# Patient Record
Sex: FEMALE | Race: WHITE | NOT HISPANIC OR LATINO | Employment: UNEMPLOYED | URBAN - METROPOLITAN AREA
[De-identification: names, ages, dates, MRNs, and addresses within clinical notes are randomized per-mention and may not be internally consistent; named-entity substitution may affect disease eponyms.]

---

## 2017-11-13 ENCOUNTER — HOSPITAL ENCOUNTER (EMERGENCY)
Facility: HOSPITAL | Age: 52
Discharge: HOME | End: 2017-11-13
Attending: EMERGENCY MEDICINE | Admitting: EMERGENCY MEDICINE

## 2017-11-13 VITALS
SYSTOLIC BLOOD PRESSURE: 138 MMHG | DIASTOLIC BLOOD PRESSURE: 83 MMHG | HEIGHT: 61 IN | WEIGHT: 199 LBS | RESPIRATION RATE: 20 BRPM | TEMPERATURE: 98.2 F | OXYGEN SATURATION: 100 % | BODY MASS INDEX: 37.57 KG/M2 | HEART RATE: 79 BPM

## 2017-11-13 DIAGNOSIS — K08.89 PAIN, DENTAL: Primary | ICD-10-CM

## 2017-11-13 PROCEDURE — 99282 EMERGENCY DEPT VISIT SF MDM: CPT

## 2017-11-13 RX ORDER — PENICILLIN V POTASSIUM 500 MG/1
500 TABLET ORAL 4 TIMES DAILY
Qty: 28 TABLET | Refills: 0 | Status: SHIPPED | OUTPATIENT
Start: 2017-11-13 | End: 2017-11-20

## 2017-11-13 RX ORDER — PENICILLIN V POTASSIUM 250 MG/1
500 TABLET ORAL ONCE
Status: COMPLETED | OUTPATIENT
Start: 2017-11-13 | End: 2017-11-13

## 2017-11-13 RX ORDER — NAPROXEN 250 MG/1
500 TABLET ORAL ONCE
Status: COMPLETED | OUTPATIENT
Start: 2017-11-13 | End: 2017-11-13

## 2017-11-13 RX ORDER — OXYCODONE HYDROCHLORIDE AND ACETAMINOPHEN 5; 325 MG/1; MG/1
1 TABLET ORAL EVERY 4 HOURS PRN
Status: DISCONTINUED | OUTPATIENT
Start: 2017-11-13 | End: 2017-11-13 | Stop reason: HOSPADM

## 2017-11-13 RX ORDER — NAPROXEN 500 MG/1
500 TABLET ORAL 2 TIMES DAILY WITH MEALS
Qty: 30 TABLET | Refills: 0 | Status: SHIPPED | OUTPATIENT
Start: 2017-11-13 | End: 2019-06-27

## 2017-11-13 RX ADMIN — NAPROXEN 500 MG: 250 TABLET ORAL at 13:56

## 2017-11-13 RX ADMIN — PENICILLIN V POTASSIUM 500 MG: 250 TABLET, FILM COATED ORAL at 13:56

## 2017-11-13 RX ADMIN — OXYCODONE HYDROCHLORIDE AND ACETAMINOPHEN 1 TABLET: 5; 325 TABLET ORAL at 13:55

## 2017-11-13 NOTE — ED NOTES
D/c instructions and rx reviewed w/ pt  All questions and concerns addressed at this time         Marene Bugler, RN  11/13/17 1400

## 2017-11-13 NOTE — ED PROVIDER NOTES
Pt Name: Madelin Mcbride  MRN: [id number]  [place] 1965  Age/Sex: 46 y o  female  Date of evaluation: 11/13/2017  PCP: Jose Magaña, [address] [place] [address]    Chief Complaint   Patient presents with    Dental Pain     Pt states she started to have a dental abscess yesterday morning  Pt is now c/o pain radiating from mouth to neck  Pt states "I had some left over cipro and starting taking that"  Pt states she had a 103 fever this morning         RADHA Lorenz presents to the Emergency Department complaining of right sided dental pain  She took motrin and left over cipro at home with no relief  She called her dentist and cannot be seen until next week  Patient reports that she has had this in the past and that she required admission at some point for a severe infection  HPI      Past Medical and Surgical History    Past Medical History:   Diagnosis Date    Back pain     Fibromyalgia     Lupus        Past Surgical History:   Procedure Laterality Date    HYSTERECTOMY      NECK SURGERY         History reviewed  No pertinent family history  Social History   Substance Use Topics    Smoking status: Current Every Day Smoker     Packs/day: 0 50     Types: Cigarettes    Smokeless tobacco: Never Used    Alcohol use No              Allergies    Allergies   Allergen Reactions    Valium [Diazepam] Anaphylaxis       Home Medications    Prior to Admission medications    Medication Sig Start Date End Date Taking? Authorizing Provider   oxyCODONE-acetaminophen (PERCOCET) 5-325 mg per tablet Take 1 tablet by mouth every 6 (six) hours as needed for moderate pain Max Daily Amount: 4 tablets 10/29/16 11/13/17  Katheryn Figueroa PA-C   UNKNOWN TO PATIENT   11/13/17  Historical Provider, MD           Review of Systems    Review of Systems   Constitutional: Negative for activity change, appetite change, chills, diaphoresis, fatigue and fever  HENT: Positive for dental problem and ear pain   Negative for congestion, drooling, facial swelling, postnasal drip, rhinorrhea, sinus pressure, sneezing and sore throat  Eyes: Negative for pain and visual disturbance  Respiratory: Negative for cough, chest tightness and shortness of breath  Cardiovascular: Negative for chest pain, palpitations and leg swelling  Gastrointestinal: Negative for abdominal distention, abdominal pain, constipation, diarrhea, nausea and vomiting  Endocrine: Negative for polydipsia, polyphagia and polyuria  Genitourinary: Negative for decreased urine volume, difficulty urinating, dysuria, flank pain, frequency and hematuria  Musculoskeletal: Negative for arthralgias, gait problem, joint swelling and neck pain  Skin: Negative for pallor and rash  Allergic/Immunologic: Negative for immunocompromised state  Neurological: Negative for syncope, speech difficulty, weakness, light-headedness, numbness and headaches  All other systems reviewed and are negative  Physical Exam      ED Triage Vitals [11/13/17 1249]   Temperature Pulse Respirations Blood Pressure SpO2   98 2 °F (36 8 °C) 79 20 138/83 100 %      Temp Source Heart Rate Source Patient Position - Orthostatic VS BP Location FiO2 (%)   Oral Monitor Sitting Left arm --      Pain Score       9               Physical Exam   Constitutional: She is oriented to person, place, and time  She appears well-developed and well-nourished  No distress  HENT:   Head: Normocephalic and atraumatic  Nose: Nose normal    Mouth/Throat: Oropharynx is clear and moist and mucous membranes are normal  No oral lesions  No trismus in the jaw  No dental abscesses  No oropharyngeal exudate, posterior oropharyngeal edema, posterior oropharyngeal erythema or tonsillar abscesses  Eyes: Conjunctivae, EOM and lids are normal  Pupils are equal, round, and reactive to light  Neck: Normal range of motion and phonation normal  Neck supple  No tracheal tenderness present  No neck rigidity   No tracheal deviation, no edema, no erythema and normal range of motion present  Cardiovascular: Normal rate, regular rhythm and normal heart sounds  Exam reveals no gallop and no friction rub  No murmur heard  Pulmonary/Chest: Effort normal and breath sounds normal  No accessory muscle usage or stridor  No respiratory distress  She has no wheezes  She has no rales  Abdominal: Soft  She exhibits no distension  There is no tenderness  There is no rebound and no guarding  Neurological: She is alert and oriented to person, place, and time  No cranial nerve deficit or sensory deficit  Skin: Skin is warm and dry  No rash noted  She is not diaphoretic  No erythema  Psychiatric: She has a normal mood and affect  Her speech is normal and behavior is normal  Judgment and thought content normal    Nursing note and vitals reviewed  Assessment and Plan    Evens Valerio is a 46 y o  female who presents with dental pain  Physical examination unremarkable  Differential diagnosis (not completely inclusive) includes dental caries/ infection  Plan will be to rx abx, treat symptomatically and follow up with dental clinic as an outpatient  MDM  Number of Diagnoses or Management Options  Pain, dental:   Diagnosis management comments: No sublingual swelling  No submandibular swelling  Patient is tolerating secretions well  She is afebrile here and non-toxic          Diagnostic Results        Labs:        Radiology:    No orders to display       All radiology studies independently viewed by me and interpreted by the radiologist     Procedure    Procedures    CritCare Time      ED Course of Care and Re-Assessments      Medications   oxyCODONE-acetaminophen (PERCOCET) 5-325 mg per tablet 1 tablet (1 tablet Oral Given 11/13/17 1355)   penicillin V potassium (VEETID) tablet 500 mg (500 mg Oral Given 11/13/17 1356)   naproxen (NAPROSYN) tablet 500 mg (500 mg Oral Given 11/13/17 1356)           FINAL IMPRESSION    Final diagnoses:   Pain, dental         DISPOSITION/PLAN    Time reflects when diagnosis was documented in both MDM as applicable and the Disposition within this note     Time User Action Codes Description Comment    11/13/2017  1:51 PM Othel Minor Add [K08 89] Pain, dental       ED Disposition     ED Disposition Condition Comment    Discharge  [address]  discharge to home/self care      Condition at discharge: Good        Follow-up Information     Follow up With Specialties Details Why Contact Info Additional [facility] [facility]  Schedule an appointment as soon as possible for a visit  [facility] [facility] Adult and [address]  Schedule an appointment as soon as possible for a visit  [facility] 81 [address] [facility] Emergency Department Emergency Medicine Go to As needed, If symptoms worsen [address] [place] [address] ED, [address] [place], [place], [address]            PATIENT REFERRED TO:    [address]  [address]  [phone]  Schedule an appointment as soon as possible for a visit      [facility] Adult and [address] [facility]  [address]  [phone]  Schedule an appointment as soon as possible for a visit      [address] [facility] Emergency Department  [address] [facility] [address] [phone]  Go to  As needed, If symptoms worsen      DISCHARGE MEDICATIONS:    Discharge Medication List as of 11/13/2017  1:54 PM      START taking these medications    Details   naproxen (NAPROSYN) 500 mg tablet Take 1 tablet by mouth 2 (two) times a day with meals, Starting Mon 11/13/2017, Print      penicillin V potassium (VEETID) 500 mg tablet Take 1 tablet by mouth 4 (four) times a day for 7 days, Starting Mon 11/13/2017, Until Mon 11/20/2017, Print             No discharge procedures on file           Mena Goodpasture, DO       Mena Goodpasture, DO  11/13/17 1500

## 2017-11-13 NOTE — DISCHARGE INSTRUCTIONS
Toothache   WHAT YOU NEED TO KNOW:   A toothache is pain that is caused by irritation of the nerves in the center of your tooth  The irritation may be caused by several problems, such as a cavity, an infection, a cracked tooth, or gum disease  It is very important to follow up with your dentist so the cause of your toothache can be diagnosed and treated  This can help prevent more serious problems  DISCHARGE INSTRUCTIONS:   Medicines: You may  need any of the following:  · NSAIDs  decrease swelling and pain  This medicine can be bought with or without a doctor's order  This medicine can cause stomach bleeding or kidney problems in certain people  If you take blood thinner medicine, always ask your healthcare provider if NSAIDs are safe for you  Always read the medicine label and follow the directions on it before using this medicine  · Acetaminophen  decreases pain  It is available without a doctor's order  Ask how much to take and how often to take it  Follow directions  Acetaminophen can cause liver damage if not taken correctly  · Pain medicine  may be given as a pill or as medicine that you put directly on your tooth or gums  Do not wait until the pain is severe before you take this medicine  · Antibiotics  help fight or prevent an infection caused by bacteria  Take them as directed  · Take your medicine as directed  Contact your healthcare provider if you think your medicine is not helping or if you have side effects  Tell him of her if you are allergic to any medicine  Keep a list of the medicines, vitamins, and herbs you take  Include the amounts, and when and why you take them  Bring the list or the pill bottles to follow-up visits  Carry your medicine list with you in case of an emergency  Follow up with your dentist as directed: You may be referred to a dental surgeon  Write down your questions so you remember to ask them during your visits     Self-care:   · Rinse your mouth with warm salt water 4 times a day or as directed  · You may need to eat soft foods to help relieve pain caused by chewing  Contact your dentist if:   · You have questions or concerns about your condition or care  Return to the emergency department if:   · You have trouble breathing  · You have swelling in your face or neck  · You have a fever and chills  · You have trouble speaking or swallowing  · You have trouble opening or closing your mouth  © 2017 [facility] Information is for End User's use only and may not be sold, redistributed or otherwise used for commercial purposes  All illustrations and images included in CareNotes® are the copyrighted property of A D A M , Inc  or Valente Rangel  The above information is an  only  It is not intended as medical advice for individual conditions or treatments  Talk to your doctor, nurse or pharmacist before following any medical regimen to see if it is safe and effective for you

## 2019-06-27 ENCOUNTER — HOSPITAL ENCOUNTER (EMERGENCY)
Facility: HOSPITAL | Age: 54
Discharge: HOME | End: 2019-06-28
Attending: EMERGENCY MEDICINE

## 2019-06-27 ENCOUNTER — APPOINTMENT (EMERGENCY)
Dept: RADIOLOGY | Facility: HOSPITAL | Age: 54
End: 2019-06-27

## 2019-06-27 VITALS
TEMPERATURE: 98.7 F | DIASTOLIC BLOOD PRESSURE: 74 MMHG | HEART RATE: 79 BPM | SYSTOLIC BLOOD PRESSURE: 162 MMHG | OXYGEN SATURATION: 95 % | RESPIRATION RATE: 16 BRPM | WEIGHT: 197.97 LBS | BODY MASS INDEX: 37.41 KG/M2

## 2019-06-27 DIAGNOSIS — M54.50 LOW BACK PAIN: ICD-10-CM

## 2019-06-27 DIAGNOSIS — M25.551 ACUTE RIGHT HIP PAIN: Primary | ICD-10-CM

## 2019-06-27 LAB
ANION GAP SERPL CALCULATED.3IONS-SCNC: 9 MMOL/L (ref 4–13)
BASOPHILS # BLD AUTO: 0.04 THOUSANDS/ΜL (ref 0–0.1)
BASOPHILS NFR BLD AUTO: 1 % (ref 0–1)
BUN SERPL-MCNC: 15 MG/DL (ref 5–25)
CALCIUM SERPL-MCNC: 9 MG/DL (ref 8.3–10.1)
CHLORIDE SERPL-SCNC: 103 MMOL/L (ref 100–108)
CO2 SERPL-SCNC: 32 MMOL/L (ref 21–32)
CREAT SERPL-MCNC: 0.77 MG/DL (ref 0.6–1.3)
CRP SERPL QL: 81.3 MG/L
EOSINOPHIL # BLD AUTO: 0.15 THOUSAND/ΜL (ref 0–0.61)
EOSINOPHIL NFR BLD AUTO: 2 % (ref 0–6)
ERYTHROCYTE [DISTWIDTH] IN BLOOD BY AUTOMATED COUNT: 13.5 % (ref 11.6–15.1)
GFR SERPL CREATININE-BSD FRML MDRD: 88 ML/MIN/1.73SQ M
GLUCOSE SERPL-MCNC: 109 MG/DL (ref 65–140)
HCT VFR BLD AUTO: 35.6 % (ref 34.8–46.1)
HGB BLD-MCNC: 11.6 G/DL (ref 11.5–15.4)
IMM GRANULOCYTES # BLD AUTO: 0.03 THOUSAND/UL (ref 0–0.2)
IMM GRANULOCYTES NFR BLD AUTO: 0 % (ref 0–2)
LYMPHOCYTES # BLD AUTO: 1.48 THOUSANDS/ΜL (ref 0.6–4.47)
LYMPHOCYTES NFR BLD AUTO: 21 % (ref 14–44)
MCH RBC QN AUTO: 28.9 PG (ref 26.8–34.3)
MCHC RBC AUTO-ENTMCNC: 32.6 G/DL (ref 31.4–37.4)
MCV RBC AUTO: 89 FL (ref 82–98)
MONOCYTES # BLD AUTO: 0.75 THOUSAND/ΜL (ref 0.17–1.22)
MONOCYTES NFR BLD AUTO: 11 % (ref 4–12)
NEUTROPHILS # BLD AUTO: 4.56 THOUSANDS/ΜL (ref 1.85–7.62)
NEUTS SEG NFR BLD AUTO: 65 % (ref 43–75)
NRBC BLD AUTO-RTO: 0 /100 WBCS
PLATELET # BLD AUTO: 274 THOUSANDS/UL (ref 149–390)
PMV BLD AUTO: 9.1 FL (ref 8.9–12.7)
POTASSIUM SERPL-SCNC: 2.9 MMOL/L (ref 3.5–5.3)
RBC # BLD AUTO: 4.02 MILLION/UL (ref 3.81–5.12)
SODIUM SERPL-SCNC: 144 MMOL/L (ref 136–145)
WBC # BLD AUTO: 7.01 THOUSAND/UL (ref 4.31–10.16)

## 2019-06-27 PROCEDURE — 80048 BASIC METABOLIC PNL TOTAL CA: CPT | Performed by: EMERGENCY MEDICINE

## 2019-06-27 PROCEDURE — 36415 COLL VENOUS BLD VENIPUNCTURE: CPT | Performed by: EMERGENCY MEDICINE

## 2019-06-27 PROCEDURE — 99283 EMERGENCY DEPT VISIT LOW MDM: CPT | Performed by: EMERGENCY MEDICINE

## 2019-06-27 PROCEDURE — 73502 X-RAY EXAM HIP UNI 2-3 VIEWS: CPT

## 2019-06-27 PROCEDURE — 85025 COMPLETE CBC W/AUTO DIFF WBC: CPT | Performed by: EMERGENCY MEDICINE

## 2019-06-27 PROCEDURE — 99284 EMERGENCY DEPT VISIT MOD MDM: CPT

## 2019-06-27 PROCEDURE — 86140 C-REACTIVE PROTEIN: CPT | Performed by: EMERGENCY MEDICINE

## 2019-06-27 PROCEDURE — 85652 RBC SED RATE AUTOMATED: CPT | Performed by: EMERGENCY MEDICINE

## 2019-06-27 RX ORDER — OXYCODONE HYDROCHLORIDE AND ACETAMINOPHEN 5; 325 MG/1; MG/1
1 TABLET ORAL ONCE
Status: COMPLETED | OUTPATIENT
Start: 2019-06-27 | End: 2019-06-27

## 2019-06-27 RX ADMIN — OXYCODONE HYDROCHLORIDE AND ACETAMINOPHEN 1 TABLET: 5; 325 TABLET ORAL at 22:06

## 2019-06-28 ENCOUNTER — APPOINTMENT (EMERGENCY)
Dept: CT IMAGING | Facility: HOSPITAL | Age: 54
End: 2019-06-28

## 2019-06-28 LAB — ERYTHROCYTE [SEDIMENTATION RATE] IN BLOOD: 45 MM/HOUR (ref 0–20)

## 2019-06-28 PROCEDURE — 74177 CT ABD & PELVIS W/CONTRAST: CPT

## 2019-06-28 PROCEDURE — 96374 THER/PROPH/DIAG INJ IV PUSH: CPT

## 2019-06-28 RX ORDER — IBUPROFEN 800 MG/1
800 TABLET ORAL 3 TIMES DAILY
Qty: 21 TABLET | Refills: 0 | Status: SHIPPED | OUTPATIENT
Start: 2019-06-28

## 2019-06-28 RX ORDER — OXYCODONE HYDROCHLORIDE AND ACETAMINOPHEN 5; 325 MG/1; MG/1
1 TABLET ORAL EVERY 6 HOURS PRN
Qty: 12 TABLET | Refills: 0 | Status: SHIPPED | OUTPATIENT
Start: 2019-06-28 | End: 2019-07-01

## 2019-06-28 RX ORDER — MORPHINE SULFATE 10 MG/ML
6 INJECTION, SOLUTION INTRAMUSCULAR; INTRAVENOUS ONCE
Status: COMPLETED | OUTPATIENT
Start: 2019-06-28 | End: 2019-06-28

## 2019-06-28 RX ADMIN — IOHEXOL 100 ML: 350 INJECTION, SOLUTION INTRAVENOUS at 00:02

## 2019-06-28 RX ADMIN — MORPHINE SULFATE 6 MG: 10 INJECTION INTRAVENOUS at 00:15

## 2019-06-28 NOTE — ED PROVIDER NOTES
History  Chief Complaint   Patient presents with    Back Pain     States lower back pain that radiates to R hip, R groin and R front and inside of R leg x 4 days  Doesnt recall injury  States stood up today and fell, denies hitting head  HPI  15-year-old female with history of achalasia, chronic lower back pain, fibromyalgia, and lupus presents to the emergency department with right groin pain  Patient states that for the past few days she has had severe pain radiating from the lateral aspect of her right hip into her right groin  She has not noted any overlying skin changes  She denies having had similar pain in the past   She has been taking Motrin at home without improvement in pain and states that pain has been worse with weight-bearing  Today pain was so severe that she fell after standing up  She denies any injury resulting from the fall and otherwise denies any recent injury to right hip, back, or right leg  On review of systems, patient complains of low-grade fever with of high of 100 6 within the past 2 days  She denies any weakness, numbness, paresthesias, change in urinary/bowel function, or complaints other than stated above  She denies IVDU  None       Past Medical History:   Diagnosis Date    Achalasia     Back pain     Fibromyalgia     Lupus ([facility] [address] )        Past Surgical History:   Procedure Laterality Date    HYSTERECTOMY      NECK SURGERY         History reviewed  No pertinent family history  I have reviewed and agree with the history as documented  Social History     Tobacco Use    Smoking status: Current Every Day Smoker     Packs/day: 0 50     Types: Cigarettes    Smokeless tobacco: Never Used   Substance Use Topics    Alcohol use: No    Drug use: No        Review of Systems   Constitutional: Positive for fever  Negative for chills  Respiratory: Negative for shortness of breath  Gastrointestinal: Negative for abdominal pain, nausea and vomiting     Musculoskeletal: Positive for arthralgias, back pain and gait problem  Negative for joint swelling  Skin: Negative for rash and wound  Allergic/Immunologic: Negative for immunocompromised state  Neurological: Negative for headaches  Psychiatric/Behavioral: The patient is not nervous/anxious  All other systems reviewed and are negative  Physical Exam  Physical Exam   Constitutional: She is oriented to person, place, and time  She appears well-nourished  No distress  HENT:   Head: Normocephalic and atraumatic  Eyes: EOM are normal    Neck: Normal range of motion  Neck supple  Cardiovascular: Normal rate and regular rhythm  Pulmonary/Chest: Effort normal and breath sounds normal  No respiratory distress  Abdominal: Soft  She exhibits no distension  There is no tenderness  Hernia confirmed negative in the right inguinal area  Musculoskeletal: Normal range of motion  Right hip: She exhibits tenderness  Legs:  Neurological: She is alert and oriented to person, place, and time  Skin: Skin is warm and dry  She is not diaphoretic  Psychiatric: She has a normal mood and affect  Her behavior is normal    Nursing note and vitals reviewed        Vital Signs  ED Triage Vitals [06/27/19 2048]   Temperature Pulse Respirations Blood Pressure SpO2   98 7 °F (37 1 °C) 79 16 162/74 95 %      Temp Source Heart Rate Source Patient Position - Orthostatic VS BP Location FiO2 (%)   Oral Monitor Sitting Left arm --      Pain Score       7           Vitals:    06/27/19 2048   BP: 162/74   Pulse: 79   Patient Position - Orthostatic VS: Sitting         Visual Acuity      ED Medications  Medications   oxyCODONE-acetaminophen (PERCOCET) 5-325 mg per tablet 1 tablet (1 tablet Oral Given 6/27/19 2206)   morphine (PF) 10 mg/mL injection 6 mg (6 mg Intravenous Given 6/28/19 0015)   iohexol (OMNIPAQUE) 350 MG/ML injection (MULTI-DOSE) 100 mL (100 mL Intravenous Given 6/28/19 0002)       Diagnostic Studies  Results Reviewed Procedure Component Value Units Date/Time    Sedimentation rate, automated [[id number]]  (Abnormal) Collected:  06/27/19 2247    Lab Status:  Final result Specimen:  Blood from Arm, Left Updated:  06/28/19 0016     Sed Rate 45 mm/hour     Basic metabolic panel [[id number]]  (Abnormal) Collected:  06/27/19 2247    Lab Status:  Final result Specimen:  Blood from Arm, Left Updated:  06/27/19 2348     Sodium 144 mmol/L      Potassium 2 9 mmol/L      Chloride 103 mmol/L      CO2 32 mmol/L      ANION GAP 9 mmol/L      BUN 15 mg/dL      Creatinine 0 77 mg/dL      Glucose 109 mg/dL      Calcium 9 0 mg/dL      eGFR 88 ml/min/1 73sq m     Narrative:       [place] guidelines for Chronic Kidney Disease (CKD):     Stage 1 with normal or high GFR (GFR > 90 mL/min/1 73 square meters)    Stage 2 Mild CKD (GFR = 60-89 mL/min/1 73 square meters)    Stage 3A Moderate CKD (GFR = 45-59 mL/min/1 73 square meters)    Stage 3B Moderate CKD (GFR = 30-44 mL/min/1 73 square meters)    Stage 4 Severe CKD (GFR = 15-29 mL/min/1 73 square meters)    Stage 5 End Stage CKD (GFR <15 mL/min/1 73 square meters)  Note: GFR calculation is accurate only with a steady state creatinine    C-reactive protein [[id number]]  (Abnormal) Collected:  06/27/19 2247    Lab Status:  Final result Specimen:  Blood from Arm, Left Updated:  06/27/19 2348     CRP 81 3 mg/L     CBC and differential [[id number]] Collected:  06/27/19 2247    Lab Status:  Final result Specimen:  Blood from Arm, Left Updated:  06/27/19 2301     WBC 7 01 Thousand/uL      RBC 4 02 Million/uL      Hemoglobin 11 6 g/dL      Hematocrit 35 6 %      MCV 89 fL      MCH 28 9 pg      MCHC 32 6 g/dL      RDW 13 5 %      MPV 9 1 fL      Platelets 490 Thousands/uL      nRBC 0 /100 WBCs      Neutrophils Relative 65 %      Immat GRANS % 0 %      Lymphocytes Relative 21 %      Monocytes Relative 11 %      Eosinophils Relative 2 %      Basophils Relative 1 %      Neutrophils Absolute 4 56 Thousands/µL      Immature Grans Absolute 0 03 Thousand/uL      Lymphocytes Absolute 1 48 Thousands/µL      Monocytes Absolute 0 75 Thousand/µL      Eosinophils Absolute 0 15 Thousand/µL      Basophils Absolute 0 04 Thousands/µL                  CT abdomen pelvis with contrast   Final Result by Atilio Baeza MD (06/28 0031)         1  No evidence of bowel obstruction, colitis or diverticulitis  2   No pyelonephritis or obstructive uropathy  3   Stable degenerative change in the sacroiliac joints and lower lumbar spine  Workstation performed: [id number]         XR hip/pelv 2-3 vws right if performed   Final Result by Ricardo Roe MD (06/28 6906)      No acute osseous abnormality  Workstation performed: [id number]                    Procedures  Procedures       ED Course  ED Course as of Jul 03 0336 Fri Jun 28, 2019   0000 C-REACTIVE PROTEIN(!): 81 3   0026 Sed Rate(!): 45             MDM    Disposition  Final diagnoses:   Acute right hip pain   Low back pain     Time reflects when diagnosis was documented in both MDM as applicable and the Disposition within this note     Time User Action Codes Description Comment    6/28/2019 12:42 AM Goyo Nuñezots Add [M25 551] Acute right hip pain     6/28/2019 12:44 AM Goyo Nuñezots Add [M54 5] Low back pain       ED Disposition     ED Disposition Condition Date/Time Comment    Discharge Stable Fri Jun 28, 2019 12:42 AM Jessica Navarro discharge to home/self care              Follow-up Information     Follow up With Specialties Details Why Contact Info Additional [address] [facility] Orthopedic Surgery Schedule an appointment as soon as possible for a visit   [address] [facility] [address] [phone] [address] Specialists [place], [address], [employer], [place], [address] [facility] Emergency Department Emergency Medicine  As needed, If symptoms worsen 34 [facility] [place] [address] [phone] [place] ED, 819 [facility], [place], [place], [address]          Discharge Medication List as of 6/28/2019 12:45 AM      START taking these medications    Details   ibuprofen (MOTRIN) 800 mg tablet Take 1 tablet (800 mg total) by mouth 3 (three) times a day, Starting Fri 6/28/2019, Normal      oxyCODONE-acetaminophen (PERCOCET) 5-325 mg per tablet Take 1 tablet by mouth every 6 (six) hours as needed for severe pain for up to 3 daysMax Daily Amount: 4 tablets, Starting Fri 6/28/2019, Until Mon 7/1/2019, Print           No discharge procedures on file      ED Provider  Electronically Signed by           Josefina Altamirano MD  07/03/19 0891

## 2021-04-14 ENCOUNTER — HOSPITAL ENCOUNTER (EMERGENCY)
Facility: HOSPITAL | Age: 56
Discharge: HOME | End: 2021-04-14
Attending: EMERGENCY MEDICINE

## 2021-04-14 ENCOUNTER — APPOINTMENT (EMERGENCY)
Dept: CT IMAGING | Facility: HOSPITAL | Age: 56
End: 2021-04-14

## 2021-04-14 VITALS
DIASTOLIC BLOOD PRESSURE: 65 MMHG | HEART RATE: 89 BPM | OXYGEN SATURATION: 99 % | RESPIRATION RATE: 18 BRPM | HEIGHT: 61 IN | TEMPERATURE: 97.1 F | BODY MASS INDEX: 35.87 KG/M2 | SYSTOLIC BLOOD PRESSURE: 145 MMHG | WEIGHT: 190 LBS

## 2021-04-14 DIAGNOSIS — N39.0 URINARY TRACT INFECTION WITHOUT HEMATURIA, SITE UNSPECIFIED: Primary | ICD-10-CM

## 2021-04-14 DIAGNOSIS — R10.13 EPIGASTRIC PAIN: ICD-10-CM

## 2021-04-14 DIAGNOSIS — R10.11 RIGHT UPPER QUADRANT ABDOMINAL PAIN: ICD-10-CM

## 2021-04-14 LAB
ALBUMIN SERPL BCP-MCNC: 3.4 G/DL (ref 3.5–5)
ALP SERPL-CCNC: 123 U/L (ref 46–116)
ALT SERPL W P-5'-P-CCNC: 22 U/L (ref 12–78)
ANION GAP SERPL CALCULATED.3IONS-SCNC: 9 MMOL/L (ref 4–13)
AST SERPL W P-5'-P-CCNC: 31 U/L (ref 5–45)
BACTERIA UR QL AUTO: ABNORMAL /HPF
BASOPHILS # BLD AUTO: 0.05 THOUSANDS/ΜL (ref 0–0.1)
BASOPHILS NFR BLD AUTO: 1 % (ref 0–1)
BILIRUB SERPL-MCNC: 0.44 MG/DL (ref 0.2–1)
BILIRUB UR QL STRIP: NEGATIVE
BUN SERPL-MCNC: 13 MG/DL (ref 5–25)
CALCIUM ALBUM COR SERPL-MCNC: 9.2 MG/DL (ref 8.3–10.1)
CALCIUM SERPL-MCNC: 8.7 MG/DL (ref 8.3–10.1)
CHLORIDE SERPL-SCNC: 106 MMOL/L (ref 100–108)
CLARITY UR: CLEAR
CO2 SERPL-SCNC: 26 MMOL/L (ref 21–32)
COLOR UR: YELLOW
CREAT SERPL-MCNC: 0.64 MG/DL (ref 0.6–1.3)
EOSINOPHIL # BLD AUTO: 0.12 THOUSAND/ΜL (ref 0–0.61)
EOSINOPHIL NFR BLD AUTO: 1 % (ref 0–6)
ERYTHROCYTE [DISTWIDTH] IN BLOOD BY AUTOMATED COUNT: 14.6 % (ref 11.6–15.1)
GFR SERPL CREATININE-BSD FRML MDRD: 100 ML/MIN/1.73SQ M
GLUCOSE SERPL-MCNC: 90 MG/DL (ref 65–140)
GLUCOSE UR STRIP-MCNC: NEGATIVE MG/DL
HCT VFR BLD AUTO: 42.2 % (ref 34.8–46.1)
HGB BLD-MCNC: 13.4 G/DL (ref 11.5–15.4)
HGB UR QL STRIP.AUTO: NEGATIVE
IMM GRANULOCYTES # BLD AUTO: 0.04 THOUSAND/UL (ref 0–0.2)
IMM GRANULOCYTES NFR BLD AUTO: 0 % (ref 0–2)
KETONES UR STRIP-MCNC: NEGATIVE MG/DL
LEUKOCYTE ESTERASE UR QL STRIP: NEGATIVE
LIPASE SERPL-CCNC: 76 U/L (ref 73–393)
LYMPHOCYTES # BLD AUTO: 1.87 THOUSANDS/ΜL (ref 0.6–4.47)
LYMPHOCYTES NFR BLD AUTO: 18 % (ref 14–44)
MCH RBC QN AUTO: 28.8 PG (ref 26.8–34.3)
MCHC RBC AUTO-ENTMCNC: 31.8 G/DL (ref 31.4–37.4)
MCV RBC AUTO: 91 FL (ref 82–98)
MONOCYTES # BLD AUTO: 0.84 THOUSAND/ΜL (ref 0.17–1.22)
MONOCYTES NFR BLD AUTO: 8 % (ref 4–12)
NEUTROPHILS # BLD AUTO: 7.57 THOUSANDS/ΜL (ref 1.85–7.62)
NEUTS SEG NFR BLD AUTO: 72 % (ref 43–75)
NITRITE UR QL STRIP: POSITIVE
NON-SQ EPI CELLS URNS QL MICRO: ABNORMAL /HPF
NRBC BLD AUTO-RTO: 0 /100 WBCS
PH UR STRIP.AUTO: 6 [PH]
PLATELET # BLD AUTO: 335 THOUSANDS/UL (ref 149–390)
PMV BLD AUTO: 8.8 FL (ref 8.9–12.7)
POTASSIUM SERPL-SCNC: 4.6 MMOL/L (ref 3.5–5.3)
PROT SERPL-MCNC: 7.7 G/DL (ref 6.4–8.2)
PROT UR STRIP-MCNC: NEGATIVE MG/DL
RBC # BLD AUTO: 4.66 MILLION/UL (ref 3.81–5.12)
RBC #/AREA URNS AUTO: ABNORMAL /HPF
SODIUM SERPL-SCNC: 141 MMOL/L (ref 136–145)
SP GR UR STRIP.AUTO: 1.02 (ref 1–1.03)
UROBILINOGEN UR QL STRIP.AUTO: 2 E.U./DL
WBC # BLD AUTO: 10.49 THOUSAND/UL (ref 4.31–10.16)
WBC #/AREA URNS AUTO: ABNORMAL /HPF

## 2021-04-14 PROCEDURE — 83690 ASSAY OF LIPASE: CPT | Performed by: EMERGENCY MEDICINE

## 2021-04-14 PROCEDURE — 36415 COLL VENOUS BLD VENIPUNCTURE: CPT | Performed by: EMERGENCY MEDICINE

## 2021-04-14 PROCEDURE — 85025 COMPLETE CBC W/AUTO DIFF WBC: CPT | Performed by: EMERGENCY MEDICINE

## 2021-04-14 PROCEDURE — 99285 EMERGENCY DEPT VISIT HI MDM: CPT | Performed by: EMERGENCY MEDICINE

## 2021-04-14 PROCEDURE — 99284 EMERGENCY DEPT VISIT MOD MDM: CPT

## 2021-04-14 PROCEDURE — 96375 TX/PRO/DX INJ NEW DRUG ADDON: CPT

## 2021-04-14 PROCEDURE — 80053 COMPREHEN METABOLIC PANEL: CPT | Performed by: EMERGENCY MEDICINE

## 2021-04-14 PROCEDURE — 81001 URINALYSIS AUTO W/SCOPE: CPT | Performed by: EMERGENCY MEDICINE

## 2021-04-14 PROCEDURE — 74177 CT ABD & PELVIS W/CONTRAST: CPT

## 2021-04-14 PROCEDURE — 96374 THER/PROPH/DIAG INJ IV PUSH: CPT

## 2021-04-14 RX ORDER — ONDANSETRON 2 MG/ML
4 INJECTION INTRAMUSCULAR; INTRAVENOUS ONCE
Status: COMPLETED | OUTPATIENT
Start: 2021-04-14 | End: 2021-04-14

## 2021-04-14 RX ORDER — LIDOCAINE HYDROCHLORIDE 20 MG/ML
15 SOLUTION OROPHARYNGEAL ONCE
Status: COMPLETED | OUTPATIENT
Start: 2021-04-14 | End: 2021-04-14

## 2021-04-14 RX ORDER — MORPHINE SULFATE 4 MG/ML
4 INJECTION, SOLUTION INTRAMUSCULAR; INTRAVENOUS ONCE
Status: COMPLETED | OUTPATIENT
Start: 2021-04-14 | End: 2021-04-14

## 2021-04-14 RX ORDER — CEPHALEXIN 250 MG/1
500 CAPSULE ORAL ONCE
Status: COMPLETED | OUTPATIENT
Start: 2021-04-14 | End: 2021-04-14

## 2021-04-14 RX ORDER — MAGNESIUM HYDROXIDE/ALUMINUM HYDROXICE/SIMETHICONE 120; 1200; 1200 MG/30ML; MG/30ML; MG/30ML
30 SUSPENSION ORAL ONCE
Status: COMPLETED | OUTPATIENT
Start: 2021-04-14 | End: 2021-04-14

## 2021-04-14 RX ORDER — MORPHINE SULFATE 4 MG/ML
4 INJECTION, SOLUTION INTRAMUSCULAR; INTRAVENOUS ONCE
Status: DISCONTINUED | OUTPATIENT
Start: 2021-04-14 | End: 2021-04-14

## 2021-04-14 RX ORDER — CEPHALEXIN 500 MG/1
500 CAPSULE ORAL EVERY 12 HOURS SCHEDULED
Qty: 10 CAPSULE | Refills: 0 | Status: SHIPPED | OUTPATIENT
Start: 2021-04-15 | End: 2021-04-20

## 2021-04-14 RX ADMIN — LIDOCAINE HYDROCHLORIDE 15 ML: 20 SOLUTION ORAL; TOPICAL at 21:12

## 2021-04-14 RX ADMIN — CEPHALEXIN 500 MG: 250 CAPSULE ORAL at 21:12

## 2021-04-14 RX ADMIN — ALUMINA, MAGNESIA, AND SIMETHICONE ORAL SUSPENSION REGULAR STRENGTH 30 ML: 1200; 1200; 120 SUSPENSION ORAL at 21:12

## 2021-04-14 RX ADMIN — MORPHINE SULFATE 4 MG: 4 INJECTION INTRAVENOUS at 19:34

## 2021-04-14 RX ADMIN — IOHEXOL 100 ML: 350 INJECTION, SOLUTION INTRAVENOUS at 20:04

## 2021-04-14 RX ADMIN — ONDANSETRON 4 MG: 2 INJECTION INTRAMUSCULAR; INTRAVENOUS at 19:33

## 2021-04-14 NOTE — ED PROVIDER NOTES
Pt Name: Audrey Nieves  MRN: [id number]  [place] 1965  Age/Sex: 64 y o  female  Date of evaluation: 4/14/2021  PCP: Keyonna Salas DO    CHIEF COMPLAINT    Chief Complaint   Patient presents with    Abdominal Pain     RUQ lump with abd bloating, constipation          HPI    64 y o  female presenting with abdominal pain that started 2 days ago  Patient states she feels are right upper quadrant abdominal lump, it is tender to touch  Pain has been constant since then  She has nausea, no vomiting  Mentions constipation  No urinary symptoms  No fevers or chills  History of cholecystectomy, appendectomy, hysterectomy  Past Medical and Surgical History    Past Medical History:   Diagnosis Date    Achalasia     Back pain     Fibromyalgia     Lupus ([address] )        Past Surgical History:   Procedure Laterality Date    HYSTERECTOMY      NECK SURGERY         No family history on file  Social History     Tobacco Use    Smoking status: Current Every Day Smoker     Packs/day: 0 50     Types: Cigarettes    Smokeless tobacco: Never Used   Substance Use Topics    Alcohol use: No    Drug use: No           Allergies    Allergies   Allergen Reactions    Valium [Diazepam] Anaphylaxis       Home Medications    Prior to Admission medications    Medication Sig Start Date End Date Taking? Authorizing Provider   ibuprofen (MOTRIN) 800 mg tablet Take 1 tablet (800 mg total) by mouth 3 (three) times a day 6/28/19   Benton Libman, MD           Review of Systems    Review of Systems   Constitutional: Negative for chills and fever  HENT: Negative for rhinorrhea and sore throat  Eyes: Negative for pain and visual disturbance  Respiratory: Negative for cough and shortness of breath  Cardiovascular: Negative for chest pain and leg swelling  Gastrointestinal: Positive for abdominal pain, constipation and nausea  Negative for vomiting  Genitourinary: Negative for dysuria and hematuria  Musculoskeletal: Negative for back pain and myalgias  Skin: Negative for rash and wound  Neurological: Negative for syncope and headaches  Physical Exam      ED Triage Vitals [04/14/21 1729]   Temperature Pulse Respirations Blood Pressure SpO2   (!) 97 1 °F (36 2 °C) 89 18 145/65 99 %      Temp Source Heart Rate Source Patient Position - Orthostatic VS BP Location FiO2 (%)   Temporal Monitor Sitting Right arm --      Pain Score       9               Physical Exam  Constitutional:       General: She is not in acute distress  Appearance: She is not ill-appearing  HENT:      Head: Normocephalic and atraumatic  Nose: Nose normal    Eyes:      Extraocular Movements: Extraocular movements intact  Pupils: Pupils are equal, round, and reactive to light  Neck:      Musculoskeletal: Normal range of motion and neck supple  Cardiovascular:      Rate and Rhythm: Normal rate and regular rhythm  Pulmonary:      Effort: No respiratory distress  Breath sounds: Normal breath sounds  No wheezing  Abdominal:      General: There is no distension  Palpations: Abdomen is soft  Comments: Right upper quadrant and epigastric tenderness to palpation   Musculoskeletal: Normal range of motion  General: No swelling or deformity  Skin:     General: Skin is warm  Findings: No erythema  Neurological:      Mental Status: She is alert and oriented to person, place, and time  Mental status is at baseline                Diagnostic Results      Labs:    Results Reviewed     Procedure Component Value Units Date/Time    Urine Microscopic [[id number]]  (Abnormal) Collected: 04/14/21 2002    Lab Status: Final result Specimen: Urine, Clean Catch Updated: 04/14/21 2035     RBC, UA None Seen /hpf      WBC, UA 2-4 /hpf      Epithelial Cells None Seen /hpf      Bacteria, UA Moderate /hpf     UA w Reflex to Microscopic w Reflex to Culture [[id number]]  (Abnormal) Collected: 04/14/21 2002    Lab Status: Final result Specimen: Urine, Clean Catch Updated: 04/14/21 2011     Color, UA Yellow     Clarity, UA Clear     Specific Gravity, UA 1 025     pH, UA 6 0     Leukocytes, UA Negative     Nitrite, UA Positive     Protein, UA Negative mg/dl      Glucose, UA Negative mg/dl      Ketones, UA Negative mg/dl      Urobilinogen, UA 2 0 E U /dl      Bilirubin, UA Negative     Blood, UA Negative    Comprehensive metabolic panel [[id number]]  (Abnormal) Collected: 04/14/21 1919    Lab Status: Final result Specimen: Blood from Arm, Right Updated: 04/14/21 1941     Sodium 141 mmol/L      Potassium 4 6 mmol/L      Chloride 106 mmol/L      CO2 26 mmol/L      ANION GAP 9 mmol/L      BUN 13 mg/dL      Creatinine 0 64 mg/dL      Glucose 90 mg/dL      Calcium 8 7 mg/dL      Corrected Calcium 9 2 mg/dL      AST 31 U/L      ALT 22 U/L      Alkaline Phosphatase 123 U/L      Total Protein 7 7 g/dL      Albumin 3 4 g/dL      Total Bilirubin 0 44 mg/dL      eGFR 100 ml/min/1 73sq m     Narrative:      National [employer] guidelines for Chronic Kidney Disease (CKD):     Stage 1 with normal or high GFR (GFR > 90 mL/min/1 73 square meters)    Stage 2 Mild CKD (GFR = 60-89 mL/min/1 73 square meters)    Stage 3A Moderate CKD (GFR = 45-59 mL/min/1 73 square meters)    Stage 3B Moderate CKD (GFR = 30-44 mL/min/1 73 square meters)    Stage 4 Severe CKD (GFR = 15-29 mL/min/1 73 square meters)    Stage 5 End Stage CKD (GFR <15 mL/min/1 73 square meters)  Note: GFR calculation is accurate only with a steady state creatinine    Lipase [[id number]]  (Normal) Collected: 04/14/21 1919    Lab Status: Final result Specimen: Blood from Arm, Right Updated: 04/14/21 1941     Lipase 76 u/L     CBC and differential [[id number]]  (Abnormal) Collected: 04/14/21 1919    Lab Status: Final result Specimen: Blood from Arm, Right Updated: 04/14/21 1925     WBC 10 49 Thousand/uL      RBC 4 66 Million/uL      Hemoglobin 13 4 g/dL      Hematocrit 42 2 %      MCV 91 fL      MCH 28 8 pg      MCHC 31 8 g/dL      RDW 14 6 %      MPV 8 8 fL      Platelets 063 Thousands/uL      nRBC 0 /100 WBCs      Neutrophils Relative 72 %      Immat GRANS % 0 %      Lymphocytes Relative 18 %      Monocytes Relative 8 %      Eosinophils Relative 1 %      Basophils Relative 1 %      Neutrophils Absolute 7 57 Thousands/µL      Immature Grans Absolute 0 04 Thousand/uL      Lymphocytes Absolute 1 87 Thousands/µL      Monocytes Absolute 0 84 Thousand/µL      Eosinophils Absolute 0 12 Thousand/µL      Basophils Absolute 0 05 Thousands/µL           All labs reviewed and utilized in the medical decision making process    Radiology:    CT abdomen pelvis with contrast   Final Result      Mild circumferential thickening of the distal esophagus in keeping with a nonspecific esophagitis  No acute inflammatory changes in the abdomen or pelvis  No apparent abdominal wall mass  The study was marked in [facility] for immediate notification  Workstation performed: [id number]             All radiology studies independently viewed by me and interpreted by the radiologist     Procedure    Procedures        MDM    Patient's workup concerning for urinary tract infection as well as nonspecific esophagitis  Pain had improved upon re-evaluation, she was given GI cocktail, Zofran and morphine  Nausea resolved  Also given a dose of antibiotics for her UTI  Advised PCP follow-up  Tylenol as needed for pain  Prescribed antibiotics to her pharmacy of choice  ED return precautions discussed, patient verbalized understanding and is in agreement with this plan            Medications   morphine (PF) 4 mg/mL injection 4 mg (4 mg Intravenous Given 4/14/21 1934)   ondansetron (ZOFRAN) injection 4 mg (4 mg Intravenous Given 4/14/21 1933)   iohexol (OMNIPAQUE) 350 MG/ML injection (SINGLE-DOSE) 100 mL (100 mL Intravenous Given 4/14/21 2004)   aluminum-magnesium hydroxide-simethicone (MYLANTA) oral suspension 30 mL (30 mL Oral Given 4/14/21 2112)   Lidocaine Viscous HCl (XYLOCAINE) 2 % mucosal solution 15 mL (15 mL Swish & Swallow Given 4/14/21 2112)   cephalexin (KEFLEX) capsule 500 mg (500 mg Oral Given 4/14/21 2112)           FINAL IMPRESSION    Final diagnoses:   Urinary tract infection without hematuria, site unspecified   Epigastric pain   Right upper quadrant abdominal pain         DISPOSITION    Time reflects when diagnosis was documented in both MDM as applicable and the Disposition within this note     Time User Action Codes Description Comment    4/14/2021  9:11 PM Isaias Lux Add [N39 0] Urinary tract infection without hematuria, site unspecified     4/14/2021  9:11 PM Isaias Lux Add [R10 13] Epigastric pain     4/14/2021  9:11 PM Isaias Lux Add [R10 11] Right upper quadrant abdominal pain       ED Disposition     ED Disposition Condition Date/Time Comment    Discharge Stable Wed Apr 14, 2021  9:11 PM [address]  discharge to home/self care              Follow-up Information     Follow up With Specialties Details Why Contact Info    Noemy Tang DO General Practice Schedule an appointment as soon as possible for a visit in 1 week  [address] [place] [address]  [phone]              PATIENT REFERRED TO:    Noemy Tang DO  [address] [facility] [address]  [phone]    Schedule an appointment as soon as possible for a visit in 1 week        DISCHARGE MEDICATIONS:    Discharge Medication List as of 4/14/2021  9:14 PM      START taking these medications    Details   cephalexin (KEFLEX) 500 mg capsule Take 1 capsule (500 mg total) by mouth every 12 (twelve) hours for 5 days, Starting Thu 4/15/2021, Until Tue 4/20/2021, Normal         CONTINUE these medications which have NOT CHANGED    Details   ibuprofen (MOTRIN) 800 mg tablet Take 1 tablet (800 mg total) by mouth 3 (three) times a day, Starting Fri 6/28/2019, Normal No discharge procedures on file  Karlee Wadsworth DO        This note was partially completed using voice recognition technology, and was scanned for gross errors; however some errors may still exist  Please contact the author with any questions or requests for clarification        Karlee Wadsworth DO  04/23/21 3505

## 2021-04-15 NOTE — DISCHARGE INSTRUCTIONS
Take tylenol as needed for pain  Take antibiotics as prescribed for UTI  Follow up with your family doctor within 1 week  Return to the emergency department for any new or worsening symptoms

## 2022-09-09 ENCOUNTER — HOSPITAL ENCOUNTER (EMERGENCY)
Facility: HOSPITAL | Age: 57
Discharge: HOME | End: 2022-09-09
Attending: EMERGENCY MEDICINE

## 2022-09-09 ENCOUNTER — APPOINTMENT (OUTPATIENT)
Dept: RADIOLOGY | Facility: HOSPITAL | Age: 57
End: 2022-09-09

## 2022-09-09 VITALS
OXYGEN SATURATION: 96 % | DIASTOLIC BLOOD PRESSURE: 70 MMHG | RESPIRATION RATE: 24 BRPM | SYSTOLIC BLOOD PRESSURE: 161 MMHG | HEART RATE: 66 BPM | TEMPERATURE: 98.1 F

## 2022-09-09 DIAGNOSIS — R07.9 CHEST PAIN: Primary | ICD-10-CM

## 2022-09-09 LAB
2HR DELTA HS TROPONIN: 0 NG/L
ALBUMIN SERPL BCP-MCNC: 3.6 G/DL (ref 3.5–5)
ALP SERPL-CCNC: 111 U/L (ref 46–116)
ALT SERPL W P-5'-P-CCNC: 21 U/L (ref 12–78)
ANION GAP SERPL CALCULATED.3IONS-SCNC: 9 MMOL/L (ref 4–13)
APTT PPP: 29 SECONDS (ref 23–37)
AST SERPL W P-5'-P-CCNC: 11 U/L (ref 5–45)
BASOPHILS # BLD AUTO: 0.04 THOUSANDS/ΜL (ref 0–0.1)
BASOPHILS NFR BLD AUTO: 0 % (ref 0–1)
BILIRUB SERPL-MCNC: 0.52 MG/DL (ref 0.2–1)
BUN SERPL-MCNC: 13 MG/DL (ref 5–25)
CALCIUM SERPL-MCNC: 8.8 MG/DL (ref 8.3–10.1)
CARDIAC TROPONIN I PNL SERPL HS: 2 NG/L
CARDIAC TROPONIN I PNL SERPL HS: 2 NG/L
CHLORIDE SERPL-SCNC: 107 MMOL/L (ref 96–108)
CO2 SERPL-SCNC: 28 MMOL/L (ref 21–32)
CREAT SERPL-MCNC: 0.78 MG/DL (ref 0.6–1.3)
D DIMER PPP FEU-MCNC: 0.48 UG/ML FEU
EOSINOPHIL # BLD AUTO: 0.22 THOUSAND/ΜL (ref 0–0.61)
EOSINOPHIL NFR BLD AUTO: 3 % (ref 0–6)
ERYTHROCYTE [DISTWIDTH] IN BLOOD BY AUTOMATED COUNT: 14.5 % (ref 11.6–15.1)
GFR SERPL CREATININE-BSD FRML MDRD: 84 ML/MIN/1.73SQ M
GLUCOSE SERPL-MCNC: 139 MG/DL (ref 65–140)
HCT VFR BLD AUTO: 39 % (ref 34.8–46.1)
HGB BLD-MCNC: 12.1 G/DL (ref 11.5–15.4)
IMM GRANULOCYTES # BLD AUTO: 0.04 THOUSAND/UL (ref 0–0.2)
IMM GRANULOCYTES NFR BLD AUTO: 0 % (ref 0–2)
INR PPP: 1.04 (ref 0.84–1.19)
LYMPHOCYTES # BLD AUTO: 1.21 THOUSANDS/ΜL (ref 0.6–4.47)
LYMPHOCYTES NFR BLD AUTO: 14 % (ref 14–44)
MCH RBC QN AUTO: 28.7 PG (ref 26.8–34.3)
MCHC RBC AUTO-ENTMCNC: 31 G/DL (ref 31.4–37.4)
MCV RBC AUTO: 93 FL (ref 82–98)
MONOCYTES # BLD AUTO: 0.64 THOUSAND/ΜL (ref 0.17–1.22)
MONOCYTES NFR BLD AUTO: 7 % (ref 4–12)
NEUTROPHILS # BLD AUTO: 6.77 THOUSANDS/ΜL (ref 1.85–7.62)
NEUTS SEG NFR BLD AUTO: 76 % (ref 43–75)
NRBC BLD AUTO-RTO: 0 /100 WBCS
NT-PROBNP SERPL-MCNC: 78 PG/ML
PLATELET # BLD AUTO: 314 THOUSANDS/UL (ref 149–390)
PMV BLD AUTO: 8.9 FL (ref 8.9–12.7)
POTASSIUM SERPL-SCNC: 3.8 MMOL/L (ref 3.5–5.3)
PROT SERPL-MCNC: 7.3 G/DL (ref 6.4–8.4)
PROTHROMBIN TIME: 13.4 SECONDS (ref 11.6–14.5)
RBC # BLD AUTO: 4.21 MILLION/UL (ref 3.81–5.12)
SODIUM SERPL-SCNC: 144 MMOL/L (ref 135–147)
WBC # BLD AUTO: 8.92 THOUSAND/UL (ref 4.31–10.16)

## 2022-09-09 PROCEDURE — 80053 COMPREHEN METABOLIC PANEL: CPT | Performed by: EMERGENCY MEDICINE

## 2022-09-09 PROCEDURE — 99285 EMERGENCY DEPT VISIT HI MDM: CPT | Performed by: EMERGENCY MEDICINE

## 2022-09-09 PROCEDURE — 36415 COLL VENOUS BLD VENIPUNCTURE: CPT | Performed by: EMERGENCY MEDICINE

## 2022-09-09 PROCEDURE — 93005 ELECTROCARDIOGRAM TRACING: CPT

## 2022-09-09 PROCEDURE — 85610 PROTHROMBIN TIME: CPT | Performed by: EMERGENCY MEDICINE

## 2022-09-09 PROCEDURE — 83880 ASSAY OF NATRIURETIC PEPTIDE: CPT | Performed by: EMERGENCY MEDICINE

## 2022-09-09 PROCEDURE — 84484 ASSAY OF TROPONIN QUANT: CPT | Performed by: EMERGENCY MEDICINE

## 2022-09-09 PROCEDURE — 96374 THER/PROPH/DIAG INJ IV PUSH: CPT

## 2022-09-09 PROCEDURE — 85379 FIBRIN DEGRADATION QUANT: CPT | Performed by: EMERGENCY MEDICINE

## 2022-09-09 PROCEDURE — 96376 TX/PRO/DX INJ SAME DRUG ADON: CPT

## 2022-09-09 PROCEDURE — 85025 COMPLETE CBC W/AUTO DIFF WBC: CPT | Performed by: EMERGENCY MEDICINE

## 2022-09-09 PROCEDURE — 71046 X-RAY EXAM CHEST 2 VIEWS: CPT

## 2022-09-09 PROCEDURE — 99285 EMERGENCY DEPT VISIT HI MDM: CPT

## 2022-09-09 PROCEDURE — 85730 THROMBOPLASTIN TIME PARTIAL: CPT | Performed by: EMERGENCY MEDICINE

## 2022-09-09 RX ORDER — MORPHINE SULFATE 4 MG/ML
4 INJECTION, SOLUTION INTRAMUSCULAR; INTRAVENOUS ONCE
Status: COMPLETED | OUTPATIENT
Start: 2022-09-09 | End: 2022-09-09

## 2022-09-09 RX ADMIN — MORPHINE SULFATE 4 MG: 4 INJECTION INTRAVENOUS at 11:03

## 2022-09-09 RX ADMIN — MORPHINE SULFATE 4 MG: 4 INJECTION INTRAVENOUS at 13:02

## 2022-09-09 NOTE — ED PROVIDER NOTES
History  Chief Complaint   Patient presents with    Chest Pain     Patient states"she started having left upper chest wall pain that started last night and pain is radiating down neck, back, and arm"  Patient states"she is sob"  Patient is a 80-year-old female past medical history of SLE, fibromyalgia, achalasia presenting with chest pain  Patient notes left upper chest pain which radiates to her shoulder and down her left arm and states has been constant for the past day, unsure exertional nature but associated with shortness of breath  Had similar 2 weeks ago and was evaluated at [facility] a given muscle relaxer which she states that she has been taking intermittently with no relief  Also took 800 mg ibuprofen today with no relief  Has never had before that point  Notes increased stress and family and states that her  is currently admitted for CHF exacerbation  Denies any nausea vomiting, dizziness, rashes, leg pain or swelling, fevers, cough, vision changes, dysuria  Prior to Admission Medications   Prescriptions Last Dose Informant Patient Reported? Taking?   ibuprofen (MOTRIN) 800 mg tablet   No No   Sig: Take 1 tablet (800 mg total) by mouth 3 (three) times a day   levothyroxine (Synthroid) 200 mcg tablet   Yes No   Sig: Take 1 tablet by mouth daily   oxyCODONE-acetaminophen (PERCOCET) 5-325 mg per tablet   Yes No   Sig: For moderate to severe pain, take 1 tab prn q6 hours day 1; 1 tab prn q 12 hours days 2; 1 tab prn once day 3  Facility-Administered Medications: None       Past Medical History:   Diagnosis Date    Achalasia     Back pain     Fibromyalgia     Lupus ([facility] [address] )        Past Surgical History:   Procedure Laterality Date    HYSTERECTOMY      NECK SURGERY         History reviewed  No pertinent family history  I have reviewed and agree with the history as documented      E-Cigarette/Vaping    E-Cigarette Use Never User      E-Cigarette/Vaping Substances Social History     Tobacco Use    Smoking status: Current Every Day Smoker     Packs/day: 0 50     Types: Cigarettes    Smokeless tobacco: Never Used   Vaping Use    Vaping Use: Never used   Substance Use Topics    Alcohol use: No    Drug use: No       Review of Systems   All other systems reviewed and are negative  Physical Exam  Physical Exam  Vitals reviewed  Constitutional:       General: She is not in acute distress  Appearance: Normal appearance  She is not ill-appearing  HENT:      Mouth/Throat:      Mouth: Mucous membranes are moist    Eyes:      Conjunctiva/sclera: Conjunctivae normal    Cardiovascular:      Rate and Rhythm: Normal rate and regular rhythm  Pulses:           Radial pulses are 2+ on the right side and 2+ on the left side  Heart sounds: Normal heart sounds  Pulmonary:      Effort: Pulmonary effort is normal       Breath sounds: Normal breath sounds  Chest:      Chest wall: Tenderness present  Abdominal:      General: Abdomen is flat  Palpations: Abdomen is soft  Tenderness: There is no abdominal tenderness  Musculoskeletal:         General: No swelling  Normal range of motion  Cervical back: Neck supple  Right lower leg: No tenderness  No edema  Left lower leg: No tenderness  No edema  Skin:     General: Skin is warm and dry  Neurological:      General: No focal deficit present  Mental Status: She is alert     Psychiatric:         Mood and Affect: Mood normal          Vital Signs  ED Triage Vitals   Temperature Pulse Respirations Blood Pressure SpO2   09/09/22 1000 09/09/22 1000 09/09/22 1000 09/09/22 1000 09/09/22 1000   98 1 °F (36 7 °C) 68 20 (!) 183/79 99 %      Temp Source Heart Rate Source Patient Position - Orthostatic VS BP Location FiO2 (%)   09/09/22 1000 09/09/22 1101 09/09/22 1101 09/09/22 1101 --   Temporal Monitor Lying Left arm       Pain Score       09/09/22 1246       8           Vitals:    09/09/22 1101 09/09/22 1130 09/09/22 1200 09/09/22 1230   BP: 144/67 136/63 139/62 161/70   Pulse: 65 64 60 66   Patient Position - Orthostatic VS: Lying   Lying         Visual Acuity      ED Medications  Medications   morphine injection 4 mg (4 mg Intravenous Given 9/9/22 1103)   morphine injection 4 mg (4 mg Intravenous Given 9/9/22 1302)       Diagnostic Studies  Results Reviewed     Procedure Component Value Units Date/Time    HS Troponin I 2hr [[id number]]  (Normal) Collected: 09/09/22 1204    Lab Status: Final result Specimen: Blood from Arm, Right Updated: 09/09/22 1245     hs TnI 2hr 2 ng/L      Delta 2hr hsTnI 0 ng/L     NT-BNP PRO [[id number]]  (Normal) Collected: 09/09/22 1005    Lab Status: Final result Specimen: Blood from Arm, Right Updated: 09/09/22 1121     NT-proBNP 78 pg/mL     D-Dimer [[id number]]  (Normal) Collected: 09/09/22 1005    Lab Status: Final result Specimen: Blood from Arm, Right Updated: 09/09/22 1100     D-Dimer, Quant 0 48 ug/ml FEU     Narrative: In the evaluation for possible pulmonary embolism, in the appropriate (Well's Score of 4 or less) patient, the age adjusted d-dimer cutoff for this patient can be calculated as:    Age x 0 01 (in ug/mL) for Age-adjusted D-dimer exclusion threshold for a patient over 50 years      HS Troponin 0hr (reflex protocol) [[id number]]  (Normal) Collected: 09/09/22 1005    Lab Status: Final result Specimen: Blood from Arm, Right Updated: 09/09/22 1054     hs TnI 0hr 2 ng/L     Comprehensive metabolic panel [[id number]] Collected: 09/09/22 1005    Lab Status: Final result Specimen: Blood from Arm, Right Updated: 09/09/22 1041     Sodium 144 mmol/L      Potassium 3 8 mmol/L      Chloride 107 mmol/L      CO2 28 mmol/L      ANION GAP 9 mmol/L      BUN 13 mg/dL      Creatinine 0 78 mg/dL      Glucose 139 mg/dL      Calcium 8 8 mg/dL      AST 11 U/L      ALT 21 U/L      Alkaline Phosphatase 111 U/L      Total Protein 7 3 g/dL      Albumin 3 6 g/dL      Total Bilirubin 0 52 mg/dL      eGFR 84 ml/min/1 73sq m     Narrative:      [place] guidelines for Chronic Kidney Disease (CKD):     Stage 1 with normal or high GFR (GFR > 90 mL/min/1 73 square meters)    Stage 2 Mild CKD (GFR = 60-89 mL/min/1 73 square meters)    Stage 3A Moderate CKD (GFR = 45-59 mL/min/1 73 square meters)    Stage 3B Moderate CKD (GFR = 30-44 mL/min/1 73 square meters)    Stage 4 Severe CKD (GFR = 15-29 mL/min/1 73 square meters)    Stage 5 End Stage CKD (GFR <15 mL/min/1 73 square meters)  Note: GFR calculation is accurate only with a steady state creatinine    APTT [[id number]]  (Normal) Collected: 09/09/22 1005    Lab Status: Final result Specimen: Blood from Arm, Right Updated: 09/09/22 1040     PTT 29 seconds     Protime-INR [[id number]]  (Normal) Collected: 09/09/22 1005    Lab Status: Final result Specimen: Blood from Arm, Right Updated: 09/09/22 1040     Protime 13 4 seconds      INR 1 04    CBC and differential [[id number]]  (Abnormal) Collected: 09/09/22 1005    Lab Status: Final result Specimen: Blood from Arm, Right Updated: 09/09/22 1014     WBC 8 92 Thousand/uL      RBC 4 21 Million/uL      Hemoglobin 12 1 g/dL      Hematocrit 39 0 %      MCV 93 fL      MCH 28 7 pg      MCHC 31 0 g/dL      RDW 14 5 %      MPV 8 9 fL      Platelets 355 Thousands/uL      nRBC 0 /100 WBCs      Neutrophils Relative 76 %      Immat GRANS % 0 %      Lymphocytes Relative 14 %      Monocytes Relative 7 %      Eosinophils Relative 3 %      Basophils Relative 0 %      Neutrophils Absolute 6 77 Thousands/µL      Immature Grans Absolute 0 04 Thousand/uL      Lymphocytes Absolute 1 21 Thousands/µL      Monocytes Absolute 0 64 Thousand/µL      Eosinophils Absolute 0 22 Thousand/µL      Basophils Absolute 0 04 Thousands/µL                  XR chest pa & lateral   Final Result by Heather García MD (09/09 1033)      No acute cardiopulmonary disease                    Workstation performed: [id number] Procedures  ECG 12 Lead Documentation Only    Date/Time: 9/9/2022 10:45 AM  Performed by: Yuliana Sanchez DO  Authorized by: Yuliana Sanchez DO     ECG reviewed by me, the ED Provider: yes    Patient location:  ED  Previous ECG:     Previous ECG:  Compared to current    Similarity:  No change  Interpretation:     Interpretation: normal    Rate:     ECG rate assessment: normal    Rhythm:     Rhythm: sinus rhythm    Ectopy:     Ectopy: none    QRS:     QRS axis:  Normal    QRS intervals:  Normal  Conduction:     Conduction: normal    ST segments:     ST segments:  Normal  T waves:     T waves: normal               ED Course  ED Course as of 09/09/22 1526   Fri Sep 09, 2022   1253 Workup unremarkable, discussed return precautions outpatient follow-up and patient states she understands  HEART Risk Score    Flowsheet Row Most Recent Value   Heart Score Risk Calculator    History 1 Filed at: 09/09/2022 1257   ECG 0 Filed at: 09/09/2022 1257   Age 1 Filed at: 09/09/2022 1257   Risk Factors 1 Filed at: 09/09/2022 1257   Troponin 0 Filed at: 09/09/2022 1257   HEART Score 3 Filed at: 09/09/2022 1257                                      MDM  Number of Diagnoses or Management Options  Diagnosis management comments: Patient is a 49-year-old female past medical history of lupus, fibromyalgia, achalasia presenting with chest pain  Patient is well-appearing bedside stable vitals and in no acute distress  She was initially hypertensive but resolved without intervention  She has equal pulses, reproducible left-sided chest pain, lungs clear to auscultation no other significant physical exam findings  Initial EKG unremarkable, initial chest x-ray unremarkable, give will give pain control, obtain labs including D-dimer and reassess        Disposition  Final diagnoses:   Chest pain     Time reflects when diagnosis was documented in both MDM as applicable and the Disposition within this note Time User Action Codes Description Comment    9/9/2022 12:56 PM Alyce Jeans Add [R07 9] Chest pain       ED Disposition     ED Disposition   Discharge    Condition   Stable    Date/Time   Fri Sep 9, 2022 12:56 PM    Comment   Rajesh Navarro discharge to home/self care  Follow-up Information     Follow up With Specialties Details Why Contact Info Additional Information    Sintia Rosenthal DO General Practice In 1 week  [address] [facility] [address]       [address] [place] [place] [facility] Schedule an appointment as soon as possible for a visit   [facility] [address] [place] [address] Road Cardiology [address] [facility] [address], [address] [facility], [place], [place], [address] [facility] Pain Medicine Schedule an appointment as soon as possible for a visit   [address] [facility] [place] [address] [facility], [place], [place], [address] [place]          Discharge Medication List as of 9/9/2022  1:02 PM      CONTINUE these medications which have NOT CHANGED    Details   ibuprofen (MOTRIN) 800 mg tablet Take 1 tablet (800 mg total) by mouth 3 (three) times a day, Starting Fri 6/28/2019, Normal      levothyroxine (Synthroid) 200 mcg tablet Take 1 tablet by mouth daily, Historical Med      oxyCODONE-acetaminophen (PERCOCET) 5-325 mg per tablet For moderate to severe pain, take 1 tab prn q6 hours day 1; 1 tab prn q 12 hours days 2; 1 tab prn once day 3 , Historical Med             No discharge procedures on file      PDMP Review     None          ED Provider  Electronically Signed by           Jimmie Hutson DO  09/09/22 0948

## 2022-09-10 LAB
ATRIAL RATE: 69 BPM
P AXIS: 74 DEGREES
PR INTERVAL: 148 MS
QRS AXIS: 50 DEGREES
QRSD INTERVAL: 90 MS
QT INTERVAL: 390 MS
QTC INTERVAL: 417 MS
T WAVE AXIS: 39 DEGREES
VENTRICULAR RATE: 69 BPM

## 2022-09-10 PROCEDURE — 93010 ELECTROCARDIOGRAM REPORT: CPT | Performed by: INTERNAL MEDICINE

## 2022-09-11 ENCOUNTER — APPOINTMENT (EMERGENCY)
Dept: CT IMAGING | Facility: HOSPITAL | Age: 57
End: 2022-09-11
Payer: COMMERCIAL

## 2022-09-11 ENCOUNTER — HOSPITAL ENCOUNTER (OUTPATIENT)
Facility: HOSPITAL | Age: 57
Setting detail: OBSERVATION
Discharge: LEFT BEFORE DISCHARGE | End: 2022-09-11
Attending: EMERGENCY MEDICINE | Admitting: FAMILY MEDICINE
Payer: COMMERCIAL

## 2022-09-11 VITALS
HEIGHT: 61 IN | RESPIRATION RATE: 17 BRPM | BODY MASS INDEX: 35.38 KG/M2 | HEART RATE: 80 BPM | SYSTOLIC BLOOD PRESSURE: 117 MMHG | WEIGHT: 187.39 LBS | OXYGEN SATURATION: 91 % | TEMPERATURE: 98.3 F | DIASTOLIC BLOOD PRESSURE: 52 MMHG

## 2022-09-11 DIAGNOSIS — J44.9 COPD (CHRONIC OBSTRUCTIVE PULMONARY DISEASE) (HCC): Primary | ICD-10-CM

## 2022-09-11 DIAGNOSIS — R06.2 WHEEZING: ICD-10-CM

## 2022-09-11 DIAGNOSIS — J40 BRONCHITIS: ICD-10-CM

## 2022-09-11 PROBLEM — R07.9 CHEST PAIN, UNSPECIFIED: Status: ACTIVE | Noted: 2022-09-11

## 2022-09-11 PROBLEM — J44.1 CHRONIC OBSTRUCTIVE PULMONARY DISEASE WITH ACUTE EXACERBATION (HCC): Status: ACTIVE | Noted: 2022-09-11

## 2022-09-11 PROBLEM — R07.1 CHEST PAIN ON BREATHING: Status: ACTIVE | Noted: 2022-09-11

## 2022-09-11 PROBLEM — F17.200 SMOKER: Status: ACTIVE | Noted: 2022-09-11

## 2022-09-11 LAB
ALBUMIN SERPL BCP-MCNC: 3.7 G/DL (ref 3.5–5)
ALP SERPL-CCNC: 119 U/L (ref 46–116)
ALT SERPL W P-5'-P-CCNC: 20 U/L (ref 12–78)
ANION GAP SERPL CALCULATED.3IONS-SCNC: 7 MMOL/L (ref 4–13)
APTT PPP: 27 SECONDS (ref 23–37)
AST SERPL W P-5'-P-CCNC: 8 U/L (ref 5–45)
ATRIAL RATE: 76 BPM
BASOPHILS # BLD AUTO: 0.03 THOUSANDS/ΜL (ref 0–0.1)
BASOPHILS NFR BLD AUTO: 0 % (ref 0–1)
BILIRUB SERPL-MCNC: 0.58 MG/DL (ref 0.2–1)
BUN SERPL-MCNC: 15 MG/DL (ref 5–25)
CALCIUM SERPL-MCNC: 9.1 MG/DL (ref 8.3–10.1)
CARDIAC TROPONIN I PNL SERPL HS: 2 NG/L
CHLORIDE SERPL-SCNC: 106 MMOL/L (ref 96–108)
CO2 SERPL-SCNC: 29 MMOL/L (ref 21–32)
CREAT SERPL-MCNC: 0.65 MG/DL (ref 0.6–1.3)
EOSINOPHIL # BLD AUTO: 0.21 THOUSAND/ΜL (ref 0–0.61)
EOSINOPHIL NFR BLD AUTO: 2 % (ref 0–6)
ERYTHROCYTE [DISTWIDTH] IN BLOOD BY AUTOMATED COUNT: 14.8 % (ref 11.6–15.1)
FLUAV RNA RESP QL NAA+PROBE: NEGATIVE
FLUBV RNA RESP QL NAA+PROBE: NEGATIVE
GFR SERPL CREATININE-BSD FRML MDRD: 98 ML/MIN/1.73SQ M
GLUCOSE SERPL-MCNC: 89 MG/DL (ref 65–140)
HCT VFR BLD AUTO: 38.8 % (ref 34.8–46.1)
HGB BLD-MCNC: 12.4 G/DL (ref 11.5–15.4)
IMM GRANULOCYTES # BLD AUTO: 0.03 THOUSAND/UL (ref 0–0.2)
IMM GRANULOCYTES NFR BLD AUTO: 0 % (ref 0–2)
INR PPP: 1.04 (ref 0.84–1.19)
LACTATE SERPL-SCNC: 1.1 MMOL/L (ref 0.5–2)
LYMPHOCYTES # BLD AUTO: 1.13 THOUSANDS/ΜL (ref 0.6–4.47)
LYMPHOCYTES NFR BLD AUTO: 13 % (ref 14–44)
MCH RBC QN AUTO: 29.2 PG (ref 26.8–34.3)
MCHC RBC AUTO-ENTMCNC: 32 G/DL (ref 31.4–37.4)
MCV RBC AUTO: 92 FL (ref 82–98)
MONOCYTES # BLD AUTO: 0.67 THOUSAND/ΜL (ref 0.17–1.22)
MONOCYTES NFR BLD AUTO: 8 % (ref 4–12)
NEUTROPHILS # BLD AUTO: 6.61 THOUSANDS/ΜL (ref 1.85–7.62)
NEUTS SEG NFR BLD AUTO: 77 % (ref 43–75)
NRBC BLD AUTO-RTO: 0 /100 WBCS
NT-PROBNP SERPL-MCNC: 43 PG/ML
P AXIS: 74 DEGREES
PLATELET # BLD AUTO: 328 THOUSANDS/UL (ref 149–390)
PMV BLD AUTO: 9 FL (ref 8.9–12.7)
POTASSIUM SERPL-SCNC: 3.8 MMOL/L (ref 3.5–5.3)
PR INTERVAL: 150 MS
PROCALCITONIN SERPL-MCNC: <0.05 NG/ML
PROT SERPL-MCNC: 7.7 G/DL (ref 6.4–8.4)
PROTHROMBIN TIME: 13.4 SECONDS (ref 11.6–14.5)
QRS AXIS: 28 DEGREES
QRSD INTERVAL: 82 MS
QT INTERVAL: 370 MS
QTC INTERVAL: 416 MS
RBC # BLD AUTO: 4.24 MILLION/UL (ref 3.81–5.12)
RSV RNA RESP QL NAA+PROBE: NEGATIVE
SARS-COV-2 RNA RESP QL NAA+PROBE: NEGATIVE
SODIUM SERPL-SCNC: 142 MMOL/L (ref 135–147)
T WAVE AXIS: 48 DEGREES
TSH SERPL DL<=0.05 MIU/L-ACNC: 2.98 UIU/ML (ref 0.45–4.5)
VENTRICULAR RATE: 76 BPM
WBC # BLD AUTO: 8.68 THOUSAND/UL (ref 4.31–10.16)

## 2022-09-11 PROCEDURE — 83605 ASSAY OF LACTIC ACID: CPT | Performed by: EMERGENCY MEDICINE

## 2022-09-11 PROCEDURE — 99285 EMERGENCY DEPT VISIT HI MDM: CPT | Performed by: EMERGENCY MEDICINE

## 2022-09-11 PROCEDURE — 94760 N-INVAS EAR/PLS OXIMETRY 1: CPT

## 2022-09-11 PROCEDURE — 93005 ELECTROCARDIOGRAM TRACING: CPT

## 2022-09-11 PROCEDURE — G1004 CDSM NDSC: HCPCS

## 2022-09-11 PROCEDURE — 84443 ASSAY THYROID STIM HORMONE: CPT | Performed by: STUDENT IN AN ORGANIZED HEALTH CARE EDUCATION/TRAINING PROGRAM

## 2022-09-11 PROCEDURE — 94664 DEMO&/EVAL PT USE INHALER: CPT

## 2022-09-11 PROCEDURE — 99285 EMERGENCY DEPT VISIT HI MDM: CPT

## 2022-09-11 PROCEDURE — 83880 ASSAY OF NATRIURETIC PEPTIDE: CPT | Performed by: EMERGENCY MEDICINE

## 2022-09-11 PROCEDURE — NC001 PR NO CHARGE: Performed by: STUDENT IN AN ORGANIZED HEALTH CARE EDUCATION/TRAINING PROGRAM

## 2022-09-11 PROCEDURE — 84145 PROCALCITONIN (PCT): CPT | Performed by: EMERGENCY MEDICINE

## 2022-09-11 PROCEDURE — 84484 ASSAY OF TROPONIN QUANT: CPT | Performed by: EMERGENCY MEDICINE

## 2022-09-11 PROCEDURE — 80053 COMPREHEN METABOLIC PANEL: CPT | Performed by: EMERGENCY MEDICINE

## 2022-09-11 PROCEDURE — 87040 BLOOD CULTURE FOR BACTERIA: CPT | Performed by: EMERGENCY MEDICINE

## 2022-09-11 PROCEDURE — 71275 CT ANGIOGRAPHY CHEST: CPT

## 2022-09-11 PROCEDURE — 85025 COMPLETE CBC W/AUTO DIFF WBC: CPT | Performed by: EMERGENCY MEDICINE

## 2022-09-11 PROCEDURE — 93010 ELECTROCARDIOGRAM REPORT: CPT | Performed by: INTERNAL MEDICINE

## 2022-09-11 PROCEDURE — 36415 COLL VENOUS BLD VENIPUNCTURE: CPT | Performed by: EMERGENCY MEDICINE

## 2022-09-11 PROCEDURE — 99220 PR INITIAL OBSERVATION CARE/DAY 70 MINUTES: CPT | Performed by: STUDENT IN AN ORGANIZED HEALTH CARE EDUCATION/TRAINING PROGRAM

## 2022-09-11 PROCEDURE — 85610 PROTHROMBIN TIME: CPT | Performed by: EMERGENCY MEDICINE

## 2022-09-11 PROCEDURE — 85730 THROMBOPLASTIN TIME PARTIAL: CPT | Performed by: EMERGENCY MEDICINE

## 2022-09-11 PROCEDURE — 0241U HB NFCT DS VIR RESP RNA 4 TRGT: CPT | Performed by: EMERGENCY MEDICINE

## 2022-09-11 RX ORDER — ENOXAPARIN SODIUM 100 MG/ML
40 INJECTION SUBCUTANEOUS DAILY
Status: DISCONTINUED | OUTPATIENT
Start: 2022-09-11 | End: 2022-09-11 | Stop reason: HOSPADM

## 2022-09-11 RX ORDER — PREDNISONE 20 MG/1
40 TABLET ORAL DAILY
Status: DISCONTINUED | OUTPATIENT
Start: 2022-09-11 | End: 2022-09-11

## 2022-09-11 RX ORDER — AZITHROMYCIN 500 MG/1
500 TABLET, FILM COATED ORAL EVERY 24 HOURS
Status: DISCONTINUED | OUTPATIENT
Start: 2022-09-11 | End: 2022-09-11 | Stop reason: HOSPADM

## 2022-09-11 RX ORDER — CYCLOBENZAPRINE HCL 10 MG
10 TABLET ORAL 3 TIMES DAILY PRN
Qty: 15 TABLET | Refills: 0 | Status: ON HOLD | OUTPATIENT
Start: 2022-09-11 | End: 2022-10-07 | Stop reason: SDUPTHER

## 2022-09-11 RX ORDER — LEVALBUTEROL 1.25 MG/.5ML
1.25 SOLUTION, CONCENTRATE RESPIRATORY (INHALATION)
Status: DISCONTINUED | OUTPATIENT
Start: 2022-09-11 | End: 2022-09-11 | Stop reason: HOSPADM

## 2022-09-11 RX ORDER — AZITHROMYCIN 500 MG/1
500 TABLET, FILM COATED ORAL EVERY 24 HOURS
Qty: 2 TABLET | Refills: 0 | Status: SHIPPED | OUTPATIENT
Start: 2022-09-12 | End: 2022-09-14

## 2022-09-11 RX ORDER — OXYCODONE HYDROCHLORIDE 5 MG/1
2.5 TABLET ORAL EVERY 4 HOURS PRN
Status: DISCONTINUED | OUTPATIENT
Start: 2022-09-11 | End: 2022-09-11 | Stop reason: HOSPADM

## 2022-09-11 RX ORDER — OXYCODONE HYDROCHLORIDE AND ACETAMINOPHEN 5; 325 MG/1; MG/1
1 TABLET ORAL ONCE
Status: COMPLETED | OUTPATIENT
Start: 2022-09-11 | End: 2022-09-11

## 2022-09-11 RX ORDER — GUAIFENESIN 600 MG/1
600 TABLET, EXTENDED RELEASE ORAL 2 TIMES DAILY
Qty: 20 TABLET | Refills: 0 | Status: ON HOLD | OUTPATIENT
Start: 2022-09-11 | End: 2022-10-07 | Stop reason: SDUPTHER

## 2022-09-11 RX ORDER — MORPHINE SULFATE 4 MG/ML
4 INJECTION, SOLUTION INTRAMUSCULAR; INTRAVENOUS ONCE
Status: COMPLETED | OUTPATIENT
Start: 2022-09-11 | End: 2022-09-11

## 2022-09-11 RX ORDER — GUAIFENESIN 600 MG/1
600 TABLET, EXTENDED RELEASE ORAL 2 TIMES DAILY
Status: DISCONTINUED | OUTPATIENT
Start: 2022-09-11 | End: 2022-09-11 | Stop reason: HOSPADM

## 2022-09-11 RX ORDER — PREDNISONE 20 MG/1
40 TABLET ORAL DAILY
Status: DISCONTINUED | OUTPATIENT
Start: 2022-09-12 | End: 2022-09-11

## 2022-09-11 RX ORDER — ALBUTEROL SULFATE 90 UG/1
2 AEROSOL, METERED RESPIRATORY (INHALATION) EVERY 6 HOURS PRN
Qty: 18 G | Refills: 0 | Status: ON HOLD | OUTPATIENT
Start: 2022-09-11 | End: 2022-10-07 | Stop reason: SDUPTHER

## 2022-09-11 RX ORDER — ALBUTEROL SULFATE 2.5 MG/3ML
5 SOLUTION RESPIRATORY (INHALATION) ONCE
Status: COMPLETED | OUTPATIENT
Start: 2022-09-11 | End: 2022-09-11

## 2022-09-11 RX ORDER — METHYLPREDNISOLONE SODIUM SUCCINATE 40 MG/ML
40 INJECTION, POWDER, LYOPHILIZED, FOR SOLUTION INTRAMUSCULAR; INTRAVENOUS EVERY 8 HOURS SCHEDULED
Status: DISCONTINUED | OUTPATIENT
Start: 2022-09-12 | End: 2022-09-11 | Stop reason: HOSPADM

## 2022-09-11 RX ORDER — PREDNISONE 20 MG/1
40 TABLET ORAL DAILY
Qty: 10 TABLET | Refills: 0 | Status: ON HOLD | OUTPATIENT
Start: 2022-09-11 | End: 2022-10-07 | Stop reason: SDUPTHER

## 2022-09-11 RX ORDER — SODIUM CHLORIDE FOR INHALATION 0.9 %
3 VIAL, NEBULIZER (ML) INHALATION
Status: DISCONTINUED | OUTPATIENT
Start: 2022-09-11 | End: 2022-09-11 | Stop reason: HOSPADM

## 2022-09-11 RX ORDER — ONDANSETRON 2 MG/ML
4 INJECTION INTRAMUSCULAR; INTRAVENOUS EVERY 6 HOURS PRN
Status: DISCONTINUED | OUTPATIENT
Start: 2022-09-11 | End: 2022-09-11 | Stop reason: HOSPADM

## 2022-09-11 RX ORDER — ACETAMINOPHEN 325 MG/1
650 TABLET ORAL EVERY 6 HOURS PRN
Status: DISCONTINUED | OUTPATIENT
Start: 2022-09-11 | End: 2022-09-11 | Stop reason: HOSPADM

## 2022-09-11 RX ORDER — CYCLOBENZAPRINE HCL 10 MG
10 TABLET ORAL 3 TIMES DAILY PRN
Status: DISCONTINUED | OUTPATIENT
Start: 2022-09-11 | End: 2022-09-11 | Stop reason: HOSPADM

## 2022-09-11 RX ORDER — METHYLPREDNISOLONE SODIUM SUCCINATE 40 MG/ML
40 INJECTION, POWDER, LYOPHILIZED, FOR SOLUTION INTRAMUSCULAR; INTRAVENOUS EVERY 8 HOURS SCHEDULED
Status: DISCONTINUED | OUTPATIENT
Start: 2022-09-11 | End: 2022-09-11

## 2022-09-11 RX ORDER — ONDANSETRON 2 MG/ML
4 INJECTION INTRAMUSCULAR; INTRAVENOUS ONCE
Status: COMPLETED | OUTPATIENT
Start: 2022-09-11 | End: 2022-09-11

## 2022-09-11 RX ORDER — METHYLPREDNISOLONE SODIUM SUCCINATE 125 MG/2ML
125 INJECTION, POWDER, LYOPHILIZED, FOR SOLUTION INTRAMUSCULAR; INTRAVENOUS ONCE
Status: COMPLETED | OUTPATIENT
Start: 2022-09-11 | End: 2022-09-11

## 2022-09-11 RX ADMIN — AZITHROMYCIN 500 MG: 500 TABLET, FILM COATED ORAL at 13:00

## 2022-09-11 RX ADMIN — OXYCODONE HYDROCHLORIDE 2.5 MG: 5 TABLET ORAL at 14:41

## 2022-09-11 RX ADMIN — LEVALBUTEROL 1.25 MG: 1.25 SOLUTION, CONCENTRATE RESPIRATORY (INHALATION) at 13:16

## 2022-09-11 RX ADMIN — GUAIFENESIN 600 MG: 600 TABLET ORAL at 13:00

## 2022-09-11 RX ADMIN — ONDANSETRON 4 MG: 2 INJECTION INTRAMUSCULAR; INTRAVENOUS at 11:59

## 2022-09-11 RX ADMIN — ENOXAPARIN SODIUM 40 MG: 40 INJECTION SUBCUTANEOUS at 13:00

## 2022-09-11 RX ADMIN — OXYCODONE HYDROCHLORIDE AND ACETAMINOPHEN 1 TABLET: 5; 325 TABLET ORAL at 10:55

## 2022-09-11 RX ADMIN — METHYLPREDNISOLONE SODIUM SUCCINATE 125 MG: 125 INJECTION, POWDER, FOR SOLUTION INTRAMUSCULAR; INTRAVENOUS at 11:59

## 2022-09-11 RX ADMIN — MORPHINE SULFATE 4 MG: 4 INJECTION INTRAVENOUS at 11:59

## 2022-09-11 RX ADMIN — IOHEXOL 85 ML: 350 INJECTION, SOLUTION INTRAVENOUS at 10:48

## 2022-09-11 RX ADMIN — ALBUTEROL SULFATE 5 MG: 2.5 SOLUTION RESPIRATORY (INHALATION) at 10:01

## 2022-09-11 RX ADMIN — ISODIUM CHLORIDE 3 ML: 0.03 SOLUTION RESPIRATORY (INHALATION) at 13:16

## 2022-09-11 NOTE — ED NOTES
Pt to be admitted, irritable at times, agreeable to treatment plan at this time     David Rivera RN  09/11/22 9559

## 2022-09-11 NOTE — ASSESSMENT & PLAN NOTE
Likely has undiagnosed COPD given smoking history  Exacerbated by recent viral bronchitis  CTA negative for PE  Mild bronchial wall thickening noted  No focal consolidation  · Status post IV Solu-Medrol and nebulizer in ED, noted improvement  · Continue with supportive care  · Follow-up on procal  · Schedule nebs  · P o   Prednisone 40 mg daily, can likely taper  · Respiratory protocol  · Would benefit from outpatient PFTs and pulmonology follow-up

## 2022-09-11 NOTE — ED NOTES
Pt breathing bettter, less dyspnea noted since neb tx    Pt tearful at times requesting IV pain medications     Angus Torre RN  09/11/22 4766

## 2022-09-11 NOTE — ED PROVIDER NOTES
History  Chief Complaint   Patient presents with    Chest Pain     Seen here 2 days ago with similar symptoms, +productive cough, +cigarette smoker     HPI patient is a 80-year-old female, here 2 days ago with anterior chest pain worse with deep inspiration with cough  Patient had a negative chest x-ray at that time negative D-dimer  Negative cardiac enzymes at that time  Patient went home and reports increasing cough and congestion  He reports shortness of breath with diffuse congestion and sputum production  Patient reports when she coughs she gets a severe fairly sharp anterior chest pain again worse with each cough and resolves after she stops coughing  She denies any acute shortness of breath reports short of breath over the last 2 days  She reports history of smoking  Past medical history of back pain fibromyalgia lupus achalasia  Family history noncontributory  Social history,    Prior to Admission Medications   Prescriptions Last Dose Informant Patient Reported? Taking?   ibuprofen (MOTRIN) 800 mg tablet 9/11/2022 at Unknown time  No Yes   Sig: Take 1 tablet (800 mg total) by mouth 3 (three) times a day   levothyroxine 200 mcg tablet Not Taking at Unknown time  Yes No   Sig: Take 1 tablet by mouth daily   Patient not taking: Reported on 9/11/2022   oxyCODONE-acetaminophen (PERCOCET) 5-325 mg per tablet Not Taking at Unknown time  Yes No   Sig: For moderate to severe pain, take 1 tab prn q6 hours day 1; 1 tab prn q 12 hours days 2; 1 tab prn once day 3  Patient not taking: Reported on 9/11/2022      Facility-Administered Medications: None       Past Medical History:   Diagnosis Date    Achalasia     Back pain     Fibromyalgia     Lupus ([address] )        Past Surgical History:   Procedure Laterality Date    HYSTERECTOMY      NECK SURGERY         History reviewed  No pertinent family history  I have reviewed and agree with the history as documented      E-Cigarette/Vaping    E-Cigarette Use Never User      E-Cigarette/Vaping Substances     Social History     Tobacco Use    Smoking status: Current Every Day Smoker     Packs/day: 0 50     Types: Cigarettes    Smokeless tobacco: Never Used   Vaping Use    Vaping Use: Never used   Substance Use Topics    Alcohol use: No    Drug use: No       Review of Systems   Constitutional: Negative for diaphoresis, fatigue and fever  HENT: Positive for congestion  Negative for ear pain, nosebleeds and sore throat  Eyes: Negative for photophobia, pain, discharge and visual disturbance  Respiratory: Positive for cough  Negative for choking, chest tightness, shortness of breath and wheezing  Cardiovascular: Positive for chest pain  Negative for palpitations  Gastrointestinal: Negative for abdominal distention, abdominal pain, diarrhea and vomiting  Genitourinary: Negative for dysuria, flank pain and frequency  Musculoskeletal: Negative for back pain, gait problem and joint swelling  Skin: Negative for color change and rash  Neurological: Negative for dizziness, syncope and headaches  Psychiatric/Behavioral: Negative for behavioral problems and confusion  The patient is not nervous/anxious  All other systems reviewed and are negative  Physical Exam  Physical Exam  Vitals and nursing note reviewed  Constitutional:       Appearance: She is well-developed  HENT:      Head: Normocephalic  Right Ear: External ear normal       Left Ear: External ear normal       Nose: Nose normal    Eyes:      General: Lids are normal       Pupils: Pupils are equal, round, and reactive to light  Cardiovascular:      Rate and Rhythm: Normal rate and regular rhythm  Pulses: Normal pulses  Heart sounds: Normal heart sounds  Pulmonary:      Effort: Tachypnea present  No respiratory distress  Breath sounds: Wheezing present  Comments: , some accessory muscle use, cough and congestion  Abdominal:      General: Abdomen is flat   Bowel sounds are normal  There is no distension  Musculoskeletal:         General: No deformity  Normal range of motion  Cervical back: Normal range of motion and neck supple  Skin:     General: Skin is warm and dry  Neurological:      Mental Status: She is alert and oriented to person, place, and time       Pulse oximetry 98% on room air adequate oxygenation, there is no hypoxia    Vital Signs  ED Triage Vitals   Temperature Pulse Respirations Blood Pressure SpO2   09/11/22 0926 09/11/22 0926 09/11/22 0926 09/11/22 0926 09/11/22 0926   98 7 °F (37 1 °C) 82 20 152/70 98 %      Temp Source Heart Rate Source Patient Position - Orthostatic VS BP Location FiO2 (%)   09/11/22 0926 09/11/22 0926 09/11/22 1057 09/11/22 0926 --   Oral Monitor Sitting Right arm       Pain Score       09/11/22 0926       8           Vitals:    09/11/22 1100 09/11/22 1254 09/11/22 1314 09/11/22 1420   BP: 149/64 109/55  117/52   Pulse: 81 76 71 80   Patient Position - Orthostatic VS:  Lying           Visual Acuity      ED Medications  Medications   enoxaparin (LOVENOX) subcutaneous injection 40 mg (40 mg Subcutaneous Given 9/11/22 1300)   guaiFENesin (MUCINEX) 12 hr tablet 600 mg (600 mg Oral Given 9/11/22 1300)   levalbuterol (XOPENEX) inhalation solution 1 25 mg (1 25 mg Nebulization Given 9/11/22 1316)     And   sodium chloride 0 9 % inhalation solution 3 mL (3 mL Nebulization Given 9/11/22 1316)   azithromycin (ZITHROMAX) tablet 500 mg (500 mg Oral Given 9/11/22 1300)   oxyCODONE (ROXICODONE) IR tablet 2 5 mg (2 5 mg Oral Given 9/11/22 1441)   acetaminophen (TYLENOL) tablet 650 mg (has no administration in time range)   ondansetron (ZOFRAN) injection 4 mg (has no administration in time range)   cyclobenzaprine (FLEXERIL) tablet 10 mg (has no administration in time range)   methylPREDNISolone sodium succinate (Solu-MEDROL) injection 40 mg (has no administration in time range)   albuterol inhalation solution 5 mg (5 mg Nebulization Given 9/11/22 1001)   oxyCODONE-acetaminophen (PERCOCET) 5-325 mg per tablet 1 tablet (1 tablet Oral Given 9/11/22 1055)   iohexol (OMNIPAQUE) 350 MG/ML injection (SINGLE-DOSE) 85 mL (85 mL Intravenous Given 9/11/22 1048)   methylPREDNISolone sodium succinate (Solu-MEDROL) injection 125 mg (125 mg Intravenous Given 9/11/22 1159)   morphine injection 4 mg (4 mg Intravenous Given 9/11/22 1159)   ondansetron (ZOFRAN) injection 4 mg (4 mg Intravenous Given 9/11/22 1159)       Diagnostic Studies  Results Reviewed     Procedure Component Value Units Date/Time    TSH, 3rd generation [[id number]]  (Normal) Collected: 09/11/22 0950    Lab Status: Final result Specimen: Blood from Arm, Left Updated: 09/11/22 1316     TSH 3RD GENERATON 2 981 uIU/mL     Narrative:      Patients undergoing fluorescein dye angiography may retain small amounts of fluorescein in the body for 48-72 hours post procedure  Samples containing fluorescein can produce falsely depressed TSH values  If the patient had this procedure,a specimen should be resubmitted post fluorescein clearance  Procalcitonin [[id number]]  (Normal) Collected: 09/11/22 0950    Lab Status: Final result Specimen: Blood from Arm, Left Updated: 09/11/22 1125     Procalcitonin <0 05 ng/ml     FLU/RSV/COVID - if FLU/RSV clinically relevant [[id number]]  (Normal) Collected: 09/11/22 1001    Lab Status: Final result Specimen: Nares from Nose Updated: 09/11/22 1048     SARS-CoV-2 Negative     INFLUENZA A PCR Negative     INFLUENZA B PCR Negative     RSV PCR Negative    Narrative:      FOR PEDIATRIC PATIENTS - copy/paste COVID Guidelines URL to browser: [email or web address]    SARS-CoV-2 assay is a Nucleic Acid Amplification assay intended for the  qualitative detection of nucleic acid from SARS-CoV-2 in nasopharyngeal  swabs  Results are for the presumptive identification of SARS-CoV-2 RNA      Positive results are indicative of infection with SARS-CoV-2, the virus  causing COVID-19, but do not rule out bacterial infection or co-infection  with other viruses  Laboratories within the [place] and its  territories are required to report all positive results to the appropriate  public health authorities  Negative results do not preclude SARS-CoV-2  infection and should not be used as the sole basis for treatment or other  patient management decisions  Negative results must be combined with  clinical observations, patient history, and epidemiological information  This test has not been FDA cleared or approved  This test has been authorized by FDA under an Emergency Use Authorization  (EUA)  This test is only authorized for the duration of time the  declaration that circumstances exist justifying the authorization of the  emergency use of an in vitro diagnostic tests for detection of SARS-CoV-2  virus and/or diagnosis of COVID-19 infection under section 564(b)(1) of  the Act, 21 U  S C  910IIR-5(J)(6), unless the authorization is terminated  or revoked sooner  The test has been validated but independent review by FDA  and CLIA is pending  Test performed using [employer] Gene[employer]pert: This RT-PCR assay targets N2,  a region unique to SARS-CoV-2  A conserved region in the E-gene was chosen  for pan-Sarbecovirus detection which includes SARS-CoV-2  HS Troponin 0hr (reflex protocol) [[id number]]  (Normal) Collected: 09/11/22 0950    Lab Status: Final result Specimen: Blood from Arm, Left Updated: 09/11/22 1026     hs TnI 0hr 2 ng/L     NT-BNP PRO [[id number]]  (Normal) Collected: 09/11/22 0950    Lab Status: Final result Specimen: Blood from Arm, Left Updated: 09/11/22 1024     NT-proBNP 43 pg/mL     Lactic acid [[id number]]  (Normal) Collected: 09/11/22 0950    Lab Status: Final result Specimen: Blood from Arm, Left Updated: 09/11/22 1020     LACTIC ACID 1 1 mmol/L     Narrative:      Result may be elevated if tourniquet was used during collection  Comprehensive metabolic panel [[id number]]  (Abnormal) Collected: 09/11/22 0950    Lab Status: Final result Specimen: Blood from Arm, Left Updated: 09/11/22 1018     Sodium 142 mmol/L      Potassium 3 8 mmol/L      Chloride 106 mmol/L      CO2 29 mmol/L      ANION GAP 7 mmol/L      BUN 15 mg/dL      Creatinine 0 65 mg/dL      Glucose 89 mg/dL      Calcium 9 1 mg/dL      AST 8 U/L      ALT 20 U/L      Alkaline Phosphatase 119 U/L      Total Protein 7 7 g/dL      Albumin 3 7 g/dL      Total Bilirubin 0 58 mg/dL      eGFR 98 ml/min/1 73sq m     Narrative:      National Kidney Disease Foundation guidelines for Chronic Kidney Disease (CKD):     Stage 1 with normal or high GFR (GFR > 90 mL/min/1 73 square meters)    Stage 2 Mild CKD (GFR = 60-89 mL/min/1 73 square meters)    Stage 3A Moderate CKD (GFR = 45-59 mL/min/1 73 square meters)    Stage 3B Moderate CKD (GFR = 30-44 mL/min/1 73 square meters)    Stage 4 Severe CKD (GFR = 15-29 mL/min/1 73 square meters)    Stage 5 End Stage CKD (GFR <15 mL/min/1 73 square meters)  Note: GFR calculation is accurate only with a steady state creatinine    Protime-INR [[id number]]  (Normal) Collected: 09/11/22 0950    Lab Status: Final result Specimen: Blood from Arm, Left Updated: 09/11/22 1013     Protime 13 4 seconds      INR 1 04    APTT [[id number]]  (Normal) Collected: 09/11/22 0950    Lab Status: Final result Specimen: Blood from Arm, Left Updated: 09/11/22 1013     PTT 27 seconds     CBC and differential [[id number]]  (Abnormal) Collected: 09/11/22 0950    Lab Status: Final result Specimen: Blood from Arm, Left Updated: 09/11/22 0958     WBC 8 68 Thousand/uL      RBC 4 24 Million/uL      Hemoglobin 12 4 g/dL      Hematocrit 38 8 %      MCV 92 fL      MCH 29 2 pg      MCHC 32 0 g/dL      RDW 14 8 %      MPV 9 0 fL      Platelets 976 Thousands/uL      nRBC 0 /100 WBCs      Neutrophils Relative 77 %      Immat GRANS % 0 %      Lymphocytes Relative 13 %      Monocytes Relative 8 %      Eosinophils Relative 2 %      Basophils Relative 0 %      Neutrophils Absolute 6 61 Thousands/µL      Immature Grans Absolute 0 03 Thousand/uL      Lymphocytes Absolute 1 13 Thousands/µL      Monocytes Absolute 0 67 Thousand/µL      Eosinophils Absolute 0 21 Thousand/µL      Basophils Absolute 0 03 Thousands/µL     Blood culture #2 [[id number]] Collected: 09/11/22 0950    Lab Status: In process Specimen: Blood from Arm, Left Updated: 09/11/22 0955    Blood culture #1 [[id number]] Collected: 09/11/22 0950    Lab Status: In process Specimen: Blood from Arm, Left Updated: 09/11/22 0955                 CTA ED chest PE Study   Final Result by Santosh Nickerson MD (09/11 1119)      1  No pulmonary embolism or pneumonia  2   Mild bronchial wall thickening may represent acute or chronic bronchitis; note is made of patient's smoking history  3   Circumferential esophageal wall thickening  Correlation with symptoms is advised  Note is made of patient's history of achalasia; there is no abnormal luminal dilatation of the esophagus  Workstation performed: [id number]                    Procedures  ECG 12 Lead Documentation Only    Date/Time: 9/11/2022 9:35 AM  Performed by: Brian Busch MD  Authorized by: Brian Busch MD     Indications / Diagnosis:  Shortness of breath chest pain with coughing  ECG reviewed by me, the ED Provider: yes    Patient location:  ED  Previous ECG:     Previous ECG:  Compared to current    Comparison ECG info:  September 9, 2022    Similarity:  Changes noted  Interpretation:     Interpretation: non-specific    Rate:     ECG rate:  Seventy-six    ECG rate assessment: normal    Rhythm:     Rhythm: sinus rhythm    Comments:      Normal sinus rhythm, poor anterior forces no acute ST-T wave changes no ST elevations  ED Course        diagnostic testing showed a normal procalcitonin,  COVID testing was negative       BNP was normal no sign of heart failure  Cardiac troponin was normal no sign of cardiac ischemia despite several days of chest pain  Lactate was normal at 1 1  Electrolytes within normal limits  white count was normal 8 6 no sign of inflammation  Hemoglobin normal at 12 no sign of anemia  CT scan for pulmonary embolism  Patient was here 2 days ago with shortness of breath presents again with shortness of breath  CT to rule out pulmonary embolism was required  Concern for pulmonary embolism or pneumonia  There was some bronchial wall thickening consistent with bronchitis  SBIRT 20yo+    Flowsheet Row Most Recent Value   SBIRT (23 yo +)    In order to provide better care to our patients, we are screening all of our patients for alcohol and drug use  Would it be okay to ask you these screening questions? Yes Filed at: 09/11/2022 1215   Initial Alcohol Screen: US AUDIT-C     1  How often do you have a drink containing alcohol? 0 Filed at: 09/11/2022 1215   2  How many drinks containing alcohol do you have on a typical day you are drinking? 0 Filed at: 09/11/2022 1215   3b  FEMALE Any Age, or MALE 65+: How often do you have 4 or more drinks on one occassion? 0 Filed at: 09/11/2022 1215   Audit-C Score 0 Filed at: 09/11/2022 1215   EULALIO: How many times in the past year have you    Used an illegal drug or used a prescription medication for non-medical reasons?  Never Filed at: 09/11/2022 1215          Wells' Criteria for PE    Flowsheet Row Most Recent Value   Wells' Criteria for PE    Clinical signs and symptoms of DVT 0 Filed at: 09/11/2022 8166   PE is primary diagnosis or equally likely 3 Filed at: 09/11/2022 0929   HR >100 1 5 Filed at: 09/11/2022 0929   Immobilization at least 3 days or Surgery in the previous 4 weeks 0 Filed at: 09/11/2022 3809   Previous, objectively diagnosed PE or DVT 0 Filed at: 09/11/2022 0929   Hemoptysis 0 Filed at: 09/11/2022 2014   Malignancy with treatment within 6 months or palliative 0 Filed at: 09/11/2022 0929   Wells' Criteria Total 4 5 Filed at: 09/11/2022 2941         discussed with patient, discussed possible discharge with patient reports she is too short of breath to be discharged  Patient reports she just needs some IV morphine while she is here so she can rest and will help her chest pain  Discussed with patient she has some difficulty breathing she is wheezing morphine with suppress respirations and we be possibly fatal   Discussed with her not in her best interest   Patient reports she is too short of breath to be sent home  She is persistently wheezing  I believe she has underlying COPD will admit  MDM medical decision making  60-year-old female presents emergency department with wheezing cough congestion chest pain with breathing  CT showed no pulmonary embolism there was no pneumonia  Presentation consistent with exacerbation COPD  Patient reports she is a heavy smoker  I believe patient also has a bronchitis  Discussed treatment with IV steroids and bronchodilators  Patient reports she is too weak to be discharged  Will admit for observation    Disposition  Final diagnoses:   COPD (chronic obstructive pulmonary disease) ([facility] [address] )   Wheezing   Bronchitis     Time reflects when diagnosis was documented in both MDM as applicable and the Disposition within this note     Time User Action Codes Description Comment    9/11/2022 11:44 AM Merdis Kennel Add [J44 1] COPD with acute exacerbation ([facility] [address] )     9/11/2022 11:44 AM Merdis Kennel Remove [J44 1] COPD with acute exacerbation ([facility] [address] )     9/11/2022 11:44 AM Merdis Kennel Add [J44 9] COPD (chronic obstructive pulmonary disease) ([facility] [address] )     9/11/2022 11:44 AM Merdis Kennel Add [R06 2] Wheezing     9/11/2022 11:44 AM Merdis Kennel Add [J40] Bronchitis       ED Disposition     ED Disposition   Admit    Condition   Stable    Date/Time   Sun Sep 11, 2022 11:52 AM    Comment   Case was discussed with hospitalist service and the patient's admission status was agreed to be observation status the service of Dr Mirna Green Discharge Medication List      CONTINUE these medications which have NOT CHANGED    Details   ibuprofen (MOTRIN) 800 mg tablet Take 1 tablet (800 mg total) by mouth 3 (three) times a day  Qty: 21 tablet, Refills: 0    Associated Diagnoses: Acute right hip pain; Low back pain      levothyroxine 200 mcg tablet Take 1 tablet by mouth daily      oxyCODONE-acetaminophen (PERCOCET) 5-325 mg per tablet For moderate to severe pain, take 1 tab prn q6 hours day 1; 1 tab prn q 12 hours days 2; 1 tab prn once day 3  No discharge procedures on file      PDMP Review     None          ED Provider  Electronically Signed by           Gavin Zimmerman MD  09/11/22 5444

## 2022-09-11 NOTE — H&P
[address] [facility]  H&P - Asia Deshpande 1965, 62 y o  female MRN: [id number]  Unit/Bed#: ED 18 Encounter: [id number]  Primary Care Provider: Joss Washington DO   Date and time admitted to hospital: 9/11/2022  9:18 AM    Smoker  Assessment & Plan  Active smoker  Smoking cessation education provided  Refused nicotine patch    Chest pain, unspecified  Assessment & Plan  Patient presented with nonspecific chest pain, worse with coughing  Denies pressure-like sensation  Asymptomatic at this time  Likely MSK nature  · Troponin negative x1, continue to trend  · EKG normal sinus rhythm without acute ST/T-wave changes  · Monitor on telemetry  · Trial Flexeril    * Chronic obstructive pulmonary disease with acute exacerbation [facility])  Assessment & Plan  Likely has undiagnosed COPD given smoking history  Exacerbated by recent viral bronchitis  CTA negative for PE  Mild bronchial wall thickening noted  No focal consolidation  · Status post IV Solu-Medrol and nebulizer in ED, noted improvement  · Continue with supportive care  · Follow-up on procal  · Schedule nebs  · P o  Prednisone 40 mg daily, can likely taper  · Respiratory protocol        VTE Pharmacologic Prophylaxis: VTE Score: 4 High Risk (Score >/= 5) - Pharmacological DVT Prophylaxis Ordered: enoxaparin (Lovenox)  Sequential Compression Devices Ordered  Code Status: Level 1 - Full Code   Discussion with family: Updated  () via phone  Anticipated Length of Stay: Patient will be admitted on an observation basis with an anticipated length of stay of less than 2 midnights secondary to COPD exacerbation  Total Time for Visit, including Counseling / Coordination of Care: 45 minutes Greater than 50% of this total time spent on direct patient counseling and coordination of care      Chief Complaint:  Chest pain, cough, shortness of breath    History of Present Illness:  Asia Deshpande is a 62 y o  female with a [facility] smoker  who presents with 3 day history of cough, shortness of breath, chest pain  Came into ED yesterday for similar symptoms  Endorses chills  Was told in the past she has COPD  Not on any inhalers  Has not been smoking for the last 2 days due to cough  Chest pain is worse with coughing  Noted improvement with nebulizer treatment  Review of Systems:  Review of Systems   Constitutional: Positive for appetite change and chills  Respiratory: Positive for cough, shortness of breath and wheezing  Cardiovascular: Positive for chest pain  All other systems reviewed and are negative  Past Medical and Surgical History:   Past Medical History:   Diagnosis Date    Achalasia     Back pain     Fibromyalgia     Lupus ([facility] [address] )        Past Surgical History:   Procedure Laterality Date    HYSTERECTOMY      NECK SURGERY         Meds/Allergies:  Prior to Admission medications    Medication Sig Start Date End Date Taking? Authorizing Provider   ibuprofen (MOTRIN) 800 mg tablet Take 1 tablet (800 mg total) by mouth 3 (three) times a day 6/28/19   Erma Pascual MD   levothyroxine (Synthroid) 200 mcg tablet Take 1 tablet by mouth daily    Historical Provider, MD   oxyCODONE-acetaminophen (PERCOCET) 5-325 mg per tablet For moderate to severe pain, take 1 tab prn q6 hours day 1; 1 tab prn q 12 hours days 2; 1 tab prn once day 3  2/25/21   Historical Provider, MD     I have reviewed home medications with patient personally  Allergies:    Allergies   Allergen Reactions    Valium [Diazepam] Anaphylaxis       Social History:  Marital Status: /Civil Union   Patient Pre-hospital Living Situation: Home  Patient Pre-hospital Level of Mobility: walks  Patient Pre-hospital Diet Restrictions: n/a  Substance Use History:   Social History     Substance and Sexual Activity   Alcohol Use No     Social History     Tobacco Use   Smoking Status Current Every Day Smoker    Packs/day: 0 50    Types: Cigarettes Smokeless Tobacco Never Used     Social History     Substance and Sexual Activity   Drug Use No       Family History:  No family history on file  Physical Exam:     Vitals:   Blood Pressure: 149/64 (09/11/22 1100)  Pulse: 81 (09/11/22 1100)  Temperature: 98 7 °F (37 1 °C) (09/11/22 0926)  Temp Source: Oral (09/11/22 0926)  Respirations: 22 (09/11/22 1100)  Height: 5' 1" (154 9 cm) (09/11/22 0926)  Weight - Scale: 85 kg (187 lb 6 3 oz) (09/11/22 0926)  SpO2: 97 % (09/11/22 1100)    Physical Exam  Vitals reviewed  Constitutional:       Appearance: She is not toxic-appearing or diaphoretic  HENT:      Mouth/Throat:      Comments: Poor dentition  Eyes:      General: No scleral icterus  Cardiovascular:      Rate and Rhythm: Normal rate and regular rhythm  Heart sounds: No murmur heard  No gallop  Pulmonary:      Comments: Satting 93% on room air  Rhonchorous breath sounds bilateral bases, worse on right  Abdominal:      General: There is no distension  Palpations: Abdomen is soft  Tenderness: There is no abdominal tenderness  Musculoskeletal:         General: No swelling  Right lower leg: No edema  Left lower leg: No edema  Skin:     Coloration: Skin is pale  Neurological:      General: No focal deficit present  Mental Status: She is oriented to person, place, and time     Psychiatric:         Behavior: Behavior normal           Additional Data:     Lab Results:  Results from last 7 days   Lab Units 09/11/22  0950   WBC Thousand/uL 8 68   HEMOGLOBIN g/dL 12 4   HEMATOCRIT % 38 8   PLATELETS Thousands/uL 328   NEUTROS PCT % 77*   LYMPHS PCT % 13*   MONOS PCT % 8   EOS PCT % 2     Results from last 7 days   Lab Units 09/11/22  0950   SODIUM mmol/L 142   POTASSIUM mmol/L 3 8   CHLORIDE mmol/L 106   CO2 mmol/L 29   BUN mg/dL 15   CREATININE mg/dL 0 65   ANION GAP mmol/L 7   CALCIUM mg/dL 9 1   ALBUMIN g/dL 3 7   TOTAL BILIRUBIN mg/dL 0 58   ALK PHOS U/L 119*   ALT U/L 20 AST U/L 8   GLUCOSE RANDOM mg/dL 89     Results from last 7 days   Lab Units 09/11/22  0950   INR  1 04             Results from last 7 days   Lab Units 09/11/22  0950   LACTIC ACID mmol/L 1 1   PROCALCITONIN ng/ml <0 05       Imaging: Reviewed radiology reports from this admission including: chest CT scan  CTA ED chest PE Study   Final Result by Summer Husain MD (09/11 1119)      1  No pulmonary embolism or pneumonia  2   Mild bronchial wall thickening may represent acute or chronic bronchitis; note is made of patient's smoking history  3   Circumferential esophageal wall thickening  Correlation with symptoms is advised  Note is made of patient's history of achalasia; there is no abnormal luminal dilatation of the esophagus  Workstation performed: [id number]             EKG and Other Studies Reviewed on Admission:   · EKG: NSR  HR 75     ** Please Note: This note has been constructed using a voice recognition system   **

## 2022-09-11 NOTE — RESPIRATORY THERAPY NOTE
RT Protocol Note  Asia Deshpande 62 y o  female MRN: [id number]  Unit/Bed#: ED 18 Encounter: [id number]    Assessment    Principal Problem:    Chronic obstructive pulmonary disease with acute exacerbation (HCC)  Active Problems:    Chest pain, unspecified    Smoker      Home Pulmonary Medications:  none       Past Medical History:   Diagnosis Date    Achalasia     Back pain     Fibromyalgia     Lupus ([facility] [address] )      Social History     Socioeconomic History    Marital status: /Civil Union     Spouse name: Not on file    Number of children: Not on file    Years of education: Not on file    Highest education level: Not on file   Occupational History    Not on file   Tobacco Use    Smoking status: Current Every Day Smoker     Packs/day: 0 50     Types: Cigarettes    Smokeless tobacco: Never Used   Vaping Use    Vaping Use: Never used   Substance and Sexual Activity    Alcohol use: No    Drug use: No    Sexual activity: Not on file   Other Topics Concern    Not on file   Social History Narrative    Not on file     Social Determinants of Health     Financial Resource Strain: Not on file   Food Insecurity: Not on file   Transportation Needs: Not on file   Physical Activity: Not on file   Stress: Not on file   Social Connections: Not on file   Intimate Partner Violence: Not on file   Housing Stability: Not on file       Subjective         Objective    Physical Exam:   Assessment Type: (P) Assess only  General Appearance: (P) Alert, Awake  Respiratory Pattern: (P) Normal  Chest Assessment: (P) Chest expansion symmetrical  Bilateral Breath Sounds: (P) Diminished, Expiratory wheezes, Coarse  Cough: (P) None  O2 Device: (P) NC    Vitals:  Blood pressure 109/55, pulse 71, temperature 98 7 °F (37 1 °C), temperature source Oral, resp  rate 17, height 5' 1" (1 549 m), weight 85 kg (187 lb 6 3 oz), SpO2 94 %  Imaging and other studies: I have personally reviewed pertinent reports        O2 Device: (P) NC     Plan    Respiratory Plan: (P) Mild Distress pathway        Resp Comments: (P) Protocol completed at this time  Pt w/ no diagnosed respiratory hx, but is a long time smoker  Pt has no respiratory meds at home  Came in c/o CP w/ coughing  Has a hx of pleurodynia  Will cont w/ ordered nebs at this time

## 2022-09-11 NOTE — ASSESSMENT & PLAN NOTE
Patient presented with nonspecific chest pain, worse with coughing  Denies pressure-like sensation  Asymptomatic at this time    Likely MSK nature  · Troponin negative x1, continue to trend  · EKG normal sinus rhythm without acute ST/T-wave changes  · Monitor on telemetry  · Trial Flexeril

## 2022-09-11 NOTE — DISCHARGE SUMMARY
Medical Problems             Resolved Problems  Date Reviewed: 9/11/2022   None               Discharging Physician / Practitioner: Zan Butler DO  PCP: Aron Jo DO  Admission Date:   Admission Orders (From admission, onward)     Ordered        09/11/22 1152  Place in Observation  Once                      Discharge Date: 09/11/22    Consultations During Hospital Stay:  · n/a    Procedures Performed:   · n/a    Significant Findings / Test Results:   · n/a    Incidental Findings:   · n/a    Test Results Pending at Discharge (will require follow up):   · n/a     Outpatient Tests Requested:  · n/a    Complications:  none    Reason for Admission: SOB, cough, chest pain    Hospital Course:   Corina Turpin is a 62 y o  female patient who originally presented to the hospital on 9/11/2022   with 3 day history of cough, shortness of breath, chest pain  Came into ED yesterday for similar symptoms  Endorses chills  Was told in the past she has COPD  Not on any inhalers  Has not been smoking for the last 2 days due to cough  Chest pain is worse with coughing  Noted improvement with nebulizer treatment  Pt admitted for suspected COPD exacerbation  Unfortunately pt requested to leave AMA  Verbalized risks, paperwork signed  Sent with script of steroid and scheduled inhalers  ED precautions discussed  Please see above list of diagnoses and related plan for additional information  Condition at Discharge: fair    Discharge Day Visit / Exam:   * Please refer to separate progress note for these details *    Discussion with Family: Patient declined call to   Discharge instructions/Information to patient and family:   See after visit summary for information provided to patient and family  Provisions for Follow-Up Care:  See after visit summary for information related to follow-up care and any pertinent home health orders         Disposition:   Pt left AMA    Planned Readmission: none     Discharge Statement:  I spent 35 minutes discharging the patient  This time was spent on the day of discharge  I had direct contact with the patient on the day of discharge  Greater than 50% of the total time was spent examining patient, answering all patient questions, arranging and discussing plan of care with patient as well as directly providing post-discharge instructions  Additional time then spent on discharge activities  Discharge Medications:  See after visit summary for reconciled discharge medications provided to patient and/or family        **Please Note: This note may have been constructed using a voice recognition system**

## 2022-09-16 LAB
BACTERIA BLD CULT: NORMAL
BACTERIA BLD CULT: NORMAL

## 2022-10-04 ENCOUNTER — HOSPITAL ENCOUNTER (INPATIENT)
Facility: HOSPITAL | Age: 57
LOS: 2 days | Discharge: HOME | DRG: 133 | End: 2022-10-07
Attending: EMERGENCY MEDICINE | Admitting: STUDENT IN AN ORGANIZED HEALTH CARE EDUCATION/TRAINING PROGRAM
Payer: COMMERCIAL

## 2022-10-04 ENCOUNTER — APPOINTMENT (EMERGENCY)
Dept: CT IMAGING | Facility: HOSPITAL | Age: 57
DRG: 133 | End: 2022-10-04
Payer: COMMERCIAL

## 2022-10-04 ENCOUNTER — APPOINTMENT (OUTPATIENT)
Dept: RADIOLOGY | Facility: HOSPITAL | Age: 57
DRG: 133 | End: 2022-10-04
Payer: COMMERCIAL

## 2022-10-04 DIAGNOSIS — K21.9 GERD (GASTROESOPHAGEAL REFLUX DISEASE): ICD-10-CM

## 2022-10-04 DIAGNOSIS — F17.200 SMOKER: ICD-10-CM

## 2022-10-04 DIAGNOSIS — J44.9 COPD (CHRONIC OBSTRUCTIVE PULMONARY DISEASE) (HCC): ICD-10-CM

## 2022-10-04 DIAGNOSIS — J44.1 COPD EXACERBATION (HCC): Primary | ICD-10-CM

## 2022-10-04 PROBLEM — E87.6 HYPOKALEMIA: Status: ACTIVE | Noted: 2022-10-04

## 2022-10-04 LAB
2HR DELTA HS TROPONIN: 0 NG/L
4HR DELTA HS TROPONIN: 0 NG/L
ALBUMIN SERPL BCP-MCNC: 2.8 G/DL (ref 3.5–5)
ALP SERPL-CCNC: 93 U/L (ref 46–116)
ALT SERPL W P-5'-P-CCNC: 19 U/L (ref 12–78)
ANION GAP SERPL CALCULATED.3IONS-SCNC: 7 MMOL/L (ref 4–13)
AST SERPL W P-5'-P-CCNC: 14 U/L (ref 5–45)
ATRIAL RATE: 84 BPM
BASOPHILS # BLD AUTO: 0.03 THOUSANDS/ΜL (ref 0–0.1)
BASOPHILS NFR BLD AUTO: 0 % (ref 0–1)
BILIRUB SERPL-MCNC: 0.37 MG/DL (ref 0.2–1)
BILIRUB UR QL STRIP: NEGATIVE
BUN SERPL-MCNC: 15 MG/DL (ref 5–25)
CALCIUM ALBUM COR SERPL-MCNC: 9.4 MG/DL (ref 8.3–10.1)
CALCIUM SERPL-MCNC: 8.4 MG/DL (ref 8.3–10.1)
CARDIAC TROPONIN I PNL SERPL HS: 3 NG/L
CHLORIDE SERPL-SCNC: 106 MMOL/L (ref 96–108)
CLARITY UR: CLEAR
CO2 SERPL-SCNC: 31 MMOL/L (ref 21–32)
COLOR UR: YELLOW
CREAT SERPL-MCNC: 0.61 MG/DL (ref 0.6–1.3)
EOSINOPHIL # BLD AUTO: 0.16 THOUSAND/ΜL (ref 0–0.61)
EOSINOPHIL NFR BLD AUTO: 2 % (ref 0–6)
ERYTHROCYTE [DISTWIDTH] IN BLOOD BY AUTOMATED COUNT: 15.2 % (ref 11.6–15.1)
FLUAV RNA RESP QL NAA+PROBE: NEGATIVE
FLUBV RNA RESP QL NAA+PROBE: NEGATIVE
GFR SERPL CREATININE-BSD FRML MDRD: 100 ML/MIN/1.73SQ M
GLUCOSE SERPL-MCNC: 105 MG/DL (ref 65–140)
GLUCOSE UR STRIP-MCNC: NEGATIVE MG/DL
HCT VFR BLD AUTO: 36.6 % (ref 34.8–46.1)
HGB BLD-MCNC: 12 G/DL (ref 11.5–15.4)
HGB UR QL STRIP.AUTO: NEGATIVE
IMM GRANULOCYTES # BLD AUTO: 0.08 THOUSAND/UL (ref 0–0.2)
IMM GRANULOCYTES NFR BLD AUTO: 1 % (ref 0–2)
KETONES UR STRIP-MCNC: NEGATIVE MG/DL
LEUKOCYTE ESTERASE UR QL STRIP: NEGATIVE
LYMPHOCYTES # BLD AUTO: 1.21 THOUSANDS/ΜL (ref 0.6–4.47)
LYMPHOCYTES NFR BLD AUTO: 15 % (ref 14–44)
MCH RBC QN AUTO: 29.2 PG (ref 26.8–34.3)
MCHC RBC AUTO-ENTMCNC: 32.8 G/DL (ref 31.4–37.4)
MCV RBC AUTO: 89 FL (ref 82–98)
MONOCYTES # BLD AUTO: 0.75 THOUSAND/ΜL (ref 0.17–1.22)
MONOCYTES NFR BLD AUTO: 9 % (ref 4–12)
NEUTROPHILS # BLD AUTO: 5.85 THOUSANDS/ΜL (ref 1.85–7.62)
NEUTS SEG NFR BLD AUTO: 73 % (ref 43–75)
NITRITE UR QL STRIP: NEGATIVE
NRBC BLD AUTO-RTO: 0 /100 WBCS
NT-PROBNP SERPL-MCNC: 52 PG/ML
P AXIS: 71 DEGREES
PH UR STRIP.AUTO: 7 [PH]
PLATELET # BLD AUTO: 306 THOUSANDS/UL (ref 149–390)
PMV BLD AUTO: 8.8 FL (ref 8.9–12.7)
POTASSIUM SERPL-SCNC: 3 MMOL/L (ref 3.5–5.3)
PR INTERVAL: 148 MS
PROT SERPL-MCNC: 6.1 G/DL (ref 6.4–8.4)
PROT UR STRIP-MCNC: NEGATIVE MG/DL
QRS AXIS: 32 DEGREES
QRSD INTERVAL: 88 MS
QT INTERVAL: 386 MS
QTC INTERVAL: 456 MS
RBC # BLD AUTO: 4.11 MILLION/UL (ref 3.81–5.12)
RSV RNA RESP QL NAA+PROBE: NEGATIVE
SARS-COV-2 RNA RESP QL NAA+PROBE: NEGATIVE
SODIUM SERPL-SCNC: 144 MMOL/L (ref 135–147)
SP GR UR STRIP.AUTO: 1.01 (ref 1–1.03)
T WAVE AXIS: 20 DEGREES
UROBILINOGEN UR QL STRIP.AUTO: 2 E.U./DL
VENTRICULAR RATE: 84 BPM
WBC # BLD AUTO: 8.08 THOUSAND/UL (ref 4.31–10.16)

## 2022-10-04 PROCEDURE — 93010 ELECTROCARDIOGRAM REPORT: CPT | Performed by: INTERNAL MEDICINE

## 2022-10-04 PROCEDURE — 94664 DEMO&/EVAL PT USE INHALER: CPT

## 2022-10-04 PROCEDURE — 94640 AIRWAY INHALATION TREATMENT: CPT

## 2022-10-04 PROCEDURE — 81003 URINALYSIS AUTO W/O SCOPE: CPT | Performed by: PHYSICIAN ASSISTANT

## 2022-10-04 PROCEDURE — G1004 CDSM NDSC: HCPCS

## 2022-10-04 PROCEDURE — 36415 COLL VENOUS BLD VENIPUNCTURE: CPT | Performed by: PHYSICIAN ASSISTANT

## 2022-10-04 PROCEDURE — 74177 CT ABD & PELVIS W/CONTRAST: CPT

## 2022-10-04 PROCEDURE — 85025 COMPLETE CBC W/AUTO DIFF WBC: CPT | Performed by: PHYSICIAN ASSISTANT

## 2022-10-04 PROCEDURE — 0241U HB NFCT DS VIR RESP RNA 4 TRGT: CPT | Performed by: EMERGENCY MEDICINE

## 2022-10-04 PROCEDURE — 71275 CT ANGIOGRAPHY CHEST: CPT

## 2022-10-04 PROCEDURE — 96374 THER/PROPH/DIAG INJ IV PUSH: CPT

## 2022-10-04 PROCEDURE — 99285 EMERGENCY DEPT VISIT HI MDM: CPT | Performed by: PHYSICIAN ASSISTANT

## 2022-10-04 PROCEDURE — 94760 N-INVAS EAR/PLS OXIMETRY 1: CPT

## 2022-10-04 PROCEDURE — 96375 TX/PRO/DX INJ NEW DRUG ADDON: CPT

## 2022-10-04 PROCEDURE — 84484 ASSAY OF TROPONIN QUANT: CPT | Performed by: PHYSICIAN ASSISTANT

## 2022-10-04 PROCEDURE — 93005 ELECTROCARDIOGRAM TRACING: CPT

## 2022-10-04 PROCEDURE — 83880 ASSAY OF NATRIURETIC PEPTIDE: CPT | Performed by: PHYSICIAN ASSISTANT

## 2022-10-04 PROCEDURE — 71045 X-RAY EXAM CHEST 1 VIEW: CPT

## 2022-10-04 PROCEDURE — 99285 EMERGENCY DEPT VISIT HI MDM: CPT

## 2022-10-04 PROCEDURE — 80053 COMPREHEN METABOLIC PANEL: CPT | Performed by: PHYSICIAN ASSISTANT

## 2022-10-04 PROCEDURE — 99220 PR INITIAL OBSERVATION CARE/DAY 70 MINUTES: CPT | Performed by: STUDENT IN AN ORGANIZED HEALTH CARE EDUCATION/TRAINING PROGRAM

## 2022-10-04 RX ORDER — METHYLPREDNISOLONE SODIUM SUCCINATE 125 MG/2ML
125 INJECTION, POWDER, LYOPHILIZED, FOR SOLUTION INTRAMUSCULAR; INTRAVENOUS ONCE
Status: COMPLETED | OUTPATIENT
Start: 2022-10-04 | End: 2022-10-04

## 2022-10-04 RX ORDER — POTASSIUM CHLORIDE 20 MEQ/1
40 TABLET, EXTENDED RELEASE ORAL ONCE
Status: COMPLETED | OUTPATIENT
Start: 2022-10-04 | End: 2022-10-04

## 2022-10-04 RX ORDER — ALBUTEROL SULFATE 2.5 MG/3ML
5 SOLUTION RESPIRATORY (INHALATION) ONCE
Status: COMPLETED | OUTPATIENT
Start: 2022-10-04 | End: 2022-10-04

## 2022-10-04 RX ORDER — ONDANSETRON 2 MG/ML
4 INJECTION INTRAMUSCULAR; INTRAVENOUS ONCE
Status: COMPLETED | OUTPATIENT
Start: 2022-10-04 | End: 2022-10-04

## 2022-10-04 RX ORDER — ALBUTEROL SULFATE 90 UG/1
2 AEROSOL, METERED RESPIRATORY (INHALATION) 4 TIMES DAILY
Status: DISCONTINUED | OUTPATIENT
Start: 2022-10-04 | End: 2022-10-04

## 2022-10-04 RX ORDER — FENTANYL CITRATE 50 UG/ML
50 INJECTION, SOLUTION INTRAMUSCULAR; INTRAVENOUS ONCE
Status: COMPLETED | OUTPATIENT
Start: 2022-10-04 | End: 2022-10-04

## 2022-10-04 RX ORDER — MAGNESIUM SULFATE 1 G/100ML
1 INJECTION INTRAVENOUS ONCE
Status: COMPLETED | OUTPATIENT
Start: 2022-10-04 | End: 2022-10-04

## 2022-10-04 RX ORDER — ENOXAPARIN SODIUM 100 MG/ML
40 INJECTION SUBCUTANEOUS DAILY
Status: DISCONTINUED | OUTPATIENT
Start: 2022-10-04 | End: 2022-10-07 | Stop reason: HOSPADM

## 2022-10-04 RX ORDER — GUAIFENESIN 600 MG/1
600 TABLET, EXTENDED RELEASE ORAL 2 TIMES DAILY
Status: DISCONTINUED | OUTPATIENT
Start: 2022-10-04 | End: 2022-10-07 | Stop reason: HOSPADM

## 2022-10-04 RX ORDER — METHOCARBAMOL 500 MG/1
500 TABLET, FILM COATED ORAL EVERY 6 HOURS PRN
Status: DISCONTINUED | OUTPATIENT
Start: 2022-10-04 | End: 2022-10-06

## 2022-10-04 RX ORDER — KETOROLAC TROMETHAMINE 30 MG/ML
15 INJECTION, SOLUTION INTRAMUSCULAR; INTRAVENOUS ONCE
Status: COMPLETED | OUTPATIENT
Start: 2022-10-04 | End: 2022-10-04

## 2022-10-04 RX ORDER — ONDANSETRON 2 MG/ML
4 INJECTION INTRAMUSCULAR; INTRAVENOUS EVERY 6 HOURS PRN
Status: DISCONTINUED | OUTPATIENT
Start: 2022-10-04 | End: 2022-10-07 | Stop reason: HOSPADM

## 2022-10-04 RX ORDER — LIDOCAINE 50 MG/G
1 PATCH TOPICAL DAILY
Status: DISCONTINUED | OUTPATIENT
Start: 2022-10-04 | End: 2022-10-07 | Stop reason: HOSPADM

## 2022-10-04 RX ORDER — KETOROLAC TROMETHAMINE 30 MG/ML
15 INJECTION, SOLUTION INTRAMUSCULAR; INTRAVENOUS EVERY 6 HOURS PRN
Status: DISPENSED | OUTPATIENT
Start: 2022-10-04 | End: 2022-10-06

## 2022-10-04 RX ORDER — LEVALBUTEROL 1.25 MG/.5ML
1.25 SOLUTION, CONCENTRATE RESPIRATORY (INHALATION)
Status: DISCONTINUED | OUTPATIENT
Start: 2022-10-04 | End: 2022-10-07 | Stop reason: HOSPADM

## 2022-10-04 RX ORDER — NICOTINE 21 MG/24HR
1 PATCH, TRANSDERMAL 24 HOURS TRANSDERMAL DAILY
Status: DISCONTINUED | OUTPATIENT
Start: 2022-10-04 | End: 2022-10-07 | Stop reason: HOSPADM

## 2022-10-04 RX ORDER — IPRATROPIUM BROMIDE AND ALBUTEROL SULFATE 2.5; .5 MG/3ML; MG/3ML
3 SOLUTION RESPIRATORY (INHALATION)
Status: DISCONTINUED | OUTPATIENT
Start: 2022-10-04 | End: 2022-10-04

## 2022-10-04 RX ORDER — BENZONATATE 100 MG/1
100 CAPSULE ORAL 3 TIMES DAILY PRN
Status: DISCONTINUED | OUTPATIENT
Start: 2022-10-04 | End: 2022-10-07 | Stop reason: HOSPADM

## 2022-10-04 RX ORDER — METHYLPREDNISOLONE SODIUM SUCCINATE 40 MG/ML
40 INJECTION, POWDER, LYOPHILIZED, FOR SOLUTION INTRAMUSCULAR; INTRAVENOUS EVERY 12 HOURS SCHEDULED
Status: DISCONTINUED | OUTPATIENT
Start: 2022-10-05 | End: 2022-10-06

## 2022-10-04 RX ORDER — ACETAMINOPHEN 325 MG/1
650 TABLET ORAL EVERY 6 HOURS PRN
Status: DISCONTINUED | OUTPATIENT
Start: 2022-10-04 | End: 2022-10-05

## 2022-10-04 RX ORDER — POTASSIUM CHLORIDE 20 MEQ/1
40 TABLET, EXTENDED RELEASE ORAL 2 TIMES DAILY
Status: DISCONTINUED | OUTPATIENT
Start: 2022-10-04 | End: 2022-10-06

## 2022-10-04 RX ORDER — AZITHROMYCIN 500 MG/1
500 TABLET, FILM COATED ORAL EVERY 24 HOURS
Status: DISCONTINUED | OUTPATIENT
Start: 2022-10-04 | End: 2022-10-05

## 2022-10-04 RX ORDER — ALBUTEROL SULFATE 90 UG/1
2 AEROSOL, METERED RESPIRATORY (INHALATION) EVERY 4 HOURS PRN
Status: DISCONTINUED | OUTPATIENT
Start: 2022-10-04 | End: 2022-10-07 | Stop reason: HOSPADM

## 2022-10-04 RX ADMIN — LIDOCAINE 5% 1 PATCH: 700 PATCH TOPICAL at 13:47

## 2022-10-04 RX ADMIN — IPRATROPIUM BROMIDE 0.5 MG: 0.5 SOLUTION RESPIRATORY (INHALATION) at 07:46

## 2022-10-04 RX ADMIN — METHYLPREDNISOLONE SODIUM SUCCINATE 125 MG: 125 INJECTION, POWDER, FOR SOLUTION INTRAMUSCULAR; INTRAVENOUS at 07:46

## 2022-10-04 RX ADMIN — ONDANSETRON 4 MG: 2 INJECTION INTRAMUSCULAR; INTRAVENOUS at 20:10

## 2022-10-04 RX ADMIN — POTASSIUM CHLORIDE 40 MEQ: 1500 TABLET, EXTENDED RELEASE ORAL at 07:00

## 2022-10-04 RX ADMIN — IOHEXOL 100 ML: 350 INJECTION, SOLUTION INTRAVENOUS at 06:17

## 2022-10-04 RX ADMIN — BENZONATATE 100 MG: 100 CAPSULE ORAL at 13:46

## 2022-10-04 RX ADMIN — KETOROLAC TROMETHAMINE 15 MG: 30 INJECTION, SOLUTION INTRAMUSCULAR at 20:10

## 2022-10-04 RX ADMIN — GUAIFENESIN 600 MG: 600 TABLET ORAL at 17:58

## 2022-10-04 RX ADMIN — LEVALBUTEROL HYDROCHLORIDE 1.25 MG: 1.25 SOLUTION, CONCENTRATE RESPIRATORY (INHALATION) at 20:23

## 2022-10-04 RX ADMIN — NICOTINE 1 PATCH: 14 PATCH, EXTENDED RELEASE TRANSDERMAL at 13:46

## 2022-10-04 RX ADMIN — IPRATROPIUM BROMIDE AND ALBUTEROL SULFATE 3 ML: .5; 3 SOLUTION RESPIRATORY (INHALATION) at 13:55

## 2022-10-04 RX ADMIN — MAGNESIUM SULFATE HEPTAHYDRATE 1 G: 1 INJECTION, SOLUTION INTRAVENOUS at 13:50

## 2022-10-04 RX ADMIN — ENOXAPARIN SODIUM 40 MG: 40 INJECTION SUBCUTANEOUS at 13:46

## 2022-10-04 RX ADMIN — ONDANSETRON 4 MG: 2 INJECTION INTRAMUSCULAR; INTRAVENOUS at 05:52

## 2022-10-04 RX ADMIN — POTASSIUM CHLORIDE 40 MEQ: 1500 TABLET, EXTENDED RELEASE ORAL at 17:59

## 2022-10-04 RX ADMIN — AZITHROMYCIN 500 MG: 500 TABLET, FILM COATED ORAL at 13:47

## 2022-10-04 RX ADMIN — ALBUTEROL SULFATE 5 MG: 2.5 SOLUTION RESPIRATORY (INHALATION) at 07:46

## 2022-10-04 RX ADMIN — IPRATROPIUM BROMIDE 0.5 MG: 0.5 SOLUTION RESPIRATORY (INHALATION) at 20:23

## 2022-10-04 RX ADMIN — FENTANYL CITRATE 50 MCG: 50 INJECTION INTRAMUSCULAR; INTRAVENOUS at 07:00

## 2022-10-04 RX ADMIN — KETOROLAC TROMETHAMINE 15 MG: 30 INJECTION, SOLUTION INTRAMUSCULAR at 05:48

## 2022-10-04 NOTE — CASE MANAGEMENT
Case Management Progress Note    Patient name Asia Deshpande  Location ED 20/ED 20 MRN [id number]  : 1965 Date 10/4/2022       LOS (days): 0  Geometric Mean LOS (GMLOS) (days):   Days to GMLOS:        OBJECTIVE:        Current admission status: Observation  Preferred Pharmacy:   [facility]/pharmacy #[id number]- [place] - [place]  [address]  [place]  [address] [address], [address] [place] [address]  Phone: [phone] Fax: [phone]    Primary Care Provider: Joss Washington DO    Primary Insurance: DOROTHY ANN PENDING  Secondary Insurance:     PROGRESS NOTE:    Per chart review, patient was independent prior to admission  Her PCP is Dr Bernardo Manuel, and she has MA pending  Pulmonology has been consulted  No CM needs anticipated at this time, but CM department will continue to follow patient through discharge

## 2022-10-04 NOTE — LETTER
[facility] MED SURG UNIT  [address] [facility] [address]  [address] [place], [place] [address]  Dept: [phone]    October 7, 2022     Patient: Karen Bishop   YOB: 1965   Date of Visit: 10/4/2022       To Whom it May Concern:    Andrews Hui is under my professional care  She was seen in the hospital from 10/4/2022   to 10/07/22  She may return to work on 10/17/2022 with the following limitations no heavy lifting   If you have any questions or concerns, please don't hesitate to call           Sincerely,          Marva Olmstead MD

## 2022-10-04 NOTE — ED NOTES
Patient requested assistance with getting to the bathroom  Patient can walk short distances however becomes heavily winded when doing so  Patient assisted to transfer to wheel chair and taken to bathroom        Sun Vaughan RN  10/04/22 1766

## 2022-10-04 NOTE — LETTER
[facility] MED SURG UNIT  [address] [address]  Dept: [phone]    October 7, 2022     Patient: Kia Kincaid   YOB: 1965   Date of Visit: 10/4/2022       To Whom it May Concern:    Barbara Albarran is under my professional care  She was seen in the hospital from 10/4/2022   to 10/07/22  She may return to work on [employer] 10/9/2022 with the following limitations no heavy lifting   If you have any questions or concerns, please don't hesitate to call           Sincerely,          Amos Cuevas MD

## 2022-10-04 NOTE — ED NOTES
PCA approached this writer and informed her that the patient was requesting treatment for her back pain  PRN medication pulled for patient, Patient declined the medication and stated that she was given the PRN pain medication earlier and it did not help her before  Patient is requesting a different treatment  Provider notified  Jonathan Lorenzana RN  10/04/22 1978

## 2022-10-04 NOTE — H&P
[address] [facility]  H&P- Asia Jeramy 1965, 62 y o  female MRN: [id number]  Unit/Bed#: ED 20 Encounter: [id number]  Primary Care Provider: Joss Washington DO   Date and time admitted to hospital: 10/4/2022  4:17 AM    * Chronic obstructive pulmonary disease with acute exacerbation [facility])  Assessment & Plan  62year old female patient with past medical history of COPD, active smoker, was recently hospitalized due to COPD exacerbation however she had left AMA at that time  Again presents to emergency room with complaining of shortness of breath, cough, right-sided upper back pain due to cough  Denies any other new complaints  COVID-19 negative  NT proBNP repaired  Troponin negative x3  CTA PE study report noted negative for pulmonary embolism, bilateral lower lobe atelectasis, scarring, incidentally noted persistent diffuse esophagitis, hepatomegaly  Currently on my encounter patient appears comfortable not in distress  O2 saturation 89% room air, improved 93% on 2-3 L nasal cannula  Acutely nontoxic appearing  Started on p o  Azithromycin for COPD exacerbation  Continue IV Solu-Medrol 40 mg b i d , DuoNebs q 6 hours  Encourage incentive spirometry use  Encourage ambulation  Continue O2 supplement 3 L to keep O2 saturation more than 92%  Will start on Tessalon Perles, Tylenol, Robaxin, lidocaine patch for musculoskeletal pain  Follow-up with pulmonology consult  Hypokalemia  Assessment & Plan  Noted with hypokalemia 3 0  Supplement electrolytes as needed  Smoker  Assessment & Plan  Reports smoking half pack a day  Counseled on cessation  Order nicotine patch  VTE Pharmacologic Prophylaxis: VTE Score: 3 Moderate Risk (Score 3-4) - Pharmacological DVT Prophylaxis Ordered: enoxaparin (Lovenox)    Code Status: Level 1 - Full Code     Anticipated Length of Stay: Patient will be admitted on an observation basis with an anticipated length of stay of less than 2 midnights secondary to mild copd exacerbation  Total Time for Visit, including Counseling / Coordination of Care: 60 minutes Greater than 50% of this total time spent on direct patient counseling and coordination of care  Chief Complaint:  Cough, shortness of birth    History of Present Illness:  Richa Lemos is a 62 y o  female with a PMH of COPD, obesity, active smoker, patient was hospitalized recently in the beginning of September due to COPD exacerbation however she had left against medical advise at the time  Patient was also seen at [facility] Emergency Department towards the end of September for UTI   who presents with complaining of worsening shortness of breath, cough associated with right upper back pain due to coughing  Patient denies chest pain, fever, chills, vomiting, dysuria, diarrhea, flank pain, hematuria, melena, hematochezia, any other new complaints  Denies any known sick contact  Reports smoking half a pack a day  Denies any substance use  Review of Systems:  Review of Systems   Constitutional: Negative for chills, diaphoresis, fatigue and fever  HENT: Negative for congestion  Eyes: Negative for visual disturbance  Respiratory: Positive for cough and shortness of breath  Cardiovascular: Negative for chest pain, palpitations and leg swelling  Gastrointestinal: Negative for abdominal pain, diarrhea, nausea and vomiting  Endocrine: Negative for polyuria  Genitourinary: Negative for dysuria  Musculoskeletal: Negative for neck stiffness  Neurological: Negative for weakness  Psychiatric/Behavioral: Negative for agitation         Past Medical and Surgical History:   Past Medical History:   Diagnosis Date    Achalasia     Back pain     Fibromyalgia     Lupus ([facility] [address] )        Past Surgical History:   Procedure Laterality Date    HYSTERECTOMY      NECK SURGERY         Meds/Allergies:  Prior to Admission medications    Medication Sig Start Date End Date Taking? Authorizing Provider   albuterol (ProAir HFA) 90 mcg/act inhaler Inhale 2 puffs every 6 (six) hours as needed for wheezing 9/11/22   Sarah Andrew DO   cyclobenzaprine (FLEXERIL) 10 mg tablet Take 1 tablet (10 mg total) by mouth 3 (three) times a day as needed for muscle spasms 9/11/22   Sarah Andujar DO   guaiFENesin (MUCINEX) 600 mg 12 hr tablet Take 1 tablet (600 mg total) by mouth 2 (two) times a day 9/11/22   Sarah Andujar DO   oxyCODONE-acetaminophen (PERCOCET) 5-325 mg per tablet For moderate to severe pain, take 1 tab prn q6 hours day 1; 1 tab prn q 12 hours days 2; 1 tab prn once day 3  Patient not taking: Reported on 9/11/2022 2/25/21   Historical Provider, MD   predniSONE 20 mg tablet Take 2 tablets (40 mg total) by mouth daily 9/11/22   Denisse 89, DO     I have reviewed home medications with patient personally  Allergies: Allergies   Allergen Reactions    Valium [Diazepam] Anaphylaxis       Social History:  Marital Status: /Civil Union     Substance Use History:   Social History     Substance and Sexual Activity   Alcohol Use No     Social History     Tobacco Use   Smoking Status Current Every Day Smoker    Packs/day: 0 50    Types: Cigarettes   Smokeless Tobacco Never Used     Social History     Substance and Sexual Activity   Drug Use No       Family History:  History reviewed  No pertinent family history  Physical Exam:     Vitals:   Blood Pressure: 120/57 (10/04/22 1200)  Pulse: 75 (10/04/22 1330)  Temperature: 98 6 °F (37 °C) (10/04/22 0425)  Temp Source: Oral (10/04/22 0425)  Respirations: 20 (10/04/22 1330)  Height: 5' 1" (154 9 cm) (10/04/22 0425)  Weight - Scale: 83 9 kg (185 lb) (10/04/22 0425)  SpO2: 93 % (10/04/22 1330)    Physical Exam  Constitutional:       General: She is not in acute distress  Appearance: Normal appearance  She is obese  She is not ill-appearing  Comments: Acutely nontoxic appearing     HENT:      Head: Normocephalic and atraumatic  Eyes:      Pupils: Pupils are equal, round, and reactive to light  Cardiovascular:      Rate and Rhythm: Normal rate  Pulses: Normal pulses  Heart sounds: Normal heart sounds  Pulmonary:      Effort: Pulmonary effort is normal  No respiratory distress  Breath sounds: Normal breath sounds  No stridor  No wheezing or rhonchi  Comments: Currently not in distress, O2 saturation 92-93% on 2-3 L nasal cannula  Abdominal:      General: Bowel sounds are normal  There is no distension  Palpations: Abdomen is soft  Tenderness: There is no abdominal tenderness  Musculoskeletal:         General: Tenderness (Right upper back/right thorax area noted mildly tender on exam with palpation) present  Cervical back: Normal range of motion and neck supple  Neurological:      General: No focal deficit present  Mental Status: She is alert and oriented to person, place, and time  Mental status is at baseline  Psychiatric:         Mood and Affect: Mood normal          Behavior: Behavior normal          Additional Data:     Lab Results:  Results from last 7 days   Lab Units 10/04/22  0507   WBC Thousand/uL 8 08   HEMOGLOBIN g/dL 12 0   HEMATOCRIT % 36 6   PLATELETS Thousands/uL 306   NEUTROS PCT % 73   LYMPHS PCT % 15   MONOS PCT % 9   EOS PCT % 2     Results from last 7 days   Lab Units 10/04/22  0507   SODIUM mmol/L 144   POTASSIUM mmol/L 3 0*   CHLORIDE mmol/L 106   CO2 mmol/L 31   BUN mg/dL 15   CREATININE mg/dL 0 61   ANION GAP mmol/L 7   CALCIUM mg/dL 8 4   ALBUMIN g/dL 2 8*   TOTAL BILIRUBIN mg/dL 0 37   ALK PHOS U/L 93   ALT U/L 19   AST U/L 14   GLUCOSE RANDOM mg/dL 105                       Imaging: Reviewed radiology reports from this admission including: CTA pe study report reviewed  CT pe study w abdomen pelvis w contrast   Final Result by Shanti Charlton MD (10/04 8157)      No pulmonary embolus        Persistent diffuse esophageal thickening suggestive of esophagitis  Consider endoscopy for further evaluation  No acute pathology in the abdomen or pelvis  Hepatomegaly  Workstation performed: [id number]         XR chest 1 view portable   Final Result by Steven Foy MD (10/04 7203)      No acute cardiopulmonary disease  Workstation performed: [id number]             EKG and Other Studies Reviewed on Admission:   · EKG: Normal sinus rhythm  ** Please Note: This note has been constructed using a voice recognition system   **

## 2022-10-04 NOTE — ED NOTES
Placed patient on 2 liters nasal cannula due to oxygen saturation of 84%on room air       Javi Magaña RN  10/04/22 4006

## 2022-10-04 NOTE — ASSESSMENT & PLAN NOTE
62year old female patient with past medical history of COPD, active smoker, was recently hospitalized due to COPD exacerbation however she had left AMA at that time  Again presents to emergency room with complaining of shortness of breath, cough, right-sided upper back pain due to cough  Denies any other new complaints  COVID-19 negative  NT proBNP repaired  Troponin negative x3  CTA PE study report noted negative for pulmonary embolism, bilateral lower lobe atelectasis, scarring, incidentally noted persistent diffuse esophagitis, hepatomegaly  Currently on my encounter patient appears comfortable not in distress  O2 saturation 89% room air, improved 93% on 2-3 L nasal cannula  Acutely nontoxic appearing  Started on p o  Azithromycin for COPD exacerbation  Continue IV Solu-Medrol 40 mg b i d , DuoNebs q 6 hours  Encourage incentive spirometry use  Encourage ambulation  Continue O2 supplement 3 L to keep O2 saturation more than 92%  Will start on Tessalon Perles, Tylenol, Robaxin, lidocaine patch for musculoskeletal pain  Follow-up with pulmonology consult

## 2022-10-04 NOTE — ED PROVIDER NOTES
History  Chief Complaint   Patient presents with    Shortness of Breath     Exposed to covid at work, presents with new onset SOB and loose cough   Back Pain     Recent diagnosis of bladder/kidney infection, started on cipro on Monday, back pain accompanying infection not improving     Patient is a 66-year-old female with no significant past medical history presenting to the emergency department for evaluation of pleuritic chest pain, upper back pain, shortness of breath starting yesterday  Patient also reports productive cough that started approximately 3 days ago  She endorses bilateral lower extremity edema  She does have positive COVID exposures at work  She is not having any fevers or chills  Patient reports right-sided abdominal pain, nausea  No vomiting or diarrhea  No history of blood clots  Patient does not take blood thinners  No history of congestive heart failure  No other complaints at this time  History provided by:  Patient   used: No    Shortness of Breath  Severity:  Moderate  Onset quality:  Gradual  Duration:  24 hours  Timing:  Constant  Progression:  Worsening  Chronicity:  New  Worsened by: Activity and deep breathing  Ineffective treatments:  None tried  Associated symptoms: abdominal pain, chest pain, cough and sputum production    Associated symptoms: no claudication, no diaphoresis, no ear pain, no fever, no headaches, no hemoptysis, no neck pain, no PND, no rash, no sore throat, no syncope, no swollen glands, no vomiting and no wheezing    Back Pain  Associated symptoms: abdominal pain and chest pain    Associated symptoms: no fever and no headaches        Prior to Admission Medications   Prescriptions Last Dose Informant Patient Reported? Taking?    albuterol (ProAir HFA) 90 mcg/act inhaler   No No   Sig: Inhale 2 puffs every 6 (six) hours as needed for wheezing   cyclobenzaprine (FLEXERIL) 10 mg tablet   No No   Sig: Take 1 tablet (10 mg total) by mouth 3 (three) times a day as needed for muscle spasms   guaiFENesin (MUCINEX) 600 mg 12 hr tablet   No No   Sig: Take 1 tablet (600 mg total) by mouth 2 (two) times a day   oxyCODONE-acetaminophen (PERCOCET) 5-325 mg per tablet   Yes No   Sig: For moderate to severe pain, take 1 tab prn q6 hours day 1; 1 tab prn q 12 hours days 2; 1 tab prn once day 3  Patient not taking: Reported on 9/11/2022   predniSONE 20 mg tablet   No No   Sig: Take 2 tablets (40 mg total) by mouth daily      Facility-Administered Medications: None       Past Medical History:   Diagnosis Date    Achalasia     Back pain     Fibromyalgia     Lupus ([facility] [address] )        Past Surgical History:   Procedure Laterality Date    HYSTERECTOMY      NECK SURGERY         No family history on file  I have reviewed and agree with the history as documented  E-Cigarette/Vaping    E-Cigarette Use Never User      E-Cigarette/Vaping Substances     Social History     Tobacco Use    Smoking status: Current Every Day Smoker     Packs/day: 0 50     Types: Cigarettes    Smokeless tobacco: Never Used   Vaping Use    Vaping Use: Never used   Substance Use Topics    Alcohol use: No    Drug use: No       Review of Systems   Constitutional: Negative for chills, diaphoresis and fever  HENT: Negative for ear pain and sore throat  Respiratory: Positive for cough, sputum production and shortness of breath  Negative for hemoptysis and wheezing  Cardiovascular: Positive for chest pain and leg swelling  Negative for palpitations, claudication, syncope and PND  Gastrointestinal: Positive for abdominal pain and nausea  Negative for diarrhea and vomiting  Musculoskeletal: Positive for back pain  Negative for neck pain  Skin: Negative for rash  Neurological: Negative for headaches  All other systems reviewed and are negative  Physical Exam  Physical Exam  Vitals reviewed  Constitutional:       General: She is not in acute distress       Appearance: Normal appearance  She is ill-appearing (chronic)  She is not toxic-appearing or diaphoretic  HENT:      Head: Normocephalic and atraumatic  Right Ear: External ear normal       Left Ear: External ear normal       Nose: Nose normal  No congestion or rhinorrhea  Mouth/Throat:      Mouth: Mucous membranes are moist       Pharynx: Oropharynx is clear  No oropharyngeal exudate or posterior oropharyngeal erythema  Eyes:      General: No scleral icterus  Right eye: No discharge  Left eye: No discharge  Extraocular Movements: Extraocular movements intact  Conjunctiva/sclera: Conjunctivae normal    Cardiovascular:      Rate and Rhythm: Normal rate and regular rhythm  Pulses: Normal pulses  Heart sounds: Normal heart sounds  No murmur heard  No friction rub  No gallop  Pulmonary:      Effort: Pulmonary effort is normal  No respiratory distress  Breath sounds: No stridor  Wheezing (scattered) present  No rhonchi or rales  Abdominal:      General: Abdomen is flat  Palpations: Abdomen is soft  Tenderness: There is abdominal tenderness (right sided)  There is no guarding or rebound  Musculoskeletal:      Cervical back: Normal range of motion and neck supple  Right lower leg: Edema present  Left lower leg: Edema present  Skin:     General: Skin is warm and dry  Capillary Refill: Capillary refill takes less than 2 seconds  Neurological:      General: No focal deficit present  Mental Status: She is alert and oriented to person, place, and time     Psychiatric:         Mood and Affect: Mood normal          Behavior: Behavior normal          Vital Signs  ED Triage Vitals [10/04/22 0425]   Temperature Pulse Respirations Blood Pressure SpO2   98 6 °F (37 °C) 92 21 140/64 93 %      Temp Source Heart Rate Source Patient Position - Orthostatic VS BP Location FiO2 (%)   Oral -- Sitting Right arm --      Pain Score       9           Vitals: 10/04/22 0425 10/04/22 0600 10/04/22 0700 10/04/22 0730   BP: 140/64 116/59 116/56    Pulse: 92 79 75 75   Patient Position - Orthostatic VS: Sitting Sitting           Visual Acuity      ED Medications  Medications   ketorolac (TORADOL) injection 15 mg (15 mg Intravenous Given 10/4/22 0548)   ondansetron (ZOFRAN) injection 4 mg (4 mg Intravenous Given 10/4/22 0552)   iohexol (OMNIPAQUE) 350 MG/ML injection (SINGLE-DOSE) 100 mL (100 mL Intravenous Given 10/4/22 0617)   potassium chloride (K-DUR,KLOR-CON) CR tablet 40 mEq (40 mEq Oral Given 10/4/22 0700)   fentanyl citrate (PF) 100 MCG/2ML 50 mcg (50 mcg Intravenous Given 10/4/22 0700)   albuterol inhalation solution 5 mg (5 mg Nebulization Given 10/4/22 0746)   ipratropium (ATROVENT) 0 02 % inhalation solution 0 5 mg (0 5 mg Nebulization Given 10/4/22 0746)   methylPREDNISolone sodium succinate (Solu-MEDROL) injection 125 mg (125 mg Intravenous Given 10/4/22 0746)       Diagnostic Studies  Results Reviewed     Procedure Component Value Units Date/Time    HS Troponin I 2hr [[id number]]  (Normal) Collected: 10/04/22 0737    Lab Status: Final result Specimen: Blood from Arm, Left Updated: 10/04/22 0813     hs TnI 2hr 3 ng/L      Delta 2hr hsTnI 0 ng/L     UA w Reflex to Microscopic w Reflex to Culture [[id number]]  (Abnormal) Collected: 10/04/22 0723    Lab Status: Final result Specimen: Urine, Other Updated: 10/04/22 0749     Color, UA Yellow     Clarity, UA Clear     Specific Gravity, UA 1 010     pH, UA 7 0     Leukocytes, UA Negative     Nitrite, UA Negative     Protein, UA Negative mg/dl      Glucose, UA Negative mg/dl      Ketones, UA Negative mg/dl      Urobilinogen, UA 2 0 E U /dl      Bilirubin, UA Negative     Occult Blood, UA Negative    HS Troponin I 4hr [[id number]]     Lab Status: No result Specimen: Blood     FLU/RSV/COVID - if FLU/RSV clinically relevant [[id number]]  (Normal) Collected: 10/04/22 5546    Lab Status: Final result Specimen: Nares from Nasopharyngeal Swab Updated: 10/04/22 0607     SARS-CoV-2 Negative     INFLUENZA A PCR Negative     INFLUENZA B PCR Negative     RSV PCR Negative    Narrative:      FOR PEDIATRIC PATIENTS - copy/paste COVID Guidelines URL to browser: [email or web address]  [email or web address]    SARS-CoV-2 assay is a Nucleic Acid Amplification assay intended for the  qualitative detection of nucleic acid from SARS-CoV-2 in nasopharyngeal  swabs  Results are for the presumptive identification of SARS-CoV-2 RNA  Positive results are indicative of infection with SARS-CoV-2, the virus  causing COVID-19, but do not rule out bacterial infection or co-infection  with other viruses  Laboratories within the [place] and its  territories are required to report all positive results to the appropriate  public health authorities  Negative results do not preclude SARS-CoV-2  infection and should not be used as the sole basis for treatment or other  patient management decisions  Negative results must be combined with  clinical observations, patient history, and epidemiological information  This test has not been FDA cleared or approved  This test has been authorized by FDA under an Emergency Use Authorization  (EUA)  This test is only authorized for the duration of time the  declaration that circumstances exist justifying the authorization of the  emergency use of an in vitro diagnostic tests for detection of SARS-CoV-2  virus and/or diagnosis of COVID-19 infection under section 564(b)(1) of  the Act, 21 U  S C  039IKN-4(D)(3), unless the authorization is terminated  or revoked sooner  The test has been validated but independent review by FDA  and CLIA is pending  Test performed using [employer]: This RT-PCR assay targets N2,  a region unique to SARS-CoV-2  A conserved region in the E-gene was chosen  for pan-Sarbecovirus detection which includes SARS-CoV-2      According to CMS-2020-01-R, this platform meets the definition of high-[employer] technology      NT-BNP PRO [[id number]]  (Normal) Collected: 10/04/22 0507    Lab Status: Final result Specimen: Blood from Arm, Left Updated: 10/04/22 0538     NT-proBNP 52 pg/mL     HS Troponin 0hr (reflex protocol) [[id number]]  (Normal) Collected: 10/04/22 0507    Lab Status: Final result Specimen: Blood from Arm, Left Updated: 10/04/22 0537     hs TnI 0hr 3 ng/L     Comprehensive metabolic panel [[id number]]  (Abnormal) Collected: 10/04/22 0507    Lab Status: Final result Specimen: Blood from Arm, Left Updated: 10/04/22 0531     Sodium 144 mmol/L      Potassium 3 0 mmol/L      Chloride 106 mmol/L      CO2 31 mmol/L      ANION GAP 7 mmol/L      BUN 15 mg/dL      Creatinine 0 61 mg/dL      Glucose 105 mg/dL      Calcium 8 4 mg/dL      Corrected Calcium 9 4 mg/dL      AST 14 U/L      ALT 19 U/L      Alkaline Phosphatase 93 U/L      Total Protein 6 1 g/dL      Albumin 2 8 g/dL      Total Bilirubin 0 37 mg/dL      eGFR 100 ml/min/1 73sq m     Narrative:      National Kidney Disease Foundation guidelines for Chronic Kidney Disease (CKD):     Stage 1 with normal or high GFR (GFR > 90 mL/min/1 73 square meters)    Stage 2 Mild CKD (GFR = 60-89 mL/min/1 73 square meters)    Stage 3A Moderate CKD (GFR = 45-59 mL/min/1 73 square meters)    Stage 3B Moderate CKD (GFR = 30-44 mL/min/1 73 square meters)    Stage 4 Severe CKD (GFR = 15-29 mL/min/1 73 square meters)    Stage 5 End Stage CKD (GFR <15 mL/min/1 73 square meters)  Note: GFR calculation is accurate only with a steady state creatinine    CBC and differential [[id number]]  (Abnormal) Collected: 10/04/22 0507    Lab Status: Final result Specimen: Blood from Arm, Left Updated: 10/04/22 0515     WBC 8 08 Thousand/uL      RBC 4 11 Million/uL      Hemoglobin 12 0 g/dL      Hematocrit 36 6 %      MCV 89 fL      MCH 29 2 pg      MCHC 32 8 g/dL      RDW 15 2 %      MPV 8 8 fL      Platelets 310 Thousands/uL      nRBC 0 /100 WBCs      Neutrophils Relative 73 %      Immat GRANS % 1 %      Lymphocytes Relative 15 %      Monocytes Relative 9 %      Eosinophils Relative 2 %      Basophils Relative 0 %      Neutrophils Absolute 5 85 Thousands/µL      Immature Grans Absolute 0 08 Thousand/uL      Lymphocytes Absolute 1 21 Thousands/µL      Monocytes Absolute 0 75 Thousand/µL      Eosinophils Absolute 0 16 Thousand/µL      Basophils Absolute 0 03 Thousands/µL                  CT pe study w abdomen pelvis w contrast   Final Result by Chris Campos MD (10/04 7043)      No pulmonary embolus  Persistent diffuse esophageal thickening suggestive of esophagitis  Consider endoscopy for further evaluation  No acute pathology in the abdomen or pelvis  Hepatomegaly  Workstation performed: [id number]         XR chest 1 view portable   Final Result by Tequila Landrum MD (10/04 8803)      No acute cardiopulmonary disease  Workstation performed: [id number]                    Procedures  Procedures         ED Course                                             MDM  Number of Diagnoses or Management Options  COPD exacerbation ([facility] [address] )  Diagnosis management comments: Patient presenting for evaluation of pleuritic chest pain, shortness of breath, cough, right-sided abdominal/flank pain  Upon arrival, she appears ill  She is not any acute distress  Vital signs unremarkable  She does have some scattered wheezes on exam   She has right-sided abdominal tenderness  PE study with abdomen and pelvis did not reveal any acute pathology  Patient was given Solu-Medrol, albuterol, ipratropium  Oxygen level dropped to 88% on room air  She was admitted to Medicine for presumptive COPD exacerbation  Currently in stable condition         Amount and/or Complexity of Data Reviewed  Clinical lab tests: ordered and reviewed  Tests in the radiology section of CPT®: ordered and reviewed  Discuss the patient with other providers: yes    Patient Progress  Patient progress: stable      Disposition  Final diagnoses:   COPD exacerbation ([address] )     Time reflects when diagnosis was documented in both MDM as applicable and the Disposition within this note     Time User Action Codes Description Comment    10/4/2022  8:18 AM Jam Jo [J44 1] COPD exacerbation [facility])       ED Disposition     ED Disposition   Admit    Condition   Stable    Date/Time   Tue Oct 4, 2022  8:18 AM    Comment   Case was discussed with ZARIA and the patient's admission status was agreed to be Admission Status: observation status to the service of Dr Roderick Light   Follow-up Information    None         Patient's Medications   Discharge Prescriptions    No medications on file       No discharge procedures on file      PDMP Review     None          ED Provider  Electronically Signed by           Lynne Le PA-C  10/04/22 6733

## 2022-10-04 NOTE — RESPIRATORY THERAPY NOTE
RT Protocol Note  Aguila Glasgow 62 y o  female MRN: [id number]  Unit/Bed#: ED 20 Encounter: [id number]    Assessment    Principal Problem:    Chronic obstructive pulmonary disease with acute exacerbation (HCC)  Active Problems:    Smoker    Hypokalemia      Home Pulmonary Medications:  Albuterol mdi 2-3 times per day  Past Medical History:   Diagnosis Date    Achalasia     Back pain     Fibromyalgia     Lupus ([address] )      Social History     Socioeconomic History    Marital status: /Civil Union     Spouse name: None    Number of children: None    Years of education: None    Highest education level: None   Occupational History    None   Tobacco Use    Smoking status: Current Every Day Smoker     Packs/day: 0 50     Types: Cigarettes    Smokeless tobacco: Never Used   Vaping Use    Vaping Use: Never used   Substance and Sexual Activity    Alcohol use: No    Drug use: No    Sexual activity: Yes   Other Topics Concern    None   Social History Narrative    None     Social Determinants of Health     Financial Resource Strain: Not on file   Food Insecurity: Not on file   Transportation Needs: Not on file   Physical Activity: Not on file   Stress: Not on file   Social Connections: Not on file   Intimate Partner Violence: Not on file   Housing Stability: Not on file       Subjective         Objective    Physical Exam:   Assessment Type: Assess only  General Appearance: Awake, Drowsy  Respiratory Pattern: Dyspnea with exertion  Chest Assessment: Chest expansion symmetrical  Bilateral Breath Sounds: Diminished    Vitals:  Blood pressure 113/53, pulse 68, temperature 98 6 °F (37 °C), temperature source Oral, resp  rate 22, height 5' 1" (1 549 m), weight 83 9 kg (185 lb), SpO2 94 %  Imaging and other studies: I have personally reviewed pertinent reports  Plan    Respiratory Plan: Mild Distress pathway     Pt admitted with copd  Takes above mentioned regimen at home    Tid nebs ordered in accordance with home regimen  No furthe rmodalities at this time

## 2022-10-05 LAB
ANION GAP SERPL CALCULATED.3IONS-SCNC: 3 MMOL/L (ref 4–13)
BUN SERPL-MCNC: 19 MG/DL (ref 5–25)
CALCIUM SERPL-MCNC: 8.2 MG/DL (ref 8.3–10.1)
CHLORIDE SERPL-SCNC: 109 MMOL/L (ref 96–108)
CO2 SERPL-SCNC: 31 MMOL/L (ref 21–32)
CREAT SERPL-MCNC: 0.71 MG/DL (ref 0.6–1.3)
ERYTHROCYTE [DISTWIDTH] IN BLOOD BY AUTOMATED COUNT: 15 % (ref 11.6–15.1)
GFR SERPL CREATININE-BSD FRML MDRD: 94 ML/MIN/1.73SQ M
GLUCOSE P FAST SERPL-MCNC: 178 MG/DL (ref 65–99)
GLUCOSE SERPL-MCNC: 178 MG/DL (ref 65–140)
HCT VFR BLD AUTO: 35.4 % (ref 34.8–46.1)
HGB BLD-MCNC: 11.3 G/DL (ref 11.5–15.4)
MCH RBC QN AUTO: 29.7 PG (ref 26.8–34.3)
MCHC RBC AUTO-ENTMCNC: 31.9 G/DL (ref 31.4–37.4)
MCV RBC AUTO: 93 FL (ref 82–98)
PLATELET # BLD AUTO: 343 THOUSANDS/UL (ref 149–390)
PMV BLD AUTO: 9 FL (ref 8.9–12.7)
POTASSIUM SERPL-SCNC: 4.4 MMOL/L (ref 3.5–5.3)
RBC # BLD AUTO: 3.81 MILLION/UL (ref 3.81–5.12)
SODIUM SERPL-SCNC: 143 MMOL/L (ref 135–147)
WBC # BLD AUTO: 15.44 THOUSAND/UL (ref 4.31–10.16)

## 2022-10-05 PROCEDURE — 94640 AIRWAY INHALATION TREATMENT: CPT

## 2022-10-05 PROCEDURE — 94668 MNPJ CHEST WALL SBSQ: CPT

## 2022-10-05 PROCEDURE — 80048 BASIC METABOLIC PNL TOTAL CA: CPT | Performed by: STUDENT IN AN ORGANIZED HEALTH CARE EDUCATION/TRAINING PROGRAM

## 2022-10-05 PROCEDURE — 99223 1ST HOSP IP/OBS HIGH 75: CPT | Performed by: INTERNAL MEDICINE

## 2022-10-05 PROCEDURE — 99233 SBSQ HOSP IP/OBS HIGH 50: CPT | Performed by: STUDENT IN AN ORGANIZED HEALTH CARE EDUCATION/TRAINING PROGRAM

## 2022-10-05 PROCEDURE — 94669 MECHANICAL CHEST WALL OSCILL: CPT

## 2022-10-05 PROCEDURE — 85027 COMPLETE CBC AUTOMATED: CPT | Performed by: STUDENT IN AN ORGANIZED HEALTH CARE EDUCATION/TRAINING PROGRAM

## 2022-10-05 PROCEDURE — 94760 N-INVAS EAR/PLS OXIMETRY 1: CPT

## 2022-10-05 PROCEDURE — 94664 DEMO&/EVAL PT USE INHALER: CPT

## 2022-10-05 RX ORDER — ACETAMINOPHEN 325 MG/1
975 TABLET ORAL EVERY 8 HOURS SCHEDULED
Status: DISCONTINUED | OUTPATIENT
Start: 2022-10-05 | End: 2022-10-07 | Stop reason: HOSPADM

## 2022-10-05 RX ORDER — OXYCODONE HYDROCHLORIDE 5 MG/1
2.5 TABLET ORAL EVERY 6 HOURS PRN
Status: DISCONTINUED | OUTPATIENT
Start: 2022-10-05 | End: 2022-10-05

## 2022-10-05 RX ORDER — AZITHROMYCIN 250 MG/1
250 TABLET, FILM COATED ORAL EVERY 24 HOURS
Status: COMPLETED | OUTPATIENT
Start: 2022-10-05 | End: 2022-10-07

## 2022-10-05 RX ORDER — OXYCODONE HYDROCHLORIDE 5 MG/1
5 TABLET ORAL ONCE
Status: COMPLETED | OUTPATIENT
Start: 2022-10-05 | End: 2022-10-05

## 2022-10-05 RX ADMIN — METHYLPREDNISOLONE SODIUM SUCCINATE 40 MG: 40 INJECTION, POWDER, FOR SOLUTION INTRAMUSCULAR; INTRAVENOUS at 22:15

## 2022-10-05 RX ADMIN — ACETAMINOPHEN 975 MG: 325 TABLET, FILM COATED ORAL at 14:07

## 2022-10-05 RX ADMIN — LIDOCAINE 5% 1 PATCH: 700 PATCH TOPICAL at 08:15

## 2022-10-05 RX ADMIN — NICOTINE 1 PATCH: 14 PATCH, EXTENDED RELEASE TRANSDERMAL at 08:14

## 2022-10-05 RX ADMIN — LEVALBUTEROL HYDROCHLORIDE 1.25 MG: 1.25 SOLUTION, CONCENTRATE RESPIRATORY (INHALATION) at 19:45

## 2022-10-05 RX ADMIN — KETOROLAC TROMETHAMINE 15 MG: 30 INJECTION, SOLUTION INTRAMUSCULAR at 04:00

## 2022-10-05 RX ADMIN — LEVALBUTEROL HYDROCHLORIDE 1.25 MG: 1.25 SOLUTION, CONCENTRATE RESPIRATORY (INHALATION) at 14:19

## 2022-10-05 RX ADMIN — POTASSIUM CHLORIDE 40 MEQ: 1500 TABLET, EXTENDED RELEASE ORAL at 17:07

## 2022-10-05 RX ADMIN — METHYLPREDNISOLONE SODIUM SUCCINATE 40 MG: 40 INJECTION, POWDER, FOR SOLUTION INTRAMUSCULAR; INTRAVENOUS at 08:16

## 2022-10-05 RX ADMIN — KETOROLAC TROMETHAMINE 15 MG: 30 INJECTION, SOLUTION INTRAMUSCULAR at 17:08

## 2022-10-05 RX ADMIN — AZITHROMYCIN 250 MG: 250 TABLET, FILM COATED ORAL at 14:12

## 2022-10-05 RX ADMIN — IPRATROPIUM BROMIDE 0.5 MG: 0.5 SOLUTION RESPIRATORY (INHALATION) at 19:45

## 2022-10-05 RX ADMIN — ACETAMINOPHEN 975 MG: 325 TABLET, FILM COATED ORAL at 22:08

## 2022-10-05 RX ADMIN — ENOXAPARIN SODIUM 40 MG: 40 INJECTION SUBCUTANEOUS at 08:17

## 2022-10-05 RX ADMIN — ONDANSETRON 4 MG: 2 INJECTION INTRAMUSCULAR; INTRAVENOUS at 18:01

## 2022-10-05 RX ADMIN — KETOROLAC TROMETHAMINE 15 MG: 30 INJECTION, SOLUTION INTRAMUSCULAR at 10:07

## 2022-10-05 RX ADMIN — OXYCODONE HYDROCHLORIDE 5 MG: 5 TABLET ORAL at 18:50

## 2022-10-05 RX ADMIN — IPRATROPIUM BROMIDE 0.5 MG: 0.5 SOLUTION RESPIRATORY (INHALATION) at 08:10

## 2022-10-05 RX ADMIN — GUAIFENESIN 600 MG: 600 TABLET ORAL at 17:07

## 2022-10-05 RX ADMIN — KETOROLAC TROMETHAMINE 15 MG: 30 INJECTION, SOLUTION INTRAMUSCULAR at 23:25

## 2022-10-05 RX ADMIN — FLUTICASONE FUROATE AND VILANTEROL TRIFENATATE 1 PUFF: 100; 25 POWDER RESPIRATORY (INHALATION) at 11:59

## 2022-10-05 RX ADMIN — GUAIFENESIN 600 MG: 600 TABLET ORAL at 08:17

## 2022-10-05 RX ADMIN — IPRATROPIUM BROMIDE 0.5 MG: 0.5 SOLUTION RESPIRATORY (INHALATION) at 14:19

## 2022-10-05 RX ADMIN — LEVALBUTEROL HYDROCHLORIDE 1.25 MG: 1.25 SOLUTION, CONCENTRATE RESPIRATORY (INHALATION) at 08:10

## 2022-10-05 RX ADMIN — POTASSIUM CHLORIDE 40 MEQ: 1500 TABLET, EXTENDED RELEASE ORAL at 08:17

## 2022-10-05 RX ADMIN — OXYCODONE HYDROCHLORIDE 2.5 MG: 5 TABLET ORAL at 12:24

## 2022-10-05 NOTE — PROGRESS NOTES
[facility]  Progress Note Misty Miyamoto Karalee Habermann 1965, 62 y o  female MRN: [id number]  Unit/Bed#: -01 Encounter: [id number]  Primary Care Provider: David Cheung DO   Date and time admitted to hospital: 10/4/2022  4:17 AM    Hypokalemia  Assessment & Plan  Noted with hypokalemia 3 0  Supplement electrolytes as needed  Smoker  Assessment & Plan  Reports smoking half pack a day  Counseled on cessation  Order nicotine patch  * Chronic obstructive pulmonary disease with acute exacerbation [facility])  Assessment & Plan  62year old female patient with past medical history of COPD, active smoker, was recently hospitalized due to COPD exacerbation however she had left AMA at that time  Again presents to emergency room with complaining of shortness of breath, cough, right-sided upper back pain due to cough  Denies any other new complaints  COVID-19 negative  NT proBNP repaired  Troponin negative x3  CTA PE study report noted negative for pulmonary embolism, bilateral lower lobe atelectasis, scarring, incidentally noted persistent diffuse esophagitis, hepatomegaly  Currently on my encounter patient appears comfortable not in distress  O2 saturation 89% room air, improved 93% on 2-3 L nasal cannula  Acutely nontoxic appearing  Started on p o  Azithromycin for COPD exacerbation  Continue IV Solu-Medrol 40 mg b i d , DuoNebs q 6 hours  Encourage incentive spirometry use  Encourage ambulation  Continue O2 supplement 2 L to keep O2 saturation more than 92%  Will start on Tessalon Perles, Tylenol, Robaxin, lidocaine patch for musculoskeletal pain  Follow-up with pulmonology consult  VTE Pharmacologic Prophylaxis:   VTE Score: 3 Moderate Risk (Score 3-4) - Pharmacological DVT Prophylaxis Ordered: Enoxaparin (Lovenox)      Mechanical VTE Prophylaxis in Place: Yes    Patient Centered Rounds: I have performed bedside rounds with nursing staff today     Discussions with Specialists or Other Care Team Provider: Pulmonology    Education and Discussions with Family / Patient: Updated  () at bedside  Current Length of Stay: 0 day(s)    Current Patient Status: Observation     Discharge Plan / Estimated Discharge Date: Anticipate discharge tomorrow to home  Code Status: Level 1 - Full Code      Subjective:   Patient was seen and examined by me at bedside  Patient was pleasant and cooperative  Patient states that her SOB has slightly improved since yesterday, though is still not feeling back at her baseline  Reports her chest pain has improved and only occurs when she takes deep breaths  Denies palpitations, abdominal pain, n/v  Patient has no other acute complaints at this time  Objective:     Vitals:   Temp (24hrs), Av °F (36 7 °C), Min:97 9 °F (36 6 °C), Max:98 °F (36 7 °C)    Temp:  [97 9 °F (36 6 °C)-98 °F (36 7 °C)] 97 9 °F (36 6 °C)  HR:  [62-77] 62  Resp:  [16-22] 18  BP: (113-183)/(41-75) 131/68  SpO2:  [87 %-97 %] 93 %  Body mass index is 34 96 kg/m²  Input and Output Summary (last 24 hours): Intake/Output Summary (Last 24 hours) at 10/5/2022 1050  Last data filed at 10/5/2022 0930  Gross per 24 hour   Intake 840 ml   Output 2 ml   Net 838 ml       Physical Exam:     Physical Exam  Constitutional:       Appearance: Normal appearance  HENT:      Head: Normocephalic and atraumatic  Right Ear: External ear normal       Left Ear: External ear normal       Nose: Nose normal    Eyes:      Conjunctiva/sclera: Conjunctivae normal    Cardiovascular:      Rate and Rhythm: Normal rate and regular rhythm  Pulses: Normal pulses  Heart sounds: Normal heart sounds  Pulmonary:      Comments: Decreased breath sounds b/l  O2 Sat 90-92% on 2L   Musculoskeletal:      Right lower leg: No edema  Left lower leg: No edema  Skin:     General: Skin is warm and dry     Neurological:      General: No focal deficit present  Mental Status: She is alert and oriented to person, place, and time  Psychiatric:         Mood and Affect: Mood normal          Behavior: Behavior normal           Additional Data:     Labs:  Results from last 7 days   Lab Units 10/05/22  0447 10/04/22  0507   WBC Thousand/uL 15 44* 8 08   HEMOGLOBIN g/dL 11 3* 12 0   HEMATOCRIT % 35 4 36 6   PLATELETS Thousands/uL 343 306   NEUTROS PCT %  --  73   LYMPHS PCT %  --  15   MONOS PCT %  --  9   EOS PCT %  --  2     Results from last 7 days   Lab Units 10/05/22  0447 10/04/22  0507   SODIUM mmol/L 143 144   POTASSIUM mmol/L 4 4 3 0*   CHLORIDE mmol/L 109* 106   CO2 mmol/L 31 31   BUN mg/dL 19 15   CREATININE mg/dL 0 71 0 61   ANION GAP mmol/L 3* 7   CALCIUM mg/dL 8 2* 8 4   ALBUMIN g/dL  --  2 8*   TOTAL BILIRUBIN mg/dL  --  0 37   ALK PHOS U/L  --  93   ALT U/L  --  19   AST U/L  --  14   GLUCOSE RANDOM mg/dL 178* 105                       Imaging: No pertinent imaging reviewed      Recent Cultures (last 7 days):           Lines/Drains:  Invasive Devices  Report    Peripheral Intravenous Line  Duration           Peripheral IV 10/04/22 Left Antecubital 1 day    Peripheral IV 10/04/22 Left Hand 1 day                Telemetry:        Last 24 Hours Medication List:   Current Facility-Administered Medications   Medication Dose Route Frequency Provider Last Rate    acetaminophen  650 mg Oral Q6H PRN Daron NICOLAS MD      albuterol  2 puff Inhalation Q4H PRN Daron NICOLAS MD      azithromycin  250 mg Oral Q24H Jessica Vazquez MD      benzonatate  100 mg Oral TID PRN Jose White MD      enoxaparin  40 mg Subcutaneous Daily Daron NICOLAS MD      fluticasone-vilanterol  1 puff Inhalation Daily Lina Chaudhry PA-C      guaiFENesin  600 mg Oral BID Daron NICOLAS MD      ipratropium  0 5 mg Nebulization TID Jose White MD      ketorolac  15 mg Intravenous Q6H PRN Jessica Ellis PA-C      levalbuterol  1 25 mg Nebulization TID Hai Yeh MD      lidocaine  1 patch Topical Daily Daron NICOLAS MD      methocarbamol  500 mg Oral Q6H PRN Hai Yeh MD      methylPREDNISolone sodium succinate  40 mg Intravenous Q12H [address] Daron Barakat MD      nicotine  1 patch Transdermal Daily Daron NICOLAS MD      ondansetron  4 mg Intravenous Q6H PRN Lisa Botello PA-C      potassium chloride  40 mEq Oral BID Hai Yeh MD          Today, Patient Was Seen By: Anshu Leal    ** Please Note: This note has been constructed using a voice recognition system   **

## 2022-10-05 NOTE — PLAN OF CARE
Problem: Potential for Falls  Goal: Patient will remain free of falls  Description: INTERVENTIONS:  - Educate patient/family on patient safety including physical limitations  - Instruct patient to call for assistance with activity   - Consult OT/PT to assist with strengthening/mobility   - Keep Call bell within reach  - Keep bed low and locked with side rails adjusted as appropriate  - Keep care items and personal belongings within reach  - Initiate and maintain comfort rounds  - Make Fall Risk Sign visible to staff  - Offer Toileting every 2 Hours, in advance of need  - Initiate/Maintain bedalarm  - Obtain necessary fall risk management equipment:   - Apply yellow socks and bracelet for high fall risk patients  - Consider moving patient to room near nurses station  Outcome: Progressing     Problem: INFECTION - ADULT  Goal: Absence or prevention of progression during hospitalization  Description: INTERVENTIONS:  - Assess and monitor for signs and symptoms of infection  - Monitor lab/diagnostic results  - Monitor all insertion sites, i e  indwelling lines, tubes, and drains  - Monitor endotracheal if appropriate and nasal secretions for changes in amount and color  - [place] appropriate cooling/warming therapies per order  - Administer medications as ordered  - Instruct and encourage patient and family to use good hand hygiene technique  - Identify and instruct in appropriate isolation precautions for identified infection/condition  Outcome: Progressing  Goal: Absence of fever/infection during neutropenic period  Description: INTERVENTIONS:  - Monitor WBC    Outcome: Progressing     Problem: RESPIRATORY - ADULT  Goal: Achieves optimal ventilation and oxygenation  Description: INTERVENTIONS:  - Assess for changes in respiratory status  - Assess for changes in mentation and behavior  - Position to facilitate oxygenation and minimize respiratory effort  - Oxygen administered by appropriate delivery if ordered  - Initiate smoking cessation education as indicated  - Encourage broncho-pulmonary hygiene including cough, deep breathe, Incentive Spirometry  - Assess the need for suctioning and aspirate as needed  - Assess and instruct to report SOB or any respiratory difficulty  - Respiratory Therapy support as indicated  Outcome: Progressing     Problem: Potential for Falls  Goal: Patient will remain free of falls  Description: INTERVENTIONS:  - Educate patient/family on patient safety including physical limitations  - Instruct patient to call for assistance with activity   - Consult OT/PT to assist with strengthening/mobility   - Keep Call bell within reach  - Keep bed low and locked with side rails adjusted as appropriate  - Keep care items and personal belongings within reach  - Initiate and maintain comfort rounds  - Make Fall Risk Sign visible to staff  - Offer Toileting every 2 Hours, in advance of need  - Initiate/Maintain bedalarm  - Obtain necessary fall risk management equipment:   - Apply yellow socks and bracelet for high fall risk patients  - Consider moving patient to room near nurses station  Outcome: Progressing     Problem: INFECTION - ADULT  Goal: Absence or prevention of progression during hospitalization  Description: INTERVENTIONS:  - Assess and monitor for signs and symptoms of infection  - Monitor lab/diagnostic results  - Monitor all insertion sites, i e  indwelling lines, tubes, and drains  - Monitor endotracheal if appropriate and nasal secretions for changes in amount and color  - [place] appropriate cooling/warming therapies per order  - Administer medications as ordered  - Instruct and encourage patient and family to use good hand hygiene technique  - Identify and instruct in appropriate isolation precautions for identified infection/condition  Outcome: Progressing  Goal: Absence of fever/infection during neutropenic period  Description: INTERVENTIONS:  - Monitor WBC    Outcome: Progressing Problem: RESPIRATORY - ADULT  Goal: Achieves optimal ventilation and oxygenation  Description: INTERVENTIONS:  - Assess for changes in respiratory status  - Assess for changes in mentation and behavior  - Position to facilitate oxygenation and minimize respiratory effort  - Oxygen administered by appropriate delivery if ordered  - Initiate smoking cessation education as indicated  - Encourage broncho-pulmonary hygiene including cough, deep breathe, Incentive Spirometry  - Assess the need for suctioning and aspirate as needed  - Assess and instruct to report SOB or any respiratory difficulty  - Respiratory Therapy support as indicated  Outcome: Progressing

## 2022-10-05 NOTE — ASSESSMENT & PLAN NOTE
62year old female patient with past medical history of COPD, active smoker, was recently hospitalized due to COPD exacerbation however she had left AMA at that time  Again presents to emergency room with complaining of shortness of breath, cough, right-sided upper back pain due to cough  Denies any other new complaints  COVID-19 negative  NT proBNP repaired  Troponin negative x3  CTA PE study report noted negative for pulmonary embolism, bilateral lower lobe atelectasis, scarring, incidentally noted persistent diffuse esophagitis, hepatomegaly  Currently on my encounter patient appears comfortable not in distress  O2 saturation 89% room air, improved 93% on 2-3 L nasal cannula  Acutely nontoxic appearing  Started on p o  Azithromycin for COPD exacerbation  Continue IV Solu-Medrol 40 mg b i d , DuoNebs q 6 hours  Encourage incentive spirometry use  Encourage ambulation  Continue O2 supplement 2 L to keep O2 saturation more than 92%  Will start on Tessalon Perles, Tylenol, Robaxin, lidocaine patch for musculoskeletal pain  Follow-up with pulmonology consult

## 2022-10-05 NOTE — ASSESSMENT & PLAN NOTE
62year old female patient with past medical history of COPD, active smoker, was recently hospitalized due to COPD exacerbation however she had left AMA at that time  Again presents to emergency room with complaining of shortness of breath, cough, right-sided upper back pain due to cough  Denies any other new complaints  COVID-19 negative  NT proBNP repaired  Troponin negative x3  CTA PE study report noted negative for pulmonary embolism, bilateral lower lobe atelectasis, scarring, incidentally noted persistent diffuse esophagitis, hepatomegaly  Currently on my encounter patient appears comfortable not in distress  O2 saturation 89% room air, improved 93% on 2-3 L nasal cannula  Acutely nontoxic appearing  Started on p o  Azithromycin for COPD exacerbation  Continue prednisone 40 mg b i d , DuoNebs q 6 hours  Encourage incentive spirometry use  Encourage ambulation  Will need ambulatory oxygen and respiratory to do home oxygen evaluation  Will start on Tessalon Perles, Tylenol, Robaxin, lidocaine patch for musculoskeletal pain  Follow-up with pulmonology consult
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
## 2022-10-05 NOTE — RESPIRATORY THERAPY NOTE
RT Protocol Note  Selma Peña 62 y o  female MRN: [id number]  Unit/Bed#: -01 Encounter: [id number]    Assessment    Principal Problem:    Chronic obstructive pulmonary disease with acute exacerbation [facility])  Active Problems:    Smoker    Hypokalemia      Home Pulmonary Medications:     10/05/22 1946   Respiratory Protocol   Protocol Initiated? Yes   Protocol Selection Respiratory   Language Barrier? No   Medical & Social History Reviewed? Yes   Diagnostic Studies Reviewed? Yes   Physical Assessment Performed? Yes   Respiratory Plan Mild Distress pathway   Respiratory Assessment   Assessment Type During-treatment   General Appearance Alert; Awake   Respiratory Pattern Normal   Chest Assessment Chest expansion symmetrical   Bilateral Breath Sounds Diminished   R Breath Sounds Diminished   L Breath Sounds Diminished   Cough None   Resp Comments pt awake and alert watching tv   Tx given with no distress noted   O2 Device NC   Subjective Data 3 lpm   Cough Description   Sputum Amount None   Additional Assessments   Pulse 70   Respirations 18   SpO2 93 %          Past Medical History:   Diagnosis Date    Achalasia     Back pain     Fibromyalgia     Lupus ([facility] [address] )      Social History     Socioeconomic History    Marital status: /Civil Union     Spouse name: None    Number of children: None    Years of education: None    Highest education level: None   Occupational History    None   Tobacco Use    Smoking status: Current Every Day Smoker     Packs/day: 0 50     Types: Cigarettes    Smokeless tobacco: Never Used   Vaping Use    Vaping Use: Never used   Substance and Sexual Activity    Alcohol use: No    Drug use: No    Sexual activity: Yes   Other Topics Concern    None   Social History Narrative    None     Social Determinants of Health     Financial Resource Strain: Not on file   Food Insecurity: Not on file   Transportation Needs: Not on file   Physical Activity: Not on file   Stress: Not on file   Social Connections: Not on file   Intimate Partner Violence: Not on file   Housing Stability: Not on file       Subjective    Subjective Data: 3 lpm    Objective    Physical Exam:   Assessment Type: During-treatment  General Appearance: Alert, Awake  Respiratory Pattern: Normal  Chest Assessment: Chest expansion symmetrical  Bilateral Breath Sounds: Diminished  R Breath Sounds: Diminished  L Breath Sounds: Diminished  Cough: None  O2 Device: NC    Vitals:  Blood pressure 121/64, pulse 70, temperature 98 7 °F (37 1 °C), resp  rate 18, height 5' 1" (1 549 m), weight 83 9 kg (185 lb), SpO2 93 %  Imaging and other studies: I have personally reviewed pertinent reports  O2 Device: NC     Plan    Respiratory Plan: Mild Distress pathway        Resp Comments: pt awake and alert watching tv   Tx given with no distress noted

## 2022-10-05 NOTE — UTILIZATION REVIEW
Initial Clinical Review    OBS order 10/4 0818 converted to IP on 10/5 1333 for COPD exacerbation requiring IV solumedrol and O2   Admission: Date/Time/Statement:   Admission Orders (From admission, onward)     Ordered        10/05/22 1333  Inpatient Admission  Once            10/04/22 0818  Place in Observation  Once                       Inpatient Admission     Standing Status:   Standing     Number of Occurrences:   1     Order Specific Question:   Level of Care     Answer:   Med Surg [16]     Order Specific Question:   Estimated length of stay     Answer:   More than 2 Midnights     Order Specific Question:   Certification     Answer:   I certify that inpatient services are medically necessary for this patient for a duration of greater than two midnights  See H&P and MD Progress Notes for additional information about the patient's course of treatment  ED Arrival Information     Expected   -    Arrival   10/4/2022 04:17    Acuity   Urgent            Means of arrival   Ambulance    Escorted by   [facility]    Service   Hospitalist    Admission type   Emergency            Arrival complaint   Back Pain           Chief Complaint   Patient presents with    Shortness of Breath     Exposed to covid at work, presents with new onset SOB and loose cough   Back Pain     Recent diagnosis of bladder/kidney infection, started on cipro on Monday, back pain accompanying infection not improving       Initial Presentation: 62 y o  female to Ed from home w/ worsening SOB , cough assoc w/ R upper back pain d/t coughing   PMHX COPD   CTA neg for PE , quinton lower lobe atelectasis, scarring  Serial trop neg   O2 sat 89 % on RA , on 2-3 l w/ sat 93 %   Admitted OBS status w/ COPD exacerbation plan to cont IV solumedrol , duonebs , enc a,b , keep sat >92% , tessalon perles , tylenol , robaxin ,lidocaine patch   K 3 0 replete and monitor       PE: tenderness R upper back / R thorax area mildly tender w/ palpation      10/5 Pulm Consult   Acute hypoxemic respiratory failure secondary to likely COPD/chronic bronchitis with acute exacerbation  On 3l w/ O2 sat low to mid 90s   Keep sat > 89 %   Will need home O2 eval prior to DC   Cont solumedrol , xoponex , atrovent   Add breo   10/5 IM Note   SOB has improved since yest , but not back to baseline   CP has improved only occurs when she takes deep breath   Cont iv solumedrol and duonebs , O2 keep sat >92 %   Started on tessalon perles, tylenol , robaxin , lidocaine patch for musculoskeletal pain   pulm following   Dec BS quinton , O2 sat 90-92 % on 2l      ED Triage Vitals [10/04/22 0425]   Temperature Pulse Respirations Blood Pressure SpO2   98 6 °F (37 °C) 92 21 140/64 93 %      Temp Source Heart Rate Source Patient Position - Orthostatic VS BP Location FiO2 (%)   Oral -- Sitting Right arm --      Pain Score       9          Wt Readings from Last 1 Encounters:   10/04/22 83 9 kg (185 lb)     Additional Vital Signs:   10/05/22 15:25:09 98 7 °F (37 1 °C) 70 18 121/64 83 87 % Abnormal  -- -- -- --   10/05/22 1419 -- -- -- -- -- 88 % Abnormal  -- -- None (Room air)  --   O2 Device: 2L reapplied at 10/05/22 1419   10/05/22 12:27:19 -- 71 -- 144/72 96 88 % Abnormal  -- -- -- --   10/05/22 0810 -- -- -- -- -- 93 % 32 3 L/min Nasal cannula --   10/05/22 07:41:22 97 9 °F (36 6 °C) 62 18 131/68 89 94 % --          10/04/22 23:30:53 -- 64 -- 114/41 Abnormal  65 88 % Abnormal  -- -- -- --   10/04/22 2023 -- 67 18 -- -- 97 % 32 3 L/min Nasal cannula --   10/04/22 19:30:35 98 °F (36 7 °C) 77 16 183/75 Abnormal  111 87 % Abnormal  -- -- -- --   10/04/22 1515 -- 70 20 -- -- 95 % -- -- -- --   10/04/22 1500 -- 65 20 120/58 83 96 % 32 3 L/min Nasal cannula --   10/04/22 1445 -- 73 20 -- -- 94 % -- -- -- --   10/04/22 1430 -- 73 22 -- -- 94 % -- -- -- --   10/04/22 1420 -- -- -- -- -- 94 % 32 3 L/min Nasal cannula --   10/04/22 1345 -- 68 22 113/53 76 94 % 32 3 L/min Nasal cannula --   10/04/22 1330 -- 75 20 -- -- 93 % -- -- -- --   10/04/22 1200 -- 66 21 120/57 82 94 % -- -- -- --   10/04/22 1145 -- 72 22 -- -- 95 % -- -- -- --   10/04/22 1115 -- 73 20 -- -- 94 % 32 3 L/min Nasal cannula --   10/04/22 1100 -- 73 21 -- -- 94 % -- -- -- --   10/04/22 1045 -- 67 20 -- -- 95 % -- -- -- --   10/04/22 1030 -- 73 21 -- -- 94 % -- -- -- --   10/04/22 1015 -- 78 19 114/56 80 92 % 32 3 L/min Nasal cannula --   10/04/22 1000 -- 77 21 -- -- 95 % 32 3 L/min Nasal cannula --   10/04/22 0945 -- 79 20 -- -- 93 % 32 3 L/min Nasal cannula --   10/04/22 0900 -- 93 22 103/67 80 90 % 32 3 L/min Nasal cannula --   10/04/22 0845 -- 85 21 122/58 84 91 % -- -- -- --   10/04/22 0836 -- -- -- -- -- 90 % 28 2 L/min Nasal cannula --   10/04/22 0835 -- 90 23 Abnormal  122/58 -- 84 % Abnormal  -- -- None (Room air) --   10/04/22 0817 -- -- -- -- -- 88 % Abnormal  -- -- None (Room air)  --   O2 Device: Provider Nonnie Fix L removed oxygen from patient to see if patient tolerates  RN will reassess patient in a few minutes at 10/04/22 0817   10/04/22 0800 -- 70 21 110/53 77 98 % 28 2 L/min Nasal cannula --   10/04/22 0730 -- 75 21 -- -- 95 % -- -- -- --   10/04/22 0700 -- 75 21 116/56 81 95 % 28 2 L/min Nasal cannula --   10/04/22 0600 -- 79 22 116/59 82 95 % 28          Pertinent Labs/Diagnostic Test Results:   10/5 EKG NSR   CT pe study w abdomen pelvis w contrast   Final Result by Kapil Estes MD (10/04 6878)      No pulmonary embolus  Persistent diffuse esophageal thickening suggestive of esophagitis  Consider endoscopy for further evaluation  No acute pathology in the abdomen or pelvis  Hepatomegaly  Workstation performed: [id number]         XR chest 1 view portable   Final Result by Rasheed Ferguson MD (10/04 8460)      No acute cardiopulmonary disease                    Workstation performed: [id number]           Results from last 7 days   Lab Units 10/04/22  0507   SARS-COV-2  Negative     Results from last 7 days   Lab Units 10/05/22  0447 10/04/22  0507   WBC Thousand/uL 15 44* 8 08   HEMOGLOBIN g/dL 11 3* 12 0   HEMATOCRIT % 35 4 36 6   PLATELETS Thousands/uL 343 306   NEUTROS ABS Thousands/µL  --  5 85     Results from last 7 days   Lab Units 10/05/22  0447 10/04/22  0507   SODIUM mmol/L 143 144   POTASSIUM mmol/L 4 4 3 0*   CHLORIDE mmol/L 109* 106   CO2 mmol/L 31 31   ANION GAP mmol/L 3* 7   BUN mg/dL 19 15   CREATININE mg/dL 0 71 0 61   EGFR ml/min/1 73sq m 94 100   CALCIUM mg/dL 8 2* 8 4     Results from last 7 days   Lab Units 10/04/22  0507   AST U/L 14   ALT U/L 19   ALK PHOS U/L 93   TOTAL PROTEIN g/dL 6 1*   ALBUMIN g/dL 2 8*   TOTAL BILIRUBIN mg/dL 0 37     Results from last 7 days   Lab Units 10/05/22  0447 10/04/22  0507   GLUCOSE RANDOM mg/dL 178* 105     Results from last 7 days   Lab Units 10/04/22  0922 10/04/22  0737 10/04/22  0507   HS TNI 0HR ng/L  --   --  3   HS TNI 2HR ng/L  --  3  --    HSTNI D2 ng/L  --  0  --    HS TNI 4HR ng/L 3  --   --    HSTNI D4 ng/L 0  --   --      Results from last 7 days   Lab Units 10/04/22  0507   NT-PRO BNP pg/mL 52       Results from last 7 days   Lab Units 10/04/22  0723   CLARITY UA  Clear   COLOR UA  Yellow   SPEC GRAV UA  1 010   PH UA  7 0   GLUCOSE UA mg/dl Negative   KETONES UA mg/dl Negative   BLOOD UA  Negative   PROTEIN UA mg/dl Negative   NITRITE UA  Negative   BILIRUBIN UA  Negative   UROBILINOGEN UA E U /dl 2 0*   LEUKOCYTES UA  Negative     Results from last 7 days   Lab Units 10/04/22  0507   INFLUENZA A PCR  Negative   INFLUENZA B PCR  Negative   RSV PCR  Negative       ED Treatment:   Medication Administration from 10/04/2022 0417 to 10/04/2022 1923       Date/Time Order Dose Route Action     10/04/2022 0548 ketorolac (TORADOL) injection 15 mg 15 mg Intravenous Given     10/04/2022 0552 ondansetron (ZOFRAN) injection 4 mg 4 mg Intravenous Given     10/04/2022 0700 potassium chloride (K-DUR,KLOR-CON) CR tablet 40 mEq 40 mEq Oral Given 10/04/2022 0700 fentanyl citrate (PF) 100 MCG/2ML 50 mcg 50 mcg Intravenous Given     10/04/2022 0746 albuterol inhalation solution 5 mg 5 mg Nebulization Given     10/04/2022 0746 ipratropium (ATROVENT) 0 02 % inhalation solution 0 5 mg 0 5 mg Nebulization Given     10/04/2022 0746 methylPREDNISolone sodium succinate (Solu-MEDROL) injection 125 mg 125 mg Intravenous Given     10/04/2022 1758 guaiFENesin (MUCINEX) 12 hr tablet 600 mg 600 mg Oral Given     10/04/2022 1346 nicotine (NICODERM CQ) 14 mg/24hr TD 24 hr patch 1 patch 1 patch Transdermal Medication Applied     10/04/2022 1346 enoxaparin (LOVENOX) subcutaneous injection 40 mg 40 mg Subcutaneous Given     10/04/2022 1347 azithromycin (ZITHROMAX) tablet 500 mg 500 mg Oral Given     10/04/2022 1346 benzonatate (TESSALON PERLES) capsule 100 mg 100 mg Oral Given     10/04/2022 1347 lidocaine (LIDODERM) 5 % patch 1 patch 1 patch Topical Medication Applied     10/04/2022 1355 ipratropium-albuterol (DUO-NEB) 0 5-2 5 mg/3 mL inhalation solution 3 mL 3 mL Nebulization Given     10/04/2022 1350 magnesium sulfate IVPB (premix) SOLN 1 g 1 g Intravenous New Bag     10/04/2022 1759 potassium chloride (K-DUR,KLOR-CON) CR tablet 40 mEq 40 mEq Oral Given        Past Medical History:   Diagnosis Date    Achalasia     Back pain     Fibromyalgia     Lupus ([facility] )      Present on Admission:   Smoker   Chronic obstructive pulmonary disease with acute exacerbation ([facility] )      Admitting Diagnosis: Shortness of breath [R06 02]  Back pain [M54 9]  Smoker [F17 200]  COPD exacerbation ([facility] ) [J44 1]  Age/Sex: 62 y o  female  Admission Orders:  Scheduled Medications:  azithromycin, 500 mg, Oral, Q24H  enoxaparin, 40 mg, Subcutaneous, Daily  guaiFENesin, 600 mg, Oral, BID  ipratropium, 0 5 mg, Nebulization, TID  levalbuterol, 1 25 mg, Nebulization, TID  lidocaine, 1 patch, Topical, Daily  methylPREDNISolone sodium succinate, 40 mg, Intravenous, Q12H [facility]  nicotine, 1 patch, Transdermal, Daily  potassium chloride, 40 mEq, Oral, BID      Continuous IV Infusions:     PRN Meds:  acetaminophen, 650 mg, Oral, Q6H PRN  albuterol, 2 puff, Inhalation, Q4H PRN  benzonatate, 100 mg, Oral, TID PRN  ketorolac, 15 mg, Intravenous, Q6H PRN  10/4 x1  10/5  x1  methocarbamol, 500 mg, Oral, Q6H PRN  ondansetron, 4 mg, Intravenous, Q6H PRN  10/4 x1    Up and OOB   Reg diet   IS   Aqua K   O2     IP CONSULT TO PULMONOLOGY    Network Utilization Review Department  ATTENTION: Please call with any questions or concerns to [phone] and carefully listen to the prompts so that you are directed to the right person  All voicemails are confidential   Laura Wallace all requests for admission clinical reviews, approved or denied determinations and any other requests to dedicated fax number below belonging to the campus where the patient is receiving treatment   List of dedicated fax numbers for the Facilities:  [address] DENIALS (Administrative/Medical Necessity) [phone]   [address] (Maternity/NICU/Pediatrics) [phone]   [address]  [address] [address] [place] [address] [address] [address] [facility] [phone]

## 2022-10-05 NOTE — PLAN OF CARE
Problem: Potential for Falls  Goal: Patient will remain free of falls  Description: INTERVENTIONS:  - Educate patient/family on patient safety including physical limitations  - Instruct patient to call for assistance with activity   - Consult OT/PT to assist with strengthening/mobility   - Keep Call bell within reach  - Keep bed low and locked with side rails adjusted as appropriate  - Keep care items and personal belongings within reach  - Initiate and maintain comfort rounds  - Make Fall Risk Sign visible to staff  - Offer Toileting every 2 Hours, in advance of need  - Initiate/Maintain bed alarm  - Obtain necessary fall risk management equipment: socks  - Apply yellow socks and bracelet for high fall risk patients  - Consider moving patient to room near nurses station  Outcome: Progressing     Problem: INFECTION - ADULT  Goal: Absence or prevention of progression during hospitalization  Description: INTERVENTIONS:  - Assess and monitor for signs and symptoms of infection  - Monitor lab/diagnostic results  - Monitor all insertion sites, i e  indwelling lines, tubes, and drains  - Monitor endotracheal if appropriate and nasal secretions for changes in amount and color  - [place] appropriate cooling/warming therapies per order  - Administer medications as ordered  - Instruct and encourage patient and family to use good hand hygiene technique  - Identify and instruct in appropriate isolation precautions for identified infection/condition  Outcome: Progressing  Goal: Absence of fever/infection during neutropenic period  Description: INTERVENTIONS:  - Monitor WBC    Outcome: Progressing     Problem: RESPIRATORY - ADULT  Goal: Achieves optimal ventilation and oxygenation  Description: INTERVENTIONS:  - Assess for changes in respiratory status  - Assess for changes in mentation and behavior  - Position to facilitate oxygenation and minimize respiratory effort  - Oxygen administered by appropriate delivery if ordered  - Initiate smoking cessation education as indicated  - Encourage broncho-pulmonary hygiene including cough, deep breathe, Incentive Spirometry  - Assess the need for suctioning and aspirate as needed  - Assess and instruct to report SOB or any respiratory difficulty  - Respiratory Therapy support as indicated  Outcome: Progressing

## 2022-10-05 NOTE — RESPIRATORY THERAPY NOTE
RT Protocol Note  Kia Kincaid 62 y o  female MRN: [id number]  Unit/Bed#: -01 Encounter: [id number]    Assessment    Principal Problem:    Chronic obstructive pulmonary disease with acute exacerbation [facility])  Active Problems:    Smoker    Hypokalemia      Home Pulmonary Medications:     10/04/22 2023   Respiratory Protocol   Protocol Initiated? Yes   Protocol Selection Respiratory   Language Barrier? No   Medical & Social History Reviewed? Yes   Diagnostic Studies Reviewed? Yes   Physical Assessment Performed? Yes   Respiratory Plan Mild Distress pathway   Respiratory Assessment   Assessment Type During-treatment   General Appearance Alert; Awake;Drowsy   Respiratory Pattern Normal   Chest Assessment Chest expansion symmetrical   Bilateral Breath Sounds Diminished   Resp Comments Pt awake and alert watching tv   Tx given with no distress notred   O2 Device NC   Subjective Data 3 lpm   Cough Description   Sputum Amount None   Additional Assessments   Pulse 67   Respirations 18   SpO2 97 %          Past Medical History:   Diagnosis Date    Achalasia     Back pain     Fibromyalgia     Lupus ([facility] )      Social History     Socioeconomic History    Marital status: /Civil Union     Spouse name: None    Number of children: None    Years of education: None    Highest education level: None   Occupational History    None   Tobacco Use    Smoking status: Current Every Day Smoker     Packs/day: 0 50     Types: Cigarettes    Smokeless tobacco: Never Used   Vaping Use    Vaping Use: Never used   Substance and Sexual Activity    Alcohol use: No    Drug use: No    Sexual activity: Yes   Other Topics Concern    None   Social History Narrative    None     Social Determinants of Health     Financial Resource Strain: Not on file   Food Insecurity: Not on file   Transportation Needs: Not on file   Physical Activity: Not on file   Stress: Not on file   Social Connections: Not on file   Intimate Partner Violence: Not on file Housing Stability: Not on file       Subjective    Subjective Data: 3 lpm    Objective    Physical Exam:   Assessment Type: During-treatment  General Appearance: Alert, Awake, Drowsy  Respiratory Pattern: Normal  Chest Assessment: Chest expansion symmetrical  Bilateral Breath Sounds: Diminished  O2 Device: NC    Vitals:  Blood pressure (!) 183/75, pulse 67, temperature 98 °F (36 7 °C), resp  rate 18, height 5' 1" (1 549 m), weight 83 9 kg (185 lb), SpO2 97 %  Imaging and other studies: I have personally reviewed pertinent reports  O2 Device: NC     Plan    Respiratory Plan: Mild Distress pathway        Resp Comments: Pt awake and alert watching tv   Tx given with no distress notred

## 2022-10-06 ENCOUNTER — TELEPHONE (OUTPATIENT)
Dept: PULMONOLOGY | Facility: CLINIC | Age: 57
End: 2022-10-06

## 2022-10-06 PROBLEM — J96.91 RESPIRATORY FAILURE WITH HYPOXIA (HCC): Status: ACTIVE | Noted: 2022-10-06

## 2022-10-06 LAB
ANION GAP SERPL CALCULATED.3IONS-SCNC: 4 MMOL/L (ref 4–13)
BUN SERPL-MCNC: 22 MG/DL (ref 5–25)
CALCIUM SERPL-MCNC: 8.6 MG/DL (ref 8.3–10.1)
CHLORIDE SERPL-SCNC: 108 MMOL/L (ref 96–108)
CO2 SERPL-SCNC: 27 MMOL/L (ref 21–32)
CREAT SERPL-MCNC: 0.72 MG/DL (ref 0.6–1.3)
GFR SERPL CREATININE-BSD FRML MDRD: 93 ML/MIN/1.73SQ M
GLUCOSE SERPL-MCNC: 209 MG/DL (ref 65–140)
POTASSIUM SERPL-SCNC: 5.3 MMOL/L (ref 3.5–5.3)
SODIUM SERPL-SCNC: 139 MMOL/L (ref 135–147)

## 2022-10-06 PROCEDURE — 94760 N-INVAS EAR/PLS OXIMETRY 1: CPT

## 2022-10-06 PROCEDURE — 94640 AIRWAY INHALATION TREATMENT: CPT

## 2022-10-06 PROCEDURE — 94664 DEMO&/EVAL PT USE INHALER: CPT

## 2022-10-06 PROCEDURE — 99232 SBSQ HOSP IP/OBS MODERATE 35: CPT | Performed by: STUDENT IN AN ORGANIZED HEALTH CARE EDUCATION/TRAINING PROGRAM

## 2022-10-06 PROCEDURE — 99232 SBSQ HOSP IP/OBS MODERATE 35: CPT | Performed by: INTERNAL MEDICINE

## 2022-10-06 PROCEDURE — 80048 BASIC METABOLIC PNL TOTAL CA: CPT | Performed by: INTERNAL MEDICINE

## 2022-10-06 RX ORDER — METHOCARBAMOL 500 MG/1
500 TABLET, FILM COATED ORAL EVERY 6 HOURS SCHEDULED
Status: DISCONTINUED | OUTPATIENT
Start: 2022-10-06 | End: 2022-10-07 | Stop reason: HOSPADM

## 2022-10-06 RX ORDER — OXYCODONE HYDROCHLORIDE AND ACETAMINOPHEN 5; 325 MG/1; MG/1
1 TABLET ORAL EVERY 6 HOURS PRN
Status: DISCONTINUED | OUTPATIENT
Start: 2022-10-06 | End: 2022-10-07 | Stop reason: HOSPADM

## 2022-10-06 RX ORDER — PREDNISONE 20 MG/1
40 TABLET ORAL DAILY
Status: DISCONTINUED | OUTPATIENT
Start: 2022-10-07 | End: 2022-10-07 | Stop reason: HOSPADM

## 2022-10-06 RX ORDER — PREDNISONE 20 MG/1
40 TABLET ORAL 2 TIMES DAILY WITH MEALS
Status: DISCONTINUED | OUTPATIENT
Start: 2022-10-06 | End: 2022-10-06

## 2022-10-06 RX ADMIN — FLUTICASONE FUROATE AND VILANTEROL TRIFENATATE 1 PUFF: 100; 25 POWDER RESPIRATORY (INHALATION) at 10:01

## 2022-10-06 RX ADMIN — LEVALBUTEROL HYDROCHLORIDE 1.25 MG: 1.25 SOLUTION, CONCENTRATE RESPIRATORY (INHALATION) at 13:06

## 2022-10-06 RX ADMIN — NICOTINE 1 PATCH: 14 PATCH, EXTENDED RELEASE TRANSDERMAL at 10:01

## 2022-10-06 RX ADMIN — IPRATROPIUM BROMIDE 0.5 MG: 0.5 SOLUTION RESPIRATORY (INHALATION) at 13:06

## 2022-10-06 RX ADMIN — ACETAMINOPHEN 975 MG: 325 TABLET, FILM COATED ORAL at 22:18

## 2022-10-06 RX ADMIN — KETOROLAC TROMETHAMINE 15 MG: 30 INJECTION, SOLUTION INTRAMUSCULAR at 06:25

## 2022-10-06 RX ADMIN — OXYCODONE HYDROCHLORIDE AND ACETAMINOPHEN 1 TABLET: 5; 325 TABLET ORAL at 22:18

## 2022-10-06 RX ADMIN — OXYCODONE HYDROCHLORIDE AND ACETAMINOPHEN 1 TABLET: 5; 325 TABLET ORAL at 15:54

## 2022-10-06 RX ADMIN — KETOROLAC TROMETHAMINE 15 MG: 30 INJECTION, SOLUTION INTRAMUSCULAR at 18:34

## 2022-10-06 RX ADMIN — KETOROLAC TROMETHAMINE 15 MG: 30 INJECTION, SOLUTION INTRAMUSCULAR at 12:17

## 2022-10-06 RX ADMIN — POTASSIUM CHLORIDE 40 MEQ: 1500 TABLET, EXTENDED RELEASE ORAL at 10:00

## 2022-10-06 RX ADMIN — LEVALBUTEROL HYDROCHLORIDE 1.25 MG: 1.25 SOLUTION, CONCENTRATE RESPIRATORY (INHALATION) at 07:09

## 2022-10-06 RX ADMIN — METHOCARBAMOL 500 MG: 500 TABLET ORAL at 20:29

## 2022-10-06 RX ADMIN — GUAIFENESIN 600 MG: 600 TABLET ORAL at 18:34

## 2022-10-06 RX ADMIN — IPRATROPIUM BROMIDE 0.5 MG: 0.5 SOLUTION RESPIRATORY (INHALATION) at 07:09

## 2022-10-06 RX ADMIN — ONDANSETRON 4 MG: 2 INJECTION INTRAMUSCULAR; INTRAVENOUS at 17:02

## 2022-10-06 RX ADMIN — PREDNISONE 40 MG: 20 TABLET ORAL at 07:46

## 2022-10-06 RX ADMIN — ACETAMINOPHEN 975 MG: 325 TABLET, FILM COATED ORAL at 06:19

## 2022-10-06 RX ADMIN — GUAIFENESIN 600 MG: 600 TABLET ORAL at 10:00

## 2022-10-06 RX ADMIN — LIDOCAINE 5% 1 PATCH: 700 PATCH TOPICAL at 10:00

## 2022-10-06 RX ADMIN — AZITHROMYCIN 250 MG: 250 TABLET, FILM COATED ORAL at 14:51

## 2022-10-06 RX ADMIN — AZITHROMYCIN 250 MG: 250 TABLET, FILM COATED ORAL at 12:17

## 2022-10-06 NOTE — TELEPHONE ENCOUNTER
Left message for pt to call office to give insurance information or go over self pay fees if no insurance assigned

## 2022-10-06 NOTE — NURSING NOTE
Pt educated on need to put her oxygen on when oxygen saturation drops below 88  Gave instructions on deep breathing when on Room Air and on how to use incentive spirometry

## 2022-10-06 NOTE — CASE MANAGEMENT
Case Management Progress Note    Patient name Derik Profit  Location /-13 MRN [id number]  : 1965 Date 10/6/2022       LOS (days): 1  Geometric Mean LOS (GMLOS) (days):   Days to GMLOS:        OBJECTIVE:        Current admission status: Inpatient  Preferred Pharmacy:   [id number] [address]  [place]  [address] [facility], [address] [place] [address]  Phone: [phone] Fax: [phone]    Primary Care Provider: Elsa Grullon DO    Primary Insurance: DOROTHY ANN PENDING  Secondary Insurance:     PROGRESS NOTE:  Patient reviewed w/ [facility] during Care Coordination rounds this morning  They anticipate she'll be cleared for dc in 24-48 hrs  She remains on steroids and abx for COPD exacerbation as well as inhalers and nebulizers  They're trying to wean her off of O2, if possible, as she is not on it chronically  Patient does not have active insurance coverage  She is MA pending and DANK has placed PATHS contact information on S  CM Dept will continue to follow for any needs at discharge

## 2022-10-06 NOTE — PROGRESS NOTES
Progress Note - Pulmonary   Claudean Punch Matlock 62 y o  female MRN: [id number]  Unit/Bed#: -01 Encounter: [id number]    Assessment:  1  Acute hypoxemic respiratory failure  2  COPD/chronic bronchitis with acute exacerbation  3  Cigarette nicotine dependence    Plan:  Acute hypoxemic respiratory failure secondary to likely COPD/chronic bronchitis with acute exacerbation  She is currently on 2 L nasal cannula with sats in the low to mid 90s, titrate to maintain O2 sats greater than 89%  Will need home O2 eval prior to discharge  She is not sure that she will use the oxygen, she works as a CNA - I spoke to her about the importance of being compliant with the oxygen and the risks of chronic hypoxia  Will be switched to prednisone today, can decrease by 10 mg every 3 days, continue Xopenex and Atrovent t i d  Continue Breo, would continue this upon discharge as well  Will need outpatient follow-up for PFTs, [place] allergy panel, COPD management  Smoking cessation, continue nicotine replacement  Plan for discharge home later today versus tomorrow, she can follow-up with us in the office in 1-2 weeks  Will sign off, please call with questions  Subjective:   Patient resting in bed  She is feeling better with her breathing and cough this morning  Main complaint it back pain  Objective:     Vitals: Blood pressure 129/64, pulse 68, temperature 97 8 °F (36 6 °C), resp  rate 20, height 5' 1" (1 549 m), weight 83 9 kg (185 lb), SpO2 (!) 83 %  ,Body mass index is 34 96 kg/m²        Intake/Output Summary (Last 24 hours) at 10/6/2022 0944  Last data filed at 10/5/2022 1300  Gross per 24 hour   Intake 300 ml   Output --   Net 300 ml       Invasive Devices  Report    Peripheral Intravenous Line  Duration           Peripheral IV 10/04/22 Left Hand 2 days                Physical Exam: /64   Pulse 68   Temp 97 8 °F (36 6 °C)   Resp 20   Ht 5' 1" (1 549 m)   Wt 83 9 kg (185 lb)   SpO2 (!) 83%   BMI 34 96 kg/m² General appearance: alert and oriented, in no acute distress  Head: Normocephalic, without obvious abnormality, atraumatic  Eyes: negative findings: conjunctivae and sclerae normal  Lungs: clear to auscultation bilaterally  Heart: regular rate and rhythm  Abdomen: normal findings: soft, non-tender  Extremities: No edema  Skin: Warm and dry  Neurologic: Mental status: Alert, oriented, thought content appropriate     Labs: I have personally reviewed pertinent lab results  , CBC: No results found for: WBC, HGB, HCT, MCV, PLT, ADJUSTEDWBC, MCH, MCHC, RDW, MPV, NRBC, CMP: No results found for: SODIUM, K, CL, CO2, ANIONGAP, BUN, CREATININE, GLUCOSE, CALCIUM, AST, ALT, ALKPHOS, PROT, BILITOT, EGFR  Imaging and other studies: I have personally reviewed pertinent reports     and I have personally reviewed pertinent films in PACS

## 2022-10-06 NOTE — NURSING NOTE
1850Dr  jaye made aware pt with c/o back pain  Medicated with toradol iv at 5pm  One time dose of Oxycodone 5mg ordered by md and given  Pt monitored

## 2022-10-06 NOTE — PLAN OF CARE
Problem: GASTROINTESTINAL - ADULT  Goal: Minimal or absence of nausea and/or vomiting  Description: INTERVENTIONS:  - Administer IV fluids if ordered to ensure adequate hydration  - Maintain NPO status until nausea and vomiting are resolved  - Nasogastric tube if ordered  - Administer ordered antiemetic medications as needed  - Provide nonpharmacologic comfort measures as appropriate  - Advance diet as tolerated, if ordered  - Consider nutrition services referral to assist patient with adequate nutrition and appropriate food choices  Outcome: Progressing     Problem: GASTROINTESTINAL - ADULT  Goal: Minimal or absence of nausea and/or vomiting  Description: INTERVENTIONS:  - Administer IV fluids if ordered to ensure adequate hydration  - Maintain NPO status until nausea and vomiting are resolved  - Nasogastric tube if ordered  - Administer ordered antiemetic medications as needed  - Provide nonpharmacologic comfort measures as appropriate  - Advance diet as tolerated, if ordered  - Consider nutrition services referral to assist patient with adequate nutrition and appropriate food choices  Outcome: Progressing     Problem: GASTROINTESTINAL - ADULT  Goal: Maintains or returns to baseline bowel function  Description: INTERVENTIONS:  - Assess bowel function  - Encourage oral fluids to ensure adequate hydration  - Administer IV fluids if ordered to ensure adequate hydration  - Administer ordered medications as needed  - Encourage mobilization and activity  - Consider nutritional services referral to assist patient with adequate nutrition and appropriate food choices  Outcome: Progressing     Problem: GASTROINTESTINAL - ADULT  Goal: Maintains adequate nutritional intake  Description: INTERVENTIONS:  - Monitor percentage of each meal consumed  - Identify factors contributing to decreased intake, treat as appropriate  - Assist with meals as needed  - Monitor I&O, weight, and lab values if indicated  - Obtain nutrition services referral as needed  Outcome: Progressing

## 2022-10-06 NOTE — ASSESSMENT & PLAN NOTE
Patient has hypoxia requiring 3 liters nasal canula  She was placed on 3 liters NC, now tapered down to 1 liters

## 2022-10-06 NOTE — PLAN OF CARE
Problem: Potential for Falls  Goal: Patient will remain free of falls  Description: INTERVENTIONS:  - Educate patient/family on patient safety including physical limitations  - Instruct patient to call for assistance with activity   - Consult OT/PT to assist with strengthening/mobility   - Keep Call bell within reach  - Keep bed low and locked with side rails adjusted as appropriate  - Keep care items and personal belongings within reach  - Initiate and maintain comfort rounds  - Make Fall Risk Sign visible to staff  - Offer Toileting every  Hours, in advance of need  - Initiate/Maintain alarm  - Obtain necessary fall risk management equipment  - Apply yellow socks and bracelet for high fall risk patients  - Consider moving patient to room near nurses station  Outcome: Progressing     Problem: INFECTION - ADULT  Goal: Absence or prevention of progression during hospitalization  Description: INTERVENTIONS:  - Assess and monitor for signs and symptoms of infection  - Monitor lab/diagnostic results  - Monitor all insertion sites, i e  indwelling lines, tubes, and drains  - Monitor endotracheal if appropriate and nasal secretions for changes in amount and color  - [place] appropriate cooling/warming therapies per order  - Administer medications as ordered  - Instruct and encourage patient and family to use good hand hygiene technique  - Identify and instruct in appropriate isolation precautions for identified infection/condition  Outcome: Progressing  Goal: Absence of fever/infection during neutropenic period  Description: INTERVENTIONS:  - Monitor WBC    Outcome: Progressing     Problem: RESPIRATORY - ADULT  Goal: Achieves optimal ventilation and oxygenation  Description: INTERVENTIONS:  - Assess for changes in respiratory status  - Assess for changes in mentation and behavior  - Position to facilitate oxygenation and minimize respiratory effort  - Oxygen administered by appropriate delivery if ordered  - Initiate smoking cessation education as indicated  - Encourage broncho-pulmonary hygiene including cough, deep breathe, Incentive Spirometry  - Assess the need for suctioning and aspirate as needed  - Assess and instruct to report SOB or any respiratory difficulty  - Respiratory Therapy support as indicated  Outcome: Progressing     Problem: GASTROINTESTINAL - ADULT  Goal: Minimal or absence of nausea and/or vomiting  Description: INTERVENTIONS:  - Administer IV fluids if ordered to ensure adequate hydration  - Maintain NPO status until nausea and vomiting are resolved  - Nasogastric tube if ordered  - Administer ordered antiemetic medications as needed  - Provide nonpharmacologic comfort measures as appropriate  - Advance diet as tolerated, if ordered  - Consider nutrition services referral to assist patient with adequate nutrition and appropriate food choices  Outcome: Progressing  Goal: Maintains or returns to baseline bowel function  Description: INTERVENTIONS:  - Assess bowel function  - Encourage oral fluids to ensure adequate hydration  - Administer IV fluids if ordered to ensure adequate hydration  - Administer ordered medications as needed  - Encourage mobilization and activity  - Consider nutritional services referral to assist patient with adequate nutrition and appropriate food choices  Outcome: Progressing  Goal: Maintains adequate nutritional intake  Description: INTERVENTIONS:  - Monitor percentage of each meal consumed  - Identify factors contributing to decreased intake, treat as appropriate  - Assist with meals as needed  - Monitor I&O, weight, and lab values if indicated  - Obtain nutrition services referral as needed  Outcome: Progressing  Goal: Oral mucous membranes remain intact  Description: INTERVENTIONS  - Assess oral mucosa and hygiene practices  - Implement preventative oral hygiene regimen  - Implement oral medicated treatments as ordered  - Initiate Nutrition services referral as needed  Outcome: Progressing

## 2022-10-06 NOTE — PROGRESS NOTES
[facility]  Progress Note Allenmaurice Oliva 1965, 62 y o  female MRN: [id number]  Unit/Bed#: -01 Encounter: [id number]  Primary Care Provider: Aracely Schneider DO   Date and time admitted to hospital: 10/4/2022  4:17 AM    Respiratory failure with hypoxia [facility])  Assessment & Plan  Patient has hypoxia requiring 3 liters nasal canula  She was placed on 3 liters NC, now tapered down to 1 liters      Hypokalemia  Assessment & Plan  Noted with hypokalemia 3 0  Supplement electrolytes as needed  Smoker  Assessment & Plan  Reports smoking half pack a day  Counseled on cessation  Order nicotine patch  * Chronic obstructive pulmonary disease with acute exacerbation [facility])  Assessment & Plan  62year old female patient with past medical history of COPD, active smoker, was recently hospitalized due to COPD exacerbation however she had left AMA at that time  Again presents to emergency room with complaining of shortness of breath, cough, right-sided upper back pain due to cough  Denies any other new complaints  COVID-19 negative  NT proBNP repaired  Troponin negative x3  CTA PE study report noted negative for pulmonary embolism, bilateral lower lobe atelectasis, scarring, incidentally noted persistent diffuse esophagitis, hepatomegaly  Currently on my encounter patient appears comfortable not in distress  O2 saturation 89% room air, improved 93% on 2-3 L nasal cannula  Acutely nontoxic appearing  Started on p o  Azithromycin for COPD exacerbation  Continue prednisone 40 mg b i d , DuoNebs q 6 hours  Encourage incentive spirometry use  Encourage ambulation  Will need ambulatory oxygen and respiratory to do home oxygen evaluation  Will start on Tessalon Perles, Tylenol, Robaxin, lidocaine patch for musculoskeletal pain  Follow-up with pulmonology consult             VTE Pharmacologic Prophylaxis: VTE Score: 3 High Risk (Score >/= 5) - Pharmacological DVT Prophylaxis Ordered: enoxaparin (Lovenox)  Sequential Compression Devices Ordered  Patient Centered Rounds: I performed bedside rounds with nursing staff today  Discussions with Specialists or Other Care Team Provider: pulmonary, RN and CM     Education and Discussions with Family / Patient: Patient declined call to   Time Spent for Care: 45 minutes  More than 50% of total time spent on counseling and coordination of care as described above  Current Length of Stay: 1 day(s)  Current Patient Status: Inpatient   Certification Statement: The patient will continue to require additional inpatient hospital stay due to hypoxia  Discharge Plan: Anticipate discharge in 24-48 hrs to home  Code Status: Level 1 - Full Code    Subjective:   Patient has improved with symptoms  She is still having hypoxia  She has no chest pain, n/v, cough, diarrhea, fever  Objective:     Vitals:   Temp (24hrs), Av 3 °F (36 8 °C), Min:97 8 °F (36 6 °C), Max:98 7 °F (37 1 °C)    Temp:  [97 8 °F (36 6 °C)-98 7 °F (37 1 °C)] 97 8 °F (36 6 °C)  HR:  [60-70] 68  Resp:  [18-20] 20  BP: (105-129)/(54-64) 129/64  SpO2:  [83 %-94 %] 90 %  Body mass index is 34 96 kg/m²  Input and Output Summary (last 24 hours): Intake/Output Summary (Last 24 hours) at 10/6/2022 1322  Last data filed at 10/6/2022 1231  Gross per 24 hour   Intake 220 ml   Output --   Net 220 ml       Physical Exam:   Physical Exam  Vitals and nursing note reviewed  Constitutional:       General: She is not in acute distress  Appearance: She is well-developed  HENT:      Head: Normocephalic and atraumatic  Right Ear: There is no impacted cerumen  Left Ear: There is no impacted cerumen  Nose: No congestion or rhinorrhea  Eyes:      General:         Right eye: No discharge  Left eye: No discharge  Conjunctiva/sclera: Conjunctivae normal    Cardiovascular:      Rate and Rhythm: Normal rate and regular rhythm  Heart sounds: No murmur heard  Pulmonary:      Effort: Pulmonary effort is normal  No respiratory distress  Breath sounds: Normal breath sounds  Abdominal:      General: There is no distension  Palpations: Abdomen is soft  There is no mass  Tenderness: There is no abdominal tenderness  Hernia: No hernia is present  Musculoskeletal:         General: No swelling, tenderness, deformity or signs of injury  Cervical back: Neck supple  Skin:     General: Skin is warm and dry  Coloration: Skin is not jaundiced or pale  Findings: No bruising or erythema  Neurological:      Mental Status: She is alert and oriented to person, place, and time  Cranial Nerves: No cranial nerve deficit  Sensory: No sensory deficit  Motor: No weakness        Coordination: Coordination normal    Psychiatric:         Mood and Affect: Mood normal          Behavior: Behavior normal            Additional Data:     Labs:  Results from last 7 days   Lab Units 10/05/22  0447 10/04/22  0507   WBC Thousand/uL 15 44* 8 08   HEMOGLOBIN g/dL 11 3* 12 0   HEMATOCRIT % 35 4 36 6   PLATELETS Thousands/uL 343 306   NEUTROS PCT %  --  73   LYMPHS PCT %  --  15   MONOS PCT %  --  9   EOS PCT %  --  2     Results from last 7 days   Lab Units 10/05/22  0447 10/04/22  0507   SODIUM mmol/L 143 144   POTASSIUM mmol/L 4 4 3 0*   CHLORIDE mmol/L 109* 106   CO2 mmol/L 31 31   BUN mg/dL 19 15   CREATININE mg/dL 0 71 0 61   ANION GAP mmol/L 3* 7   CALCIUM mg/dL 8 2* 8 4   ALBUMIN g/dL  --  2 8*   TOTAL BILIRUBIN mg/dL  --  0 37   ALK PHOS U/L  --  93   ALT U/L  --  19   AST U/L  --  14   GLUCOSE RANDOM mg/dL 178* 105                       Lines/Drains:  Invasive Devices  Report    Peripheral Intravenous Line  Duration           Peripheral IV 10/04/22 Left Hand 2 days                      Imaging: Reviewed radiology reports from this admission including: chest xray    Recent Cultures (last 7 days):         Last 24 Hours Medication List:   Current Facility-Administered Medications   Medication Dose Route Frequency Provider Last Rate    acetaminophen  975 mg Oral Q8H [address] Layla Farr MD      albuterol  2 puff Inhalation Q4H PRN Felisha Alston MD      azithromycin  250 mg Oral Q24H Layla Farr MD      benzonatate  100 mg Oral TID PRN Felisha Alston MD      enoxaparin  40 mg Subcutaneous Daily Felisha Alston MD      fluticasone-vilanterol  1 puff Inhalation Daily [facility]      guaiFENesin  600 mg Oral BID Daron NICOLAS MD      ipratropium  0 5 mg Nebulization TID Felisha Alston MD      ketorolac  15 mg Intravenous Q6H PRN Chap[place]ito Botello PA-C      levalbuterol  1 25 mg Nebulization TID Felisha Alston MD      lidocaine  1 patch Topical Daily Felisha Alston MD      methocarbamol  500 mg Oral Q6H PRN Felisha Alston MD      nicotine  1 patch Transdermal Daily Daron NICOLAS MD      ondansetron  4 mg Intravenous Q6H PRN [place] Botello PA-C      potassium chloride  40 mEq Oral BID Felisha Alston MD      predniSONE  40 mg Oral BID With Meals Layla Farr MD          Today, Patient Was Seen By: Layla Farr    **Please Note: This note may have been constructed using a voice recognition system  **

## 2022-10-07 VITALS
SYSTOLIC BLOOD PRESSURE: 118 MMHG | BODY MASS INDEX: 34.93 KG/M2 | HEIGHT: 61 IN | WEIGHT: 185 LBS | DIASTOLIC BLOOD PRESSURE: 51 MMHG | HEART RATE: 75 BPM | OXYGEN SATURATION: 87 % | RESPIRATION RATE: 18 BRPM | TEMPERATURE: 98.7 F

## 2022-10-07 LAB
BASOPHILS # BLD MANUAL: 0 THOUSAND/UL (ref 0–0.1)
BASOPHILS NFR MAR MANUAL: 0 % (ref 0–1)
DME PARACHUTE DELIVERY DATE REQUESTED: NORMAL
DME PARACHUTE DELIVERY NOTE: NORMAL
DME PARACHUTE ITEM DESCRIPTION: NORMAL
DME PARACHUTE ORDER STATUS: NORMAL
DME PARACHUTE SUPPLIER NAME: NORMAL
DME PARACHUTE SUPPLIER PHONE: NORMAL
EOSINOPHIL # BLD MANUAL: 0 THOUSAND/UL (ref 0–0.4)
EOSINOPHIL NFR BLD MANUAL: 0 % (ref 0–6)
ERYTHROCYTE [DISTWIDTH] IN BLOOD BY AUTOMATED COUNT: 15.3 % (ref 11.6–15.1)
GLUCOSE SERPL-MCNC: 182 MG/DL (ref 65–140)
HCT VFR BLD AUTO: 34.6 % (ref 34.8–46.1)
HGB BLD-MCNC: 10.8 G/DL (ref 11.5–15.4)
LYMPHOCYTES # BLD AUTO: 1.42 THOUSAND/UL (ref 0.6–4.47)
LYMPHOCYTES # BLD AUTO: 8 % (ref 14–44)
MCH RBC QN AUTO: 29 PG (ref 26.8–34.3)
MCHC RBC AUTO-ENTMCNC: 31.2 G/DL (ref 31.4–37.4)
MCV RBC AUTO: 93 FL (ref 82–98)
METAMYELOCYTES NFR BLD MANUAL: 2 % (ref 0–1)
MONOCYTES # BLD AUTO: 0.35 THOUSAND/UL (ref 0–1.22)
MONOCYTES NFR BLD: 2 % (ref 4–12)
MYELOCYTES NFR BLD MANUAL: 2 % (ref 0–1)
NEUTROPHILS # BLD MANUAL: 15.26 THOUSAND/UL (ref 1.85–7.62)
NEUTS SEG NFR BLD AUTO: 86 % (ref 43–75)
PLATELET # BLD AUTO: 337 THOUSANDS/UL (ref 149–390)
PLATELET BLD QL SMEAR: ADEQUATE
PMV BLD AUTO: 8.9 FL (ref 8.9–12.7)
PROCALCITONIN SERPL-MCNC: <0.05 NG/ML
RBC # BLD AUTO: 3.73 MILLION/UL (ref 3.81–5.12)
RBC MORPH BLD: NORMAL
WBC # BLD AUTO: 17.74 THOUSAND/UL (ref 4.31–10.16)

## 2022-10-07 PROCEDURE — 85007 BL SMEAR W/DIFF WBC COUNT: CPT | Performed by: INTERNAL MEDICINE

## 2022-10-07 PROCEDURE — 94640 AIRWAY INHALATION TREATMENT: CPT

## 2022-10-07 PROCEDURE — 84145 PROCALCITONIN (PCT): CPT | Performed by: STUDENT IN AN ORGANIZED HEALTH CARE EDUCATION/TRAINING PROGRAM

## 2022-10-07 PROCEDURE — 85027 COMPLETE CBC AUTOMATED: CPT | Performed by: INTERNAL MEDICINE

## 2022-10-07 PROCEDURE — 94760 N-INVAS EAR/PLS OXIMETRY 1: CPT

## 2022-10-07 PROCEDURE — 99239 HOSP IP/OBS DSCHRG MGMT >30: CPT | Performed by: STUDENT IN AN ORGANIZED HEALTH CARE EDUCATION/TRAINING PROGRAM

## 2022-10-07 PROCEDURE — 82948 REAGENT STRIP/BLOOD GLUCOSE: CPT

## 2022-10-07 RX ORDER — BENZONATATE 100 MG/1
100 CAPSULE ORAL 3 TIMES DAILY PRN
Qty: 20 CAPSULE | Refills: 0 | Status: SHIPPED | OUTPATIENT
Start: 2022-10-07 | End: 2022-10-07 | Stop reason: SDUPTHER

## 2022-10-07 RX ORDER — OXYCODONE HYDROCHLORIDE AND ACETAMINOPHEN 5; 325 MG/1; MG/1
1 TABLET ORAL EVERY 6 HOURS PRN
Qty: 10 TABLET | Refills: 0 | Status: SHIPPED | OUTPATIENT
Start: 2022-10-07 | End: 2022-10-07 | Stop reason: SDUPTHER

## 2022-10-07 RX ORDER — AZITHROMYCIN 250 MG/1
250 TABLET, FILM COATED ORAL EVERY 24 HOURS
Qty: 2 TABLET | Refills: 0 | Status: SHIPPED | OUTPATIENT
Start: 2022-10-07 | End: 2022-10-07 | Stop reason: SDUPTHER

## 2022-10-07 RX ORDER — IPRATROPIUM BROMIDE AND ALBUTEROL SULFATE 2.5; .5 MG/3ML; MG/3ML
3 SOLUTION RESPIRATORY (INHALATION) 4 TIMES DAILY
Qty: 120 ML | Refills: 0 | Status: SHIPPED | OUTPATIENT
Start: 2022-10-07 | End: 2022-10-07 | Stop reason: SDUPTHER

## 2022-10-07 RX ORDER — NICOTINE 21 MG/24HR
1 PATCH, TRANSDERMAL 24 HOURS TRANSDERMAL DAILY
Qty: 28 PATCH | Refills: 0 | Status: SHIPPED | OUTPATIENT
Start: 2022-10-08 | End: 2022-10-07 | Stop reason: SDUPTHER

## 2022-10-07 RX ORDER — ALBUTEROL SULFATE 90 UG/1
2 AEROSOL, METERED RESPIRATORY (INHALATION) EVERY 6 HOURS PRN
Qty: 18 G | Refills: 0 | Status: SHIPPED | OUTPATIENT
Start: 2022-10-07 | End: 2022-10-07 | Stop reason: SDUPTHER

## 2022-10-07 RX ORDER — AZITHROMYCIN 250 MG/1
250 TABLET, FILM COATED ORAL EVERY 24 HOURS
Qty: 2 TABLET | Refills: 0 | Status: SHIPPED | OUTPATIENT
Start: 2022-10-07 | End: 2022-10-09

## 2022-10-07 RX ORDER — CYCLOBENZAPRINE HCL 10 MG
10 TABLET ORAL 3 TIMES DAILY PRN
Qty: 15 TABLET | Refills: 0 | Status: SHIPPED | OUTPATIENT
Start: 2022-10-07

## 2022-10-07 RX ORDER — GUAIFENESIN 600 MG/1
600 TABLET, EXTENDED RELEASE ORAL 2 TIMES DAILY
Qty: 20 TABLET | Refills: 0 | Status: SHIPPED | OUTPATIENT
Start: 2022-10-07

## 2022-10-07 RX ORDER — PREDNISONE 10 MG/1
TABLET ORAL
Qty: 26 TABLET | Refills: 0 | Status: SHIPPED | OUTPATIENT
Start: 2022-10-07 | End: 2022-10-07 | Stop reason: SDUPTHER

## 2022-10-07 RX ORDER — BENZONATATE 100 MG/1
100 CAPSULE ORAL 3 TIMES DAILY PRN
Qty: 20 CAPSULE | Refills: 0 | Status: SHIPPED | OUTPATIENT
Start: 2022-10-07

## 2022-10-07 RX ORDER — IPRATROPIUM BROMIDE AND ALBUTEROL SULFATE 2.5; .5 MG/3ML; MG/3ML
3 SOLUTION RESPIRATORY (INHALATION) 4 TIMES DAILY
Qty: 120 ML | Refills: 0 | Status: SHIPPED | OUTPATIENT
Start: 2022-10-07

## 2022-10-07 RX ORDER — PREDNISONE 10 MG/1
TABLET ORAL
Qty: 26 TABLET | Refills: 0 | Status: SHIPPED | OUTPATIENT
Start: 2022-10-07 | End: 2022-10-18

## 2022-10-07 RX ORDER — ALBUTEROL SULFATE 90 UG/1
2 AEROSOL, METERED RESPIRATORY (INHALATION) EVERY 6 HOURS PRN
Qty: 18 G | Refills: 0 | Status: SHIPPED | OUTPATIENT
Start: 2022-10-07 | End: 2022-10-07

## 2022-10-07 RX ORDER — ALBUTEROL SULFATE 90 UG/1
2 AEROSOL, METERED RESPIRATORY (INHALATION) EVERY 4 HOURS PRN
Qty: 18 G | Refills: 0 | Status: SHIPPED | OUTPATIENT
Start: 2022-10-07 | End: 2022-10-08 | Stop reason: SDUPTHER

## 2022-10-07 RX ORDER — OXYCODONE HYDROCHLORIDE AND ACETAMINOPHEN 5; 325 MG/1; MG/1
1 TABLET ORAL EVERY 6 HOURS PRN
Qty: 10 TABLET | Refills: 0 | Status: SHIPPED | OUTPATIENT
Start: 2022-10-07 | End: 2022-10-17

## 2022-10-07 RX ORDER — NICOTINE 21 MG/24HR
1 PATCH, TRANSDERMAL 24 HOURS TRANSDERMAL DAILY
Qty: 28 PATCH | Refills: 0 | Status: SHIPPED | OUTPATIENT
Start: 2022-10-08

## 2022-10-07 RX ADMIN — OXYCODONE HYDROCHLORIDE AND ACETAMINOPHEN 1 TABLET: 5; 325 TABLET ORAL at 11:54

## 2022-10-07 RX ADMIN — METHOCARBAMOL 500 MG: 500 TABLET ORAL at 05:41

## 2022-10-07 RX ADMIN — OXYCODONE HYDROCHLORIDE AND ACETAMINOPHEN 1 TABLET: 5; 325 TABLET ORAL at 05:40

## 2022-10-07 RX ADMIN — FLUTICASONE FUROATE AND VILANTEROL TRIFENATATE 1 PUFF: 100; 25 POWDER RESPIRATORY (INHALATION) at 08:54

## 2022-10-07 RX ADMIN — GUAIFENESIN 600 MG: 600 TABLET ORAL at 08:53

## 2022-10-07 RX ADMIN — PREDNISONE 40 MG: 20 TABLET ORAL at 08:53

## 2022-10-07 RX ADMIN — ACETAMINOPHEN 975 MG: 325 TABLET, FILM COATED ORAL at 05:40

## 2022-10-07 RX ADMIN — METHOCARBAMOL 500 MG: 500 TABLET ORAL at 13:12

## 2022-10-07 RX ADMIN — LEVALBUTEROL HYDROCHLORIDE 1.25 MG: 1.25 SOLUTION, CONCENTRATE RESPIRATORY (INHALATION) at 13:16

## 2022-10-07 RX ADMIN — IPRATROPIUM BROMIDE 0.5 MG: 0.5 SOLUTION RESPIRATORY (INHALATION) at 13:16

## 2022-10-07 RX ADMIN — LEVALBUTEROL HYDROCHLORIDE 1.25 MG: 1.25 SOLUTION, CONCENTRATE RESPIRATORY (INHALATION) at 08:12

## 2022-10-07 RX ADMIN — ONDANSETRON 4 MG: 2 INJECTION INTRAMUSCULAR; INTRAVENOUS at 13:12

## 2022-10-07 RX ADMIN — LIDOCAINE 5% 1 PATCH: 700 PATCH TOPICAL at 08:53

## 2022-10-07 RX ADMIN — AZITHROMYCIN 250 MG: 250 TABLET, FILM COATED ORAL at 13:12

## 2022-10-07 RX ADMIN — NICOTINE 1 PATCH: 14 PATCH, EXTENDED RELEASE TRANSDERMAL at 08:54

## 2022-10-07 RX ADMIN — METHOCARBAMOL 500 MG: 500 TABLET ORAL at 01:14

## 2022-10-07 RX ADMIN — IPRATROPIUM BROMIDE 0.5 MG: 0.5 SOLUTION RESPIRATORY (INHALATION) at 08:12

## 2022-10-07 NOTE — DISCHARGE SUMMARY
[facility]  Discharge- Selma Peña 1965, 62 y o  female MRN: [id number]  Unit/Bed#: -01 Encounter: [id number]  Primary Care Provider: Leighton Cheung DO   Date and time admitted to hospital: 10/4/2022  4:17 AM    * Chronic obstructive pulmonary disease with acute exacerbation [facility])  Assessment & Plan  62year old female patient with past medical history of presumed COPD(no PFTs documented), active smoker, was recently hospitalized due to COPD exacerbation however she had left AMA at that time  Again presents to emergency room with complaining of shortness of breath, cough, right-sided upper back pain due to cough  Required oxygen at the time of admission with tightness of chest   Denies any other new complaints  COVID-19 negative  NT proBNP    Troponin negative x3  CTA PE study report noted negative for pulmonary embolism, bilateral lower lobe atelectasis, scarring, incidentally noted persistent diffuse esophagitis, hepatomegaly  Improved O2 saturations   Acutely nontoxic appearing  Started on p o  Azithromycin for COPD exacerbation  Continue prednisone 40 mg daily for 2 more days, then 30 mg daily for 3 more days, then 20 mg daily for 3 more days, then 10 mg daily for three days and stop  Encourage incentive spirometry use  Encourage ambulation  Did not qualify for home oxygen   Will start on Tessalon Perles, Tylenol,wants oxycodone for now   Follow-up with pulmonary outpatient for PFTs      Respiratory failure with hypoxia [facility])  Assessment & Plan  Patient has hypoxia requiring 3 liters nasal canula  She was placed on 3 liters NC, now tapered down to 1 liters  Improved, did not qualify for oxygen for home       Hypokalemia  Assessment & Plan  Noted with hypokalemia 3 0  Supplement electrolytes as needed  Smoker  Assessment & Plan  Reports smoking half pack a day  Counseled on cessation  Order nicotine patch          Medical Problems Resolved Problems  Date Reviewed: 10/7/2022   None               Discharging Physician / Practitioner: Nando Abrams  PCP: Thad Padilla,   Admission Date:   Admission Orders (From admission, onward)     Ordered        10/05/22 1333  Inpatient Admission  Once            10/04/22 0818  Place in Observation  Once                      Discharge Date: 10/07/22    Consultations During Hospital Stay:  · Pulmonary, PT/OT, Respiratory therapy    Procedures Performed:   COVID-19 negative  NT proBNP    Troponin negative x3  CTA PE study report noted negative for pulmonary embolism, bilateral lower lobe atelectasis, scarring, incidentally noted persistent diffuse esophagitis, hepatomegaly  Significant Findings / Test Results:   ·  as above     Incidental Findings:   · None      Test Results Pending at Discharge (will require follow up): · None      Outpatient Tests Requested:  · PFTs by pulmonary     Complications:  Patient has no insurance and needs outpatient medications set up before discharge  Reason for Admission: SOB    Hospital Course:   Sean Sow is a 62 y o  female patient who originally presented to the hospital on 10/4/2022 due to sob, she has history of COPD, no PFTs in system suggest she has COPD  She is an active smoker  She was started on steroids, methylprednisolone 125 mg once, then 40 mg bid started, then changed to 40 mg po by pulmonary  Nebulization every 4 hours with duo nebs, breo-ellipta was added  She was hypoxic respiratory failure as per her body habitus she tends have hypoxia when she is sitting forward, however, compensates when she is sitting back  She will need diuretics  She has improved and did not require oxygen while ambulating, she did not qualify for home O2  She is stable for discharge home  She has issues with insurance, needs medications and nebulization machine provided for her   She will need pulmonary follow up for PFT tests at some point when she is feeling better and has insurance assistance  Please see above list of diagnoses and related plan for additional information  Condition at Discharge: stable    Discharge Day Visit / Exam:   * Please refer to separate progress note for these details *    Discussion with Family: Patient declined call to   Discharge instructions/Information to patient and family:   See after visit summary for information provided to patient and family  Provisions for Follow-Up Care:  See after visit summary for information related to follow-up care and any pertinent home health orders  Disposition:   Home    Planned Readmission: no      Discharge Statement:  I spent 45 minutes discharging the patient  This time was spent on the day of discharge  I had direct contact with the patient on the day of discharge  Greater than 50% of the total time was spent examining patient, answering all patient questions, arranging and discussing plan of care with patient as well as directly providing post-discharge instructions  Additional time then spent on discharge activities  Discharge Medications:  See after visit summary for reconciled discharge medications provided to patient and/or family        **Please Note: This note may have been constructed using a voice recognition system**

## 2022-10-07 NOTE — CASE MANAGEMENT
Case Management Discharge Planning Note    Patient name [facility]  Location /-71 MRN [id number]  : 1965 Date 10/7/2022       Current Admission Date: 10/4/2022  Current Admission Diagnosis:Chronic obstructive pulmonary disease with acute exacerbation [facility])   Patient Active Problem List    Diagnosis Date Noted    Respiratory failure with hypoxia ([facility] ) 10/06/2022    Hypokalemia 10/04/2022    Chest pain, unspecified 2022    Chronic obstructive pulmonary disease with acute exacerbation ([facility] ) 2022    Smoker 2022      LOS (days): 2  Geometric Mean LOS (GMLOS) (days):   Days to GMLOS:     OBJECTIVE:  Risk of Unplanned Readmission Score: 14 61         Current admission status: Inpatient   Preferred Pharmacy:   [id number] [address]  [address]  [address] [address], [address] [facility]  Phone: [phone] Fax: [address]  [address]  [address] [place] [address] [address] [address]  Phone: [phone] Fax: [phone]    Primary Care Provider: Jessica Michel DO    Primary Insurance: DOROTHY ANN PENDING  Secondary Insurance:     DISCHARGE DETAILS:    DME Referral Provided  Referral made for DME?: Yes  DME referral completed for the following items[de-identified] Nebulizer  DME Supplier Name[de-identified] [employer]    Other Referral/Resources/Interventions Provided:  Interventions: DME  Referral Comments: CM sent a referral to [facility] requesting a nebulizer  SLIM agreeable to sign  CM then called [facility] again at [phone] and spoke to [address] confirmed that they still have not received any recent scripts for patient  Rober asked CM to call back in about 10 minutes

## 2022-10-07 NOTE — ASSESSMENT & PLAN NOTE
62year old female patient with past medical history of presumed COPD(no PFTs documented), active smoker, was recently hospitalized due to COPD exacerbation however she had left AMA at that time  Again presents to emergency room with complaining of shortness of breath, cough, right-sided upper back pain due to cough  Required oxygen at the time of admission with tightness of chest   Denies any other new complaints  COVID-19 negative  NT proBNP    Troponin negative x3  CTA PE study report noted negative for pulmonary embolism, bilateral lower lobe atelectasis, scarring, incidentally noted persistent diffuse esophagitis, hepatomegaly  Improved O2 saturations   Acutely nontoxic appearing  Started on p o  Azithromycin for COPD exacerbation  Continue prednisone 40 mg daily for 2 more days, then 30 mg daily for 3 more days, then 20 mg daily for 3 more days, then 10 mg daily for three days and stop  Encourage incentive spirometry use  Encourage ambulation    Did not qualify for home oxygen   Will start on Tessalon Perles, Tylenol,wants oxycodone for now   Follow-up with pulmonary outpatient for PFTs

## 2022-10-07 NOTE — PLAN OF CARE
Problem: Potential for Falls  Goal: Patient will remain free of falls  Description: INTERVENTIONS:  - Educate patient/family on patient safety including physical limitations  - Instruct patient to call for assistance with activity   - Consult OT/PT to assist with strengthening/mobility   - Keep Call bell within reach  - Keep bed low and locked with side rails adjusted as appropriate  - Keep care items and personal belongings within reach  - Initiate and maintain comfort rounds  - Make Fall Risk Sign visible to staff  - Offer Toileting every  Hours, in advance of need  - Initiate/Maintain alarm  - Obtain necessary fall risk management equipment  - Apply yellow socks and bracelet for high fall risk patients  - Consider moving patient to room near nurses station  10/7/2022 1522 by Cassy Díaz RN  Outcome: Adequate for Discharge  10/7/2022 1049 by Cassy Díaz RN  Outcome: Progressing     Problem: INFECTION - ADULT  Goal: Absence or prevention of progression during hospitalization  Description: INTERVENTIONS:  - Assess and monitor for signs and symptoms of infection  - Monitor lab/diagnostic results  - Monitor all insertion sites, i e  indwelling lines, tubes, and drains  - Monitor endotracheal if appropriate and nasal secretions for changes in amount and color  - [place] appropriate cooling/warming therapies per order  - Administer medications as ordered  - Instruct and encourage patient and family to use good hand hygiene technique  - Identify and instruct in appropriate isolation precautions for identified infection/condition  10/7/2022 1522 by Cassy Díaz RN  Outcome: Adequate for Discharge  10/7/2022 1049 by Cassy Díaz RN  Outcome: Progressing  Goal: Absence of fever/infection during neutropenic period  Description: INTERVENTIONS:  - Monitor WBC    10/7/2022 1522 by Cassy Díaz RN  Outcome: Adequate for Discharge  10/7/2022 1049 by Cassy Díaz RN  Outcome: Progressing Problem: RESPIRATORY - ADULT  Goal: Achieves optimal ventilation and oxygenation  Description: INTERVENTIONS:  - Assess for changes in respiratory status  - Assess for changes in mentation and behavior  - Position to facilitate oxygenation and minimize respiratory effort  - Oxygen administered by appropriate delivery if ordered  - Initiate smoking cessation education as indicated  - Encourage broncho-pulmonary hygiene including cough, deep breathe, Incentive Spirometry  - Assess the need for suctioning and aspirate as needed  - Assess and instruct to report SOB or any respiratory difficulty  - Respiratory Therapy support as indicated  10/7/2022 1522 by Tio Christianson RN  Outcome: Adequate for Discharge  10/7/2022 1049 by Tio Christianson RN  Outcome: Progressing     Problem: GASTROINTESTINAL - ADULT  Goal: Minimal or absence of nausea and/or vomiting  Description: INTERVENTIONS:  - Administer IV fluids if ordered to ensure adequate hydration  - Maintain NPO status until nausea and vomiting are resolved  - Nasogastric tube if ordered  - Administer ordered antiemetic medications as needed  - Provide nonpharmacologic comfort measures as appropriate  - Advance diet as tolerated, if ordered  - Consider nutrition services referral to assist patient with adequate nutrition and appropriate food choices  10/7/2022 1522 by Tio Christianson RN  Outcome: Adequate for Discharge  10/7/2022 1049 by Tio Christianson RN  Outcome: Progressing  Goal: Maintains or returns to baseline bowel function  Description: INTERVENTIONS:  - Assess bowel function  - Encourage oral fluids to ensure adequate hydration  - Administer IV fluids if ordered to ensure adequate hydration  - Administer ordered medications as needed  - Encourage mobilization and activity  - Consider nutritional services referral to assist patient with adequate nutrition and appropriate food choices  10/7/2022 1522 by Tio Christianson RN  Outcome: Adequate for Discharge  10/7/2022 1049 by Martha Dinh RN  Outcome: Progressing  Goal: Maintains adequate nutritional intake  Description: INTERVENTIONS:  - Monitor percentage of each meal consumed  - Identify factors contributing to decreased intake, treat as appropriate  - Assist with meals as needed  - Monitor I&O, weight, and lab values if indicated  - Obtain nutrition services referral as needed  10/7/2022 1522 by Martha Dinh RN  Outcome: Adequate for Discharge  10/7/2022 1049 by Martha Dinh RN  Outcome: Progressing  Goal: Oral mucous membranes remain intact  Description: INTERVENTIONS  - Assess oral mucosa and hygiene practices  - Implement preventative oral hygiene regimen  - Implement oral medicated treatments as ordered  - Initiate Nutrition services referral as needed  10/7/2022 1522 by Martha Dinh RN  Outcome: Adequate for Discharge  10/7/2022 1049 by Martha Dinh RN  Outcome: Progressing

## 2022-10-07 NOTE — PLAN OF CARE
Problem: Potential for Falls  Goal: Patient will remain free of falls  Description: INTERVENTIONS:  - Educate patient/family on patient safety including physical limitations  - Instruct patient to call for assistance with activity   - Consult OT/PT to assist with strengthening/mobility   - Keep Call bell within reach  - Keep bed low and locked with side rails adjusted as appropriate  - Keep care items and personal belongings within reach  - Initiate and maintain comfort rounds  - Make Fall Risk Sign visible to staff  - Offer Toileting every  Hours, in advance of need  - Initiate/Maintain alarm  - Obtain necessary fall risk management equipment:  - Apply yellow socks and bracelet for high fall risk patients  - Consider moving patient to room near nurses station  Outcome: Progressing     Problem: INFECTION - ADULT  Goal: Absence or prevention of progression during hospitalization  Description: INTERVENTIONS:  - Assess and monitor for signs and symptoms of infection  - Monitor lab/diagnostic results  - Monitor all insertion sites, i e  indwelling lines, tubes, and drains  - Monitor endotracheal if appropriate and nasal secretions for changes in amount and color  - Clio appropriate cooling/warming therapies per order  - Administer medications as ordered  - Instruct and encourage patient and family to use good hand hygiene technique  - Identify and instruct in appropriate isolation precautions for identified infection/condition  Outcome: Progressing  Goal: Absence of fever/infection during neutropenic period  Description: INTERVENTIONS:  - Monitor WBC    Outcome: Progressing     Problem: RESPIRATORY - ADULT  Goal: Achieves optimal ventilation and oxygenation  Description: INTERVENTIONS:  - Assess for changes in respiratory status  - Assess for changes in mentation and behavior  - Position to facilitate oxygenation and minimize respiratory effort  - Oxygen administered by appropriate delivery if ordered  - Initiate smoking cessation education as indicated  - Encourage broncho-pulmonary hygiene including cough, deep breathe, Incentive Spirometry  - Assess the need for suctioning and aspirate as needed  - Assess and instruct to report SOB or any respiratory difficulty  - Respiratory Therapy support as indicated  Outcome: Progressing     Problem: GASTROINTESTINAL - ADULT  Goal: Minimal or absence of nausea and/or vomiting  Description: INTERVENTIONS:  - Administer IV fluids if ordered to ensure adequate hydration  - Maintain NPO status until nausea and vomiting are resolved  - Nasogastric tube if ordered  - Administer ordered antiemetic medications as needed  - Provide nonpharmacologic comfort measures as appropriate  - Advance diet as tolerated, if ordered  - Consider nutrition services referral to assist patient with adequate nutrition and appropriate food choices  Outcome: Progressing  Goal: Maintains or returns to baseline bowel function  Description: INTERVENTIONS:  - Assess bowel function  - Encourage oral fluids to ensure adequate hydration  - Administer IV fluids if ordered to ensure adequate hydration  - Administer ordered medications as needed  - Encourage mobilization and activity  - Consider nutritional services referral to assist patient with adequate nutrition and appropriate food choices  Outcome: Progressing  Goal: Maintains adequate nutritional intake  Description: INTERVENTIONS:  - Monitor percentage of each meal consumed  - Identify factors contributing to decreased intake, treat as appropriate  - Assist with meals as needed  - Monitor I&O, weight, and lab values if indicated  - Obtain nutrition services referral as needed  Outcome: Progressing  Goal: Oral mucous membranes remain intact  Description: INTERVENTIONS  - Assess oral mucosa and hygiene practices  - Implement preventative oral hygiene regimen  - Implement oral medicated treatments as ordered  - Initiate Nutrition services referral as needed  Outcome: Progressing

## 2022-10-07 NOTE — CASE MANAGEMENT
Case Management Discharge Planning Note    Patient name Mayuri Katelin  Location /-33 MRN [id number]  : 1965 Date 10/7/2022       Current Admission Date: 10/4/2022  Current Admission Diagnosis:Chronic obstructive pulmonary disease with acute exacerbation [facility])   Patient Active Problem List    Diagnosis Date Noted    Respiratory failure with hypoxia ([facility] [address] ) 10/06/2022    Hypokalemia 10/04/2022    Chest pain, unspecified 2022    Chronic obstructive pulmonary disease with acute exacerbation ([facility] ) 2022    Smoker 2022      LOS (days): 2  Geometric Mean LOS (GMLOS) (days):   Days to GMLOS:     OBJECTIVE:  Risk of Unplanned Readmission Score: 14 61         Current admission status: Inpatient   Preferred Pharmacy:   [id number] [address]  [address]  [address] [address], [address] [facility]  Phone: [phone] Fax: [phone]    [address] [place] [address] [address]  [address]  [address] [address]  Phone: [phone] Fax: [phone]    [facility] [id number] [facility], [facility] [address] [address]  Phone: [phone] Fax: [phone]    Primary Care Provider: Ashkan Gaston DO    Primary Insurance: DOROTHY ANN PENDING  Secondary Insurance:     DISCHARGE DETAILS:    Other Referral/Resources/Interventions Provided:  Interventions: Prescription Price Check, DME  Referral Comments: CM called [facility] in [place] at [phone] and spoke to RASHEED IQBAL reported that she has only run the cost of 7 prescriptions so far, and it comes out to $86 77  However, she has 4 more to run and it should take about 30 more minutes  RASHEED asked CM to call back  CM then reviewed [place] and found that the nebulizer has been approved, but there is a $100 copay   CM to review with patient if she is agreeable with cost

## 2022-10-07 NOTE — CASE MANAGEMENT
Case Management Discharge Planning Note    Patient name Imelda Cerda  Location /-44 MRN [id number]  : 1965 Date 10/7/2022       Current Admission Date: 10/4/2022  Current Admission Diagnosis:Chronic obstructive pulmonary disease with acute exacerbation [facility])   Patient Active Problem List    Diagnosis Date Noted    Respiratory failure with hypoxia ([facility] [address] ) 10/06/2022    Hypokalemia 10/04/2022    Chest pain, unspecified 2022    [facility] disease with [facility] exacerbation ([facility] ) 2022    Smoker 2022      LOS (days): 2  Geometric Mean LOS (GMLOS) (days):   Days to GMLOS:     OBJECTIVE:  Risk of Unplanned Readmission Score: 14 61         Current admission status: Inpatient   Preferred Pharmacy:   [id number] [address] - [address]  [address]  [address] [facility], [address]  Phone: [phone] Fax: [phone] [facility], [address] [facility] [address]  [address]  [address]  [address] [place] [address] Drive [address]  Phone: [phone] Fax: [phone]    Primary Care Provider: Ashley Alegria DO    Primary Insurance: DOROTHY ANN PENDING  Secondary Insurance:     DISCHARGE DETAILS:    Other Referral/Resources/Interventions Provided:  Interventions: Prescription Price Check  Referral Comments: DANK called [facility] at [phone] and spoke to Dewayne Tatum reported that she has a few scripts, but there is nothing over $20  However, these scripts are old and nothing was sent over today  DANK sent a TT to [facility] asking Dr Lucio Burt to send scripts again

## 2022-10-07 NOTE — ASSESSMENT & PLAN NOTE
Patient has hypoxia requiring 3 liters nasal canula  She was placed on 3 liters NC, now tapered down to 1 liters  Improved, did not qualify for oxygen for home

## 2022-10-08 RX ORDER — ALBUTEROL SULFATE 90 UG/1
2 AEROSOL, METERED RESPIRATORY (INHALATION) EVERY 4 HOURS PRN
Qty: 18 G | Refills: 0 | Status: SHIPPED | OUTPATIENT
Start: 2022-10-08

## 2022-10-08 RX ORDER — PANTOPRAZOLE SODIUM 40 MG/1
40 TABLET, DELAYED RELEASE ORAL DAILY
Qty: 30 TABLET | Refills: 0 | Status: SHIPPED | OUTPATIENT
Start: 2022-10-08

## 2022-11-10 LAB
DME PARACHUTE DELIVERY DATE ACTUAL: NORMAL
DME PARACHUTE DELIVERY DATE REQUESTED: NORMAL
DME PARACHUTE DELIVERY NOTE: NORMAL
DME PARACHUTE ITEM DESCRIPTION: NORMAL
DME PARACHUTE ORDER STATUS: NORMAL
DME PARACHUTE SUPPLIER NAME: NORMAL
DME PARACHUTE SUPPLIER PHONE: NORMAL

## 2023-01-01 ENCOUNTER — APPOINTMENT (EMERGENCY)
Dept: RADIOLOGY | Facility: HOSPITAL | Age: 58
End: 2023-01-01

## 2023-01-01 ENCOUNTER — APPOINTMENT (EMERGENCY)
Dept: CT IMAGING | Facility: HOSPITAL | Age: 58
End: 2023-01-01

## 2023-01-01 ENCOUNTER — HOSPITAL ENCOUNTER (EMERGENCY)
Facility: HOSPITAL | Age: 58
Discharge: HOME | End: 2023-01-02
Attending: EMERGENCY MEDICINE

## 2023-01-01 DIAGNOSIS — M54.9 BACK PAIN: Primary | ICD-10-CM

## 2023-01-01 DIAGNOSIS — S32.010A CLOSED COMPRESSION FRACTURE OF BODY OF L1 VERTEBRA (HCC): ICD-10-CM

## 2023-01-01 LAB
ALBUMIN SERPL BCP-MCNC: 3.4 G/DL (ref 3.5–5)
ALP SERPL-CCNC: 109 U/L (ref 46–116)
ALT SERPL W P-5'-P-CCNC: 20 U/L (ref 12–78)
ANION GAP SERPL CALCULATED.3IONS-SCNC: 6 MMOL/L (ref 4–13)
AST SERPL W P-5'-P-CCNC: 16 U/L (ref 5–45)
BASOPHILS # BLD AUTO: 0.04 THOUSANDS/ÂΜL (ref 0–0.1)
BASOPHILS NFR BLD AUTO: 0 % (ref 0–1)
BILIRUB SERPL-MCNC: 0.62 MG/DL (ref 0.2–1)
BUN SERPL-MCNC: 16 MG/DL (ref 5–25)
CALCIUM ALBUM COR SERPL-MCNC: 9.2 MG/DL (ref 8.3–10.1)
CALCIUM SERPL-MCNC: 8.7 MG/DL (ref 8.3–10.1)
CHLORIDE SERPL-SCNC: 100 MMOL/L (ref 96–108)
CO2 SERPL-SCNC: 32 MMOL/L (ref 21–32)
CREAT SERPL-MCNC: 0.73 MG/DL (ref 0.6–1.3)
EOSINOPHIL # BLD AUTO: 0.21 THOUSAND/ÂΜL (ref 0–0.61)
EOSINOPHIL NFR BLD AUTO: 2 % (ref 0–6)
ERYTHROCYTE [DISTWIDTH] IN BLOOD BY AUTOMATED COUNT: 15.6 % (ref 11.6–15.1)
GFR SERPL CREATININE-BSD FRML MDRD: 91 ML/MIN/1.73SQ M
GLUCOSE SERPL-MCNC: 108 MG/DL (ref 65–140)
HCT VFR BLD AUTO: 39 % (ref 34.8–46.1)
HGB BLD-MCNC: 12.4 G/DL (ref 11.5–15.4)
IMM GRANULOCYTES # BLD AUTO: 0.04 THOUSAND/UL (ref 0–0.2)
IMM GRANULOCYTES NFR BLD AUTO: 0 % (ref 0–2)
LYMPHOCYTES # BLD AUTO: 1.38 THOUSANDS/ÂΜL (ref 0.6–4.47)
LYMPHOCYTES NFR BLD AUTO: 10 % (ref 14–44)
MCH RBC QN AUTO: 29 PG (ref 26.8–34.3)
MCHC RBC AUTO-ENTMCNC: 31.8 G/DL (ref 31.4–37.4)
MCV RBC AUTO: 91 FL (ref 82–98)
MONOCYTES # BLD AUTO: 1.09 THOUSAND/ÂΜL (ref 0.17–1.22)
MONOCYTES NFR BLD AUTO: 8 % (ref 4–12)
NEUTROPHILS # BLD AUTO: 10.92 THOUSANDS/ÂΜL (ref 1.85–7.62)
NEUTS SEG NFR BLD AUTO: 80 % (ref 43–75)
NRBC BLD AUTO-RTO: 0 /100 WBCS
PLATELET # BLD AUTO: 269 THOUSANDS/UL (ref 149–390)
PMV BLD AUTO: 9.2 FL (ref 8.9–12.7)
POTASSIUM SERPL-SCNC: 4.4 MMOL/L (ref 3.5–5.3)
PROT SERPL-MCNC: 7.3 G/DL (ref 6.4–8.4)
RBC # BLD AUTO: 4.28 MILLION/UL (ref 3.81–5.12)
SODIUM SERPL-SCNC: 138 MMOL/L (ref 135–147)
WBC # BLD AUTO: 13.68 THOUSAND/UL (ref 4.31–10.16)

## 2023-01-01 RX ORDER — MORPHINE SULFATE 4 MG/ML
4 INJECTION, SOLUTION INTRAMUSCULAR; INTRAVENOUS ONCE
Status: COMPLETED | OUTPATIENT
Start: 2023-01-01 | End: 2023-01-01

## 2023-01-01 RX ORDER — KETOROLAC TROMETHAMINE 30 MG/ML
15 INJECTION, SOLUTION INTRAMUSCULAR; INTRAVENOUS ONCE
Status: COMPLETED | OUTPATIENT
Start: 2023-01-01 | End: 2023-01-01

## 2023-01-01 RX ADMIN — SODIUM CHLORIDE 1000 ML: 0.9 INJECTION, SOLUTION INTRAVENOUS at 22:58

## 2023-01-01 RX ADMIN — MORPHINE SULFATE 4 MG: 4 INJECTION INTRAVENOUS at 22:59

## 2023-01-01 RX ADMIN — KETOROLAC TROMETHAMINE 15 MG: 30 INJECTION, SOLUTION INTRAMUSCULAR; INTRAVENOUS at 22:58

## 2023-01-01 RX ADMIN — IOHEXOL 100 ML: 350 INJECTION, SOLUTION INTRAVENOUS at 23:44

## 2023-01-01 NOTE — Clinical Note
Lowell Hamlin was seen and treated in our emergency department on 1/1/2023  No work until cleared by Family Doctor/Orthopedics        Diagnosis:     Mary Santizo    She may return on this date: If you have any questions or concerns, please don't hesitate to call        Ciera Skaggs MD    ______________________________           _______________          _______________  Hospital Representative                              Date                                Time

## 2023-01-02 VITALS
TEMPERATURE: 99.1 F | RESPIRATION RATE: 16 BRPM | HEART RATE: 100 BPM | DIASTOLIC BLOOD PRESSURE: 100 MMHG | OXYGEN SATURATION: 93 % | SYSTOLIC BLOOD PRESSURE: 144 MMHG

## 2023-01-02 PROBLEM — A41.9 SEPSIS (HCC): Status: ACTIVE | Noted: 2023-01-02

## 2023-01-02 PROBLEM — S32.010A COMPRESSION FRACTURE OF L1 VERTEBRA (HCC): Status: ACTIVE | Noted: 2023-01-02
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
## 2023-01-02 PROBLEM — J44.9 COPD (CHRONIC OBSTRUCTIVE PULMONARY DISEASE) (HCC): Status: ACTIVE | Noted: 2023-01-02

## 2023-01-02 RX ORDER — MORPHINE SULFATE 15 MG/1
7.5 TABLET ORAL ONCE
Status: DISCONTINUED | OUTPATIENT
Start: 2023-01-02 | End: 2023-01-02

## 2023-01-02 RX ORDER — IBUPROFEN 800 MG/1
800 TABLET ORAL 3 TIMES DAILY
Qty: 21 TABLET | Refills: 0 | Status: SHIPPED | OUTPATIENT
Start: 2023-01-02 | End: 2023-01-09 | Stop reason: SDUPTHER

## 2023-01-02 RX ORDER — METHOCARBAMOL 500 MG/1
500 TABLET, FILM COATED ORAL 2 TIMES DAILY
Qty: 20 TABLET | Refills: 0 | OUTPATIENT
Start: 2023-01-02 | End: 2023-01-09

## 2023-01-02 RX ORDER — METHOCARBAMOL 500 MG/1
500 TABLET, FILM COATED ORAL 2 TIMES DAILY
Qty: 20 TABLET | Refills: 0 | Status: SHIPPED | OUTPATIENT
Start: 2023-01-02 | End: 2023-01-09 | Stop reason: SDUPTHER

## 2023-01-02 RX ORDER — MORPHINE SULFATE 15 MG/1
7.5 TABLET ORAL ONCE
Status: COMPLETED | OUTPATIENT
Start: 2023-01-02 | End: 2023-01-02

## 2023-01-02 RX ORDER — MORPHINE SULFATE 15 MG/1
7.5 TABLET ORAL EVERY 4 HOURS PRN
Qty: 5 TABLET | Refills: 0 | OUTPATIENT
Start: 2023-01-02 | End: 2023-01-09

## 2023-01-02 RX ADMIN — MORPHINE SULFATE 7.5 MG: 15 TABLET ORAL at 01:50

## 2023-01-02 NOTE — ASSESSMENT & PLAN NOTE
· HR 98, RR21, WBC 13 68  · CT showing scattered airspace opacities in the right lung possible representing infection  · Not hypoxic on RA; denies SOB  · Trend WBC; f/u w/ pending infectious labs

## 2023-01-02 NOTE — ASSESSMENT & PLAN NOTE
· Does not appear to be in acute exacerbation  · Continue home inhalers  · Monitor respiratory status

## 2023-01-02 NOTE — ED NOTES
Patient transported to [address] [place] [address], [address] [place] [address]  01/01/23 1045

## 2023-01-02 NOTE — ASSESSMENT & PLAN NOTE
· Had fall on Friday  · Not currently on outpatient anticoagulation  · Had resulting L1 compression fracture as above   · Fall precautions; assist with ambulation

## 2023-01-02 NOTE — ED NOTES
XR came to take pt for imaging, pt would like to wait until pain medications work a little longer before going over for imaging   XR tech states she will be back in a few minutes       Lynn Raymond RN  01/01/23 1845

## 2023-01-02 NOTE — ED NOTES
RN went in to give pt oral pain medication per provider to  see if pt able to ambulate at this time  Patient was sleeping at the time, RN turned on lights and spoke to pt  Pt is arousable, but could not give RN pain score due to falling back asleep during conversation  Provider made aware of pt sleepiness at this time, and RN did not give pain medication   Provider states will come to reevaluate pt to see if pt will be able to ambulate     Lynn Raymond RN  01/02/23 2204

## 2023-01-02 NOTE — ED NOTES
Patient ambulatory to bathroom at this time, pt asked for pain medication; RN told patient provider needed to evaluate patient first due to patient being sleepy when RN went into room to administer second pain medication dose  Pt refused to RN to hold and assist pt to bathroom stating she was steady enough to walk on her own   Provider made aware     Millie Salazar RN  01/02/23 9461

## 2023-01-04 ENCOUNTER — TELEPHONE (OUTPATIENT)
Dept: PHYSICAL THERAPY | Facility: OTHER | Age: 58
End: 2023-01-04

## 2023-01-04 NOTE — TELEPHONE ENCOUNTER
Nurse reached out to discuss recent referral entered for  Comprehensive Spine program   Patients voice MB is full  Nurse unable to reach or LM  Referral deferred for f/u attempt per protocol

## 2023-01-07 ENCOUNTER — HOSPITAL ENCOUNTER (OUTPATIENT)
Facility: HOSPITAL | Age: 58
Setting detail: OBSERVATION
Discharge: HOME | End: 2023-01-09
Attending: EMERGENCY MEDICINE | Admitting: INTERNAL MEDICINE

## 2023-01-07 ENCOUNTER — APPOINTMENT (EMERGENCY)
Dept: RADIOLOGY | Facility: HOSPITAL | Age: 58
End: 2023-01-07

## 2023-01-07 DIAGNOSIS — S32.010A CLOSED COMPRESSION FRACTURE OF BODY OF L1 VERTEBRA (HCC): ICD-10-CM

## 2023-01-07 DIAGNOSIS — S32.010A COMPRESSION FRACTURE OF L1 VERTEBRA (HCC): Primary | ICD-10-CM

## 2023-01-07 DIAGNOSIS — S22.000A COMPRESSION FRACTURE OF BODY OF THORACIC VERTEBRA (HCC): ICD-10-CM

## 2023-01-07 DIAGNOSIS — M54.9 BACK PAIN: ICD-10-CM

## 2023-01-07 PROBLEM — M32.13 SYSTEMIC LUPUS ERYTHEMATOSUS WITH LUNG INVOLVEMENT (HCC): Status: ACTIVE | Noted: 2022-12-10

## 2023-01-07 LAB
ALBUMIN SERPL BCP-MCNC: 3.2 G/DL (ref 3.5–5)
ALP SERPL-CCNC: 114 U/L (ref 46–116)
ALT SERPL W P-5'-P-CCNC: 18 U/L (ref 12–78)
ANION GAP SERPL CALCULATED.3IONS-SCNC: 8 MMOL/L (ref 4–13)
AST SERPL W P-5'-P-CCNC: 13 U/L (ref 5–45)
BASOPHILS # BLD AUTO: 0.03 THOUSANDS/ÂΜL (ref 0–0.1)
BASOPHILS NFR BLD AUTO: 0 % (ref 0–1)
BILIRUB SERPL-MCNC: 0.31 MG/DL (ref 0.2–1)
BUN SERPL-MCNC: 14 MG/DL (ref 5–25)
CALCIUM ALBUM COR SERPL-MCNC: 9.4 MG/DL (ref 8.3–10.1)
CALCIUM SERPL-MCNC: 8.8 MG/DL (ref 8.3–10.1)
CHLORIDE SERPL-SCNC: 105 MMOL/L (ref 96–108)
CO2 SERPL-SCNC: 30 MMOL/L (ref 21–32)
CREAT SERPL-MCNC: 0.86 MG/DL (ref 0.6–1.3)
EOSINOPHIL # BLD AUTO: 0.31 THOUSAND/ÂΜL (ref 0–0.61)
EOSINOPHIL NFR BLD AUTO: 4 % (ref 0–6)
ERYTHROCYTE [DISTWIDTH] IN BLOOD BY AUTOMATED COUNT: 14.7 % (ref 11.6–15.1)
GFR SERPL CREATININE-BSD FRML MDRD: 75 ML/MIN/1.73SQ M
GLUCOSE SERPL-MCNC: 144 MG/DL (ref 65–140)
HCT VFR BLD AUTO: 37.1 % (ref 34.8–46.1)
HGB BLD-MCNC: 11.7 G/DL (ref 11.5–15.4)
IMM GRANULOCYTES # BLD AUTO: 0.09 THOUSAND/UL (ref 0–0.2)
IMM GRANULOCYTES NFR BLD AUTO: 1 % (ref 0–2)
LYMPHOCYTES # BLD AUTO: 1.23 THOUSANDS/ÂΜL (ref 0.6–4.47)
LYMPHOCYTES NFR BLD AUTO: 15 % (ref 14–44)
MCH RBC QN AUTO: 28.8 PG (ref 26.8–34.3)
MCHC RBC AUTO-ENTMCNC: 31.5 G/DL (ref 31.4–37.4)
MCV RBC AUTO: 91 FL (ref 82–98)
MONOCYTES # BLD AUTO: 0.65 THOUSAND/ÂΜL (ref 0.17–1.22)
MONOCYTES NFR BLD AUTO: 8 % (ref 4–12)
NEUTROPHILS # BLD AUTO: 5.96 THOUSANDS/ÂΜL (ref 1.85–7.62)
NEUTS SEG NFR BLD AUTO: 72 % (ref 43–75)
NRBC BLD AUTO-RTO: 0 /100 WBCS
PLATELET # BLD AUTO: 259 THOUSANDS/UL (ref 149–390)
PMV BLD AUTO: 8.9 FL (ref 8.9–12.7)
POTASSIUM SERPL-SCNC: 3.3 MMOL/L (ref 3.5–5.3)
PROT SERPL-MCNC: 7.1 G/DL (ref 6.4–8.4)
RBC # BLD AUTO: 4.06 MILLION/UL (ref 3.81–5.12)
SODIUM SERPL-SCNC: 143 MMOL/L (ref 135–147)
WBC # BLD AUTO: 8.27 THOUSAND/UL (ref 4.31–10.16)

## 2023-01-07 RX ORDER — POTASSIUM CHLORIDE 20 MEQ/1
20 TABLET, EXTENDED RELEASE ORAL ONCE
Status: COMPLETED | OUTPATIENT
Start: 2023-01-08 | End: 2023-01-08

## 2023-01-07 RX ORDER — ACETAMINOPHEN 325 MG/1
975 TABLET ORAL EVERY 6 HOURS PRN
Status: DISCONTINUED | OUTPATIENT
Start: 2023-01-07 | End: 2023-01-09 | Stop reason: HOSPADM

## 2023-01-07 RX ORDER — DIPHENHYDRAMINE HYDROCHLORIDE 50 MG/ML
50 INJECTION INTRAMUSCULAR; INTRAVENOUS ONCE
Status: COMPLETED | OUTPATIENT
Start: 2023-01-07 | End: 2023-01-07

## 2023-01-07 RX ORDER — KETOROLAC TROMETHAMINE 30 MG/ML
15 INJECTION, SOLUTION INTRAMUSCULAR; INTRAVENOUS EVERY 6 HOURS PRN
Status: DISCONTINUED | OUTPATIENT
Start: 2023-01-07 | End: 2023-01-08

## 2023-01-07 RX ORDER — FENTANYL CITRATE 50 UG/ML
100 INJECTION, SOLUTION INTRAMUSCULAR; INTRAVENOUS ONCE
Status: COMPLETED | OUTPATIENT
Start: 2023-01-07 | End: 2023-01-07

## 2023-01-07 RX ORDER — LIDOCAINE 50 MG/G
1 PATCH TOPICAL DAILY PRN
Status: DISCONTINUED | OUTPATIENT
Start: 2023-01-07 | End: 2023-01-09 | Stop reason: HOSPADM

## 2023-01-07 RX ORDER — ONDANSETRON 2 MG/ML
4 INJECTION INTRAMUSCULAR; INTRAVENOUS ONCE
Status: COMPLETED | OUTPATIENT
Start: 2023-01-07 | End: 2023-01-07

## 2023-01-07 RX ORDER — METHOCARBAMOL 500 MG/1
500 TABLET, FILM COATED ORAL EVERY 8 HOURS PRN
Status: DISCONTINUED | OUTPATIENT
Start: 2023-01-07 | End: 2023-01-09 | Stop reason: HOSPADM

## 2023-01-07 RX ADMIN — ONDANSETRON 4 MG: 2 INJECTION INTRAMUSCULAR; INTRAVENOUS at 21:35

## 2023-01-07 RX ADMIN — FENTANYL CITRATE 100 MCG: 50 INJECTION, SOLUTION INTRAMUSCULAR; INTRAVENOUS at 21:34

## 2023-01-07 RX ADMIN — DIPHENHYDRAMINE HYDROCHLORIDE 50 MG: 50 INJECTION, SOLUTION INTRAMUSCULAR; INTRAVENOUS at 21:35

## 2023-01-08 PROBLEM — R26.2 AMBULATORY DYSFUNCTION: Status: ACTIVE | Noted: 2023-01-08

## 2023-01-08 LAB
ATRIAL RATE: 85 BPM
FLUAV RNA RESP QL NAA+PROBE: NEGATIVE
FLUBV RNA RESP QL NAA+PROBE: NEGATIVE
P AXIS: 80 DEGREES
PR INTERVAL: 148 MS
QRS AXIS: 27 DEGREES
QRSD INTERVAL: 88 MS
QT INTERVAL: 364 MS
QTC INTERVAL: 433 MS
RSV RNA RESP QL NAA+PROBE: NEGATIVE
SARS-COV-2 RNA RESP QL NAA+PROBE: NEGATIVE
T WAVE AXIS: 39 DEGREES
VENTRICULAR RATE: 85 BPM

## 2023-01-08 RX ORDER — HEPARIN SODIUM 5000 [USP'U]/ML
5000 INJECTION, SOLUTION INTRAVENOUS; SUBCUTANEOUS EVERY 8 HOURS SCHEDULED
Status: DISCONTINUED | OUTPATIENT
Start: 2023-01-08 | End: 2023-01-09 | Stop reason: HOSPADM

## 2023-01-08 RX ORDER — ONDANSETRON 2 MG/ML
4 INJECTION INTRAMUSCULAR; INTRAVENOUS EVERY 6 HOURS PRN
Status: DISCONTINUED | OUTPATIENT
Start: 2023-01-08 | End: 2023-01-09 | Stop reason: HOSPADM

## 2023-01-08 RX ORDER — KETOROLAC TROMETHAMINE 30 MG/ML
15 INJECTION, SOLUTION INTRAMUSCULAR; INTRAVENOUS EVERY 6 HOURS PRN
Status: DISCONTINUED | OUTPATIENT
Start: 2023-01-08 | End: 2023-01-09 | Stop reason: HOSPADM

## 2023-01-08 RX ORDER — NICOTINE 21 MG/24HR
1 PATCH, TRANSDERMAL 24 HOURS TRANSDERMAL DAILY
Status: DISCONTINUED | OUTPATIENT
Start: 2023-01-08 | End: 2023-01-09 | Stop reason: HOSPADM

## 2023-01-08 RX ORDER — ALBUTEROL SULFATE 90 UG/1
2 AEROSOL, METERED RESPIRATORY (INHALATION) EVERY 4 HOURS PRN
Status: DISCONTINUED | OUTPATIENT
Start: 2023-01-08 | End: 2023-01-09 | Stop reason: HOSPADM

## 2023-01-08 RX ORDER — BENZONATATE 100 MG/1
100 CAPSULE ORAL 3 TIMES DAILY PRN
Status: DISCONTINUED | OUTPATIENT
Start: 2023-01-08 | End: 2023-01-09 | Stop reason: HOSPADM

## 2023-01-08 RX ORDER — OXYCODONE HYDROCHLORIDE 5 MG/1
5 TABLET ORAL EVERY 4 HOURS PRN
Status: DISCONTINUED | OUTPATIENT
Start: 2023-01-08 | End: 2023-01-09 | Stop reason: HOSPADM

## 2023-01-08 RX ORDER — PANTOPRAZOLE SODIUM 40 MG/1
40 TABLET, DELAYED RELEASE ORAL DAILY
Status: DISCONTINUED | OUTPATIENT
Start: 2023-01-08 | End: 2023-01-09 | Stop reason: HOSPADM

## 2023-01-08 RX ORDER — MAGNESIUM HYDROXIDE/ALUMINUM HYDROXICE/SIMETHICONE 120; 1200; 1200 MG/30ML; MG/30ML; MG/30ML
30 SUSPENSION ORAL EVERY 6 HOURS PRN
Status: DISCONTINUED | OUTPATIENT
Start: 2023-01-08 | End: 2023-01-09 | Stop reason: HOSPADM

## 2023-01-08 RX ORDER — GUAIFENESIN 600 MG/1
600 TABLET, EXTENDED RELEASE ORAL 2 TIMES DAILY
Status: DISCONTINUED | OUTPATIENT
Start: 2023-01-08 | End: 2023-01-09 | Stop reason: HOSPADM

## 2023-01-08 RX ORDER — ENOXAPARIN SODIUM 100 MG/ML
40 INJECTION SUBCUTANEOUS DAILY
Status: DISCONTINUED | OUTPATIENT
Start: 2023-01-08 | End: 2023-01-08

## 2023-01-08 RX ORDER — FLUTICASONE FUROATE AND VILANTEROL 100; 25 UG/1; UG/1
1 POWDER RESPIRATORY (INHALATION) DAILY
Status: DISCONTINUED | OUTPATIENT
Start: 2023-01-08 | End: 2023-01-09 | Stop reason: HOSPADM

## 2023-01-08 RX ORDER — OXYCODONE HYDROCHLORIDE 5 MG/1
5 TABLET ORAL EVERY 4 HOURS PRN
Status: DISCONTINUED | OUTPATIENT
Start: 2023-01-08 | End: 2023-01-08

## 2023-01-08 RX ORDER — POTASSIUM CHLORIDE 20 MEQ/1
40 TABLET, EXTENDED RELEASE ORAL ONCE
Status: COMPLETED | OUTPATIENT
Start: 2023-01-08 | End: 2023-01-08

## 2023-01-08 RX ORDER — IPRATROPIUM BROMIDE AND ALBUTEROL SULFATE 2.5; .5 MG/3ML; MG/3ML
3 SOLUTION RESPIRATORY (INHALATION) EVERY 6 HOURS SCHEDULED
Status: DISCONTINUED | OUTPATIENT
Start: 2023-01-08 | End: 2023-01-09

## 2023-01-08 RX ADMIN — GUAIFENESIN 600 MG: 600 TABLET ORAL at 17:24

## 2023-01-08 RX ADMIN — ENOXAPARIN SODIUM 40 MG: 40 INJECTION SUBCUTANEOUS at 08:19

## 2023-01-08 RX ADMIN — POTASSIUM CHLORIDE 20 MEQ: 1500 TABLET, EXTENDED RELEASE ORAL at 00:13

## 2023-01-08 RX ADMIN — IPRATROPIUM BROMIDE AND ALBUTEROL SULFATE 3 ML: 2.5; .5 SOLUTION RESPIRATORY (INHALATION) at 05:04

## 2023-01-08 RX ADMIN — PANTOPRAZOLE SODIUM 40 MG: 40 TABLET, DELAYED RELEASE ORAL at 08:19

## 2023-01-08 RX ADMIN — ACETAMINOPHEN 650 MG: 325 TABLET, FILM COATED ORAL at 00:12

## 2023-01-08 RX ADMIN — METHOCARBAMOL 500 MG: 500 TABLET ORAL at 00:13

## 2023-01-08 RX ADMIN — OXYCODONE HYDROCHLORIDE 5 MG: 5 TABLET ORAL at 13:29

## 2023-01-08 RX ADMIN — POTASSIUM CHLORIDE 40 MEQ: 1500 TABLET, EXTENDED RELEASE ORAL at 09:26

## 2023-01-08 RX ADMIN — BENZONATATE 100 MG: 100 CAPSULE ORAL at 05:34

## 2023-01-08 RX ADMIN — MORPHINE SULFATE 2 MG: 2 INJECTION, SOLUTION INTRAMUSCULAR; INTRAVENOUS at 12:00

## 2023-01-08 RX ADMIN — LIDOCAINE 1 PATCH: 50 PATCH TOPICAL at 13:22

## 2023-01-08 RX ADMIN — METHOCARBAMOL 500 MG: 500 TABLET ORAL at 17:24

## 2023-01-08 RX ADMIN — METHOCARBAMOL 500 MG: 500 TABLET ORAL at 08:20

## 2023-01-08 RX ADMIN — ONDANSETRON 4 MG: 2 INJECTION INTRAMUSCULAR; INTRAVENOUS at 23:44

## 2023-01-08 RX ADMIN — KETOROLAC TROMETHAMINE 15 MG: 30 INJECTION, SOLUTION INTRAMUSCULAR at 00:13

## 2023-01-08 RX ADMIN — BENZONATATE 100 MG: 100 CAPSULE ORAL at 08:19

## 2023-01-08 RX ADMIN — OXYCODONE HYDROCHLORIDE 5 MG: 5 TABLET ORAL at 04:59

## 2023-01-08 RX ADMIN — OXYCODONE HYDROCHLORIDE 5 MG: 5 TABLET ORAL at 09:31

## 2023-01-08 RX ADMIN — MORPHINE SULFATE 2 MG: 2 INJECTION, SOLUTION INTRAMUSCULAR; INTRAVENOUS at 23:14

## 2023-01-08 RX ADMIN — GUAIFENESIN 600 MG: 600 TABLET ORAL at 08:20

## 2023-01-08 RX ADMIN — KETOROLAC TROMETHAMINE 15 MG: 30 INJECTION, SOLUTION INTRAMUSCULAR; INTRAVENOUS at 15:49

## 2023-01-08 RX ADMIN — KETOROLAC TROMETHAMINE 15 MG: 30 INJECTION, SOLUTION INTRAMUSCULAR at 08:33

## 2023-01-08 RX ADMIN — MORPHINE SULFATE 2 MG: 2 INJECTION, SOLUTION INTRAMUSCULAR; INTRAVENOUS at 18:41

## 2023-01-08 RX ADMIN — OXYCODONE HYDROCHLORIDE 5 MG: 5 TABLET ORAL at 21:33

## 2023-01-08 RX ADMIN — FLUTICASONE FUROATE AND VILANTEROL TRIFENATATE 1 PUFF: 100; 25 POWDER RESPIRATORY (INHALATION) at 09:26

## 2023-01-08 RX ADMIN — OXYCODONE HYDROCHLORIDE 5 MG: 5 TABLET ORAL at 17:24

## 2023-01-08 RX ADMIN — NICOTINE 1 PATCH: 14 PATCH, EXTENDED RELEASE TRANSDERMAL at 08:20

## 2023-01-08 RX ADMIN — LIDOCAINE 1 PATCH: 50 PATCH TOPICAL at 00:12

## 2023-01-08 NOTE — PHYSICAL THERAPY NOTE
Physical Therapy Evaluation     Patient's Name: Corina Turpin    Admitting Diagnosis  SOB (shortness of breath) [R06 02]  Pain [R52]    Problem List  Patient Active Problem List   Diagnosis    Chest pain, unspecified    Chronic obstructive pulmonary disease with acute exacerbation (HCC)    Smoker    Hypokalemia    Respiratory failure with hypoxia (HCC)    Compression fracture of L1 vertebra (HCC)    COPD (chronic obstructive pulmonary disease) ([facility] )    Sepsis ([facility] )    Fall    Systemic lupus erythematosus with lung involvement ([facility] )    Ambulatory dysfunction       Past Medical History  Past Medical History:   Diagnosis Date    Achalasia     Back pain     Fibromyalgia     Lupus ([facility] [address] )        Past Surgical History  Past Surgical History:   Procedure Laterality Date    HYSTERECTOMY      NECK SURGERY          01/08/23 1119   PT Last Visit   PT Visit Date 01/08/23   Note Type   Note type Evaluation; Cancelled Session   Cancel Reasons Medical status   Additional Comments Chart review performed  At this time, PT evaluation cancelled secondary to lumbar xray reporting "Progression of a compression fracture involving L1 with approximately 40% loss of vertebral body height, progressed from the recent CT of 1/1/2023 "  Await further clarification for bracing needs, Dr Loreto Isaac notified  PT will follow and provide PT interventions as appropriate           Giacomo Hodge, PT

## 2023-01-08 NOTE — ED NOTES
Pt states she is incontinent of small amount of urine from coughing  Refusing to be changed until she receives pain medications        Varinder Rodriguez RN  01/07/23 6076

## 2023-01-08 NOTE — CONSULTS
Orthopedics   Jersey Navarro 62 y o  female MRN: [id number]  Unit/Bed#: ED 16-01      Chief Complaint:   Back Pain    HPI:   62 y  o female ambulator complaining of Lumbar back pain  Patient has a history for COPD  She states that she started to have back pain about 1 week ago  She states that she was helping a coworker left somebody when she felt a pop in her back and had sudden pain that went from her lower back and radiated down both of her legs  She was seen in the emergency room on 1/1/2023 and was diagnosed with an L1 compression fracture  She reports taking pain medication while at home which does not seem to help  She states that she has tried taking some Motrin [employer]i-Gels with some relief of her symptoms  She states that she has been able to walk with some discomfort while doing so  She denies any chest pain, shortness of breath, nausea, vomiting, diarrhea, lightheadedness or dizziness  Denies any urinary or bowel incontinence  She states that she occasionally gets numbness and tingling down her legs which she states prior to the incident she had a history of sciatica  Review Of Systems:   · Skin: Normal  · Neuro: See HPI  · Musculoskeletal: See HPI  · 14 point review of systems negative except as stated above     Past Medical History:   Past Medical History:   Diagnosis Date   • Achalasia    • Back pain    • Fibromyalgia    • Lupus ([facility] [address] )        Past Surgical History:   Past Surgical History:   Procedure Laterality Date   • HYSTERECTOMY     • NECK SURGERY         Family History:  Family history reviewed and non-contributory  History reviewed  No pertinent family history      Social History:  Social History     Socioeconomic History   • Marital status: /Civil Union     Spouse name: None   • Number of children: None   • Years of education: None   • Highest education level: None   Occupational History   • None   Tobacco Use   • Smoking status: Every Day     Packs/day: 0 50     Types: Cigarettes   • Smokeless tobacco: Never   Vaping Use   • Vaping Use: Never used   Substance and Sexual Activity   • Alcohol use: Never   • Drug use: Never   • Sexual activity: Yes   Other Topics Concern   • None   Social History Narrative   • None     Social Determinants of Health     Financial Resource Strain: Not on file   Food Insecurity: Not on file   Transportation Needs: Not on file   Physical Activity: Not on file   Stress: Not on file   Social Connections: Not on file   Intimate Partner Violence: Not on file   Housing Stability: Not on file       Allergies:    Allergies   Allergen Reactions   • Valium [Diazepam] Anaphylaxis           Labs:  0   Lab Value Date/Time    HCT 37 1 01/07/2023 2128    HCT 39 0 01/01/2023 2246    HCT 34 6 (L) 10/07/2022 0515    HGB 11 7 01/07/2023 2128    HGB 12 4 01/01/2023 2246    HGB 10 8 (L) 10/07/2022 0515    INR 1 04 09/11/2022 0950    WBC 8 27 01/07/2023 2128    WBC 13 68 (H) 01/01/2023 2246    WBC 17 74 (H) 10/07/2022 0515    ESR 45 (H) 06/27/2019 2247    CRP 81 3 (H) 06/27/2019 2247       Meds:    Current Facility-Administered Medications:   •  acetaminophen (TYLENOL) tablet 975 mg, 975 mg, Oral, Q6H PRN, Megan Madison PA-C, 650 mg at 01/08/23 0012  •  albuterol (PROVENTIL HFA,VENTOLIN HFA) inhaler 2 puff, 2 puff, Inhalation, Q4H PRN, Megan Madison PA-C  •  aluminum-magnesium hydroxide-simethicone (MYLANTA) oral suspension 30 mL, 30 mL, Oral, Q6H PRN, Megan Madison PA-C  •  benzonatate (TESSALON PERLES) capsule 100 mg, 100 mg, Oral, TID PRN, Megan Madison PA-C, 100 mg at 01/08/23 3623  •  Fluticasone Furoate-Vilanterol 100-25 mcg/actuation 1 puff, 1 puff, Inhalation, Daily, Gay Plata PA-C, 1 puff at 01/08/23 0926  •  guaiFENesin (MUCINEX) 12 hr tablet 600 mg, 600 mg, Oral, BID, Gay Plata PA-C, 600 mg at 01/08/23 0820  •  heparin (porcine) subcutaneous injection 5,000 Units, 5,000 Units, Subcutaneous, Q8H [facility], Lisbeth Shi MD  • ipratropium-albuterol (DUO-NEB) 0 5-2 5 mg/3 mL inhalation solution 3 mL, 3 mL, Nebulization, Q6H [facility], Gay Plata PA-C, 3 mL at 01/08/23 0504  •  ketorolac (TORADOL) injection 15 mg, 15 mg, Intravenous, Q6H PRN, Rut Dorsey MD  •  lidocaine (LIDODERM) 5 % patch 1 patch, 1 patch, Topical, Daily PRN, Che Lyndon, PA-C, 1 patch at 01/08/23 1322  •  methocarbamol (ROBAXIN) tablet 500 mg, 500 mg, Oral, Q8H PRN, Che Haney, PA-C, 500 mg at 01/08/23 0820  •  morphine injection 2 mg, 2 mg, Intravenous, Q4H PRN, Rut Dorsey MD, 2 mg at 01/08/23 1200  •  nicotine (NICODERM CQ) 14 mg/24hr TD 24 hr patch 1 patch, 1 patch, Transdermal, Daily, Che Haney, PA-C, 1 patch at 01/08/23 0820  •  ondansetron (ZOFRAN) injection 4 mg, 4 mg, Intravenous, Q6H PRN, Che Haney, PA-C  •  oxyCODONE (ROXICODONE) IR tablet 5 mg, 5 mg, Oral, Q4H PRN, Rut Dorsey MD, 5 mg at 01/08/23 1329  •  pantoprazole (PROTONIX) EC tablet 40 mg, 40 mg, Oral, Daily, Che Lyndon, PA-C, 40 mg at 01/08/23 8403    Current Outpatient Medications:   •  albuterol (PROVENTIL HFA,VENTOLIN HFA) 90 mcg/act inhaler, Inhale 2 puffs every 4 (four) hours as needed for wheezing, Disp: 18 g, Rfl: 0  •  benzonatate (TESSALON PERLES) 100 mg capsule, Take 1 capsule (100 mg total) by mouth 3 (three) times a day as needed for cough, Disp: 20 capsule, Rfl: 0  •  cyclobenzaprine (FLEXERIL) 10 mg tablet, Take 1 tablet (10 mg total) by mouth 3 (three) times a day as needed for muscle spasms, Disp: 15 tablet, Rfl: 0  •  fluticasone-vilanterol (BREO ELLIPTA) 100-25 mcg/inh inhaler, Inhale 1 puff daily Rinse mouth after use   Do not start before October 8, 2022 , Disp: 60 blister, Rfl: 0  •  guaiFENesin (MUCINEX) 600 mg 12 hr tablet, Take 1 tablet (600 mg total) by mouth 2 (two) times a day, Disp: 20 tablet, Rfl: 0  •  ibuprofen (MOTRIN) 800 mg tablet, Take 1 tablet (800 mg total) by mouth 3 (three) times a day, Disp: 21 tablet, Rfl: 0  • ipratropium-albuterol (DUO-NEB) 0 5-2 5 mg/3 mL nebulizer solution, Take 3 mL by nebulization 4 (four) times a day, Disp: 120 mL, Rfl: 0  •  methocarbamol (ROBAXIN) 500 mg tablet, Take 1 tablet (500 mg total) by mouth 2 (two) times a day, Disp: 20 tablet, Rfl: 0  •  methocarbamol (ROBAXIN) 500 mg tablet, Take 1 tablet (500 mg total) by mouth 2 (two) times a day, Disp: 20 tablet, Rfl: 0  •  morphine (MSIR) 15 mg tablet, Take 0 5 tablets (7 5 mg total) by mouth every 4 (four) hours as needed for severe pain for up to 10 doses Max Daily Amount: 45 mg, Disp: 5 tablet, Rfl: 0  •  nicotine (NICODERM CQ) 14 mg/24hr TD 24 hr patch, Place 1 patch on the skin daily Do not start before October 8, 2022 , Disp: 28 patch, Rfl: 0  •  pantoprazole (PROTONIX) 40 mg tablet, Take 1 tablet (40 mg total) by mouth daily, Disp: 30 tablet, Rfl: 0  •  predniSONE 10 mg tablet, Take 4 tablets (40 mg total) by mouth daily for 2 days, THEN 3 tablets (30 mg total) daily for 3 days, THEN 2 tablets (20 mg total) daily for 3 days, THEN 1 tablet (10 mg total) daily for 3 days  , Disp: 26 tablet, Rfl: 0    Blood Culture:   Lab Results   Component Value Date    BLOODCX No Growth After 5 Days  09/11/2022    BLOODCX No Growth After 5 Days  09/11/2022       Wound Culture:   No results found for: WOUNDCULT    Ins and Outs:  No intake/output data recorded            Physical Exam:   /52   Pulse 59   Temp 98 3 °F (36 8 °C)   Resp 16   Ht 5' 1" (1 549 m)   Wt 83 9 kg (185 lb)   SpO2 93%   BMI 34 96 kg/m²   Gen: No acute distress, resting comfortably in bed  HEENT: Eyes clear, moist mucus membranes, hearing intact  Respiratory: No audible wheezing or stridor  Cardiovascular: Well Perfused peripherally, 2+ distal pulse  Abdomen: nondistended, no peritoneal signs  Musculoskeletal: BACK  · Skin intact, no open lesions  · Tenderness to palpation over Lumbar spine  · 5/5 strength with hip flexion/extension/abduction, knee flexion/extension, ankle dorsi/plantar flexion, EHL/FHL bilateral lower extremities  · Sensation intact L2-S1 bilateral lower extremities  · negative straight leg raise  · 2+ deep tendon reflexes noted at patella tendon, achilles tendon bilateral lower extremities    Radiology:   I personally reviewed the films  X-rays  Lumbar spine shows compression fracture L1 progression of compression since CT scan 1/1/2023    CT scan of the chest abdomen pelvis from 1/1/2023 demonstrated L1 compression fracture    _*_*_*_*_*_*_*_*_*_*_*_*_*_*_*_*_*_*_*_*_*_*_*_*_*_*_*_*_*_*_*_*_*_*_*_*_*_*_*_*_*    Assessment:  62 y  o female complaining of  Lumbar back pain with L1 compression fracture    Plan:   · Weight-bear as tolerated bilateral lower extremity  · Order for TLSO brace was placed  · PT/OT for ambulation training  · Analgesics as per primary team  · DVT prophylaxis as per primary team  · Body mass index is 34 96 kg/m²  moderately obese  Recommend behavior modifications, nutrition and physical activity  · Dispo: Merline Roller for discharge from ortho perspective   · CT scan and x-ray were reviewed with the patient  It was discussed has been slight progression of her compression of the anterior column of the bar spine at L1  It was discussed that order a TLSO brace to be worn  She can use analgesics as needed for pain control  She can follow-up with Dr Camila Akhtar as an outpatient for reevaluation  No further orthopedic intervention is needed at this time  If there are any questions, please reach out  Ricarda Bennett PA-C

## 2023-01-08 NOTE — ASSESSMENT & PLAN NOTE
· Diagnosed 1/1 with L1 compression fracture after lifting coworker at work to help them and hearing a "pop in my back "  · Reports no relief of pain despite receiving p o  morphine and Robaxin from ED on 1/1, returning and per ED recommendation for admission due to failed pain control  · PT/OT eval  · Plan for outpatient follow-up with orthopedic/comprehensive spine team  · As needed aqua K pad, lidocaine patch, pain medications

## 2023-01-08 NOTE — ED NOTES
Pt given PRN medications for pain  Became aggravated when initial pain medications didn't include strong pain meds  Demanded to see the admitting Doctor  Pt had been made aware that stronger medications would be added by provider after she had just evaluated her  Charge nurse made aware of patient behavior due to pt screaming out        Juanita Posada RN  01/08/23 0020

## 2023-01-08 NOTE — ED NOTES
Pt again on call bell requesting pain medications, provider made aware  Pt made aware that admitting provider will be here shortly        Fabiano Lund RN  01/07/23 9191

## 2023-01-08 NOTE — ASSESSMENT & PLAN NOTE
· Secondary to L1 compression fracture  · Able to ambulate, but difficult due to pain  · PT/OT eval  · See plan under compression fracture of L1 vertebrae

## 2023-01-08 NOTE — H&P
[address] [facility]  [facility]&P- [facility]  1965, 62 y o  female MRN: [id number]  Unit/Bed#: ED 16 Encounter: [id number]  Primary Care Provider: Gurvinder Bentley DO   Date and time admitted to hospital: 1/7/2023  8:41 PM    * Compression fracture of L1 vertebra (HCC)  Assessment & Plan  · Diagnosed 1/1 with L1 compression fracture after lifting coworker at work to help them and hearing a "pop in my back "  · Reports no relief of pain despite receiving p o  morphine and Robaxin from ED on 1/1, returning and per ED recommendation for admission due to failed pain control  · PT/OT eval  · Plan for outpatient follow-up with orthopedic/comprehensive spine team  · As needed aqua K pad, lidocaine patch, pain medications    Ambulatory dysfunction  Assessment & Plan  · Secondary to L1 compression fracture  · Able to ambulate, but difficult due to pain  · PT/OT eval  · See plan under compression fracture of L1 vertebrae    COPD (chronic obstructive pulmonary disease) ([facility] )  Assessment & Plan  · Currently does not appear to be in exacerbation  · Continue home DuoNeb treatments 4 times daily, Breo inhaler daily      VTE Pharmacologic Prophylaxis: VTE Score: 3 Moderate Risk (Score 3-4) - Pharmacological DVT Prophylaxis Ordered: enoxaparin (Lovenox)  Code Status: Level 1 - Full Code per pt  Discussion with family: pt  Anticipated Length of Stay: Patient will be admitted on an observation basis with an anticipated length of stay of less than 2 midnights secondary to see above  Total Time for Visit, including Counseling / Coordination of Care: 60 minutes Greater than 50% of this total time spent on direct patient counseling and coordination of care  Chief Complaint:    Chief Complaint   Patient presents with   • Pain     Pt reports she's was told to come in if her back pain hasn't improved  Also cough and some chest pain  Was seen here Sunday          History of Present Illness:  [facility]  is a 62 y o  female with a PMH of COPD who presents with complaint of sudden onset constant severe back pain x1 week  Patient reports she had to help lift a coworker at work and at the time heard a pop in her back and felt pain from her lower back radiating down bilateral legs  Of note patient was in the ED 1/1 and diagnosed with L1 compression fracture  She reports pain medication at home was not helping and she continues to have constant severe aching in her lower back with pain a little bit worse on the left side versus the right side  Reports she has been able to ambulate, but due to pain it can be difficult at times  Denies abdominal pain, chest pain, worsening shortness of breath from baseline  Does admit to increase in productive cough, but no worse respiratory distress  Review of Systems:  Review of Systems   Constitutional: Negative for activity change  Respiratory: Positive for cough  Negative for shortness of breath  Cardiovascular: Negative for chest pain  Gastrointestinal: Negative for abdominal pain  Musculoskeletal: Positive for back pain  Neurological: Negative for numbness  Past Medical and Surgical History:   Past Medical History:   Diagnosis Date   • Achalasia    • Back pain    • Fibromyalgia    • Lupus ([facility] [address] )        Past Surgical History:   Procedure Laterality Date   • HYSTERECTOMY     • NECK SURGERY         Meds/Allergies:  Prior to Admission medications    Medication Sig Start Date End Date Taking?  Authorizing Provider   albuterol (PROVENTIL HFA,VENTOLIN HFA) 90 mcg/act inhaler Inhale 2 puffs every 4 (four) hours as needed for wheezing 10/8/22   Irene Fabian MD   benzonatate (TESSALON PERLES) 100 mg capsule Take 1 capsule (100 mg total) by mouth 3 (three) times a day as needed for cough 10/7/22   Irene Fabian MD   cyclobenzaprine (FLEXERIL) 10 mg tablet Take 1 tablet (10 mg total) by mouth 3 (three) times a day as needed for muscle spasms 10/7/22   Ehsan Newberry Tyler Preciado MD   fluticasone-vilanterol (BREO ELLIPTA) 100-25 mcg/inh inhaler Inhale 1 puff daily Rinse mouth after use  Do not start before October 8, 2022  10/8/22   Kandi Friedman MD   guaiFENesin (MUCINEX) 600 mg 12 hr tablet Take 1 tablet (600 mg total) by mouth 2 (two) times a day 10/7/22   Kandi Friedman MD   ibuprofen (MOTRIN) 800 mg tablet Take 1 tablet (800 mg total) by mouth 3 (three) times a day 1/2/23   Mathieu Gagnon MD   ipratropium-albuterol (DUO-NEB) 0 5-2 5 mg/3 mL nebulizer solution Take 3 mL by nebulization 4 (four) times a day 10/7/22   Kandi Friedman MD   methocarbamol (ROBAXIN) 500 mg tablet Take 1 tablet (500 mg total) by mouth 2 (two) times a day 1/2/23   Mathieu Gagnon MD   methocarbamol (ROBAXIN) 500 mg tablet Take 1 tablet (500 mg total) by mouth 2 (two) times a day 1/2/23   Mathieu Gagnon MD   morphine (MSIR) 15 mg tablet Take 0 5 tablets (7 5 mg total) by mouth every 4 (four) hours as needed for severe pain for up to 10 doses Max Daily Amount: 45 mg 1/2/23   Mathieu Gangon MD   nicotine (NICODERM CQ) 14 mg/24hr TD 24 hr patch Place 1 patch on the skin daily Do not start before October 8, 2022  10/8/22   Kandi Friedman MD   pantoprazole (PROTONIX) 40 mg tablet Take 1 tablet (40 mg total) by mouth daily 10/8/22   Mary Kate Howard MD   predniSONE 10 mg tablet Take 4 tablets (40 mg total) by mouth daily for 2 days, THEN 3 tablets (30 mg total) daily for 3 days, THEN 2 tablets (20 mg total) daily for 3 days, THEN 1 tablet (10 mg total) daily for 3 days  10/7/22 10/18/22  Kandi Friedman MD     I have reviewed her medications per review of chart  Allergies:    Allergies   Allergen Reactions   • Valium [Diazepam] Anaphylaxis       Social History:  Marital Status: /Civil Union     Patient Pre-hospital Living Situation: Home  Patient Pre-hospital Level of Mobility: walks  Patient Pre-hospital Diet Restrictions: n/a  Substance Use History:   Social History     Substance and Sexual Activity   Alcohol Use No     Social History     Tobacco Use   Smoking Status Every Day   • Packs/day: 0 50   • Types: Cigarettes   Smokeless Tobacco Never     Social History     Substance and Sexual Activity   Drug Use No       Family History:  History reviewed  No pertinent family history  Physical Exam:     Vitals:   Blood Pressure: 137/61 (01/08/23 0000)  Pulse: 67 (01/08/23 0000)  Temperature: 98 3 °F (36 8 °C) (01/07/23 2041)  Respirations: 16 (01/08/23 0000)  SpO2: 99 % (01/08/23 0000)    Physical Exam  Vitals and nursing note reviewed  Constitutional:       General: She is not in acute distress  Appearance: She is not diaphoretic  HENT:      Head: Normocephalic  Cardiovascular:      Rate and Rhythm: Normal rate and regular rhythm  Heart sounds: Normal heart sounds  Pulmonary:      Effort: Pulmonary effort is normal  No respiratory distress  Breath sounds: Normal breath sounds  Abdominal:      General: Abdomen is flat  Bowel sounds are normal       Palpations: Abdomen is soft  Tenderness: There is no abdominal tenderness  Musculoskeletal:      Right lower leg: No edema  Left lower leg: No edema  Comments: Tenderness to palpation over mid thoracic spine and lumbar spine  No tenderness to palpation over paravertebral muscles or cervical spine  Tenderness palpation over posterior left hip, mostly over muscle  No tenderness to palpation over lower extremities  Full range of flexion and extension of left hip and knee   Skin:     General: Skin is warm and dry  Neurological:      General: No focal deficit present  Mental Status: She is alert and oriented to person, place, and time     Psychiatric:         Mood and Affect: Mood normal          Behavior: Behavior normal           Additional Data:     Lab Results:  Results from last 7 days   Lab Units 01/07/23  2128   WBC Thousand/uL 8 27   HEMOGLOBIN g/dL 11 7   HEMATOCRIT % 37 1   PLATELETS Thousands/uL 259 NEUTROS PCT % 72   LYMPHS PCT % 15   MONOS PCT % 8   EOS PCT % 4     Results from last 7 days   Lab Units 01/07/23  2128   SODIUM mmol/L 143   POTASSIUM mmol/L 3 3*   CHLORIDE mmol/L 105   CO2 mmol/L 30   BUN mg/dL 14   CREATININE mg/dL 0 86   ANION GAP mmol/L 8   CALCIUM mg/dL 8 8   ALBUMIN g/dL 3 2*   TOTAL BILIRUBIN mg/dL 0 31   ALK PHOS U/L 114   ALT U/L 18   AST U/L 13   GLUCOSE RANDOM mg/dL 144*                       Lines/Drains:  Invasive Devices     Peripheral Intravenous Line  Duration           Peripheral IV 01/07/23 Right;Ventral (anterior) Forearm <1 day                    Imaging: Personally reviewed the following imaging: abdominal/pelvic CT and xray(s)  XR spine lumbar 2 or 3 views injury    (Results Pending)       EKG and Other Studies Reviewed on Admission:   · EKG: Normal sinus rhythm  ** Please Note: This note has been constructed using a voice recognition system   **

## 2023-01-08 NOTE — UTILIZATION REVIEW
Initial Clinical Review    Admission: Date/Time/Statement:   Admission Orders (From admission, onward)     Ordered        01/07/23 2305  Place in Observation  Once                      Orders Placed This Encounter   Procedures   • Place in Observation     Standing Status:   Standing     Number of Occurrences:   1     Order Specific Question:   Level of Care     Answer:   Med Surg [16]     ED Arrival Information     Expected   -    Arrival   1/7/2023 20:34    Acuity   Urgent            Means of arrival   Walk-In    Escorted by   Spouse    Service   Hospitalist    Admission type   Emergency            Arrival complaint   sob & body pain           Chief Complaint   Patient presents with   • Pain     Pt reports she's was told to come in if her back pain hasn't improved  Also cough and some chest pain  Was seen here Sunday  Initial Presentation: 62 y o  female   To ER from home  H/o recent fall,  Sciatica, copd  ER visit  1/1 for back pain  (CT  Showed compression deformity at L1)   DC  home on ibuprofen, robaxin, MSIR prn  Returns to ER Today 1/7  For intractable Back pain  Lumbar Xray shows worsening compression  Admit  OBS  Status,  MS Level of care for  Pain management,  PT OT evaluations,  Ortho consult       Date:   1/08      Day 2: Pt states she is not getting pain relief - reviewed pain meds with pt & instructed pt that we will try to offer meds every 4 hours rather than pt having to ring for them    1/08  ORTHO:   slight progression of her compression of the anterior column of the bar spine at L1 - ordered  TLSO brace:  Cont prn analgesics  WBAT b/l LE  Body mass index is 34 96 kg/m²  moderately obese  Recommend behavior modifications, nutrition and physical activity          ED Triage Vitals   Temperature Pulse Respirations Blood Pressure SpO2   01/07/23 2041 01/07/23 2041 01/07/23 2041 01/07/23 2041 01/07/23 2041   98 3 °F (36 8 °C) 65 18 144/67 93 %      Temp src Heart Rate Source Patient Position - Orthostatic VS BP Location FiO2 (%)   -- 01/07/23 2100 01/07/23 2041 01/07/23 2041 --    Monitor Sitting Left arm       Pain Score       01/07/23 2134       10 - Worst Possible Pain          Wt Readings from Last 1 Encounters:   01/08/23 83 9 kg (185 lb)     Additional Vital Signs:    01/08/23 0729 -- -- -- --   01/08/23 0600 -- 59 -- 106/52   01/08/23 0545 -- 61 16 123/59   01/08/23 0500 -- 83 -- 126/70   01/08/23 0430 -- 61 -- 122/66   01/08/23 0330 -- 60 -- 127/60   01/08/23 0300 -- 65 16 116/61   01/08/23 0230 -- 63 18 126/67   01/08/23 0200 -- 67 -- 129/64   01/08/23 0130 -- 68 -- 124/69   01/08/23 0100 -- 70 17 123/66   01/08/23 0000 -- 67 16 137/61   01/07/23 2330 -- 62 -- 126/60   01/07/23 2300 -- 69 16 108/57   01/07/23 2230 -- 71 18 115/57   01/07/23 2215 -- 76 -- 125/58   01/07/23 2100 -- 78 17 139/65       Pertinent Labs/Diagnostic Test Results:     1/1  CTAP:     compression deformity of the L1 vertebral body        XR spine lumbar 2 or 3 views injury   Final Result by Mario Mills MD (01/08 0900)   Progression of a compression fracture involving L1 with approximately 40%    loss of vertebral body height, progressed from the recent CT of 1/1/2023 1/7  EKG :  NSR     Results from last 7 days   Lab Units 01/07/23 2327   SARS-COV-2  Negative     Results from last 7 days   Lab Units 01/07/23 2128 01/01/23  2246   WBC Thousand/uL 8 27 13 68*   HEMOGLOBIN g/dL 11 7 12 4   HEMATOCRIT % 37 1 39 0   PLATELETS Thousands/uL 259 269   NEUTROS ABS Thousands/µL 5 96 10 92*         Results from last 7 days   Lab Units 01/07/23 2128 01/01/23  2246   SODIUM mmol/L 143 138   POTASSIUM mmol/L 3 3* 4 4   CHLORIDE mmol/L 105 100   CO2 mmol/L 30 32   ANION GAP mmol/L 8 6   BUN mg/dL 14 16   CREATININE mg/dL 0 86 0 73   EGFR ml/min/1 73sq m 75 91   CALCIUM mg/dL 8 8 8 7     Results from last 7 days   Lab Units 01/07/23 2128 01/01/23  2246   AST U/L 13 16   ALT U/L 18 20   ALK PHOS U/L 114 109 TOTAL PROTEIN g/dL 7 1 7 3   ALBUMIN g/dL 3 2* 3 4*   TOTAL BILIRUBIN mg/dL 0 31 0 62         Results from last 7 days   Lab Units 01/07/23 2128 01/01/23  2246   GLUCOSE RANDOM mg/dL 144* 108       Results from last 7 days   Lab Units 01/07/23  2327   INFLUENZA A PCR  Negative   INFLUENZA B PCR  Negative   RSV PCR  Negative       ED Treatment:   Medication Administration from 01/07/2023 2034 to 01/08/2023 1732    Date/Time Order Dose Route Action   01/07/2023 2134 EST fentanyl citrate (PF) 100 MCG/2ML 100 mcg 100 mcg Intravenous Given   01/07/2023 2135 EST diphenhydrAMINE (BENADRYL) injection 50 mg 50 mg Intravenous Given   01/07/2023 2135 EST ondansetron (ZOFRAN) injection 4 mg 4 mg Intravenous Given   01/08/2023 1322 EST lidocaine (LIDODERM) 5 % patch 1 patch 1 patch Topical Medication Applied   01/08/2023 0012 EST lidocaine (LIDODERM) 5 % patch 1 patch 1 patch Topical Medication Applied   01/08/2023 0833 EST ketorolac (TORADOL) injection 15 mg 15 mg Intravenous Given   01/08/2023 0013 EST ketorolac (TORADOL) injection 15 mg 15 mg Intravenous Given   01/08/2023 1724 EST methocarbamol (ROBAXIN) tablet 500 mg 500 mg Oral Given   01/08/2023 0820 EST methocarbamol (ROBAXIN) tablet 500 mg 500 mg Oral Given   01/08/2023 0013 EST methocarbamol (ROBAXIN) tablet 500 mg 500 mg Oral Given   01/08/2023 0013 EST potassium chloride (K-DUR,KLOR-CON) CR tablet 20 mEq 20 mEq Oral Given   01/08/2023 0819 EST benzonatate (TESSALON PERLES) capsule 100 mg 100 mg Oral Given   01/08/2023 0534 EST benzonatate (TESSALON PERLES) capsule 100 mg 100 mg Oral Given   01/08/2023 0926 EST Fluticasone Furoate-Vilanterol 100-25 mcg/actuation 1 puff 1 puff Inhalation Given   01/08/2023 1724 EST guaiFENesin (MUCINEX) 12 hr tablet 600 mg 600 mg Oral Given   01/08/2023 0820 EST guaiFENesin (MUCINEX) 12 hr tablet 600 mg 600 mg Oral Given   01/08/2023 0504 EST ipratropium-albuterol (DUO-NEB) 0 5-2 5 mg/3 mL inhalation solution 3 mL 3 mL Nebulization Given   01/08/2023 0820 EST nicotine (NICODERM CQ) 14 mg/24hr TD 24 hr patch 1 patch 1 patch Transdermal Medication Applied   01/08/2023 0819 EST pantoprazole (PROTONIX) EC tablet 40 mg 40 mg Oral Given   01/08/2023 0819 EST enoxaparin (LOVENOX) subcutaneous injection 40 mg 40 mg Subcutaneous Given   01/08/2023 0931 EST oxyCODONE (ROXICODONE) IR tablet 5 mg 5 mg Oral Given   01/08/2023 0459 EST oxyCODONE (ROXICODONE) IR tablet 5 mg 5 mg Oral Given   01/08/2023 0926 EST potassium chloride (K-DUR,KLOR-CON) CR tablet 40 mEq 40 mEq Oral Given   01/08/2023 1200 EST morphine injection 2 mg 2 mg Intravenous Given   01/08/2023 1724 EST oxyCODONE (ROXICODONE) IR tablet 5 mg 5 mg Oral Given   01/08/2023 1329 EST oxyCODONE (ROXICODONE) IR tablet 5 mg 5 mg Oral Given   01/08/2023 1549 EST ketorolac (TORADOL) injection 15 mg 15 mg Intravenous Given        Past Medical History:   Diagnosis Date   • Achalasia    • Back pain    • Fibromyalgia    • Lupus ([facility] [address] )      Present on Admission:  • Compression fracture of L1 vertebra ([facility])  • Ambulatory dysfunction  • COPD (chronic obstructive pulmonary disease) ([facility])      Admitting Diagnosis: SOB (shortness of breath) [R06 02]  Pain [R52]  Age/Sex: 62 y o  female  Admission Orders:   See above note  Routine vs    Up w/assist - OOB as tolerated  Scheduled Medications:  Fluticasone Furoate-Vilanterol, 1 puff, Inhalation, Daily  guaiFENesin, 600 mg, Oral, BID  heparin (porcine), 5,000 Units, Subcutaneous, Q8H [address]  ipratropium-albuterol, 3 mL, Nebulization, Q6H TANVIR  nicotine, 1 patch, Transdermal, Daily  pantoprazole, 40 mg, Oral, Daily      Continuous IV Infusions:     PRN Meds:  acetaminophen, 975 mg, Oral, Q6H PRN  albuterol, 2 puff, Inhalation, Q4H PRN  aluminum-magnesium hydroxide-simethicone, 30 mL, Oral, Q6H PRN  benzonatate, 100 mg, Oral, TID PRN  ketorolac, 15 mg, Intravenous, Q6H PRN  lidocaine, 1 patch, Topical, Daily PRN  methocarbamol, 500 mg, Oral, Q8H PRN      1/08 @ 1725  morphine injection, 2 mg, Intravenous, Q4H PRN  ondansetron, 4 mg, Intravenous, Q6H PRN  oxyCODONE, 5 mg, Oral, Q4H PRN     1/08 @  [address] Utilization Review Department  ATTENTION: Please call with any questions or concerns to [phone] and carefully listen to the prompts so that you are directed to the right person  All voicemails are confidential   Elisa Grand all requests for admission clinical reviews, approved or denied determinations and any other requests to dedicated fax number below belonging to the campus where the patient is receiving treatment   List of dedicated fax numbers for the Facilities:  [address] DENIALS (Administrative/Medical Necessity) [phone]   [address] (Maternity/NICU/Pediatrics) [phone]   [address] [phone]   [facility] [phone]   [address] [address] Karan Jade [facility] 28 [phone]   [facility] [place] [facility] 134 5 [facility] [phone]

## 2023-01-08 NOTE — ED PROVIDER NOTES
History  Chief Complaint   Patient presents with   • Pain     Pt reports she's was told to come in if her back pain hasn't improved  Also cough and some chest pain  Was seen here Sunday  66-year-old female with recent fall, seen here in the emergency department and found to have a compression fracture returns to the emergency department due to intractable pain  The patient with an x-ray done to check for worsening compression and if it is present she will be admitted for pain control and PT OT evaluation          Past Medical History:  No date: Achalasia  No date: Back pain  No date: Fibromyalgia  No date: Lupus [facility])      Results Reviewed    Procedure Component Value Units Date/Time   CBC and differential ([id number]) Collected: 01/07/23 2128   Lab Status: Final result Specimen: Blood from Arm, Right Updated:   01/07/23 2134    WBC 8 27 Thousand/uL     RBC 4 06 Million/uL     Hemoglobin 11 7 g/dL     Hematocrit 37 1 %     MCV 91 fL     MCH 28 8 pg     MCHC 31 5 g/dL     RDW 14 7 %     MPV 8 9 fL     Platelets 881 Thousands/uL     nRBC 0 /100 WBCs     Neutrophils Relative 72 %     Immat GRANS % 1 %     Lymphocytes Relative 15 %     Monocytes Relative 8 %     Eosinophils Relative 4 %     Basophils Relative 0 %     Neutrophils Absolute 5 96 Thousands/µL     Immature Grans Absolute 0 09 Thousand/uL     Lymphocytes Absolute 1 23 Thousands/µL     Monocytes Absolute 0 65 Thousand/µL     Eosinophils Absolute 0 31 Thousand/µL     Basophils Absolute 0 03 Thousands/µL    Comprehensive metabolic panel ([id number]) Collected: 01/07/23 2128   Lab Status:  In process Specimen: Blood from Arm, Right Updated: 01/07/23 2131         Active Ambulatory Problems   Chest pain, unspecified        Date Noted: 09/11/2022     Chronic obstructive pulmonary disease with acute exacerbation [facility])        Date Noted: 09/11/2022     Smoker        Date Noted: 09/11/2022     Hypokalemia        Date Noted: 10/04/2022     Respiratory failure with hypoxia ([facility] )        Date Noted: 10/06/2022     Compression fracture of L1 vertebra ([place] [address] )        Date Noted: 01/02/2023     COPD (chronic obstructive pulmonary disease) ([place] [address] )        Date Noted: 01/02/2023     Sepsis [facility])        Date Noted: 01/02/2023     Fall        Date Noted: 01/02/2023    Resolved Ambulatory Problems  No Resolved Ambulatory Problems  Past Medical History:  No date: Achalasia  No date: Back pain  No date: Fibromyalgia  No date: Lupus ([place] [address] )      Administrations This Visit    diphenhydrAMINE (BENADRYL) injection 50 mg    Admin Date  01/07/2023 Action  Given Dose  50 mg Route  Intravenous Administered By  Jessica Ruelas RN       fentanyl citrate (PF) 100 MCG/2ML 100 mcg    Admin Date  01/07/2023 Action  Given Dose  100 mcg Route  Intravenous Administered By  Jessica Ruelas RN       ondansetron [facility]) injection 4 mg    Admin Date  01/07/2023 Action  Given Dose  4 mg Route  Intravenous Administered By  Jessica Ruelas RN              -- Valium (Diazepam) -- Anaphylaxis        Results          62 y o   female  Patient presents with:  Pain: Pt reports she's was told to come in if her back pain hasn't improved  Also cough and some chest pain  Was seen here Sunday       Past Medical History:  No date: Achalasia  No date: Back pain  No date: Fibromyalgia  No date: Lupus [facility]) Active Ambulatory Problems    Chest pain, unspecified         Date Noted: 09/11/2022      Chronic obstructive pulmonary disease with acute exacerbation ([place] [address] )         Date Noted: 09/11/2022      Smoker         Date Noted: 09/11/2022      Hypokalemia         Date Noted: 10/04/2022      Respiratory failure with hypoxia ([place] [address] )         Date Noted: 10/06/2022      Compression fracture of L1 vertebra ([facility])         Date Noted: 01/02/2023      COPD (chronic obstructive pulmonary disease) ([facility] )         Date Noted: 01/02/2023      Sepsis [facility])         Date Noted: 01/02/2023      Fall         Date Noted: 01/02/2023    Resolved Ambulatory Problems  No Resolved Ambulatory Problems  Past Medical History:  No date: Achalasia  No date: Back pain  No date: Fibromyalgia  No date: Lupus ([facility] )           History provided by:  Patient   used: No    Back Pain  Location:  Lumbar spine  Quality:  Aching  Radiates to:  Does not radiate  Pain severity:  Moderate  Pain is:  Same all the time  Timing:  Constant  Progression:  Worsening  Chronicity:  New  Context: not falling, not lifting heavy objects, not MCA, not occupational injury and not recent injury        Prior to Admission Medications   Prescriptions Last Dose Informant Patient Reported? Taking? albuterol (PROVENTIL HFA,VENTOLIN HFA) 90 mcg/act inhaler   No No   Sig: Inhale 2 puffs every 4 (four) hours as needed for wheezing   benzonatate (TESSALON PERLES) 100 mg capsule   No No   Sig: Take 1 capsule (100 mg total) by mouth 3 (three) times a day as needed for cough   cyclobenzaprine (FLEXERIL) 10 mg tablet   No No   Sig: Take 1 tablet (10 mg total) by mouth 3 (three) times a day as needed for muscle spasms   fluticasone-vilanterol (BREO ELLIPTA) 100-25 mcg/inh inhaler   No No   Sig: Inhale 1 puff daily Rinse mouth after use  Do not start before October 8, 2022     guaiFENesin (MUCINEX) 600 mg 12 hr tablet   No No   Sig: Take 1 tablet (600 mg total) by mouth 2 (two) times a day   ibuprofen (MOTRIN) 800 mg tablet   No No   Sig: Take 1 tablet (800 mg total) by mouth 3 (three) times a day   ipratropium-albuterol (DUO-NEB) 0 5-2 5 mg/3 mL nebulizer solution   No No   Sig: Take 3 mL by nebulization 4 (four) times a day   methocarbamol (ROBAXIN) 500 mg tablet   No No   Sig: Take 1 tablet (500 mg total) by mouth 2 (two) times a day   methocarbamol (ROBAXIN) 500 mg tablet   No No   Sig: Take 1 tablet (500 mg total) by mouth 2 (two) times a day   morphine (MSIR) 15 mg tablet   No No   Sig: Take 0 5 tablets (7 5 mg total) by mouth every 4 (four) hours as needed for severe pain for up to 10 doses Max Daily Amount: 45 mg   nicotine (NICODERM CQ) 14 mg/24hr TD 24 hr patch   No No   Sig: Place 1 patch on the skin daily Do not start before October 8, 2022  pantoprazole (PROTONIX) 40 mg tablet   No No   Sig: Take 1 tablet (40 mg total) by mouth daily   predniSONE 10 mg tablet   No No   Sig: Take 4 tablets (40 mg total) by mouth daily for 2 days, THEN 3 tablets (30 mg total) daily for 3 days, THEN 2 tablets (20 mg total) daily for 3 days, THEN 1 tablet (10 mg total) daily for 3 days  Facility-Administered Medications: None       Past Medical History:   Diagnosis Date   • Achalasia    • Back pain    • Fibromyalgia    • Lupus ([facility] )        Past Surgical History:   Procedure Laterality Date   • HYSTERECTOMY     • NECK SURGERY         History reviewed  No pertinent family history  I have reviewed and agree with the history as documented  E-Cigarette/Vaping   • E-Cigarette Use Never User      E-Cigarette/Vaping Substances     Social History     Tobacco Use   • Smoking status: Every Day     Packs/day: 0 50     Types: Cigarettes   • Smokeless tobacco: Never   Vaping Use   • Vaping Use: Never used   Substance Use Topics   • Alcohol use: Never   • Drug use: Never       Review of Systems   Musculoskeletal: Positive for back pain  All other systems reviewed and are negative        Physical Exam  Physical Exam    Vital Signs  ED Triage Vitals   Temperature Pulse Respirations Blood Pressure SpO2   01/07/23 2041 01/07/23 2041 01/07/23 2041 01/07/23 2041 01/07/23 2041   98 3 °F (36 8 °C) 65 18 144/67 93 %      Temp src Heart Rate Source Patient Position - Orthostatic VS BP Location FiO2 (%)   -- 01/07/23 2100 01/07/23 2041 01/07/23 2041 --    Monitor Sitting Left arm       Pain Score       01/07/23 2134       10 - Worst Possible Pain           Vitals:    01/08/23 0430 01/08/23 0500 01/08/23 0545 01/08/23 0600   BP: 122/66 126/70 123/59 106/52   Pulse: 61 83 61 59   Patient Position - Orthostatic VS: Sitting Sitting          Visual Acuity      ED Medications  Medications   lidocaine (LIDODERM) 5 % patch 1 patch (1 patch Topical Medication Applied 1/8/23 1322)   acetaminophen (TYLENOL) tablet 975 mg (650 mg Oral Given 1/8/23 0012)   methocarbamol (ROBAXIN) tablet 500 mg (500 mg Oral Given 1/8/23 0820)   benzonatate (TESSALON PERLES) capsule 100 mg (100 mg Oral Given 1/8/23 0819)   albuterol (PROVENTIL HFA,VENTOLIN HFA) inhaler 2 puff (has no administration in time range)   Fluticasone Furoate-Vilanterol 100-25 mcg/actuation 1 puff (1 puff Inhalation Given 1/8/23 0926)   guaiFENesin (MUCINEX) 12 hr tablet 600 mg (600 mg Oral Given 1/8/23 0820)   ipratropium-albuterol (DUO-NEB) 0 5-2 5 mg/3 mL inhalation solution 3 mL (3 mL Nebulization Not Given 1/8/23 1646)   nicotine (NICODERM CQ) 14 mg/24hr TD 24 hr patch 1 patch (1 patch Transdermal Medication Applied 1/8/23 0820)   pantoprazole (PROTONIX) EC tablet 40 mg (40 mg Oral Given 1/8/23 0819)   aluminum-magnesium hydroxide-simethicone (MYLANTA) oral suspension 30 mL (has no administration in time range)   ondansetron (ZOFRAN) injection 4 mg (has no administration in time range)   morphine injection 2 mg (2 mg Intravenous Given 1/8/23 1200)   oxyCODONE (ROXICODONE) IR tablet 5 mg (5 mg Oral Given 1/8/23 1329)   ketorolac (TORADOL) injection 15 mg (15 mg Intravenous Given 1/8/23 1549)   heparin (porcine) subcutaneous injection 5,000 Units (5,000 Units Subcutaneous Not Given 1/8/23 1320)   fentanyl citrate (PF) 100 MCG/2ML 100 mcg (100 mcg Intravenous Given 1/7/23 2134)   diphenhydrAMINE (BENADRYL) injection 50 mg (50 mg Intravenous Given 1/7/23 2135)   ondansetron (ZOFRAN) injection 4 mg (4 mg Intravenous Given 1/7/23 2135)   potassium chloride (K-DUR,KLOR-CON) CR tablet 20 mEq (20 mEq Oral Given 1/8/23 0013)   potassium chloride (K-DUR,KLOR-CON) CR tablet 40 mEq (40 mEq Oral Given 1/8/23 0926)       Diagnostic Studies  Results Reviewed     Procedure Component Value Units Date/Time    COVID/FLU/RSV [[id number]]  (Normal) Collected: 01/07/23 4034    Lab Status: Final result Specimen: Nares from Nose Updated: 01/08/23 0010     SARS-CoV-2 Negative     INFLUENZA A PCR Negative     INFLUENZA B PCR Negative     RSV PCR Negative    Narrative:      FOR PEDIATRIC PATIENTS - copy/paste COVID Guidelines URL to browser: [email or web address]  [email or web address]    SARS-CoV-2 assay is a Nucleic Acid Amplification assay intended for the  qualitative detection of nucleic acid from SARS-CoV-2 in nasopharyngeal  swabs  Results are for the presumptive identification of SARS-CoV-2 RNA  Positive results are indicative of infection with SARS-CoV-2, the virus  causing COVID-19, but do not rule out bacterial infection or co-infection  with other viruses  Laboratories within the [place] and its  territories are required to report all positive results to the appropriate  public health authorities  Negative results do not preclude SARS-CoV-2  infection and should not be used as the sole basis for treatment or other  patient management decisions  Negative results must be combined with  clinical observations, patient history, and epidemiological information  This test has not been FDA cleared or approved  This test has been authorized by FDA under an Emergency Use Authorization  (EUA)  This test is only authorized for the duration of time the  declaration that circumstances exist justifying the authorization of the  emergency use of an in vitro diagnostic tests for detection of SARS-CoV-2  virus and/or diagnosis of COVID-19 infection under section 564(b)(1) of  the Act, 21 U  S C  735VOZ-7(J)(4), unless the authorization is terminated  or revoked sooner  The test has been validated but independent review by FDA  and CLIA is pending  Test performed using [employer] GeneXpert: This RT-PCR assay targets N2,  a region unique to SARS-CoV-2   A conserved region in the E-gene was chosen  for pan-Sarbecovirus detection which includes SARS-CoV-2  According to CMS-2020-01-R, this platform meets the definition of high-throughput technology      Comprehensive metabolic panel [[id number]]  (Abnormal) Collected: 01/07/23 2128    Lab Status: Final result Specimen: Blood from Arm, Right Updated: 01/07/23 2154     Sodium 143 mmol/L      Potassium 3 3 mmol/L      Chloride 105 mmol/L      CO2 30 mmol/L      ANION GAP 8 mmol/L      BUN 14 mg/dL      Creatinine 0 86 mg/dL      Glucose 144 mg/dL      Calcium 8 8 mg/dL      Corrected Calcium 9 4 mg/dL      AST 13 U/L      ALT 18 U/L      Alkaline Phosphatase 114 U/L      Total Protein 7 1 g/dL      Albumin 3 2 g/dL      Total Bilirubin 0 31 mg/dL      eGFR 75 ml/min/1 73sq m     Narrative:      National Kidney Disease Foundation guidelines for Chronic Kidney Disease (CKD):   •  Stage 1 with normal or high GFR (GFR > 90 mL/min/1 73 square meters)  •  Stage 2 Mild CKD (GFR = 60-89 mL/min/1 73 square meters)  •  Stage 3A Moderate CKD (GFR = 45-59 mL/min/1 73 square meters)  •  Stage 3B Moderate CKD (GFR = 30-44 mL/min/1 73 square meters)  •  Stage 4 Severe CKD (GFR = 15-29 mL/min/1 73 square meters)  •  Stage 5 End Stage CKD (GFR <15 mL/min/1 73 square meters)  Note: GFR calculation is accurate only with a steady state creatinine    CBC and differential [[id number]] Collected: 01/07/23 2128    Lab Status: Final result Specimen: Blood from Arm, Right Updated: 01/07/23 2134     WBC 8 27 Thousand/uL      RBC 4 06 Million/uL      Hemoglobin 11 7 g/dL      Hematocrit 37 1 %      MCV 91 fL      MCH 28 8 pg      MCHC 31 5 g/dL      RDW 14 7 %      MPV 8 9 fL      Platelets 681 Thousands/uL      nRBC 0 /100 WBCs      Neutrophils Relative 72 %      Immat GRANS % 1 %      Lymphocytes Relative 15 %      Monocytes Relative 8 %      Eosinophils Relative 4 %      Basophils Relative 0 %      Neutrophils Absolute 5 96 Thousands/µL      Immature Grans Absolute 0 09 Thousand/uL      Lymphocytes Absolute 1 23 Thousands/µL      Monocytes Absolute 0 65 Thousand/µL      Eosinophils Absolute 0 31 Thousand/µL      Basophils Absolute 0 03 Thousands/µL                  XR spine lumbar 2 or 3 views injury   Final Result by Stacy Douglas MD (01/08 0900)   Addendum (preliminary) 1 of 1 by Stacy Douglas MD (01/08 0900)   ADDENDUM:      The study was marked in EPIC for immediate notification (changed from    significant)  Final      Progression of a compression fracture involving L1 with approximately 40%    loss of vertebral body height, progressed from the recent CT of 1/1/2023  The study was marked in EPIC for significant notification  Workstation performed: [id number]                    Procedures  Procedures         ED Course                                             Medical Decision Making  Amount and/or Complexity of Data Reviewed  Labs: ordered  Radiology: ordered  Risk  Prescription drug management  Decision regarding hospitalization  Disposition  Final diagnoses:   Compression fracture of L1 vertebra ([facility] )     Time reflects when diagnosis was documented in both MDM as applicable and the Disposition within this note     Time User Action Codes Description Comment    1/8/2023 11:33 AM Mario Ordaz Add [S32 010A] Compression fracture of L1 vertebra [facility])       ED Disposition     ED Disposition   Admit    Condition   Stable    Date/Time   Sat Jan 7, 2023 10:47 PM    Comment   Case was discussed with Mitchell Client and the patient's admission status was agreed to be Admission Status: inpatient status to the service of Dr Isrrael Plaza   Follow-up Information    None         Patient's Medications   Discharge Prescriptions    No medications on file       No discharge procedures on file      PDMP Review       Value Time User    PDMP Reviewed  Yes 10/6/2022  3:44 PM Claudia Epstein MD          ED Provider  Electronically Signed by           Marcelino Aguilar DO  01/08/23 165

## 2023-01-08 NOTE — ASSESSMENT & PLAN NOTE
· Currently does not appear to be in exacerbation  · Continue home DuoNeb treatments 4 times daily, Breo inhaler daily

## 2023-01-08 NOTE — ED NOTES
Pt requesting to speak with nursing supervisor, charge nurse went in but pt was on phone  Lengthly conversation with pt about her requests - pt upset because she's not sure of her plan of care - explained to pt that she is here for pain management only, pt states "well I can home for that"  Extensive review of pt's meds & plan for OT/PT & brace explained to pt  Pt states she is not getting pain relief - reviewed pain meds with pt & instructed pt that we will try to offer meds every 4 hours rather than pt having to ring for them  Pt states she has been ringing her callbell for very long time & many people have walked by her room without answering her  Pt appears to be satisfied with conversation         Mare Roe RN  01/08/23 8440

## 2023-01-08 NOTE — ED NOTES
Pt returned from imaging, states pain medications did not work, provider made aware        Jessica Ruelas, RN  01/07/23 0319

## 2023-01-09 VITALS
HEART RATE: 70 BPM | DIASTOLIC BLOOD PRESSURE: 66 MMHG | OXYGEN SATURATION: 94 % | BODY MASS INDEX: 34.93 KG/M2 | SYSTOLIC BLOOD PRESSURE: 156 MMHG | HEIGHT: 61 IN | WEIGHT: 185 LBS | TEMPERATURE: 98.2 F | RESPIRATION RATE: 18 BRPM

## 2023-01-09 LAB
DME PARACHUTE DELIVERY DATE REQUESTED: NORMAL
DME PARACHUTE ITEM DESCRIPTION: NORMAL
DME PARACHUTE ORDER STATUS: NORMAL
DME PARACHUTE SUPPLIER NAME: NORMAL
DME PARACHUTE SUPPLIER PHONE: NORMAL

## 2023-01-09 RX ORDER — IBUPROFEN 800 MG/1
800 TABLET ORAL EVERY 8 HOURS PRN
Qty: 60 TABLET | Refills: 0 | Status: SHIPPED | OUTPATIENT
Start: 2023-01-09

## 2023-01-09 RX ORDER — OXYCODONE HYDROCHLORIDE 5 MG/1
5 TABLET ORAL EVERY 4 HOURS PRN
Qty: 25 TABLET | Refills: 0 | Status: ON HOLD | OUTPATIENT
Start: 2023-01-09 | End: 2023-01-17 | Stop reason: SDUPTHER

## 2023-01-09 RX ORDER — METHOCARBAMOL 500 MG/1
500 TABLET, FILM COATED ORAL 2 TIMES DAILY
Qty: 40 TABLET | Refills: 0 | Status: ON HOLD | OUTPATIENT
Start: 2023-01-09 | End: 2023-01-17 | Stop reason: SDUPTHER

## 2023-01-09 RX ORDER — LIDOCAINE 50 MG/G
1 PATCH TOPICAL DAILY PRN
Qty: 30 PATCH | Refills: 0 | Status: SHIPPED | OUTPATIENT
Start: 2023-01-09

## 2023-01-09 RX ORDER — LEVALBUTEROL 1.25 MG/.5ML
1.25 SOLUTION, CONCENTRATE RESPIRATORY (INHALATION)
Status: DISCONTINUED | OUTPATIENT
Start: 2023-01-09 | End: 2023-01-09 | Stop reason: HOSPADM

## 2023-01-09 RX ADMIN — OXYCODONE HYDROCHLORIDE 5 MG: 5 TABLET ORAL at 12:09

## 2023-01-09 RX ADMIN — HEPARIN SODIUM 5000 UNITS: 5000 INJECTION INTRAVENOUS; SUBCUTANEOUS at 06:18

## 2023-01-09 RX ADMIN — OXYCODONE HYDROCHLORIDE 5 MG: 5 TABLET ORAL at 07:48

## 2023-01-09 RX ADMIN — LEVALBUTEROL HYDROCHLORIDE 1.25 MG: 1.25 SOLUTION, CONCENTRATE RESPIRATORY (INHALATION) at 08:22

## 2023-01-09 RX ADMIN — MORPHINE SULFATE 2 MG: 2 INJECTION, SOLUTION INTRAMUSCULAR; INTRAVENOUS at 03:37

## 2023-01-09 RX ADMIN — NICOTINE 1 PATCH: 14 PATCH, EXTENDED RELEASE TRANSDERMAL at 09:39

## 2023-01-09 RX ADMIN — IPRATROPIUM BROMIDE 0.5 MG: 0.5 SOLUTION RESPIRATORY (INHALATION) at 08:22

## 2023-01-09 RX ADMIN — PANTOPRAZOLE SODIUM 40 MG: 40 TABLET, DELAYED RELEASE ORAL at 09:39

## 2023-01-09 RX ADMIN — GUAIFENESIN 600 MG: 600 TABLET ORAL at 09:39

## 2023-01-09 RX ADMIN — FLUTICASONE FUROATE AND VILANTEROL TRIFENATATE 1 PUFF: 100; 25 POWDER RESPIRATORY (INHALATION) at 09:38

## 2023-01-09 RX ADMIN — LIDOCAINE 1 PATCH: 50 PATCH TOPICAL at 10:00

## 2023-01-09 RX ADMIN — OXYCODONE HYDROCHLORIDE 5 MG: 5 TABLET ORAL at 02:18

## 2023-01-09 RX ADMIN — MORPHINE SULFATE 2 MG: 2 INJECTION, SOLUTION INTRAMUSCULAR; INTRAVENOUS at 10:47

## 2023-01-09 RX ADMIN — METHOCARBAMOL 500 MG: 500 TABLET ORAL at 04:03

## 2023-01-09 RX ADMIN — KETOROLAC TROMETHAMINE 15 MG: 30 INJECTION, SOLUTION INTRAMUSCULAR; INTRAVENOUS at 04:37

## 2023-01-09 NOTE — DISCHARGE INSTR - AVS FIRST PAGE
Dear Jonathon Calderon,     It was our pleasure to care for you here at [facility]  At this time we provide for you here, the most important instructions / recommendations at discharge:     Notable Medication Adjustments -   Tylenol 650 mg every 6 hours  Ibuprofen 800 mg every 8 hours  Robaxin 500 mg twice a day  Oxycodone 5 mg every 4 hours  Lidoderm patch (can be bough over the counter) daily onto the lumbar spine  Testing Required after Discharge -   None  Important follow up information -   Dr Camila Akhtar is the orthopedic surgeon with whom you can follow up for check up in the op setting in regard to the spinal fracture  Call the primary care doctor to make an appt regarding evaluation and treatment for potential osteoporosis  Physical therapy in the op setting referral was placed to strengthen the muscles around the spine to support the area of the spinal fracture  Other Instructions -   Please wear the TSLO brace with ambulation and as needed when sitting to support the spinal fracture  Please review this entire after visit summary as additional general instructions including medication list, appointments, activity, diet, any pertinent wound care, and other additional recommendations from your care team that may be provided for you        Sincerely,     Sendy Villanueva PA-C

## 2023-01-09 NOTE — ED PROVIDER NOTES
History  Chief Complaint   Patient presents with   • Back Pain     Pt arrives via EMS c/o of "severe lower back pain to my hips and groin, I can't walk" pt states she lifted a pt at work several days ago and felt a pop that has progressively worsened since  Pt states she took a percocet at 11 this morning and barely felt relief  59-year-old female presenting to the emergency department for evaluation of back pain  Patient arrives via EMS complaining of low back pain that radiates to her hips and groin bilaterally, aching in nature, radiating down both legs, started after she felt a pop after working and lifting a heavy patient  She took 1 Percocet and did not feel much relief  She denies any numbness or tingling  She denies any bowel or bladder dysfunction/incontinence  She denies any fevers or chills, she denies any saddle anesthesia          Prior to Admission Medications   Prescriptions Last Dose Informant Patient Reported? Taking? albuterol (PROVENTIL HFA,VENTOLIN HFA) 90 mcg/act inhaler   No No   Sig: Inhale 2 puffs every 4 (four) hours as needed for wheezing   benzonatate (TESSALON PERLES) 100 mg capsule   No No   Sig: Take 1 capsule (100 mg total) by mouth 3 (three) times a day as needed for cough   fluticasone-vilanterol (BREO ELLIPTA) 100-25 mcg/inh inhaler   No No   Sig: Inhale 1 puff daily Rinse mouth after use  Do not start before October 8, 2022  guaiFENesin (MUCINEX) 600 mg 12 hr tablet   No No   Sig: Take 1 tablet (600 mg total) by mouth 2 (two) times a day   ipratropium-albuterol (DUO-NEB) 0 5-2 5 mg/3 mL nebulizer solution   No No   Sig: Take 3 mL by nebulization 4 (four) times a day   nicotine (NICODERM CQ) 14 mg/24hr TD 24 hr patch   No No   Sig: Place 1 patch on the skin daily Do not start before October 8, 2022     pantoprazole (PROTONIX) 40 mg tablet   No No   Sig: Take 1 tablet (40 mg total) by mouth daily      Facility-Administered Medications: None       Past Medical History: Diagnosis Date   • Achalasia    • Back pain    • Fibromyalgia    • Lupus ([facility] [address] )        Past Surgical History:   Procedure Laterality Date   • HYSTERECTOMY     • NECK SURGERY         History reviewed  No pertinent family history  I have reviewed and agree with the history as documented  E-Cigarette/Vaping   • E-Cigarette Use Never User      E-Cigarette/Vaping Substances     Social History     Tobacco Use   • Smoking status: Every Day     Packs/day: 0 50     Types: Cigarettes   • Smokeless tobacco: Never   Vaping Use   • Vaping Use: Never used   Substance Use Topics   • Alcohol use: Never   • Drug use: Never       Review of Systems   Constitutional: Negative for appetite change, chills, fatigue and fever  HENT: Negative for sneezing and sore throat  Eyes: Negative for visual disturbance  Respiratory: Negative for cough, choking, chest tightness, shortness of breath and wheezing  Cardiovascular: Negative for chest pain and palpitations  Gastrointestinal: Negative for abdominal pain, constipation, diarrhea, nausea and vomiting  Genitourinary: Negative for difficulty urinating and dysuria  Musculoskeletal: Positive for arthralgias, back pain and myalgias  Neurological: Negative for dizziness, weakness, light-headedness, numbness and headaches  All other systems reviewed and are negative  Physical Exam  Physical Exam  Vitals and nursing note reviewed  Constitutional:       General: She is not in acute distress  Appearance: She is well-developed  She is not diaphoretic  HENT:      Head: Normocephalic and atraumatic  Eyes:      Pupils: Pupils are equal, round, and reactive to light  Neck:      Vascular: No JVD  Trachea: No tracheal deviation  Cardiovascular:      Rate and Rhythm: Normal rate and regular rhythm  Heart sounds: Normal heart sounds  No murmur heard  No friction rub  No gallop  Pulmonary:      Effort: Pulmonary effort is normal  No respiratory distress  Breath sounds: Normal breath sounds  No wheezing or rales  Abdominal:      General: Bowel sounds are normal  There is no distension  Palpations: Abdomen is soft  Tenderness: There is no abdominal tenderness  There is no guarding or rebound  Musculoskeletal:      Comments: There is some tenderness to the mid lumbar spine  There is 5-5 strength in the bilateral lower extremities including 5 out of 5 EHL strength, there is 2+ dorsalis pedis pulse bilaterally  Skin:     General: Skin is warm and dry  Coloration: Skin is not pale  Neurological:      Mental Status: She is alert and oriented to person, place, and time  Cranial Nerves: No cranial nerve deficit  Motor: No abnormal muscle tone     Psychiatric:         Behavior: Behavior normal          Vital Signs  ED Triage Vitals   Temperature Pulse Respirations Blood Pressure SpO2   01/01/23 2002 01/01/23 2002 01/01/23 2002 01/01/23 2002 01/01/23 2002   99 1 °F (37 3 °C) 98 21 145/84 92 %      Temp Source Heart Rate Source Patient Position - Orthostatic VS BP Location FiO2 (%)   01/01/23 2002 01/01/23 2002 01/01/23 2002 01/01/23 2002 --   Oral Monitor Sitting Left arm       Pain Score       01/01/23 2258       10 - Worst Possible Pain           Vitals:    01/01/23 2002 01/01/23 2303 01/02/23 0135   BP: 145/84 148/67 144/100   Pulse: 98 94 100   Patient Position - Orthostatic VS: Sitting Sitting Sitting         Visual Acuity      ED Medications  Medications   ketorolac (TORADOL) injection 15 mg (15 mg Intravenous Given 1/1/23 2258)   morphine injection 4 mg (4 mg Intravenous Given 1/1/23 2259)   sodium chloride 0 9 % bolus 1,000 mL (0 mL Intravenous Stopped 1/2/23 0058)   iohexol (OMNIPAQUE) 350 MG/ML injection (SINGLE-DOSE) 100 mL (100 mL Intravenous Given 1/1/23 2344)   morphine (MSIR) IR tablet 7 5 mg (7 5 mg Oral Given 1/2/23 0150)       Diagnostic Studies  Results Reviewed     Procedure Component Value Units Date/Time Comprehensive metabolic panel [[id number]]  (Abnormal) Collected: 01/01/23 2246    Lab Status: Final result Specimen: Blood from Arm, Left Updated: 01/01/23 2319     Sodium 138 mmol/L      Potassium 4 4 mmol/L      Chloride 100 mmol/L      CO2 32 mmol/L      ANION GAP 6 mmol/L      BUN 16 mg/dL      Creatinine 0 73 mg/dL      Glucose 108 mg/dL      Calcium 8 7 mg/dL      Corrected Calcium 9 2 mg/dL      AST 16 U/L      ALT 20 U/L      Alkaline Phosphatase 109 U/L      Total Protein 7 3 g/dL      Albumin 3 4 g/dL      Total Bilirubin 0 62 mg/dL      eGFR 91 ml/min/1 73sq m     Narrative:      National Kidney Disease Foundation guidelines for Chronic Kidney Disease (CKD):   •  Stage 1 with normal or high GFR (GFR > 90 mL/min/1 73 square meters)  •  Stage 2 Mild CKD (GFR = 60-89 mL/min/1 73 square meters)  •  Stage 3A Moderate CKD (GFR = 45-59 mL/min/1 73 square meters)  •  Stage 3B Moderate CKD (GFR = 30-44 mL/min/1 73 square meters)  •  Stage 4 Severe CKD (GFR = 15-29 mL/min/1 73 square meters)  •  Stage 5 End Stage CKD (GFR <15 mL/min/1 73 square meters)  Note: GFR calculation is accurate only with a steady state creatinine    CBC and differential [[id number]]  (Abnormal) Collected: 01/01/23 2246    Lab Status: Final result Specimen: Blood from Arm, Left Updated: 01/01/23 2251     WBC 13 68 Thousand/uL      RBC 4 28 Million/uL      Hemoglobin 12 4 g/dL      Hematocrit 39 0 %      MCV 91 fL      MCH 29 0 pg      MCHC 31 8 g/dL      RDW 15 6 %      MPV 9 2 fL      Platelets 991 Thousands/uL      nRBC 0 /100 WBCs      Neutrophils Relative 80 %      Immat GRANS % 0 %      Lymphocytes Relative 10 %      Monocytes Relative 8 %      Eosinophils Relative 2 %      Basophils Relative 0 %      Neutrophils Absolute 10 92 Thousands/µL      Immature Grans Absolute 0 04 Thousand/uL      Lymphocytes Absolute 1 38 Thousands/µL      Monocytes Absolute 1 09 Thousand/µL      Eosinophils Absolute 0 21 Thousand/µL      Basophils Absolute 0 04 Thousands/µL                  CT abdomen pelvis with contrast   Final Result by Addie Peña DO (01/02 3145)      Compression deformity of the L1 vertebral body  Scattered airspace opacities in the right lung which may represent infection  Recommend short-term follow-up chest x-ray in 3 months to evaluate for resolution  The study was marked in [facility] for immediate notification  Workstation performed: [id number]                    Procedures  Procedures         ED Course                               SBIRT 22yo+    Flowsheet Row Most Recent Value   SBIRT (23 yo +)    In order to provide better care to our patients, we are screening all of our patients for alcohol and drug use  Would it be okay to ask you these screening questions? Unable to answer at this time Filed at: 01/01/2023 9763                    Medical Decision Making  62 female with acute low back pain, will check CT, give pain meds, reassess  CT demonstrates compression fracture of L1 vertebra, will give pain meds, recommend light duty at work, will give orthospine follow-up  Back pain: complicated acute illness or injury  Closed compression fracture of body of L1 vertebra ([facility] [address] ): complicated acute illness or injury  Amount and/or Complexity of Data Reviewed  Labs: ordered  Radiology: ordered  Risk  Prescription drug management            Disposition  Final diagnoses:   Back pain   Closed compression fracture of body of L1 vertebra ([address] )     Time reflects when diagnosis was documented in both MDM as applicable and the Disposition within this note     Time User Action Codes Description Comment    1/2/2023  1:32 AM Wiliam Brandt [M54 9] Back pain     1/2/2023  1:32 AM Wiliam Brandt [S32 010A] Closed compression fracture of body of L1 vertebra [facility])       ED Disposition     ED Disposition   Discharge    Condition   Stable    Date/Time   Mon Jan 2, 2023  1:32 AM    Comment   Eliazar Navarro discharge to home/self care                Follow-up Information    None         Discharge Medication List as of 1/2/2023  1:34 AM      START taking these medications    Details   ibuprofen (MOTRIN) 800 mg tablet Take 1 tablet (800 mg total) by mouth 3 (three) times a day, Starting Mon 1/2/2023, Normal      !! methocarbamol (ROBAXIN) 500 mg tablet Take 1 tablet (500 mg total) by mouth 2 (two) times a day, Starting Mon 1/2/2023, Normal      !! methocarbamol (ROBAXIN) 500 mg tablet Take 1 tablet (500 mg total) by mouth 2 (two) times a day, Starting Mon 1/2/2023, Normal      morphine (MSIR) 15 mg tablet Take 0 5 tablets (7 5 mg total) by mouth every 4 (four) hours as needed for severe pain for up to 10 doses Max Daily Amount: 45 mg, Starting Mon 1/2/2023, Normal       !! - Potential duplicate medications found  Please discuss with provider  CONTINUE these medications which have NOT CHANGED    Details   albuterol (PROVENTIL HFA,VENTOLIN HFA) 90 mcg/act inhaler Inhale 2 puffs every 4 (four) hours as needed for wheezing, Starting Sat 10/8/2022, Normal      benzonatate (TESSALON PERLES) 100 mg capsule Take 1 capsule (100 mg total) by mouth 3 (three) times a day as needed for cough, Starting Fri 10/7/2022, Normal      fluticasone-vilanterol (BREO ELLIPTA) 100-25 mcg/inh inhaler Inhale 1 puff daily Rinse mouth after use   Do not start before October 8, 2022 , Starting Sat 10/8/2022, Normal      guaiFENesin (MUCINEX) 600 mg 12 hr tablet Take 1 tablet (600 mg total) by mouth 2 (two) times a day, Starting Fri 10/7/2022, Normal      ipratropium-albuterol (DUO-NEB) 0 5-2 5 mg/3 mL nebulizer solution Take 3 mL by nebulization 4 (four) times a day, Starting Fri 10/7/2022, Normal      nicotine (NICODERM CQ) 14 mg/24hr TD 24 hr patch Place 1 patch on the skin daily Do not start before October 8, 2022 , Starting Sat 10/8/2022, Normal      pantoprazole (PROTONIX) 40 mg tablet Take 1 tablet (40 mg total) by mouth daily, Starting Sat 10/8/2022, Normal cyclobenzaprine (FLEXERIL) 10 mg tablet Take 1 tablet (10 mg total) by mouth 3 (three) times a day as needed for muscle spasms, Starting Fri 10/7/2022, Normal      predniSONE 10 mg tablet Multiple Dosages:Starting Fri 10/7/2022, Until Sat 10/8/2022 at 2359, THEN Starting Sun 10/9/2022, Until Tue 10/11/2022 at 2359, THEN Starting Wed 10/12/2022, Until Fri 10/14/2022 at 2359, THEN Starting Sat 10/15/2022, Until Mon 10/17/2022 at 2359Ta ke 4 tablets (40 mg total) by mouth daily for 2 days, THEN 3 tablets (30 mg total) daily for 3 days, THEN 2 tablets (20 mg total) daily for 3 days, THEN 1 tablet (10 mg total) daily for 3 days  , Normal                 PDMP Review       Value Time User    PDMP Reviewed  Yes 10/6/2022  3:44 PM Anastasia Bowens MD          ED Provider  Electronically Signed by           Mertha Cushing, MD  01/09/23 7409

## 2023-01-09 NOTE — OCCUPATIONAL THERAPY NOTE
Occupational Therapy Evaluation        Patient Name: Seth Mcleod  GAMSQ'C Date: 1/9/2023 01/09/23 0914   OT Last Visit   OT Visit Date 01/09/23   Note Type   Note type Evaluation   Pain Assessment   Pain Assessment Tool 0-10   Pain Score 9   Pain Location/Orientation Orientation: Lower; Location: Back; Location: Leg   Hospital Pain Intervention(s) Repositioned; Ambulation/increased activity; Emotional support; Rest   Multiple Pain Sites Yes   Pain 2   Pain Score 2 6   Pain Location/Orientation 2 Location: Head   Restrictions/Precautions   Weight Bearing Precautions Per Order Yes  (per ortho)   RLE Weight Bearing Per Order WBAT   LLE Weight Bearing Per Order WBAT   Braces or Orthoses TLSO   Other Precautions Multiple lines;Telemetry;O2;Fall Risk;Pain;Spinal precautions  (back safety precautions; log roll technique  2L O2 NC)   Home Living   Type of Home Mobile home   Home Layout One level;Stairs to enter with rails; Performs ADLs on one level  (4-5 LISA)   Bathroom Shower/Tub Tub/shower unit   Bathroom Toilet Standard   Bathroom Equipment Grab bars in shower   [address] Other (Comment)  (no AD in use at baseline)   Additional Comments pt reports home O2 since hospitalized with the Flu in Dec 2022   Prior Function   Level of [place] Independent with ADLs; Independent with functional mobility; Independent with IADLS   Lives With Spouse; Family   Receives Help From Family;Friend(s)   IADLs Independent with driving;Family/Friend/Other provides meals; Independent with medication management   Falls in the last 6 months 1 to 4   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 3  Moderate Assistance   [address] 5  [address] 3  Moderate [address]  5  Supervision/Setup   Functional Assistance 5  Supervision/Setup   Bed Mobility   Sit to Supine 4  Minimal assistance   Additional items Assist x 1;HOB elevated; Bedrails; Increased time required   Additional Comments education on log roll technique   Transfers   Sit to Stand 4  Minimal assistance   Additional items Assist x 1; Armrests; Increased time required;Verbal cues   Stand to Sit 4  Minimal assistance   Additional items Assist x 1; Armrests; Increased time required;Verbal cues   Functional Mobility   Functional Mobility 4  Minimal assistance   Additional items   (will benfit from the use of a RW)   Balance   Static Sitting Fair +   Dynamic Sitting Fair   Static Standing Fair -   Dynamic Standing Poor +   Activity Tolerance   Activity Tolerance Patient limited by pain; Patient limited by fatigue   Nurse Made Aware RASHARD Siddiqui made aware of therapy outcome   RUE Assessment   RUE Assessment WFL   LUE Assessment   LUE Assessment WFL   Hand Function   Gross Motor Coordination Functional   Fine Motor Coordination Functional   Sensation   Light Touch No apparent deficits  (BUEs)   Psychosocial   Psychosocial (WDL) X   Patient Behaviors/Mood Tearful   Ability to Express Feelings Able to express   Ability to Express Needs Able to express   Ability to Express Thoughts Able to express   Ability to Understand Others Understands   Cognition   Overall Cognitive Status [facility]   Arousal/Participation Alert; Responsive   Attention Within functional limits   Orientation Level Oriented X4   Memory Within functional limits   Following Commands Follows all commands and directions without difficulty   Assessment   Limitation Decreased ADL status; Decreased endurance;Decreased self-care trans;Decreased high-level ADLs   Prognosis Good   Assessment Patient is a 62 y o  female seen for OT evaluation s/p admit to [address] [facility] on 1/7/2023 w/Compression fracture of L1 vertebra ([facility] [address] )  Commorbidities affecting patient's functional performance at time of assessment include: compression fx L1, COPD, ambulatory dysfunction, chest pain, COPD, smoker, hypokalemia, respiratory failure with hypoxia, sepsis, fall systemic lupus erythematosus with lung involvement  Orders placed for OT evaluation and treatment  Performed at least two patient identifiers during session including name and wristband  Prior to admission, Patient reported she is independent ADLs and IADLs, lives with her  and family in a mobile home with 4-5 LISA  patient ambulates with no AD, drives and works at [employer]  Personal factors affecting patient at time of initial evaluation include: steps to enter, difficulty performing ADLs and difficulty performing IADLs  Upon evaluation, patient requires supervision and set up assist for UB ADLs, moderate assist for LB ADLs  Occupational performance is affected by the following deficits: degenerative arthritic joint changes, dynamic sit/ stand balance deficit with poor standing tolerance time for self care and functional mobility, decreased activity tolerance, increased pain and orthopedic restrictions  Patient to benefit from continued Occupational Therapy treatment while in the hospital to address deficits as defined above and maximize level of functional independence with ADLs and functional mobility  Occupational Performance areas to address include: dressing, toilet hygiene, transfer to all surfaces, functional mobility, emergency response and IADLs: safety procedures   From OT standpoint, recommendation at time of d/c would be Home with family support, [address] and Home OT  Goals   Patient Goals to derease pain   Plan   Treatment Interventions ADL retraining; Endurance training;Patient/family training;Equipment evaluation/education; Compensatory technique education;Continued evaluation   Goal Expiration Date 01/16/23   OT Frequency 3-5x/wk   Recommendation   OT Discharge Recommendation Home with home health rehabilitation   AM-PAC Daily Activity Inpatient   Lower Body Dressing 2   Bathing 2   Toileting 3   Upper Body Dressing 3   Grooming 4   Eating 4   Daily Activity Raw Score 18   Daily Activity Standardized Score (Calc for Raw Score >=11) 38 66   AM-PAC Applied Cognition Inpatient   Following a Speech/Presentation 4   Understanding Ordinary Conversation 4   Taking Medications 4   Remembering Where Things Are Placed or Put Away 4   Remembering List of 4-5 Errands 4   Taking Care of Complicated Tasks 4   Applied Cognition Raw Score 24   Applied Cognition Standardized Score 62 21   Barthel Index   Feeding 10   Bathing 0   Grooming Score 0   Dressing Score 5   Bladder Score 10   Bowels Score 10   Toilet Use Score 5   Transfers (Bed/Chair) Score 5   Mobility (Level Surface) Score 0   Stairs Score 0   Barthel Index Score 45     Occupational therapy Goals: In 7-10 days    Patient will verbalize/ demonstrate back safety precautions during functional activity  with No verbal cues    Patient will demonstrate correct of  use of long handle equipment to complete LB ADLs @ Mod I  level  Patient will increase standing tolerance time to 5 minutes with  Unilateral UE support to complete sink level ADLs @  Mod I  level    Patient will restore  independence in bed mobility using Log Rolling technique to transfer OOB at Mod I  level  Patient will verbalize 3  safety awareness/ body mechanics principles to  prevent falls in the home setting  Patient will complete UB/LB ADLs @ Mod I level      Patient will complete light meal prep @ Mod I level  Patient will complete transfers to all surfaces @ Mod I level with AD as indicated

## 2023-01-09 NOTE — ED NOTES
RN went to bring pt PRN pain medication that was requested, upon entering room patient was asleep  RN had to shake pt to wake up, pt then became upset and crying out that she was in pain  After medicating pt returned to her position and sat on phone, no outward signs of distress or discomfort        Ad Alvarez RN  01/09/23 4952

## 2023-01-09 NOTE — ASSESSMENT & PLAN NOTE
· Diagnosed 1/1 with L1 compression fracture after lifting coworker at work to help them and hearing a "pop in my back "  · Reports no relief of pain despite receiving p o  morphine and Robaxin from ED on 1/1  · TLSO bracing  · Plan for outpatient follow-up with orthopedic/comprehensive spine team  · As needed aqua K pad, lidocaine patch, pain medications

## 2023-01-09 NOTE — PLAN OF CARE
Problem: OCCUPATIONAL THERAPY ADULT  Goal: Performs self-care activities at highest level of function for planned discharge setting  See evaluation for individualized goals  Description: Treatment Interventions: ADL retraining, Endurance training, Patient/family training, Equipment evaluation/education, Compensatory technique education, Continued evaluation          See flowsheet documentation for full assessment, interventions and recommendations  Note: Limitation: Decreased ADL status, Decreased endurance, Decreased self-care trans, Decreased high-level ADLs  Prognosis: Good  Assessment: Patient is a 62 y o  female seen for OT evaluation s/p admit to [address] [facility] on 1/7/2023 w/Compression fracture of L1 vertebra ([facility] [address] )  Commorbidities affecting patient's functional performance at time of assessment include: compression fx L1, COPD, ambulatory dysfunction, chest pain, COPD, smoker, hypokalemia, respiratory failure with hypoxia, sepsis, fall systemic lupus erythematosus with lung involvement  Orders placed for OT evaluation and treatment  Performed at least two patient identifiers during session including name and wristband  Prior to admission, Patient reported she is independent ADLs and IADLs, lives with her  and family in a mobile home with 4-5 LISA  patient ambulates with no AD, drives and works at [employer]  Personal factors affecting patient at time of initial evaluation include: steps to enter, difficulty performing ADLs and difficulty performing IADLs  Upon evaluation, patient requires supervision and set up assist for UB ADLs, moderate assist for LB ADLs  Occupational performance is affected by the following deficits: degenerative arthritic joint changes, dynamic sit/ stand balance deficit with poor standing tolerance time for self care and functional mobility, decreased activity tolerance, increased pain and orthopedic restrictions    Patient to benefit from continued Occupational Therapy treatment while in the hospital to address deficits as defined above and maximize level of functional independence with ADLs and functional mobility  Occupational Performance areas to address include: dressing, toilet hygiene, transfer to all surfaces, functional mobility, emergency response and IADLs: safety procedures  From OT standpoint, recommendation at time of d/c would be Home with family support, [address] and Home OT       OT Discharge Recommendation: Home with home health rehabilitation

## 2023-01-09 NOTE — CASE MANAGEMENT
Case Management Assessment & Discharge Planning Note    Patient name Nader Govern  [facility] LINDSAY Pool Room/LINDSAY MRN [id number]  : 1965 Date 2023       Current Admission Date: 2023  Current Admission Diagnosis:Compression fracture of L1 vertebra [facility])   Patient Active Problem List    Diagnosis Date Noted   • Ambulatory dysfunction 2023   • Compression fracture of L1 vertebra ([facility] [address] ) 2023   • COPD (chronic obstructive pulmonary disease) ([facility] [address] ) 2023   • Sepsis ([facility] [address] ) 2023   • Fall 2023   • Systemic lupus erythematosus with lung involvement ([facility] ) 12/10/2022   • Respiratory failure with hypoxia ([facility] ) 10/06/2022   • Hypokalemia 10/04/2022   • Chest pain, unspecified 2022   • Chronic obstructive pulmonary disease with acute exacerbation ([facility] ) 2022   • Smoker 2022      LOS (days): 0  Geometric Mean LOS (GMLOS) (days):   Days to GMLOS:     OBJECTIVE:              Current admission status: Observation       Preferred Pharmacy:   [facility]/pharm[facility] #[id number]- [address]HY ALMANZA - [address]  [address]  [address] [address], [address] [facility]  Phone: [phone] Fax: [phone]    [address] [place] [address] [address] [place]  [address]  [address] [place] [address] [address] [address]  Phone: [phone] Fax: [phone]    [facility] [address], [address] [address]  Phone: [phone] Fax: [phone]    Primary Care Provider: Larisa Wren DO    Primary Insurance: WORKERS COMPENSATION  Secondary Insurance:     ASSESSMENT:  Lacey 26 Proxies    There are no active Health Care Proxies on file         Advance Directives  Does patient have a [address] [place] [address] Avenue?: No  Was patient offered paperwork?: Yes  Does patient currently have a Health Care decision maker?: Yes, please see Health Care Proxy section  Does patient have Advance Directives?: No  Was patient offered paperwork?: Yes (declined)  Primary Contact:  Matheus Roberts         Readmission Root Cause  30 Day Readmission: No    Patient Information  Admitted from[de-identified] Home  Mental Status: Alert  During Assessment patient was accompanied by: Not accompanied during assessment  Assessment information provided by[de-identified] Patient  Primary Caregiver: Self  Support Systems: Spouse/significant other, Children, Family members  [place] of Residence: [place] [address] do you live in?: [address] entry access options   Select all that apply : Stairs  Number of steps to enter home : 3  Do the steps have railings?: Yes  Type of Current Residence: [facility]  In the last 12 months, was there a time when you were not able to pay the mortgage or rent on time?: No  In the last 12 months, how many places have you lived?: 1  In the last 12 months, was there a time when you did not have a steady place to sleep or slept in a shelter (including now)?: No  Homeless/housing insecurity resource given?: No  Living Arrangements: Lives w/ Spouse/significant other, Lives w/ Extended Family (lives with  Matheus Roberts, 4 children, and her brother)  Is patient a ?: No    Activities of Daily Living Prior to Admission  Functional Status: Independent  Completes ADLs independently?: Yes  Ambulates independently?: Yes  Does patient use assisted devices?: No  Does patient currently own DME?: No  Does patient have a history of Outpatient Therapy (PT/OT)?: No  Does the patient have a history of Short-Term Rehab?: No  Does patient have a history of [facility]?: Yes  [facility] have [facility]?: No         Patient Information Continued  Income Source: Unemployed  Does patient have prescription coverage?: Yes  Within the past 12 months, you worried that your food would run out before you got the money to buy more : Never true  Within the past 12 months, the food you bought just didn't last and you didn't have money to get more : Never true  Food insecurity resource given?: No  Does patient receive dialysis treatments?: No  Does patient have a history of substance abuse?: No  Does patient have a history of Mental Health Diagnosis?: No    PHQ 2/9 Screening   Reviewed PHQ 2/9 Depression Screening Score?: No    Means of Transportation  Means of Transport to Appts[de-identified] Family transport  In the past 12 months, has lack of transportation kept you from medical appointments or from getting medications?: No  In the past 12 months, has lack of transportation kept you from meetings, work, or from getting things needed for daily living?: No  Was application for public transport provided?: No        DISCHARGE DETAILS:    Discharge planning discussed with[de-identified] patient  Freedom of Choice: Yes  Comments - Freedom of Choice: PT recommended a walker-order placed w/[employer] and brought to pt, but she is refusing this DME  She does not feel that it is needed  Refusing VNA-states she has adequate family support with her , 4 children and her brother  CM contacted family/caregiver?: No- see comments (pt states he is here waiting to take her home  She will update him herself)  Were Treatment Team discharge recommendations reviewed with patient/caregiver?: Yes  Did patient/caregiver verbalize understanding of patient care needs?: Yes  Were patient/caregiver advised of the risks associated with not following Treatment Team discharge recommendations?: Yes    Contacts  Patient Contacts: Minerva Moreno  Relationship to Patient[de-identified] [address]         Is the patient interested in [facility] at discharge?: No    DME Referral Provided  Referral made for DME?: Yes  DME referral completed for the following items[de-identified] Obie Castleman  DME Supplier Name[de-identified] [employer] (but pt refused DME)    Other Referral/Resources/Interventions Provided:  Referral Comments: Pt refusing VNA services and walker    Feels she has adequate family support    Would you like to participate in our [address] service program?  : No - Declined

## 2023-01-09 NOTE — ED NOTES
This RN attempted to give pt another breathing treatment at this time and pt refused again  Pt continues to complain about severe pain in her back and not being able to get comfortable  Pt still agitated and verbally aggressive towards this RN  RN offered to reach out to treatment team again and pt denied        Willian Laboy RN  01/09/23 Tessa Singh 4 Jacklyn Cox  01/09/23 6090

## 2023-01-09 NOTE — PLAN OF CARE
Problem: PHYSICAL THERAPY ADULT  Goal: Performs mobility at highest level of function for planned discharge setting  See evaluation for individualized goals  Description: Treatment/Interventions: LE strengthening/ROM, Functional transfer training, Therapeutic exercise, Endurance training, Patient/family training, Equipment eval/education, Bed mobility, Gait training, Elevations, Spoke to nursing, OT  Equipment Recommended: Ness Domínguez (LEXIE)       See flowsheet documentation for full assessment, interventions and recommendations  1/9/2023 1035 by Claudette Lu, PT  Note: Prognosis: Good  Problem List: Decreased strength, Decreased endurance, Impaired balance, Decreased mobility, Orthopedic restrictions, Pain, Obesity  Assessment: Pt is 62 y o  female seen for PT evaluation on 1/9/2023 s/p admit to [facility] on 1/7/2023 w/ Compression fracture of L1 vertebra ([facility] [address] )  PT was consulted to assess pt's functional mobility and d/c needs  Order placed for PT eval and tx  PTA, pt resides with spouse and children in mobile home with 4-5 LISA, ambulates without AD, works part time as PCA  At time of eval, pt requiring min A for bed mobility, transfers, short gait trial with use of RW  Upon evaluation, pt presenting with impaired functional mobility d/t decreased strength, decreased ROM, decreased endurance, impaired balance, decreased mobility, pain and activity intolerance  Pertinent PMHx and current co-morbidities affecting pt's physical performance at time of assessment include: compression fx L1, COPD, ambulatory dysfunction, chest pain, COPD, smoker, hypokalemia, respiratory failure with hypoxia, sepsis, fall systemic lupus erythematosus with lung involvement  Personal factors affecting pt at time of eval include: ambulating w/ assistive device, stairs to enter home, inability to navigate community distances and positive fall history   The following objective measures performed on IE also reveal limitations: Barthel Index: 45/100, Modified [place]: 4 (moderate/severe disability) and AM-PAC 6-Clicks: 90/27  Pt's clinical presentation is currently unstable/unpredictable seen in pt's presentation of abnormal lab value(s), need for input for task focus and mobility technique, back pain impacting overall mobility status and ongoing medical assessment  Overall, pt's rehab potential and prognosis to return to PLOF is good as impacted by objective findings, warranting pt to receive further skilled PT interventions to address identified impairments, activity limitation(s), and participation restriction(s)  Pt to benefit from continued PT tx to address deficits as defined above and maximize level of functional independent mobility  From PT/mobility standpoint, recommendation at time of d/c would be post acute rehabilitation services pending progress in order to facilitate return to PLOF  Barriers to Discharge: Inaccessible home environment, Decreased caregiver support     PT Discharge Recommendation: Post acute rehabilitation services    See flowsheet documentation for full assessment  1/9/2023 1034 by Noni Roberts PT  Note: Prognosis: Good  Problem List: Decreased strength, Decreased endurance, Impaired balance, Decreased mobility, Orthopedic restrictions, Pain, Obesity  Assessment: Pt is 62 y o  female seen for PT evaluation on 1/9/2023 s/p admit to [place] on 1/7/2023 w/ Compression fracture of L1 vertebra ([address] )  PT was consulted to assess pt's functional mobility and d/c needs  Order placed for PT eval and tx  PTA, pt resides with spouse and children in mobile home with 4-5 LISA, ambulates without AD, works part time as PCA  At time of eval, pt requiring min A for bed mobility, transfers, short gait trial with use of RW  Upon evaluation, pt presenting with impaired functional mobility d/t decreased strength, decreased ROM, decreased endurance, impaired balance, decreased mobility, pain and activity intolerance  Pertinent PMHx and current co-morbidities affecting pt's physical performance at time of assessment include: compression fx L1, COPD, ambulatory dysfunction, chest pain, COPD, smoker, hypokalemia, respiratory failure with hypoxia, sepsis, fall systemic lupus erythematosus with lung involvement  Personal factors affecting pt at time of eval include: ambulating w/ assistive device, stairs to enter home, inability to navigate community distances and positive fall history  The following objective measures performed on IE also reveal limitations: Barthel Index: 45/100, Modified [place]: 4 (moderate/severe disability) and AM-PAC 6-Clicks: 08/79  Pt's clinical presentation is currently unstable/unpredictable seen in pt's presentation of abnormal lab value(s), need for input for task focus and mobility technique, back pain impacting overall mobility status and ongoing medical assessment  Overall, pt's rehab potential and prognosis to return to PLOF is good as impacted by objective findings, warranting pt to receive further skilled PT interventions to address identified impairments, activity limitation(s), and participation restriction(s)  Pt to benefit from continued PT tx to address deficits as defined above and maximize level of functional independent mobility  From PT/mobility standpoint, recommendation at time of d/c would be post acute rehabilitation services pending progress in order to facilitate return to PLOF  Barriers to Discharge: Inaccessible home environment, Decreased caregiver support     PT Discharge Recommendation: Post acute rehabilitation services    See flowsheet documentation for full assessment

## 2023-01-09 NOTE — DISCHARGE SUMMARY
[address] [facility]  Discharge- Kia Kincaid 1965, 62 y o  female MRN: [id number]  Unit/Bed#: ED 16 Encounter: [id number]  Primary Care Provider: Jeff Sethi DO   Date and time admitted to hospital: 1/7/2023  8:41 PM    * Compression fracture of L1 vertebra (HCC)  Assessment & Plan  · Diagnosed 1/1 with L1 compression fracture after lifting coworker at work to help them and hearing a "pop in my back "  · Reports no relief of pain despite receiving p o  morphine and Robaxin from ED on 1/1  · TLSO bracing  · Plan for outpatient follow-up with orthopedic/comprehensive spine team  · As needed aqua K pad, lidocaine patch, pain medications    Ambulatory dysfunction  Assessment & Plan  · Secondary to L1 compression fracture  · Able to ambulate, but difficult due to pain  · PT/OT eval  · See plan under compression fracture of L1 vertebrae    COPD (chronic obstructive pulmonary disease) ([facility] [address] )  Assessment & Plan  · Currently does not appear to be in exacerbation  · Continue home DuoNeb treatments 4 times daily, Breo inhaler daily    Medical Problems     Resolved Problems  Date Reviewed: 1/8/2023   None       Discharging Physician / Practitioner: Michelle Zamora PA-C  PCP: Jeff Sethi DO  Admission Date:   Admission Orders (From admission, onward)     Ordered        01/07/23 2305  Place in Observation  Once                      Discharge Date: 01/09/23    Consultations During Hospital Stay:  · Ortho surg, pt    Procedures Performed:   · None    Significant Findings / Test Results:   XR spine lumbar 2 or 3 views injury   Final Result by Sherley De Souza MD (01/08 0900)   Addendum (preliminary) 1 of 1 by Sherley De Souza MD (01/08 0900)   ADDENDUM:      The study was marked in EPIC for immediate notification (changed from    significant)        Final      Progression of a compression fracture involving L1 with approximately 40%    loss of vertebral body height, progressed from the recent CT of 1/1/2023  The study was marked in EPIC for significant notification  Workstation performed: [id number]           Results Reviewed     Procedure Component Value Units Date/Time    COVID/FLU/RSV [[id number]]  (Normal) Collected: 01/07/23 9910    Lab Status: Final result Specimen: Nares from Nose Updated: 01/08/23 0010     SARS-CoV-2 Negative     INFLUENZA A PCR Negative     INFLUENZA B PCR Negative     RSV PCR Negative    Narrative:      FOR PEDIATRIC PATIENTS - copy/paste COVID Guidelines URL to browser: [email or web address]  [employer]    SARS-CoV-2 assay is a Nucleic Acid Amplification assay intended for the  qualitative detection of nucleic acid from SARS-CoV-2 in nasopharyngeal  swabs  Results are for the presumptive identification of SARS-CoV-2 RNA  Positive results are indicative of infection with SARS-CoV-2, the virus  causing COVID-19, but do not rule out bacterial infection or co-infection  with other viruses  Laboratories within the [place] and its  territories are required to report all positive results to the appropriate  public health authorities  Negative results do not preclude SARS-CoV-2  infection and should not be used as the sole basis for treatment or other  patient management decisions  Negative results must be combined with  clinical observations, patient history, and epidemiological information  This test has not been FDA cleared or approved  This test has been authorized by FDA under an Emergency Use Authorization  (EUA)  This test is only authorized for the duration of time the  declaration that circumstances exist justifying the authorization of the  emergency use of an in vitro diagnostic tests for detection of SARS-CoV-2  virus and/or diagnosis of COVID-19 infection under section 564(b)(1) of  the Act, 21 U  S C  813GGP-4(G)(6), unless the authorization is terminated  or revoked sooner   The test has been validated but independent review by FDA  and CLIA is pending  Test performed using [employer] Gene[employer]pert: This RT-PCR assay targets N2,  a region unique to SARS-CoV-2  A conserved region in the E-gene was chosen  for pan-Sarbecovirus detection which includes SARS-CoV-2  According to CMS-2020-01-R, this platform meets the definition of high-throughput technology      Comprehensive metabolic panel [[id number]]  (Abnormal) Collected: 01/07/23 2128    Lab Status: Final result Specimen: Blood from Arm, Right Updated: 01/07/23 2154     Sodium 143 mmol/L      Potassium 3 3 mmol/L      Chloride 105 mmol/L      CO2 30 mmol/L      ANION GAP 8 mmol/L      BUN 14 mg/dL      Creatinine 0 86 mg/dL      Glucose 144 mg/dL      Calcium 8 8 mg/dL      Corrected Calcium 9 4 mg/dL      AST 13 U/L      ALT 18 U/L      Alkaline Phosphatase 114 U/L      Total Protein 7 1 g/dL      Albumin 3 2 g/dL      Total Bilirubin 0 31 mg/dL      eGFR 75 ml/min/1 73sq m     Narrative:      [place] guidelines for Chronic Kidney Disease (CKD):   •  Stage 1 with normal or high GFR (GFR > 90 mL/min/1 73 square meters)  •  Stage 2 Mild CKD (GFR = 60-89 mL/min/1 73 square meters)  •  Stage 3A Moderate CKD (GFR = 45-59 mL/min/1 73 square meters)  •  Stage 3B Moderate CKD (GFR = 30-44 mL/min/1 73 square meters)  •  Stage 4 Severe CKD (GFR = 15-29 mL/min/1 73 square meters)  •  Stage 5 End Stage CKD (GFR <15 mL/min/1 73 square meters)  Note: GFR calculation is accurate only with a steady state creatinine    CBC and differential [[id number]] Collected: 01/07/23 2128    Lab Status: Final result Specimen: Blood from Arm, Right Updated: 01/07/23 2134     WBC 8 27 Thousand/uL      RBC 4 06 Million/uL      Hemoglobin 11 7 g/dL      Hematocrit 37 1 %      MCV 91 fL      MCH 28 8 pg      MCHC 31 5 g/dL      RDW 14 7 %      MPV 8 9 fL      Platelets 839 Thousands/uL      nRBC 0 /100 WBCs      Neutrophils Relative 72 %      Immat GRANS % 1 %      Lymphocytes Relative 15 %      Monocytes Relative 8 %      Eosinophils Relative 4 %      Basophils Relative 0 %      Neutrophils Absolute 5 96 Thousands/µL      Immature Grans Absolute 0 09 Thousand/uL      Lymphocytes Absolute 1 23 Thousands/µL      Monocytes Absolute 0 65 Thousand/µL      Eosinophils Absolute 0 31 Thousand/µL      Basophils Absolute 0 03 Thousands/µL             Incidental Findings:   · None     Test Results Pending at Discharge (will require follow up): · None     Outpatient Tests Requested:  · None    Complications:  None    Reason for Admission: back pain    Hospital Course:   Jung Rosenbaum is a 62 y o  female patient with pmh obesity, copd, smoker, fibromyalgia, lupus, achalasia, chronic back pain who originally presented to the hospital on 1/7/2023 due to back pain  Had prior eval in the ED 1/1/23 because of feeling a "pop in the back" with acute on chronic back pain with strain at work  She had acute compression fracture diagnosed on imaging in the ED and was dc to home with referral to comprehensive spine program and 5 pills of morphine sulfate 15 mg  She returned to the ED for the same back pain 1/7/2023 and was admitted for pain management  Repeat imaging showed the same compression fx  Ortho surg rec TSLO brace and analgesia  Patient was instructed by PT on the bracing and provided with Rx for Ibuprofen 800 mg q8h prn (disp 60 tablets), robaxin 500 mg bid (disp 40 tablets), Lidoderm patch daily (disp 30 patch) and oxycodone 5mg IR q4h prn (disp 25 tablets)  She can follow up with ortho as needed for this problem  She was again referred to PT and comprehensive spine PT on dc  She needs to wear the brace to support the area and then work with PT to strengthen the paraspinal muscles and support the area in the long term, improve stability and overall improve her pain  I explained this to her  She is amenable to dc to home today   I called the PCP office to alert him that she was dc from the hospital today and needs fu op  Please see above list of diagnoses and related plan for additional information  Condition at Discharge: stable    Discharge Day Visit / Exam:   Subjective:  Complaining of back pain in the left side of the lumbar spine which is stabbing and radiates under the back of the ribs on both sides of her back  Also causing numbness into the right thigh which is new with this injury  States this is all acute not any component of her chronic back pain  Denies urinary incontinence or fecal incontinence  I educated her about the prognosis and the way these compression fractures can come up (?osteoporosis) and they way they become healed/pain decreases over time with bracing and PT  Vitals: Blood Pressure: 115/71 (01/09/23 1047)  Pulse: 72 (01/09/23 1047)  Temperature: 98 2 °F (36 8 °C) (01/09/23 0007)  Temp Source: Oral (01/09/23 0007)  Respirations: 18 (01/09/23 0822)  Height: 5' 1" (154 9 cm) (01/08/23 0724)  Weight - Scale: 83 9 kg (185 lb) (01/08/23 0724)  SpO2: 96 % (01/09/23 1047)  Exam:   Physical Exam  Vitals and nursing note reviewed  HENT:      Head: Normocephalic and atraumatic  Nose: Nose normal       Mouth/Throat:      Mouth: Mucous membranes are moist    Eyes:      Conjunctiva/sclera: Conjunctivae normal    Cardiovascular:      Rate and Rhythm: Normal rate and regular rhythm  Pulses: Normal pulses  Heart sounds: Normal heart sounds  Pulmonary:      Effort: Pulmonary effort is normal       Breath sounds: Normal breath sounds  Abdominal:      General: Bowel sounds are normal       Palpations: Abdomen is soft  Musculoskeletal:         General: No deformity  Cervical back: Normal range of motion  No rigidity  Comments: Lb le rom limited from pain    Lumbar spinal process with tenderness as well as para spinal tenderness   Skin:     Findings: No bruising or rash        Comments: No edema in the back or the legs   Neurological:      General: No focal deficit present  Mental Status: She is alert and oriented to person, place, and time  Psychiatric:         Mood and Affect: Mood normal          Behavior: Behavior normal           Discussion with Family: Patient declined call to   Discharge instructions/Information to patient and family:   See after visit summary for information provided to patient and family  Provisions for Follow-Up Care:  See after visit summary for information related to follow-up care and any pertinent home health orders  Disposition:   Home    Planned Readmission: none     Discharge Statement:  I spent 45 minutes discharging the patient  This time was spent on the day of discharge  I had direct contact with the patient on the day of discharge  Greater than 50% of the total time was spent examining patient, answering all patient questions, arranging and discussing plan of care with patient as well as directly providing post-discharge instructions  Additional time then spent on discharge activities  Discharge Medications:  See after visit summary for reconciled discharge medications provided to patient and/or family        **Please Note: This note may have been constructed using a voice recognition system**

## 2023-01-09 NOTE — ED NOTES
RN offered pt breathing treatment as scheduled pt refused and stated "I am in too much pain and do not need it at this time"  Pt also became agitated and stated that she wanted to see a Dr because her urine was dark and would like to know why and would like different pain medication  This RN offered to reach out to the dr on call to address pain management and pt responded " It is not worth it   I do not want to get yelled at, don't bother "      Gil Cunha, RASHARD  01/09/23 [address] [facility], [address] [place] [address]  01/09/23 8098

## 2023-01-09 NOTE — RESPIRATORY THERAPY NOTE
RT Protocol Note  Hernan Lawson 62 y o  female MRN: [id number]  Unit/Bed#: ED 16 Encounter: [id number]    Assessment    Principal Problem:    Compression fracture of L1 vertebra (HCC)  Active Problems:    COPD (chronic obstructive pulmonary disease) (HCC)    Ambulatory dysfunction      Home Pulmonary Medications:  Nebs/inhalers    Home Devices/Therapy: Home O2    Past Medical History:   Diagnosis Date    Achalasia     Back pain     Fibromyalgia     Lupus ([address] )      Social History     Socioeconomic History    Marital status: /Civil Union     Spouse name: None    Number of children: None    Years of education: None    Highest education level: None   Occupational History    None   Tobacco Use    Smoking status: Every Day     Packs/day: 0 50     Types: Cigarettes    Smokeless tobacco: Never   Vaping Use    Vaping Use: Never used   Substance and Sexual Activity    Alcohol use: Never    Drug use: Never    Sexual activity: Yes   Other Topics Concern    None   Social History Narrative    None     Social Determinants of Health     Financial Resource Strain: Not on file   Food Insecurity: Not on file   Transportation Needs: Not on file   Physical Activity: Not on file   Stress: Not on file   Social Connections: Not on file   Intimate Partner Violence: Not on file   Housing Stability: Not on file       Subjective         Objective    Physical Exam:   Assessment Type: Pre-treatment  General Appearance: Alert, Awake  Respiratory Pattern: Normal  Chest Assessment: Chest expansion symmetrical  Bilateral Breath Sounds: Diminished  Cough: None  O2 Device: NC    Vitals:  Blood pressure 105/52, pulse 62, temperature 98 2 °F (36 8 °C), temperature source Oral, resp  rate 18, height 5' 1" (1 549 m), weight 83 9 kg (185 lb), SpO2 97 %  Imaging and other studies: I have personally reviewed pertinent reports        O2 Device: NC     Plan    Respiratory Plan: No distress/Pulmonary history, Home Bronchodilator Patient pathway        Resp Comments: Pt resting comfortably in bed in no apparent distress  Offers no complaints at this time  Pt states she takes nebs at home and has a PMH of COPD  Will cont w/ sherin nebs  Tx given

## 2023-01-09 NOTE — PHYSICAL THERAPY NOTE
Physical Therapy Evaluation   Time in: 850  Time out: 907  Total evaluation time: 17 minutes    Patient's Name: Ross Chávez    Admitting Diagnosis  SOB (shortness of breath) [R06 02]  Pain [R52]    Problem List  Patient Active Problem List   Diagnosis   • Chest pain, unspecified   • Chronic obstructive pulmonary disease with acute exacerbation (HCC)   • Smoker   • Hypokalemia   • Respiratory failure with hypoxia (HCC)   • Compression fracture of L1 vertebra ([facility])   • COPD (chronic obstructive pulmonary disease) ([facility] )   • Sepsis ([facility] )   • Fall   • Systemic lupus erythematosus with lung involvement ([facility] )   • Ambulatory dysfunction       Past Medical History  Past Medical History:   Diagnosis Date   • Achalasia    • Back pain    • Fibromyalgia    • Lupus ([facility] )        Past Surgical History  Past Surgical History:   Procedure Laterality Date   • HYSTERECTOMY     • NECK SURGERY         PT performed at least 2 patient identifiers during session: Name and wristband  01/09/23 0852   PT Last Visit   PT Visit Date 01/09/23   Note Type   Note type Evaluation   Pain Assessment   Pain Assessment Tool 0-10   Pain Score 9   Pain Location/Orientation Orientation: Lower; Location: Back; Location: Leg   Hospital Pain Intervention(s) Repositioned; Ambulation/increased activity; Emotional support; Rest   Multiple Pain Sites Yes   Pain 2   Pain Score 2 6   Pain Location/Orientation 2 Location: Head   Patient's Stated Pain Goal 2   (RN made aware of session outcome/recs)   Restrictions/Precautions   Weight Bearing Precautions Per Order Yes  (per ortho)   RLE Weight Bearing Per Order WBAT   LLE Weight Bearing Per Order WBAT   Braces or Orthoses TLSO   Other Precautions Multiple lines;Telemetry;O2;Fall Risk;Pain;Spinal precautions  (back safety precautions; log roll technique  2L O2 NC)   Home Living   Type of Home Mobile home   Home Layout One level;Stairs to enter with rails; Performs ADLs on one level  (4-5 LISA)   Bathroom Shower/Tub Tub/shower unit   Bathroom Toilet Standard   Bathroom Equipment Grab bars in shower   [address] Other (Comment)  (no AD at baseline)   Additional Comments pt reports home O2 since hospitalized with the Flu in Dec 2022, notes she is supposed to wear continuously, but is using more prn   Prior Function   Level of [place] Independent with ADLs; Independent with functional mobility; Independent with IADLS   Lives With Spouse; Family   Receives Help From Family;Friend(s)   IADLs Independent with driving;Family/Friend/Other provides meals; Independent with medication management   Falls in the last 6 months 1 to 4   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[employer])   General   Family/Caregiver Present No   Cognition   Overall Cognitive Status WFL   Arousal/Participation Alert   Orientation Level Oriented X4   Memory Within functional limits   Following Commands Follows all commands and directions without difficulty   Comments pt agreeable to PT evaluation   RLE Assessment   RLE Assessment   (grossly 4-/5 observed with functional mobility)   LLE Assessment   LLE Assessment   (grossly 4-/5 observed with functional mobility)   Coordination   Movements are Fluid and Coordinated 1   Sensation WFL   Bed Mobility   Supine to Sit   (pt received EOB  upon arrival)   Sit to Supine 4  Minimal assistance   Additional items Assist x 1;HOB elevated; Bedrails; Increased time required  (pt placed self into quadraped position and returned self BTB)   Additional Comments education on log roll technique   Transfers   Sit to Stand 4  Minimal assistance   Additional items Assist x 1; Armrests; Increased time required;Verbal cues   Stand to Sit 4  Minimal assistance   Additional items Assist x 1; Armrests; Increased time required;Verbal cues    (SPO2 observed to be reading 85% on RA sp mobility, redonned 2L O2 NC, RN made aware)   Ambulation/Elevation   Gait pattern Decreased foot clearance; Antalgic; Wide BRAXTON; Short stride; Step to   Gait Assistance 4  Minimal assist   Additional items Assist x 1;Verbal cues; Tactile cues   Assistive Device Rolling walker   Distance 5' (limited d/t pain)   Stair Management Assistance Not tested   Balance   Static Sitting Fair +   Dynamic Sitting Fair   Static Standing Fair -   Dynamic Standing Poor +   Ambulatory Poor +   Endurance Deficit   Endurance Deficit Yes   Activity Tolerance   Activity Tolerance Patient limited by pain; Patient limited by fatigue   Medical Staff Made Aware care coordination with [facility]   Nurse Made Aware RN ESDRAS   Assessment   Prognosis Good   Problem List Decreased strength;Decreased endurance; Impaired balance;Decreased mobility;Orthopedic restrictions;Pain;Obesity   Assessment Pt is 62 y o  female seen for PT evaluation on 1/9/2023 s/p admit to [facility] on 1/7/2023 w/ Compression fracture of L1 vertebra ([facility] )  PT was consulted to assess pt's functional mobility and d/c needs  Order placed for PT eval and tx  PTA, pt resides with spouse and children in mobile home with 4-5 LISA, ambulates without AD, works part time as PCA  At time of eval, pt requiring min A for bed mobility, transfers, short gait trial with use of RW  Upon evaluation, pt presenting with impaired functional mobility d/t decreased strength, decreased ROM, decreased endurance, impaired balance, decreased mobility, pain and activity intolerance  Pertinent PMHx and current co-morbidities affecting pt's physical performance at time of assessment include: compression fx L1, COPD, ambulatory dysfunction, chest pain, COPD, smoker, hypokalemia, respiratory failure with hypoxia, sepsis, fall systemic lupus erythematosus with lung involvement  Personal factors affecting pt at time of eval include: ambulating w/ assistive device, stairs to enter home, inability to navigate community distances and positive fall history   The following objective measures performed on IE also reveal limitations: Barthel Index: 45/100, Modified [place]: 4 (moderate/severe disability) and AM-PAC 6-Clicks: 95/40  Pt's clinical presentation is currently unstable/unpredictable seen in pt's presentation of abnormal lab value(s), need for input for task focus and mobility technique, back pain impacting overall mobility status and ongoing medical assessment  Overall, pt's rehab potential and prognosis to return to PLOF is good as impacted by objective findings, warranting pt to receive further skilled PT interventions to address identified impairments, activity limitation(s), and participation restriction(s)  Pt to benefit from continued PT tx to address deficits as defined above and maximize level of functional independent mobility  From PT/mobility standpoint, recommendation at time of d/c would be post acute rehabilitation services pending progress in order to facilitate return to PLOF     Barriers to Discharge Inaccessible home environment;Decreased caregiver support   Goals   Patient Goals to decrease the pain   STG Expiration Date 01/19/23   Short Term Goal #1 In 7-10 days: Increase bilateral LE strength 1/2 grade to facilitate independent mobility, Perform all bed mobility tasks modified independent to decrease caregiver burden, Perform all transfers modified independent to improve independence, Ambulate > 150 ft  with least restrictive assistive device modified independent w/o LOB and w/ normalized gait pattern 100% of the time, Navigate 5 stairs modified independent with unilateral handrail to facilitate return to previous living environment, Increase all balance 1/2 grade to decrease risk for falls, Improve Barthel Index score to 60 or greater to facilitate independence, PT provider will perform functional balance assessment to determine fall risk and pt to don/doff TLSO independently and verbalize back safety precautions   PT Treatment Day 0   Plan   Treatment/Interventions LE strengthening/ROM; Functional transfer training; Therapeutic exercise; Endurance training;Patient/family training;Equipment eval/education; Bed mobility;Gait training;Elevations; Spoke to nursing;OT   PT Frequency 3-5x/wk   Recommendation   PT Discharge Recommendation Post acute rehabilitation services   Equipment Recommended Walker  (RW)   AM-PAC Basic Mobility Inpatient   Turning in Flat Bed Without Bedrails 3   Lying on Back to Sitting on Edge of Flat Bed Without Bedrails 3   Moving Bed to Chair 3   Standing Up From Chair Using Arms 3   Walk in Room 2   Climb 3-5 Stairs With Railing 2   Basic Mobility Inpatient Raw Score 16   Basic Mobility Standardized Score 38 32   Highest Level Of Mobility   JH-HLM Goal 5: Stand one or more mins   JH-HLM Achieved 5: Stand (1 or more minutes)   Modified [place] Scale   Modified [place] Scale 4   Barthel Index   Feeding 10   Bathing 0   Grooming Score 0   Dressing Score 5   Bladder Score 10   Bowels Score 10   Toilet Use Score 5   Transfers (Bed/Chair) Score 5   Mobility (Level Surface) Score 0   Stairs Score 0   Barthel Index Score 45       Efren Hooks, PT, DPT                        Orthotic Note            Date: 1/9/2023      Patient Name: Dionna Mcintyre       Reason for Consult:  Order placed for TLSO brace per orthopedics  "Weight-bear as tolerated bilateral lower extremity"  Recommendations:  Pt sized w/ [employer] with extension panels prior to PT mobility assessment, see PT evaluation for assessment findings  Pt educated on brace components, wearing schedule when OOB per MD recommendation, maintenance of brace, donning/doffing, skin inspection  Educated that brace may need to be repositioned throughout the day   Pt verbalized understanding, requiring assistance to don/doff brace w/ verbal instruction 100% of the time from therapist  Education to [facility] staff on same, including don/doffing, wearing schedule, skin inspection of NSG q shift as well as assessing circulation for appropriate fit of brace  Handout provided and reviewed with pt re back safety precautions (no bending, lifting, twisting), log roll technique, proper body mechanics, brace components/ don/doff instructions        Pratik Jackson, PT, DPT

## 2023-01-09 NOTE — ASSESSMENT & PLAN NOTE
Patient arrives from home via [facility] following a fall.  Patient reports tripping over his rug and landing face first.  Patient is on Xarelto, per EMS, blood pressure 183/98, paced rhythm, 98% on RA and 224 mg/dL for blood sugar.  Trauma alert called, Dr. Conner at bedside.  Patient is alert and oriented x4.  C-collar placed per Dr. Conner orders due to C-spine tenderness.  Patient arrives with nasal clip due to nose bleed, bleeding appears controlled, but will continue to assess.    · Secondary to L1 compression fracture  · Able to ambulate, but difficult due to pain  · PT/OT eval  · See plan under compression fracture of L1 vertebrae

## 2023-01-14 ENCOUNTER — APPOINTMENT (EMERGENCY)
Dept: CT IMAGING | Facility: HOSPITAL | Age: 58
End: 2023-01-14

## 2023-01-14 ENCOUNTER — APPOINTMENT (EMERGENCY)
Dept: RADIOLOGY | Facility: HOSPITAL | Age: 58
End: 2023-01-14

## 2023-01-14 ENCOUNTER — HOSPITAL ENCOUNTER (INPATIENT)
Facility: HOSPITAL | Age: 58
LOS: 1 days | Discharge: HOME | End: 2023-01-17
Attending: EMERGENCY MEDICINE | Admitting: INTERNAL MEDICINE

## 2023-01-14 DIAGNOSIS — J44.1 COPD EXACERBATION (HCC): ICD-10-CM

## 2023-01-14 DIAGNOSIS — S32.010A COMPRESSION FRACTURE OF L1 VERTEBRA (HCC): ICD-10-CM

## 2023-01-14 DIAGNOSIS — R06.02 SOB (SHORTNESS OF BREATH): ICD-10-CM

## 2023-01-14 DIAGNOSIS — R07.9 CHEST PAIN, UNSPECIFIED TYPE: ICD-10-CM

## 2023-01-14 DIAGNOSIS — J18.9 PNEUMONIA: Primary | ICD-10-CM

## 2023-01-14 DIAGNOSIS — S32.010A CLOSED COMPRESSION FRACTURE OF BODY OF L1 VERTEBRA (HCC): ICD-10-CM

## 2023-01-14 DIAGNOSIS — S32.010D COMPRESSION FRACTURE OF L1 VERTEBRA WITH ROUTINE HEALING, SUBSEQUENT ENCOUNTER: ICD-10-CM

## 2023-01-14 DIAGNOSIS — M54.9 BACK PAIN: ICD-10-CM

## 2023-01-14 LAB
ALBUMIN SERPL BCP-MCNC: 3.1 G/DL (ref 3.5–5)
ALP SERPL-CCNC: 141 U/L (ref 46–116)
ALT SERPL W P-5'-P-CCNC: 16 U/L (ref 12–78)
ANION GAP SERPL CALCULATED.3IONS-SCNC: 9 MMOL/L (ref 4–13)
APTT PPP: 30 SECONDS (ref 23–37)
AST SERPL W P-5'-P-CCNC: 11 U/L (ref 5–45)
BASOPHILS # BLD AUTO: 0.03 THOUSANDS/ÂΜL (ref 0–0.1)
BASOPHILS NFR BLD AUTO: 0 % (ref 0–1)
BILIRUB SERPL-MCNC: 0.29 MG/DL (ref 0.2–1)
BUN SERPL-MCNC: 11 MG/DL (ref 5–25)
CALCIUM ALBUM COR SERPL-MCNC: 9.4 MG/DL (ref 8.3–10.1)
CALCIUM SERPL-MCNC: 8.7 MG/DL (ref 8.3–10.1)
CARDIAC TROPONIN I PNL SERPL HS: 3 NG/L
CHLORIDE SERPL-SCNC: 103 MMOL/L (ref 96–108)
CO2 SERPL-SCNC: 30 MMOL/L (ref 21–32)
CREAT SERPL-MCNC: 0.82 MG/DL (ref 0.6–1.3)
EOSINOPHIL # BLD AUTO: 0.14 THOUSAND/ÂΜL (ref 0–0.61)
EOSINOPHIL NFR BLD AUTO: 2 % (ref 0–6)
ERYTHROCYTE [DISTWIDTH] IN BLOOD BY AUTOMATED COUNT: 14.7 % (ref 11.6–15.1)
GFR SERPL CREATININE-BSD FRML MDRD: 79 ML/MIN/1.73SQ M
GLUCOSE SERPL-MCNC: 114 MG/DL (ref 65–140)
HCT VFR BLD AUTO: 37 % (ref 34.8–46.1)
HGB BLD-MCNC: 11.9 G/DL (ref 11.5–15.4)
IMM GRANULOCYTES # BLD AUTO: 0.06 THOUSAND/UL (ref 0–0.2)
IMM GRANULOCYTES NFR BLD AUTO: 1 % (ref 0–2)
INR PPP: 1.08 (ref 0.84–1.19)
LYMPHOCYTES # BLD AUTO: 1.15 THOUSANDS/ÂΜL (ref 0.6–4.47)
LYMPHOCYTES NFR BLD AUTO: 13 % (ref 14–44)
MCH RBC QN AUTO: 29.2 PG (ref 26.8–34.3)
MCHC RBC AUTO-ENTMCNC: 32.2 G/DL (ref 31.4–37.4)
MCV RBC AUTO: 91 FL (ref 82–98)
MONOCYTES # BLD AUTO: 0.66 THOUSAND/ÂΜL (ref 0.17–1.22)
MONOCYTES NFR BLD AUTO: 8 % (ref 4–12)
NEUTROPHILS # BLD AUTO: 6.79 THOUSANDS/ÂΜL (ref 1.85–7.62)
NEUTS SEG NFR BLD AUTO: 76 % (ref 43–75)
NRBC BLD AUTO-RTO: 0 /100 WBCS
PLATELET # BLD AUTO: 300 THOUSANDS/UL (ref 149–390)
PMV BLD AUTO: 9.2 FL (ref 8.9–12.7)
POTASSIUM SERPL-SCNC: 3.2 MMOL/L (ref 3.5–5.3)
PROT SERPL-MCNC: 7.2 G/DL (ref 6.4–8.4)
PROTHROMBIN TIME: 13.8 SECONDS (ref 11.6–14.5)
RBC # BLD AUTO: 4.08 MILLION/UL (ref 3.81–5.12)
SODIUM SERPL-SCNC: 142 MMOL/L (ref 135–147)
WBC # BLD AUTO: 8.83 THOUSAND/UL (ref 4.31–10.16)

## 2023-01-14 RX ORDER — MORPHINE SULFATE 4 MG/ML
4 INJECTION, SOLUTION INTRAMUSCULAR; INTRAVENOUS ONCE
Status: COMPLETED | OUTPATIENT
Start: 2023-01-14 | End: 2023-01-14

## 2023-01-14 RX ORDER — POTASSIUM CHLORIDE 20 MEQ/1
40 TABLET, EXTENDED RELEASE ORAL ONCE
Status: COMPLETED | OUTPATIENT
Start: 2023-01-14 | End: 2023-01-14

## 2023-01-14 RX ADMIN — IOHEXOL 85 ML: 350 INJECTION, SOLUTION INTRAVENOUS at 22:57

## 2023-01-14 RX ADMIN — MORPHINE SULFATE 4 MG: 4 INJECTION INTRAVENOUS at 23:47

## 2023-01-14 RX ADMIN — POTASSIUM CHLORIDE 40 MEQ: 1500 TABLET, EXTENDED RELEASE ORAL at 23:14

## 2023-01-14 RX ADMIN — MORPHINE SULFATE 4 MG: 4 INJECTION INTRAVENOUS at 21:56

## 2023-01-15 ENCOUNTER — APPOINTMENT (OUTPATIENT)
Dept: NON INVASIVE DIAGNOSTICS | Facility: HOSPITAL | Age: 58
End: 2023-01-15

## 2023-01-15 PROBLEM — R07.9 CHEST PAIN: Status: ACTIVE | Noted: 2023-01-15

## 2023-01-15 PROBLEM — J96.00 ACUTE RESPIRATORY FAILURE (HCC): Status: ACTIVE | Noted: 2023-01-15

## 2023-01-15 PROBLEM — E87.70 VOLUME OVERLOAD: Status: ACTIVE | Noted: 2023-01-15

## 2023-01-15 LAB
2HR DELTA HS TROPONIN: 0 NG/L
4HR DELTA HS TROPONIN: 0 NG/L
AORTIC ROOT: 2.4 CM
AORTIC VALVE MEAN VELOCITY: 12 M/S
APICAL FOUR CHAMBER EJECTION FRACTION: 60 %
ATRIAL RATE: 250 BPM
ATRIAL RATE: 258 BPM
ATRIAL RATE: 74 BPM
ATRIAL RATE: 78 BPM
ATRIAL RATE: 84 BPM
ATRIAL RATE: 89 BPM
AV LVOT MEAN GRADIENT: 5 MMHG
AV LVOT PEAK GRADIENT: 8 MMHG
AV MEAN GRADIENT: 7 MMHG
AV PEAK GRADIENT: 14 MMHG
AV VELOCITY RATIO: 0.77
CARDIAC TROPONIN I PNL SERPL HS: 3 NG/L
CARDIAC TROPONIN I PNL SERPL HS: 3 NG/L
DOP CALC AO PEAK VEL: 1.89 M/S
DOP CALC AO VTI: 36.46 CM
DOP CALC LVOT PEAK VEL VTI: 30.19 CM
DOP CALC LVOT PEAK VEL: 1.45 M/S
E WAVE DECELERATION TIME: 261 MS
ERYTHROCYTE [DISTWIDTH] IN BLOOD BY AUTOMATED COUNT: 14.8 % (ref 11.6–15.1)
FLUAV RNA RESP QL NAA+PROBE: NEGATIVE
FLUBV RNA RESP QL NAA+PROBE: NEGATIVE
FRACTIONAL SHORTENING: 35 (ref 28–44)
HCT VFR BLD AUTO: 34.6 % (ref 34.8–46.1)
HGB BLD-MCNC: 11.1 G/DL (ref 11.5–15.4)
INTERVENTRICULAR SEPTUM IN DIASTOLE (PARASTERNAL SHORT AXIS VIEW): 0.7 CM
INTERVENTRICULAR SEPTUM: 0.7 CM (ref 0.6–1.1)
L PNEUMO1 AG UR QL IA.RAPID: NEGATIVE
LAAS-AP2: 21.8 CM2
LAAS-AP4: 19.5 CM2
LACTATE SERPL-SCNC: 1.1 MMOL/L (ref 0.5–2)
LEFT ATRIUM SIZE: 3.6 CM
LEFT INTERNAL DIMENSION IN SYSTOLE: 3 CM (ref 2.1–4)
LEFT VENTRICULAR INTERNAL DIMENSION IN DIASTOLE: 4.6 CM (ref 3.5–6)
LEFT VENTRICULAR POSTERIOR WALL IN END DIASTOLE: 1 CM
LEFT VENTRICULAR STROKE VOLUME: 64 ML
LVSV (TEICH): 64 ML
MCH RBC QN AUTO: 29.1 PG (ref 26.8–34.3)
MCHC RBC AUTO-ENTMCNC: 32.1 G/DL (ref 31.4–37.4)
MCV RBC AUTO: 91 FL (ref 82–98)
MV E'TISSUE VEL-SEP: 11 CM/S
MV PEAK A VEL: 0.89 M/S
MV PEAK E VEL: 102 CM/S
MV STENOSIS PRESSURE HALF TIME: 76 MS
MV VALVE AREA P 1/2 METHOD: 2.89
NT-PROBNP SERPL-MCNC: 58 PG/ML
P AXIS: 100 DEGREES
P AXIS: 69 DEGREES
P AXIS: 71 DEGREES
P AXIS: 73 DEGREES
P AXIS: 88 DEGREES
P AXIS: 98 DEGREES
PLATELET # BLD AUTO: 297 THOUSANDS/UL (ref 149–390)
PMV BLD AUTO: 8.9 FL (ref 8.9–12.7)
PR INTERVAL: 142 MS
PR INTERVAL: 158 MS
PR INTERVAL: 160 MS
PR INTERVAL: 168 MS
PROCALCITONIN SERPL-MCNC: <0.05 NG/ML
QRS AXIS: 10 DEGREES
QRS AXIS: 12 DEGREES
QRS AXIS: 12 DEGREES
QRS AXIS: 132 DEGREES
QRS AXIS: 135 DEGREES
QRS AXIS: 137 DEGREES
QRSD INTERVAL: 84 MS
QRSD INTERVAL: 86 MS
QRSD INTERVAL: 88 MS
QT INTERVAL: 348 MS
QT INTERVAL: 350 MS
QT INTERVAL: 366 MS
QT INTERVAL: 366 MS
QT INTERVAL: 384 MS
QT INTERVAL: 392 MS
QTC INTERVAL: 406 MS
QTC INTERVAL: 413 MS
QTC INTERVAL: 417 MS
QTC INTERVAL: 420 MS
QTC INTERVAL: 423 MS
QTC INTERVAL: 429 MS
RBC # BLD AUTO: 3.81 MILLION/UL (ref 3.81–5.12)
RIGHT ATRIUM AREA SYSTOLE A4C: 14.8 CM2
RIGHT VENTRICLE ID DIMENSION: 4.2 CM
RSV RNA RESP QL NAA+PROBE: NEGATIVE
S PNEUM AG UR QL: NEGATIVE
SARS-COV-2 RNA RESP QL NAA+PROBE: NEGATIVE
SL CV LEFT ATRIUM LENGTH A2C: 6.3 CM
SL CV PED ECHO LEFT VENTRICLE DIASTOLIC VOLUME (MOD BIPLANE) 2D: 97 ML
SL CV PED ECHO LEFT VENTRICLE SYSTOLIC VOLUME (MOD BIPLANE) 2D: 34 ML
T WAVE AXIS: 114 DEGREES
T WAVE AXIS: 127 DEGREES
T WAVE AXIS: 131 DEGREES
T WAVE AXIS: 55 DEGREES
T WAVE AXIS: 59 DEGREES
T WAVE AXIS: 66 DEGREES
TRICUSPID ANNULAR PLANE SYSTOLIC EXCURSION: 2.1 CM
VENTRICULAR RATE: 72 BPM
VENTRICULAR RATE: 72 BPM
VENTRICULAR RATE: 74 BPM
VENTRICULAR RATE: 78 BPM
VENTRICULAR RATE: 84 BPM
VENTRICULAR RATE: 89 BPM
WBC # BLD AUTO: 7.83 THOUSAND/UL (ref 4.31–10.16)

## 2023-01-15 RX ORDER — PREDNISONE 20 MG/1
40 TABLET ORAL DAILY
Status: DISCONTINUED | OUTPATIENT
Start: 2023-01-15 | End: 2023-01-16

## 2023-01-15 RX ORDER — METHOCARBAMOL 500 MG/1
500 TABLET, FILM COATED ORAL 2 TIMES DAILY
Status: DISCONTINUED | OUTPATIENT
Start: 2023-01-15 | End: 2023-01-17 | Stop reason: HOSPADM

## 2023-01-15 RX ORDER — ACETAMINOPHEN 325 MG/1
650 TABLET ORAL EVERY 6 HOURS PRN
Status: DISCONTINUED | OUTPATIENT
Start: 2023-01-15 | End: 2023-01-17 | Stop reason: HOSPADM

## 2023-01-15 RX ORDER — PANTOPRAZOLE SODIUM 40 MG/1
40 TABLET, DELAYED RELEASE ORAL DAILY
Status: DISCONTINUED | OUTPATIENT
Start: 2023-01-15 | End: 2023-01-17 | Stop reason: HOSPADM

## 2023-01-15 RX ORDER — OXYCODONE HYDROCHLORIDE 5 MG/1
5 TABLET ORAL EVERY 4 HOURS PRN
Status: DISCONTINUED | OUTPATIENT
Start: 2023-01-15 | End: 2023-01-16

## 2023-01-15 RX ORDER — NICOTINE 21 MG/24HR
1 PATCH, TRANSDERMAL 24 HOURS TRANSDERMAL DAILY
Status: DISCONTINUED | OUTPATIENT
Start: 2023-01-15 | End: 2023-01-17 | Stop reason: HOSPADM

## 2023-01-15 RX ORDER — FUROSEMIDE 10 MG/ML
40 INJECTION INTRAMUSCULAR; INTRAVENOUS ONCE
Status: COMPLETED | OUTPATIENT
Start: 2023-01-15 | End: 2023-01-15

## 2023-01-15 RX ORDER — IPRATROPIUM BROMIDE AND ALBUTEROL SULFATE 2.5; .5 MG/3ML; MG/3ML
3 SOLUTION RESPIRATORY (INHALATION) 4 TIMES DAILY
Status: DISCONTINUED | OUTPATIENT
Start: 2023-01-15 | End: 2023-01-15

## 2023-01-15 RX ORDER — LEVALBUTEROL 1.25 MG/.5ML
1.25 SOLUTION, CONCENTRATE RESPIRATORY (INHALATION)
Status: DISCONTINUED | OUTPATIENT
Start: 2023-01-15 | End: 2023-01-17 | Stop reason: HOSPADM

## 2023-01-15 RX ORDER — LIDOCAINE 50 MG/G
1 PATCH TOPICAL DAILY PRN
Status: DISCONTINUED | OUTPATIENT
Start: 2023-01-15 | End: 2023-01-17 | Stop reason: HOSPADM

## 2023-01-15 RX ORDER — ENOXAPARIN SODIUM 100 MG/ML
40 INJECTION SUBCUTANEOUS DAILY
Status: DISCONTINUED | OUTPATIENT
Start: 2023-01-15 | End: 2023-01-17 | Stop reason: HOSPADM

## 2023-01-15 RX ORDER — FLUTICASONE FUROATE AND VILANTEROL 100; 25 UG/1; UG/1
1 POWDER RESPIRATORY (INHALATION) DAILY
Status: DISCONTINUED | OUTPATIENT
Start: 2023-01-15 | End: 2023-01-17 | Stop reason: HOSPADM

## 2023-01-15 RX ORDER — ALBUTEROL SULFATE 90 UG/1
2 AEROSOL, METERED RESPIRATORY (INHALATION) EVERY 4 HOURS PRN
Status: DISCONTINUED | OUTPATIENT
Start: 2023-01-15 | End: 2023-01-17 | Stop reason: HOSPADM

## 2023-01-15 RX ORDER — GUAIFENESIN 600 MG/1
600 TABLET, EXTENDED RELEASE ORAL 2 TIMES DAILY
Status: DISCONTINUED | OUTPATIENT
Start: 2023-01-15 | End: 2023-01-16

## 2023-01-15 RX ADMIN — IPRATROPIUM BROMIDE 0.5 MG: 0.5 SOLUTION RESPIRATORY (INHALATION) at 19:44

## 2023-01-15 RX ADMIN — IPRATROPIUM BROMIDE 0.5 MG: 0.5 SOLUTION RESPIRATORY (INHALATION) at 08:06

## 2023-01-15 RX ADMIN — METHOCARBAMOL 500 MG: 500 TABLET ORAL at 09:16

## 2023-01-15 RX ADMIN — IPRATROPIUM BROMIDE 0.5 MG: 0.5 SOLUTION RESPIRATORY (INHALATION) at 13:49

## 2023-01-15 RX ADMIN — OXYCODONE HYDROCHLORIDE 5 MG: 5 TABLET ORAL at 20:24

## 2023-01-15 RX ADMIN — ENOXAPARIN SODIUM 40 MG: 40 INJECTION SUBCUTANEOUS at 09:16

## 2023-01-15 RX ADMIN — Medication 1000 MG: at 01:45

## 2023-01-15 RX ADMIN — LIDOCAINE 5% 1 PATCH: 700 PATCH TOPICAL at 03:03

## 2023-01-15 RX ADMIN — FLUTICASONE FUROATE AND VILANTEROL TRIFENATATE 1 PUFF: 100; 25 POWDER RESPIRATORY (INHALATION) at 09:17

## 2023-01-15 RX ADMIN — PANTOPRAZOLE SODIUM 40 MG: 40 TABLET, DELAYED RELEASE ORAL at 09:16

## 2023-01-15 RX ADMIN — LEVALBUTEROL HYDROCHLORIDE 1.25 MG: 1.25 SOLUTION, CONCENTRATE RESPIRATORY (INHALATION) at 19:44

## 2023-01-15 RX ADMIN — LEVALBUTEROL HYDROCHLORIDE 1.25 MG: 1.25 SOLUTION, CONCENTRATE RESPIRATORY (INHALATION) at 08:06

## 2023-01-15 RX ADMIN — FUROSEMIDE 40 MG: 10 INJECTION, SOLUTION INTRAMUSCULAR; INTRAVENOUS at 03:03

## 2023-01-15 RX ADMIN — METHOCARBAMOL 500 MG: 500 TABLET ORAL at 17:06

## 2023-01-15 RX ADMIN — GUAIFENESIN 600 MG: 600 TABLET ORAL at 09:16

## 2023-01-15 RX ADMIN — OXYCODONE HYDROCHLORIDE 5 MG: 5 TABLET ORAL at 03:26

## 2023-01-15 RX ADMIN — LEVALBUTEROL HYDROCHLORIDE 1.25 MG: 1.25 SOLUTION, CONCENTRATE RESPIRATORY (INHALATION) at 13:49

## 2023-01-15 RX ADMIN — ACETAMINOPHEN 650 MG: 325 TABLET, FILM COATED ORAL at 03:02

## 2023-01-15 RX ADMIN — NICOTINE 1 PATCH: 21 PATCH, EXTENDED RELEASE TRANSDERMAL at 09:17

## 2023-01-15 RX ADMIN — GUAIFENESIN 600 MG: 600 TABLET ORAL at 17:06

## 2023-01-15 RX ADMIN — PREDNISONE 40 MG: 20 TABLET ORAL at 13:50

## 2023-01-15 RX ADMIN — OXYCODONE HYDROCHLORIDE 5 MG: 5 TABLET ORAL at 09:19

## 2023-01-15 NOTE — ASSESSMENT & PLAN NOTE
· Patient presenting to the ED for midsternal chest pain and associated SOB  · ECG: NSR without acute ischemic changes  · Troponin level: 0hr 3, 2hr 3 (Delta 0)  · Likely related to underlying pneumonia vs volume overload  · Serial ECG and troponin levels  · Monitor on telemetry

## 2023-01-15 NOTE — ED PROVIDER NOTES
Pt Name: Kylie Pickens  MRN: [id number]  [place] 1965  Age/Sex: 62 y o  female  Date of evaluation: 1/14/2023  PCP: Brian Headley DO    CHIEF COMPLAINT    Chief Complaint   Patient presents with   • Chest Pain     Cp started at 1300 today  No prior hx  States it's in the middle of her chest and does not radiate  Described as a heaviness  Pt wears 02 at home  Was given 324 ASA and 1 nitro with no relief          HPI and MDM    62 y o  female presenting with cp that started at 1300 earlier today  Has been consistent since then but severity has varied, it is not as bad as it was  Mentions she's also had palpitations and fluttering sensations, but when she checks her heart rate it has been normal and has not changed  Mentions associated sob  She states she used her breathing treatments, has history of COPD, but they did not help  Upon external documentation reviewed, patient was recently discharged from the hospital on 1/9/2023, she was admitted for persistent from L1 compression fracture  Upon review  ,  Note from 12/9/2022 shows that patient was hospitalized at an outside hospital, was diagnosed with influenza A and acute respiratory failure with hypoxia, since then patient states she has been on oxygen, 2 L  Per my independent rotation of EKG, normal sinus rhythm heart of 84, narrow QRS, normal axis, intervals reassuring, no ST elevation, no significantly depression  Chest x-ray per my independent interpretation is concerning for linear atelectasis versus pneumonia in the right lower lobe  CT scan results as below  Patient does mention productive cough, states she had a fever couple days ago, it is possible she may have pneumonia  Started on IV antibiotics  She states that she is supposed to use her 2 L of oxygen as needed but has required it consistently  I discussed with internal medicine for hospitalization            Medications   albuterol (PROVENTIL HFA,VENTOLIN HFA) inhaler 2 puff (has no administration in time range)   Fluticasone Furoate-Vilanterol 100-25 mcg/actuation 1 puff (has no administration in time range)   guaiFENesin (MUCINEX) 12 hr tablet 600 mg (has no administration in time range)   lidocaine (LIDODERM) 5 % patch 1 patch (1 patch Topical Medication Applied 1/15/23 0303)   methocarbamol (ROBAXIN) tablet 500 mg (has no administration in time range)   oxyCODONE (ROXICODONE) IR tablet 5 mg (5 mg Oral Given 1/15/23 0326)   pantoprazole (PROTONIX) EC tablet 40 mg (has no administration in time range)   acetaminophen (TYLENOL) tablet 650 mg (650 mg Oral Given 1/15/23 0302)   nicotine (NICODERM CQ) 21 mg/24 hr TD 24 hr patch 1 patch (has no administration in time range)   enoxaparin (LOVENOX) subcutaneous injection 40 mg (has no administration in time range)   ceftriaxone (ROCEPHIN) 1 g/50 mL in dextrose IVPB (has no administration in time range)   ipratropium (ATROVENT) 0 02 % inhalation solution 0 5 mg (has no administration in time range)   levalbuterol (XOPENEX) inhalation solution 1 25 mg (has no administration in time range)   morphine injection 4 mg (4 mg Intravenous Given 1/14/23 2156)   potassium chloride (K-DUR,KLOR-CON) CR tablet 40 mEq (40 mEq Oral Given 1/14/23 2314)   iohexol (OMNIPAQUE) 350 MG/ML injection (SINGLE-DOSE) 85 mL (85 mL Intravenous Given 1/14/23 2257)   morphine injection 4 mg (4 mg Intravenous Given 1/14/23 2347)   ceftriaxone (ROCEPHIN) 1 g/50 mL in dextrose IVPB (0 mg Intravenous Stopped 1/15/23 0218)   furosemide (LASIX) injection 40 mg (40 mg Intravenous Given 1/15/23 0303)         Past Medical and Surgical History    Past Medical History:   Diagnosis Date   • Achalasia    • Back pain    • Fibromyalgia    • Lupus ([address] )        Past Surgical History:   Procedure Laterality Date   • HYSTERECTOMY     • NECK SURGERY         History reviewed  No pertinent family history      Social History     Tobacco Use   • Smoking status: Every Day     Packs/day: 1 00 Types: Cigarettes   • Smokeless tobacco: Never   Vaping Use   • Vaping Use: Never used   Substance Use Topics   • Alcohol use: Never   • Drug use: Never           Allergies    Allergies   Allergen Reactions   • Valium [Diazepam] Anaphylaxis       Home Medications    Prior to Admission medications    Medication Sig Start Date End Date Taking? Authorizing Provider   albuterol (PROVENTIL HFA,VENTOLIN HFA) 90 mcg/act inhaler Inhale 2 puffs every 4 (four) hours as needed for wheezing 10/8/22   Ana Blank MD   benzonatate (TESSALON PERLES) 100 mg capsule Take 1 capsule (100 mg total) by mouth 3 (three) times a day as needed for cough 10/7/22   Ana Blank MD   fluticasone-vilanterol (BREO ELLIPTA) 100-25 mcg/inh inhaler Inhale 1 puff daily Rinse mouth after use   Do not start before October 8, 2022  10/8/22   Ana Blank MD   guaiFENesin (MUCINEX) 600 mg 12 hr tablet Take 1 tablet (600 mg total) by mouth 2 (two) times a day 10/7/22   Ana Blank MD   ibuprofen (MOTRIN) 800 mg tablet Take 1 tablet (800 mg total) by mouth every 8 (eight) hours as needed for moderate pain 1/9/23   Beverly Garcia PA-C   ipratropium-albuterol (DUO-NEB) 0 5-2 5 mg/3 mL nebulizer solution Take 3 mL by nebulization 4 (four) times a day 10/7/22   Ana Blank MD   lidocaine (LIDODERM) 5 % Apply 1 patch topically daily as needed (back pain) Remove & Discard patch within 12 hours or as directed by MD 1/9/23   Evelio Dominguez PA-C   methocarbamol (ROBAXIN) 500 mg tablet Take 1 tablet (500 mg total) by mouth 2 (two) times a day 1/9/23   Evelio Dominguez PA-C   nicotine (NICODERM CQ) 14 mg/24hr TD 24 hr patch Place 1 patch on the skin daily Do not start before October 8, 2022  10/8/22   Ana Blank MD   oxyCODONE (ROXICODONE) 5 immediate release tablet Take 1 tablet (5 mg total) by mouth every 4 (four) hours as needed for moderate pain Max Daily Amount: 30 mg 1/9/23   Beverly Garcia PA-C   pantoprazole (PROTONIX) 40 mg tablet Take 1 tablet (40 mg total) by mouth daily 10/8/22   Nichole Agarwal MD           Physical Exam      ED Triage Vitals   Temperature Pulse Respirations Blood Pressure SpO2   01/14/23 2127 01/14/23 2126 01/14/23 2126 01/14/23 2126 01/14/23 2126   98 8 °F (37 1 °C) 79 20 141/67 97 %      Temp Source Heart Rate Source Patient Position - Orthostatic VS BP Location FiO2 (%)   01/14/23 2127 01/14/23 2126 01/14/23 2126 01/14/23 2126 --   Oral Monitor Sitting Left arm       Pain Score       01/14/23 2156       7               Physical Exam  Constitutional:       General: She is not in acute distress  HENT:      Head: Normocephalic and atraumatic  Nose: Nose normal       Mouth/Throat:      Mouth: Mucous membranes are moist    Eyes:      Extraocular Movements: Extraocular movements intact  Pupils: Pupils are equal, round, and reactive to light  Cardiovascular:      Rate and Rhythm: Normal rate and regular rhythm  Pulmonary:      Effort: No respiratory distress  Breath sounds: Normal breath sounds  No wheezing  Abdominal:      General: There is no distension  Palpations: Abdomen is soft  Tenderness: There is no abdominal tenderness  Musculoskeletal:         General: No swelling or deformity  Cervical back: Normal range of motion and neck supple  Skin:     General: Skin is warm  Findings: No erythema  Neurological:      Mental Status: She is alert and oriented to person, place, and time  Mental status is at baseline                Diagnostic Results      Labs:    Results Reviewed     Procedure Component Value Units Date/Time    NT-BNP PRO [[id number]]  (Normal) Collected: 01/15/23 0149    Lab Status: Final result Specimen: Blood from Arm, Right Updated: 01/15/23 0428     NT-proBNP 58 pg/mL     Procalcitonin [[id number]]  (Normal) Collected: 01/15/23 0149    Lab Status: Final result Specimen: Blood from Arm, Right Updated: 01/15/23 0253     Procalcitonin <0 05 ng/ml     HS Troponin I 4hr [[id number]]  (Normal) Collected: 01/15/23 0159    Lab Status: Final result Specimen: Blood Updated: 01/15/23 0250     hs TnI 4hr 3 ng/L      Delta 4hr hsTnI 0 ng/L     Lactic acid [[id number]]  (Normal) Collected: 01/15/23 0149    Lab Status: Final result Specimen: Blood from Arm, Right Updated: 01/15/23 0244     LACTIC ACID 1 1 mmol/L     Narrative:      Result may be elevated if tourniquet was used during collection  COVID/FLU/RSV [[id number]]  (Normal) Collected: 01/15/23 0149    Lab Status: Final result Specimen: Nares from Nose Updated: 01/15/23 0240     SARS-CoV-2 Negative     INFLUENZA A PCR Negative     INFLUENZA B PCR Negative     RSV PCR Negative    Narrative:      FOR PEDIATRIC PATIENTS - copy/paste COVID Guidelines URL to browser: [email or web address] App Review Source/  [employer]    SARS-CoV-2 assay is a Nucleic Acid Amplification assay intended for the  qualitative detection of nucleic acid from SARS-CoV-2 in nasopharyngeal  swabs  Results are for the presumptive identification of SARS-CoV-2 RNA  Positive results are indicative of infection with SARS-CoV-2, the virus  causing COVID-19, but do not rule out bacterial infection or co-infection  with other viruses  Laboratories within the [place] and its  territories are required to report all positive results to the appropriate  public health authorities  Negative results do not preclude SARS-CoV-2  infection and should not be used as the sole basis for treatment or other  patient management decisions  Negative results must be combined with  clinical observations, patient history, and epidemiological information  This test has not been FDA cleared or approved  This test has been authorized by FDA under an Emergency Use Authorization  (EUA)   This test is only authorized for the duration of time the  declaration that circumstances exist justifying the authorization of the  emergency use of an in vitro diagnostic tests for detection of SARS-CoV-2  virus and/or diagnosis of COVID-19 infection under section 564(b)(1) of  the Act, 21 U  S C  916HJA-2(N)(0), unless the authorization is terminated  or revoked sooner  The test has been validated but independent review by FDA  and CLIA is pending  Test performed using [email or web address]: This RT-PCR assay targets N2,  a region unique to SARS-CoV-2  A conserved region in the E-gene was chosen  for pan-Sarbecovirus detection which includes SARS-CoV-2  According to CMS-2020-01-R, this platform meets the definition of high-throughput technology  Blood culture #2 [[id number]] Collected: 01/15/23 0149    Lab Status: In process Specimen: Blood from Arm, Right Updated: 01/15/23 0159    Blood culture #1 [[id number]] Collected: 01/15/23 0149    Lab Status:  In process Specimen: Blood from Arm, Left Updated: 01/15/23 0159    Sputum culture and Gram stain [[id number]]     Lab Status: No result Specimen: Sputum     Strep Pneumoniae, Urine [[id number]]     Lab Status: No result Specimen: Urine     Legionella antigen, Urine [[id number]]     Lab Status: No result Specimen: Urine     HS Troponin I 2hr [[id number]]  (Normal) Collected: 01/15/23 0029    Lab Status: Final result Specimen: Blood from Arm, Right Updated: 01/15/23 0116     hs TnI 2hr 3 ng/L      Delta 2hr hsTnI 0 ng/L     HS Troponin 0hr (reflex protocol) [[id number]]  (Normal) Collected: 01/14/23 2154    Lab Status: Final result Specimen: Blood from Arm, Left Updated: 01/14/23 2235     hs TnI 0hr 3 ng/L     Comprehensive metabolic panel [[id number]]  (Abnormal) Collected: 01/14/23 2154    Lab Status: Final result Specimen: Blood from Arm, Left Updated: 01/14/23 2229     Sodium 142 mmol/L      Potassium 3 2 mmol/L      Chloride 103 mmol/L      CO2 30 mmol/L      ANION GAP 9 mmol/L      BUN 11 mg/dL      Creatinine 0 82 mg/dL      Glucose 114 mg/dL      Calcium 8 7 mg/dL      Corrected Calcium 9 4 mg/dL      AST 11 U/L      ALT 16 U/L Alkaline Phosphatase 141 U/L      Total Protein 7 2 g/dL      Albumin 3 1 g/dL      Total Bilirubin 0 29 mg/dL      eGFR 79 ml/min/1 73sq m     Narrative:      [place] guidelines for Chronic Kidney Disease (CKD):   •  Stage 1 with normal or high GFR (GFR > 90 mL/min/1 73 square meters)  •  Stage 2 Mild CKD (GFR = 60-89 mL/min/1 73 square meters)  •  Stage 3A Moderate CKD (GFR = 45-59 mL/min/1 73 square meters)  •  Stage 3B Moderate CKD (GFR = 30-44 mL/min/1 73 square meters)  •  Stage 4 Severe CKD (GFR = 15-29 mL/min/1 73 square meters)  •  Stage 5 End Stage CKD (GFR <15 mL/min/1 73 square meters)  Note: GFR calculation is accurate only with a steady state creatinine    Protime-INR [[id number]]  (Normal) Collected: 01/14/23 2154    Lab Status: Final result Specimen: Blood from Arm, Left Updated: 01/14/23 2224     Protime 13 8 seconds      INR 1 08    APTT [[id number]]  (Normal) Collected: 01/14/23 2154    Lab Status: Final result Specimen: Blood from Arm, Left Updated: 01/14/23 2224     PTT 30 seconds     CBC and differential [[id number]]  (Abnormal) Collected: 01/14/23 2154    Lab Status: Final result Specimen: Blood from Arm, Left Updated: 01/14/23 2204     WBC 8 83 Thousand/uL      RBC 4 08 Million/uL      Hemoglobin 11 9 g/dL      Hematocrit 37 0 %      MCV 91 fL      MCH 29 2 pg      MCHC 32 2 g/dL      RDW 14 7 %      MPV 9 2 fL      Platelets 122 Thousands/uL      nRBC 0 /100 WBCs      Neutrophils Relative 76 %      Immat GRANS % 1 %      Lymphocytes Relative 13 %      Monocytes Relative 8 %      Eosinophils Relative 2 %      Basophils Relative 0 %      Neutrophils Absolute 6 79 Thousands/µL      Immature Grans Absolute 0 06 Thousand/uL      Lymphocytes Absolute 1 15 Thousands/µL      Monocytes Absolute 0 66 Thousand/µL      Eosinophils Absolute 0 14 Thousand/µL      Basophils Absolute 0 03 Thousands/µL           All labs reviewed and utilized in the medical decision making process    Radiology:    CTA ED chest PE study   Final Result      Bibasilar areas of subsegmental atelectasis  Superimposed infection must be excluded clinically  Irregularity of the superior margin of the L1 vertebral body  Most recent CT abdomen pelvis from October 4, 2022 does not demonstrate this abnormality  On this examination, this anatomy is incompletely visualized  This finding may represent a fracture  Recommend dedicated imaging of this anatomy  Workstation performed: [id number]         XR chest 1 view portable    (Results Pending)       All radiology studies independently viewed by me and interpreted by the radiologist     Procedure    Procedures        FINAL IMPRESSION    Final diagnoses:   Pneumonia   Chest pain, unspecified type   SOB (shortness of breath)         DISPOSITION    Time reflects when diagnosis was documented in both MDM as applicable and the Disposition within this note     Time User Action Codes Description Comment    1/15/2023  1:24 AM Lenoria Dwaine Add [J18 9] Pneumonia     1/15/2023  1:25 AM Lenoria Dwaine Add [R07 9] Chest pain, unspecified type     1/15/2023  1:25 AM Lenoria Dwaine Add [R06 02] SOB (shortness of breath)       ED Disposition     ED Disposition   Admit    Condition   Stable    Date/Time   Sun Aryan 15, 2023  1:24 AM    Comment   Case was discussed with Devante Deras and the patient's admission status was agreed to be Admission Status: observation status to the service of Dr Faby Grant  Follow-up Information    None           PATIENT REFERRED TO:    No follow-up provider specified      DISCHARGE MEDICATIONS:    Current Discharge Medication List      CONTINUE these medications which have NOT CHANGED    Details   albuterol (PROVENTIL HFA,VENTOLIN HFA) 90 mcg/act inhaler Inhale 2 puffs every 4 (four) hours as needed for wheezing  Qty: 18 g, Refills: 0    Comments: Substitution to a formulary equivalent within the same pharmaceutical class is authorized    Associated Diagnoses: COPD exacerbation (HCC)      benzonatate (TESSALON PERLES) 100 mg capsule Take 1 capsule (100 mg total) by mouth 3 (three) times a day as needed for cough  Qty: 20 capsule, Refills: 0    Associated Diagnoses: COPD exacerbation (HCC)      guaiFENesin (MUCINEX) 600 mg 12 hr tablet Take 1 tablet (600 mg total) by mouth 2 (two) times a day  Qty: 20 tablet, Refills: 0    Associated Diagnoses: COPD (chronic obstructive pulmonary disease) (HCC)      ibuprofen (MOTRIN) 800 mg tablet Take 1 tablet (800 mg total) by mouth every 8 (eight) hours as needed for moderate pain  Qty: 60 tablet, Refills: 0    Associated Diagnoses: Back pain; Closed compression fracture of body of L1 vertebra (HCC)      ipratropium-albuterol (DUO-NEB) 0 5-2 5 mg/3 mL nebulizer solution Take 3 mL by nebulization 4 (four) times a day  Qty: 120 mL, Refills: 0    Associated Diagnoses: COPD exacerbation ([facility])      lidocaine (LIDODERM) 5 % Apply 1 patch topically daily as needed (back pain) Remove & Discard patch within 12 hours or as directed by MD  Qty: 30 patch, Refills: 0    Associated Diagnoses: Compression fracture of L1 vertebra (HCC)      methocarbamol (ROBAXIN) 500 mg tablet Take 1 tablet (500 mg total) by mouth 2 (two) times a day  Qty: 40 tablet, Refills: 0    Associated Diagnoses: Back pain; Closed compression fracture of body of L1 vertebra (HCC)      oxyCODONE (ROXICODONE) 5 immediate release tablet Take 1 tablet (5 mg total) by mouth every 4 (four) hours as needed for moderate pain Max Daily Amount: 30 mg  Qty: 25 tablet, Refills: 0    Associated Diagnoses: Compression fracture of L1 vertebra (HCC)      pantoprazole (PROTONIX) 40 mg tablet Take 1 tablet (40 mg total) by mouth daily  Qty: 30 tablet, Refills: 0    Associated Diagnoses: GERD (gastroesophageal reflux disease)      tiotropium (SPIRIVA) 18 mcg inhalation capsule Place 18 mcg into inhaler and inhale daily      fluticasone-vilanterol (BREO ELLIPTA) 100-25 mcg/inh inhaler Inhale 1 puff daily Rinse mouth after use  Do not start before October 8, 2022  Qty: 60 blister, Refills: 0    Associated Diagnoses: COPD exacerbation ([facility] [address] )      nicotine (NICODERM CQ) 14 mg/24hr TD 24 hr patch Place 1 patch on the skin daily Do not start before October 8, 2022  Qty: 28 patch, Refills: 0    Associated Diagnoses: COPD exacerbation ([facility] [address] )             No discharge procedures on file  Jude Bloom DO        This note was partially completed using voice recognition technology, and was scanned for gross errors; however some errors may still exist  Please contact the author with any questions or requests for clarification        Jude Bloom DO  01/15/23 [id number]

## 2023-01-15 NOTE — H&P
[address] [facility] 1965, 62 y o  female MRN: [id number]  Unit/Bed#: ED 11 Encounter: [id number]  Primary Care Provider: Kaern Singer,    Date and time admitted to hospital: 1/14/2023  9:23 PM    * Acute respiratory failure [facility])  Assessment & Plan  Patient presenting to the ED for acute mid-sternal chest pain and associated shortness of breath that started yesterday afternoon  She had a recent admission for L1 compression fracture  Denies any fever/chills, abdominal pain, nausea or vomiting  · Patient currently requiring 2L NC  Reports that at home she uses 2L NC as needed, but rarely uses it  Right now she is dependent on nasal cannula oxygen  · CT chest: Bibasilar areas of subsegmental atelectasis  Superimposed infection must be excluded clinically  · Etiology: underlying pneumonia vs volume overload vs other  · Does not meet sepsis criteria on admission  · Will start on IV ceftriaxone  · Follow up lactic acid, procalcitonin, BC x2  · Check COVID/FLU/RSV  · Monitor respiratory status    Volume overload  Assessment & Plan  · Patient presenting with bilateral lower extremity pitting edema  Patient reports that this is new over the last 2-3 days    · Denies history of CHF, however patient does not follow with a PCP  · Will check BNP  · Obtain Echo  · Give dose of IV lasix 40mg now  · Cardiac diet with 1500ml fluid restriction  · Daily weights, I&O  · Monitor respiratory status, currently on 2L NC    Chest pain  Assessment & Plan  · Patient presenting to the ED for midsternal chest pain and associated SOB  · ECG: NSR without acute ischemic changes  · Troponin level: 0hr 3, 2hr 3 (Delta 0)  · Likely related to underlying pneumonia vs volume overload  · Serial ECG and troponin levels  · Monitor on telemetry    Hypokalemia  Assessment & Plan  · Potassium 3 2 on admission  · Given 40mEq oral K   · Recheck and replete as necessary     Compression fracture of L1 vertebra [facility])  Assessment & Plan  · Recently hospitalized for L1 compression fracture  · Continue with TLSO brace  · Oxycodone 5mg Q4H prn  · Outpatient follow up with Ortho    COPD (chronic obstructive pulmonary disease) ([facility])  Assessment & Plan  · No acute exacerbation  · Continue pre-hospital inhalers      VTE Prophylaxis: Enoxaparin (Lovenox)  / sequential compression device   Code Status: Level 1 code  Discussion with family: Update in the AM    Anticipated Length of Stay:  Patient will be admitted on an Observation basis with an anticipated length of stay of  Less than 2 midnights  Justification for Hospital Stay: Acute respiratory failure    Total Time for Visit, including Counseling / Coordination of Care: 90 minutes  Greater than 50% of this total time spent on direct patient counseling and coordination of care  Chief Complaint:   Chest pain, SOB    History of Present Illness:    Nathaniel Patel is a 62 y o  female who has a past medical history significant for COPD, obesity, tobacco abuse, L1 compression fracture  Patient presenting to the ED for acute mid-sternal chest pain and associated shortness of breath that started yesterday afternoon  She had a recent admission for L1 compression fracture  Denies any fever/chills, abdominal pain, nausea or vomiting  Patient currently requiring admission for acute respiratory failure and chest pain  All patient questions answered to the best of my ability  Review of Systems:    Review of Systems   Constitutional: Negative for chills and fever  HENT: Negative for ear pain and sore throat  Eyes: Negative for pain and visual disturbance  Respiratory: Positive for shortness of breath  Negative for cough  Cardiovascular: Positive for chest pain and leg swelling  Negative for palpitations  Gastrointestinal: Negative for abdominal pain and vomiting  Genitourinary: Negative for dysuria and hematuria     Musculoskeletal: Negative for arthralgias and back pain  Skin: Negative for color change and rash  Neurological: Negative for seizures and syncope  All other systems reviewed and are negative  Past Medical and Surgical History:     Past Medical History:   Diagnosis Date   • Achalasia    • Back pain    • Fibromyalgia    • Lupus ([facility] [address] )        Past Surgical History:   Procedure Laterality Date   • HYSTERECTOMY     • NECK SURGERY         Meds/Allergies:    Prior to Admission medications    Medication Sig Start Date End Date Taking? Authorizing Provider   albuterol (PROVENTIL HFA,VENTOLIN HFA) 90 mcg/act inhaler Inhale 2 puffs every 4 (four) hours as needed for wheezing 10/8/22   Ria Moreno MD   benzonatate (TESSALON PERLES) 100 mg capsule Take 1 capsule (100 mg total) by mouth 3 (three) times a day as needed for cough 10/7/22   Ria Moreno MD   fluticasone-vilanterol (BREO ELLIPTA) 100-25 mcg/inh inhaler Inhale 1 puff daily Rinse mouth after use   Do not start before October 8, 2022  10/8/22   Ria Moreno MD   guaiFENesin (MUCINEX) 600 mg 12 hr tablet Take 1 tablet (600 mg total) by mouth 2 (two) times a day 10/7/22   Ria Moreno MD   ibuprofen (MOTRIN) 800 mg tablet Take 1 tablet (800 mg total) by mouth every 8 (eight) hours as needed for moderate pain 1/9/23   Beverly Garcia PA-C   ipratropium-albuterol (DUO-NEB) 0 5-2 5 mg/3 mL nebulizer solution Take 3 mL by nebulization 4 (four) times a day 10/7/22   Ria Moreno MD   lidocaine (LIDODERM) 5 % Apply 1 patch topically daily as needed (back pain) Remove & Discard patch within 12 hours or as directed by MD 1/9/23   Ana Laura Mercado PA-C   methocarbamol (ROBAXIN) 500 mg tablet Take 1 tablet (500 mg total) by mouth 2 (two) times a day 1/9/23   Ana Laura Mercado PA-C   nicotine (NICODERM CQ) 14 mg/24hr TD 24 hr patch Place 1 patch on the skin daily Do not start before October 8, 2022  10/8/22   Ria Moreno MD   oxyCODONE (ROXICODONE) 5 immediate release tablet Take 1 tablet (5 mg total) by mouth every 4 (four) hours as needed for moderate pain Max Daily Amount: 30 mg 1/9/23   Beverly Garcia PA-C   pantoprazole (PROTONIX) 40 mg tablet Take 1 tablet (40 mg total) by mouth daily 10/8/22   Jared Haque MD     I have reviewed home medications using allscripts  Allergies: Allergies   Allergen Reactions   • Valium [Diazepam] Anaphylaxis       Social History:     Marital Status: /Civil Union   Occupation: NA  Patient Pre-hospital Living Situation: Home  Patient Pre-hospital Level of Mobility: Walks  Patient Pre-hospital Diet Restrictions: None  Substance Use History:   Social History     Substance and Sexual Activity   Alcohol Use Never     Social History     Tobacco Use   Smoking Status Every Day   • Packs/day: 0 50   • Types: Cigarettes   Smokeless Tobacco Never     Social History     Substance and Sexual Activity   Drug Use Never       Family History:    History reviewed  No pertinent family history  Physical Exam:     Vitals:   Blood Pressure: 137/64 (01/14/23 2330)  Pulse: 72 (01/14/23 2330)  Temperature: 98 8 °F (37 1 °C) (01/14/23 2127)  Temp Source: Oral (01/14/23 2127)  Respirations: 21 (01/14/23 2330)  SpO2: 97 % (01/14/23 2330)    Physical Exam  Vitals and nursing note reviewed  Constitutional:       General: She is not in acute distress  Appearance: She is well-developed  HENT:      Head: Normocephalic and atraumatic  Eyes:      Conjunctiva/sclera: Conjunctivae normal    Cardiovascular:      Rate and Rhythm: Normal rate and regular rhythm  Heart sounds: No murmur heard  Pulmonary:      Effort: Respiratory distress present  Breath sounds: Rales present  Comments: 2L NC  Abdominal:      Palpations: Abdomen is soft  Tenderness: There is no abdominal tenderness  Musculoskeletal:      Cervical back: Neck supple  Right lower leg: Edema present  Left lower leg: Edema present  Skin:     General: Skin is warm and dry        Capillary Refill: Capillary refill takes less than 2 seconds  Neurological:      Mental Status: She is alert and oriented to person, place, and time  Psychiatric:         Mood and Affect: Mood normal        Additional Data:     Lab Results: I have personally reviewed pertinent reports  Results from last 7 days   Lab Units 01/14/23  2154   WBC Thousand/uL 8 83   HEMOGLOBIN g/dL 11 9   HEMATOCRIT % 37 0   PLATELETS Thousands/uL 300   NEUTROS PCT % 76*   LYMPHS PCT % 13*   MONOS PCT % 8   EOS PCT % 2     Results from last 7 days   Lab Units 01/14/23  2154   SODIUM mmol/L 142   POTASSIUM mmol/L 3 2*   CHLORIDE mmol/L 103   CO2 mmol/L 30   BUN mg/dL 11   CREATININE mg/dL 0 82   ANION GAP mmol/L 9   CALCIUM mg/dL 8 7   ALBUMIN g/dL 3 1*   TOTAL BILIRUBIN mg/dL 0 29   ALK PHOS U/L 141*   ALT U/L 16   AST U/L 11   GLUCOSE RANDOM mg/dL 114     Results from last 7 days   Lab Units 01/14/23  2154   INR  1 08                   Imaging: I have personally reviewed pertinent reports  CTA ED chest PE study   Final Result by Martha Huber MD (01/15 0039)      Bibasilar areas of subsegmental atelectasis  Superimposed infection must be excluded clinically  Irregularity of the superior margin of the L1 vertebral body  Most recent CT abdomen pelvis from October 4, 2022 does not demonstrate this abnormality  On this examination, this anatomy is incompletely visualized  This finding may represent a fracture  Recommend dedicated imaging of this anatomy  Workstation performed: [id number]         XR chest 1 view portable    (Results Pending)       EKG, Pathology, and Other Studies Reviewed on Admission:   · EKG: Reviewed    Allscripts / Epic Records Reviewed: Yes     ** Please Note: This note has been constructed using a voice recognition system   **

## 2023-01-15 NOTE — PLAN OF CARE
Problem: Potential for Falls  Goal: Patient will remain free of falls  Description: INTERVENTIONS:  - Educate patient/family on patient safety including physical limitations  - Instruct patient to call for assistance with activity   - Consult OT/PT to assist with strengthening/mobility   - Keep Call bell within reach  - Keep bed low and locked with side rails adjusted as appropriate  - Keep care items and personal belongings within reach  - Initiate and maintain comfort rounds  - Make Fall Risk Sign visible to staff  - Offer Toileting every 2 Hours, in advance of need  - Initiate/Maintain Bed alarm  - Obtain necessary fall risk management equipment: Bed alarm  - Apply yellow socks and bracelet for high fall risk patients  - Consider moving patient to room near nurses station  Outcome: Progressing

## 2023-01-15 NOTE — ASSESSMENT & PLAN NOTE
Patient presenting to the ED for acute mid-sternal chest pain and associated shortness of breath that started yesterday afternoon  She had a recent admission for L1 compression fracture  Denies any fever/chills, abdominal pain, nausea or vomiting  · Patient currently requiring 2L NC  Reports that at home she uses 2L NC as needed, but rarely uses it  Right now she is dependent on nasal cannula oxygen  · CT chest: Bibasilar areas of subsegmental atelectasis  Superimposed infection must be excluded clinically     · Etiology: underlying pneumonia vs volume overload vs other  · Does not meet sepsis criteria on admission  · Will start on IV ceftriaxone  · Follow up lactic acid, procalcitonin, BC x2  · Check COVID/FLU/RSV  · Monitor respiratory status

## 2023-01-15 NOTE — ASSESSMENT & PLAN NOTE
· Patient presenting with bilateral lower extremity pitting edema  Patient reports that this is new over the last 2-3 days    · Denies history of CHF, however patient does not follow with a PCP  · Will check BNP  · Obtain Echo  · Give dose of IV lasix 40mg now  · Cardiac diet with 1500ml fluid restriction  · Daily weights, I&O  · Monitor respiratory status, currently on 2L NC

## 2023-01-15 NOTE — ASSESSMENT & PLAN NOTE
· Recently hospitalized for L1 compression fracture  · Continue with TLSO brace  · Oxycodone 5mg Q4H prn  · Outpatient follow up with Ortho

## 2023-01-15 NOTE — RESPIRATORY THERAPY NOTE
RT Protocol Note  Karen Delacruz 62 y o  female MRN: [id number]  Unit/Bed#: -01 Encounter: [id number]    Assessment    Principal Problem:    Acute respiratory failure ([email or web address] )  Active Problems:    Hypokalemia    Compression fracture of L1 vertebra (HCC)    COPD (chronic obstructive pulmonary disease) ([facility])    Chest pain    Volume overload      Home Pulmonary Medications:  Nebs/inhalers    Home Devices/Therapy: Home O2    Past Medical History:   Diagnosis Date    Achalasia     Back pain     Fibromyalgia     Lupus ([email or web address] )      Social History     Socioeconomic History    Marital status: /Civil Union     Spouse name: None    Number of children: None    Years of education: None    Highest education level: None   Occupational History    None   Tobacco Use    Smoking status: Every Day     Packs/day: 1 00     Types: Cigarettes    Smokeless tobacco: Never   Vaping Use    Vaping Use: Never used   Substance and Sexual Activity    Alcohol use: Never    Drug use: Never    Sexual activity: Yes   Other Topics Concern    None   Social History Narrative    None     Social Determinants of Health     Financial Resource Strain: Not on file   Food Insecurity: No Food Insecurity    Worried About Running Out of Food in the Last Year: Never true    Ran Out of Food in the Last Year: Never true   Transportation Needs: No Transportation Needs    Lack of Transportation (Medical): No    Lack of Transportation (Non-Medical):  No   Physical Activity: Not on file   Stress: Not on file   Social Connections: Not on file   Intimate Partner Violence: Not on file   Housing Stability: Low Risk     Unable to Pay for Housing in the Last Year: No    Number of Places Lived in the Last Year: 1    Unstable Housing in the Last Year: No       Subjective         Objective    Physical Exam:   Assessment Type: Pre-treatment  General Appearance: Alert, Awake  Respiratory Pattern: Normal  Chest Assessment: Chest expansion symmetrical  Bilateral Breath Sounds: Clear  Cough: None  O2 Device: NC    Vitals:  Blood pressure (!) 87/50, pulse (P) 58, temperature 97 6 °F (36 4 °C), resp  rate (P) 16, height 5' 1" (1 549 m), weight 93 9 kg (207 lb 0 2 oz), SpO2 99 %  Imaging and other studies: I have personally reviewed pertinent reports  O2 Device: NC     Plan    Respiratory Plan: Home Bronchodilator Patient pathway        Resp Comments: Pt resting in no apparent distress  Offers no complaints  Pt has a hx of COPD; admitted w/ chest pain and SOB  Uses inhalers and nebs at home  Will cont w/ home regimen  Tx given

## 2023-01-16 PROBLEM — J44.1 COPD EXACERBATION (HCC): Status: ACTIVE | Noted: 2023-01-02

## 2023-01-16 LAB
ALBUMIN SERPL BCP-MCNC: 3 G/DL (ref 3.5–5)
ALP SERPL-CCNC: 131 U/L (ref 46–116)
ALT SERPL W P-5'-P-CCNC: 15 U/L (ref 12–78)
ANION GAP SERPL CALCULATED.3IONS-SCNC: 9 MMOL/L (ref 4–13)
AST SERPL W P-5'-P-CCNC: 17 U/L (ref 5–45)
BASOPHILS # BLD AUTO: 0.02 THOUSANDS/ÂΜL (ref 0–0.1)
BASOPHILS NFR BLD AUTO: 0 % (ref 0–1)
BILIRUB SERPL-MCNC: 0.21 MG/DL (ref 0.2–1)
BUN SERPL-MCNC: 15 MG/DL (ref 5–25)
CALCIUM ALBUM COR SERPL-MCNC: 9.8 MG/DL (ref 8.3–10.1)
CALCIUM SERPL-MCNC: 9 MG/DL (ref 8.3–10.1)
CHLORIDE SERPL-SCNC: 106 MMOL/L (ref 96–108)
CO2 SERPL-SCNC: 29 MMOL/L (ref 21–32)
CREAT SERPL-MCNC: 0.72 MG/DL (ref 0.6–1.3)
EOSINOPHIL # BLD AUTO: 0.01 THOUSAND/ÂΜL (ref 0–0.61)
EOSINOPHIL NFR BLD AUTO: 0 % (ref 0–6)
ERYTHROCYTE [DISTWIDTH] IN BLOOD BY AUTOMATED COUNT: 14.3 % (ref 11.6–15.1)
GFR SERPL CREATININE-BSD FRML MDRD: 93 ML/MIN/1.73SQ M
GLUCOSE SERPL-MCNC: 155 MG/DL (ref 65–140)
HCT VFR BLD AUTO: 35.5 % (ref 34.8–46.1)
HGB BLD-MCNC: 11.5 G/DL (ref 11.5–15.4)
IMM GRANULOCYTES # BLD AUTO: 0.04 THOUSAND/UL (ref 0–0.2)
IMM GRANULOCYTES NFR BLD AUTO: 0 % (ref 0–2)
LYMPHOCYTES # BLD AUTO: 0.73 THOUSANDS/ÂΜL (ref 0.6–4.47)
LYMPHOCYTES NFR BLD AUTO: 7 % (ref 14–44)
MCH RBC QN AUTO: 29 PG (ref 26.8–34.3)
MCHC RBC AUTO-ENTMCNC: 32.4 G/DL (ref 31.4–37.4)
MCV RBC AUTO: 90 FL (ref 82–98)
MONOCYTES # BLD AUTO: 0.55 THOUSAND/ÂΜL (ref 0.17–1.22)
MONOCYTES NFR BLD AUTO: 6 % (ref 4–12)
NEUTROPHILS # BLD AUTO: 8.6 THOUSANDS/ÂΜL (ref 1.85–7.62)
NEUTS SEG NFR BLD AUTO: 87 % (ref 43–75)
NRBC BLD AUTO-RTO: 0 /100 WBCS
PLATELET # BLD AUTO: 327 THOUSANDS/UL (ref 149–390)
PMV BLD AUTO: 9.2 FL (ref 8.9–12.7)
POTASSIUM SERPL-SCNC: 4 MMOL/L (ref 3.5–5.3)
PROT SERPL-MCNC: 7.2 G/DL (ref 6.4–8.4)
RBC # BLD AUTO: 3.96 MILLION/UL (ref 3.81–5.12)
SODIUM SERPL-SCNC: 144 MMOL/L (ref 135–147)
WBC # BLD AUTO: 9.95 THOUSAND/UL (ref 4.31–10.16)

## 2023-01-16 RX ORDER — OXYCODONE HYDROCHLORIDE 5 MG/1
5 TABLET ORAL EVERY 4 HOURS PRN
Status: DISCONTINUED | OUTPATIENT
Start: 2023-01-16 | End: 2023-01-17 | Stop reason: HOSPADM

## 2023-01-16 RX ORDER — IBUPROFEN 400 MG/1
800 TABLET ORAL EVERY 6 HOURS PRN
Status: DISCONTINUED | OUTPATIENT
Start: 2023-01-16 | End: 2023-01-17 | Stop reason: HOSPADM

## 2023-01-16 RX ORDER — GUAIFENESIN 600 MG/1
1200 TABLET, EXTENDED RELEASE ORAL 2 TIMES DAILY
Status: DISCONTINUED | OUTPATIENT
Start: 2023-01-16 | End: 2023-01-17 | Stop reason: HOSPADM

## 2023-01-16 RX ORDER — METHYLPREDNISOLONE SODIUM SUCCINATE 40 MG/ML
40 INJECTION, POWDER, LYOPHILIZED, FOR SOLUTION INTRAMUSCULAR; INTRAVENOUS EVERY 12 HOURS SCHEDULED
Status: DISCONTINUED | OUTPATIENT
Start: 2023-01-16 | End: 2023-01-17 | Stop reason: HOSPADM

## 2023-01-16 RX ADMIN — OXYCODONE HYDROCHLORIDE 5 MG: 5 TABLET ORAL at 08:17

## 2023-01-16 RX ADMIN — IBUPROFEN 800 MG: 400 TABLET, FILM COATED ORAL at 12:55

## 2023-01-16 RX ADMIN — IPRATROPIUM BROMIDE 0.5 MG: 0.5 SOLUTION RESPIRATORY (INHALATION) at 19:23

## 2023-01-16 RX ADMIN — MORPHINE SULFATE 2 MG: 2 INJECTION, SOLUTION INTRAMUSCULAR; INTRAVENOUS at 15:09

## 2023-01-16 RX ADMIN — LEVALBUTEROL HYDROCHLORIDE 1.25 MG: 1.25 SOLUTION, CONCENTRATE RESPIRATORY (INHALATION) at 14:01

## 2023-01-16 RX ADMIN — METHYLPREDNISOLONE SODIUM SUCCINATE 40 MG: 40 INJECTION, POWDER, FOR SOLUTION INTRAMUSCULAR; INTRAVENOUS at 12:54

## 2023-01-16 RX ADMIN — IPRATROPIUM BROMIDE 0.5 MG: 0.5 SOLUTION RESPIRATORY (INHALATION) at 14:01

## 2023-01-16 RX ADMIN — LEVALBUTEROL HYDROCHLORIDE 1.25 MG: 1.25 SOLUTION, CONCENTRATE RESPIRATORY (INHALATION) at 07:41

## 2023-01-16 RX ADMIN — FLUTICASONE FUROATE AND VILANTEROL TRIFENATATE 1 PUFF: 100; 25 POWDER RESPIRATORY (INHALATION) at 08:19

## 2023-01-16 RX ADMIN — ENOXAPARIN SODIUM 40 MG: 40 INJECTION SUBCUTANEOUS at 08:19

## 2023-01-16 RX ADMIN — GUAIFENESIN 600 MG: 600 TABLET ORAL at 08:19

## 2023-01-16 RX ADMIN — IPRATROPIUM BROMIDE 0.5 MG: 0.5 SOLUTION RESPIRATORY (INHALATION) at 07:41

## 2023-01-16 RX ADMIN — NICOTINE 1 PATCH: 21 PATCH, EXTENDED RELEASE TRANSDERMAL at 08:19

## 2023-01-16 RX ADMIN — METHYLPREDNISOLONE SODIUM SUCCINATE 40 MG: 40 INJECTION, POWDER, FOR SOLUTION INTRAMUSCULAR; INTRAVENOUS at 21:21

## 2023-01-16 RX ADMIN — METHOCARBAMOL 500 MG: 500 TABLET ORAL at 18:04

## 2023-01-16 RX ADMIN — GUAIFENESIN 1200 MG: 600 TABLET ORAL at 18:04

## 2023-01-16 RX ADMIN — ACETAMINOPHEN 650 MG: 325 TABLET, FILM COATED ORAL at 18:04

## 2023-01-16 RX ADMIN — LEVALBUTEROL HYDROCHLORIDE 1.25 MG: 1.25 SOLUTION, CONCENTRATE RESPIRATORY (INHALATION) at 19:23

## 2023-01-16 RX ADMIN — OXYCODONE HYDROCHLORIDE 5 MG: 5 TABLET ORAL at 19:52

## 2023-01-16 RX ADMIN — PANTOPRAZOLE SODIUM 40 MG: 40 TABLET, DELAYED RELEASE ORAL at 08:19

## 2023-01-16 RX ADMIN — PREDNISONE 40 MG: 20 TABLET ORAL at 08:19

## 2023-01-16 RX ADMIN — METHOCARBAMOL 500 MG: 500 TABLET ORAL at 08:19

## 2023-01-16 NOTE — ASSESSMENT & PLAN NOTE
· Continue pre-hospital inhalers  · On IV solumedrol and wean as able, likely transition to po tomorrow

## 2023-01-16 NOTE — ASSESSMENT & PLAN NOTE
· Patient presenting with bilateral lower extremity pitting edema  Patient reports that this is new over the last 2-3 days    · Denies history of CHF, however patient does not follow with a PCP  · Echo reviewed   · No further diuretics indicated   · Daily weights, I&O  · Monitor respiratory status

## 2023-01-16 NOTE — ASSESSMENT & PLAN NOTE
· Patient presenting to the ED for midsternal chest pain and associated SOB  · ECG: NSR without acute ischemic changes  · Troponin level: 0hr 3, 2hr 3 (Delta 0)  · Likely related to underlying pneumonia vs volume overload  · Serial ECG and troponin levels  · No events on tele

## 2023-01-16 NOTE — PLAN OF CARE
Problem: Potential for Falls  Goal: Patient will remain free of falls  Description: INTERVENTIONS:  - Educate patient/family on patient safety including physical limitations  - Instruct patient to call for assistance with activity   - Consult OT/PT to assist with strengthening/mobility   - Keep Call bell within reach  - Keep bed low and locked with side rails adjusted as appropriate  - Keep care items and personal belongings within reach  - Initiate and maintain comfort rounds  - Make Fall Risk Sign visible to staff  - Offer Toileting every 2 Hours, in advance of need  - Initiate/Maintain bed alarm  - Obtain necessary fall risk management equipment:   Problem: PAIN - ADULT  Goal: Verbalizes/displays adequate comfort level or baseline comfort level  Description: Interventions:  - Encourage patient to monitor pain and request assistance  - Assess pain using appropriate pain scale  - Administer analgesics based on type and severity of pain and evaluate response  - Implement non-pharmacological measures as appropriate and evaluate response  - Consider cultural and social influences on pain and pain management  - Notify physician/advanced practitioner if interventions unsuccessful or patient reports new pain  Outcome: Progressing     - Apply yellow socks and bracelet for high fall risk patients  - Consider moving patient to room near nurses station  Outcome: Progressing

## 2023-01-16 NOTE — ASSESSMENT & PLAN NOTE
Patient presenting to the ED for acute mid-sternal chest pain and associated shortness of breath that started yesterday afternoon  She had a recent admission for L1 compression fracture  Denies any fever/chills, abdominal pain, nausea or vomiting  · Patient currently requiring 2L NC  Reports that at home she uses 2L NC as needed, but rarely uses it  Right now she is dependent on nasal cannula oxygen  · CT chest: Bibasilar areas of subsegmental atelectasis  Superimposed infection must be excluded clinically     · Possibly secondary to copd exacerbation   · Does not meet sepsis criteria on admission  · Ceftriaxone discontinued as procal is negative and no pneumonia on CT chest   ·  COVID/FLU/RSV negative   · Monitor respiratory status  · Switch to IV steroid and taper as able

## 2023-01-16 NOTE — PLAN OF CARE
Problem: Potential for Falls  Goal: Patient will remain free of falls  Description: INTERVENTIONS:  - Educate patient/family on patient safety including physical limitations  - Instruct patient to call for assistance with activity   - Consult OT/PT to assist with strengthening/mobility   - Keep Call bell within reach  - Keep bed low and locked with side rails adjusted as appropriate  - Keep care items and personal belongings within reach  - Initiate and maintain comfort rounds  - Make Fall Risk Sign visible to staff  - Offer Toileting every 2 Hours, in advance of need  - Initiate/Maintain Bed alarm  - Obtain necessary fall risk management equipment: Bed Alarm  - Apply yellow socks and bracelet for high fall risk patients  - Consider moving patient to room near nurses station  Outcome: Progressing     Problem: MOBILITY - ADULT  Goal: Maintain or return to baseline ADL function  Description: INTERVENTIONS:  -  Assess patient's ability to carry out ADLs; assess patient's baseline for ADL function and identify physical deficits which impact ability to perform ADLs (bathing, care of mouth/teeth, toileting, grooming, dressing, etc )  - Assess/evaluate cause of self-care deficits   - Assess range of motion  - Assess patient's mobility; develop plan if impaired  - Assess patient's need for assistive devices and provide as appropriate  - Encourage maximum independence but intervene and supervise when necessary  - Involve family in performance of ADLs  - Assess for home care needs following discharge   - Consider OT consult to assist with ADL evaluation and planning for discharge  - Provide patient education as appropriate  Outcome: Progressing  Goal: Maintains/Returns to pre admission functional level  Description: INTERVENTIONS:  - Perform BMAT or MOVE assessment daily    - Set and communicate daily mobility goal to care team and patient/family/caregiver     - Collaborate with rehabilitation services on mobility goals if consulted  - Perform Range of Motion 4 times a day  - Reposition patient every 2 hours    - Dangle patient 4 times a day  - Stand patient 4 times a day  - Ambulate patient 4 times a day  - Out of bed to chair 3 times a day   - Out of bed for meals 3 times a day  - Out of bed for toileting  - Record patient progress and toleration of activity level   Outcome: Progressing     Problem: PAIN - ADULT  Goal: Verbalizes/displays adequate comfort level or baseline comfort level  Description: Interventions:  - Encourage patient to monitor pain and request assistance  - Assess pain using appropriate pain scale  - Administer analgesics based on type and severity of pain and evaluate response  - Implement non-pharmacological measures as appropriate and evaluate response  - Consider cultural and social influences on pain and pain management  - Notify physician/advanced practitioner if interventions unsuccessful or patient reports new pain  Outcome: Progressing     Problem: DISCHARGE PLANNING  Goal: Discharge to home or other facility with appropriate resources  Description: INTERVENTIONS:  - Identify barriers to discharge w/patient and caregiver  - Arrange for needed discharge resources and transportation as appropriate  - Identify discharge learning needs (meds, wound care, etc )  - Arrange for interpretive services to assist at discharge as needed  - Refer to Case Management Department for coordinating discharge planning if the patient needs post-hospital services based on physician/advanced practitioner order or complex needs related to functional status, cognitive ability, or social support system  Outcome: Progressing     Problem: RESPIRATORY - ADULT  Goal: Achieves optimal ventilation and oxygenation  Description: INTERVENTIONS:  - Assess for changes in respiratory status  - Assess for changes in mentation and behavior  - Position to facilitate oxygenation and minimize respiratory effort  - Oxygen administered by appropriate delivery if ordered  - Initiate smoking cessation education as indicated  - Encourage broncho-pulmonary hygiene including cough, deep breathe, Incentive Spirometry  - Assess the need for suctioning and aspirate as needed  - Assess and instruct to report SOB or any respiratory difficulty  - Respiratory Therapy support as indicated  Outcome: Progressing

## 2023-01-16 NOTE — NURSING NOTE
Pt requested family take home medications sent to pharmacy: oxycodone, Ibuprofen, Robaxin all given to pts    lead out of building to ensure medication were safely routed home

## 2023-01-16 NOTE — PROGRESS NOTES
[address] [facility]  Progress Note Lesley Godfrey Free 1965, 62 y o  female MRN: [id number]  Unit/Bed#: -01 Encounter: [id number]  Primary Care Provider: Gabriela Black DO   Date and time admitted to hospital: 1/14/2023  9:23 PM    * Acute respiratory failure [facility])  Assessment & Plan  Patient presenting to the ED for acute mid-sternal chest pain and associated shortness of breath that started yesterday afternoon  She had a recent admission for L1 compression fracture  Denies any fever/chills, abdominal pain, nausea or vomiting  · Patient currently requiring 2L NC  Reports that at home she uses 2L NC as needed, but rarely uses it  Right now she is dependent on nasal cannula oxygen  · CT chest: Bibasilar areas of subsegmental atelectasis  Superimposed infection must be excluded clinically  · Possibly secondary to copd exacerbation   · Does not meet sepsis criteria on admission  · Ceftriaxone discontinued as procal is negative and no pneumonia on CT chest   ·  COVID/FLU/RSV negative   · Monitor respiratory status  · Switch to IV steroid and taper as able     Volume overload  Assessment & Plan  · Patient presenting with bilateral lower extremity pitting edema  Patient reports that this is new over the last 2-3 days    · Denies history of CHF, however patient does not follow with a PCP  · Echo reviewed   · No further diuretics indicated   · Daily weights, I&O  · Monitor respiratory status    Chest pain  Assessment & Plan  · Patient presenting to the ED for midsternal chest pain and associated SOB  · ECG: NSR without acute ischemic changes  · Troponin level: 0hr 3, 2hr 3 (Delta 0)  · Likely related to underlying pneumonia vs volume overload  · Serial ECG and troponin levels  · No events on tele    COPD exacerbation (HCC)  Assessment & Plan  · Continue pre-hospital inhalers  · On IV solumedrol and wean as able, likely transition to po tomorrow     Compression fracture of L1 vertebra [facility])  Assessment & Plan  · Recently hospitalized for L1 compression fracture  · Continue with TLSO brace  · Oxycodone 5mg Q4H prn  · Outpatient follow up with Ortho    Hypokalemia  Assessment & Plan  · Repleted and now within normal range   · Recheck and replete as necessary         VTE Pharmacologic Prophylaxis:   Pharmacologic: Enoxaparin (Lovenox)  Mechanical VTE Prophylaxis in Place: Yes    Review of Systems:    Review of Systems   Constitutional: Negative for chills and fever  HENT: Negative for ear pain and sore throat  Eyes: Negative for pain and visual disturbance  Respiratory: Positive for shortness of breath  Negative for cough  Cardiovascular: Negative for chest pain and palpitations  Gastrointestinal: Negative for abdominal pain and vomiting  Genitourinary: Negative for dysuria and hematuria  Musculoskeletal: Negative for arthralgias and back pain  Skin: Negative for color change and rash  Neurological: Negative for seizures and syncope  All other systems reviewed and are negative  Past Medical and Surgical History:     Past Medical History:   Diagnosis Date   • Achalasia    • Back pain    • Fibromyalgia    • Lupus ([facility] [address] )        Past Surgical History:   Procedure Laterality Date   • HYSTERECTOMY     • NECK SURGERY         Patient Centered Rounds: I have performed bedside rounds with nursing staff today  Discussions with Specialists or Other Care Team Provider: Nursing, CM    Education and Discussions with Family / Patient: Patient     Time Spent for Care: 45 minutes  More than 50% of total time spent on counseling and coordination of care as described above      Current Length of Stay: 0 day(s)    Current Patient Status: Inpatient   Certification Statement: The patient will continue to require additional inpatient hospital stay due to IV steroid     Discharge Plan: next 24-48 hours     Code Status: Level 1 - Full Code      Subjective:   Still complaints of SOB, wheezing and cough     Objective:     Vitals:   Temp (24hrs), Av 1 °F (36 7 °C), Min:97 7 °F (36 5 °C), Max:98 5 °F (36 9 °C)    Temp:  [97 7 °F (36 5 °C)-98 5 °F (36 9 °C)] 97 7 °F (36 5 °C)  HR:  [64-94] 67  Resp:  [16] 16  BP: (116-136)/(57-71) 136/71  SpO2:  [84 %-99 %] 93 %  Body mass index is 39 36 kg/m²  Input and Output Summary (last 24 hours): Intake/Output Summary (Last 24 hours) at 2023 1127  Last data filed at 2023 1001  Gross per 24 hour   Intake 240 ml   Output 600 ml   Net -360 ml       Physical Exam:     Physical Exam  Vitals and nursing note reviewed  Constitutional:       General: She is not in acute distress  Appearance: She is well-developed  She is not ill-appearing or diaphoretic  Interventions: Nasal cannula in place  HENT:      Head: Normocephalic and atraumatic  Mouth/Throat:      Mouth: Mucous membranes are moist       Pharynx: Oropharynx is clear  Eyes:      General: No scleral icterus  Conjunctiva/sclera: Conjunctivae normal    Cardiovascular:      Rate and Rhythm: Normal rate and regular rhythm  Heart sounds: No murmur heard  Pulmonary:      Effort: Pulmonary effort is normal  No respiratory distress  Breath sounds: Wheezing present  Abdominal:      General: Bowel sounds are normal       Palpations: Abdomen is soft  Tenderness: There is no abdominal tenderness  Musculoskeletal:         General: No swelling  Cervical back: Normal range of motion and neck supple  Right lower leg: No edema  Left lower leg: No edema  Skin:     General: Skin is warm and dry  Capillary Refill: Capillary refill takes less than 2 seconds  Neurological:      General: No focal deficit present  Mental Status: She is alert and oriented to person, place, and time     Psychiatric:         Mood and Affect: Mood normal          Behavior: Behavior normal            Additional Data:     Labs:    Results from last 7 days   Lab Units 01/16/23  0455   WBC Thousand/uL 9 95   HEMOGLOBIN g/dL 11 5   HEMATOCRIT % 35 5   PLATELETS Thousands/uL 327   NEUTROS PCT % 87*   LYMPHS PCT % 7*   MONOS PCT % 6   EOS PCT % 0     Results from last 7 days   Lab Units 01/16/23  0455   SODIUM mmol/L 144   POTASSIUM mmol/L 4 0   CHLORIDE mmol/L 106   CO2 mmol/L 29   BUN mg/dL 15   CREATININE mg/dL 0 72   ANION GAP mmol/L 9   CALCIUM mg/dL 9 0   ALBUMIN g/dL 3 0*   TOTAL BILIRUBIN mg/dL 0 21   ALK PHOS U/L 131*   ALT U/L 15   AST U/L 17   GLUCOSE RANDOM mg/dL 155*     Results from last 7 days   Lab Units 01/14/23  2154   INR  1 08             Results from last 7 days   Lab Units 01/15/23  0149   LACTIC ACID mmol/L 1 1   PROCALCITONIN ng/ml <0 05           * I Have Reviewed All Lab Data Listed Above  * Additional Pertinent Lab Tests Reviewed: [facility]    Imaging:    Imaging Reports Reviewed Today Include: CTA chest   Imaging Personally Reviewed by Myself Includes:  none    Recent Cultures (last 7 days):     Results from last 7 days   Lab Units 01/15/23  0802 01/15/23  0149   BLOOD CULTURE   --  Received in Microbiology Lab  Culture in Progress  Received in Microbiology Lab  Culture in Progress     LEGIONELLA URINARY ANTIGEN  Negative  --        Last 24 Hours Medication List:   Current Facility-Administered Medications   Medication Dose Route Frequency Provider Last Rate   • acetaminophen  650 mg Oral Q6H PRN Emma Thapa PA-C     • albuterol  2 puff Inhalation Q4H PRN Emma Thapa PA-C     • enoxaparin  40 mg Subcutaneous Daily Emma Thapa PA-C     • Fluticasone Furoate-Vilanterol  1 puff Inhalation Daily Emma Thapa PA-C     • guaiFENesin  1,200 mg Oral BID Felton Silvestre MD     • ibuprofen  800 mg Oral Q6H PRN Felton Silvestre MD     • ipratropium  0 5 mg Nebulization TID Krishna Lara MD     • levalbuterol  1 25 mg Nebulization TID Krishna Lara MD     • lidocaine  1 patch Topical Daily PRN Dustin Motta PA-C     • methocarbamol  500 mg Oral BID Lon Melissa PA-C     • methylPREDNISolone sodium succinate  40 mg Intravenous Q12H [address] Yariel Lai MD     • nicotine  1 patch Transdermal Daily Emma Thapa PA-C     • oxyCODONE  5 mg Oral Q4H PRN Yariel Lai MD     • pantoprazole  40 mg Oral Daily Lon Melissa PA-C          Today, Patient Was Seen By: Andrzej Griffin MD    ** Please Note: Dictation voice to text software may have been used in the creation of this document   **

## 2023-01-16 NOTE — UTILIZATION REVIEW
Initial Clinical Review      OBS order 1/15 0125 converted to IP on 1/16 1120 for continued treatment of COPD requiring IV steroids     Admission: Date/Time/Statement:   Admission Orders (From admission, onward)     Ordered        01/16/23 1120  Inpatient Admission  Once            01/15/23 0125  Place in Observation  Once                       Inpatient Admission     Standing Status:   Standing     Number of Occurrences:   1     Order Specific Question:   Level of Care     Answer:   Med Surg [16]     Order Specific Question:   Estimated length of stay     Answer:   More than 2 Midnights     Order Specific Question:   Certification     Answer:   I certify that inpatient services are medically necessary for this patient for a duration of greater than two midnights  See H&P and MD Progress Notes for additional information about the patient's course of treatment  ED Arrival Information     Expected   -    Arrival   1/14/2023 21:22    Acuity   Urgent            Means of arrival   Ambulance    Escorted by   [address] [place] [address] Ambulance    Service   Hospitalist    Admission type   Emergency            Arrival complaint   chest pain           Chief Complaint   Patient presents with   • Chest Pain     Cp started at 1300 today  No prior hx  States it's in the middle of her chest and does not radiate  Described as a heaviness  Pt wears 02 at home  Was given 324 ASA and 1 nitro with no relief        Initial Presentation: 62 y o  female acute mid-sternal chest pain and associated shortness of breath that started yesterday afternoon  She had a recent admission for L1 compression fracture  PMHX COPD, L1 compression fracture   CT chest: Bibasilar areas of subsegmental atelectasis  Superimposed infection must be excluded clinically  Etiology: underlying pneumonia vs volume overload vs other  Admitted OBS status w/ COPD exacerbation  plan for IV abx , check BNP , IV lasix , weights , I&O , echo , fld restriction    Cont nebs, inhalers, solumedrol   Hold off on furtehr IV diuretics and abx   F/u BC   Maintaining O2 sat on  2l NC   PE: rales, BLE edema     1/16 IM Note   Pt cont w/ SOB , wheezing and cough   Cont IV steroids  Wean as able and transition to po   Cont supportive care   O2 sat 93 % on 2l NC   Pt on 2l NC prn at home   States she rarely uses it            ED Triage Vitals   Temperature Pulse Respirations Blood Pressure SpO2   01/14/23 2127 01/14/23 2126 01/14/23 2126 01/14/23 2126 01/14/23 2126   98 8 °F (37 1 °C) 79 20 141/67 97 %      Temp Source Heart Rate Source Patient Position - Orthostatic VS BP Location FiO2 (%)   01/14/23 2127 01/14/23 2126 01/14/23 2126 01/14/23 2126 --   Oral Monitor Sitting Left arm       Pain Score       01/14/23 2156       7          Wt Readings from Last 1 Encounters:   01/16/23 94 5 kg (208 lb 5 4 oz)     Additional Vital Signs:   01/16/23 0741 -- -- -- -- -- 93 % 28 2 L/min Nasal cannula --   01/16/23 07:23:04 97 7 °F (36 5 °C) 67 -- 136/71 93 93 % -- -- -- --   01/16/23 03:31:29 97 7 °F (36 5 °C) 72 16 118/57 77 90 % -- -- -- --   01/15/23 21:58:33 98 3 °F (36 8 °C) 94 -- 135/65 88 91 % -- -- -- --   01/15/23 1944 -- -- -- -- -- 91 % -- -- -- --   01/15/23 18:52:29 98 3 °F (36 8 °C) 89 -- 130/62 85 89 % Abnormal  -- -- -- --   01/15/23 18:50:37 98 3 °F (36 8 °C) 82 -- 130/62 85 91 % -- -- -- --   01/15/23 1623 -- 66 -- -- -- 90 % -- -- None (Room air) --   01/15/23 15:31:38 98 5 °F (36 9 °C) 81 -- 118/71 87 84 % Abnormal  -- -- -- --   01/15/23 1409 -- 70 -- 116/57 -- 92 % -- -- None (Room air) --   01/15/23 1352 -- 64 -- -- -- 99 % -- -- None (Room air) --   01/15/23 1349 -- -- -- -- -- 89 % Abnormal  -- -- None (Room air) --   01/15/23 13:28:39 -- 69 -- 116/57 77 94 % -- -- None (Room air) Sitting   01/15/23 1100 97 6 °F (36 4 °C) 61 -- 114/56 75 96 % -- -- -- --   01/15/23 10:58:14 97 6 °F (36 4 °C) 64 16 114/56 75 95 % -- -- -- --   01/15/23 0919 -- -- -- -- -- -- 28 2 L/min  Nasal cannula --   Nasal Cannula O2 Flow Rate (L/min): PRN at 01/15/23 0919   01/15/23 0806 -- 58 16 -- -- 99 % 28 2 L/min Nasal cannula --   01/15/23 07:24:31 97 6 °F (36 4 °C) -- 16 87/50 Abnormal  62 Abnormal  -- -- -- -- --   01/15/23 02:20:53 98 3 °F (36 8 °C) -- 16 114/59 77 -- -- -- -- --   01/15/23 0200 -- 81 25 Abnormal  114/59 83 98 % -- -- -- --   01/14/23 2330 -- 72 21 137/64 -- 97 % 28 2 L/min  Nasal cannula Sitting   Nasal Cannula O2 Flow Rate (L/min): per pt, she is on 2L at home at 01/14/23 2330   01/14/23 2127 98 8 °F (37 1 °C) -- -- -- -- --           Pertinent Labs/Diagnostic Test Results:   1/15 Echo •  Left Ventricle: Left ventricular cavity size is normal  Wall thickness is normal  Systolic function is normal (65%)  Wall motion is normal  Diastolic function is normal   •  Right Ventricle: Right ventricular cavity size is normal  Systolic function is normal   •  Mitral Valve: There is trace regurgitation  •  Tricuspid Valve: There is trace regurgitation  The right ventricular systolic pressure is normal      1/15 EKG Normal sinus rhythm  Left posterior fascicular block  Nonspecific ST abnormality  CTA ED chest PE study   Final Result by Finesse Ruiz MD (01/15 0039)      Bibasilar areas of subsegmental atelectasis  Superimposed infection must be excluded clinically  Irregularity of the superior margin of the L1 vertebral body  Most recent CT abdomen pelvis from October 4, 2022 does not demonstrate this abnormality  On this examination, this anatomy is incompletely visualized  This finding may represent a fracture  Recommend dedicated imaging of this anatomy  Workstation performed: [id number]         XR chest 1 view portable   Final Result by Chase Magaña MD (01/15 1312)      Bibasilar atelectatic changes, right greater than left  Lungs otherwise clear                    Workstation performed: [id number]           Results from last 7 days   Lab Units 01/15/23  0149   SARS-COV-2 Negative     Results from last 7 days   Lab Units 01/16/23  0455 01/15/23  0318 01/14/23  2154   WBC Thousand/uL 9 95 7 83 8 83   HEMOGLOBIN g/dL 11 5 11 1* 11 9   HEMATOCRIT % 35 5 34 6* 37 0   PLATELETS Thousands/uL 327 297 300   NEUTROS ABS Thousands/µL 8 60*  --  6 79     Results from last 7 days   Lab Units 01/16/23  0455 01/14/23  2154   SODIUM mmol/L 144 142   POTASSIUM mmol/L 4 0 3 2*   CHLORIDE mmol/L 106 103   CO2 mmol/L 29 30   ANION GAP mmol/L 9 9   BUN mg/dL 15 11   CREATININE mg/dL 0 72 0 82   EGFR ml/min/1 73sq m 93 79   CALCIUM mg/dL 9 0 8 7     Results from last 7 days   Lab Units 01/16/23  0455 01/14/23  2154   AST U/L 17 11   ALT U/L 15 16   ALK PHOS U/L 131* 141*   TOTAL PROTEIN g/dL 7 2 7 2   ALBUMIN g/dL 3 0* 3 1*   TOTAL BILIRUBIN mg/dL 0 21 0 29     Results from last 7 days   Lab Units 01/16/23  0455 01/14/23  2154   GLUCOSE RANDOM mg/dL 155* 114     Results from last 7 days   Lab Units 01/15/23  0159 01/15/23  0029 01/14/23  2154   HS TNI 0HR ng/L  --   --  3   HS TNI 2HR ng/L  --  3  --    HSTNI D2 ng/L  --  0  --    HS TNI 4HR ng/L 3  --   --    HSTNI D4 ng/L 0  --   --      Results from last 7 days   Lab Units 01/14/23  2154   PROTIME seconds 13 8   INR  1 08   PTT seconds 30     Results from last 7 days   Lab Units 01/15/23  0149   PROCALCITONIN ng/ml <0 05     Results from last 7 days   Lab Units 01/15/23  0149   LACTIC ACID mmol/L 1 1     Results from last 7 days   Lab Units 01/15/23  0149   NT-PRO BNP pg/mL 58     Results from last 7 days   Lab Units 01/15/23  0802 01/15/23  0149   STREP PNEUMONIAE ANTIGEN, URINE  Negative  --    LEGIONELLA URINARY ANTIGEN  Negative  --    INFLUENZA A PCR   --  Negative   INFLUENZA B PCR   --  Negative   RSV PCR   --  Negative       Results from last 7 days   Lab Units 01/15/23  0149   BLOOD CULTURE  Received in Microbiology Lab  Culture in Progress  Received in Microbiology Lab  Culture in Progress         ED Treatment:   Medication Administration from 01/14/2023 2122 to 01/15/2023 0209       Date/Time Order Dose Route Action     01/14/2023 2156 EST morphine injection 4 mg 4 mg Intravenous Given     01/14/2023 2314 EST potassium chloride (K-DUR,KLOR-CON) CR tablet 40 mEq 40 mEq Oral Given     01/14/2023 2347 EST morphine injection 4 mg 4 mg Intravenous Given     01/15/2023 0145 EST ceftriaxone (ROCEPHIN) 1 g/50 mL in dextrose IVPB 1,000 mg Intravenous New Bag        Past Medical History:   Diagnosis Date   • Achalasia    • Back pain    • Fibromyalgia    • Lupus ([facility] [address] )      Present on Admission:  • COPD exacerbation ([facility] [address] )  • Compression fracture of L1 vertebra (HCC)  • Hypokalemia      Admitting Diagnosis: Chest pain [R07 9]  Pneumonia [J18 9]  SOB (shortness of breath) [R06 02]  Chest pain, unspecified type [R07 9]  Age/Sex: 62 y o  female  Admission Orders:  Scheduled Medications:  enoxaparin, 40 mg, Subcutaneous, Daily  Fluticasone Furoate-Vilanterol, 1 puff, Inhalation, Daily  guaiFENesin, 1,200 mg, Oral, BID  ipratropium, 0 5 mg, Nebulization, TID  levalbuterol, 1 25 mg, Nebulization, TID  methocarbamol, 500 mg, Oral, BID  methylPREDNISolone sodium succinate, 40 mg, Intravenous, Q12H [facility]  nicotine, 1 patch, Transdermal, Daily  pantoprazole, 40 mg, Oral, Daily      Continuous IV Infusions:     PRN Meds:  acetaminophen, 650 mg, Oral, Q6H PRN  albuterol, 2 puff, Inhalation, Q4H PRN  ibuprofen, 800 mg, Oral, Q6H PRN  lidocaine, 1 patch, Topical, Daily PRN  morphine injection, 2 mg, Intravenous, Daily PRN  oxyCODONE, 5 mg, Oral, Q4H PRN      Weights  I&O   Up as jennifer   Fld restriction 1500 ml   Tele   None    Network Utilization Review Department  ATTENTION: Please call with any questions or concerns to [phone] and carefully listen to the prompts so that you are directed to the right person   All voicemails are confidential   Anastasia Darby all requests for admission clinical reviews, approved or denied determinations and any other requests to dedicated fax number below belonging to the campus where the patient is receiving treatment   List of dedicated fax numbers for the Facilities:  [address] [place] [address] DENIALS (Administrative/Medical Necessity) [phone]   [address]  (Maternity/NICU/Pediatrics) [phone] [address] [phone]   [address] [phone]   [address] [address] [address] [place] [address] [phone]   [address] [facility] [address] [facility] [phone]

## 2023-01-16 NOTE — CASE MANAGEMENT
Case Management Assessment & Discharge Planning Note    Patient name Eloise Langley  Location /-95 MRN [id number]  : 1965 Date 2023       Current Admission Date: 2023  Current Admission Diagnosis:Acute respiratory failure [facility])   Patient Active Problem List    Diagnosis Date Noted   • Chest pain 01/15/2023   • Acute respiratory failure ([address] ) 01/15/2023   • Volume overload 01/15/2023   • Ambulatory dysfunction 2023   • Compression fracture of L1 vertebra ([address] ) 2023   • COPD exacerbation ([address] ) 2023   • Sepsis ([address] ) 2023   • Fall 2023   • Systemic lupus erythematosus with lung involvement ([address] ) 12/10/2022   • Respiratory failure with hypoxia ([address] ) 10/06/2022   • Hypokalemia 10/04/2022   • Chest pain, unspecified 2022   • Chronic obstructive pulmonary disease with acute exacerbation ([address] ) 2022   • Smoker 2022      LOS (days): 0  Geometric Mean LOS (GMLOS) (days):   Days to GMLOS:     OBJECTIVE:    Risk of Unplanned Readmission Score: 14 44         Current admission status: Inpatient       Preferred Pharmacy:   [address] [place], [place] [address]e - [address] [place] [address] 6  [address] [place] [address] Drive [address]  Phone: [phone] Fax: [phone]    Primary Care Provider: Jersey Goins DO    Primary Insurance:   Secondary Insurance:     ASSESSMENT:  [address] Proxies    There are no active Health Care Proxies on file         Advance Directives  Does patient have a [address]?: No  Was patient offered paperwork?: Yes (pt declined)  Does patient currently have a Health Care decision maker?: Yes, please see Health Care Proxy section  Does patient have Advance Directives?: No  Was patient offered paperwork?: Yes (declined)  Primary Contact:  Matt Marie         Readmission Root Cause  30 Day Readmission: No    Patient Information  Admitted from[de-identified] Home  Mental Status: Alert  During Assessment patient was accompanied by: Not accompanied during assessment  Assessment information provided by[de-identified] Patient  Primary Caregiver: Self  Support Systems: Spouse/significant other  [place] of Residence: [place] [address] do you live in?: [place] [place] [address] entry access options   Select all that apply : Stairs  Number of steps to enter home : 2  Do the steps have railings?: Yes  Type of Current Residence: [place]  In the last 12 months, was there a time when you were not able to pay the mortgage or rent on time?: No  In the last 12 months, how many places have you lived?: 1  In the last 12 months, was there a time when you did not have a steady place to sleep or slept in a shelter (including now)?: No  Homeless/housing insecurity resource given?: No  Living Arrangements: Lives w/ Spouse/significant other, Lives w/ Extended Family (Lives with  Octavio Perdomo and 4 grandchildren)  Is patient a ?: No    Activities of Daily Living Prior to Admission  Functional Status: Independent  Completes ADLs independently?: Yes  Ambulates independently?: Yes  Does patient use assisted devices?: Yes  Assisted Devices (DME) used: Portable Oxygen tanks, Portable Oxygen concentrator  DME Company Name (respiratory supplies): [employer]  O2 Rate(s): 2lpm, prn  Does patient currently own DME?: No  Does patient have a history of Outpatient Therapy (PT/OT)?: No  Does the patient have a history of Short-Term Rehab?: No  Does patient have a history of HHC?: No  Does patient currently have [facility]?: No         Patient Information Continued  Income Source: Employed  Does patient have prescription coverage?: No (pt has no insurance-financial informed)  Within the past 12 months, you worried that your food would run out before you got the money to buy more : Never true  Within the past 12 months, the food you bought just didn't last and you didn't have money to get more : Never true  Food insecurity resource given?: No  Does patient receive dialysis treatments?: No  Does patient have a history of substance abuse?: No  Does patient have a history of Mental Health Diagnosis?: No    PHQ 2/9 Screening   Reviewed PHQ 2/9 Depression Screening Score?: No    Means of Transportation  Means of Transport to Appts[de-identified] Drives Self  In the past 12 months, has lack of transportation kept you from medical appointments or from getting medications?: No  In the past 12 months, has lack of transportation kept you from meetings, work, or from getting things needed for daily living?: No  Was application for public transport provided?: No        DISCHARGE DETAILS:    Discharge planning discussed with[de-identified] patient  Freedom of Choice: Yes  Comments - Freedom of Choice: CM discussed dc needs and pt is very concerned about this hospital bill  Financial informed of self pay status  Pt refusing VNA services  CM contacted family/caregiver?: No- see comments (pt will update her  herself)  Were Treatment Team discharge recommendations reviewed with patient/caregiver?: Yes  Did patient/caregiver verbalize understanding of patient care needs?: Yes  Were patient/caregiver advised of the risks associated with not following Treatment Team discharge recommendations?: Yes    Contacts  Patient Contacts: Justen Garvin  Relationship to Patient[de-identified] [address]         Is the patient interested in [facility] at discharge?: No    DME Referral Provided  Referral made for DME?: No    Other Referral/Resources/Interventions Provided:  Financial Resources Provided: Financial Counselor  Referral Comments: CM discussed dc needs and pt is very concerned about this hospital bill  Financial informed of self pay status    Pt refusing VNA services    Would you like to participate in our [facility] service program?  : No - Declined       Discharge Destination Plan[de-identified] Home  Transport at Discharge : Family

## 2023-01-17 VITALS
TEMPERATURE: 97.5 F | WEIGHT: 201.28 LBS | HEIGHT: 61 IN | BODY MASS INDEX: 38 KG/M2 | DIASTOLIC BLOOD PRESSURE: 68 MMHG | SYSTOLIC BLOOD PRESSURE: 148 MMHG | RESPIRATION RATE: 22 BRPM | OXYGEN SATURATION: 92 % | HEART RATE: 62 BPM

## 2023-01-17 LAB
ANION GAP SERPL CALCULATED.3IONS-SCNC: 9 MMOL/L (ref 4–13)
BUN SERPL-MCNC: 15 MG/DL (ref 5–25)
CALCIUM SERPL-MCNC: 8.9 MG/DL (ref 8.3–10.1)
CHLORIDE SERPL-SCNC: 107 MMOL/L (ref 96–108)
CO2 SERPL-SCNC: 28 MMOL/L (ref 21–32)
CREAT SERPL-MCNC: 0.69 MG/DL (ref 0.6–1.3)
ERYTHROCYTE [DISTWIDTH] IN BLOOD BY AUTOMATED COUNT: 14.3 % (ref 11.6–15.1)
GFR SERPL CREATININE-BSD FRML MDRD: 96 ML/MIN/1.73SQ M
GLUCOSE SERPL-MCNC: 215 MG/DL (ref 65–140)
HCT VFR BLD AUTO: 34.6 % (ref 34.8–46.1)
HGB BLD-MCNC: 11.2 G/DL (ref 11.5–15.4)
MCH RBC QN AUTO: 28.9 PG (ref 26.8–34.3)
MCHC RBC AUTO-ENTMCNC: 32.4 G/DL (ref 31.4–37.4)
MCV RBC AUTO: 89 FL (ref 82–98)
PLATELET # BLD AUTO: 361 THOUSANDS/UL (ref 149–390)
PMV BLD AUTO: 9.1 FL (ref 8.9–12.7)
POTASSIUM SERPL-SCNC: 4.4 MMOL/L (ref 3.5–5.3)
RBC # BLD AUTO: 3.87 MILLION/UL (ref 3.81–5.12)
SODIUM SERPL-SCNC: 144 MMOL/L (ref 135–147)
WBC # BLD AUTO: 14.99 THOUSAND/UL (ref 4.31–10.16)

## 2023-01-17 RX ORDER — PREDNISONE 10 MG/1
TABLET ORAL
Qty: 30 TABLET | Refills: 0 | Status: SHIPPED | OUTPATIENT
Start: 2023-01-17 | End: 2023-01-29

## 2023-01-17 RX ORDER — OXYCODONE HYDROCHLORIDE 5 MG/1
5 TABLET ORAL EVERY 4 HOURS PRN
Qty: 10 TABLET | Refills: 0 | Status: SHIPPED | OUTPATIENT
Start: 2023-01-17

## 2023-01-17 RX ORDER — METHOCARBAMOL 500 MG/1
500 TABLET, FILM COATED ORAL 2 TIMES DAILY
Qty: 40 TABLET | Refills: 0 | Status: SHIPPED | OUTPATIENT
Start: 2023-01-17

## 2023-01-17 RX ADMIN — FLUTICASONE FUROATE AND VILANTEROL TRIFENATATE 1 PUFF: 100; 25 POWDER RESPIRATORY (INHALATION) at 09:08

## 2023-01-17 RX ADMIN — GUAIFENESIN 1200 MG: 600 TABLET ORAL at 09:07

## 2023-01-17 RX ADMIN — OXYCODONE HYDROCHLORIDE 5 MG: 5 TABLET ORAL at 13:01

## 2023-01-17 RX ADMIN — NICOTINE 1 PATCH: 21 PATCH, EXTENDED RELEASE TRANSDERMAL at 09:08

## 2023-01-17 RX ADMIN — METHYLPREDNISOLONE SODIUM SUCCINATE 40 MG: 40 INJECTION, POWDER, FOR SOLUTION INTRAMUSCULAR; INTRAVENOUS at 09:06

## 2023-01-17 RX ADMIN — OXYCODONE HYDROCHLORIDE 5 MG: 5 TABLET ORAL at 02:01

## 2023-01-17 RX ADMIN — METHOCARBAMOL 500 MG: 500 TABLET ORAL at 09:07

## 2023-01-17 RX ADMIN — IPRATROPIUM BROMIDE 0.5 MG: 0.5 SOLUTION RESPIRATORY (INHALATION) at 07:27

## 2023-01-17 RX ADMIN — LEVALBUTEROL HYDROCHLORIDE 1.25 MG: 1.25 SOLUTION, CONCENTRATE RESPIRATORY (INHALATION) at 07:27

## 2023-01-17 RX ADMIN — MORPHINE SULFATE 2 MG: 2 INJECTION, SOLUTION INTRAMUSCULAR; INTRAVENOUS at 09:15

## 2023-01-17 RX ADMIN — PANTOPRAZOLE SODIUM 40 MG: 40 TABLET, DELAYED RELEASE ORAL at 09:07

## 2023-01-17 NOTE — DISCHARGE SUMMARY
[address] [facility]  Discharge- María Kilpatrick 1965, 62 y o  female MRN: [id number]  Unit/Bed#: -01 Encounter: [id number]  Primary Care Provider: Jaiden Garnett DO   Date and time admitted to hospital: 1/14/2023  9:23 PM    * Acute respiratory failure [facility])  Assessment & Plan  Patient presenting to the ED for acute mid-sternal chest pain and associated shortness of breath that started yesterday afternoon  She had a recent admission for L1 compression fracture  Denies any fever/chills, abdominal pain, nausea or vomiting  · Patient currently requiring 2L NC  Reports that at home she uses 2L NC as needed, but rarely uses it  Right now she is dependent on nasal cannula oxygen  · CT chest: Bibasilar areas of subsegmental atelectasis  Superimposed infection must be excluded clinically  · Possibly secondary to copd exacerbation   · Does not meet sepsis criteria on admission  · Ceftriaxone discontinued as procal is negative and no pneumonia on CT chest   ·  COVID/FLU/RSV negative   · Monitor respiratory status  · Transitioned to p o  steroid taper    Volume overload  Assessment & Plan  · Patient presenting with bilateral lower extremity pitting edema  Patient reports that this is new over the last 2-3 days    · Denies history of CHF, however patient does not follow with a PCP  · Echo reviewed   · No further diuretics indicated   · Daily weights, I&O  · Monitor respiratory status    Chest pain  Assessment & Plan  · Patient presenting to the ED for midsternal chest pain and associated SOB  · ECG: NSR without acute ischemic changes  · Troponin level: 0hr 3, 2hr 3 (Delta 0)  · Likely related to underlying pneumonia vs volume overload  · Serial ECG and troponin levels  · No events on tele    COPD exacerbation (HCC)  Assessment & Plan  · Continue pre-hospital inhalers  · Transition to prednisone taper    Compression fracture of L1 vertebra (HCC)  Assessment & Plan  · Recently hospitalized for L1 compression fracture  · Continue with TLSO brace  · Oxycodone 5mg Q4H prn  · Outpatient follow up with Ortho    Hypokalemia  Assessment & Plan  · Repleted and now within normal range   · Recheck and replete as necessary           Discharging Physician / Practitioner: Grant Lira MD  PCP: Sergio Aguayo DO  Admission Date:   Admission Orders (From admission, onward)     Ordered        01/16/23 1120  Inpatient Admission  Once            01/15/23 0125  Place in Observation  Once                      Discharge Date: 01/17/23    Medical Problems     Resolved Problems  Date Reviewed: 1/17/2023   None         Consultations During Hospital Stay:  · none  Significant Findings / Test Results:   XR chest 1 view portable    Result Date: 1/15/2023  Impression: Bibasilar atelectatic changes, right greater than left  Lungs otherwise clear  Workstation performed: [id number]     CTA ED chest PE study    Result Date: 1/15/2023  Impression: Bibasilar areas of subsegmental atelectasis  Superimposed infection must be excluded clinically  Irregularity of the superior margin of the L1 vertebral body  Most recent CT abdomen pelvis from October 4, 2022 does not demonstrate this abnormality  On this examination, this anatomy is incompletely visualized  This finding may represent a fracture  Recommend dedicated imaging of this anatomy  Workstation performed: [id number]       Complications:  none    Reason for Admission: SOB    Hospital Course:     Vidhya Simon is a 62 y o  female patient who originally presented to the hospital on 1/14/2023 due to COPD exacerbation and required oxygen initially, treated with nebulizer therapy and IV steroid with improvement of symptoms significantly  Patient remained hemodynamically stable and satting well on room air at rest and on ambulation  Patient will be discharged with prednisone taper  She medically stable for discharge          Please see above list of diagnoses and related plan for additional information  Condition at Discharge: stable     Discharge Day Visit / Exam:     Subjective: No Acute events  Vitals: Blood Pressure: 148/68 (01/17/23 0742)  Pulse: 62 (01/17/23 0742)  Temperature: 97 5 °F (36 4 °C) (01/17/23 0742)  Temp Source: Oral (01/14/23 2127)  Respirations: 22 (01/16/23 1437)  Height: 5' 1" (154 9 cm) (01/15/23 1409)  Weight - Scale: 91 3 kg (201 lb 4 5 oz) (01/17/23 0600)  SpO2: 92 % (01/17/23 0921)  Exam:   Physical Exam  Vitals and nursing note reviewed  Constitutional:       General: She is not in acute distress  Appearance: She is well-developed  She is not ill-appearing or diaphoretic  HENT:      Head: Normocephalic and atraumatic  Mouth/Throat:      Mouth: Mucous membranes are moist       Pharynx: Oropharynx is clear  Eyes:      General: No scleral icterus  Conjunctiva/sclera: Conjunctivae normal    Cardiovascular:      Rate and Rhythm: Normal rate and regular rhythm  Heart sounds: No murmur heard  Pulmonary:      Effort: Pulmonary effort is normal  No respiratory distress  Breath sounds: Normal breath sounds  No wheezing or rales  Abdominal:      Palpations: Abdomen is soft  Tenderness: There is no abdominal tenderness  Musculoskeletal:         General: No swelling  Cervical back: Neck supple  Right lower leg: No edema  Left lower leg: No edema  Skin:     General: Skin is warm and dry  Capillary Refill: Capillary refill takes less than 2 seconds  Neurological:      General: No focal deficit present  Mental Status: She is alert and oriented to person, place, and time  Psychiatric:         Mood and Affect: Mood normal          Behavior: Behavior normal            Discharge instructions/Information to patient and family:   See after visit summary for information provided to patient and family        Provisions for Follow-Up Care:  See after visit summary for information related to follow-up care and any pertinent home health orders  Disposition:     Home        Planned Readmission: no     Discharge Statement:  I spent 50 minutes discharging the patient  This time was spent on the day of discharge  I had direct contact with the patient on the day of discharge  Greater than 50% of the total time was spent examining patient, answering all patient questions, arranging and discussing plan of care with patient as well as directly providing post-discharge instructions  Additional time then spent on discharge activities  Discharge Medications:  See after visit summary for reconciled discharge medications provided to patient and family        ** Please Note: This note has been constructed using a voice recognition system **

## 2023-01-17 NOTE — PLAN OF CARE
Problem: Potential for Falls  Goal: Patient will remain free of falls  Description: INTERVENTIONS:  - Educate patient/family on patient safety including physical limitations  - Instruct patient to call for assistance with activity   - Consult OT/PT to assist with strengthening/mobility   - Keep Call bell within reach  - Keep bed low and locked with side rails adjusted as appropriate  - Keep care items and personal belongings within reach  - Initiate and maintain comfort rounds  - Make Fall Risk Sign visible to staff  - Offer Toileting every 2 Hours, in advance of need  - Initiate/Maintain 2alarm  - Obtain necessary fall risk management equipment: 2  - Apply yellow socks and bracelet for high fall risk patients  - Consider moving patient to room near nurses station  1/17/2023 1420 by Dm Lamb RN  Outcome: Progressing  1/17/2023 1412 by Dm Lamb RN  Outcome: Progressing     Problem: Potential for Falls  Goal: Patient will remain free of falls  Description: INTERVENTIONS:  - Educate patient/family on patient safety including physical limitations  - Instruct patient to call for assistance with activity   - Consult OT/PT to assist with strengthening/mobility   - Keep Call bell within reach  - Keep bed low and locked with side rails adjusted as appropriate  - Keep care items and personal belongings within reach  - Initiate and maintain comfort rounds  - Make Fall Risk Sign visible to staff  - Offer Toileting every 2 Hours, in advance of need  - Initiate/Maintain 2alarm  - Obtain necessary fall risk management equipment: 2  - Apply yellow socks and bracelet for high fall risk patients  - Consider moving patient to room near nurses station  1/17/2023 1420 by Dm Lamb RN  Outcome: Progressing  1/17/2023 1412 by Dm Lamb RN  Outcome: Progressing

## 2023-01-17 NOTE — OCCUPATIONAL THERAPY NOTE
Occupational Therapy Evaluation      [facility]    1/17/2023    Patient Active Problem List   Diagnosis    Chest pain, unspecified    Chronic obstructive pulmonary disease with acute exacerbation (HCC)    Smoker    Hypokalemia    Respiratory failure with hypoxia (HCC)    Compression fracture of L1 vertebra (HCC)    COPD exacerbation (HCC)    Sepsis ([facility] )    Fall    Systemic lupus erythematosus with lung involvement ([facility] )    Ambulatory dysfunction    Chest pain    Acute respiratory failure (HCC)    Volume overload       Past Medical History:   Diagnosis Date    Achalasia     Back pain     Fibromyalgia     Lupus ([facility] )        Past Surgical History:   Procedure Laterality Date    HYSTERECTOMY      NECK SURGERY          01/17/23 0939   OT Last Visit   OT Visit Date 01/17/23   Note Type   Note type Evaluation   Additional Comments Pt agreeable to OT eval  Upon arrival pt supine in bed with HOB elevated  Pain Assessment   Pain Assessment Tool 0-10   Pain Score 8   Pain Location/Orientation Location: Back   Pain Onset/Description Onset: Ongoing;Frequency: Constant/Continuous; Descriptor: Aching;Descriptor: Discomfort   Hospital Pain Intervention(s) Ambulation/increased activity;Repositioned;Medication (See MAR)   Restrictions/Precautions   Weight Bearing Precautions Per Order Yes   RLE Weight Bearing Per Order WBAT  (per ortho on 1/9/23)   LLE Weight Bearing Per Order WBAT  (per ortho on 1/9/23)   Braces or Orthoses TLSO  (Pt reports that she has not been wearing brace d/t discomfort from the back plate and reports that she asked  to bring TLSO brace into hospital ~3 days ago but he keeps forgetting  Provided extensive education on importance of TLSO brace )   Other Precautions Bed Alarm; Chair Alarm;Telemetry; Fall Risk;Pain;Spinal precautions   Home Living   Type of [address] [place] [address] One level;Performs ADLs on one level; Able to live on main level with bedroom/bathroom;Stairs to enter with rails  (4-5 LISA)   Bathroom Shower/Tub Tub/shower unit   Bathroom Toilet Standard   Bathroom Equipment Grab bars in shower   [address]   (no AD used at baseline)   Additional Comments Pt reports 2 L O2 via NC PRN   Prior Function   Level of [place] Independent with ADLs; Independent with functional mobility; Independent with IADLS   Lives With Spouse; Family   Receives Help From Family   IADLs Independent with driving; Independent with medication management; Family/Friend/Other provides meals   Falls in the last 6 months 1 to 4   Vocational Part time employment  (pt has not been working since recent hospitalization on 1/9/23)   Lifestyle   Autonomy Patient reporting being independent with ADLs, ambulatory with no AD, and lives with family in a one level house   Reciprocal Relationships Supportive family   Service to Others Works part-time as CNA at [phone]  Modified independent   [address] [place] [address]  4  [address] 5  [facility] 66  3  [facility] 73  5  Supervision/Setup   Bed Mobility   Rolling R 5  Supervision  (log-roll technique utilized for spinal precautions)   Additional items Assist x 1;HOB elevated; Bedrails; Increased time required   Supine to Sit 5  Supervision   Additional items Assist x 1;HOB elevated; Bedrails; Increased time required;Verbal cues;LE management   Sit to Supine   (DNT: pt seated OOB in recliner at end of session)   Additional Comments Pt denied lightheaded/dizziness with transitional movements   Transfers   Sit to Stand 5  Supervision   Additional items Impulsive;Verbal cues  (Upon sitting at EOB, pt impulsively stood despite not having TLSO)   Stand to Sit 5  Supervision   Additional items Assist x 1; Armrests; Verbal cues; Impulsive Functional Mobility   Functional Mobility   (CGA)   Additional Comments Pt insisted on ambulating despite max education on back safety and importance of TLSO brace  Additional items   (pt declined RW or SPC)   Balance   Static Sitting Fair +   Dynamic Sitting Fair   Static Standing Fair   Dynamic Standing Fair   Activity Tolerance   Activity Tolerance Patient limited by fatigue;Patient limited by pain   Medical Staff Made Aware Pt seen as a co-eval with PT Cee due to the patient's co-morbidities, clinically unstable presentation, and present impairments which are a regression from the patient's baseline  RUE Assessment   RUE Assessment WFL  (grossly 4-/5 MMT based on functional assessment)   LUE Assessment   LUE Assessment WFL  (grossly 4-/5 MMT based on functional assessment)   Hand Function   Gross Motor Coordination Functional   Fine Motor Coordination Functional   Cognition   Overall Cognitive Status WFL   Arousal/Participation Alert; Responsive   Attention Attends with cues to redirect   Orientation Level Oriented X4   Memory Decreased recall of precautions  (pt did not recall spinal precautions from 1/9/23  Re-educated and recommended avoiding bending, lifting and twisting)   Following Commands Follows one step commands with increased time or repetition   Assessment   Limitation Decreased ADL status; Decreased UE strength;Decreased Safe judgement during ADL;Decreased endurance;Decreased self-care trans;Decreased high-level ADLs   Prognosis Good   Assessment Patient is a 62 y o  female seen for OT evaluation s/p admit to [id number] [facility]  on 1/14/2023 w/Acute respiratory failure ([address] )  Commorbidities affecting patient's functional performance at time of assessment include: hypokalemia, compression fx of L1 vertebra, COPD, chest pain, and volume overload  Orders placed for OT evaluation and treatment and up as tolerated   Performed at least two patient identifiers during session including name and wristband  Prior to admission, Patient reporting being independent with ADLs, ambulatory with no AD, and lives with family in a one level house  Personal factors affecting patient at time of initial evaluation include: steps to enter, difficulty performing ADLs and difficulty performing IADLs  Upon evaluation, patient requires supervision assist for UB ADLs, minimal  and moderate assist for LB ADLs, transfers and functional ambulation in room and bathroom with supervision and contact guard assist, with the use of no AD  Patient is oriented x 4  Occupational performance is affected by the following deficits: impulsive, decreased safety awareness, decreased muscle strength, dynamic sit/ stand balance deficit with poor standing tolerance time for self care and functional mobility, decreased activity tolerance, increased pain and delayed righting and equilibrium reactions  Patient to benefit from continued Occupational Therapy treatment while in the hospital to address deficits as defined above and maximize level of functional independence with ADLs and functional mobility  Occupational Performance areas to address include: grooming , bathing/ shower, dressing, toilet hygiene, transfer to all surfaces, functional ambulation, medication routine/ management, IADLS: Household maintenance, IADLs: safety procedures and IADLs: meal prep/ clean up  From OT standpoint, recommendation at time of d/c would be Home with outpatient rehabilitation  Goals   Patient Goals to get back to work   Plan   Treatment Interventions ADL retraining;Functional transfer training;UE strengthening/ROM; Endurance training;Patient/family training;Equipment evaluation/education; Compensatory technique education; Activityengagement   Goal Expiration Date 01/27/23   OT Treatment Day 0   OT Frequency 2-3x/wk   Recommendation   OT Discharge Recommendation Home with outpatient rehabilitation   Additional Comments  The patient's raw score on the AM-PAC Daily Activity inpatient short form is 19, standardized score is 40 22, greater than 39 4  Patients at this level are likely to benefit from discharge to home  Please refer to the recommendation of the Occupational Therapist for safe discharge planning  AM-PAC Daily Activity Inpatient   Lower Body Dressing 2   Bathing 3   Toileting 3   Upper Body Dressing 3   Grooming 4   Eating 4   Daily Activity Raw Score 19   Daily Activity Standardized Score (Calc for Raw Score >=11) 40 22   AM-PAC Applied Cognition Inpatient   Following a Speech/Presentation 4   Understanding Ordinary Conversation 4   Taking Medications 3   Remembering Where Things Are Placed or Put Away 3   Remembering List of 4-5 Errands 3   Taking Care of Complicated Tasks 3   Applied Cognition Raw Score 20   Applied Cognition Standardized Score 41 76   Modified [place] Scale   Modified [place] Scale 3   End of Consult   Patient Position at End of Consult Bedside chair; All needs within reach   Nurse Communication Nurse aware of consult  (RASHARD Simpson present during session)     GOALS:    *ADL transfers with (I) for inc'd independence with ADLs/purposeful tasks    *UB ADL with (I) for inc'd independence with self cares    *LB ADL with (S) using AE prn for inc'd independence with self cares    *Toileting with (S) for clothing management and hygiene for return to PLOF with personal care    *Increase stand tolerance x 5  m for inc'd tolerance with standing purposeful tasks    *Participate in 10m UE therex to increase overall stamina/activity tolerance for purposeful tasks    *Bed mobility- (I) for inc'd independence to manage own comfort and initiate EOB & OOB purposeful tasks    *Patient will verbalize 3 safety awareness/ principles to prevent falls in the home setting  *Patient will verbalize and demonstrate use of energy conservation/deep breathing techniques and work simplification skills during functional activities with no verbal cues  *Patient will increase OOB/sitting tolerance to 2-4 hours per day to increase participation in self-care and leisure tasks with no s/s of exertion  *Patient will engage in ongoing cognitive assessment to assist with safe discharge planning/recommendations  *Pt will verbalize and demonstrate understanding of spinal movement precautions 100% in tx sessions for increased safety and functional mobility        *Pt will demonstrate use of long handled AE during 100% of tx sessions for increased ADL safety and independence following D/C     RICHARD Hugo, OTR/L

## 2023-01-17 NOTE — PHYSICAL THERAPY NOTE
PT Evaluation (20min)  (9:20-9:40)    Past Medical History:   Diagnosis Date    Achalasia     Back pain     Fibromyalgia     Lupus ([facility] )          01/17/23 0940   PT Last Visit   PT Visit Date 01/17/23   Note Type   Note type Evaluation   Pain Assessment   Pain Assessment Tool 0-10   Pain Score 8   Pain Location/Orientation Location: Back   Hospital Pain Intervention(s) Ambulation/increased activity;Repositioned   Restrictions/Precautions   Weight Bearing Precautions Per Order Yes  (per PT eval 1/9/23 from recent admission)   RLE Weight Bearing Per Order WBAT   LLE Weight Bearing Per Order WBAT   Braces or Orthoses TLSO  (not present for PT eval; pt reports causes increased discomfort + has not been compliant c using)   Other Precautions Bed Alarm; Chair Alarm;Telemetry; Fall Risk;Pain;Spinal precautions  (home O2 (2L prn); back safety precautions for jt protection)   Home Living   Type of [address] [place] [address] One level;Stairs to enter with rails  (4-5 LISA)   Bathroom Shower/Tub Tub/shower unit   Bathroom Toilet Standard   Bathroom Equipment Grab bars in shower   Bathroom Accessibility Accessible   Prior Function   Level of [place] Independent with ADLs; Independent with functional mobility; Independent with IADLS   Lives With Spouse; Family   Receives Help From Family   IADLs Independent with driving; Independent with medication management; Family/Friend/Other provides meals   Falls in the last 6 months 1 to 4   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[employer])   General   Additional Pertinent History pt presents to [facility] c acute respiratory failure  PT consulted for mobility + d/c planning  Family/Caregiver Present No   Cognition   Orientation Level Oriented X4   Subjective   Subjective "I need to get up and walk and get out of this bed"     RUE Assessment   RUE Assessment WFL   LUE Assessment   LUE Assessment WFL   RLE Assessment   RLE Assessment WFL  (4/5)   LLE Assessment   LLE Assessment WFL  (4/5)   Coordination   Sensation WFL   Bed Mobility   Rolling R 5  Supervision   Additional items HOB elevated; Bedrails; Increased time required;Verbal cues  (cues for log roll technique)   Supine to Sit 5  Supervision   Additional items HOB elevated; Bedrails; Increased time required;Verbal cues   Transfers   Sit to Stand 5  Supervision   Additional items Verbal cues; Increased time required   Stand to Sit 5  Supervision   Additional items Armrests; Verbal cues; Increased time required   Ambulation/Elevation   Gait pattern Antalgic;Narrow BRAXTON; Decreased foot clearance; Inconsistent annie   Gait Assistance 5  Supervision   Additional items Verbal cues   Assistive Device None; Other (Comment)  (pt occassionally uses wall, furniture to steady; refused trials c RW or SPC)   Distance 90'x2 c standing rest + 15' c seated rest for toileting   Stair Management Assistance Not tested   Balance   Static Sitting Fair +   Dynamic Sitting Fair   Static Standing Fair   Dynamic Standing Fair   Ambulatory Fair -   Endurance Deficit   Endurance Deficit Yes   Endurance Deficit Description SpO2 92-92% on RA throughout session despite SOB   Activity Tolerance   Activity Tolerance Patient limited by fatigue;Patient limited by pain   Medical Staff Made Aware OT-Homa   Nurse Made Aware RN [place] aware of outcomes of PT session  Assessment   Prognosis Good   Problem List Decreased strength;Decreased endurance; Impaired balance;Decreased mobility;Pain;Orthopedic restrictions   Assessment pt is a 58y/o f who presents to [facility] c acute respiratory failure  PMH significant for achalasia, back pain s/p L1 compression fx, fibromyalgia, + Lupus  at baseline, pt (I) c functional mobility tasks s AD  resides c spouse + family in ranch home c 4-5 LISA  pt has TLSO, however not present for PT eval + pt reports non-compliance c use 2* discomfort   currently presents c deficits in strength, balance, gait quality, pain, + activity tolerance noted in PT exam above  Barthel Index 70/100, Modified [place] 3  pt educated on back safety precautions along c log roll technique c bed mobility  ambulated 90'x2 c standing rest + 15' c seated rest 2* fatigue  SpO2 92-93% on RA throughout session despite SOB  tolerated sitting in chair at end of session c chair alarm activated  would benefit from skilled PT to maximize functional mobility + return home safely  AM-PAC raw score 18 indicating pt safe for d/c home c OPPT follow up, however please refer to PT d/c recommendation for safe d/c planning  pt would also benefit from [facility]  PT eval of high complexity 2* unstable med status c pt requiring ongoing medical management 2* acute respiratory failure  pt c multiple co-morbidities + mobility deficits noted above  resides in ranch home c 4-5 LISA  Barriers to Discharge Inaccessible home environment   Goals   Patient Goals "to get back to work"  [facility] Expiration Date 01/27/23   Short Term Goal #1 1  increase strength 1/2 grade to improve overall functional mobility, 2  perform bed mobility mod (I) to decrease caregiver burden, 3  perform transfers mod (I) to safely perform ADLs, 4  ambulate >150' mod (I)/(I) to safely return to work, 5  negotiate 5 stairs mod (I) to safely enter home   Plan   Treatment/Interventions Functional transfer training;LE strengthening/ROM; Elevations; Therapeutic exercise; Endurance training;Patient/family training;Equipment eval/education; Bed mobility;Gait training;Spoke to nursing;OT   PT Frequency 3-5x/wk   Recommendation   PT Discharge Recommendation Home with outpatient rehabilitation   AM-PAC Basic Mobility Inpatient   Turning in Flat Bed Without Bedrails 3   Lying on Back to Sitting on Edge of Flat Bed Without Bedrails 3   Moving Bed to Chair 3   Standing Up From Chair Using Arms 3   Walk in Room 3   Climb 3-5 Stairs With Railing 3   Basic Mobility Inpatient Raw Score 18   Basic Mobility Standardized Score 41 05   Highest Level Of Mobility   [facility] Goal 6: Walk 10 steps or more   -[facility] Achieved 7: Walk 25 feet or more   Modified [place]oris Scale   Modified [place] Scale 3   Barthel Index   Feeding 10   Bathing 5   Grooming Score 5   Dressing Score 5   Bladder Score 10   Bowels Score 10   Toilet Use Score 5   Transfers (Bed/Chair) Score 10   Mobility (Level Surface) Score 10   Stairs Score 0   Barthel Index Score 70   End of Consult   Patient Position at End of Consult Bedside chair;Bed/Chair alarm activated; All needs within reach     Elaine Díaz DPT

## 2023-01-17 NOTE — PLAN OF CARE
Problem: OCCUPATIONAL THERAPY ADULT  Goal: Performs self-care activities at highest level of function for planned discharge setting  See evaluation for individualized goals  Description: Treatment Interventions: ADL retraining, Functional transfer training, UE strengthening/ROM, Endurance training, Patient/family training, Equipment evaluation/education, Compensatory technique education, Activityengagement          See flowsheet documentation for full assessment, interventions and recommendations  Note: Limitation: Decreased ADL status, Decreased UE strength, Decreased Safe judgement during ADL, Decreased endurance, Decreased self-care trans, Decreased high-level ADLs  Prognosis: Good  Assessment: Patient is a 62 y o  female seen for OT evaluation s/p admit to [id number] [place] [address]  on 1/14/2023 w/Acute respiratory failure ([facility] )  Commorbidities affecting patient's functional performance at time of assessment include: hypokalemia, compression fx of L1 vertebra, COPD, chest pain, and volume overload  Orders placed for OT evaluation and treatment and up as tolerated  Performed at least two patient identifiers during session including name and wristband  Prior to admission, Patient reporting being independent with ADLs, ambulatory with no AD, and lives with family in a one level house  Personal factors affecting patient at time of initial evaluation include: steps to enter, difficulty performing ADLs and difficulty performing IADLs  Upon evaluation, patient requires supervision assist for UB ADLs, minimal  and moderate assist for LB ADLs, transfers and functional ambulation in room and bathroom with supervision and contact guard assist, with the use of no AD  Patient is oriented x 4    Occupational performance is affected by the following deficits: decreased muscle strength, dynamic sit/ stand balance deficit with poor standing tolerance time for self care and functional mobility, decreased activity tolerance, increased pain and delayed righting and equilibrium reactions  Patient to benefit from continued Occupational Therapy treatment while in the hospital to address deficits as defined above and maximize level of functional independence with ADLs and functional mobility  Occupational Performance areas to address include: grooming , bathing/ shower, dressing, toilet hygiene, transfer to all surfaces, functional ambulation, medication routine/ management, IADLS: Household maintenance, IADLs: safety procedures and IADLs: meal prep/ clean up  From OT standpoint, recommendation at time of d/c would be Home with outpatient rehabilitation       OT Discharge Recommendation: Home with outpatient rehabilitation     Colleen Hopper OTD

## 2023-01-17 NOTE — PLAN OF CARE
Problem: Potential for Falls  Goal: Patient will remain free of falls  Description: INTERVENTIONS:  - Educate patient/family on patient safety including physical limitations  - Instruct patient to call for assistance with activity   - Consult OT/PT to assist with strengthening/mobility   - Keep Call bell within reach  - Keep bed low and locked with side rails adjusted as appropriate  - Keep care items and personal belongings within reach  - Initiate and maintain comfort rounds  - Make Fall Risk Sign visible to staff  - Offer Toileting every   Problem: Potential for Falls  Goal: Patient will remain free of falls  Description: INTERVENTIONS:  - Educate patient/family on patient safety including physical limitations  - Instruct patient to call for assistance with activity   - Consult OT/PT to assist with strengthening/mobility   - Keep Call bell within reach  - Keep bed low and locked with side rails adjusted as appropriate  - Keep care items and personal belongings within reach  - Initiate and maintain comfort rounds  - Make Fall Risk Sign visible to staff  - Offer Toileting every 2 Hours, in advance of need  - Initiate/Maintain 2alarm  - Obtain necessary fall risk management equipment: 2  - Apply yellow socks and bracelet for high fall risk patients  - Consider moving patient to room near nurses station  Outcome: Progressing    Hours, in advance of need  - Initiate/Maintain 2alarm  - Obtain necessary fall risk management equipment: 2  - Apply yellow socks and bracelet for high fall risk patients  - Consider moving patient to room near nurses station  Outcome: Progressing

## 2023-01-17 NOTE — PLAN OF CARE
Problem: PHYSICAL THERAPY ADULT  Goal: Performs mobility at highest level of function for planned discharge setting  See evaluation for individualized goals  Description: Treatment/Interventions: Functional transfer training, LE strengthening/ROM, Elevations, Therapeutic exercise, Endurance training, Patient/family training, Equipment eval/education, Bed mobility, Gait training, Spoke to nursing, OT          See flowsheet documentation for full assessment, interventions and recommendations  Note: Prognosis: Good  Problem List: Decreased strength, Decreased endurance, Impaired balance, Decreased mobility, Pain, Orthopedic restrictions  Assessment: pt is a 58y/o f who presents to [facility] c acute respiratory failure  PMH significant for achalasia, back pain s/p L1 compression fx, fibromyalgia, + Lupus  at baseline, pt (I) c functional mobility tasks s AD  resides c spouse + family in ranch home c 4-5 LISA  pt has TLSO, however not present for PT eval + pt reports non-compliance c use 2* discomfort  currently presents c deficits in strength, balance, gait quality, pain, + activity tolerance noted in PT exam above  Barthel Index 70/100, Modified [place] 3  pt educated on back safety precautions along c log roll technique c bed mobility  ambulated 90'x2 c standing rest + 15' c seated rest 2* fatigue  SpO2 92-93% on RA throughout session despite SOB  tolerated sitting in chair at end of session c chair alarm activated  would benefit from skilled PT to maximize functional mobility + return home safely  AM-PAC raw score 18 indicating pt safe for d/c home c OPPT follow up, however please refer to PT d/c recommendation for safe d/c planning  pt would also benefit from [facility]  PT eval of high complexity 2* unstable med status c pt requiring ongoing medical management 2* acute respiratory failure  pt c multiple co-morbidities + mobility deficits noted above  resides in ranch home c 4-5 LISA    Barriers to Discharge: Inaccessible home environment     PT Discharge Recommendation: Home with outpatient rehabilitation    See flowsheet documentation for full assessment

## 2023-01-17 NOTE — PLAN OF CARE
Problem: Potential for Falls  Goal: Patient will remain free of falls  Description: INTERVENTIONS:  - Educate patient/family on patient safety including physical limitations  - Instruct patient to call for assistance with activity   - Consult OT/PT to assist with strengthening/mobility   - Keep Call bell within reach  - Keep bed low and locked with side rails adjusted as appropriate  - Keep care items and personal belongings within reach  - Initiate and maintain comfort rounds  - Make Fall Risk Sign visible to staff  - Apply yellow socks and bracelet for high fall risk patients  - Consider moving patient to room near nurses station  Outcome: Progressing     Problem: MOBILITY - ADULT  Goal: Maintain or return to baseline ADL function  Description: INTERVENTIONS:  -  Assess patient's ability to carry out ADLs; assess patient's baseline for ADL function and identify physical deficits which impact ability to perform ADLs (bathing, care of mouth/teeth, toileting, grooming, dressing, etc )  - Assess/evaluate cause of self-care deficits   - Assess range of motion  - Assess patient's mobility; develop plan if impaired  - Assess patient's need for assistive devices and provide as appropriate  - Encourage maximum independence but intervene and supervise when necessary  - Involve family in performance of ADLs  - Assess for home care needs following discharge   - Consider OT consult to assist with ADL evaluation and planning for discharge  - Provide patient education as appropriate  Outcome: Progressing  Goal: Maintains/Returns to pre admission functional level  Description: INTERVENTIONS:  - Perform BMAT or MOVE assessment daily    - Set and communicate daily mobility goal to care team and patient/family/caregiver     - Collaborate with rehabilitation services on mobility goals if consulted  - Ambulate patient 3 times a day  - Out of bed to chair 3 times a day   - Out of bed for meals 3 times a day  - Out of bed for toileting  - Record patient progress and toleration of activity level   Outcome: Progressing     Problem: PAIN - ADULT  Goal: Verbalizes/displays adequate comfort level or baseline comfort level  Description: Interventions:  - Encourage patient to monitor pain and request assistance  - Assess pain using appropriate pain scale  - Administer analgesics based on type and severity of pain and evaluate response  - Implement non-pharmacological measures as appropriate and evaluate response  - Consider cultural and social influences on pain and pain management  - Notify physician/advanced practitioner if interventions unsuccessful or patient reports new pain  Outcome: Progressing     Problem: DISCHARGE PLANNING  Goal: Discharge to home or other facility with appropriate resources  Description: INTERVENTIONS:  - Identify barriers to discharge w/patient and caregiver  - Arrange for needed discharge resources and transportation as appropriate  - Identify discharge learning needs (meds, wound care, etc )  - Arrange for interpretive services to assist at discharge as needed  - Refer to Case Management Department for coordinating discharge planning if the patient needs post-hospital services based on physician/advanced practitioner order or complex needs related to functional status, cognitive ability, or social support system  Outcome: Progressing     Problem: Knowledge Deficit  Goal: Patient/family/caregiver demonstrates understanding of disease process, treatment plan, medications, and discharge instructions  Description: Complete learning assessment and assess knowledge base    Interventions:  - Provide teaching at level of understanding  - Provide teaching via preferred learning methods  Outcome: Progressing     Problem: RESPIRATORY - ADULT  Goal: Achieves optimal ventilation and oxygenation  Description: INTERVENTIONS:  - Assess for changes in respiratory status  - Assess for changes in mentation and behavior  - Position to facilitate oxygenation and minimize respiratory effort  - Oxygen administered by appropriate delivery if ordered  - Initiate smoking cessation education as indicated  - Encourage broncho-pulmonary hygiene including cough, deep breathe, Incentive Spirometry  - Assess the need for suctioning and aspirate as needed  - Assess and instruct to report SOB or any respiratory difficulty  - Respiratory Therapy support as indicated  Outcome: Progressing

## 2023-01-17 NOTE — PROGRESS NOTES
Patient ambulated in hallway with [facility] PT  O2 accessed by this nurse  O2 sat  92-93% On RA  No distress noted

## 2023-01-17 NOTE — ASSESSMENT & PLAN NOTE
Patient presenting to the ED for acute mid-sternal chest pain and associated shortness of breath that started yesterday afternoon  She had a recent admission for L1 compression fracture  Denies any fever/chills, abdominal pain, nausea or vomiting  · Patient currently requiring 2L NC  Reports that at home she uses 2L NC as needed, but rarely uses it  Right now she is dependent on nasal cannula oxygen  · CT chest: Bibasilar areas of subsegmental atelectasis  Superimposed infection must be excluded clinically     · Possibly secondary to copd exacerbation   · Does not meet sepsis criteria on admission  · Ceftriaxone discontinued as procal is negative and no pneumonia on CT chest   ·  COVID/FLU/RSV negative   · Monitor respiratory status  · Transitioned to p o  steroid taper

## 2023-01-20 LAB
BACTERIA BLD CULT: NORMAL
BACTERIA BLD CULT: NORMAL

## 2023-01-21 ENCOUNTER — APPOINTMENT (EMERGENCY)
Dept: RADIOLOGY | Facility: HOSPITAL | Age: 58
End: 2023-01-21

## 2023-01-21 ENCOUNTER — HOSPITAL ENCOUNTER (EMERGENCY)
Facility: HOSPITAL | Age: 58
Discharge: HOME | End: 2023-01-22
Attending: EMERGENCY MEDICINE

## 2023-01-21 VITALS
TEMPERATURE: 98.1 F | HEART RATE: 75 BPM | OXYGEN SATURATION: 97 % | RESPIRATION RATE: 18 BRPM | DIASTOLIC BLOOD PRESSURE: 68 MMHG | SYSTOLIC BLOOD PRESSURE: 142 MMHG

## 2023-01-21 DIAGNOSIS — R42 LIGHTHEADEDNESS: ICD-10-CM

## 2023-01-21 DIAGNOSIS — M54.9 BACK PAIN: ICD-10-CM

## 2023-01-21 DIAGNOSIS — R06.02 SHORTNESS OF BREATH: ICD-10-CM

## 2023-01-21 DIAGNOSIS — R00.2 PALPITATIONS: Primary | ICD-10-CM

## 2023-01-21 LAB
2HR DELTA HS TROPONIN: 0 NG/L
ALBUMIN SERPL BCP-MCNC: 3.4 G/DL (ref 3.5–5)
ALP SERPL-CCNC: 124 U/L (ref 46–116)
ALT SERPL W P-5'-P-CCNC: 10 U/L (ref 12–78)
ANION GAP SERPL CALCULATED.3IONS-SCNC: 8 MMOL/L (ref 4–13)
APTT PPP: 27 SECONDS (ref 23–37)
AST SERPL W P-5'-P-CCNC: 9 U/L (ref 5–45)
BASOPHILS # BLD AUTO: 0.02 THOUSANDS/ÂΜL (ref 0–0.1)
BASOPHILS NFR BLD AUTO: 0 % (ref 0–1)
BILIRUB SERPL-MCNC: 0.3 MG/DL (ref 0.2–1)
BUN SERPL-MCNC: 17 MG/DL (ref 5–25)
CALCIUM ALBUM COR SERPL-MCNC: 8.9 MG/DL (ref 8.3–10.1)
CALCIUM SERPL-MCNC: 8.4 MG/DL (ref 8.3–10.1)
CARDIAC TROPONIN I PNL SERPL HS: 4 NG/L
CARDIAC TROPONIN I PNL SERPL HS: 4 NG/L
CHLORIDE SERPL-SCNC: 105 MMOL/L (ref 96–108)
CO2 SERPL-SCNC: 27 MMOL/L (ref 21–32)
CREAT SERPL-MCNC: 0.81 MG/DL (ref 0.6–1.3)
EOSINOPHIL # BLD AUTO: 0.03 THOUSAND/ÂΜL (ref 0–0.61)
EOSINOPHIL NFR BLD AUTO: 0 % (ref 0–6)
ERYTHROCYTE [DISTWIDTH] IN BLOOD BY AUTOMATED COUNT: 14.8 % (ref 11.6–15.1)
FLUAV RNA RESP QL NAA+PROBE: NEGATIVE
FLUBV RNA RESP QL NAA+PROBE: NEGATIVE
GFR SERPL CREATININE-BSD FRML MDRD: 80 ML/MIN/1.73SQ M
GLUCOSE SERPL-MCNC: 119 MG/DL (ref 65–140)
HCT VFR BLD AUTO: 36.1 % (ref 34.8–46.1)
HGB BLD-MCNC: 11.9 G/DL (ref 11.5–15.4)
IMM GRANULOCYTES # BLD AUTO: 0.05 THOUSAND/UL (ref 0–0.2)
IMM GRANULOCYTES NFR BLD AUTO: 1 % (ref 0–2)
INR PPP: 1 (ref 0.84–1.19)
LYMPHOCYTES # BLD AUTO: 1.26 THOUSANDS/ÂΜL (ref 0.6–4.47)
LYMPHOCYTES NFR BLD AUTO: 12 % (ref 14–44)
MCH RBC QN AUTO: 29.3 PG (ref 26.8–34.3)
MCHC RBC AUTO-ENTMCNC: 33 G/DL (ref 31.4–37.4)
MCV RBC AUTO: 89 FL (ref 82–98)
MONOCYTES # BLD AUTO: 0.71 THOUSAND/ÂΜL (ref 0.17–1.22)
MONOCYTES NFR BLD AUTO: 7 % (ref 4–12)
NEUTROPHILS # BLD AUTO: 8.37 THOUSANDS/ÂΜL (ref 1.85–7.62)
NEUTS SEG NFR BLD AUTO: 80 % (ref 43–75)
NRBC BLD AUTO-RTO: 0 /100 WBCS
NT-PROBNP SERPL-MCNC: 59 PG/ML
PLATELET # BLD AUTO: 407 THOUSANDS/UL (ref 149–390)
PMV BLD AUTO: 8.6 FL (ref 8.9–12.7)
POTASSIUM SERPL-SCNC: 3.6 MMOL/L (ref 3.5–5.3)
PROT SERPL-MCNC: 6.9 G/DL (ref 6.4–8.4)
PROTHROMBIN TIME: 13 SECONDS (ref 11.6–14.5)
RBC # BLD AUTO: 4.06 MILLION/UL (ref 3.81–5.12)
RSV RNA RESP QL NAA+PROBE: NEGATIVE
SARS-COV-2 RNA RESP QL NAA+PROBE: NEGATIVE
SODIUM SERPL-SCNC: 140 MMOL/L (ref 135–147)
WBC # BLD AUTO: 10.44 THOUSAND/UL (ref 4.31–10.16)

## 2023-01-21 RX ORDER — OXYCODONE HYDROCHLORIDE 5 MG/1
5 TABLET ORAL ONCE
Status: COMPLETED | OUTPATIENT
Start: 2023-01-21 | End: 2023-01-21

## 2023-01-21 RX ADMIN — OXYCODONE HYDROCHLORIDE 5 MG: 5 TABLET ORAL at 20:37

## 2023-01-22 LAB
ATRIAL RATE: 66 BPM
ATRIAL RATE: 67 BPM
P AXIS: 69 DEGREES
P AXIS: 74 DEGREES
PR INTERVAL: 138 MS
PR INTERVAL: 140 MS
QRS AXIS: 29 DEGREES
QRS AXIS: 6 DEGREES
QRSD INTERVAL: 90 MS
QRSD INTERVAL: 94 MS
QT INTERVAL: 380 MS
QT INTERVAL: 388 MS
QTC INTERVAL: 398 MS
QTC INTERVAL: 409 MS
T WAVE AXIS: 25 DEGREES
T WAVE AXIS: 44 DEGREES
VENTRICULAR RATE: 66 BPM
VENTRICULAR RATE: 67 BPM

## 2023-01-22 RX ORDER — OXYCODONE HYDROCHLORIDE 5 MG/1
5 TABLET ORAL ONCE
Status: COMPLETED | OUTPATIENT
Start: 2023-01-22 | End: 2023-01-22

## 2023-01-22 RX ORDER — OXYCODONE HYDROCHLORIDE 5 MG/1
5 TABLET ORAL EVERY 6 HOURS PRN
Qty: 8 TABLET | Refills: 0 | Status: SHIPPED | OUTPATIENT
Start: 2023-01-22 | End: 2023-01-24

## 2023-01-22 RX ADMIN — OXYCODONE HYDROCHLORIDE 5 MG: 5 TABLET ORAL at 00:38

## 2023-01-22 NOTE — DISCHARGE INSTRUCTIONS
Follow-up with orthopedic spine specialist as scheduled  Follow-up with primary care provider/cardiology for palpitations  Return if you have any new or worsening symptoms  Please return to the ED if you begin to experience any new or worsening symptoms, chest pain, shortness of breath, lightheadedness, dizziness, changes to vision, passing out, numbness, tingling, or weakness in the extremities, difficulty walking/swallowing/breathing, fevers or chills

## 2023-01-22 NOTE — ED PROVIDER NOTES
History  Chief Complaint   Patient presents with   • Dizziness     Pt c/o of headache and dizziness that began yesterday and "my heart keeps racing"  Pt states she was recently discharged after COPD exacerbation and given steroids     Santo Mcmillan is a 62 y o  female with a pertinent past medical history for lupus, polymyalgia, COPD presenting today with lightheadedness, palpitations  Patient was recently admitted here for shortness of breath and was diagnosed with COPD exacerbation, I reviewed notes from previous visit  She was discharged on steroids  Patient is finishing her taper currently  Reports that beginning yesterday she began to experience lightheadedness with palpitations  She also recently has history of L1 compression fracture which she has scheduled follow-up with orthopedics  Reports that pain has continued at this time  Denies any numbness/ting/weakness in the extremities  Patient has remained afebrile at home  Patient denies any chest pain  MDM:  On arrival the patient is awake alert and oriented x4, saturating 97% on room air, other vital signs reviewed  Reviewed patient's previous notes  History obtained from patient and from family member at bedside  Will obtain cardiac work-up secondary to palpitations and lightheadedness  Symptoms are likely secondary to steroid use  Patient's primary care provider had told the patient this as well, however they recommended presentation for cardiac work-up  Patient reports that she has had continued shortness of breath and has oxygen available at home, denies any hypoxic changes at home she has a pulse oximeter  Measures pulse oximetry frequently  Differential diagnosis includes but is not limited to COPD exacerbation, pneumonia, arrhythmia  Prior to Admission Medications   Prescriptions Last Dose Informant Patient Reported? Taking?    albuterol (PROVENTIL HFA,VENTOLIN HFA) 90 mcg/act inhaler   No No   Sig: Inhale 2 puffs every 4 (four) hours as needed for wheezing   benzonatate (TESSALON PERLES) 100 mg capsule   No No   Sig: Take 1 capsule (100 mg total) by mouth 3 (three) times a day as needed for cough   fluticasone-vilanterol (BREO ELLIPTA) 100-25 mcg/inh inhaler   No No   Sig: Inhale 1 puff daily Rinse mouth after use  Do not start before October 8, 2022  Patient not taking: Reported on 1/15/2023   guaiFENesin (MUCINEX) 600 mg 12 hr tablet   No No   Sig: Take 1 tablet (600 mg total) by mouth 2 (two) times a day   ibuprofen (MOTRIN) 800 mg tablet   No No   Sig: Take 1 tablet (800 mg total) by mouth every 8 (eight) hours as needed for moderate pain   ipratropium-albuterol (DUO-NEB) 0 5-2 5 mg/3 mL nebulizer solution   No No   Sig: Take 3 mL by nebulization 4 (four) times a day   lidocaine (LIDODERM) 5 %   No No   Sig: Apply 1 patch topically daily as needed (back pain) Remove & Discard patch within 12 hours or as directed by MD   methocarbamol (ROBAXIN) 500 mg tablet   No No   Sig: Take 1 tablet (500 mg total) by mouth 2 (two) times a day   nicotine (NICODERM CQ) 14 mg/24hr TD 24 hr patch   No No   Sig: Place 1 patch on the skin daily Do not start before October 8, 2022  oxyCODONE (ROXICODONE) 5 immediate release tablet   No No   Sig: Take 1 tablet (5 mg total) by mouth every 4 (four) hours as needed for moderate pain Max Daily Amount: 30 mg   pantoprazole (PROTONIX) 40 mg tablet   No No   Sig: Take 1 tablet (40 mg total) by mouth daily   predniSONE 10 mg tablet   No No   Sig: Take 4 tablets (40 mg total) by mouth daily for 3 days, THEN 3 tablets (30 mg total) daily for 3 days, THEN 2 tablets (20 mg total) daily for 3 days, THEN 1 tablet (10 mg total) daily for 3 days     tiotropium (SPIRIVA) 18 mcg inhalation capsule   Yes No   Sig: Place 18 mcg into inhaler and inhale daily      Facility-Administered Medications: None       Past Medical History:   Diagnosis Date   • Achalasia    • Back pain    • Fibromyalgia    • Lupus ([facility] [address] ) Past Surgical History:   Procedure Laterality Date   • HYSTERECTOMY     • NECK SURGERY         No family history on file  I have reviewed and agree with the history as documented  E-Cigarette/Vaping   • E-Cigarette Use Never User      E-Cigarette/Vaping Substances     Social History     Tobacco Use   • Smoking status: Every Day     Packs/day: 1 00     Types: Cigarettes   • Smokeless tobacco: Never   Vaping Use   • Vaping Use: Never used   Substance Use Topics   • Alcohol use: Never   • Drug use: Never       Review of Systems   Cardiovascular: Positive for palpitations  Neurological: Positive for light-headedness  All other systems reviewed and are negative  Physical Exam  Physical Exam  Vitals and nursing note reviewed  Constitutional:       General: She is not in acute distress  Appearance: She is well-developed  HENT:      Head: Normocephalic and atraumatic  Eyes:      Conjunctiva/sclera: Conjunctivae normal    Cardiovascular:      Rate and Rhythm: Normal rate and regular rhythm  Heart sounds: No murmur heard  Pulmonary:      Effort: Pulmonary effort is normal  No respiratory distress  Breath sounds: Normal breath sounds  Abdominal:      Palpations: Abdomen is soft  Tenderness: There is no abdominal tenderness  Musculoskeletal:         General: No swelling  Cervical back: Neck supple  Skin:     General: Skin is warm and dry  Capillary Refill: Capillary refill takes less than 2 seconds  Neurological:      Mental Status: She is alert     Psychiatric:         Mood and Affect: Mood normal          Vital Signs  ED Triage Vitals   Temperature Pulse Respirations Blood Pressure SpO2   01/21/23 1945 01/21/23 1945 01/21/23 1945 01/21/23 1945 01/21/23 1945   98 1 °F (36 7 °C) 75 18 142/68 97 %      Temp Source Heart Rate Source Patient Position - Orthostatic VS BP Location FiO2 (%)   01/21/23 1945 01/21/23 1945 01/21/23 1945 01/21/23 1945 --   Oral Monitor Sitting Left arm       Pain Score       01/21/23 2037       8           Vitals:    01/21/23 1945   BP: 142/68   Pulse: 75   Patient Position - Orthostatic VS: Sitting         Visual Acuity      ED Medications  Medications   oxyCODONE (ROXICODONE) IR tablet 5 mg (5 mg Oral Given 1/21/23 2037)   oxyCODONE (ROXICODONE) IR tablet 5 mg (5 mg Oral Given 1/22/23 0038)       Diagnostic Studies  Results Reviewed     Procedure Component Value Units Date/Time    HS Troponin I 2hr [[id number]]  (Normal) Collected: 01/21/23 2301    Lab Status: Final result Specimen: Blood from Arm, Left Updated: 01/21/23 2352     hs TnI 2hr 4 ng/L      Delta 2hr hsTnI 0 ng/L     FLU/RSV/COVID - if FLU/RSV clinically relevant [[id number]]  (Normal) Collected: 01/21/23 2046    Lab Status: Final result Specimen: Nasopharyngeal Swab Updated: 01/21/23 2133     SARS-CoV-2 Negative     INFLUENZA A PCR Negative     INFLUENZA B PCR Negative     RSV PCR Negative    Narrative:      FOR PEDIATRIC PATIENTS - copy/paste COVID Guidelines URL to browser: [email or web address]    SARS-CoV-2 assay is a Nucleic Acid Amplification assay intended for the  qualitative detection of nucleic acid from SARS-CoV-2 in nasopharyngeal  swabs  Results are for the presumptive identification of SARS-CoV-2 RNA  Positive results are indicative of infection with SARS-CoV-2, the virus  causing COVID-19, but do not rule out bacterial infection or co-infection  with other viruses  Laboratories within the [place] and its  territories are required to report all positive results to the appropriate  public health authorities  Negative results do not preclude SARS-CoV-2  infection and should not be used as the sole basis for treatment or other  patient management decisions  Negative results must be combined with  clinical observations, patient history, and epidemiological information    This test has not been FDA cleared or approved  This test has been authorized by FDA under an Emergency Use Authorization  (EUA)  This test is only authorized for the duration of time the  declaration that circumstances exist justifying the authorization of the  emergency use of an in vitro diagnostic tests for detection of SARS-CoV-2  virus and/or diagnosis of COVID-19 infection under section 564(b)(1) of  the Act, 21 U  S C  653MZC-1(N)(4), unless the authorization is terminated  or revoked sooner  The test has been validated but independent review by FDA  and CLIA is pending  Test performed using [employer] GeneXpert: This RT-PCR assay targets N2,  a region unique to SARS-CoV-2  A conserved region in the E-gene was chosen  for pan-Sarbecovirus detection which includes SARS-CoV-2  According to CMS-2020-01-R, this platform meets the definition of high-throughput technology      HS Troponin 0hr (reflex protocol) [[id number]]  (Normal) Collected: 01/21/23 2046    Lab Status: Final result Specimen: Blood from Arm, Left Updated: 01/21/23 2117     hs TnI 0hr 4 ng/L     NT-BNP PRO-BE,SH,[place] campuses only [[id number]]  (Normal) Collected: 01/21/23 2046    Lab Status: Final result Specimen: Blood from Arm, Left Updated: 01/21/23 2116     NT-proBNP 59 pg/mL     Comprehensive metabolic panel [[id number]]  (Abnormal) Collected: 01/21/23 2046    Lab Status: Final result Specimen: Blood from Arm, Left Updated: 01/21/23 2109     Sodium 140 mmol/L      Potassium 3 6 mmol/L      Chloride 105 mmol/L      CO2 27 mmol/L      ANION GAP 8 mmol/L      BUN 17 mg/dL      Creatinine 0 81 mg/dL      Glucose 119 mg/dL      Calcium 8 4 mg/dL      Corrected Calcium 8 9 mg/dL      AST 9 U/L      ALT 10 U/L      Alkaline Phosphatase 124 U/L      Total Protein 6 9 g/dL      Albumin 3 4 g/dL      Total Bilirubin 0 30 mg/dL      eGFR 80 ml/min/1 73sq m     Narrative:      [place] guidelines for Chronic Kidney Disease (CKD):   •  Stage 1 with normal or high GFR (GFR > 90 mL/min/1 73 square meters)  •  Stage 2 Mild CKD (GFR = 60-89 mL/min/1 73 square meters)  •  Stage 3A Moderate CKD (GFR = 45-59 mL/min/1 73 square meters)  •  Stage 3B Moderate CKD (GFR = 30-44 mL/min/1 73 square meters)  •  Stage 4 Severe CKD (GFR = 15-29 mL/min/1 73 square meters)  •  Stage 5 End Stage CKD (GFR <15 mL/min/1 73 square meters)  Note: GFR calculation is accurate only with a steady state creatinine    Protime-INR [[id number]]  (Normal) Collected: 01/21/23 2046    Lab Status: Final result Specimen: Blood from Arm, Left Updated: 01/21/23 2107     Protime 13 0 seconds      INR 1 00    APTT [[id number]]  (Normal) Collected: 01/21/23 2046    Lab Status: Final result Specimen: Blood from Arm, Left Updated: 01/21/23 2107     PTT 27 seconds     CBC and differential [[id number]]  (Abnormal) Collected: 01/21/23 2046    Lab Status: Final result Specimen: Blood from Arm, Left Updated: 01/21/23 2053     WBC 10 44 Thousand/uL      RBC 4 06 Million/uL      Hemoglobin 11 9 g/dL      Hematocrit 36 1 %      MCV 89 fL      MCH 29 3 pg      MCHC 33 0 g/dL      RDW 14 8 %      MPV 8 6 fL      Platelets 532 Thousands/uL      nRBC 0 /100 WBCs      Neutrophils Relative 80 %      Immat GRANS % 1 %      Lymphocytes Relative 12 %      Monocytes Relative 7 %      Eosinophils Relative 0 %      Basophils Relative 0 %      Neutrophils Absolute 8 37 Thousands/µL      Immature Grans Absolute 0 05 Thousand/uL      Lymphocytes Absolute 1 26 Thousands/µL      Monocytes Absolute 0 71 Thousand/µL      Eosinophils Absolute 0 03 Thousand/µL      Basophils Absolute 0 02 Thousands/µL                  XR chest 1 view portable   Final Result by Kerry Harley MD (01/21 2300)      Bibasilar atelectasis otherwise no acute cardiopulmonary disease                    Workstation performed: [id number]                    Procedures  Procedures         ED Course  ED Course as of 01/23/23 0010   Sat Jan 21, 2023 2052 EKG interpreted by me, normal sinus rhythm at a rate of 89 bpm, no ST or T wave elevation or depression, no STEMI  QT lengthened from previous EKG  2202 Call to radiology for official read on CXR  Randi Harris Jan 22, 2023 0024 Patient reevaluated - feeling significantly improved at this time  Ran out of pain medication which she was taking daily secondary to compression fracture requesting pain med              HEART Risk Score    Flowsheet Row Most Recent Value   Heart Score Risk Calculator    History 1 Filed at: 01/22/2023 0036   ECG 1 Filed at: 01/22/2023 0036   Age 1 Filed at: 01/22/2023 0036   Risk Factors 2 Filed at: 01/22/2023 0036   Troponin 0 Filed at: 01/22/2023 0036   HEART Score 5 Filed at: 01/22/2023 0036                        SBIRT 22yo+    Flowsheet Row Most Recent Value   SBIRT (25 yo +)    In order to provide better care to our patients, we are screening all of our patients for alcohol and drug use  Would it be okay to ask you these screening questions? Yes Filed at: 01/21/2023 2048   Initial Alcohol Screen: US AUDIT-C     1  How often do you have a drink containing alcohol? 1 Filed at: 01/21/2023 2048   2  How many drinks containing alcohol do you have on a typical day you are drinking? 1 Filed at: 01/21/2023 2048   Audit-C Score 2 Filed at: 01/21/2023 2048   EULALIO: How many times in the past year have you    Used an illegal drug or used a prescription medication for non-medical reasons? Never Filed at: 01/21/2023 2048                    Medical Decision Making  Back pain: acute illness or injury  Lightheadedness: acute illness or injury  Palpitations: acute illness or injury  Shortness of breath: acute illness or injury  Amount and/or Complexity of Data Reviewed  Labs: ordered  Radiology: ordered  Risk  Prescription drug management            Disposition  Final diagnoses:   Palpitations   Lightheadedness   Shortness of breath   Back pain     Time reflects when diagnosis was documented in both MDM as applicable and the Disposition within this note     Time User Action Codes Description Comment    1/22/2023 12:33 AM Marylee Price Add [R00 2] Palpitations     1/22/2023 12:33 AM Marylee Price Add [R42] Lightheadedness     1/22/2023 12:33 AM Marylee Price Add [R06 02] Shortness of breath     1/22/2023 12:34 AM Marylee Price Add [M54 9] Back pain       ED Disposition     ED Disposition   Discharge    Condition   Stable    Date/Time   Sun Jan 22, 2023 12:33 AM    [address] discharge to home/self care  Follow-up Information     Follow up With Specialties Details Why Contact Info Additional [address] [facility] Emergency Department Emergency Medicine Go to  If symptoms worsen 34 [facility] Emergency Department, [address] [place], [place], [address] [place] [address] General Practice   [facility]  [address]  [address] [place] [address]  [phone]             Discharge Medication List as of 1/22/2023 12:37 AM      START taking these medications    Details   !! oxyCODONE (Roxicodone) 5 immediate release tablet Take 1 tablet (5 mg total) by mouth every 6 (six) hours as needed for moderate pain for up to 2 days Max Daily Amount: 20 mg, Starting Sun 1/22/2023, Until Tue 1/24/2023 at 2359, Normal       !! - Potential duplicate medications found  Please discuss with provider  CONTINUE these medications which have NOT CHANGED    Details   albuterol (PROVENTIL HFA,VENTOLIN HFA) 90 mcg/act inhaler Inhale 2 puffs every 4 (four) hours as needed for wheezing, Starting Sat 10/8/2022, Normal      benzonatate (TESSALON PERLES) 100 mg capsule Take 1 capsule (100 mg total) by mouth 3 (three) times a day as needed for cough, Starting Fri 10/7/2022, Normal      fluticasone-vilanterol (BREO ELLIPTA) 100-25 mcg/inh inhaler Inhale 1 puff daily Rinse mouth after use   Do not start before October 8, 2022 , Starting Sat 10/8/2022, Normal      guaiFENesin (MUCINEX) 600 mg 12 hr tablet Take 1 tablet (600 mg total) by mouth 2 (two) times a day, Starting Fri 10/7/2022, Normal      ibuprofen (MOTRIN) 800 mg tablet Take 1 tablet (800 mg total) by mouth every 8 (eight) hours as needed for moderate pain, Starting Mon 1/9/2023, Normal      ipratropium-albuterol (DUO-NEB) 0 5-2 5 mg/3 mL nebulizer solution Take 3 mL by nebulization 4 (four) times a day, Starting Fri 10/7/2022, Normal      lidocaine (LIDODERM) 5 % Apply 1 patch topically daily as needed (back pain) Remove & Discard patch within 12 hours or as directed by MD, Starting Mon 1/9/2023, Normal      methocarbamol (ROBAXIN) 500 mg tablet Take 1 tablet (500 mg total) by mouth 2 (two) times a day, Starting Tue 1/17/2023, Normal      nicotine (NICODERM CQ) 14 mg/24hr TD 24 hr patch Place 1 patch on the skin daily Do not start before October 8, 2022 , Starting Sat 10/8/2022, Normal      !! oxyCODONE (ROXICODONE) 5 immediate release tablet Take 1 tablet (5 mg total) by mouth every 4 (four) hours as needed for moderate pain Max Daily Amount: 30 mg, Starting Tue 1/17/2023, Normal      pantoprazole (PROTONIX) 40 mg tablet Take 1 tablet (40 mg total) by mouth daily, Starting Sat 10/8/2022, Normal      predniSONE 10 mg tablet Multiple Dosages:Starting Tue 1/17/2023, Until Thu 1/19/2023 at 2359, THEN Starting Fri 1/20/2023, Until Sun 1/22/2023 at 2359, THEN Starting Mon 1/23/2023, Until Wed 1/25/2023 at 2359, THEN Starting Thu 1/26/2023, Until Sat 1/28/2023 at 2359Take 4  tablets (40 mg total) by mouth daily for 3 days, THEN 3 tablets (30 mg total) daily for 3 days, THEN 2 tablets (20 mg total) daily for 3 days, THEN 1 tablet (10 mg total) daily for 3 days  , Normal      tiotropium (SPIRIVA) 18 mcg inhalation capsule Place 18 mcg into inhaler and inhale daily, Historical Med       !! - Potential duplicate medications found   Please discuss with provider                PDMP Review       Value Time User    PDMP Reviewed  Yes 1/17/2023  1:10 PM Emory Cerda MD          ED Provider  Electronically Signed by           Ravinder Stearns PA-C  01/23/23 0011

## 2023-01-27 ENCOUNTER — HOSPITAL ENCOUNTER (EMERGENCY)
Facility: HOSPITAL | Age: 58
Discharge: HOME | End: 2023-01-27
Attending: EMERGENCY MEDICINE

## 2023-01-27 ENCOUNTER — APPOINTMENT (EMERGENCY)
Dept: RADIOLOGY | Facility: HOSPITAL | Age: 58
End: 2023-01-27

## 2023-01-27 ENCOUNTER — TELEPHONE (OUTPATIENT)
Dept: OBGYN CLINIC | Facility: HOSPITAL | Age: 58
End: 2023-01-27

## 2023-01-27 VITALS
OXYGEN SATURATION: 95 % | RESPIRATION RATE: 21 BRPM | DIASTOLIC BLOOD PRESSURE: 64 MMHG | SYSTOLIC BLOOD PRESSURE: 143 MMHG | HEIGHT: 61 IN | TEMPERATURE: 97.7 F | HEART RATE: 83 BPM | WEIGHT: 201 LBS | BODY MASS INDEX: 37.95 KG/M2

## 2023-01-27 DIAGNOSIS — S32.000A COMPRESSION FRACTURE OF LUMBAR VERTEBRA, UNSPECIFIED LUMBAR VERTEBRAL LEVEL, INITIAL ENCOUNTER (HCC): Primary | ICD-10-CM

## 2023-01-27 RX ORDER — LIDOCAINE 50 MG/G
1 PATCH TOPICAL ONCE
Status: DISCONTINUED | OUTPATIENT
Start: 2023-01-27 | End: 2023-01-27 | Stop reason: HOSPADM

## 2023-01-27 RX ORDER — OXYCODONE HYDROCHLORIDE 5 MG/1
5 TABLET ORAL EVERY 6 HOURS PRN
Qty: 10 TABLET | Refills: 0 | Status: SHIPPED | OUTPATIENT
Start: 2023-01-27 | End: 2023-02-06

## 2023-01-27 RX ORDER — OXYCODONE HYDROCHLORIDE 5 MG/1
5 TABLET ORAL EVERY 6 HOURS PRN
Qty: 10 TABLET | Refills: 0 | Status: SHIPPED | OUTPATIENT
Start: 2023-01-27 | End: 2023-01-27 | Stop reason: SDUPTHER

## 2023-01-27 RX ORDER — METHOCARBAMOL 500 MG/1
500 TABLET, FILM COATED ORAL ONCE
Status: COMPLETED | OUTPATIENT
Start: 2023-01-27 | End: 2023-01-27

## 2023-01-27 RX ORDER — KETOROLAC TROMETHAMINE 30 MG/ML
15 INJECTION, SOLUTION INTRAMUSCULAR; INTRAVENOUS ONCE
Status: COMPLETED | OUTPATIENT
Start: 2023-01-27 | End: 2023-01-27

## 2023-01-27 RX ORDER — OXYCODONE HYDROCHLORIDE 5 MG/1
5 TABLET ORAL ONCE
Status: COMPLETED | OUTPATIENT
Start: 2023-01-27 | End: 2023-01-27

## 2023-01-27 RX ADMIN — METHOCARBAMOL 500 MG: 500 TABLET ORAL at 17:40

## 2023-01-27 RX ADMIN — OXYCODONE HYDROCHLORIDE 5 MG: 5 TABLET ORAL at 18:46

## 2023-01-27 RX ADMIN — KETOROLAC TROMETHAMINE 15 MG: 30 INJECTION, SOLUTION INTRAMUSCULAR at 17:40

## 2023-01-27 NOTE — ED NOTES
Pt states that med given for back pain is not working and asked the RN to notify the provider   Provider was notified     Sean York RN  01/27/23 5554

## 2023-01-27 NOTE — TELEPHONE ENCOUNTER
Caller: Nanci    Doctor: Clark Hammans    Reason for call: Patietnt was checking on a sooner appt  , she might have to go to the ED    Call back#: [phone]

## 2023-01-28 NOTE — ED PROVIDER NOTES
History  Chief Complaint   Patient presents with   • Back Pain     Pt reports she has a hx of a compression fracture and she had fall yesterday landing on her butt and now has back pain  HPI  Patient is a 59-year-old female past medical history of chronic back pain, fibromyalgia, lupus, compression fracture of spine presenting today after a fall with back pain  Reports that her pain is in the same location as her previous compression fracture  Reports has been taking Tylenol, ibuprofen with only minimal relief  Denies any saddle anesthesia dysuria hematuria urinary retention or urgency  Denies any numbness, weakness  Has otherwise been in her usual state of health  Prior to Admission Medications   Prescriptions Last Dose Informant Patient Reported? Taking? albuterol (PROVENTIL HFA,VENTOLIN HFA) 90 mcg/act inhaler   No No   Sig: Inhale 2 puffs every 4 (four) hours as needed for wheezing   benzonatate (TESSALON PERLES) 100 mg capsule   No No   Sig: Take 1 capsule (100 mg total) by mouth 3 (three) times a day as needed for cough   fluticasone-vilanterol (BREO ELLIPTA) 100-25 mcg/inh inhaler   No No   Sig: Inhale 1 puff daily Rinse mouth after use  Do not start before October 8, 2022     Patient not taking: Reported on 1/15/2023   guaiFENesin (MUCINEX) 600 mg 12 hr tablet   No No   Sig: Take 1 tablet (600 mg total) by mouth 2 (two) times a day   ibuprofen (MOTRIN) 800 mg tablet   No No   Sig: Take 1 tablet (800 mg total) by mouth every 8 (eight) hours as needed for moderate pain   ipratropium-albuterol (DUO-NEB) 0 5-2 5 mg/3 mL nebulizer solution   No No   Sig: Take 3 mL by nebulization 4 (four) times a day   lidocaine (LIDODERM) 5 %   No No   Sig: Apply 1 patch topically daily as needed (back pain) Remove & Discard patch within 12 hours or as directed by MD   methocarbamol (ROBAXIN) 500 mg tablet   No No   Sig: Take 1 tablet (500 mg total) by mouth 2 (two) times a day   nicotine (NICODERM CQ) 14 mg/24hr TD 24 hr patch   No No   Sig: Place 1 patch on the skin daily Do not start before October 8, 2022  oxyCODONE (ROXICODONE) 5 immediate release tablet   No No   Sig: Take 1 tablet (5 mg total) by mouth every 4 (four) hours as needed for moderate pain Max Daily Amount: 30 mg   pantoprazole (PROTONIX) 40 mg tablet   No No   Sig: Take 1 tablet (40 mg total) by mouth daily   predniSONE 10 mg tablet   No No   Sig: Take 4 tablets (40 mg total) by mouth daily for 3 days, THEN 3 tablets (30 mg total) daily for 3 days, THEN 2 tablets (20 mg total) daily for 3 days, THEN 1 tablet (10 mg total) daily for 3 days  tiotropium (SPIRIVA) 18 mcg inhalation capsule   Yes No   Sig: Place 18 mcg into inhaler and inhale daily      Facility-Administered Medications: None       Past Medical History:   Diagnosis Date   • Achalasia    • Back pain    • Fibromyalgia    • Lupus ([facility] [address] )        Past Surgical History:   Procedure Laterality Date   • HYSTERECTOMY     • NECK SURGERY         History reviewed  No pertinent family history  I have reviewed and agree with the history as documented  E-Cigarette/Vaping   • E-Cigarette Use Never User      E-Cigarette/Vaping Substances     Social History     Tobacco Use   • Smoking status: Every Day     Packs/day: 1 00     Types: Cigarettes   • Smokeless tobacco: Never   Vaping Use   • Vaping Use: Never used   Substance Use Topics   • Alcohol use: Never   • Drug use: Never       Review of Systems   Constitutional: Negative for chills and fever  HENT: Negative for congestion and sore throat  Eyes: Negative for redness and visual disturbance  Respiratory: Negative for cough and shortness of breath  Cardiovascular: Negative for chest pain and palpitations  Gastrointestinal: Negative for constipation, diarrhea, nausea and vomiting  Genitourinary: Negative for dysuria, hematuria, vaginal bleeding and vaginal discharge  Musculoskeletal: Positive for back pain  Negative for myalgias     Skin: Negative for rash and wound  Allergic/Immunologic: Negative for immunocompromised state  Neurological: Negative for seizures and syncope  Psychiatric/Behavioral: Negative for confusion, self-injury and suicidal ideas  Physical Exam  Physical Exam  Vitals and nursing note reviewed  Constitutional:       General: She is not in acute distress  Appearance: Normal appearance  She is well-developed  She is not ill-appearing  HENT:      Head: Normocephalic and atraumatic  No raccoon eyes  Right Ear: External ear normal       Left Ear: External ear normal       Nose: Nose normal  No congestion  Mouth/Throat:      Lips: Pink  Mouth: Mucous membranes are moist    Eyes:      General: Lids are normal  No scleral icterus  Conjunctiva/sclera: Conjunctivae normal    Cardiovascular:      Rate and Rhythm: Normal rate and regular rhythm  Heart sounds: No murmur heard  No friction rub  Pulmonary:      Effort: Pulmonary effort is normal  No respiratory distress  Breath sounds: No wheezing or rales  Abdominal:      General: Abdomen is flat  Tenderness: There is no abdominal tenderness  There is no guarding or rebound  Musculoskeletal:         General: No swelling or signs of injury  Cervical back: Normal range of motion  No rigidity or tenderness  Lumbar back: Tenderness present  No bony tenderness  Negative right straight leg raise test and negative left straight leg raise test    Skin:     General: Skin is warm and dry  Coloration: Skin is not jaundiced  Findings: No rash  Neurological:      Mental Status: She is alert and oriented to person, place, and time  Mental status is at baseline  Psychiatric:         Behavior: Behavior normal  Behavior is cooperative           Vital Signs  ED Triage Vitals   Temperature Pulse Respirations Blood Pressure SpO2   01/27/23 1447 01/27/23 1447 01/27/23 1447 01/27/23 1447 01/27/23 1447   97 7 °F (36 5 °C) 83 21 143/64 95 %      Temp Source Heart Rate Source Patient Position - Orthostatic VS BP Location FiO2 (%)   01/27/23 1447 01/27/23 1447 01/27/23 1447 01/27/23 1447 --   Temporal Monitor Sitting Left arm       Pain Score       01/27/23 1740       8           Vitals:    01/27/23 1447   BP: 143/64   Pulse: 83   Patient Position - Orthostatic VS: Sitting         Visual Acuity      ED Medications  Medications   ketorolac (TORADOL) injection 15 mg (15 mg Intramuscular Given 1/27/23 1740)   methocarbamol (ROBAXIN) tablet 500 mg (500 mg Oral Given 1/27/23 1740)   oxyCODONE (ROXICODONE) IR tablet 5 mg (5 mg Oral Given 1/27/23 1846)       Diagnostic Studies  Results Reviewed     None                 XR spine lumbar 2 or 3 views injury    (Results Pending)              Procedures  Procedures         ED Course  ED Course as of 01/27/23 2341   Fri Jan 27, 2023   1837 XR does show concerning features for a compression fracture however compared to her prior no significant change  Imaging review done by myself and preliminary results were discussed with the patient and family members  [place] was had with patient and family members that this read is preliminary and therefore discrepancies between this read and the final read by the radiologist are possible   Patient and family members were informed that any discrepancies found by would be relayed by phone call  Will treat with oxycodone for breakthrough pain only  Will discharge in stable condition  Return precautions discussed  Referred to spine center  MDM  Patient on arrival is ambulatory to room is in no acute distress, vital signs stable, afebrile  On exam lungs clear auscultation, heart without murmurs rubs or gallops abdomen soft nontender  Denies history of severe or worsening lower extremity weakness/numbness  Denies history of saddle anesthesia/perineal anesthesia  Denies bowel or bladder incontinence/retention    History does not suggest diagnosis of cauda equina syndrome  Patient denies history of IVDA, denies history of fevers, no recent surgeries or any procedures to suggest a transient bacteremia leading to a diagnosis of subdural abscess  Denies history of blood thinner use with recent history of lumbar puncture or any violation of the epidural space to suggest history of epidural hematoma  Therefore these above diagnoses (cauda equina syndrome, epidural abscess, epidural hematoma) were not pursued with advanced diagnostic imaging  Will obtain x-ray to further assess, and trauma history  Disposition  Final diagnoses:   Compression fracture of lumbar vertebra, unspecified lumbar vertebral level, initial encounter [facility])     Time reflects when diagnosis was documented in both MDM as applicable and the Disposition within this note     Time User Action Codes Description Comment    1/27/2023  6:38 PM Lizeth Lazo Add [S32 000A] Compression fracture of lumbar vertebra, unspecified lumbar vertebral level, initial encounter [facility])       ED Disposition     ED Disposition   Discharge    Condition   Stable    Date/Time   Fri Jan 27, 2023  6:38 PM    Comment   Louanne Prader Matlock discharge to home/self care  Follow-up Information     Follow up With Specialties Details Why [address], [address], DO General Practice   [address]  [address]  [address] [facility]  [phone]            Discharge Medication List as of 1/27/2023  6:45 PM      START taking these medications    Details   !! oxyCODONE (Roxicodone) 5 immediate release tablet Take 1 tablet (5 mg total) by mouth every 6 (six) hours as needed for severe pain for up to 10 days Max Daily Amount: 20 mg, Starting Fri 1/27/2023, Until Mon 2/6/2023 at 2359, Normal       !! - Potential duplicate medications found  Please discuss with provider        CONTINUE these medications which have NOT CHANGED    Details   albuterol (PROVENTIL HFA,VENTOLIN HFA) 90 mcg/act inhaler Inhale 2 puffs every 4 (four) hours as needed for wheezing, Starting Sat 10/8/2022, Normal      benzonatate (TESSALON PERLES) 100 mg capsule Take 1 capsule (100 mg total) by mouth 3 (three) times a day as needed for cough, Starting Fri 10/7/2022, Normal      fluticasone-vilanterol (BREO ELLIPTA) 100-25 mcg/inh inhaler Inhale 1 puff daily Rinse mouth after use   Do not start before October 8, 2022 , Starting Sat 10/8/2022, Normal      guaiFENesin (MUCINEX) 600 mg 12 hr tablet Take 1 tablet (600 mg total) by mouth 2 (two) times a day, Starting Fri 10/7/2022, Normal      ibuprofen (MOTRIN) 800 mg tablet Take 1 tablet (800 mg total) by mouth every 8 (eight) hours as needed for moderate pain, Starting Mon 1/9/2023, Normal      ipratropium-albuterol (DUO-NEB) 0 5-2 5 mg/3 mL nebulizer solution Take 3 mL by nebulization 4 (four) times a day, Starting Fri 10/7/2022, Normal      lidocaine (LIDODERM) 5 % Apply 1 patch topically daily as needed (back pain) Remove & Discard patch within 12 hours or as directed by MD, Starting Mon 1/9/2023, Normal      methocarbamol (ROBAXIN) 500 mg tablet Take 1 tablet (500 mg total) by mouth 2 (two) times a day, Starting Tue 1/17/2023, Normal      nicotine (NICODERM CQ) 14 mg/24hr TD 24 hr patch Place 1 patch on the skin daily Do not start before October 8, 2022 , Starting Sat 10/8/2022, Normal      !! oxyCODONE (ROXICODONE) 5 immediate release tablet Take 1 tablet (5 mg total) by mouth every 4 (four) hours as needed for moderate pain Max Daily Amount: 30 mg, Starting Tue 1/17/2023, Normal      pantoprazole (PROTONIX) 40 mg tablet Take 1 tablet (40 mg total) by mouth daily, Starting Sat 10/8/2022, Normal      predniSONE 10 mg tablet Multiple Dosages:Starting Tue 1/17/2023, Until Thu 1/19/2023 at 2359, THEN Starting Fri 1/20/2023, Until Sun 1/22/2023 at 2359, THEN Starting Mon 1/23/2023, Until Wed 1/25/2023 at 2359, THEN Starting Thu 1/26/2023, Until Sat 1/28/2023 at 2359Take 4  tablets (40 mg total) by mouth daily for 3 days, THEN 3 tablets (30 mg total) daily for 3 days, THEN 2 tablets (20 mg total) daily for 3 days, THEN 1 tablet (10 mg total) daily for 3 days  , Normal      tiotropium (SPIRIVA) 18 mcg inhalation capsule Place 18 mcg into inhaler and inhale daily, Historical Med       !! - Potential duplicate medications found  Please discuss with provider  No discharge procedures on file      PDMP Review       Value Time User    PDMP Reviewed  Yes 1/17/2023  1:10 PM Neville Yu MD          ED Provider  Electronically Signed by           Dilcia Fairbanks MD  01/27/23 1367

## 2023-01-30 ENCOUNTER — TELEPHONE (OUTPATIENT)
Dept: PHYSICAL THERAPY | Facility: OTHER | Age: 58
End: 2023-01-30

## 2023-01-30 NOTE — TELEPHONE ENCOUNTER
Call placed to the patient per [facility] referral     Voice message left for patient to call back  Phone number and hours of business provided  This is the 1st attempt to reach the patient  Will defer per protocol  Compression Fracture and has apointment with Ortho surgery 2/13/23  Possible workers comp case

## 2023-02-05 ENCOUNTER — APPOINTMENT (EMERGENCY)
Dept: CT IMAGING | Facility: HOSPITAL | Age: 58
End: 2023-02-05

## 2023-02-05 ENCOUNTER — APPOINTMENT (EMERGENCY)
Dept: RADIOLOGY | Facility: HOSPITAL | Age: 58
End: 2023-02-05

## 2023-02-05 ENCOUNTER — HOSPITAL ENCOUNTER (EMERGENCY)
Facility: HOSPITAL | Age: 58
Discharge: HOME | End: 2023-02-05
Attending: EMERGENCY MEDICINE | Admitting: EMERGENCY MEDICINE

## 2023-02-05 VITALS
SYSTOLIC BLOOD PRESSURE: 138 MMHG | RESPIRATION RATE: 24 BRPM | OXYGEN SATURATION: 99 % | DIASTOLIC BLOOD PRESSURE: 65 MMHG | HEIGHT: 61 IN | WEIGHT: 201 LBS | BODY MASS INDEX: 37.95 KG/M2 | TEMPERATURE: 97.4 F | HEART RATE: 69 BPM

## 2023-02-05 DIAGNOSIS — R42 LIGHTHEADEDNESS: Primary | ICD-10-CM

## 2023-02-05 DIAGNOSIS — R06.00 DYSPNEA: ICD-10-CM

## 2023-02-05 LAB
ALBUMIN SERPL BCP-MCNC: 3.2 G/DL (ref 3.5–5)
ALP SERPL-CCNC: 102 U/L (ref 46–116)
ALT SERPL W P-5'-P-CCNC: 25 U/L (ref 12–78)
ANION GAP SERPL CALCULATED.3IONS-SCNC: 9 MMOL/L (ref 4–13)
AST SERPL W P-5'-P-CCNC: 13 U/L (ref 5–45)
BASOPHILS # BLD AUTO: 0.02 THOUSANDS/ÂΜL (ref 0–0.1)
BASOPHILS NFR BLD AUTO: 0 % (ref 0–1)
BILIRUB SERPL-MCNC: 0.38 MG/DL (ref 0.2–1)
BUN SERPL-MCNC: 10 MG/DL (ref 5–25)
CALCIUM ALBUM COR SERPL-MCNC: 9 MG/DL (ref 8.3–10.1)
CALCIUM SERPL-MCNC: 8.4 MG/DL (ref 8.3–10.1)
CARDIAC TROPONIN I PNL SERPL HS: 3 NG/L
CHLORIDE SERPL-SCNC: 105 MMOL/L (ref 96–108)
CO2 SERPL-SCNC: 29 MMOL/L (ref 21–32)
CREAT SERPL-MCNC: 0.9 MG/DL (ref 0.6–1.3)
EOSINOPHIL # BLD AUTO: 0.12 THOUSAND/ÂΜL (ref 0–0.61)
EOSINOPHIL NFR BLD AUTO: 2 % (ref 0–6)
ERYTHROCYTE [DISTWIDTH] IN BLOOD BY AUTOMATED COUNT: 15.4 % (ref 11.6–15.1)
GFR SERPL CREATININE-BSD FRML MDRD: 71 ML/MIN/1.73SQ M
GLUCOSE SERPL-MCNC: 132 MG/DL (ref 65–140)
HCT VFR BLD AUTO: 35.2 % (ref 34.8–46.1)
HGB BLD-MCNC: 11.2 G/DL (ref 11.5–15.4)
IMM GRANULOCYTES # BLD AUTO: 0.02 THOUSAND/UL (ref 0–0.2)
IMM GRANULOCYTES NFR BLD AUTO: 0 % (ref 0–2)
LACTATE SERPL-SCNC: 1.2 MMOL/L (ref 0.5–2)
LIPASE SERPL-CCNC: 33 U/L (ref 73–393)
LYMPHOCYTES # BLD AUTO: 1.05 THOUSANDS/ÂΜL (ref 0.6–4.47)
LYMPHOCYTES NFR BLD AUTO: 17 % (ref 14–44)
MAGNESIUM SERPL-MCNC: 1.9 MG/DL (ref 1.6–2.6)
MCH RBC QN AUTO: 28.6 PG (ref 26.8–34.3)
MCHC RBC AUTO-ENTMCNC: 31.8 G/DL (ref 31.4–37.4)
MCV RBC AUTO: 90 FL (ref 82–98)
MONOCYTES # BLD AUTO: 0.47 THOUSAND/ÂΜL (ref 0.17–1.22)
MONOCYTES NFR BLD AUTO: 8 % (ref 4–12)
NEUTROPHILS # BLD AUTO: 4.43 THOUSANDS/ÂΜL (ref 1.85–7.62)
NEUTS SEG NFR BLD AUTO: 73 % (ref 43–75)
NRBC BLD AUTO-RTO: 0 /100 WBCS
NT-PROBNP SERPL-MCNC: 130 PG/ML
PLATELET # BLD AUTO: 190 THOUSANDS/UL (ref 149–390)
PMV BLD AUTO: 9.2 FL (ref 8.9–12.7)
POTASSIUM SERPL-SCNC: 3.2 MMOL/L (ref 3.5–5.3)
PROT SERPL-MCNC: 6.3 G/DL (ref 6.4–8.4)
RBC # BLD AUTO: 3.92 MILLION/UL (ref 3.81–5.12)
SODIUM SERPL-SCNC: 143 MMOL/L (ref 135–147)
TSH SERPL DL<=0.05 MIU/L-ACNC: 4.82 UIU/ML (ref 0.45–4.5)
WBC # BLD AUTO: 6.11 THOUSAND/UL (ref 4.31–10.16)

## 2023-02-05 RX ORDER — SODIUM CHLORIDE 9 MG/ML
125 INJECTION, SOLUTION INTRAVENOUS CONTINUOUS
Status: DISCONTINUED | OUTPATIENT
Start: 2023-02-05 | End: 2023-02-05 | Stop reason: HOSPADM

## 2023-02-05 RX ORDER — OXYCODONE HYDROCHLORIDE AND ACETAMINOPHEN 5; 325 MG/1; MG/1
1 TABLET ORAL ONCE
Status: COMPLETED | OUTPATIENT
Start: 2023-02-05 | End: 2023-02-05

## 2023-02-05 RX ORDER — POTASSIUM CHLORIDE 20 MEQ/1
40 TABLET, EXTENDED RELEASE ORAL ONCE
Status: COMPLETED | OUTPATIENT
Start: 2023-02-05 | End: 2023-02-05

## 2023-02-05 RX ADMIN — SODIUM CHLORIDE 125 ML/HR: 0.9 INJECTION, SOLUTION INTRAVENOUS at 16:58

## 2023-02-05 RX ADMIN — IOHEXOL 100 ML: 350 INJECTION, SOLUTION INTRAVENOUS at 17:32

## 2023-02-05 RX ADMIN — OXYCODONE HYDROCHLORIDE AND ACETAMINOPHEN 1 TABLET: 5; 325 TABLET ORAL at 18:34

## 2023-02-05 RX ADMIN — POTASSIUM CHLORIDE 40 MEQ: 1500 TABLET, EXTENDED RELEASE ORAL at 18:54

## 2023-02-06 LAB
ATRIAL RATE: 78 BPM
P AXIS: 69 DEGREES
PR INTERVAL: 142 MS
QRS AXIS: 43 DEGREES
QRSD INTERVAL: 84 MS
QT INTERVAL: 362 MS
QTC INTERVAL: 412 MS
T WAVE AXIS: 54 DEGREES
VENTRICULAR RATE: 78 BPM

## 2023-02-07 NOTE — ED PROVIDER NOTES
History  Chief Complaint   Patient presents with   • Dizziness     Pt c/o lightheadedness and slight sob that started Friday after having a cardiac catheretization      80-year-old female is coming in with complaint of headache and lightheadedness for the past 2 days  She reports that she was admitted at the hospital at [facility] where they were evaluating her for chest pain which she actually still states she has some  Patient reports she had a cath that had no intervention for reports they just discharged her because they are now not-for-profit  She says that she just feels lightheaded but does not describe it as a dizziness or room spinning or vertiginous  Worse with some position changes  She does have a mild diffuse headache  She has no other associated neurologic symptoms  She states she told her cardiologist about the lightheadedness and they told her to come make sure it was not a stroke, but she actually has no vision change no speech change no facial droop no arm numbness leg numbness or weakness in her lightheadedness is not vertigo or vertiginous nests she says its a "lightheadedness at times with moving too fast   Patient has some mild shortness of breath although she admits she always has some shortness of breath because she has COPD and patient still has some substernal chest discomfort which she had before but that is not worsening or new  She has no associated fever or chills  No nausea vomiting or diarrhea  No back pain or abdominal pain        History provided by:  Patient  Dizziness  Quality:  Lightheadedness  Severity:  Mild  Onset quality:  Gradual  Duration:  1 day  Timing:  Constant  Progression:  Unchanged  Context: not with medication    Relieved by:  Nothing  Worsened by:  Nothing  Associated symptoms: chest pain (some substernal chest pain, she has had this issue, she was admitted to [facility] for this and kept in hospital, had stress test, echo and cath that was negative with no intervention) and headaches    Associated symptoms: no diarrhea, no hearing loss, no palpitations, no shortness of breath, no syncope, no tinnitus and no weakness        Prior to Admission Medications   Prescriptions Last Dose Informant Patient Reported? Taking? albuterol (PROVENTIL HFA,VENTOLIN HFA) 90 mcg/act inhaler   No No   Sig: Inhale 2 puffs every 4 (four) hours as needed for wheezing   benzonatate (TESSALON PERLES) 100 mg capsule   No No   Sig: Take 1 capsule (100 mg total) by mouth 3 (three) times a day as needed for cough   fluticasone-vilanterol (BREO ELLIPTA) 100-25 mcg/inh inhaler   No No   Sig: Inhale 1 puff daily Rinse mouth after use  Do not start before October 8, 2022  Patient not taking: Reported on 1/15/2023   guaiFENesin (MUCINEX) 600 mg 12 hr tablet   No No   Sig: Take 1 tablet (600 mg total) by mouth 2 (two) times a day   ibuprofen (MOTRIN) 800 mg tablet   No No   Sig: Take 1 tablet (800 mg total) by mouth every 8 (eight) hours as needed for moderate pain   ipratropium-albuterol (DUO-NEB) 0 5-2 5 mg/3 mL nebulizer solution   No No   Sig: Take 3 mL by nebulization 4 (four) times a day   lidocaine (LIDODERM) 5 %   No No   Sig: Apply 1 patch topically daily as needed (back pain) Remove & Discard patch within 12 hours or as directed by MD   methocarbamol (ROBAXIN) 500 mg tablet   No No   Sig: Take 1 tablet (500 mg total) by mouth 2 (two) times a day   nicotine (NICODERM CQ) 14 mg/24hr TD 24 hr patch   No No   Sig: Place 1 patch on the skin daily Do not start before October 8, 2022     oxyCODONE (ROXICODONE) 5 immediate release tablet   No No   Sig: Take 1 tablet (5 mg total) by mouth every 4 (four) hours as needed for moderate pain Max Daily Amount: 30 mg   oxyCODONE (Roxicodone) 5 immediate release tablet   No No   Sig: Take 1 tablet (5 mg total) by mouth every 6 (six) hours as needed for severe pain for up to 10 days Max Daily Amount: 20 mg   pantoprazole (PROTONIX) 40 mg tablet   No No   Sig: Take 1 tablet (40 mg total) by mouth daily   tiotropium (SPIRIVA) 18 mcg inhalation capsule   Yes No   Sig: Place 18 mcg into inhaler and inhale daily      Facility-Administered Medications: None       Past Medical History:   Diagnosis Date   • Achalasia    • Back pain    • Fibromyalgia    • Lupus ([facility] [address] )        Past Surgical History:   Procedure Laterality Date   • HYSTERECTOMY     • NECK SURGERY         History reviewed  No pertinent family history  I have reviewed and agree with the history as documented  E-Cigarette/Vaping   • E-Cigarette Use Never User      E-Cigarette/Vaping Substances     Social History     Tobacco Use   • Smoking status: Every Day     Packs/day: 1 00     Types: Cigarettes   • Smokeless tobacco: Never   Vaping Use   • Vaping Use: Never used   Substance Use Topics   • Alcohol use: Never   • Drug use: Never       Review of Systems   HENT: Negative for hearing loss and tinnitus  Respiratory: Negative for shortness of breath  Cardiovascular: Positive for chest pain (some substernal chest pain, she has had this issue, she was admitted to [facility] for this and kept in hospital, had stress test, echo and cath that was negative with no intervention)  Negative for palpitations and syncope  Gastrointestinal: Negative for diarrhea  Neurological: Positive for dizziness and headaches  Negative for weakness  All other systems reviewed and are negative  Physical Exam  Physical Exam  Vitals and nursing note reviewed  Constitutional:       General: She is not in acute distress  Appearance: She is well-developed  She is not diaphoretic  HENT:      Head: Normocephalic and atraumatic  Nose: Nose normal       Mouth/Throat:      Mouth: Mucous membranes are moist    Eyes:      Extraocular Movements: Extraocular movements intact  Conjunctiva/sclera: Conjunctivae normal       Pupils: Pupils are equal, round, and reactive to light     Cardiovascular:      Rate and Rhythm: Normal rate and regular rhythm  Heart sounds: Normal heart sounds  Pulmonary:      Effort: Pulmonary effort is normal  No respiratory distress  Breath sounds: Normal breath sounds  Abdominal:      General: There is no distension  Palpations: Abdomen is soft  Tenderness: There is no abdominal tenderness  Musculoskeletal:         General: Normal range of motion  Cervical back: Normal range of motion and neck supple  Comments: DMITRIY had cath site with bandage C/D/I distal motor and sensation intact, and cap refill intact   Skin:     General: Skin is warm and dry  Neurological:      General: No focal deficit present  Mental Status: She is alert and oriented to person, place, and time  Mental status is at baseline  Cranial Nerves: No cranial nerve deficit  Sensory: No sensory deficit  Motor: No weakness        Coordination: Coordination normal       Gait: Gait normal          Vital Signs  ED Triage Vitals   Temperature Pulse Respirations Blood Pressure SpO2   02/05/23 1623 02/05/23 1623 02/05/23 1623 02/05/23 1623 02/05/23 1623   (!) 97 4 °F (36 3 °C) 73 19 (!) 186/84 96 %      Temp Source Heart Rate Source Patient Position - Orthostatic VS BP Location FiO2 (%)   02/05/23 1623 02/05/23 1623 02/05/23 1623 02/05/23 1623 --   Temporal Monitor Sitting Right arm       Pain Score       02/05/23 1704       6           Vitals:    02/05/23 1623 02/05/23 1800   BP: (!) 186/84 138/65   Pulse: 73 69   Patient Position - Orthostatic VS: Sitting          Visual Acuity  Visual Acuity    Flowsheet Row Most Recent Value   L Pupil Size (mm) 3   R Pupil Size (mm) 3          ED Medications  Medications   iohexol (OMNIPAQUE) 350 MG/ML injection (SINGLE-DOSE) 100 mL (100 mL Intravenous Given 2/5/23 1732)   oxyCODONE-acetaminophen (PERCOCET) 5-325 mg per tablet 1 tablet (1 tablet Oral Given 2/5/23 1834)   potassium chloride (K-DUR,KLOR-CON) CR tablet 40 mEq (40 mEq Oral Given 2/5/23 1854) Diagnostic Studies  Results Reviewed     Procedure Component Value Units Date/Time    TSH [[id number]]  (Abnormal) Collected: 02/05/23 1653    Lab Status: Final result Specimen: Blood from Arm, Left Updated: 02/05/23 1730     TSH 3RD GENERATON 4 820 uIU/mL     Narrative:      Patients undergoing fluorescein dye angiography may retain small amounts of fluorescein in the body for 48-72 hours post procedure  Samples containing fluorescein can produce falsely depressed TSH values  If the patient had this procedure,a specimen should be resubmitted post fluorescein clearance  Magnesium [[id number]]  (Normal) Collected: 02/05/23 1653    Lab Status: Final result Specimen: Blood from Arm, Left Updated: 02/05/23 1730     Magnesium 1 9 mg/dL     Lipase [[id number]]  (Abnormal) Collected: 02/05/23 1653    Lab Status: Final result Specimen: Blood from Arm, Left Updated: 02/05/23 1730     Lipase 33 u/L     NT-BNP PRO-[facility],[place] campuses only [[id number]]  (Abnormal) Collected: 02/05/23 1653    Lab Status: Final result Specimen: Blood from Arm, Left Updated: 02/05/23 1730     NT-proBNP 130 pg/mL     HS Troponin 0hr (reflex protocol) [[id number]]  (Normal) Collected: 02/05/23 1653    Lab Status: Final result Specimen: Blood from Arm, Left Updated: 02/05/23 1723     hs TnI 0hr 3 ng/L     Lactic acid [[id number]]  (Normal) Collected: 02/05/23 1653    Lab Status: Final result Specimen: Blood from Arm, Left Updated: 02/05/23 1723     LACTIC ACID 1 2 mmol/L     Narrative:      Result may be elevated if tourniquet was used during collection      Comprehensive metabolic panel [[id number]]  (Abnormal) Collected: 02/05/23 1653    Lab Status: Final result Specimen: Blood from Arm, Left Updated: 02/05/23 1720     Sodium 143 mmol/L      Potassium 3 2 mmol/L      Chloride 105 mmol/L      CO2 29 mmol/L      ANION GAP 9 mmol/L      BUN 10 mg/dL      Creatinine 0 90 mg/dL      Glucose 132 mg/dL      Calcium 8 4 mg/dL      Corrected Calcium 9 0 mg/dL      AST 13 U/L      ALT 25 U/L      Alkaline Phosphatase 102 U/L      Total Protein 6 3 g/dL      Albumin 3 2 g/dL      Total Bilirubin 0 38 mg/dL      eGFR 71 ml/min/1 73sq m     Narrative:      [place] guidelines for Chronic Kidney Disease (CKD):   •  Stage 1 with normal or high GFR (GFR > 90 mL/min/1 73 square meters)  •  Stage 2 Mild CKD (GFR = 60-89 mL/min/1 73 square meters)  •  Stage 3A Moderate CKD (GFR = 45-59 mL/min/1 73 square meters)  •  Stage 3B Moderate CKD (GFR = 30-44 mL/min/1 73 square meters)  •  Stage 4 Severe CKD (GFR = 15-29 mL/min/1 73 square meters)  •  Stage 5 End Stage CKD (GFR <15 mL/min/1 73 square meters)  Note: GFR calculation is accurate only with a steady state creatinine    CBC and differential [[id number]]  (Abnormal) Collected: 02/05/23 1653    Lab Status: Final result Specimen: Blood from Arm, Left Updated: 02/05/23 1659     WBC 6 11 Thousand/uL      RBC 3 92 Million/uL      Hemoglobin 11 2 g/dL      Hematocrit 35 2 %      MCV 90 fL      MCH 28 6 pg      MCHC 31 8 g/dL      RDW 15 4 %      MPV 9 2 fL      Platelets 918 Thousands/uL      nRBC 0 /100 WBCs      Neutrophils Relative 73 %      Immat GRANS % 0 %      Lymphocytes Relative 17 %      Monocytes Relative 8 %      Eosinophils Relative 2 %      Basophils Relative 0 %      Neutrophils Absolute 4 43 Thousands/µL      Immature Grans Absolute 0 02 Thousand/uL      Lymphocytes Absolute 1 05 Thousands/µL      Monocytes Absolute 0 47 Thousand/µL      Eosinophils Absolute 0 12 Thousand/µL      Basophils Absolute 0 02 Thousands/µL                  CTA head and neck with and without contrast   Final Result by Bigg Rodríguez DO (02/05 1810)      Mild chronic microangiopathic changes  No acute intracranial abnormality  No significant carotid or vertebral artery stenosis  No focal intracranial stenosis or aneurysm                    Workstation performed: [id number]         XR chest 1 view portable   ED Interpretation by Shashank Beth MD (02/05 1752)   NAD      Final Result by Tatiana Gómez MD (02/06 7050)      Suspected resolving infiltrate/atelectasis at right lung base, improved                  Workstation performed: [id number]                    Procedures  ECG 12 Lead Documentation Only    Date/Time: 2/5/2023 3:25 PM  Performed by: Shashank Beth MD  Authorized by: Shashank Beth MD     Rate:     ECG rate:  78    ECG rate assessment: normal    Rhythm:     Rhythm: sinus rhythm    ST segments:     ST segments:  Normal  T waves:     T waves: normal               ED Course                               SBIRT 20yo+    Flowsheet Row Most Recent Value   SBIRT (23 yo +)    In order to provide better care to our patients, we are screening all of our patients for alcohol and drug use  Would it be okay to ask you these screening questions? No Filed at: 02/05/2023 1712                    Medical Decision Making  60-year-old female coming in with multiple complaints having some headache and lightheadedness and still with some chest pain and shortness of breath after patient had recent hospital admission  Patient had a recent catheterization with reported negative clean cath without any intervention  Will check EKG and troponins to evaluate and make sure no sequela from cath  Given recent cath intervention and complaint of lightheadedness will CTA her head  Although patient's complaint of headache and lightheadedness she actually has a normal neurologic exam and her NIH is 0  She has no focal neurologic complaints and has no deficits  But will CTA her head given recent cath and likely use of blood thinners to more serious neurologic pathology  Will get chest x-ray as well rule out pneumonia or pneumothorax  Patient sat is 99%  She has no reported fever  She states her breathing is at her baseline    We will observe and have her follow-up with cardiology if examinations are negative  Dyspnea: complicated acute illness or injury  Lightheadedness: complicated acute illness or injury  Amount and/or Complexity of Data Reviewed  Labs: ordered  Radiology: ordered and independent interpretation performed  Risk  Prescription drug management  Disposition  Final diagnoses:   Lightheadedness   Dyspnea     Time reflects when diagnosis was documented in both MDM as applicable and the Disposition within this note     Time User Action Codes Description Comment    2/5/2023  6:51 PM Renna Lanes Add [R42] [address]     2/5/2023  6:52 PM Renna Lanes Add [R06 00] Dyspnea       ED Disposition     ED Disposition   Discharge    Condition   Stable    Date/Time   Sun Feb 5, 2023 1851    [place] 61 discharge to home/self care  Follow-up Information     Follow up With Specialties Details Why Contact Info    Mir Abbey, DO General Practice Go to  If symptoms worsen [address] [address] [phone]            Discharge Medication List as of 2/5/2023  6:52 PM      CONTINUE these medications which have NOT CHANGED    Details   albuterol (PROVENTIL HFA,VENTOLIN HFA) 90 mcg/act inhaler Inhale 2 puffs every 4 (four) hours as needed for wheezing, Starting Sat 10/8/2022, Normal      benzonatate (TESSALON PERLES) 100 mg capsule Take 1 capsule (100 mg total) by mouth 3 (three) times a day as needed for cough, Starting Fri 10/7/2022, Normal      fluticasone-vilanterol (BREO ELLIPTA) 100-25 mcg/inh inhaler Inhale 1 puff daily Rinse mouth after use   Do not start before October 8, 2022 , Starting Sat 10/8/2022, Normal      guaiFENesin (MUCINEX) 600 mg 12 hr tablet Take 1 tablet (600 mg total) by mouth 2 (two) times a day, Starting Fri 10/7/2022, Normal      ibuprofen (MOTRIN) 800 mg tablet Take 1 tablet (800 mg total) by mouth every 8 (eight) hours as needed for moderate pain, Starting Mon 1/9/2023, Normal ipratropium-albuterol (DUO-NEB) 0 5-2 5 mg/3 mL nebulizer solution Take 3 mL by nebulization 4 (four) times a day, Starting Fri 10/7/2022, Normal      lidocaine (LIDODERM) 5 % Apply 1 patch topically daily as needed (back pain) Remove & Discard patch within 12 hours or as directed by MD, Starting Mon 1/9/2023, Normal      methocarbamol (ROBAXIN) 500 mg tablet Take 1 tablet (500 mg total) by mouth 2 (two) times a day, Starting Tue 1/17/2023, Normal      nicotine (NICODERM CQ) 14 mg/24hr TD 24 hr patch Place 1 patch on the skin daily Do not start before October 8, 2022 , Starting Sat 10/8/2022, Normal      !! oxyCODONE (ROXICODONE) 5 immediate release tablet Take 1 tablet (5 mg total) by mouth every 4 (four) hours as needed for moderate pain Max Daily Amount: 30 mg, Starting Tue 1/17/2023, Normal      !! oxyCODONE (Roxicodone) 5 immediate release tablet Take 1 tablet (5 mg total) by mouth every 6 (six) hours as needed for severe pain for up to 10 days Max Daily Amount: 20 mg, Starting Fri 1/27/2023, Until Mon 2/6/2023 at 2359, Normal      pantoprazole (PROTONIX) 40 mg tablet Take 1 tablet (40 mg total) by mouth daily, Starting Sat 10/8/2022, Normal      tiotropium (SPIRIVA) 18 mcg inhalation capsule Place 18 mcg into inhaler and inhale daily, Historical Med       !! - Potential duplicate medications found  Please discuss with provider  No discharge procedures on file      PDMP Review       Value Time User    PDMP Reviewed  Yes 1/17/2023  1:10 PM Yariel Lai MD          ED Provider  Electronically Signed by           Ana Avery MD  02/07/23 6761

## 2023-02-13 ENCOUNTER — OFFICE VISIT (OUTPATIENT)
Dept: OBGYN CLINIC | Facility: CLINIC | Age: 58
End: 2023-02-13

## 2023-02-13 ENCOUNTER — APPOINTMENT (OUTPATIENT)
Dept: RADIOLOGY | Facility: CLINIC | Age: 58
End: 2023-02-13

## 2023-02-13 VITALS
WEIGHT: 203 LBS | BODY MASS INDEX: 38.33 KG/M2 | HEART RATE: 97 BPM | SYSTOLIC BLOOD PRESSURE: 125 MMHG | HEIGHT: 61 IN | DIASTOLIC BLOOD PRESSURE: 77 MMHG

## 2023-02-13 DIAGNOSIS — M54.16 LUMBAR RADICULOPATHY: ICD-10-CM

## 2023-02-13 DIAGNOSIS — M54.50 ACUTE LOW BACK PAIN, UNSPECIFIED BACK PAIN LATERALITY, UNSPECIFIED WHETHER SCIATICA PRESENT: ICD-10-CM

## 2023-02-13 DIAGNOSIS — S32.010A COMPRESSION FRACTURE OF L1 VERTEBRA, INITIAL ENCOUNTER (HCC): Primary | ICD-10-CM

## 2023-02-13 NOTE — PROGRESS NOTES
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 she felt a pop in her lower back  She had immediate onset of pain with this  After this persisted she was evaluated at the ED on 1/1/2023 where x-rays were taken which she reports revealed a compression fracture of L1  She was then discharged however over the following week her pain became very severe and she was again evaluated at the ED where she states she was admitted for 3 days  Since being discharged she has had continued pain in her lower back  Denies any numbness and tingling  She unfortunately did slip on ice due to difficulty ambulating because of the pain on 1/26/2023 and again proceeded to the ED for evaluation and x-rays, she states the fracture had showed worsening at that point        ALLERGIES:   Allergies   Allergen Reactions   • Valium [Diazepam] Anaphylaxis       MEDICATIONS:    Current Outpatient Medications:   •  albuterol (PROVENTIL HFA,VENTOLIN HFA) 90 mcg/act inhaler, Inhale 2 puffs every 4 (four) hours as needed for wheezing, Disp: 18 g, Rfl: 0  •  benzonatate (TESSALON PERLES) 100 mg capsule, Take 1 capsule (100 mg total) by mouth 3 (three) times a day as needed for cough, Disp: 20 capsule, Rfl: 0  •  guaiFENesin (MUCINEX) 600 mg 12 hr tablet, Take 1 tablet (600 mg total) by mouth 2 (two) times a day, Disp: 20 tablet, Rfl: 0  •  ibuprofen (MOTRIN) 800 mg tablet, Take 1 tablet (800 mg total) by mouth every 8 (eight) hours as needed for moderate pain, Disp: 60 tablet, Rfl: 0  •  ipratropium-albuterol (DUO-NEB) 0 5-2 5 mg/3 mL nebulizer solution, Take 3 mL by nebulization 4 (four) times a day, Disp: 120 mL, Rfl: 0  •  lidocaine (LIDODERM) 5 %, Apply 1 patch topically daily as needed (back pain) Remove & Discard patch within 12 hours or as directed by MD, Disp: 30 patch, Rfl: 0  •  methocarbamol (ROBAXIN) 500 mg tablet, Take 1 tablet (500 mg total) by mouth 2 (two) times a day, Disp: 40 tablet, Rfl: 0  •  nicotine (NICODERM CQ) 14 mg/24hr TD 24 hr patch, Place 1 patch on the skin daily Do not start before October 8, 2022 , Disp: 28 patch, Rfl: 0  •  oxyCODONE (ROXICODONE) 5 immediate release tablet, Take 1 tablet (5 mg total) by mouth every 4 (four) hours as needed for moderate pain Max Daily Amount: 30 mg, Disp: 10 tablet, Rfl: 0  •  pantoprazole (PROTONIX) 40 mg tablet, Take 1 tablet (40 mg total) by mouth daily, Disp: 30 tablet, Rfl: 0  •  tiotropium (SPIRIVA) 18 mcg inhalation capsule, Place 18 mcg into inhaler and inhale daily, Disp: , Rfl:   •  fluticasone-vilanterol (BREO ELLIPTA) 100-25 mcg/inh inhaler, Inhale 1 puff daily Rinse mouth after use  Do not start before October 8, 2022  (Patient not taking: Reported on 1/15/2023), Disp: 60 blister, Rfl: 0     PAST MEDICAL HISTORY:   Past Medical History:   Diagnosis Date   • Achalasia    • Back pain    • Fibromyalgia    • Lupus ([facility] [address] )        PAST SURGICAL HISTORY:  Past Surgical History:   Procedure Laterality Date   • HYSTERECTOMY     • NECK SURGERY         SOCIAL HISTORY:  Social History     Tobacco Use   Smoking Status Every Day   • Packs/day: 1 00   • Types: Cigarettes   Smokeless Tobacco Never        ROS:  Review of Systems   Constitutional: Negative for appetite change and unexpected weight change  HENT: Negative for congestion and trouble swallowing  Eyes: Negative for visual disturbance  Respiratory: Negative for cough and shortness of breath  Cardiovascular: Negative for chest pain and palpitations  Gastrointestinal: Negative for nausea and vomiting  Endocrine: Negative for cold intolerance and heat intolerance  Musculoskeletal: Positive for back pain  Negative for gait problem  Skin: Negative for rash  Neurological: Negative for numbness  PHYSICAL EXAM:  62 y o  female sitting uncomfortably on exam chair in no acute distress  Tenderness to palpation over L1  Range of motion limited secondary to pain  Able to heel and toe walk  RADIOGRAPHIC STUDIES:  1   X-rays, L-spine, 1/7/2023 -L1 compression fracture with mild wedge deformity  The fracture involves the superior endplate of L1 vertebral body in association with mild kyphosis  2   X-rays, L-spine, 1/27/2023 -mild progression of deformity associate with L1 compression fracture  No evidence of coronal imbalance  Kyphosis and loss of sagittal balance and multilevel lumbar degenerative disc disease  3  L-spine, 2/13/2023 -progression of deformity at L1 with increased kyphosis, mild  Multilevel degenerative changes  ASSESSMENT:  1  Lumbar radiculopathy  -     XR spine lumbar minimum 4 views non injury; Future; Expected date: 02/13/2023        PLAN:  62 y o  female with L1 compression fracture, further collapse on repeat x-rays  On review of the serial x-rays, there is evidence of progression of deformity consistent with a healing compression fracture  She continues to be symptomatic and is now 6 weeks out from her injury  Natural history of compression fracture was discussed with the patient  I do think that there is a need for MRI study  If there is still edema at the fracture site, she will be a great surgical candidate  The surgery was to be in the form of a kyphoplasty which is performed by IR  At this time would like to obtain an MRI of the lumbar spine to evaluate for healing of this fracture  We did discuss the possible need for possible kyphoplasty  Also discussed other treatment modalities  She is going to need rehabilitation given her body habitus, her age, her medical comorbidities and the fact that she now has a compression fracture with deformity at the thoracolumbar junction    Regardless of fracture status, I would like to start her in [facility] at this time  She most likely is suffering from osteoporosis  Would also like to obtain a DEXA scan to evaluate her bone density  She is to reach out to her PCP regarding any prescription medications for pain  I will plan to see her in 2 weeks to discuss the results of her MRI and treatment plan moving forward           Scribe Attestation    I,:  Minerva Ring am acting as a scribe while in the presence of the attending physician :       I,:  Marek Nguyen MD personally performed the services described in this documentation    as scribed in my presence :

## 2023-02-14 ENCOUNTER — TELEPHONE (OUTPATIENT)
Dept: OBGYN CLINIC | Facility: HOSPITAL | Age: 58
End: 2023-02-14

## 2023-02-14 NOTE — TELEPHONE ENCOUNTER
Spoke with patient via telephone  Advised that work note is in her chart for her to remain out of work until next followup appointment

## 2023-02-14 NOTE — TELEPHONE ENCOUNTER
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

## 2023-02-15 ENCOUNTER — APPOINTMENT (EMERGENCY)
Dept: RADIOLOGY | Facility: HOSPITAL | Age: 58
End: 2023-02-15

## 2023-02-15 ENCOUNTER — HOSPITAL ENCOUNTER (EMERGENCY)
Facility: HOSPITAL | Age: 58
Discharge: HOME | End: 2023-02-15
Attending: EMERGENCY MEDICINE

## 2023-02-15 VITALS
TEMPERATURE: 99.4 F | SYSTOLIC BLOOD PRESSURE: 120 MMHG | RESPIRATION RATE: 20 BRPM | DIASTOLIC BLOOD PRESSURE: 57 MMHG | OXYGEN SATURATION: 92 % | HEART RATE: 82 BPM

## 2023-02-15 DIAGNOSIS — R07.9 CHEST PAIN: Primary | ICD-10-CM

## 2023-02-15 LAB
2HR DELTA HS TROPONIN: 0 NG/L
ALBUMIN SERPL BCP-MCNC: 4.2 G/DL (ref 3.5–5)
ALP SERPL-CCNC: 95 U/L (ref 34–104)
ALT SERPL W P-5'-P-CCNC: 12 U/L (ref 7–52)
ANION GAP SERPL CALCULATED.3IONS-SCNC: 11 MMOL/L (ref 4–13)
AST SERPL W P-5'-P-CCNC: 10 U/L (ref 13–39)
BASOPHILS # BLD AUTO: 0.02 THOUSANDS/ÂΜL (ref 0–0.1)
BASOPHILS NFR BLD AUTO: 0 % (ref 0–1)
BILIRUB SERPL-MCNC: 0.51 MG/DL (ref 0.2–1)
BUN SERPL-MCNC: 11 MG/DL (ref 5–25)
CALCIUM SERPL-MCNC: 9.5 MG/DL (ref 8.4–10.2)
CARDIAC TROPONIN I PNL SERPL HS: 2 NG/L
CARDIAC TROPONIN I PNL SERPL HS: 2 NG/L
CHLORIDE SERPL-SCNC: 104 MMOL/L (ref 96–108)
CO2 SERPL-SCNC: 24 MMOL/L (ref 21–32)
CREAT SERPL-MCNC: 0.72 MG/DL (ref 0.6–1.3)
EOSINOPHIL # BLD AUTO: 0.16 THOUSAND/ÂΜL (ref 0–0.61)
EOSINOPHIL NFR BLD AUTO: 2 % (ref 0–6)
ERYTHROCYTE [DISTWIDTH] IN BLOOD BY AUTOMATED COUNT: 15 % (ref 11.6–15.1)
GFR SERPL CREATININE-BSD FRML MDRD: 92 ML/MIN/1.73SQ M
GLUCOSE SERPL-MCNC: 112 MG/DL (ref 65–140)
HCT VFR BLD AUTO: 35.6 % (ref 34.8–46.1)
HGB BLD-MCNC: 11.8 G/DL (ref 11.5–15.4)
IMM GRANULOCYTES # BLD AUTO: 0.02 THOUSAND/UL (ref 0–0.2)
IMM GRANULOCYTES NFR BLD AUTO: 0 % (ref 0–2)
INR PPP: 1.14 (ref 0.84–1.19)
LYMPHOCYTES # BLD AUTO: 0.9 THOUSANDS/ÂΜL (ref 0.6–4.47)
LYMPHOCYTES NFR BLD AUTO: 11 % (ref 14–44)
MCH RBC QN AUTO: 29.3 PG (ref 26.8–34.3)
MCHC RBC AUTO-ENTMCNC: 33.1 G/DL (ref 31.4–37.4)
MCV RBC AUTO: 88 FL (ref 82–98)
MONOCYTES # BLD AUTO: 0.52 THOUSAND/ÂΜL (ref 0.17–1.22)
MONOCYTES NFR BLD AUTO: 6 % (ref 4–12)
NEUTROPHILS # BLD AUTO: 6.51 THOUSANDS/ÂΜL (ref 1.85–7.62)
NEUTS SEG NFR BLD AUTO: 81 % (ref 43–75)
NRBC BLD AUTO-RTO: 0 /100 WBCS
PLATELET # BLD AUTO: 389 THOUSANDS/UL (ref 149–390)
PMV BLD AUTO: 8.8 FL (ref 8.9–12.7)
POTASSIUM SERPL-SCNC: 3.5 MMOL/L (ref 3.5–5.3)
PROT SERPL-MCNC: 7.4 G/DL (ref 6.4–8.4)
PROTHROMBIN TIME: 14.4 SECONDS (ref 11.6–14.5)
RBC # BLD AUTO: 4.03 MILLION/UL (ref 3.81–5.12)
SODIUM SERPL-SCNC: 139 MMOL/L (ref 135–147)
WBC # BLD AUTO: 8.13 THOUSAND/UL (ref 4.31–10.16)

## 2023-02-15 RX ORDER — KETOROLAC TROMETHAMINE 30 MG/ML
30 INJECTION, SOLUTION INTRAMUSCULAR; INTRAVENOUS ONCE
Status: COMPLETED | OUTPATIENT
Start: 2023-02-15 | End: 2023-02-15

## 2023-02-15 RX ADMIN — KETOROLAC TROMETHAMINE 30 MG: 30 INJECTION, SOLUTION INTRAMUSCULAR at 19:07

## 2023-02-16 LAB
ATRIAL RATE: 77 BPM
P AXIS: 73 DEGREES
PR INTERVAL: 148 MS
QRS AXIS: 14 DEGREES
QRSD INTERVAL: 86 MS
QT INTERVAL: 374 MS
QTC INTERVAL: 423 MS
T WAVE AXIS: 38 DEGREES
VENTRICULAR RATE: 77 BPM

## 2023-02-19 NOTE — ED PROVIDER NOTES
History  Chief Complaint   Patient presents with   • Chest Pain     Pt reports chest pain and SOB on and off for a while  Pt reports recent cardiac cath      60-year-old female patient presents to the emergency department for evaluation of chest pain  Patient still has the outline of EKG stickers from her last 2 evaluations for similar symptoms and it happened over the last several days  Today's chest pain is no different than her previous bouts when she was told to come to the emergency department anytime she has chest pains and the patient is following these directions  Currently patient sitting in bed, no apparent distress, speaking full sentences, not pausing to breathe  Patient was able to ambulate to the treatment area without difficulty  She is not pale, not diaphoretic, she is not nauseated, not vomiting  Plan will be for evaluation with a differential diagnosis includes was not limited to acute coronary syndrome, pneumonia, pneumothorax  History provided by:  Patient   used: No    Chest Pain  Pain location:  L chest  Pain quality: aching    Pain severity:  Mild  Onset quality:  Gradual  Timing:  Constant  Relieved by:  Nothing  Worsened by:  Nothing tried  Ineffective treatments:  None tried      Prior to Admission Medications   Prescriptions Last Dose Informant Patient Reported? Taking? albuterol (PROVENTIL HFA,VENTOLIN HFA) 90 mcg/act inhaler   No No   Sig: Inhale 2 puffs every 4 (four) hours as needed for wheezing   benzonatate (TESSALON PERLES) 100 mg capsule   No No   Sig: Take 1 capsule (100 mg total) by mouth 3 (three) times a day as needed for cough   fluticasone-vilanterol (BREO ELLIPTA) 100-25 mcg/inh inhaler   No No   Sig: Inhale 1 puff daily Rinse mouth after use  Do not start before October 8, 2022     Patient not taking: Reported on 1/15/2023   guaiFENesin (MUCINEX) 600 mg 12 hr tablet   No No   Sig: Take 1 tablet (600 mg total) by mouth 2 (two) times a day ibuprofen (MOTRIN) 800 mg tablet   No No   Sig: Take 1 tablet (800 mg total) by mouth every 8 (eight) hours as needed for moderate pain   ipratropium-albuterol (DUO-NEB) 0 5-2 5 mg/3 mL nebulizer solution   No No   Sig: Take 3 mL by nebulization 4 (four) times a day   lidocaine (LIDODERM) 5 %   No No   Sig: Apply 1 patch topically daily as needed (back pain) Remove & Discard patch within 12 hours or as directed by MD   methocarbamol (ROBAXIN) 500 mg tablet   No No   Sig: Take 1 tablet (500 mg total) by mouth 2 (two) times a day   nicotine (NICODERM CQ) 14 mg/24hr TD 24 hr patch   No No   Sig: Place 1 patch on the skin daily Do not start before October 8, 2022  oxyCODONE (ROXICODONE) 5 immediate release tablet   No No   Sig: Take 1 tablet (5 mg total) by mouth every 4 (four) hours as needed for moderate pain Max Daily Amount: 30 mg   pantoprazole (PROTONIX) 40 mg tablet   No No   Sig: Take 1 tablet (40 mg total) by mouth daily   tiotropium (SPIRIVA) 18 mcg inhalation capsule   Yes No   Sig: Place 18 mcg into inhaler and inhale daily      Facility-Administered Medications: None       Past Medical History:   Diagnosis Date   • Achalasia    • Back pain    • Cancer ([facility] )     Ovarian   • Fibromyalgia    • Lupus ([facility] )        Past Surgical History:   Procedure Laterality Date   • HYSTERECTOMY     • NECK SURGERY         History reviewed  No pertinent family history  I have reviewed and agree with the history as documented  E-Cigarette/Vaping   • E-Cigarette Use Never User      E-Cigarette/Vaping Substances     Social History     Tobacco Use   • Smoking status: Every Day     Packs/day: 1 00     Types: Cigarettes   • Smokeless tobacco: Never   Vaping Use   • Vaping Use: Never used   Substance Use Topics   • Alcohol use: Never   • Drug use: Never       Review of Systems   Cardiovascular: Positive for chest pain  All other systems reviewed and are negative        Physical Exam  Physical Exam  Vitals and nursing note reviewed  Constitutional:       Appearance: She is well-developed  HENT:      Head: Normocephalic and atraumatic  Right Ear: External ear normal       Left Ear: External ear normal    Eyes:      Conjunctiva/sclera: Conjunctivae normal    Neck:      Thyroid: No thyromegaly  Vascular: No JVD  Trachea: No tracheal deviation  Cardiovascular:      Rate and Rhythm: Normal rate  Pulmonary:      Effort: Pulmonary effort is normal       Breath sounds: Normal breath sounds  No stridor  Abdominal:      General: There is no distension  Palpations: Abdomen is soft  There is no mass  Tenderness: There is no abdominal tenderness  There is no guarding  Hernia: No hernia is present  Musculoskeletal:         General: No tenderness or deformity  Normal range of motion  Lymphadenopathy:      Cervical: No cervical adenopathy  Skin:     General: Skin is warm  Coloration: Skin is not pale  Findings: No erythema or rash  Neurological:      Mental Status: She is alert and oriented to person, place, and time     Psychiatric:         Behavior: Behavior normal          Vital Signs  ED Triage Vitals   Temperature Pulse Respirations Blood Pressure SpO2   02/15/23 1704 02/15/23 1702 02/15/23 1702 02/15/23 1702 02/15/23 1702   99 4 °F (37 4 °C) 82 22 164/76 97 %      Temp Source Heart Rate Source Patient Position - Orthostatic VS BP Location FiO2 (%)   02/15/23 1704 02/15/23 1702 02/15/23 1702 02/15/23 1702 --   Tympanic Monitor Sitting Right arm       Pain Score       --                  Vitals:    02/15/23 1815 02/15/23 1900 02/15/23 2000 02/15/23 2100   BP: 118/56 117/56 118/57 120/57   Pulse: 79 81 79 82   Patient Position - Orthostatic VS:             Visual Acuity      ED Medications  Medications   ketorolac (TORADOL) injection 30 mg (30 mg Intravenous Given 2/15/23 1907)       Diagnostic Studies  Results Reviewed     Procedure Component Value Units Date/Time    HS Troponin I 2hr [[id number]]  (Normal) Collected: 02/15/23 1858    Lab Status: Final result Specimen: Blood from Arm, Left Updated: 02/15/23 1933     hs TnI 2hr 2 ng/L      Delta 2hr hsTnI 0 ng/L     HS Troponin 0hr (reflex protocol) [[id number]]  (Normal) Collected: 02/15/23 1719    Lab Status: Final result Specimen: Blood from Arm, Left Updated: 02/15/23 1817     hs TnI 0hr 2 ng/L     Comprehensive metabolic panel [[id number]]  (Abnormal) Collected: 02/15/23 1719    Lab Status: Final result Specimen: Blood from Arm, Left Updated: 02/15/23 1800     Sodium 139 mmol/L      Potassium 3 5 mmol/L      Chloride 104 mmol/L      CO2 24 mmol/L      ANION GAP 11 mmol/L      BUN 11 mg/dL      Creatinine 0 72 mg/dL      Glucose 112 mg/dL      Calcium 9 5 mg/dL      AST 10 U/L      ALT 12 U/L      Alkaline Phosphatase 95 U/L      Total Protein 7 4 g/dL      Albumin 4 2 g/dL      Total Bilirubin 0 51 mg/dL      eGFR 92 ml/min/1 73sq m     Narrative:      [facility] guidelines for Chronic Kidney Disease (CKD):   •  Stage 1 with normal or high GFR (GFR > 90 mL/min/1 73 square meters)  •  Stage 2 Mild CKD (GFR = 60-89 mL/min/1 73 square meters)  •  Stage 3A Moderate CKD (GFR = 45-59 mL/min/1 73 square meters)  •  Stage 3B Moderate CKD (GFR = 30-44 mL/min/1 73 square meters)  •  Stage 4 Severe CKD (GFR = 15-29 mL/min/1 73 square meters)  •  Stage 5 End Stage CKD (GFR <15 mL/min/1 73 square meters)  Note: GFR calculation is accurate only with a steady state creatinine    Protime-INR [[id number]]  (Normal) Collected: 02/15/23 1719    Lab Status: Final result Specimen: Blood from Arm, Left Updated: 02/15/23 1755     Protime 14 4 seconds      INR 1 14    CBC and differential [[id number]]  (Abnormal) Collected: 02/15/23 1719    Lab Status: Final result Specimen: Blood from Arm, Left Updated: 02/15/23 1728     WBC 8 13 Thousand/uL      RBC 4 03 Million/uL      Hemoglobin 11 8 g/dL      Hematocrit 35 6 %      MCV 88 fL      MCH 29 3 pg MCHC 33 1 g/dL      RDW 15 0 %      MPV 8 8 fL      Platelets 659 Thousands/uL      nRBC 0 /100 WBCs      Neutrophils Relative 81 %      Immat GRANS % 0 %      Lymphocytes Relative 11 %      Monocytes Relative 6 %      Eosinophils Relative 2 %      Basophils Relative 0 %      Neutrophils Absolute 6 51 Thousands/µL      Immature Grans Absolute 0 02 Thousand/uL      Lymphocytes Absolute 0 90 Thousands/µL      Monocytes Absolute 0 52 Thousand/µL      Eosinophils Absolute 0 16 Thousand/µL      Basophils Absolute 0 02 Thousands/µL                  XR chest 2 views   Final Result by Anshu Lynn MD (02/16 1211)      Right base and middle lobe atelectasis and or focal infiltrate      The study was marked in EPIC for immediate notification  Workstation performed: [id number]                    Procedures  Procedures         ED Course             HEART Risk Score    Flowsheet Row Most Recent Value   Heart Score Risk Calculator    History 0 Filed at: 02/15/2023 2138   ECG 0 Filed at: 02/15/2023 2138   Age 1 Filed at: 02/15/2023 2138   Risk Factors 0 Filed at: 02/15/2023 2138   Troponin 0 Filed at: 02/15/2023 2138   HEART Score 1 Filed at: 02/15/2023 2138                        SBIRT 20yo+    Flowsheet Row Most Recent Value   SBIRT (23 yo +)    In order to provide better care to our patients, we are screening all of our patients for alcohol and drug use  Would it be okay to ask you these screening questions? Yes Filed at: 02/15/2023 1717   Initial Alcohol Screen: US AUDIT-C     1  How often do you have a drink containing alcohol? 0 Filed at: 02/15/2023 1717   2  How many drinks containing alcohol do you have on a typical day you are drinking? 0 Filed at: 02/15/2023 1717   3a  Male UNDER 65: How often do you have five or more drinks on one occasion? 0 Filed at: 02/15/2023 1717   3b  FEMALE Any Age, or MALE 65+: How often do you have 4 or more drinks on one occassion?  0 Filed at: 02/15/2023 1717   Audit-C Score 0 Filed at: 02/15/2023 1717   EULALIO: How many times in the past year have you    Used an illegal drug or used a prescription medication for non-medical reasons? Never Filed at: 02/15/2023 1717                    Medical Decision Making  The patient was evaluated for life-threatening causes of her chest pain  None were found  The patient can follow-up as an outpatient with her primary care physician to discuss further testing    Chest pain: self-limited or minor problem  Amount and/or Complexity of Data Reviewed  Labs: ordered  Radiology: ordered  Risk  Prescription drug management  Disposition  Final diagnoses:   Chest pain     Time reflects when diagnosis was documented in both MDM as applicable and the Disposition within this note     Time User Action Codes Description Comment    2/15/2023  9:40 PM Gene  Add [R07 9] Chest pain       ED Disposition     ED Disposition   Discharge    Condition   Stable    Date/Time   Wed Feb 15, 2023  9:40 PM    Comment   Dory Navarro discharge to home/self care  Follow-up Information     Follow up With Specialties Details Why [address], [address], DO General Practice   [address]  [address]  [address] [address] [phone]            Discharge Medication List as of 2/15/2023  9:41 PM      CONTINUE these medications which have NOT CHANGED    Details   albuterol (PROVENTIL HFA,VENTOLIN HFA) 90 mcg/act inhaler Inhale 2 puffs every 4 (four) hours as needed for wheezing, Starting Sat 10/8/2022, Normal      benzonatate (TESSALON PERLES) 100 mg capsule Take 1 capsule (100 mg total) by mouth 3 (three) times a day as needed for cough, Starting Fri 10/7/2022, Normal      fluticasone-vilanterol (BREO ELLIPTA) 100-25 mcg/inh inhaler Inhale 1 puff daily Rinse mouth after use   Do not start before October 8, 2022 , Starting Sat 10/8/2022, Normal      guaiFENesin (MUCINEX) 600 mg 12 hr tablet Take 1 tablet (600 mg total) by mouth 2 (two) times a day, Starting Fri 10/7/2022, Normal      ibuprofen (MOTRIN) 800 mg tablet Take 1 tablet (800 mg total) by mouth every 8 (eight) hours as needed for moderate pain, Starting Mon 1/9/2023, Normal      ipratropium-albuterol (DUO-NEB) 0 5-2 5 mg/3 mL nebulizer solution Take 3 mL by nebulization 4 (four) times a day, Starting Fri 10/7/2022, Normal      lidocaine (LIDODERM) 5 % Apply 1 patch topically daily as needed (back pain) Remove & Discard patch within 12 hours or as directed by MD, Starting Mon 1/9/2023, Normal      methocarbamol (ROBAXIN) 500 mg tablet Take 1 tablet (500 mg total) by mouth 2 (two) times a day, Starting Tue 1/17/2023, Normal      nicotine (NICODERM CQ) 14 mg/24hr TD 24 hr patch Place 1 patch on the skin daily Do not start before October 8, 2022 , Starting Sat 10/8/2022, Normal      oxyCODONE (ROXICODONE) 5 immediate release tablet Take 1 tablet (5 mg total) by mouth every 4 (four) hours as needed for moderate pain Max Daily Amount: 30 mg, Starting Tue 1/17/2023, Normal      pantoprazole (PROTONIX) 40 mg tablet Take 1 tablet (40 mg total) by mouth daily, Starting Sat 10/8/2022, Normal      tiotropium (SPIRIVA) 18 mcg inhalation capsule Place 18 mcg into inhaler and inhale daily, Historical Med             No discharge procedures on file      PDMP Review       Value Time User    PDMP Reviewed  Yes 1/17/2023  1:10 PM Marilyn Meyers MD          ED Provider  Electronically Signed by           Sary Gavin DO  02/19/23 0555

## 2023-02-20 ENCOUNTER — HOSPITAL ENCOUNTER (OUTPATIENT)
Dept: BONE DENSITY | Facility: CLINIC | Age: 58
Discharge: HOME | End: 2023-02-20

## 2023-02-20 ENCOUNTER — HOSPITAL ENCOUNTER (EMERGENCY)
Facility: HOSPITAL | Age: 58
Discharge: HOME | End: 2023-02-20
Attending: EMERGENCY MEDICINE

## 2023-02-20 ENCOUNTER — APPOINTMENT (EMERGENCY)
Dept: RADIOLOGY | Facility: HOSPITAL | Age: 58
End: 2023-02-20

## 2023-02-20 VITALS
OXYGEN SATURATION: 92 % | RESPIRATION RATE: 20 BRPM | HEART RATE: 82 BPM | WEIGHT: 203.04 LBS | DIASTOLIC BLOOD PRESSURE: 57 MMHG | BODY MASS INDEX: 45.52 KG/M2 | TEMPERATURE: 98.7 F | SYSTOLIC BLOOD PRESSURE: 129 MMHG

## 2023-02-20 VITALS — BODY MASS INDEX: 45.51 KG/M2 | HEIGHT: 56 IN

## 2023-02-20 DIAGNOSIS — S32.010A COMPRESSION FRACTURE OF L1 VERTEBRA, INITIAL ENCOUNTER (HCC): ICD-10-CM

## 2023-02-20 DIAGNOSIS — J44.1 COPD WITH ACUTE EXACERBATION (HCC): Primary | ICD-10-CM

## 2023-02-20 DIAGNOSIS — M54.50 ACUTE LOW BACK PAIN, UNSPECIFIED BACK PAIN LATERALITY, UNSPECIFIED WHETHER SCIATICA PRESENT: ICD-10-CM

## 2023-02-20 DIAGNOSIS — R05.9 COUGH: ICD-10-CM

## 2023-02-20 DIAGNOSIS — R07.9 CHEST PAIN: ICD-10-CM

## 2023-02-20 LAB
ALBUMIN SERPL BCP-MCNC: 3.3 G/DL (ref 3.5–5)
ALP SERPL-CCNC: 108 U/L (ref 46–116)
ALT SERPL W P-5'-P-CCNC: 15 U/L (ref 12–78)
ANION GAP SERPL CALCULATED.3IONS-SCNC: 9 MMOL/L (ref 4–13)
AST SERPL W P-5'-P-CCNC: 12 U/L (ref 5–45)
BASE EX.OXY STD BLDV CALC-SCNC: 70.6 % (ref 60–80)
BASE EXCESS BLDV CALC-SCNC: 2.1 MMOL/L
BASOPHILS # BLD AUTO: 0.03 THOUSANDS/ÂΜL (ref 0–0.1)
BASOPHILS NFR BLD AUTO: 0 % (ref 0–1)
BILIRUB DIRECT SERPL-MCNC: 0.17 MG/DL (ref 0–0.2)
BILIRUB SERPL-MCNC: 0.45 MG/DL (ref 0.2–1)
BUN SERPL-MCNC: 10 MG/DL (ref 5–25)
CALCIUM SERPL-MCNC: 9.2 MG/DL (ref 8.3–10.1)
CARDIAC TROPONIN I PNL SERPL HS: 3 NG/L
CHLORIDE SERPL-SCNC: 105 MMOL/L (ref 96–108)
CO2 SERPL-SCNC: 29 MMOL/L (ref 21–32)
CREAT SERPL-MCNC: 0.75 MG/DL (ref 0.6–1.3)
EOSINOPHIL # BLD AUTO: 0.12 THOUSAND/ÂΜL (ref 0–0.61)
EOSINOPHIL NFR BLD AUTO: 2 % (ref 0–6)
ERYTHROCYTE [DISTWIDTH] IN BLOOD BY AUTOMATED COUNT: 14.8 % (ref 11.6–15.1)
GFR SERPL CREATININE-BSD FRML MDRD: 88 ML/MIN/1.73SQ M
GLUCOSE SERPL-MCNC: 98 MG/DL (ref 65–140)
HCO3 BLDV-SCNC: 27.4 MMOL/L (ref 24–30)
HCT VFR BLD AUTO: 33.5 % (ref 34.8–46.1)
HGB BLD-MCNC: 10.7 G/DL (ref 11.5–15.4)
IMM GRANULOCYTES # BLD AUTO: 0.02 THOUSAND/UL (ref 0–0.2)
IMM GRANULOCYTES NFR BLD AUTO: 0 % (ref 0–2)
LYMPHOCYTES # BLD AUTO: 1.03 THOUSANDS/ÂΜL (ref 0.6–4.47)
LYMPHOCYTES NFR BLD AUTO: 15 % (ref 14–44)
MCH RBC QN AUTO: 28.2 PG (ref 26.8–34.3)
MCHC RBC AUTO-ENTMCNC: 31.9 G/DL (ref 31.4–37.4)
MCV RBC AUTO: 88 FL (ref 82–98)
MONOCYTES # BLD AUTO: 0.62 THOUSAND/ÂΜL (ref 0.17–1.22)
MONOCYTES NFR BLD AUTO: 9 % (ref 4–12)
NEUTROPHILS # BLD AUTO: 5.2 THOUSANDS/ÂΜL (ref 1.85–7.62)
NEUTS SEG NFR BLD AUTO: 74 % (ref 43–75)
NRBC BLD AUTO-RTO: 0 /100 WBCS
NT-PROBNP SERPL-MCNC: 59 PG/ML
O2 CT BLDV-SCNC: 12.2 ML/DL
PCO2 BLDV: 45.2 MM HG (ref 42–50)
PH BLDV: 7.4 [PH] (ref 7.3–7.4)
PLATELET # BLD AUTO: 377 THOUSANDS/UL (ref 149–390)
PMV BLD AUTO: 8.8 FL (ref 8.9–12.7)
PO2 BLDV: 38.5 MM HG (ref 35–45)
POTASSIUM SERPL-SCNC: 3.2 MMOL/L (ref 3.5–5.3)
PROT SERPL-MCNC: 7 G/DL (ref 6.4–8.4)
RBC # BLD AUTO: 3.8 MILLION/UL (ref 3.81–5.12)
SODIUM SERPL-SCNC: 143 MMOL/L (ref 135–147)
WBC # BLD AUTO: 7.02 THOUSAND/UL (ref 4.31–10.16)

## 2023-02-20 RX ORDER — OXYCODONE HYDROCHLORIDE AND ACETAMINOPHEN 5; 325 MG/1; MG/1
1 TABLET ORAL ONCE
Status: COMPLETED | OUTPATIENT
Start: 2023-02-20 | End: 2023-02-20

## 2023-02-20 RX ORDER — PREDNISONE 20 MG/1
40 TABLET ORAL DAILY
Qty: 10 TABLET | Refills: 0 | Status: SHIPPED | OUTPATIENT
Start: 2023-02-20

## 2023-02-20 RX ORDER — AMOXICILLIN 500 MG/1
1000 CAPSULE ORAL 3 TIMES DAILY
Qty: 42 CAPSULE | Refills: 0 | Status: SHIPPED | OUTPATIENT
Start: 2023-02-20 | End: 2023-02-27

## 2023-02-20 RX ORDER — IPRATROPIUM BROMIDE AND ALBUTEROL SULFATE 2.5; .5 MG/3ML; MG/3ML
3 SOLUTION RESPIRATORY (INHALATION) ONCE
Status: COMPLETED | OUTPATIENT
Start: 2023-02-20 | End: 2023-02-20

## 2023-02-20 RX ORDER — METHYLPREDNISOLONE SODIUM SUCCINATE 125 MG/2ML
60 INJECTION, POWDER, LYOPHILIZED, FOR SOLUTION INTRAMUSCULAR; INTRAVENOUS ONCE
Status: COMPLETED | OUTPATIENT
Start: 2023-02-20 | End: 2023-02-20

## 2023-02-20 RX ADMIN — IPRATROPIUM BROMIDE AND ALBUTEROL SULFATE 3 ML: 2.5; .5 SOLUTION RESPIRATORY (INHALATION) at 14:06

## 2023-02-20 RX ADMIN — IPRATROPIUM BROMIDE AND ALBUTEROL SULFATE 3 ML: 2.5; .5 SOLUTION RESPIRATORY (INHALATION) at 13:17

## 2023-02-20 RX ADMIN — METHYLPREDNISOLONE SODIUM SUCCINATE 60 MG: 125 INJECTION, POWDER, FOR SOLUTION INTRAMUSCULAR; INTRAVENOUS at 13:17

## 2023-02-20 RX ADMIN — OXYCODONE HYDROCHLORIDE AND ACETAMINOPHEN 1 TABLET: 5; 325 TABLET ORAL at 14:31

## 2023-02-20 NOTE — ED PROVIDER NOTES
History  Chief Complaint   Patient presents with   • Shortness of Breath     Since this AM, referred to ED by PCP  Expiratory wheezes noted in triage  Pt concerned for pna  Productive cough, yellow in color  61 yo female with chronic COPD who presents to ED c/o SOB with wheezing and productive cough  Unassociated chronic sternal nonradiating nonpleuritic CP which she has had every day for over a year and she attributes to stress  Prior to Admission Medications   Prescriptions Last Dose Informant Patient Reported? Taking? albuterol (PROVENTIL HFA,VENTOLIN HFA) 90 mcg/act inhaler   No No   Sig: Inhale 2 puffs every 4 (four) hours as needed for wheezing   benzonatate (TESSALON PERLES) 100 mg capsule   No No   Sig: Take 1 capsule (100 mg total) by mouth 3 (three) times a day as needed for cough   fluticasone-vilanterol (BREO ELLIPTA) 100-25 mcg/inh inhaler   No No   Sig: Inhale 1 puff daily Rinse mouth after use  Do not start before October 8, 2022  Patient not taking: Reported on 1/15/2023   guaiFENesin (MUCINEX) 600 mg 12 hr tablet   No No   Sig: Take 1 tablet (600 mg total) by mouth 2 (two) times a day   ibuprofen (MOTRIN) 800 mg tablet   No No   Sig: Take 1 tablet (800 mg total) by mouth every 8 (eight) hours as needed for moderate pain   ipratropium-albuterol (DUO-NEB) 0 5-2 5 mg/3 mL nebulizer solution   No No   Sig: Take 3 mL by nebulization 4 (four) times a day   lidocaine (LIDODERM) 5 %   No No   Sig: Apply 1 patch topically daily as needed (back pain) Remove & Discard patch within 12 hours or as directed by MD   methocarbamol (ROBAXIN) 500 mg tablet   No No   Sig: Take 1 tablet (500 mg total) by mouth 2 (two) times a day   nicotine (NICODERM CQ) 14 mg/24hr TD 24 hr patch   No No   Sig: Place 1 patch on the skin daily Do not start before October 8, 2022     oxyCODONE (ROXICODONE) 5 immediate release tablet   No No   Sig: Take 1 tablet (5 mg total) by mouth every 4 (four) hours as needed for moderate pain Max Daily Amount: 30 mg   pantoprazole (PROTONIX) 40 mg tablet   No No   Sig: Take 1 tablet (40 mg total) by mouth daily   tiotropium (SPIRIVA) 18 mcg inhalation capsule   Yes No   Sig: Place 18 mcg into inhaler and inhale daily      Facility-Administered Medications: None       Past Medical History:   Diagnosis Date   • Achalasia    • Back pain    • Cancer (HCC)     Ovarian   • Fibromyalgia    • Lupus (HCC)        Past Surgical History:   Procedure Laterality Date   • HYSTERECTOMY     • NECK SURGERY         No family history on file  I have reviewed and agree with the history as documented  E-Cigarette/Vaping   • E-Cigarette Use Never User      E-Cigarette/Vaping Substances     Social History     Tobacco Use   • Smoking status: Every Day     Packs/day: 1 00     Types: Cigarettes   • Smokeless tobacco: Never   Vaping Use   • Vaping Use: Never used   Substance Use Topics   • Alcohol use: Never   • Drug use: Never       Review of Systems   Respiratory: Positive for cough, shortness of breath and wheezing  Physical Exam  Physical Exam  Vitals and nursing note reviewed  Constitutional:       General: She is not in acute distress  Appearance: Normal appearance  She is well-developed  She is not ill-appearing, toxic-appearing or diaphoretic  HENT:      Head: Normocephalic and atraumatic  Eyes:      Conjunctiva/sclera: Conjunctivae normal       Pupils: Pupils are equal, round, and reactive to light  Neck:      Vascular: No JVD  Cardiovascular:      Rate and Rhythm: Normal rate and regular rhythm  Pulses: Normal pulses  Heart sounds: Normal heart sounds  Pulmonary:      Effort: Pulmonary effort is normal  No respiratory distress  Breath sounds: No stridor  Wheezing present  Comments: Speaking full sentences without difficulty or limitation  No accessory muscle use  Abdominal:      General: There is no distension  Palpations: Abdomen is soft  Tenderness: There is no abdominal tenderness  There is no guarding or rebound  Musculoskeletal:         General: No tenderness or deformity  Normal range of motion  Cervical back: Normal range of motion and neck supple  Skin:     General: Skin is warm and dry  Capillary Refill: Capillary refill takes less than 2 seconds  Coloration: Skin is not pale  Findings: No erythema or rash  Neurological:      General: No focal deficit present  Mental Status: She is alert and oriented to person, place, and time  Cranial Nerves: No cranial nerve deficit  Sensory: No sensory deficit  Motor: No weakness or abnormal muscle tone        Coordination: Coordination normal          Vital Signs  ED Triage Vitals   Temperature Pulse Respirations Blood Pressure SpO2   02/20/23 1251 02/20/23 1251 02/20/23 1251 02/20/23 1251 02/20/23 1251   98 7 °F (37 1 °C) 81 18 133/60 95 %      Temp Source Heart Rate Source Patient Position - Orthostatic VS BP Location FiO2 (%)   02/20/23 1251 02/20/23 1251 -- -- --   Oral Monitor         Pain Score       02/20/23 1431       8           Vitals:    02/20/23 1251 02/20/23 1315 02/20/23 1400   BP: 133/60 125/60 129/57   Pulse: 81 78 82         Visual Acuity      ED Medications  Medications   ipratropium-albuterol (DUO-NEB) 0 5-2 5 mg/3 mL inhalation solution 3 mL (3 mL Nebulization Given 2/20/23 1317)   methylPREDNISolone sodium succinate (Solu-MEDROL) injection 60 mg (60 mg Intravenous Given 2/20/23 1317)   ipratropium-albuterol (DUO-NEB) 0 5-2 5 mg/3 mL inhalation solution 3 mL (3 mL Nebulization Given 2/20/23 1406)   oxyCODONE-acetaminophen (PERCOCET) 5-325 mg per tablet 1 tablet (1 tablet Oral Given 2/20/23 1431)       Diagnostic Studies  Results Reviewed     Procedure Component Value Units Date/Time    HS Troponin I 2hr [[id number]]     Lab Status: No result Specimen: Blood     HS Troponin 0hr (reflex protocol) [[id number]]  (Normal) Collected: 02/20/23 1311 Lab Status: Final result Specimen: Blood from Arm, Right Updated: 02/20/23 1344     hs TnI 0hr 3 ng/L     Hepatic function panel [[id number]]  (Abnormal) Collected: 02/20/23 1311    Lab Status: Final result Specimen: Blood from Arm, Right Updated: 02/20/23 1342     Total Bilirubin 0 45 mg/dL      Bilirubin, Direct 0 17 mg/dL      Alkaline Phosphatase 108 U/L      AST 12 U/L      ALT 15 U/L      Total Protein 7 0 g/dL      Albumin 3 3 g/dL     NT-BNP PRO-BE,[facility] only           [[id number]]  (Normal) Collected: 02/20/23 1311    Lab Status: Final result Specimen: Blood from Arm, Right Updated: 02/20/23 1342     NT-proBNP 59 pg/mL     Basic metabolic panel [[id number]]  (Abnormal) Collected: 02/20/23 1311    Lab Status: Final result Specimen: Blood from Arm, Right Updated: 02/20/23 1334     Sodium 143 mmol/L      Potassium 3 2 mmol/L      Chloride 105 mmol/L      CO2 29 mmol/L      ANION GAP 9 mmol/L      BUN 10 mg/dL      Creatinine 0 75 mg/dL      Glucose 98 mg/dL      Calcium 9 2 mg/dL      eGFR 88 ml/min/1 73sq m     Narrative:      [place] guidelines for Chronic Kidney Disease (CKD):   •  Stage 1 with normal or high GFR (GFR > 90 mL/min/1 73 square meters)  •  Stage 2 Mild CKD (GFR = 60-89 mL/min/1 73 square meters)  •  Stage 3A Moderate CKD (GFR = 45-59 mL/min/1 73 square meters)  •  Stage 3B Moderate CKD (GFR = 30-44 mL/min/1 73 square meters)  •  Stage 4 Severe CKD (GFR = 15-29 mL/min/1 73 square meters)  •  Stage 5 End Stage CKD (GFR <15 mL/min/1 73 square meters)  Note: GFR calculation is accurate only with a steady state creatinine    Blood gas, venous [[id number]] Collected: 02/20/23 1311    Lab Status: Final result Specimen: Blood from Arm, Right Updated: 02/20/23 1323     pH, [place] 7 400     pCO2, Dwight 45 2 mm Hg      pO2, Dwight 38 5 mm Hg      HCO3, Dw[place] 27 4 mmol/L      Base Excess, Dwight 2 1 mmol/L      O2 Content, Dwight 12 2 ml/dL      O2 HGB, VENOUS 70 6 %     CBC and differential [[id number]]  (Abnormal) Collected: 02/20/23 1311    Lab Status: Final result Specimen: Blood from Arm, Right Updated: 02/20/23 1318     WBC 7 02 Thousand/uL      RBC 3 80 Million/uL      Hemoglobin 10 7 g/dL      Hematocrit 33 5 %      MCV 88 fL      MCH 28 2 pg      MCHC 31 9 g/dL      RDW 14 8 %      MPV 8 8 fL      Platelets 922 Thousands/uL      nRBC 0 /100 WBCs      Neutrophils Relative 74 %      Immat GRANS % 0 %      Lymphocytes Relative 15 %      Monocytes Relative 9 %      Eosinophils Relative 2 %      Basophils Relative 0 %      Neutrophils Absolute 5 20 Thousands/µL      Immature Grans Absolute 0 02 Thousand/uL      Lymphocytes Absolute 1 03 Thousands/µL      Monocytes Absolute 0 62 Thousand/µL      Eosinophils Absolute 0 12 Thousand/µL      Basophils Absolute 0 03 Thousands/µL                  XR chest 1 view portable    (Results Pending)   interpreted by me  Normal xray  No ptx or effusion  Procedures  ECG 12 Lead Documentation Only    Date/Time: 2/20/2023 2:50 PM  Performed by: Phuong Rayo MD  Authorized by: Phuong Rayo MD     Indications / Diagnosis:  Cp  ECG reviewed by me, the ED Provider: yes    Patient location:  ED  Previous ECG:     Previous ECG:  Unavailable  Interpretation:     Interpretation: normal    Rate:     ECG rate:  80    ECG rate assessment: normal    Rhythm:     Rhythm: sinus rhythm    Comments:      Normal ekg  ED Course  ED Course as of 02/20/23 1452   Mon Feb 20, 2023   1403 Reexamined  Normal wob  Appears well  Pt appears appropriate for d/c home at this time, she requests one more breathing treatment prior to d/c  Will order                HEART Risk Score    Flowsheet Row Most Recent Value   Heart Score Risk Calculator    History 0 Filed at: 02/20/2023 1452   ECG 0 Filed at: 02/20/2023 1452   Age 1 Filed at: 02/20/2023 1452   Risk Factors 1 Filed at: 02/20/2023 1452   Troponin 0 Filed at: 02/20/2023 1452   HEART Score 2 Filed at: 02/20/2023 1452                        SBIRT 20yo+    Flowsheet Row Most Recent Value   SBIRT (25 yo +)    In order to provide better care to our patients, we are screening all of our patients for alcohol and drug use  Would it be okay to ask you these screening questions? Yes Filed at: 02/20/2023 1311   Initial Alcohol Screen: US AUDIT-C     1  How often do you have a drink containing alcohol? 0 Filed at: 02/20/2023 1311   2  How many drinks containing alcohol do you have on a typical day you are drinking? 0 Filed at: 02/20/2023 1311   3a  Male UNDER 65: How often do you have five or more drinks on one occasion? 0 Filed at: 02/20/2023 1311   3b  FEMALE Any Age, or MALE 65+: How often do you have 4 or more drinks on one occassion? 0 Filed at: 02/20/2023 1311   Audit-C Score 0 Filed at: 02/20/2023 1311   EULALIO: How many times in the past year have you    Used an illegal drug or used a prescription medication for non-medical reasons? Never Filed at: 02/20/2023 1311                    Medical Decision Making  Chest pain: chronic illness or injury  COPD with acute exacerbation [facility]): chronic illness or injury with exacerbation, progression, or side effects of treatment  Cough: complicated acute illness or injury  Amount and/or Complexity of Data Reviewed  Labs: ordered  Radiology: ordered  Risk  Prescription drug management            Disposition  Final diagnoses:   COPD with acute exacerbation ([facility] [address] )   Cough   Chest pain     Time reflects when diagnosis was documented in both MDM as applicable and the Disposition within this note     Time User Action Codes Description Comment    2/20/2023  1:58 PM Rena Kocher Add [J44 1] COPD with acute exacerbation ([facility] [address] )     2/20/2023  1:58 PM Rena Kocher Add [R05 9] Cough     2/20/2023  2:52 PM Rena Kocher Add [R07 9] Chest pain       ED Disposition     ED Disposition   Discharge    Condition   Stable    Date/Time   Mon Feb 20, 2023  1:57 PM    Comment   [facility] discharge to home/self care  Follow-up Information     Follow up With Specialties Details Why Contact Info Additional Information    [address] [facility] Emergency [facility] Emergency Medicine  If symptoms worsen [facility] [address] [place] [address] Emergency Department, [address] [place] [address], [place], [place], [address]          Discharge Medication List as of 2/20/2023  2:00 PM      START taking these medications    Details   amoxicillin (AMOXIL) 500 mg capsule Take 2 capsules (1,000 mg total) by mouth 3 (three) times a day for 7 days, Starting Mon 2/20/2023, Until Mon 2/27/2023, Print      predniSONE 20 mg tablet Take 2 tablets (40 mg total) by mouth daily, Starting Mon 2/20/2023, Print         CONTINUE these medications which have NOT CHANGED    Details   albuterol (PROVENTIL HFA,VENTOLIN HFA) 90 mcg/act inhaler Inhale 2 puffs every 4 (four) hours as needed for wheezing, Starting Sat 10/8/2022, Normal      benzonatate (TESSALON PERLES) 100 mg capsule Take 1 capsule (100 mg total) by mouth 3 (three) times a day as needed for cough, Starting Fri 10/7/2022, Normal      fluticasone-vilanterol (BREO ELLIPTA) 100-25 mcg/inh inhaler Inhale 1 puff daily Rinse mouth after use   Do not start before October 8, 2022 , Starting Sat 10/8/2022, Normal      guaiFENesin (MUCINEX) 600 mg 12 hr tablet Take 1 tablet (600 mg total) by mouth 2 (two) times a day, Starting Fri 10/7/2022, Normal      ibuprofen (MOTRIN) 800 mg tablet Take 1 tablet (800 mg total) by mouth every 8 (eight) hours as needed for moderate pain, Starting Mon 1/9/2023, Normal      ipratropium-albuterol (DUO-NEB) 0 5-2 5 mg/3 mL nebulizer solution Take 3 mL by nebulization 4 (four) times a day, Starting Fri 10/7/2022, Normal      lidocaine (LIDODERM) 5 % Apply 1 patch topically daily as needed (back pain) Remove & Discard patch within 12 hours or as directed by MD, Starting Major Bleak 1/9/2023, Normal      methocarbamol (ROBAXIN) 500 mg tablet Take 1 tablet (500 mg total) by mouth 2 (two) times a day, Starting Tue 1/17/2023, Normal      nicotine (NICODERM CQ) 14 mg/24hr TD 24 hr patch Place 1 patch on the skin daily Do not start before October 8, 2022 , Starting Sat 10/8/2022, Normal      oxyCODONE (ROXICODONE) 5 immediate release tablet Take 1 tablet (5 mg total) by mouth every 4 (four) hours as needed for moderate pain Max Daily Amount: 30 mg, Starting Tue 1/17/2023, Normal      pantoprazole (PROTONIX) 40 mg tablet Take 1 tablet (40 mg total) by mouth daily, Starting Sat 10/8/2022, Normal      tiotropium (SPIRIVA) 18 mcg inhalation capsule Place 18 mcg into inhaler and inhale daily, Historical Med             No discharge procedures on file      PDMP Review       Value Time User    PDMP Reviewed  Yes 1/17/2023  1:10 PM Terry Danielle MD          ED Provider  Electronically Signed by           Phuong Rayo MD  02/20/23 1535

## 2023-02-21 LAB
ATRIAL RATE: 80 BPM
P AXIS: 70 DEGREES
PR INTERVAL: 152 MS
QRS AXIS: 27 DEGREES
QRSD INTERVAL: 90 MS
QT INTERVAL: 364 MS
QTC INTERVAL: 419 MS
T WAVE AXIS: 37 DEGREES
VENTRICULAR RATE: 80 BPM

## 2023-03-01 ENCOUNTER — HOSPITAL ENCOUNTER (EMERGENCY)
Facility: HOSPITAL | Age: 58
Discharge: HOME | End: 2023-03-02
Attending: EMERGENCY MEDICINE

## 2023-03-01 ENCOUNTER — APPOINTMENT (EMERGENCY)
Dept: CT IMAGING | Facility: HOSPITAL | Age: 58
End: 2023-03-01

## 2023-03-01 DIAGNOSIS — M54.9 BACK PAIN: ICD-10-CM

## 2023-03-01 DIAGNOSIS — K21.9 GERD (GASTROESOPHAGEAL REFLUX DISEASE): ICD-10-CM

## 2023-03-01 DIAGNOSIS — M54.50 ACUTE LOW BACK PAIN: Primary | ICD-10-CM

## 2023-03-01 DIAGNOSIS — S32.010A COMPRESSION FRACTURE OF L1 VERTEBRA, INITIAL ENCOUNTER (HCC): ICD-10-CM

## 2023-03-01 DIAGNOSIS — S32.010A CLOSED COMPRESSION FRACTURE OF BODY OF L1 VERTEBRA (HCC): ICD-10-CM

## 2023-03-01 RX ORDER — OXYCODONE HYDROCHLORIDE AND ACETAMINOPHEN 5; 325 MG/1; MG/1
1 TABLET ORAL ONCE
Status: COMPLETED | OUTPATIENT
Start: 2023-03-01 | End: 2023-03-01

## 2023-03-01 RX ORDER — METHOCARBAMOL 500 MG/1
500 TABLET, FILM COATED ORAL ONCE
Status: DISCONTINUED | OUTPATIENT
Start: 2023-03-01 | End: 2023-03-02 | Stop reason: HOSPADM

## 2023-03-01 RX ORDER — KETOROLAC TROMETHAMINE 30 MG/ML
30 INJECTION, SOLUTION INTRAMUSCULAR; INTRAVENOUS ONCE
Status: COMPLETED | OUTPATIENT
Start: 2023-03-01 | End: 2023-03-01

## 2023-03-01 RX ADMIN — OXYCODONE HYDROCHLORIDE AND ACETAMINOPHEN 1 TABLET: 5; 325 TABLET ORAL at 23:38

## 2023-03-01 RX ADMIN — KETOROLAC TROMETHAMINE 30 MG: 30 INJECTION, SOLUTION INTRAMUSCULAR; INTRAVENOUS at 22:21

## 2023-03-02 VITALS
OXYGEN SATURATION: 97 % | HEART RATE: 88 BPM | RESPIRATION RATE: 16 BRPM | TEMPERATURE: 98.2 F | SYSTOLIC BLOOD PRESSURE: 121 MMHG | DIASTOLIC BLOOD PRESSURE: 64 MMHG

## 2023-03-02 RX ORDER — OXYCODONE HYDROCHLORIDE AND ACETAMINOPHEN 5; 325 MG/1; MG/1
1 TABLET ORAL ONCE
Status: COMPLETED | OUTPATIENT
Start: 2023-03-02 | End: 2023-03-02

## 2023-03-02 RX ORDER — OXYCODONE HYDROCHLORIDE AND ACETAMINOPHEN 5; 325 MG/1; MG/1
1 TABLET ORAL EVERY 4 HOURS PRN
Qty: 20 TABLET | Refills: 0 | Status: SHIPPED | OUTPATIENT
Start: 2023-03-02 | End: 2023-03-07

## 2023-03-02 RX ORDER — IBUPROFEN 800 MG/1
800 TABLET ORAL EVERY 8 HOURS PRN
Qty: 60 TABLET | Refills: 0 | Status: SHIPPED | OUTPATIENT
Start: 2023-03-02

## 2023-03-02 RX ORDER — METHOCARBAMOL 500 MG/1
500 TABLET, FILM COATED ORAL 2 TIMES DAILY
Qty: 20 TABLET | Refills: 0 | Status: SHIPPED | OUTPATIENT
Start: 2023-03-02

## 2023-03-02 RX ADMIN — OXYCODONE HYDROCHLORIDE AND ACETAMINOPHEN 1 TABLET: 5; 325 TABLET ORAL at 01:32

## 2023-03-02 NOTE — ED NOTES
Patient transported to [address] [place] [address], [address] [place] [address]  03/01/23 3358
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
## 2023-03-03 PROBLEM — A41.9 SEPSIS (HCC): Status: RESOLVED | Noted: 2023-01-02 | Resolved: 2023-03-03

## 2023-03-07 ENCOUNTER — HOSPITAL ENCOUNTER (EMERGENCY)
Facility: HOSPITAL | Age: 58
Discharge: HOME | End: 2023-03-07
Attending: EMERGENCY MEDICINE

## 2023-03-07 ENCOUNTER — APPOINTMENT (EMERGENCY)
Dept: RADIOLOGY | Facility: HOSPITAL | Age: 58
End: 2023-03-07

## 2023-03-07 ENCOUNTER — APPOINTMENT (EMERGENCY)
Dept: CT IMAGING | Facility: HOSPITAL | Age: 58
End: 2023-03-07

## 2023-03-07 VITALS
TEMPERATURE: 98.2 F | OXYGEN SATURATION: 95 % | SYSTOLIC BLOOD PRESSURE: 125 MMHG | RESPIRATION RATE: 23 BRPM | DIASTOLIC BLOOD PRESSURE: 60 MMHG | HEART RATE: 92 BPM

## 2023-03-07 DIAGNOSIS — R07.9 CHEST PAIN: Primary | ICD-10-CM

## 2023-03-07 LAB
2HR DELTA HS TROPONIN: 0 NG/L
ALBUMIN SERPL BCP-MCNC: 4.1 G/DL (ref 3.5–5)
ALP SERPL-CCNC: 88 U/L (ref 34–104)
ALT SERPL W P-5'-P-CCNC: 11 U/L (ref 7–52)
ANION GAP SERPL CALCULATED.3IONS-SCNC: 7 MMOL/L (ref 4–13)
AST SERPL W P-5'-P-CCNC: 13 U/L (ref 13–39)
ATRIAL RATE: 82 BPM
BASE EX.OXY STD BLDV CALC-SCNC: 72.7 % (ref 60–80)
BASE EXCESS BLDV CALC-SCNC: 1.5 MMOL/L
BASOPHILS # BLD AUTO: 0.03 THOUSANDS/ÂΜL (ref 0–0.1)
BASOPHILS NFR BLD AUTO: 0 % (ref 0–1)
BILIRUB SERPL-MCNC: 0.54 MG/DL (ref 0.2–1)
BUN SERPL-MCNC: 9 MG/DL (ref 5–25)
CALCIUM SERPL-MCNC: 9 MG/DL (ref 8.4–10.2)
CARDIAC TROPONIN I PNL SERPL HS: 3 NG/L
CARDIAC TROPONIN I PNL SERPL HS: 3 NG/L
CHLORIDE SERPL-SCNC: 106 MMOL/L (ref 96–108)
CO2 SERPL-SCNC: 27 MMOL/L (ref 21–32)
CREAT SERPL-MCNC: 0.63 MG/DL (ref 0.6–1.3)
EOSINOPHIL # BLD AUTO: 0.08 THOUSAND/ÂΜL (ref 0–0.61)
EOSINOPHIL NFR BLD AUTO: 1 % (ref 0–6)
ERYTHROCYTE [DISTWIDTH] IN BLOOD BY AUTOMATED COUNT: 15.1 % (ref 11.6–15.1)
GFR SERPL CREATININE-BSD FRML MDRD: 99 ML/MIN/1.73SQ M
GLUCOSE SERPL-MCNC: 141 MG/DL (ref 65–140)
HCO3 BLDV-SCNC: 27 MMOL/L (ref 24–30)
HCT VFR BLD AUTO: 36.8 % (ref 34.8–46.1)
HGB BLD-MCNC: 11.8 G/DL (ref 11.5–15.4)
IMM GRANULOCYTES # BLD AUTO: 0.05 THOUSAND/UL (ref 0–0.2)
IMM GRANULOCYTES NFR BLD AUTO: 1 % (ref 0–2)
INR PPP: 1 (ref 0.84–1.19)
LYMPHOCYTES # BLD AUTO: 0.97 THOUSANDS/ÂΜL (ref 0.6–4.47)
LYMPHOCYTES NFR BLD AUTO: 12 % (ref 14–44)
MCH RBC QN AUTO: 28.3 PG (ref 26.8–34.3)
MCHC RBC AUTO-ENTMCNC: 32.1 G/DL (ref 31.4–37.4)
MCV RBC AUTO: 88 FL (ref 82–98)
MONOCYTES # BLD AUTO: 0.48 THOUSAND/ÂΜL (ref 0.17–1.22)
MONOCYTES NFR BLD AUTO: 6 % (ref 4–12)
NEUTROPHILS # BLD AUTO: 6.46 THOUSANDS/ÂΜL (ref 1.85–7.62)
NEUTS SEG NFR BLD AUTO: 80 % (ref 43–75)
NRBC BLD AUTO-RTO: 0 /100 WBCS
O2 CT BLDV-SCNC: 13.4 ML/DL
P AXIS: 74 DEGREES
PCO2 BLDV: 46.3 MM HG (ref 42–50)
PH BLDV: 7.38 [PH] (ref 7.3–7.4)
PLATELET # BLD AUTO: 289 THOUSANDS/UL (ref 149–390)
PMV BLD AUTO: 9.1 FL (ref 8.9–12.7)
PO2 BLDV: 41.9 MM HG (ref 35–45)
POTASSIUM SERPL-SCNC: 3.8 MMOL/L (ref 3.5–5.3)
PR INTERVAL: 152 MS
PROT SERPL-MCNC: 7 G/DL (ref 6.4–8.4)
PROTHROMBIN TIME: 13 SECONDS (ref 11.6–14.5)
QRS AXIS: 47 DEGREES
QRSD INTERVAL: 82 MS
QT INTERVAL: 376 MS
QTC INTERVAL: 439 MS
RBC # BLD AUTO: 4.17 MILLION/UL (ref 3.81–5.12)
SODIUM SERPL-SCNC: 140 MMOL/L (ref 135–147)
T WAVE AXIS: 12 DEGREES
VENTRICULAR RATE: 82 BPM
WBC # BLD AUTO: 8.07 THOUSAND/UL (ref 4.31–10.16)

## 2023-03-07 RX ORDER — IPRATROPIUM BROMIDE AND ALBUTEROL SULFATE 2.5; .5 MG/3ML; MG/3ML
3 SOLUTION RESPIRATORY (INHALATION) ONCE
Status: COMPLETED | OUTPATIENT
Start: 2023-03-07 | End: 2023-03-07

## 2023-03-07 RX ORDER — ALBUTEROL SULFATE 2.5 MG/3ML
5 SOLUTION RESPIRATORY (INHALATION) ONCE
Status: COMPLETED | OUTPATIENT
Start: 2023-03-07 | End: 2023-03-07

## 2023-03-07 RX ORDER — KETOROLAC TROMETHAMINE 30 MG/ML
30 INJECTION, SOLUTION INTRAMUSCULAR; INTRAVENOUS ONCE
Status: COMPLETED | OUTPATIENT
Start: 2023-03-07 | End: 2023-03-07

## 2023-03-07 RX ADMIN — ALBUTEROL SULFATE 5 MG: 2.5 SOLUTION RESPIRATORY (INHALATION) at 16:48

## 2023-03-07 RX ADMIN — IPRATROPIUM BROMIDE AND ALBUTEROL SULFATE 3 ML: .5; 3 SOLUTION RESPIRATORY (INHALATION) at 16:48

## 2023-03-07 RX ADMIN — KETOROLAC TROMETHAMINE 30 MG: 30 INJECTION, SOLUTION INTRAMUSCULAR at 17:58

## 2023-03-07 NOTE — ED PROVIDER NOTES
History  Chief Complaint   Patient presents with   • Shortness of Breath   • Chest Pain     Pt states she got a bloody nose today and afterwards developed a headache, chest pain and SOB      62year old female patient comes into the ED with complaint of chest pain  She states having a nose bleed that lasted for "3 hours"  She was able to control it and then she rebled for "one more hour"  She came in stating he did not take her daily medications because she was afraid it would cause her to bleed more  She is a smoker  She has COPD  She is wheezing audibly  She will be given a breathing treatment  She will be evaluated for possible ACS vs  COPD exacerbation as the most likely of the possible differrential diagnoses  History provided by:  Patient   used: No    Shortness of Breath  Severity:  Mild  Onset quality:  Gradual  Timing:  Constant  Progression:  Worsening  Chronicity:  New  Context: not activity, not emotional upset, not pollens and not weather changes    Relieved by:  Nothing  Worsened by:  Nothing  Ineffective treatments:  None tried  Associated symptoms: chest pain    Risk factors: no recent alcohol use, no family hx of DVT, no oral contraceptive use and no recent surgery    Chest Pain  Associated symptoms: shortness of breath        Prior to Admission Medications   Prescriptions Last Dose Informant Patient Reported? Taking? albuterol (PROVENTIL HFA,VENTOLIN HFA) 90 mcg/act inhaler   No No   Sig: Inhale 2 puffs every 4 (four) hours as needed for wheezing   benzonatate (TESSALON PERLES) 100 mg capsule   No No   Sig: Take 1 capsule (100 mg total) by mouth 3 (three) times a day as needed for cough   fluticasone-vilanterol (BREO ELLIPTA) 100-25 mcg/inh inhaler   No No   Sig: Inhale 1 puff daily Rinse mouth after use  Do not start before October 8, 2022     Patient not taking: Reported on 1/15/2023   guaiFENesin (MUCINEX) 600 mg 12 hr tablet   No No   Sig: Take 1 tablet (600 mg total) by mouth 2 (two) times a day   ibuprofen (MOTRIN) 800 mg tablet   No No   Sig: Take 1 tablet (800 mg total) by mouth every 8 (eight) hours as needed for moderate pain   ipratropium-albuterol (DUO-NEB) 0 5-2 5 mg/3 mL nebulizer solution   No No   Sig: Take 3 mL by nebulization 4 (four) times a day   lidocaine (LIDODERM) 5 %   No No   Sig: Apply 1 patch topically daily as needed (back pain) Remove & Discard patch within 12 hours or as directed by MD   methocarbamol (ROBAXIN) 500 mg tablet   No No   Sig: Take 1 tablet (500 mg total) by mouth 2 (two) times a day   methocarbamol (ROBAXIN) 500 mg tablet   No No   Sig: Take 1 tablet (500 mg total) by mouth 2 (two) times a day   nicotine (NICODERM CQ) 14 mg/24hr TD 24 hr patch   No No   Sig: Place 1 patch on the skin daily Do not start before October 8, 2022  oxyCODONE (ROXICODONE) 5 immediate release tablet   No No   Sig: Take 1 tablet (5 mg total) by mouth every 4 (four) hours as needed for moderate pain Max Daily Amount: 30 mg   oxyCODONE-acetaminophen (PERCOCET) 5-325 mg per tablet   No No   Sig: Take 1 tablet by mouth every 4 (four) hours as needed for severe pain for up to 5 days Max Daily Amount: 6 tablets   pantoprazole (PROTONIX) 40 mg tablet   No No   Sig: Take 1 tablet (40 mg total) by mouth daily   predniSONE 20 mg tablet   No No   Sig: Take 2 tablets (40 mg total) by mouth daily   tiotropium (SPIRIVA) 18 mcg inhalation capsule   Yes No   Sig: Place 18 mcg into inhaler and inhale daily      Facility-Administered Medications: None       Past Medical History:   Diagnosis Date   • Achalasia    • Back pain    • [facility] ([facility] )     Ovarian   • Fibromyalgia    • Lupus ([facility] )        Past Surgical History:   Procedure Laterality Date   • HYSTERECTOMY     • NECK SURGERY         History reviewed  No pertinent family history  I have reviewed and agree with the history as documented      E-Cigarette/Vaping   • E-Cigarette Use Never User E-Cigarette/Vaping Substances     Social History     Tobacco Use   • Smoking status: Every Day     Packs/day: 1 00     Types: Cigarettes   • Smokeless tobacco: Never   Vaping Use   • Vaping Use: Never used   Substance Use Topics   • Alcohol use: Never   • Drug use: Never       Review of Systems   Respiratory: Positive for shortness of breath  Cardiovascular: Positive for chest pain  All other systems reviewed and are negative  Physical Exam  Physical Exam  Vitals and nursing note reviewed  Constitutional:       Appearance: She is well-developed  HENT:      Head: Normocephalic and atraumatic  Right Ear: External ear normal       Left Ear: External ear normal    Eyes:      Conjunctiva/sclera: Conjunctivae normal    Neck:      Thyroid: No thyromegaly  Vascular: No JVD  Trachea: No tracheal deviation  Cardiovascular:      Rate and Rhythm: Normal rate  Pulmonary:      Effort: Pulmonary effort is normal       Breath sounds: Normal breath sounds  No stridor  Abdominal:      General: There is no distension  Palpations: Abdomen is soft  There is no mass  Tenderness: There is no abdominal tenderness  There is no guarding  Hernia: No hernia is present  Musculoskeletal:         General: No tenderness or deformity  Normal range of motion  Lymphadenopathy:      Cervical: No cervical adenopathy  Skin:     General: Skin is warm  Coloration: Skin is not pale  Findings: No erythema or rash  Neurological:      Mental Status: She is alert and oriented to person, place, and time     Psychiatric:         Behavior: Behavior normal          Vital Signs  ED Triage Vitals [03/07/23 1628]   Temperature Pulse Respirations Blood Pressure SpO2   98 6 °F (37 °C) 77 22 (!) 136/107 98 %      Temp Source Heart Rate Source Patient Position - Orthostatic VS BP Location FiO2 (%)   Oral Monitor Sitting Left arm --      Pain Score       --           Vitals:    03/07/23 1628 03/07/23 1630   BP: (!) 136/107 (!) 136/107   Pulse: 77 80   Patient Position - Orthostatic VS: Sitting          Visual Acuity      ED Medications  Medications   albuterol inhalation solution 5 mg (has no administration in time range)   ipratropium-albuterol (DUO-NEB) 0 5-2 5 mg/3 mL inhalation solution 3 mL (has no administration in time range)       Diagnostic Studies  Results Reviewed     Procedure Component Value Units Date/Time    Blood gas, venous [[id number]]     Lab Status: No result Specimen: Blood     HS Troponin 0hr (reflex protocol) [[id number]]     Lab Status: No result Specimen: Blood     CBC and differential [[id number]]     Lab Status: No result Specimen: Blood     Comprehensive metabolic panel [[id number]]     Lab Status: No result Specimen: Blood     Protime-INR [[id number]]     Lab Status: No result Specimen: Blood                  XR chest 2 views    (Results Pending)   CT head without contrast    (Results Pending)              Procedures  Procedures         ED Course  ED Course as of 03/08/23 1118 [facility] 07, 2023 1857 The patient is refusing the CT scan of the head  Will monitor                                             MDM    Disposition  Final diagnoses:   None     ED Disposition     None      Follow-up Information    None         Patient's Medications   Discharge Prescriptions    No medications on file       No discharge procedures on file      PDMP Review       Value Time User    PDMP Reviewed  Yes 1/17/2023  1:10 PM Tiffany Ponce MD          ED Provider  Electronically Signed by           Isidra Pierce DO  03/08/23 9610

## 2023-03-15 ENCOUNTER — HOSPITAL ENCOUNTER (OUTPATIENT)
Dept: MRI IMAGING | Facility: HOSPITAL | Age: 58
Discharge: HOME | End: 2023-03-15
Attending: ORTHOPAEDIC SURGERY

## 2023-03-15 DIAGNOSIS — M54.50 ACUTE LOW BACK PAIN, UNSPECIFIED BACK PAIN LATERALITY, UNSPECIFIED WHETHER SCIATICA PRESENT: ICD-10-CM

## 2023-03-15 DIAGNOSIS — S32.010A COMPRESSION FRACTURE OF L1 VERTEBRA, INITIAL ENCOUNTER (HCC): ICD-10-CM

## 2023-03-20 ENCOUNTER — OFFICE VISIT (OUTPATIENT)
Dept: OBGYN CLINIC | Facility: CLINIC | Age: 58
End: 2023-03-20

## 2023-03-20 VITALS
WEIGHT: 202 LBS | HEIGHT: 56 IN | DIASTOLIC BLOOD PRESSURE: 91 MMHG | SYSTOLIC BLOOD PRESSURE: 151 MMHG | BODY MASS INDEX: 45.44 KG/M2 | HEART RATE: 99 BPM

## 2023-03-20 DIAGNOSIS — S32.010D COMPRESSION FRACTURE OF L1 VERTEBRA WITH ROUTINE HEALING, SUBSEQUENT ENCOUNTER: ICD-10-CM

## 2023-03-20 DIAGNOSIS — M54.50 ACUTE LOW BACK PAIN, UNSPECIFIED BACK PAIN LATERALITY, UNSPECIFIED WHETHER SCIATICA PRESENT: Primary | ICD-10-CM

## 2023-03-20 NOTE — PROGRESS NOTES
[address] Gerry Banks MD  5 [facility] [address]  [phone]    HISTORY OF PRESENT ILLNESS:    Richa Lemos is a 62 y o  female who presents for follow up of L1 compression fracture in which displayed increased collapse at last visit, 2/13/2023  She did have work related injury and is debate with work over the coverage  She feels worse overall  Today she complains of thoracolumbar pain that can extend upward toward neck with spasms  She denies radiating lumbar pain  She rates her pain at 8/10 and 10/10 at its worse  General activity aggravates while rest alleviates  She has used IBU 800mg with limited benefit  She has use Robaxin with limited benefit  She denies recent aqua or physical therapy and resources are a concern  She last had lumbar injections 10 years ago  She denies past lumbar surgery  She has history of C4-C5 fusion          ALLERGIES:   Allergies   Allergen Reactions   • Valium [Diazepam] Anaphylaxis       MEDICATIONS:    Current Outpatient Medications:   •  albuterol (PROVENTIL HFA,VENTOLIN HFA) 90 mcg/act inhaler, Inhale 2 puffs every 4 (four) hours as needed for wheezing, Disp: 18 g, Rfl: 0  •  guaiFENesin (MUCINEX) 600 mg 12 hr tablet, Take 1 tablet (600 mg total) by mouth 2 (two) times a day, Disp: 20 tablet, Rfl: 0  •  ibuprofen (MOTRIN) 800 mg tablet, Take 1 tablet (800 mg total) by mouth every 8 (eight) hours as needed for moderate pain, Disp: 60 tablet, Rfl: 0  •  ipratropium-albuterol (DUO-NEB) 0 5-2 5 mg/3 mL nebulizer solution, Take 3 mL by nebulization 4 (four) times a day, Disp: 120 mL, Rfl: 0  •  lidocaine (LIDODERM) 5 %, Apply 1 patch topically daily as needed (back pain) Remove & Discard patch within 12 hours or as directed by MD, Disp: 30 patch, Rfl: 0  •  methocarbamol (ROBAXIN) 500 mg tablet, Take 1 tablet (500 mg total) by mouth 2 (two) times a day, Disp: 40 tablet, Rfl: 0  •  nicotine (NICODERM CQ) 14 mg/24hr TD 24 hr patch, Place 1 patch on the skin daily Do not start before October 8, 2022 , Disp: 28 patch, Rfl: 0  •  pantoprazole (PROTONIX) 40 mg tablet, Take 1 tablet (40 mg total) by mouth daily, Disp: 30 tablet, Rfl: 0  •  predniSONE 20 mg tablet, Take 2 tablets (40 mg total) by mouth daily, Disp: 10 tablet, Rfl: 0  •  tiotropium (SPIRIVA) 18 mcg inhalation capsule, Place 18 mcg into inhaler and inhale daily, Disp: , Rfl:   •  benzonatate (TESSALON PERLES) 100 mg capsule, Take 1 capsule (100 mg total) by mouth 3 (three) times a day as needed for cough, Disp: 20 capsule, Rfl: 0  •  fluticasone-vilanterol (BREO ELLIPTA) 100-25 mcg/inh inhaler, Inhale 1 puff daily Rinse mouth after use  Do not start before October 8, 2022  (Patient not taking: Reported on 1/15/2023), Disp: 60 blister, Rfl: 0  •  methocarbamol (ROBAXIN) 500 mg tablet, Take 1 tablet (500 mg total) by mouth 2 (two) times a day, Disp: 20 tablet, Rfl: 0  •  oxyCODONE (ROXICODONE) 5 immediate release tablet, Take 1 tablet (5 mg total) by mouth every 4 (four) hours as needed for moderate pain Max Daily Amount: 30 mg, Disp: 10 tablet, Rfl: 0     PAST MEDICAL HISTORY:   Past Medical History:   Diagnosis Date   • Achalasia    • Back pain    • Cancer ([facility] )     Ovarian   • Fibromyalgia    • Lupus ([facility] )        PAST SURGICAL HISTORY:  Past Surgical History:   Procedure Laterality Date   • HYSTERECTOMY     • NECK SURGERY         SOCIAL HISTORY:  Social History     Tobacco Use   Smoking Status Every Day   • Packs/day: 1 00   • Types: Cigarettes   Smokeless Tobacco Never        ROS:  Review of Systems   Constitutional: Negative for appetite change and unexpected weight change  HENT: Negative for congestion and trouble swallowing  Eyes: Negative for visual disturbance  Respiratory: Negative for cough and shortness of breath  Cardiovascular: Negative for chest pain and palpitations  Gastrointestinal: Negative for nausea and vomiting     Endocrine: Negative for cold intolerance and heat intolerance  Musculoskeletal: Positive for back pain  Negative for gait problem  Skin: Negative for rash  Neurological: Negative for numbness  PHYSICAL EXAM:  62 y o  female sitting uncomfortably on exam chair in no acute distress  Tenderness to palpation over L1  Range of motion limited secondary to pain  Able to heel and toe walk  RADIOGRAPHIC STUDIES:  1   X-rays, L-spine, 1/7/2023 -L1 compression fracture with mild wedge deformity  The fracture involves the superior endplate of L1 vertebral body in association with mild kyphosis  2   X-rays, L-spine, 1/27/2023 -mild progression of deformity associate with L1 compression fracture  No evidence of coronal imbalance  Kyphosis and loss of sagittal balance and multilevel lumbar degenerative disc disease  3  L-spine, 2/13/2023 -progression of deformity at L1 with increased kyphosis, mild  Multilevel degenerative changes  4  DXA scan of 2/20/2023:  Evidence of osteopenia  5   Lumbar MRI of 3/15/2023:  Healed L1 compression deformity  No significant stenosis or nerve compression  ASSESSMENT:  1  Acute low back pain, unspecified back pain laterality, unspecified whether sciatica present  -     Ambulatory referral to Pain Management; Future    2  Compression fracture of L1 vertebra with routine healing, subsequent encounter        PLAN:  62 y o  female with L1 compression fracture, further collapse on repeat x-rays with DXA scan and lumbar MRI reviewed today with patient  She continues to have significant thoracolumbar pain extending upward toward neck with no radiating symptoms  Lumbar MRI demonstrates healed L1 compression deformity with no significant stenosis or nerve compression  DXA scan demonstrates osteopenia  The patient should follow up with PCP in regard to osteopenia  We did discuss the referral to pain management  She stated that she is familiar with Dr Luis Acevedo  She is referred to back to [facility] for pain management  Surgery is not recommended at this time  The patient can follow up as needed and is welcome to return at any point with any new or old issue           Scribe Attestation    I,:  Kimberly Ordaz am acting as a scribe while in the presence of the attending physician :       I,:  Nanci Mora MD personally performed the services described in this documentation    as scribed in my presence :

## 2023-03-21 ENCOUNTER — TELEPHONE (OUTPATIENT)
Dept: OBGYN CLINIC | Facility: CLINIC | Age: 58
End: 2023-03-21

## 2023-03-21 NOTE — TELEPHONE ENCOUNTER
Caller: Patient     Doctor: Zena Degroot     Reason for call: Patient is wanting to know if she is to return to work at this time or wait till she is seen by the specialist before that call is made? She is in need of a note for her employer        Call back#: [phone]

## 2023-03-22 NOTE — TELEPHONE ENCOUNTER
Caller: Nanci    Doctor: Dougie Bashir    Reason for call: The patient is calling back to ask for a note for Workers Comp  She understands that she will follow up with the other doctor but she does need a note for her employer regarding her appointment with Dr Dougie Bashir  Thank you        Call back#: [phone]

## 2023-03-30 NOTE — TELEPHONE ENCOUNTER
Caller: Self    Doctor: Speedy Ann    Reason for call: Patient states  office did not receive letter, can it be refaxed Fax#: [phone]   will also pickup a copy at the [place] office   Patient wanted to speak with someone from the office, transferred to [facility]    Call back#: [phone]

## 2023-03-30 NOTE — TELEPHONE ENCOUNTER
Caller: Patient    Doctor: Cali England    Reason for call: Requesting for the work note and the referral to be faxed over  Patient has expressed that this needed today      Fax#: [phone]    Call back#: [phone]

## 2023-04-09 ENCOUNTER — APPOINTMENT (EMERGENCY)
Dept: CT IMAGING | Facility: HOSPITAL | Age: 58
End: 2023-04-09

## 2023-04-09 ENCOUNTER — APPOINTMENT (EMERGENCY)
Dept: RADIOLOGY | Facility: HOSPITAL | Age: 58
End: 2023-04-09

## 2023-04-09 ENCOUNTER — HOSPITAL ENCOUNTER (EMERGENCY)
Facility: HOSPITAL | Age: 58
Discharge: HOME | End: 2023-04-09
Attending: EMERGENCY MEDICINE | Admitting: EMERGENCY MEDICINE

## 2023-04-09 VITALS
DIASTOLIC BLOOD PRESSURE: 55 MMHG | TEMPERATURE: 98.6 F | RESPIRATION RATE: 21 BRPM | SYSTOLIC BLOOD PRESSURE: 111 MMHG | HEART RATE: 85 BPM | OXYGEN SATURATION: 92 %

## 2023-04-09 DIAGNOSIS — R10.9 ABDOMINAL PAIN: Primary | ICD-10-CM

## 2023-04-09 LAB
ALBUMIN SERPL BCP-MCNC: 3.5 G/DL (ref 3.5–5)
ALP SERPL-CCNC: 80 U/L (ref 34–104)
ALT SERPL W P-5'-P-CCNC: 10 U/L (ref 7–52)
ANION GAP SERPL CALCULATED.3IONS-SCNC: 8 MMOL/L (ref 4–13)
AST SERPL W P-5'-P-CCNC: 20 U/L (ref 13–39)
BASOPHILS # BLD AUTO: 0.04 THOUSANDS/ΜL (ref 0–0.1)
BASOPHILS NFR BLD AUTO: 1 % (ref 0–1)
BILIRUB SERPL-MCNC: 0.41 MG/DL (ref 0.2–1)
BUN SERPL-MCNC: 15 MG/DL (ref 5–25)
CALCIUM SERPL-MCNC: 8.6 MG/DL (ref 8.4–10.2)
CARDIAC TROPONIN I PNL SERPL HS: 4 NG/L
CHLORIDE SERPL-SCNC: 107 MMOL/L (ref 96–108)
CO2 SERPL-SCNC: 25 MMOL/L (ref 21–32)
CREAT SERPL-MCNC: 0.98 MG/DL (ref 0.6–1.3)
EOSINOPHIL # BLD AUTO: 0.09 THOUSAND/ΜL (ref 0–0.61)
EOSINOPHIL NFR BLD AUTO: 1 % (ref 0–6)
ERYTHROCYTE [DISTWIDTH] IN BLOOD BY AUTOMATED COUNT: 14.5 % (ref 11.6–15.1)
GFR SERPL CREATININE-BSD FRML MDRD: 63 ML/MIN/1.73SQ M
GLUCOSE SERPL-MCNC: 122 MG/DL (ref 65–140)
HCT VFR BLD AUTO: 32.7 % (ref 34.8–46.1)
HGB BLD-MCNC: 10.5 G/DL (ref 11.5–15.4)
IMM GRANULOCYTES # BLD AUTO: 0.02 THOUSAND/UL (ref 0–0.2)
IMM GRANULOCYTES NFR BLD AUTO: 0 % (ref 0–2)
LIPASE SERPL-CCNC: 21 U/L (ref 11–82)
LYMPHOCYTES # BLD AUTO: 1.23 THOUSANDS/ΜL (ref 0.6–4.47)
LYMPHOCYTES NFR BLD AUTO: 14 % (ref 14–44)
MCH RBC QN AUTO: 28.5 PG (ref 26.8–34.3)
MCHC RBC AUTO-ENTMCNC: 32.1 G/DL (ref 31.4–37.4)
MCV RBC AUTO: 89 FL (ref 82–98)
MONOCYTES # BLD AUTO: 0.9 THOUSAND/ΜL (ref 0.17–1.22)
MONOCYTES NFR BLD AUTO: 10 % (ref 4–12)
NEUTROPHILS # BLD AUTO: 6.34 THOUSANDS/ΜL (ref 1.85–7.62)
NEUTS SEG NFR BLD AUTO: 74 % (ref 43–75)
NRBC BLD AUTO-RTO: 0 /100 WBCS
PLATELET # BLD AUTO: 261 THOUSANDS/UL (ref 149–390)
PMV BLD AUTO: 9 FL (ref 8.9–12.7)
POTASSIUM SERPL-SCNC: 3.9 MMOL/L (ref 3.5–5.3)
PROT SERPL-MCNC: 6.3 G/DL (ref 6.4–8.4)
RBC # BLD AUTO: 3.69 MILLION/UL (ref 3.81–5.12)
SODIUM SERPL-SCNC: 140 MMOL/L (ref 135–147)
WBC # BLD AUTO: 8.62 THOUSAND/UL (ref 4.31–10.16)

## 2023-04-09 RX ORDER — ONDANSETRON 2 MG/ML
4 INJECTION INTRAMUSCULAR; INTRAVENOUS ONCE
Status: COMPLETED | OUTPATIENT
Start: 2023-04-09 | End: 2023-04-09

## 2023-04-09 RX ORDER — MORPHINE SULFATE 4 MG/ML
4 INJECTION, SOLUTION INTRAMUSCULAR; INTRAVENOUS ONCE
Status: COMPLETED | OUTPATIENT
Start: 2023-04-09 | End: 2023-04-09

## 2023-04-09 RX ORDER — FAMOTIDINE 20 MG/1
20 TABLET, FILM COATED ORAL 2 TIMES DAILY
Qty: 30 TABLET | Refills: 0 | Status: SHIPPED | OUTPATIENT
Start: 2023-04-09

## 2023-04-09 RX ADMIN — ONDANSETRON 4 MG: 2 INJECTION INTRAMUSCULAR; INTRAVENOUS at 19:36

## 2023-04-09 RX ADMIN — MORPHINE SULFATE 4 MG: 4 INJECTION INTRAVENOUS at 21:17

## 2023-04-09 RX ADMIN — SODIUM CHLORIDE 1000 ML: 0.9 INJECTION, SOLUTION INTRAVENOUS at 19:38

## 2023-04-09 RX ADMIN — MORPHINE SULFATE 4 MG: 4 INJECTION INTRAVENOUS at 19:36

## 2023-04-09 RX ADMIN — IOHEXOL 100 ML: 350 INJECTION, SOLUTION INTRAVENOUS at 20:20

## 2023-04-10 LAB
ATRIAL RATE: 87 BPM
P AXIS: 73 DEGREES
PR INTERVAL: 152 MS
QRS AXIS: 18 DEGREES
QRSD INTERVAL: 86 MS
QT INTERVAL: 360 MS
QTC INTERVAL: 433 MS
T WAVE AXIS: -15 DEGREES
VENTRICULAR RATE: 87 BPM

## 2023-04-10 NOTE — ED NOTES
Patient transported to [address] [place] [address] Drive, [facility]  04/09/23 2012

## 2023-04-25 NOTE — ED PROVIDER NOTES
History  Chief Complaint   Patient presents with   • Abdominal Pain     Pt arrived via hospital wheelchair with c/o abdominal pain that started 4 days ago  77-year-old female presented to the emergency department for evaluation abdominal pain  Patient has chronic back and abdominal pain, comes presenting to the emergency department with exacerbation of her abdominal pain, cramps 4 days of aching generalized abdominal pain worse in the epigastrium  Nonradiating  No fevers or chills  Nausea without vomiting  No changes in bowel or bladder habits  No chest pain palpitations or shortness of breath  Prior to Admission Medications   Prescriptions Last Dose Informant Patient Reported? Taking? albuterol (PROVENTIL HFA,VENTOLIN HFA) 90 mcg/act inhaler   No No   Sig: Inhale 2 puffs every 4 (four) hours as needed for wheezing   benzonatate (TESSALON PERLES) 100 mg capsule   No No   Sig: Take 1 capsule (100 mg total) by mouth 3 (three) times a day as needed for cough   fluticasone-vilanterol (BREO ELLIPTA) 100-25 mcg/inh inhaler   No No   Sig: Inhale 1 puff daily Rinse mouth after use  Do not start before October 8, 2022     Patient not taking: Reported on 1/15/2023   guaiFENesin (MUCINEX) 600 mg 12 hr tablet   No No   Sig: Take 1 tablet (600 mg total) by mouth 2 (two) times a day   ibuprofen (MOTRIN) 800 mg tablet   No No   Sig: Take 1 tablet (800 mg total) by mouth every 8 (eight) hours as needed for moderate pain   ipratropium-albuterol (DUO-NEB) 0 5-2 5 mg/3 mL nebulizer solution   No No   Sig: Take 3 mL by nebulization 4 (four) times a day   lidocaine (LIDODERM) 5 %   No No   Sig: Apply 1 patch topically daily as needed (back pain) Remove & Discard patch within 12 hours or as directed by MD   methocarbamol (ROBAXIN) 500 mg tablet   No No   Sig: Take 1 tablet (500 mg total) by mouth 2 (two) times a day   methocarbamol (ROBAXIN) 500 mg tablet   No No   Sig: Take 1 tablet (500 mg total) by mouth 2 (two) times a day   nicotine (NICODERM CQ) 14 mg/24hr TD 24 hr patch   No No   Sig: Place 1 patch on the skin daily Do not start before October 8, 2022  oxyCODONE (ROXICODONE) 5 immediate release tablet   No No   Sig: Take 1 tablet (5 mg total) by mouth every 4 (four) hours as needed for moderate pain Max Daily Amount: 30 mg   pantoprazole (PROTONIX) 40 mg tablet   No No   Sig: Take 1 tablet (40 mg total) by mouth daily   predniSONE 20 mg tablet   No No   Sig: Take 2 tablets (40 mg total) by mouth daily   tiotropium (SPIRIVA) 18 mcg inhalation capsule   Yes No   Sig: Place 18 mcg into inhaler and inhale daily      Facility-Administered Medications: None       Past Medical History:   Diagnosis Date   • Achalasia    • Back pain    • Cancer ([facility] )     Ovarian   • Fibromyalgia    • Lupus ([facility] )        Past Surgical History:   Procedure Laterality Date   • HYSTERECTOMY     • NECK SURGERY         History reviewed  No pertinent family history  I have reviewed and agree with the history as documented  E-Cigarette/Vaping   • E-Cigarette Use Never User      E-Cigarette/Vaping Substances     Social History     Tobacco Use   • Smoking status: Every Day     Packs/day: 1 00     Types: Cigarettes   • Smokeless tobacco: Never   Vaping Use   • Vaping Use: Never used   Substance Use Topics   • Alcohol use: Never   • Drug use: Never       Review of Systems   Constitutional: Negative for appetite change, chills, fatigue and fever  HENT: Negative for sneezing and sore throat  Eyes: Negative for visual disturbance  Respiratory: Negative for cough, choking, chest tightness, shortness of breath and wheezing  Cardiovascular: Negative for chest pain and palpitations  Gastrointestinal: Positive for abdominal distention and nausea  Negative for abdominal pain, constipation, diarrhea and vomiting  Genitourinary: Negative for difficulty urinating and dysuria     Neurological: Negative for dizziness, weakness, light-headedness, numbness and headaches  All other systems reviewed and are negative  Physical Exam  Physical Exam  Vitals and nursing note reviewed  Constitutional:       General: She is not in acute distress  Appearance: She is well-developed  She is not diaphoretic  HENT:      Head: Normocephalic and atraumatic  Eyes:      Pupils: Pupils are equal, round, and reactive to light  Neck:      Vascular: No JVD  Trachea: No tracheal deviation  Cardiovascular:      Rate and Rhythm: Normal rate and regular rhythm  Heart sounds: Normal heart sounds  No murmur heard  No friction rub  No gallop  Pulmonary:      Effort: Pulmonary effort is normal  No respiratory distress  Breath sounds: Normal breath sounds  No wheezing or rales  Abdominal:      General: Bowel sounds are normal  There is no distension  Palpations: Abdomen is soft  Tenderness: There is abdominal tenderness in the epigastric area  There is no guarding or rebound  Skin:     General: Skin is warm and dry  Coloration: Skin is not pale  Neurological:      Mental Status: She is alert and oriented to person, place, and time  Cranial Nerves: No cranial nerve deficit  Motor: No abnormal muscle tone     Psychiatric:         Behavior: Behavior normal          Vital Signs  ED Triage Vitals   Temperature Pulse Respirations Blood Pressure SpO2   04/09/23 1858 04/09/23 1858 04/09/23 1858 04/09/23 1858 04/09/23 1858   98 6 °F (37 °C) 94 19 143/63 95 %      Temp Source Heart Rate Source Patient Position - Orthostatic VS BP Location FiO2 (%)   04/09/23 1858 04/09/23 1858 04/09/23 1858 04/09/23 1858 --   Oral Monitor Sitting Left arm       Pain Score       04/09/23 1936       10 - Worst Possible Pain           Vitals:    04/09/23 1858 04/09/23 2000   BP: 143/63 111/55   Pulse: 94 85   Patient Position - Orthostatic VS: Sitting Sitting         Visual Acuity      ED Medications  Medications   sodium chloride 0 9 % bolus 1,000 mL (0 mL Intravenous Stopped 4/9/23 2117)   ondansetron (ZOFRAN) injection 4 mg (4 mg Intravenous Given 4/9/23 1936)   morphine injection 4 mg (4 mg Intravenous Given 4/9/23 1936)   iohexol (OMNIPAQUE) 350 MG/ML injection (MULTI-DOSE) 100 mL (100 mL Intravenous Given 4/9/23 2020)   morphine injection 4 mg (4 mg Intravenous Given 4/9/23 2117)       Diagnostic Studies  Results Reviewed     Procedure Component Value Units Date/Time    CBC and differential [[id number]]  (Abnormal) Collected: 04/09/23 1954    Lab Status: Final result Specimen: Blood from Arm, Right Updated: 04/09/23 2009     WBC 8 62 Thousand/uL      RBC 3 69 Million/uL      Hemoglobin 10 5 g/dL      Hematocrit 32 7 %      MCV 89 fL      MCH 28 5 pg      MCHC 32 1 g/dL      RDW 14 5 %      MPV 9 0 fL      Platelets 450 Thousands/uL      nRBC 0 /100 WBCs      Neutrophils Relative 74 %      Immat GRANS % 0 %      Lymphocytes Relative 14 %      Monocytes Relative 10 %      Eosinophils Relative 1 %      Basophils Relative 1 %      Neutrophils Absolute 6 34 Thousands/µL      Immature Grans Absolute 0 02 Thousand/uL      Lymphocytes Absolute 1 23 Thousands/µL      Monocytes Absolute 0 90 Thousand/µL      Eosinophils Absolute 0 09 Thousand/µL      Basophils Absolute 0 04 Thousands/µL     HS Troponin 0hr (reflex protocol) [[id number]]  (Normal) Collected: 04/09/23 1927    Lab Status: Final result Specimen: Blood from Arm, Right Updated: 04/09/23 1954     hs TnI 0hr 4 ng/L     Comprehensive metabolic panel [[id number]]  (Abnormal) Collected: 04/09/23 1927    Lab Status: Final result Specimen: Blood from Arm, Right Updated: 04/09/23 1953     Sodium 140 mmol/L      Potassium 3 9 mmol/L      Chloride 107 mmol/L      CO2 25 mmol/L      ANION GAP 8 mmol/L      BUN 15 mg/dL      Creatinine 0 98 mg/dL      Glucose 122 mg/dL      Calcium 8 6 mg/dL      AST 20 U/L      ALT 10 U/L      Alkaline Phosphatase 80 U/L      Total Protein 6 3 g/dL      Albumin 3 5 g/dL      Total Bilirubin 0 41 mg/dL      eGFR 63 ml/min/1 73sq m     Narrative:      [place] guidelines for Chronic Kidney Disease (CKD):   •  Stage 1 with normal or high GFR (GFR > 90 mL/min/1 73 square meters)  •  Stage 2 Mild CKD (GFR = 60-89 mL/min/1 73 square meters)  •  Stage 3A Moderate CKD (GFR = 45-59 mL/min/1 73 square meters)  •  Stage 3B Moderate CKD (GFR = 30-44 mL/min/1 73 square meters)  •  Stage 4 Severe CKD (GFR = 15-29 mL/min/1 73 square meters)  •  Stage 5 End Stage CKD (GFR <15 mL/min/1 73 square meters)  Note: GFR calculation is accurate only with a steady state creatinine    Lipase [[id number]]  (Normal) Collected: 04/09/23 1927    Lab Status: Final result Specimen: Blood from Arm, Right Updated: 04/09/23 1953     Lipase 21 u/L                  CT chest without contrast   Final Result by Nanci Malagon MD (04/09 2151)      No acute abnormality  Basilar subsegmental atelectatic changes similar to the prior study  Correlate clinically to exclude superimposed infection  Mild nonspecific distal esophageal wall thickening similar to the prior  Correlate clinically and consider GI follow-up  Workstation performed: [id number]         CT abdomen pelvis with contrast   Final Result by Nanci Malagon MD (04/09 2102)      No acute abdominopelvic abnormality identified  Workstation performed: [id number]         XR chest 1 view portable   Final Result by Peggy Toure MD (15/91 5024)      No acute cardiopulmonary disease  Findings are stable            Workstation performed: [id number]                    Procedures  Procedures         ED Course                                             Medical Decision Making  15-year-old female with abdominal pain, will check labs, CT, give fluids, pain meds, antiemetics reassess  Amount and/or Complexity of Data Reviewed  Labs: ordered  Radiology: ordered        Risk  Prescription drug management  Disposition  Final diagnoses:   Abdominal pain     Time reflects when diagnosis was documented in both MDM as applicable and the Disposition within this note     Time User Action Codes Description Comment    4/9/2023  9:10 PM Ezio Villa Add [R10 9] Abdominal pain       ED Disposition     ED Disposition   Discharge    Condition   Stable    Date/Time   Sun Apr 9, 2023  9:10 PM    Orlando Geiger discharge to home/self care  Follow-up Information     Follow up With Specialties Details Why [address], [address], DO General Practice   [address]  [address]  [address] [address] [phone]            Discharge Medication List as of 4/9/2023  9:10 PM      START taking these medications    Details   famotidine (PEPCID) 20 mg tablet Take 1 tablet (20 mg total) by mouth 2 (two) times a day, Starting Sun 4/9/2023, Normal         CONTINUE these medications which have NOT CHANGED    Details   albuterol (PROVENTIL HFA,VENTOLIN HFA) 90 mcg/act inhaler Inhale 2 puffs every 4 (four) hours as needed for wheezing, Starting Sat 10/8/2022, Normal      benzonatate (TESSALON PERLES) 100 mg capsule Take 1 capsule (100 mg total) by mouth 3 (three) times a day as needed for cough, Starting Fri 10/7/2022, Normal      fluticasone-vilanterol (BREO ELLIPTA) 100-25 mcg/inh inhaler Inhale 1 puff daily Rinse mouth after use   Do not start before October 8, 2022 , Starting Sat 10/8/2022, Normal      guaiFENesin (MUCINEX) 600 mg 12 hr tablet Take 1 tablet (600 mg total) by mouth 2 (two) times a day, Starting Fri 10/7/2022, Normal      ibuprofen (MOTRIN) 800 mg tablet Take 1 tablet (800 mg total) by mouth every 8 (eight) hours as needed for moderate pain, Starting Thu 3/2/2023, Normal      ipratropium-albuterol (DUO-NEB) 0 5-2 5 mg/3 mL nebulizer solution Take 3 mL by nebulization 4 (four) times a day, Starting Fri 10/7/2022, Normal      lidocaine (LIDODERM) 5 % Apply 1 patch topically daily as needed (back pain) Remove & Discard patch within 12 hours or as directed by MD, Starting Mon 1/9/2023, Normal      !! methocarbamol (ROBAXIN) 500 mg tablet Take 1 tablet (500 mg total) by mouth 2 (two) times a day, Starting Tue 1/17/2023, Normal      !! methocarbamol (ROBAXIN) 500 mg tablet Take 1 tablet (500 mg total) by mouth 2 (two) times a day, Starting Thu 3/2/2023, Normal      nicotine (NICODERM CQ) 14 mg/24hr TD 24 hr patch Place 1 patch on the skin daily Do not start before October 8, 2022 , Starting Sat 10/8/2022, Normal      oxyCODONE (ROXICODONE) 5 immediate release tablet Take 1 tablet (5 mg total) by mouth every 4 (four) hours as needed for moderate pain Max Daily Amount: 30 mg, Starting Tue 1/17/2023, Normal      pantoprazole (PROTONIX) 40 mg tablet Take 1 tablet (40 mg total) by mouth daily, Starting Sat 10/8/2022, Normal      predniSONE 20 mg tablet Take 2 tablets (40 mg total) by mouth daily, Starting Mon 2/20/2023, Print      tiotropium (SPIRIVA) 18 mcg inhalation capsule Place 18 mcg into inhaler and inhale daily, Historical Med       !! - Potential duplicate medications found  Please discuss with provider  No discharge procedures on file      PDMP Review       Value Time User    PDMP Reviewed  Yes 1/17/2023  1:10 PM Braeden Parisi MD          ED Provider  Electronically Signed by           Marty Wu MD  04/24/23 7516

## 2023-08-06 ENCOUNTER — HOSPITAL ENCOUNTER (EMERGENCY)
Facility: HOSPITAL | Age: 58
Discharge: HOME | End: 2023-08-06
Attending: EMERGENCY MEDICINE | Admitting: EMERGENCY MEDICINE
Payer: OTHER MISCELLANEOUS

## 2023-08-06 ENCOUNTER — APPOINTMENT (EMERGENCY)
Dept: RADIOLOGY | Facility: HOSPITAL | Age: 58
End: 2023-08-06
Payer: OTHER MISCELLANEOUS

## 2023-08-06 VITALS
TEMPERATURE: 98.5 F | DIASTOLIC BLOOD PRESSURE: 93 MMHG | OXYGEN SATURATION: 96 % | RESPIRATION RATE: 18 BRPM | SYSTOLIC BLOOD PRESSURE: 137 MMHG | HEART RATE: 96 BPM

## 2023-08-06 DIAGNOSIS — M25.551 PAIN OF RIGHT HIP: ICD-10-CM
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
## 2023-08-06 PROCEDURE — 99284 EMERGENCY DEPT VISIT MOD MDM: CPT | Performed by: EMERGENCY MEDICINE

## 2023-08-06 PROCEDURE — 73502 X-RAY EXAM HIP UNI 2-3 VIEWS: CPT

## 2023-08-06 PROCEDURE — 99284 EMERGENCY DEPT VISIT MOD MDM: CPT

## 2023-08-06 PROCEDURE — 96372 THER/PROPH/DIAG INJ SC/IM: CPT

## 2023-08-06 RX ORDER — MORPHINE SULFATE 10 MG/ML
6 INJECTION, SOLUTION INTRAMUSCULAR; INTRAVENOUS ONCE
Status: COMPLETED | OUTPATIENT
Start: 2023-08-06 | End: 2023-08-06

## 2023-08-06 RX ORDER — OXYCODONE HYDROCHLORIDE 5 MG/1
5 TABLET ORAL EVERY 6 HOURS PRN
Qty: 12 TABLET | Refills: 0 | Status: SHIPPED | OUTPATIENT
Start: 2023-08-06

## 2023-08-06 RX ADMIN — MORPHINE SULFATE 6 MG: 10 INJECTION INTRAVENOUS at 19:46

## 2023-08-07 NOTE — ED PROVIDER NOTES
History  Chief Complaint   Patient presents with   • Fall     Patient reports old spinal fracture from this past December, had a "nerve cauterization" this past Monday with pain management, then fell at [facility] this past Wednesday and is reporting right hip, right low back and right leg pain. Timmy any loss of sensation. (-) thinners or headstrike     63 yo female with 4 days R hip pain s/p fall. Unalleviated by po meds. No leg weakness or numbness. Worse with weight bearing and movement. Occurs in context of chronic pain, follows with pain management. No fever or chills. No urinary sxs. No abd pain. No other injuries or concerns. Prior to Admission Medications   Prescriptions Last Dose Informant Patient Reported? Taking? albuterol (PROVENTIL HFA,VENTOLIN HFA) 90 mcg/act inhaler  Self No No   Sig: Inhale 2 puffs every 4 (four) hours as needed for wheezing   benzonatate (TESSALON PERLES) 100 mg capsule  Self No No   Sig: Take 1 capsule (100 mg total) by mouth 3 (three) times a day as needed for cough   famotidine (PEPCID) 20 mg tablet   No No   Sig: Take 1 tablet (20 mg total) by mouth 2 (two) times a day   fluticasone-vilanterol (BREO ELLIPTA) 100-25 mcg/inh inhaler  Self No No   Sig: Inhale 1 puff daily Rinse mouth after use. Do not start before October 8, 2022.    Patient not taking: Reported on 1/15/2023   guaiFENesin (MUCINEX) 600 mg 12 hr tablet  Self No No   Sig: Take 1 tablet (600 mg total) by mouth 2 (two) times a day   ibuprofen (MOTRIN) 800 mg tablet  Self No No   Sig: Take 1 tablet (800 mg total) by mouth every 8 (eight) hours as needed for moderate pain   ipratropium-albuterol (DUO-NEB) 0.5-2.5 mg/3 mL nebulizer solution  Self No No   Sig: Take 3 mL by nebulization 4 (four) times a day   lidocaine (LIDODERM) 5 %  Self No No   Sig: Apply 1 patch topically daily as needed (back pain) Remove & Discard patch within 12 hours or as directed by MD   methocarbamol (ROBAXIN) 500 mg tablet  Self No No Sig: Take 1 tablet (500 mg total) by mouth 2 (two) times a day   methocarbamol (ROBAXIN) 500 mg tablet  Self No No   Sig: Take 1 tablet (500 mg total) by mouth 2 (two) times a day   nicotine (NICODERM CQ) 14 mg/24hr TD 24 hr patch  Self No No   Sig: Place 1 patch on the skin daily Do not start before October 8, 2022. oxyCODONE (ROXICODONE) 5 immediate release tablet  Self No No   Sig: Take 1 tablet (5 mg total) by mouth every 4 (four) hours as needed for moderate pain Max Daily Amount: 30 mg   pantoprazole (PROTONIX) 40 mg tablet  Self No No   Sig: Take 1 tablet (40 mg total) by mouth daily   predniSONE 20 mg tablet  Self No No   Sig: Take 2 tablets (40 mg total) by mouth daily   tiotropium (SPIRIVA) 18 mcg inhalation capsule  Self Yes No   Sig: Place 18 mcg into inhaler and inhale daily      Facility-Administered Medications: None       Past Medical History:   Diagnosis Date   • Achalasia    • Back pain    • Cancer ([address])     Ovarian   • Fibromyalgia    • Lupus ([address])        Past Surgical History:   Procedure Laterality Date   • HYSTERECTOMY     • NECK SURGERY         History reviewed. No pertinent family history. I have reviewed and agree with the history as documented. E-Cigarette/Vaping   • E-Cigarette Use Never User      E-Cigarette/Vaping Substances     Social History     Tobacco Use   • Smoking status: Every Day     Packs/day: 1.00     Types: Cigarettes   • Smokeless tobacco: Never   Vaping Use   • Vaping Use: Never used   Substance Use Topics   • Alcohol use: Never   • Drug use: Never       Review of Systems   Musculoskeletal: Positive for arthralgias. Physical Exam  Physical Exam  Vitals and nursing note reviewed. Constitutional:       General: She is not in acute distress. Appearance: Normal appearance. She is well-developed. She is not ill-appearing, toxic-appearing or diaphoretic. HENT:      Head: Normocephalic and atraumatic.    Eyes:      Conjunctiva/sclera: Conjunctivae normal. Pupils: Pupils are equal, round, and reactive to light. Neck:      Vascular: No JVD. Cardiovascular:      Rate and Rhythm: Normal rate and regular rhythm. Heart sounds: Normal heart sounds. Pulmonary:      Effort: Pulmonary effort is normal.      Breath sounds: Normal breath sounds. No stridor. Abdominal:      General: There is no distension. Palpations: Abdomen is soft. Tenderness: There is no abdominal tenderness. There is no guarding or rebound. Musculoskeletal:         General: No swelling, tenderness, deformity or signs of injury. Normal range of motion. Cervical back: Normal range of motion and neck supple. Right lower leg: No edema. Left lower leg: No edema. Skin:     General: Skin is warm and dry. Capillary Refill: Capillary refill takes less than 2 seconds. Coloration: Skin is not pale. Findings: No erythema or rash. Neurological:      General: No focal deficit present. Mental Status: She is alert and oriented to person, place, and time. Cranial Nerves: No cranial nerve deficit. Sensory: No sensory deficit. Motor: No weakness or abnormal muscle tone.       Coordination: Coordination normal.         Vital Signs  ED Triage Vitals   Temperature Pulse Respirations Blood Pressure SpO2   08/06/23 1930 08/06/23 1930 08/06/23 1930 08/06/23 1930 08/06/23 1930   98.5 °F (36.9 °C) 97 19 137/93 97 %      Temp Source Heart Rate Source Patient Position - Orthostatic VS BP Location FiO2 (%)   08/06/23 1930 08/06/23 1930 -- -- --   Temporal Monitor         Pain Score       08/06/23 1946       9           Vitals:    08/06/23 1930 08/06/23 1945   BP: 137/93 137/93   Pulse: 97 96         Visual Acuity  Visual Acuity    Flowsheet Row Most Recent Value   L Pupil Size (mm) 3   R Pupil Size (mm) 3          ED Medications  Medications   morphine injection 6 mg (6 mg Intramuscular Given 8/6/23 1946)       Diagnostic Studies  Results Reviewed     None XR hip/pelv 2-3 vws right if performed    (Results Pending)   interpreted by me. No acute findings. Procedures  Procedures         ED Course  ED Course as of 08/06/23 2122   [place] Puls Aug 06, 2023   2056 I have reasonably determined that electronically prescribing a controlled substance would be impractical for the patient to obtain the controlled substance prescribed by electronic prescription or would cause an untimely delay resulting in an adverse impact on the patient's medical condition. Medical Decision Making  Fall, initial encounter: acute illness or injury  Pain of right hip: complicated acute illness or injury     Details: ddx includes fracture, sprain, strain, disloction. Amount and/or Complexity of Data Reviewed  Radiology: ordered and independent interpretation performed. Risk  Prescription drug management. Disposition  Final diagnoses:   Fall, initial encounter   Pain of right hip     Time reflects when diagnosis was documented in both MDM as applicable and the Disposition within this note     Time User Action Codes Description Comment    8/6/2023  8:55 PM Livia Thornton Add [L27. YFQF] Fall, initial encounter     8/6/2023  8:55 PM Livia Thornton Add [M25.551] Pain of right hip       ED Disposition     ED Disposition   Discharge    Condition   Stable    Date/Time   Sun Aug 6, 2023  8:55 PM    Comment   Sung Navarro discharge to home/self care.                Follow-up Information     Follow up With Specialties Details Why Contact Info Additional Information    [address] [place] [address] Emergency Department Emergency Medicine  If symptoms worsen [address] [facility] Emergency Department, [place], [place], [address]          Discharge Medication List as of 8/6/2023  8:56 PM      START taking these medications    Details !! oxyCODONE (Roxicodone) 5 immediate release tablet Take 1 tablet (5 mg total) by mouth every 6 (six) hours as needed for moderate pain for up to 12 doses Max Daily Amount: 20 mg, Starting Sun 8/6/2023, Print       !! - Potential duplicate medications found. Please discuss with provider. CONTINUE these medications which have NOT CHANGED    Details   albuterol (PROVENTIL HFA,VENTOLIN HFA) 90 mcg/act inhaler Inhale 2 puffs every 4 (four) hours as needed for wheezing, Starting Sat 10/8/2022, Normal      benzonatate (TESSALON PERLES) 100 mg capsule Take 1 capsule (100 mg total) by mouth 3 (three) times a day as needed for cough, Starting Fri 10/7/2022, Normal      famotidine (PEPCID) 20 mg tablet Take 1 tablet (20 mg total) by mouth 2 (two) times a day, Starting Sun 4/9/2023, Normal      fluticasone-vilanterol (BREO ELLIPTA) 100-25 mcg/inh inhaler Inhale 1 puff daily Rinse mouth after use.  Do not start before October 8, 2022., Starting Sat 10/8/2022, Normal      guaiFENesin (MUCINEX) 600 mg 12 hr tablet Take 1 tablet (600 mg total) by mouth 2 (two) times a day, Starting Fri 10/7/2022, Normal      ibuprofen (MOTRIN) 800 mg tablet Take 1 tablet (800 mg total) by mouth every 8 (eight) hours as needed for moderate pain, Starting u 3/2/2023, Normal      ipratropium-albuterol (DUO-NEB) 0.5-2.5 mg/3 mL nebulizer solution Take 3 mL by nebulization 4 (four) times a day, Starting Fri 10/7/2022, Normal      lidocaine (LIDODERM) 5 % Apply 1 patch topically daily as needed (back pain) Remove & Discard patch within 12 hours or as directed by MD, Starting Mon 1/9/2023, Normal      !! methocarbamol (ROBAXIN) 500 mg tablet Take 1 tablet (500 mg total) by mouth 2 (two) times a day, Starting Tue 1/17/2023, Normal      !! methocarbamol (ROBAXIN) 500 mg tablet Take 1 tablet (500 mg total) by mouth 2 (two) times a day, Starting Thu 3/2/2023, Normal      nicotine (NICODERM CQ) 14 mg/24hr TD 24 hr patch Place 1 patch on the skin daily Do not start before October 8, 2022., Starting Sat 10/8/2022, Normal      !! oxyCODONE (ROXICODONE) 5 immediate release tablet Take 1 tablet (5 mg total) by mouth every 4 (four) hours as needed for moderate pain Max Daily Amount: 30 mg, Starting Tue 1/17/2023, Normal      pantoprazole (PROTONIX) 40 mg tablet Take 1 tablet (40 mg total) by mouth daily, Starting Sat 10/8/2022, Normal      predniSONE 20 mg tablet Take 2 tablets (40 mg total) by mouth daily, Starting Mon 2/20/2023, Print      tiotropium (SPIRIVA) 18 mcg inhalation capsule Place 18 mcg into inhaler and inhale daily, Historical Med       !! - Potential duplicate medications found. Please discuss with provider. No discharge procedures on file.     PDMP Review       Value Time User    PDMP Reviewed  Yes 1/17/2023  1:10 PM Tevin Hope MD          ED Provider  Electronically Signed by           Abhi Rios MD  08/06/23 8108

## 2023-11-25 ENCOUNTER — APPOINTMENT (EMERGENCY)
Dept: RADIOLOGY | Facility: HOSPITAL | Age: 58
DRG: 140 | End: 2023-11-25
Payer: COMMERCIAL

## 2023-11-25 ENCOUNTER — APPOINTMENT (EMERGENCY)
Dept: CT IMAGING | Facility: HOSPITAL | Age: 58
DRG: 140 | End: 2023-11-25
Payer: COMMERCIAL

## 2023-11-25 ENCOUNTER — HOSPITAL ENCOUNTER (INPATIENT)
Facility: HOSPITAL | Age: 58
LOS: 4 days | Discharge: HOME | DRG: 140 | End: 2023-11-29
Attending: EMERGENCY MEDICINE | Admitting: FAMILY MEDICINE
Payer: COMMERCIAL

## 2023-11-25 DIAGNOSIS — S32.010D COMPRESSION FRACTURE OF L1 VERTEBRA WITH ROUTINE HEALING, SUBSEQUENT ENCOUNTER: ICD-10-CM

## 2023-11-25 DIAGNOSIS — R07.9 CHEST PAIN, UNSPECIFIED: ICD-10-CM

## 2023-11-25 DIAGNOSIS — R06.00 DYSPNEA: Primary | ICD-10-CM

## 2023-11-25 DIAGNOSIS — R11.0 NAUSEA: ICD-10-CM

## 2023-11-25 DIAGNOSIS — M81.0 OSTEOPOROSIS: ICD-10-CM

## 2023-11-25 PROBLEM — K21.9 GASTROESOPHAGEAL REFLUX DISEASE WITHOUT ESOPHAGITIS: Status: ACTIVE | Noted: 2023-11-25

## 2023-11-25 LAB
2HR DELTA HS TROPONIN: 0 NG/L
4HR DELTA HS TROPONIN: 0 NG/L
ALBUMIN SERPL BCP-MCNC: 3.9 G/DL (ref 3.5–5)
ALP SERPL-CCNC: 92 U/L (ref 34–104)
ALT SERPL W P-5'-P-CCNC: 6 U/L (ref 7–52)
ANION GAP SERPL CALCULATED.3IONS-SCNC: 7 MMOL/L
AST SERPL W P-5'-P-CCNC: 10 U/L (ref 13–39)
BASOPHILS # BLD AUTO: 0.04 THOUSANDS/ÂΜL (ref 0–0.1)
BASOPHILS NFR BLD AUTO: 1 % (ref 0–1)
BILIRUB SERPL-MCNC: 0.32 MG/DL (ref 0.2–1)
BNP SERPL-MCNC: 28 PG/ML (ref 0–100)
BUN SERPL-MCNC: 16 MG/DL (ref 5–25)
CALCIUM SERPL-MCNC: 8.8 MG/DL (ref 8.4–10.2)
CARDIAC TROPONIN I PNL SERPL HS: 3 NG/L
CHLORIDE SERPL-SCNC: 106 MMOL/L (ref 96–108)
CO2 SERPL-SCNC: 28 MMOL/L (ref 21–32)
CREAT SERPL-MCNC: 0.68 MG/DL (ref 0.6–1.3)
EOSINOPHIL # BLD AUTO: 0.13 THOUSAND/ÂΜL (ref 0–0.61)
EOSINOPHIL NFR BLD AUTO: 2 % (ref 0–6)
ERYTHROCYTE [DISTWIDTH] IN BLOOD BY AUTOMATED COUNT: 15.7 % (ref 11.6–15.1)
FLUAV RNA RESP QL NAA+PROBE: NEGATIVE
FLUBV RNA RESP QL NAA+PROBE: NEGATIVE
GFR SERPL CREATININE-BSD FRML MDRD: 96 ML/MIN/1.73SQ M
GLUCOSE SERPL-MCNC: 92 MG/DL (ref 65–140)
HCT VFR BLD AUTO: 41.3 % (ref 34.8–46.1)
HGB BLD-MCNC: 13.1 G/DL (ref 11.5–15.4)
IMM GRANULOCYTES # BLD AUTO: 0.03 THOUSAND/UL (ref 0–0.2)
IMM GRANULOCYTES NFR BLD AUTO: 0 % (ref 0–2)
LACTATE SERPL-SCNC: 1 MMOL/L (ref 0.5–2)
LIPASE SERPL-CCNC: 15 U/L (ref 11–82)
LYMPHOCYTES # BLD AUTO: 1.84 THOUSANDS/ÂΜL (ref 0.6–4.47)
LYMPHOCYTES NFR BLD AUTO: 21 % (ref 14–44)
MCH RBC QN AUTO: 29.3 PG (ref 26.8–34.3)
MCHC RBC AUTO-ENTMCNC: 31.7 G/DL (ref 31.4–37.4)
MCV RBC AUTO: 92 FL (ref 82–98)
MONOCYTES # BLD AUTO: 0.87 THOUSAND/ÂΜL (ref 0.17–1.22)
MONOCYTES NFR BLD AUTO: 10 % (ref 4–12)
NEUTROPHILS # BLD AUTO: 5.95 THOUSANDS/ÂΜL (ref 1.85–7.62)
NEUTS SEG NFR BLD AUTO: 66 % (ref 43–75)
NRBC BLD AUTO-RTO: 0 /100 WBCS
PLATELET # BLD AUTO: 291 THOUSANDS/UL (ref 149–390)
PMV BLD AUTO: 9.8 FL (ref 8.9–12.7)
POTASSIUM SERPL-SCNC: 4 MMOL/L (ref 3.5–5.3)
PROT SERPL-MCNC: 6.6 G/DL (ref 6.4–8.4)
RBC # BLD AUTO: 4.47 MILLION/UL (ref 3.81–5.12)
RSV RNA RESP QL NAA+PROBE: NEGATIVE
SARS-COV-2 RNA RESP QL NAA+PROBE: NEGATIVE
SODIUM SERPL-SCNC: 141 MMOL/L (ref 135–147)
WBC # BLD AUTO: 8.86 THOUSAND/UL (ref 4.31–10.16)

## 2023-11-25 PROCEDURE — 71045 X-RAY EXAM CHEST 1 VIEW: CPT

## 2023-11-25 PROCEDURE — 83690 ASSAY OF LIPASE: CPT | Performed by: EMERGENCY MEDICINE

## 2023-11-25 PROCEDURE — 83605 ASSAY OF LACTIC ACID: CPT | Performed by: EMERGENCY MEDICINE

## 2023-11-25 PROCEDURE — 94664 DEMO&/EVAL PT USE INHALER: CPT

## 2023-11-25 PROCEDURE — 71275 CT ANGIOGRAPHY CHEST: CPT

## 2023-11-25 PROCEDURE — 99285 EMERGENCY DEPT VISIT HI MDM: CPT | Performed by: EMERGENCY MEDICINE

## 2023-11-25 PROCEDURE — 73552 X-RAY EXAM OF FEMUR 2/>: CPT

## 2023-11-25 PROCEDURE — 93005 ELECTROCARDIOGRAM TRACING: CPT

## 2023-11-25 PROCEDURE — G1004 CDSM NDSC: HCPCS

## 2023-11-25 PROCEDURE — 99285 EMERGENCY DEPT VISIT HI MDM: CPT

## 2023-11-25 PROCEDURE — 96374 THER/PROPH/DIAG INJ IV PUSH: CPT

## 2023-11-25 PROCEDURE — 84484 ASSAY OF TROPONIN QUANT: CPT | Performed by: EMERGENCY MEDICINE

## 2023-11-25 PROCEDURE — 94760 N-INVAS EAR/PLS OXIMETRY 1: CPT

## 2023-11-25 PROCEDURE — 94644 CONT INHLJ TX 1ST HOUR: CPT

## 2023-11-25 PROCEDURE — 73590 X-RAY EXAM OF LOWER LEG: CPT

## 2023-11-25 PROCEDURE — 74177 CT ABD & PELVIS W/CONTRAST: CPT

## 2023-11-25 PROCEDURE — 36415 COLL VENOUS BLD VENIPUNCTURE: CPT | Performed by: EMERGENCY MEDICINE

## 2023-11-25 PROCEDURE — 99223 1ST HOSP IP/OBS HIGH 75: CPT | Performed by: FAMILY MEDICINE

## 2023-11-25 PROCEDURE — 96376 TX/PRO/DX INJ SAME DRUG ADON: CPT

## 2023-11-25 PROCEDURE — 87040 BLOOD CULTURE FOR BACTERIA: CPT | Performed by: EMERGENCY MEDICINE

## 2023-11-25 PROCEDURE — 83880 ASSAY OF NATRIURETIC PEPTIDE: CPT | Performed by: EMERGENCY MEDICINE

## 2023-11-25 PROCEDURE — 85025 COMPLETE CBC W/AUTO DIFF WBC: CPT | Performed by: EMERGENCY MEDICINE

## 2023-11-25 PROCEDURE — 0241U HB NFCT DS VIR RESP RNA 4 TRGT: CPT | Performed by: EMERGENCY MEDICINE

## 2023-11-25 PROCEDURE — 80053 COMPREHEN METABOLIC PANEL: CPT | Performed by: EMERGENCY MEDICINE

## 2023-11-25 RX ORDER — METHYLPREDNISOLONE SODIUM SUCCINATE 40 MG/ML
40 INJECTION, POWDER, LYOPHILIZED, FOR SOLUTION INTRAMUSCULAR; INTRAVENOUS EVERY 8 HOURS
Status: DISCONTINUED | OUTPATIENT
Start: 2023-11-25 | End: 2023-11-27

## 2023-11-25 RX ORDER — DEXAMETHASONE SODIUM PHOSPHATE 10 MG/ML
10 INJECTION, SOLUTION INTRAMUSCULAR; INTRAVENOUS ONCE
Status: COMPLETED | OUTPATIENT
Start: 2023-11-25 | End: 2023-11-25

## 2023-11-25 RX ORDER — CEFTRIAXONE 1 G/50ML
1000 INJECTION, SOLUTION INTRAVENOUS EVERY 24 HOURS
Status: DISCONTINUED | OUTPATIENT
Start: 2023-11-26 | End: 2023-11-26

## 2023-11-25 RX ORDER — IPRATROPIUM BROMIDE AND ALBUTEROL SULFATE 2.5; .5 MG/3ML; MG/3ML
3 SOLUTION RESPIRATORY (INHALATION)
Status: DISCONTINUED | OUTPATIENT
Start: 2023-11-25 | End: 2023-11-25

## 2023-11-25 RX ORDER — NICOTINE 21 MG/24HR
1 PATCH, TRANSDERMAL 24 HOURS TRANSDERMAL DAILY
Status: DISCONTINUED | OUTPATIENT
Start: 2023-11-26 | End: 2023-11-29 | Stop reason: HOSPADM

## 2023-11-25 RX ORDER — SODIUM CHLORIDE FOR INHALATION 0.9 %
12 VIAL, NEBULIZER (ML) INHALATION ONCE
Status: COMPLETED | OUTPATIENT
Start: 2023-11-25 | End: 2023-11-25

## 2023-11-25 RX ORDER — ENOXAPARIN SODIUM 100 MG/ML
40 INJECTION SUBCUTANEOUS DAILY
Status: DISCONTINUED | OUTPATIENT
Start: 2023-11-26 | End: 2023-11-29 | Stop reason: HOSPADM

## 2023-11-25 RX ORDER — CEFTRIAXONE 1 G/50ML
1000 INJECTION, SOLUTION INTRAVENOUS ONCE
Status: COMPLETED | OUTPATIENT
Start: 2023-11-25 | End: 2023-11-25

## 2023-11-25 RX ORDER — METHOCARBAMOL 500 MG/1
500 TABLET, FILM COATED ORAL 2 TIMES DAILY
Status: DISCONTINUED | OUTPATIENT
Start: 2023-11-25 | End: 2023-11-29 | Stop reason: HOSPADM

## 2023-11-25 RX ORDER — MORPHINE SULFATE 10 MG/ML
6 INJECTION, SOLUTION INTRAMUSCULAR; INTRAVENOUS ONCE
Status: COMPLETED | OUTPATIENT
Start: 2023-11-25 | End: 2023-11-25

## 2023-11-25 RX ORDER — GUAIFENESIN 600 MG/1
600 TABLET, EXTENDED RELEASE ORAL 2 TIMES DAILY
Status: DISCONTINUED | OUTPATIENT
Start: 2023-11-25 | End: 2023-11-29 | Stop reason: HOSPADM

## 2023-11-25 RX ORDER — MORPHINE SULFATE 4 MG/ML
4 INJECTION, SOLUTION INTRAMUSCULAR; INTRAVENOUS ONCE
Status: COMPLETED | OUTPATIENT
Start: 2023-11-25 | End: 2023-11-25

## 2023-11-25 RX ORDER — ALBUTEROL SULFATE 90 UG/1
2 AEROSOL, METERED RESPIRATORY (INHALATION) EVERY 4 HOURS PRN
Status: DISCONTINUED | OUTPATIENT
Start: 2023-11-25 | End: 2023-11-29 | Stop reason: HOSPADM

## 2023-11-25 RX ORDER — LEVALBUTEROL INHALATION SOLUTION 1.25 MG/3ML
1.25 SOLUTION RESPIRATORY (INHALATION)
Status: DISCONTINUED | OUTPATIENT
Start: 2023-11-26 | End: 2023-11-29 | Stop reason: HOSPADM

## 2023-11-25 RX ORDER — ACETAMINOPHEN 325 MG/1
650 TABLET ORAL EVERY 6 HOURS PRN
Status: DISCONTINUED | OUTPATIENT
Start: 2023-11-25 | End: 2023-11-26

## 2023-11-25 RX ORDER — PANTOPRAZOLE SODIUM 40 MG/1
40 TABLET, DELAYED RELEASE ORAL DAILY
Status: DISCONTINUED | OUTPATIENT
Start: 2023-11-26 | End: 2023-11-29 | Stop reason: HOSPADM

## 2023-11-25 RX ORDER — DOXYCYCLINE HYCLATE 100 MG/1
100 CAPSULE ORAL ONCE
Status: COMPLETED | OUTPATIENT
Start: 2023-11-25 | End: 2023-11-25

## 2023-11-25 RX ORDER — FAMOTIDINE 20 MG/1
20 TABLET, FILM COATED ORAL 2 TIMES DAILY
Status: DISCONTINUED | OUTPATIENT
Start: 2023-11-25 | End: 2023-11-29 | Stop reason: HOSPADM

## 2023-11-25 RX ORDER — ACETAMINOPHEN 325 MG/1
975 TABLET ORAL ONCE
Status: COMPLETED | OUTPATIENT
Start: 2023-11-25 | End: 2023-11-25

## 2023-11-25 RX ORDER — OXYCODONE HYDROCHLORIDE 5 MG/1
5 TABLET ORAL EVERY 4 HOURS PRN
Status: DISCONTINUED | OUTPATIENT
Start: 2023-11-25 | End: 2023-11-26

## 2023-11-25 RX ADMIN — IPRATROPIUM BROMIDE 1 MG: 0.5 SOLUTION RESPIRATORY (INHALATION) at 15:31

## 2023-11-25 RX ADMIN — ALBUTEROL SULFATE 10 MG: 2.5 SOLUTION RESPIRATORY (INHALATION) at 15:50

## 2023-11-25 RX ADMIN — IOHEXOL 100 ML: 350 INJECTION, SOLUTION INTRAVENOUS at 18:21

## 2023-11-25 RX ADMIN — DEXAMETHASONE SODIUM PHOSPHATE 10 MG: 10 INJECTION, SOLUTION INTRAMUSCULAR; INTRAVENOUS at 20:25

## 2023-11-25 RX ADMIN — OXYCODONE HYDROCHLORIDE 5 MG: 5 TABLET ORAL at 22:05

## 2023-11-25 RX ADMIN — MORPHINE SULFATE 4 MG: 4 INJECTION INTRAVENOUS at 16:56

## 2023-11-25 RX ADMIN — FAMOTIDINE 20 MG: 20 TABLET ORAL at 22:05

## 2023-11-25 RX ADMIN — CEFTRIAXONE 1000 MG: 1 INJECTION, SOLUTION INTRAVENOUS at 20:28

## 2023-11-25 RX ADMIN — ACETAMINOPHEN 975 MG: 325 TABLET, FILM COATED ORAL at 15:31

## 2023-11-25 RX ADMIN — DOXYCYCLINE HYCLATE 100 MG: 100 CAPSULE ORAL at 20:25

## 2023-11-25 RX ADMIN — Medication 12 ML: at 15:35

## 2023-11-25 RX ADMIN — AZITHROMYCIN MONOHYDRATE 500 MG: 500 INJECTION, POWDER, LYOPHILIZED, FOR SOLUTION INTRAVENOUS at 22:55

## 2023-11-25 RX ADMIN — METHOCARBAMOL TABLETS 500 MG: 500 TABLET, COATED ORAL at 22:05

## 2023-11-25 RX ADMIN — GUAIFENESIN 600 MG: 600 TABLET ORAL at 22:05

## 2023-11-25 RX ADMIN — MORPHINE SULFATE 6 MG: 10 INJECTION INTRAVENOUS at 18:11

## 2023-11-25 RX ADMIN — METHYLPREDNISOLONE SODIUM SUCCINATE 40 MG: 40 INJECTION, POWDER, FOR SOLUTION INTRAMUSCULAR; INTRAVENOUS at 22:05

## 2023-11-25 NOTE — ED PROVIDER NOTES
History  Chief Complaint   Patient presents with    Shortness of Breath     Patient c/o sob, episodes of palpitations, right leg swelling x 1 week following a trip and fall at home. Patient is a 22-year-old female past medical history of COPD, SLE, fibromyalgia, achalasia presenting with shortness of breath. Patient is exertional shortness of breath constant for the last week and worsening associated with chest pain which starts the left side of the back and radiates to her chest and left arm. She has nausea and lightheadedness and intermittent palpitations in same timeframe. States she had a fall, slipped in the mud onto her back 1 week ago without any head trauma or LOC. Since that time his had worsened back pain from a chronic L1 fracture, as well as right leg pain and swelling. Has been taking ibuprofen with no relief. Notes cough productive of yellow-white intermittently bloody sputum and fevers with Tmax of 100.9. Denies any numbness or tingling, weakness, bowel or bladder incontinence, urinary tension, saddle anesthesia. Notes nausea and lightheadedness. Has never been intubated because of her COPD and has been using her nebs 4 times a day which is normal for her. Prior to Admission Medications   Prescriptions Last Dose Informant Patient Reported? Taking? albuterol (PROVENTIL HFA,VENTOLIN HFA) 90 mcg/act inhaler  Self No No   Sig: Inhale 2 puffs every 4 (four) hours as needed for wheezing   benzonatate (TESSALON PERLES) 100 mg capsule  Self No No   Sig: Take 1 capsule (100 mg total) by mouth 3 (three) times a day as needed for cough   famotidine (PEPCID) 20 mg tablet   No No   Sig: Take 1 tablet (20 mg total) by mouth 2 (two) times a day   fluticasone-vilanterol (BREO ELLIPTA) 100-25 mcg/inh inhaler  Self No No   Sig: Inhale 1 puff daily Rinse mouth after use. Do not start before October 8, 2022.    Patient not taking: Reported on 1/15/2023   guaiFENesin (MUCINEX) 600 mg 12 hr tablet Self No No   Sig: Take 1 tablet (600 mg total) by mouth 2 (two) times a day   ibuprofen (MOTRIN) 800 mg tablet  Self No No   Sig: Take 1 tablet (800 mg total) by mouth every 8 (eight) hours as needed for moderate pain   ipratropium-albuterol (DUO-NEB) 0.5-2.5 mg/3 mL nebulizer solution  Self No No   Sig: Take 3 mL by nebulization 4 (four) times a day   lidocaine (LIDODERM) 5 %  Self No No   Sig: Apply 1 patch topically daily as needed (back pain) Remove & Discard patch within 12 hours or as directed by MD   methocarbamol (ROBAXIN) 500 mg tablet  Self No No   Sig: Take 1 tablet (500 mg total) by mouth 2 (two) times a day   methocarbamol (ROBAXIN) 500 mg tablet  Self No No   Sig: Take 1 tablet (500 mg total) by mouth 2 (two) times a day   nicotine (NICODERM CQ) 14 mg/24hr TD 24 hr patch  Self No No   Sig: Place 1 patch on the skin daily Do not start before October 8, 2022. oxyCODONE (ROXICODONE) 5 immediate release tablet  Self No No   Sig: Take 1 tablet (5 mg total) by mouth every 4 (four) hours as needed for moderate pain Max Daily Amount: 30 mg   oxyCODONE (Roxicodone) 5 immediate release tablet   No No   Sig: Take 1 tablet (5 mg total) by mouth every 6 (six) hours as needed for moderate pain for up to 12 doses Max Daily Amount: 20 mg   pantoprazole (PROTONIX) 40 mg tablet  Self No No   Sig: Take 1 tablet (40 mg total) by mouth daily   predniSONE 20 mg tablet  Self No No   Sig: Take 2 tablets (40 mg total) by mouth daily   tiotropium (SPIRIVA) 18 mcg inhalation capsule  Self Yes No   Sig: Place 18 mcg into inhaler and inhale daily      Facility-Administered Medications: None       Past Medical History:   Diagnosis Date    Achalasia     Back pain     Cancer ([address])     Ovarian    Fibromyalgia     Lupus (HCC)        Past Surgical History:   Procedure Laterality Date    HYSTERECTOMY      NECK SURGERY         No family history on file. I have reviewed and agree with the history as documented.     E-Cigarette/Vaping E-Cigarette Use Never User      E-Cigarette/Vaping Substances     Social History     Tobacco Use    Smoking status: Every Day     Packs/day: 1.00     Types: Cigarettes    Smokeless tobacco: Never   Vaping Use    Vaping Use: Never used   Substance Use Topics    Alcohol use: Never    Drug use: Never       Review of Systems   All other systems reviewed and are negative. Physical Exam  Physical Exam  Vitals reviewed. Constitutional:       General: She is not in acute distress. Appearance: Normal appearance. She is not ill-appearing. HENT:      Mouth/Throat:      Mouth: Mucous membranes are moist.   Eyes:      Conjunctiva/sclera: Conjunctivae normal.      Comments: Normal conjunctiva   Cardiovascular:      Rate and Rhythm: Normal rate and regular rhythm. Pulses: Normal pulses. Heart sounds: Normal heart sounds. Pulmonary:      Effort: Pulmonary effort is normal.      Breath sounds: Normal breath sounds. Abdominal:      General: Abdomen is flat. Palpations: Abdomen is soft. Tenderness: There is abdominal tenderness. Comments: Mild generalized abdominal tenderness without guarding   Musculoskeletal:         General: No swelling. Normal range of motion. Cervical back: Neck supple. Right lower leg: Tenderness present. No edema. Left lower leg: No tenderness. No edema. Comments: No spinal tenderness, no deformity, neurovascularly intact   Skin:     General: Skin is warm and dry. Neurological:      General: No focal deficit present. Mental Status: She is alert. Motor: No weakness.       Comments: Intact strength and sensation in the lower extremities   Psychiatric:         Mood and Affect: Mood normal.       Vital Signs  ED Triage Vitals   Temperature Pulse Respirations Blood Pressure SpO2   11/25/23 1454 11/25/23 1454 11/25/23 1454 11/25/23 1454 11/25/23 1454   98.1 °F (36.7 °C) 93 22 162/77 95 %      Temp Source Heart Rate Source Patient Position - Orthostatic VS BP Location FiO2 (%)   11/25/23 1454 11/25/23 1454 -- -- --   Oral Monitor         Pain Score       11/25/23 1531       9           Vitals:    11/25/23 1545 11/25/23 1600 11/25/23 1900 11/25/23 1931   BP: 142/71 126/58 129/61 113/55   Pulse: 90 80 88 84         Visual Acuity      ED Medications  Medications   dexamethasone (PF) (DECADRON) injection 10 mg (has no administration in time range)   cefTRIAXone (ROCEPHIN) IVPB (premix in dextrose) 1,000 mg 50 mL (has no administration in time range)   doxycycline hyclate (VIBRAMYCIN) capsule 100 mg (has no administration in time range)   albuterol inhalation solution 10 mg (10 mg Nebulization Given 11/25/23 1550)   ipratropium (ATROVENT) 0.02 % inhalation solution 1 mg (1 mg Nebulization Given 11/25/23 1531)   sodium chloride 0.9 % inhalation solution 12 mL (12 mL Nebulization Given 11/25/23 1535)   morphine injection 4 mg (4 mg Intravenous Given 11/25/23 1656)   acetaminophen (TYLENOL) tablet 975 mg (975 mg Oral Given 11/25/23 1531)   morphine injection 6 mg (6 mg Intravenous Given 11/25/23 1811)   iohexol (OMNIPAQUE) 350 MG/ML injection (MULTI-DOSE) 100 mL (100 mL Intravenous Given 11/25/23 1821)       Diagnostic Studies  Results Reviewed       Procedure Component Value Units Date/Time    HS Troponin I 2hr [[id number]]  (Normal) Collected: 11/25/23 1901    Lab Status: Final result Specimen: Blood from Arm, Left Updated: 11/25/23 1933     hs TnI 2hr 3 ng/L      Delta 2hr hsTnI 0 ng/L     HS Troponin I 4hr [[id number]]     Lab Status: No result Specimen: Blood     FLU/RSV/COVID - if FLU/RSV clinically relevant [[id number]]  (Normal) Collected: 11/25/23 1655    Lab Status: Final result Specimen: Nares from Nose Updated: 11/25/23 1744     SARS-CoV-2 Negative     INFLUENZA A PCR Negative     INFLUENZA B PCR Negative     RSV PCR Negative    Narrative:      FOR PEDIATRIC PATIENTS - copy/paste COVID Guidelines URL to browser: [email or web address]    SARS-CoV-2 assay is a Nucleic Acid Amplification assay intended for the  qualitative detection of nucleic acid from SARS-CoV-2 in nasopharyngeal  swabs. Results are for the presumptive identification of SARS-CoV-2 RNA. Positive results are indicative of infection with SARS-CoV-2, the virus  causing COVID-19, but do not rule out bacterial infection or co-infection  with other viruses. Laboratories within the [facility] and its  territories are required to report all positive results to the appropriate  public health authorities. Negative results do not preclude SARS-CoV-2  infection and should not be used as the sole basis for treatment or other  patient management decisions. Negative results must be combined with  clinical observations, patient history, and epidemiological information. This test has not been FDA cleared or approved. This test has been authorized by FDA under an Emergency Use Authorization  (EUA). This test is only authorized for the duration of time the  declaration that circumstances exist justifying the authorization of the  emergency use of an in vitro diagnostic tests for detection of SARS-CoV-2  virus and/or diagnosis of COVID-19 infection under section 564(b)(1) of  the Act, 21 U. S.C. 647SNE-6(W)(1), unless the authorization is terminated  or revoked sooner. The test has been validated but independent review by FDA  and CLIA is pending. Test performed using [employer] GeneXpert: This RT-PCR assay targets N2,  a region unique to SARS-CoV-2. A conserved region in the E-gene was chosen  for pan-Sarbecovirus detection which includes SARS-CoV-2. According to CMS-2020-01-R, this platform meets the definition of high-throughput technology.     HS Troponin 0hr (reflex protocol) [[id number]]  (Normal) Collected: 11/25/23 0957    Lab Status: Final result Specimen: Blood from Arm, Right Updated: 11/25/23 3215     hs TnI 0hr 3 ng/L     B-Type Natriuretic Peptide(BNP) [[id number]]  (Normal) Collected: 11/25/23 1655    Lab Status: Final result Specimen: Blood from Arm, Right Updated: 11/25/23 1730     BNP 28 pg/mL     Lipase [[id number]]  (Normal) Collected: 11/25/23 1655    Lab Status: Final result Specimen: Blood from Arm, Right Updated: 11/25/23 1723     Lipase 15 u/L     Comprehensive metabolic panel [[id number]]  (Abnormal) Collected: 11/25/23 1655    Lab Status: Final result Specimen: Blood from Arm, Right Updated: 11/25/23 1723     Sodium 141 mmol/L      Potassium 4.0 mmol/L      Chloride 106 mmol/L      CO2 28 mmol/L      ANION GAP 7 mmol/L      BUN 16 mg/dL      Creatinine 0.68 mg/dL      Glucose 92 mg/dL      Calcium 8.8 mg/dL      AST 10 U/L      ALT 6 U/L      Alkaline Phosphatase 92 U/L      Total Protein 6.6 g/dL      Albumin 3.9 g/dL      Total Bilirubin 0.32 mg/dL      eGFR 96 ml/min/1.73sq m     Narrative:      [place] guidelines for Chronic Kidney Disease (CKD):     Stage 1 with normal or high GFR (GFR > 90 mL/min/1.73 square meters)    Stage 2 Mild CKD (GFR = 60-89 mL/min/1.73 square meters)    Stage 3A Moderate CKD (GFR = 45-59 mL/min/1.73 square meters)    Stage 3B Moderate CKD (GFR = 30-44 mL/min/1.73 square meters)    Stage 4 Severe CKD (GFR = 15-29 mL/min/1.73 square meters)    Stage 5 End Stage CKD (GFR <15 mL/min/1.73 square meters)  Note: GFR calculation is accurate only with a steady state creatinine    Lactic acid, plasma (w/reflex if result > 2.0) [[id number]]  (Normal) Collected: 11/25/23 1655    Lab Status: Final result Specimen: Blood from Arm, Right Updated: 11/25/23 1722     LACTIC ACID 1.0 mmol/L     Narrative:      Result may be elevated if tourniquet was used during collection.     CBC and differential [[id number]]  (Abnormal) Collected: 11/25/23 1655    Lab Status: Final result Specimen: Blood from Arm, Right Updated: 11/25/23 1706     WBC 8.86 Thousand/uL      RBC 4.47 Million/uL      Hemoglobin 13.1 g/dL      Hematocrit 41.3 %      MCV 92 fL      MCH 29.3 pg      MCHC 31.7 g/dL      RDW 15.7 %      MPV 9.8 fL      Platelets 845 Thousands/uL      nRBC 0 /100 WBCs      Neutrophils Relative 66 %      Immat GRANS % 0 %      Lymphocytes Relative 21 %      Monocytes Relative 10 %      Eosinophils Relative 2 %      Basophils Relative 1 %      Neutrophils Absolute 5.95 Thousands/µL      Immature Grans Absolute 0.03 Thousand/uL      Lymphocytes Absolute 1.84 Thousands/µL      Monocytes Absolute 0.87 Thousand/µL      Eosinophils Absolute 0.13 Thousand/µL      Basophils Absolute 0.04 Thousands/µL     Blood culture #2 [[id number]] Collected: 11/25/23 1655    Lab Status: In process Specimen: Blood from Arm, Right Updated: 11/25/23 1702    Blood culture #1 [[id number]] Collected: 11/25/23 1653    Lab Status: In process Specimen: Blood from Arm, Left Updated: 11/25/23 1702                   PE Study with CT Abdomen and Pelvis with contrast   Final Result by Elise Bourgeois MD (11/25 1956)   No pulmonary embolism   Areas of segmental atelectasis left lingular and right middle and lower lobes   No acute consolidation   No acute inflammatory stranding. Chronic L1 compression with stable appearance   Thickening the distal esophagus may be due to reflux disease/esophagitis, unchanged from the April 14, 2021 correlate with the endoscopy         Workstation performed: [id number]         CT recon only lumbar spine (No Charge)   Final Result by Gavi Rojas MD (11/25 1958)      No acute fracture or traumatic subluxation. Unchanged superior endplate compression fracture of L1 with mild retropulsion of the posterior cortex.       Workstation performed: [id number]         XR chest 1 view portable   ED Interpretation by Vivian Mcgrath DO (11/25 1731)   NAD      XR tibia fibula 2 views RIGHT   ED Interpretation by Vivian Mcgrath DO (11/25 1731)   NAD      XR femur 2 views RIGHT ED Interpretation by Tomas Catherine DO (11/25 1731)   NAD                 Procedures  ECG 12 Lead Documentation Only    Date/Time: 11/25/2023 3:04 PM    Performed by: Tomas Catherine DO  Authorized by: Tomas Catherine DO    Patient location:  ED  Previous ECG:     Previous ECG:  Compared to current    Similarity:  No change  Interpretation:     Interpretation: normal    Rate:     ECG rate assessment: normal    Rhythm:     Rhythm: sinus rhythm    Ectopy:     Ectopy: none    QRS:     QRS axis:  Normal    QRS intervals:  Normal  Conduction:     Conduction: normal    ST segments:     ST segments:  Normal  T waves:     T waves: non-specific             ED Course  ED Course as of 11/25/23 2008   Sat Nov 25, 2023   1809 Patient became hypoxic to 85%, requiring 3 L of oxygen, will obtain CT and admit. 2003 Patient with atelectasis, given new hypoxia will give Decadron for COPD exacerbation, antibiotics, pain blood cultures and admit. SBIRT 22yo+      Flowsheet Row Most Recent Value   Initial Alcohol Screen: US AUDIT-C     1. How often do you have a drink containing alcohol? 0 Filed at: 11/25/2023 1500   2. How many drinks containing alcohol do you have on a typical day you are drinking? 0 Filed at: 11/25/2023 1500   3b. FEMALE Any Age, or MALE 65+: How often do you have 4 or more drinks on one occassion? 0 Filed at: 11/25/2023 1500   Audit-C Score 0 Filed at: 11/25/2023 1500   EULALIO: How many times in the past year have you. .. Used an illegal drug or used a prescription medication for non-medical reasons? Never Filed at: 11/25/2023 1500                      Medical Decision Making  Patient is a 60-year-old female past medical history of COPD, SLE, fibromyalgia, achalasia presenting with shortness of breath. Patient is well-appearing at bedside with stable vitals and in no acute distress.   She has mild conversational dyspnea without retractions or accessory muscle use with lungs clear to auscultation but with poor air movement with no significant lower extremity edema but diffuse mild tenderness to the right lower extremity with no deformity or neurologic deficits, mild lumbar spine tenderness, mild abdominal tenderness and no other significant physical exam findings. Obtain CT PE as patient has history of COPD with recent leg injury as well as intermittent hemoptysis as well as CT abdomen pelvis to assess for traumatic injuries or other intra-abdominal pathology causing tenderness, labs, EKG to assess for electrolyte abnormalities, anemia, signs of infection, arrhythmia or ACS, give pain control and breathing treatment and reassess. Amount and/or Complexity of Data Reviewed  Labs: ordered. Radiology: ordered and independent interpretation performed. Risk  OTC drugs. Prescription drug management. Decision regarding hospitalization. Disposition  Final diagnoses:   Dyspnea     Time reflects when diagnosis was documented in both MDM as applicable and the Disposition within this note       Time User Action Codes Description Comment    11/25/2023  8:08 PM Geraldine Velasquez Add [R06.00] Dyspnea           ED Disposition       ED Disposition   Admit    Condition   Stable    Date/Time   Sat Nov 25, 2023 2008    Comment   Case was discussed with Dr. Paris Harp and the patient's admission status was agreed to be Admission Status: inpatient status to the service of Dr. Paris Harp . Follow-up Information    None         Patient's Medications   Discharge Prescriptions    No medications on file       No discharge procedures on file.     PDMP Review         Value Time User    PDMP Reviewed  Yes 1/17/2023  1:10 PM Dre Quintero MD            ED Provider  Electronically Signed by             Vivian Mcgrath DO  11/25/23 2008

## 2023-11-26 LAB
ATRIAL RATE: 87 BPM
P AXIS: 77 DEGREES
PR INTERVAL: 140 MS
QRS AXIS: 52 DEGREES
QRSD INTERVAL: 84 MS
QT INTERVAL: 366 MS
QTC INTERVAL: 440 MS
T WAVE AXIS: 20 DEGREES
VENTRICULAR RATE: 87 BPM

## 2023-11-26 PROCEDURE — 99232 SBSQ HOSP IP/OBS MODERATE 35: CPT | Performed by: INTERNAL MEDICINE

## 2023-11-26 PROCEDURE — 94640 AIRWAY INHALATION TREATMENT: CPT

## 2023-11-26 PROCEDURE — 94664 DEMO&/EVAL PT USE INHALER: CPT

## 2023-11-26 PROCEDURE — 94760 N-INVAS EAR/PLS OXIMETRY 1: CPT

## 2023-11-26 RX ORDER — LIDOCAINE 50 MG/G
1 PATCH TOPICAL DAILY
Status: DISCONTINUED | OUTPATIENT
Start: 2023-11-26 | End: 2023-11-29 | Stop reason: HOSPADM

## 2023-11-26 RX ORDER — ONDANSETRON 2 MG/ML
4 INJECTION INTRAMUSCULAR; INTRAVENOUS EVERY 6 HOURS PRN
Status: DISCONTINUED | OUTPATIENT
Start: 2023-11-26 | End: 2023-11-27

## 2023-11-26 RX ORDER — ACETAMINOPHEN 325 MG/1
650 TABLET ORAL EVERY 6 HOURS SCHEDULED
Status: DISCONTINUED | OUTPATIENT
Start: 2023-11-26 | End: 2023-11-29 | Stop reason: HOSPADM

## 2023-11-26 RX ORDER — AMOXICILLIN 250 MG
1 CAPSULE ORAL
Status: DISCONTINUED | OUTPATIENT
Start: 2023-11-26 | End: 2023-11-29 | Stop reason: HOSPADM

## 2023-11-26 RX ORDER — AZITHROMYCIN 500 MG/1
500 TABLET, FILM COATED ORAL EVERY 24 HOURS
Status: COMPLETED | OUTPATIENT
Start: 2023-11-26 | End: 2023-11-27

## 2023-11-26 RX ORDER — OXYCODONE HYDROCHLORIDE 5 MG/1
10 TABLET ORAL EVERY 4 HOURS PRN
Status: DISCONTINUED | OUTPATIENT
Start: 2023-11-26 | End: 2023-11-29

## 2023-11-26 RX ORDER — OXYCODONE HYDROCHLORIDE 5 MG/1
5 TABLET ORAL EVERY 4 HOURS PRN
Status: DISCONTINUED | OUTPATIENT
Start: 2023-11-26 | End: 2023-11-29

## 2023-11-26 RX ADMIN — GUAIFENESIN 600 MG: 600 TABLET ORAL at 17:19

## 2023-11-26 RX ADMIN — LEVALBUTEROL HYDROCHLORIDE 1.25 MG: 1.25 SOLUTION RESPIRATORY (INHALATION) at 07:31

## 2023-11-26 RX ADMIN — ACETAMINOPHEN 650 MG: 325 TABLET, FILM COATED ORAL at 22:30

## 2023-11-26 RX ADMIN — ALBUTEROL SULFATE 2 PUFF: 90 AEROSOL, METERED RESPIRATORY (INHALATION) at 17:19

## 2023-11-26 RX ADMIN — ALBUTEROL SULFATE 2 PUFF: 90 AEROSOL, METERED RESPIRATORY (INHALATION) at 08:15

## 2023-11-26 RX ADMIN — METHOCARBAMOL TABLETS 500 MG: 500 TABLET, COATED ORAL at 17:19

## 2023-11-26 RX ADMIN — OXYCODONE HYDROCHLORIDE 10 MG: 5 TABLET ORAL at 16:09

## 2023-11-26 RX ADMIN — LEVALBUTEROL HYDROCHLORIDE 1.25 MG: 1.25 SOLUTION RESPIRATORY (INHALATION) at 20:06

## 2023-11-26 RX ADMIN — ENOXAPARIN SODIUM 40 MG: 40 INJECTION SUBCUTANEOUS at 08:14

## 2023-11-26 RX ADMIN — AZITHROMYCIN 500 MG: 500 TABLET, FILM COATED ORAL at 15:13

## 2023-11-26 RX ADMIN — FAMOTIDINE 20 MG: 20 TABLET ORAL at 08:14

## 2023-11-26 RX ADMIN — GUAIFENESIN 600 MG: 600 TABLET ORAL at 08:14

## 2023-11-26 RX ADMIN — IPRATROPIUM BROMIDE 0.5 MG: 0.5 SOLUTION RESPIRATORY (INHALATION) at 20:06

## 2023-11-26 RX ADMIN — LEVALBUTEROL HYDROCHLORIDE 1.25 MG: 1.25 SOLUTION RESPIRATORY (INHALATION) at 13:04

## 2023-11-26 RX ADMIN — NICOTINE 1 PATCH: 14 PATCH, EXTENDED RELEASE TRANSDERMAL at 08:15

## 2023-11-26 RX ADMIN — METHOCARBAMOL TABLETS 500 MG: 500 TABLET, COATED ORAL at 08:14

## 2023-11-26 RX ADMIN — PANTOPRAZOLE SODIUM 40 MG: 40 TABLET, DELAYED RELEASE ORAL at 08:14

## 2023-11-26 RX ADMIN — IPRATROPIUM BROMIDE 0.5 MG: 0.5 SOLUTION RESPIRATORY (INHALATION) at 13:04

## 2023-11-26 RX ADMIN — METHYLPREDNISOLONE SODIUM SUCCINATE 40 MG: 40 INJECTION, POWDER, FOR SOLUTION INTRAMUSCULAR; INTRAVENOUS at 05:37

## 2023-11-26 RX ADMIN — ONDANSETRON 4 MG: 2 INJECTION INTRAMUSCULAR; INTRAVENOUS at 22:29

## 2023-11-26 RX ADMIN — OXYCODONE HYDROCHLORIDE 10 MG: 5 TABLET ORAL at 22:29

## 2023-11-26 RX ADMIN — IPRATROPIUM BROMIDE 0.5 MG: 0.5 SOLUTION RESPIRATORY (INHALATION) at 07:31

## 2023-11-26 RX ADMIN — LIDOCAINE 1 PATCH: 50 PATCH CUTANEOUS at 15:12

## 2023-11-26 RX ADMIN — METHYLPREDNISOLONE SODIUM SUCCINATE 40 MG: 40 INJECTION, POWDER, FOR SOLUTION INTRAMUSCULAR; INTRAVENOUS at 13:17

## 2023-11-26 RX ADMIN — METHYLPREDNISOLONE SODIUM SUCCINATE 40 MG: 40 INJECTION, POWDER, FOR SOLUTION INTRAMUSCULAR; INTRAVENOUS at 22:28

## 2023-11-26 RX ADMIN — SENNOSIDES AND DOCUSATE SODIUM 1 TABLET: 50; 8.6 TABLET ORAL at 22:29

## 2023-11-26 RX ADMIN — ONDANSETRON 4 MG: 2 INJECTION INTRAMUSCULAR; INTRAVENOUS at 13:17

## 2023-11-26 RX ADMIN — FAMOTIDINE 20 MG: 20 TABLET ORAL at 17:19

## 2023-11-26 RX ADMIN — ONDANSETRON 4 MG: 2 INJECTION INTRAMUSCULAR; INTRAVENOUS at 04:12

## 2023-11-26 RX ADMIN — OXYCODONE HYDROCHLORIDE 5 MG: 5 TABLET ORAL at 08:14

## 2023-11-26 RX ADMIN — ACETAMINOPHEN 650 MG: 325 TABLET, FILM COATED ORAL at 17:19

## 2023-11-26 RX ADMIN — OXYCODONE HYDROCHLORIDE 5 MG: 5 TABLET ORAL at 12:14

## 2023-11-26 NOTE — RESPIRATORY THERAPY NOTE
RT Protocol Note  Herbie Santiago 62 y.o. female MRN: [id number]  Unit/Bed#: -01 Encounter: [id number]    Assessment    Principal Problem:    COPD exacerbation ([address])  Active Problems:    Smoker    Compression fracture of L1 vertebra ([address])    Fall    Gastroesophageal reflux disease without esophagitis    Vitals:  Blood pressure 121/68, pulse 86, temperature 98.3 °F (36.8 °C), resp. rate 19, height 4' 9" (1.448 m), weight 92.1 kg (203 lb 0.7 oz), SpO2 93 %. O2 Device: ra     Plan    Respiratory Plan: Mild Distress pathway        Resp Comments: pt dyspenic w exertion. BBS diminished. pt on RA spo2 93%. will cont w current tx plan. no AC necessary at this time.

## 2023-11-26 NOTE — PROGRESS NOTES
[facility]  [facility]  Name: Jules Hanna I  MRN: [id number]  Unit/Bed#: -01 I Date of Admission: 11/25/2023   Date of Service: 11/26/2023 I Hospital Day: 1    Assessment/Plan   Gastroesophageal reflux disease without esophagitis  Assessment & Plan  Continue Protonix and Pepcid    Fall  Assessment & Plan  Had a fall a week ago slipped in the mud onto her back. X-rays with no fracture. She has chronic L1 fracture. She is on pain medication at home for chronic back pain. Compression fracture of L1 vertebra (HCC)  Assessment & Plan  Chronic compression of L1. Continue pain meds imaging shows no worsening. Smoker  Assessment & Plan  Placed on nicotine patch, continue to smoke. * COPD exacerbation ([address])  Assessment & Plan  Been having cough and sputum production for about a week now. Has not been feeling well. Been using inhaler at home more often. COVID-19 negative flu negative, RSV negative  Continues to smoke. We will treat with IV steroids 40 mg every 8 hours and Zithromax  DuoNebs every 6 hours  Mucinex twice daily  No evidence of infection, DC Rocephin  Plan to tape down steroid tomorrow               VTE Pharmacologic Prophylaxis: VTE Score: 5 High Risk (Score >/= 5) - Pharmacological DVT Prophylaxis Ordered: enoxaparin (Lovenox). Sequential Compression Devices Ordered. Mobility:   Basic Mobility Inpatient Raw Score: 20  JH-HLM Goal: 6: Walk 10 steps or more  JH-HLM Achieved: 7: Walk 25 feet or more      Patient Centered Rounds: I performed bedside rounds with nursing staff today. Discussions with Specialists or Other Care Team Provider:     Education and Discussions with Family / Patient: Attempted to update  () via phone. Left voicemail. Total Time Spent on Date of Encounter in care of patient: 30 mins.  This time was spent on one or more of the following: performing physical exam; counseling and coordination of care; obtaining or reviewing history; documenting in the medical record; reviewing/ordering tests, medications or procedures; communicating with other healthcare professionals and discussing with patient's family/caregivers. Current Length of Stay: 1 day(s)  Current Patient Status: Inpatient   Certification Statement: The patient will continue to require additional inpatient hospital stay due to COPD exerbation  Discharge Plan: Anticipate discharge in 24-48 hrs to home. Code Status: Level 1 - Full Code    Subjective:   Patient complained of SOB, sputum production and back pain. Objective:     Vitals:   Temp (24hrs), Av.4 °F (36.9 °C), Min:98.3 °F (36.8 °C), Max:98.5 °F (36.9 °C)    Temp:  [98.3 °F (36.8 °C)-98.5 °F (36.9 °C)] 98.5 °F (36.9 °C)  HR:  [84-88] 86  Resp:  [19-22] 22  BP: (113-134)/(55-68) 134/67  SpO2:  [88 %-94 %] 93 %  Body mass index is 43.94 kg/m². Input and Output Summary (last 24 hours): Intake/Output Summary (Last 24 hours) at 2023 1645  Last data filed at 2023 1001  Gross per 24 hour   Intake 10 ml   Output --   Net 10 ml       Physical Exam:   Physical Exam  HENT:      Head: Normocephalic. Eyes:      Pupils: Pupils are equal, round, and reactive to light. Cardiovascular:      Rate and Rhythm: Normal rate and regular rhythm. Heart sounds: No murmur heard. Pulmonary:      Effort: Pulmonary effort is normal. No respiratory distress. Breath sounds: Wheezing present. No rales. Abdominal:      Palpations: Abdomen is soft. Tenderness: There is no abdominal tenderness. Musculoskeletal:         General: Tenderness (back) present. No swelling. Normal range of motion. Cervical back: Normal range of motion. Right lower leg: No edema. Left lower leg: No edema. Skin:     General: Skin is warm. Neurological:      Mental Status: She is alert and oriented to person, place, and time.    Psychiatric:         Mood and Affect: Mood normal. Additional Data:     Labs:  Results from last 7 days   Lab Units 11/25/23  1655   WBC Thousand/uL 8.86   HEMOGLOBIN g/dL 13.1   HEMATOCRIT % 41.3   PLATELETS Thousands/uL 291   NEUTROS PCT % 66   LYMPHS PCT % 21   MONOS PCT % 10   EOS PCT % 2     Results from last 7 days   Lab Units 11/25/23  1655   SODIUM mmol/L 141   POTASSIUM mmol/L 4.0   CHLORIDE mmol/L 106   CO2 mmol/L 28   BUN mg/dL 16   CREATININE mg/dL 0.68   ANION GAP mmol/L 7   CALCIUM mg/dL 8.8   ALBUMIN g/dL 3.9   TOTAL BILIRUBIN mg/dL 0.32   ALK PHOS U/L 92   ALT U/L 6*   AST U/L 10*   GLUCOSE RANDOM mg/dL 92                 Results from last 7 days   Lab Units 11/25/23  1655   LACTIC ACID mmol/L 1.0       Lines/Drains:  Invasive Devices       Peripheral Intravenous Line  Duration             Peripheral IV 11/26/23 Dorsal (posterior); Right Hand <1 day                          Imaging: Reviewed radiology reports from this admission including: chest CT scan    Recent Cultures (last 7 days):   Results from last 7 days   Lab Units 11/25/23  1655 11/25/23  1653   BLOOD CULTURE  Received in Microbiology Lab. Culture in Progress. Received in Microbiology Lab. Culture in Progress.        Last 24 Hours Medication List:   Current Facility-Administered Medications   Medication Dose Route Frequency Provider Last Rate    acetaminophen  650 mg Oral Q6H Chantal Monae MD      albuterol  2 puff Inhalation Q4H PRN Fiona Graham MD      azithromycin  500 mg Oral Q24H Asiya Russo MD      enoxaparin  40 mg Subcutaneous Daily Fiona Graham MD      famotidine  20 mg Oral BID Fiona Graham MD      guaiFENesin  600 mg Oral BID Fiona Graham MD      ipratropium  0.5 mg Nebulization TID Fiona Graham MD      levalbuterol  1.25 mg Nebulization TID Fiona Graham MD      lidocaine  1 patch Topical Daily Asiya Russo MD      methocarbamol  500 mg Oral BID Fiona Graham MD      methylPREDNISolone sodium succinate  40 mg Intravenous Darion Crowe MD      nicotine  1 patch Transdermal Daily oRsalie Shetty MD      ondansetron  4 mg Intravenous Q6H PRN NII Basurto      oxyCODONE  5 mg Oral Q4H PRN Jarrod Hoffmann MD      Or    oxyCODONE  10 mg Oral Q4H PRN Jarrod Hoffmann MD      pantoprazole  40 mg Oral Daily Rosalie Shetty MD      senna-docusate sodium  1 tablet Oral HS Jarrod Hoffmann MD          Today, Patient Was Seen By: Jarrod Hoffmann MD    **Please Note: This note may have been constructed using a voice recognition system. **

## 2023-11-26 NOTE — ASSESSMENT & PLAN NOTE
Had a fall a week ago slipped in the mud onto her back. X-rays with no fracture. She has chronic L1 fracture. She is on pain medication at home for chronic back pain.

## 2023-11-26 NOTE — ASSESSMENT & PLAN NOTE
Been having cough and sputum production for about a week now. Has not been feeling well. Been using inhaler at home more often. COVID-19 negative flu negative, RSV negative  Continues to smoke.     We will treat with IV steroids 40 mg every 8 hours and Rocephin and Zithromax  DuoNebs every 6 hours  Mucinex twice daily

## 2023-11-26 NOTE — H&P
[address]  H&P  Name: Anshu Bain 62 y.o. female I MRN: [id number]  Unit/Bed#: -01 I Date of Admission: 11/25/2023   Date of Service: 11/25/2023 I Hospital Day: 0      Assessment/Plan   * COPD exacerbation ([address])  Assessment & Plan  Been having cough and sputum production for about a week now. Has not been feeling well. Been using inhaler at home more often. COVID-19 negative flu negative, RSV negative  Continues to smoke. We will treat with IV steroids 40 mg every 8 hours and Rocephin and Zithromax  DuoNebs every 6 hours  Mucinex twice daily    Smoker  Assessment & Plan  Placed on nicotine patch, continue to smoke. Compression fracture of L1 vertebra (HCC)  Assessment & Plan  Chronic compression of L1. Continue pain meds imaging shows no worsening. Gastroesophageal reflux disease without esophagitis  Assessment & Plan  Continue Protonix and Pepcid         Disposition  #1 IV Rocephin, Zithromax  #2 DuoNebs every 6 hours  #3 IV Solu-Medrol 40 mg every 8 hours      VTE Prophylaxis: Enoxaparin (Lovenox)  / sequential compression device   Code Status: Level 1 - Full Code       Anticipated Length of Stay:  Patient will be admitted on an Inpatient basis with an anticipated length of stay of greater 2 midnights. Justification for Hospital Stay: Please see detailed plans noted above. Chief Complaint:     Shortness of breath  History of Present Illness:  Anshu Bain is a 62 y.o. female who has past medical history significant for SLE, fibromyalgia, chronic back pain secondary to chronic L1 compression fracture, ongoing tobacco history, COPD comes in for shortness of breath and sputum production for 1 week. Has not been feeling well. Had a fever documented at home. She states that her sister from [place] is not feeling well and her  got sick yesterday with vomiting and diarrhea.   She slipped in the mud a week ago, imaging in the emergency room shows no fracture and she has a chronic L1 fracture. Review of Systems:    Constitutional:   fever or chills   Eyes:  Denies change in visual acuity   HENT:  Denies nasal congestion or sore throat   Respiratory:   cough or shortness of breath   Cardiovascular:  Denies chest pain or edema   GI:  Denies abdominal pain or bloody stools  :  Denies dysuria   Musculoskeletal:  back pain   Integument:  Denies rash   Neurologic:  Denies headache or sensory changes   Endocrine:  Denies polyuria or polydipsia   Lymphatic:  Denies swollen glands   Psychiatric:  Denies depression or anxiety     Past Medical and Surgical History:   Past Medical History:   Diagnosis Date    Achalasia     Back pain     Cancer ([address] [facility])     Ovarian    Fibromyalgia     Lupus ([address] [facility])      Past Surgical History:   Procedure Laterality Date    HYSTERECTOMY      NECK SURGERY         Meds/Allergies:  Medications Prior to Admission   Medication Sig Dispense Refill Last Dose    albuterol (PROVENTIL HFA,VENTOLIN HFA) 90 mcg/act inhaler Inhale 2 puffs every 4 (four) hours as needed for wheezing 18 g 0     benzonatate (TESSALON PERLES) 100 mg capsule Take 1 capsule (100 mg total) by mouth 3 (three) times a day as needed for cough 20 capsule 0     famotidine (PEPCID) 20 mg tablet Take 1 tablet (20 mg total) by mouth 2 (two) times a day 30 tablet 0     fluticasone-vilanterol (BREO ELLIPTA) 100-25 mcg/inh inhaler Inhale 1 puff daily Rinse mouth after use. Do not start before October 8, 2022.  (Patient not taking: Reported on 1/15/2023) 60 blister 0     guaiFENesin (MUCINEX) 600 mg 12 hr tablet Take 1 tablet (600 mg total) by mouth 2 (two) times a day 20 tablet 0     ibuprofen (MOTRIN) 800 mg tablet Take 1 tablet (800 mg total) by mouth every 8 (eight) hours as needed for moderate pain 60 tablet 0     ipratropium-albuterol (DUO-NEB) 0.5-2.5 mg/3 mL nebulizer solution Take 3 mL by nebulization 4 (four) times a day 120 mL 0     lidocaine (LIDODERM) 5 % Apply 1 patch topically daily as needed (back pain) Remove & Discard patch within 12 hours or as directed by MD 30 patch 0     methocarbamol (ROBAXIN) 500 mg tablet Take 1 tablet (500 mg total) by mouth 2 (two) times a day 40 tablet 0     methocarbamol (ROBAXIN) 500 mg tablet Take 1 tablet (500 mg total) by mouth 2 (two) times a day 20 tablet 0     nicotine (NICODERM CQ) 14 mg/24hr TD 24 hr patch Place 1 patch on the skin daily Do not start before October 8, 2022. 28 patch 0     oxyCODONE (ROXICODONE) 5 immediate release tablet Take 1 tablet (5 mg total) by mouth every 4 (four) hours as needed for moderate pain Max Daily Amount: 30 mg 10 tablet 0     oxyCODONE (Roxicodone) 5 immediate release tablet Take 1 tablet (5 mg total) by mouth every 6 (six) hours as needed for moderate pain for up to 12 doses Max Daily Amount: 20 mg 12 tablet 0     pantoprazole (PROTONIX) 40 mg tablet Take 1 tablet (40 mg total) by mouth daily 30 tablet 0     predniSONE 20 mg tablet Take 2 tablets (40 mg total) by mouth daily 10 tablet 0     tiotropium (SPIRIVA) 18 mcg inhalation capsule Place 18 mcg into inhaler and inhale daily          Allergies: Allergies   Allergen Reactions    Valium [Diazepam] Anaphylaxis       History:  Marital Status: /Civil Union     Substance Use History:   Social History     Substance and Sexual Activity   Alcohol Use Never     Social History     Tobacco Use   Smoking Status Every Day    Packs/day: 0.50    Types: Cigarettes   Smokeless Tobacco Never     Social History     Substance and Sexual Activity   Drug Use Never       Family History:  History reviewed. No pertinent family history.     Physical Exam:     Vitals:   Blood Pressure: 121/68 (11/25/23 2059)  Pulse: 84 (11/25/23 1931)  Temperature: 98.3 °F (36.8 °C) (11/25/23 2059)  Temp Source: Oral (11/25/23 1454)  Respirations: 21 (11/25/23 1931)  Height: 4' 9" (144.8 cm) (11/25/23 2059)  Weight - Scale: 92.1 kg (203 lb 0.7 oz) (11/25/23 2059)  SpO2: 93 % (11/25/23 1931)    Constitutional:  Non-toxic appearance  Eyes:  EOMI, No scleral icterus   HENT:   oropharynx moist, external ears normal, external nose normal   Respiratory:  No respiratory distress, no wheezing   Cardiovascular:  Normal rate, no murmurs   GI:  Soft, nondistended, no guarding   :  No costovertebral angle tenderness   Musculoskeletal:  no tenderness, no deformities. Back- no tenderness  Integument:  no jaundice, no rash   Neurologic:  Alert &awake, communicative, CN 2-12 normal,  no focal deficits noted   Psychiatric:  Speech and behavior appropriate       Lab Results: I have personally reviewed pertinent reports. Results from last 7 days   Lab Units 11/25/23  1655   WBC Thousand/uL 8.86   HEMOGLOBIN g/dL 13.1   HEMATOCRIT % 41.3   PLATELETS Thousands/uL 291   NEUTROS PCT % 66   LYMPHS PCT % 21   MONOS PCT % 10   EOS PCT % 2     Results from last 7 days   Lab Units 11/25/23  1655   POTASSIUM mmol/L 4.0   CHLORIDE mmol/L 106   CO2 mmol/L 28   BUN mg/dL 16   CREATININE mg/dL 0.68   CALCIUM mg/dL 8.8   ALK PHOS U/L 92   ALT U/L 6*   AST U/L 10*             Imaging: I have personally reviewed pertinent reports. CT recon only lumbar spine (No Charge)    Result Date: 11/25/2023  Narrative: CT RECON ONLY LUMBAR SPINE (NO CHARGE) INDICATION:   fall. COMPARISON: Concurrent CT of the chest, abdomen, and pelvis. CT of the abdomen and pelvis 4/9/2023. TECHNIQUE: Axial CT examination of the lumbar spine was obtained utilizing reconstructed images from CT of the chest, abdomen and pelvis performed the same day. Images were reformatted in the sagittal and coronal planes. This examination, like all CT scans performed in the [facility], was performed utilizing techniques to minimize radiation dose exposure, including the use of iterative reconstruction and automated exposure control. FINDINGS: ALIGNMENT: Normal alignment. No spondylolisthesis. No scoliosis. VERTEBRAE: No acute fracture. Unchanged superior endplate compression fracture at the L1 vertebral body with mild retropulsion of posterior cortex. DEGENERATIVE CHANGES: Mild multilevel degenerative disc disease. Degenerative changes of the sacroiliac joints. PREVERTEBRAL AND PARASPINAL SOFT TISSUES: CT of the abdomen and pelvis performed concurrently. Impression: No acute fracture or traumatic subluxation. Unchanged superior endplate compression fracture of L1 with mild retropulsion of the posterior cortex. Workstation performed: [id number]     PE Study with CT Abdomen and Pelvis with contrast    Result Date: 11/25/2023  Narrative: CT PULMONARY ANGIOGRAM OF THE CHEST AND CT ABDOMEN AND PELVIS WITH INTRAVENOUS CONTRAST INDICATION:   Shortness of breath, COPD, recent injury. COMPARISON: April 9, 2023, CT chest, CT abdomen TECHNIQUE:  CT examination of the chest, abdomen and pelvis was performed. Thin section CT angiographic technique was used in the chest in order to evaluate for pulmonary embolus and coronal 3D MIP postprocessing was performed on the acquisition scanner. Multiplanar 2D reformatted images were created from the source data. This examination, like all CT scans performed in the [facility], was performed utilizing techniques to minimize radiation dose exposure, including the use of iterative reconstruction and automated exposure control. Radiation dose length product (DLP) for this visit:  1738 mGy-cm IV Contrast:  100 mL of iohexol (OMNIPAQUE) Enteric Contrast:  Enteric contrast was not administered. FINDINGS: CHEST PULMONARY ARTERIAL TREE: Good opacification of the pulm arteries. No patchy the first through 4 ounces to suggest pneumonia LUNGS: No acute consolidation atelectasis right middle lobe, areas atelectasis seen in the right lower lobe, left lower lobe PLEURA: No effusion seen No pneumo seen HEART/AORTA:  Heart is unremarkable for patient's age. No thoracic aortic aneurysm.  MEDIASTINUM AND MORGAN: No second mediastinal lymph enlargement Mild thickening of the thoracic esophagus CHEST WALL AND LOWER NECK:  Unremarkable. ABDOMEN LIVER/BILIARY TREE: No focal liver lesion Prominence of the intrahepatic ducts, likely due to postcholecystectomy GALLBLADDER: Surgically absent SPLEEN:  Unremarkable. PANCREAS:  Unremarkable. ADRENAL GLANDS:  Unremarkable. KIDNEYS/URETERS:  Unremarkable. No hydronephrosis. STOMACH AND BOWEL: Diverticulosis seen There is no abnormal dilatation of small bowel loops APPENDIX:  No findings to suggest appendicitis. ABDOMINOPELVIC CAVITY:  No ascites. No pneumoperitoneum. No lymphadenopathy. VESSELS:  Unremarkable for patient's age. PELVIS REPRODUCTIVE ORGANS: Uterus surgically absent URINARY BLADDER:  Unremarkable. ABDOMINAL WALL/INGUINAL REGIONS: Fat attenuation nodular density in the soft tissue of the lower left anterior abdominal wall, stable, benign OSSEOUS STRUCTURES: Chronic right compression deformity along     Impression: No pulmonary embolism Areas of segmental atelectasis left lingular and right middle and lower lobes No acute consolidation No acute inflammatory stranding. Chronic L1 compression with stable appearance Thickening the distal esophagus may be due to reflux disease/esophagitis, unchanged from the April 14, 2021 correlate with the endoscopy Workstation performed: [id number]       Total time for visit, including counseling/coordination of care: 55 minutes. Greater than 50% of this total time spent on direct patient counseling and coorination of care. Epic Records Reviewed as well as Records in Care Everywhere    ** Please Note: Dragon 360 Dictation voice to text software was used in the creation of this document.  **

## 2023-11-26 NOTE — ASSESSMENT & PLAN NOTE
Been having cough and sputum production for about a week now. Has not been feeling well. Been using inhaler at home more often. COVID-19 negative flu negative, RSV negative  Continues to smoke.     We will treat with IV steroids 40 mg every 8 hours and Zithromax  DuoNebs every 6 hours  Mucinex twice daily  No evidence of infection, DC Thomas  Plan to tape down steroid tomorrow

## 2023-11-26 NOTE — RESPIRATORY THERAPY NOTE
RT Protocol Note  Jose R Padgett 62 y.o. female MRN: [id number]  Unit/Bed#: -01 Encounter: [id number]    Assessment    Principal Problem:    COPD exacerbation ([address])  Active Problems:    Smoker    Compression fracture of L1 vertebra ([address])    Fall    Gastroesophageal reflux disease without esophagitis      Home Pulmonary Medications:  DuoNeb QID  Home Devices/Therapy: Home O2    Past Medical History:   Diagnosis Date    Achalasia     Back pain     Cancer ([address])     Ovarian    Fibromyalgia     Lupus ([address])      Social History     Socioeconomic History    Marital status: /Civil Union     Spouse name: None    Number of children: None    Years of education: None    Highest education level: None   Occupational History    None   Tobacco Use    Smoking status: Every Day     Packs/day: 0.50     Types: Cigarettes    Smokeless tobacco: Never   Vaping Use    Vaping Use: Never used   Substance and Sexual Activity    Alcohol use: Never    Drug use: Never    Sexual activity: Yes   Other Topics Concern    None   Social History Narrative    None     Social Determinants of Health     Financial Resource Strain: Not on file   Food Insecurity: No Food Insecurity (1/16/2023)    Hunger Vital Sign     Worried About Running Out of Food in the Last Year: Never true     Ran Out of Food in the Last Year: Never true   Transportation Needs: No Transportation Needs (1/16/2023)    PRAPARE - Transportation     Lack of Transportation (Medical): No     Lack of Transportation (Non-Medical):  No   Physical Activity: Not on file   Stress: Not on file   Social Connections: Not on file   Intimate Partner Violence: Not on file   Housing Stability: Low Risk  (1/16/2023)    Housing Stability Vital Sign     Unable to Pay for Housing in the Last Year: No     Number of Places Lived in the Last Year: 1     Unstable Housing in the Last Year: No       Subjective         Objective    Physical Exam:   Assessment Type: Assess only  General Appearance: Awake  Respiratory Pattern: Dyspnea with exertion  Chest Assessment: Chest expansion symmetrical  Bilateral Breath Sounds: Diminished    Vitals:  Blood pressure 121/68, pulse 84, temperature 98.3 °F (36.8 °C), resp. rate 21, height 4' 9" (1.448 m), weight 92.1 kg (203 lb 0.7 oz), SpO2 93 %. Imaging and other studies: I have personally reviewed pertinent reports. Plan    Respiratory Plan: Moderate/Severe Distress pathway        Resp Comments: Pt with history of COPD admitted for COPD exacerbation, patient continues to smoke. Patient takes Duoneb QID at home but was without power and unable to take her txs. WIll order xopenex and atrovent TID at this time. Patient has a strong productive cough will continue with IS no other airway clearance needed at this time.

## 2023-11-27 ENCOUNTER — APPOINTMENT (INPATIENT)
Dept: RADIOLOGY | Facility: HOSPITAL | Age: 58
DRG: 140 | End: 2023-11-27
Payer: COMMERCIAL

## 2023-11-27 PROBLEM — M81.0 OSTEOPOROSIS: Status: ACTIVE | Noted: 2023-11-27

## 2023-11-27 LAB
ANION GAP SERPL CALCULATED.3IONS-SCNC: 4 MMOL/L
BASOPHILS # BLD AUTO: 0.01 THOUSANDS/ÂΜL (ref 0–0.1)
BASOPHILS NFR BLD AUTO: 0 % (ref 0–1)
BUN SERPL-MCNC: 19 MG/DL (ref 5–25)
CALCIUM SERPL-MCNC: 8.9 MG/DL (ref 8.4–10.2)
CHLORIDE SERPL-SCNC: 107 MMOL/L (ref 96–108)
CO2 SERPL-SCNC: 29 MMOL/L (ref 21–32)
CREAT SERPL-MCNC: 0.73 MG/DL (ref 0.6–1.3)
EOSINOPHIL # BLD AUTO: 0 THOUSAND/ÂΜL (ref 0–0.61)
EOSINOPHIL NFR BLD AUTO: 0 % (ref 0–6)
ERYTHROCYTE [DISTWIDTH] IN BLOOD BY AUTOMATED COUNT: 15.4 % (ref 11.6–15.1)
GFR SERPL CREATININE-BSD FRML MDRD: 91 ML/MIN/1.73SQ M
GLUCOSE SERPL-MCNC: 180 MG/DL (ref 65–140)
GLUCOSE SERPL-MCNC: 206 MG/DL (ref 65–140)
HCT VFR BLD AUTO: 37.7 % (ref 34.8–46.1)
HGB BLD-MCNC: 11.9 G/DL (ref 11.5–15.4)
IMM GRANULOCYTES # BLD AUTO: 0.1 THOUSAND/UL (ref 0–0.2)
IMM GRANULOCYTES NFR BLD AUTO: 1 % (ref 0–2)
LYMPHOCYTES # BLD AUTO: 0.54 THOUSANDS/ÂΜL (ref 0.6–4.47)
LYMPHOCYTES NFR BLD AUTO: 4 % (ref 14–44)
MCH RBC QN AUTO: 29.8 PG (ref 26.8–34.3)
MCHC RBC AUTO-ENTMCNC: 31.6 G/DL (ref 31.4–37.4)
MCV RBC AUTO: 94 FL (ref 82–98)
MONOCYTES # BLD AUTO: 0.62 THOUSAND/ÂΜL (ref 0.17–1.22)
MONOCYTES NFR BLD AUTO: 4 % (ref 4–12)
NEUTROPHILS # BLD AUTO: 13.14 THOUSANDS/ÂΜL (ref 1.85–7.62)
NEUTS SEG NFR BLD AUTO: 91 % (ref 43–75)
NRBC BLD AUTO-RTO: 0 /100 WBCS
PLATELET # BLD AUTO: 334 THOUSANDS/UL (ref 149–390)
PMV BLD AUTO: 8.8 FL (ref 8.9–12.7)
POTASSIUM SERPL-SCNC: 4.8 MMOL/L (ref 3.5–5.3)
RBC # BLD AUTO: 4 MILLION/UL (ref 3.81–5.12)
SODIUM SERPL-SCNC: 140 MMOL/L (ref 135–147)
WBC # BLD AUTO: 14.41 THOUSAND/UL (ref 4.31–10.16)

## 2023-11-27 PROCEDURE — 85025 COMPLETE CBC W/AUTO DIFF WBC: CPT | Performed by: INTERNAL MEDICINE

## 2023-11-27 PROCEDURE — 94640 AIRWAY INHALATION TREATMENT: CPT

## 2023-11-27 PROCEDURE — 82948 REAGENT STRIP/BLOOD GLUCOSE: CPT

## 2023-11-27 PROCEDURE — 80048 BASIC METABOLIC PNL TOTAL CA: CPT | Performed by: INTERNAL MEDICINE

## 2023-11-27 PROCEDURE — 99232 SBSQ HOSP IP/OBS MODERATE 35: CPT | Performed by: FAMILY MEDICINE

## 2023-11-27 PROCEDURE — 94760 N-INVAS EAR/PLS OXIMETRY 1: CPT

## 2023-11-27 PROCEDURE — 94664 DEMO&/EVAL PT USE INHALER: CPT

## 2023-11-27 PROCEDURE — 73521 X-RAY EXAM HIPS BI 2 VIEWS: CPT

## 2023-11-27 RX ORDER — ECHINACEA PURPUREA EXTRACT 125 MG
1 TABLET ORAL
Status: DISCONTINUED | OUTPATIENT
Start: 2023-11-27 | End: 2023-11-29 | Stop reason: HOSPADM

## 2023-11-27 RX ORDER — ONDANSETRON 4 MG/1
4 TABLET, ORALLY DISINTEGRATING ORAL EVERY 6 HOURS PRN
Status: DISCONTINUED | OUTPATIENT
Start: 2023-11-27 | End: 2023-11-28

## 2023-11-27 RX ORDER — METHYLPREDNISOLONE SODIUM SUCCINATE 40 MG/ML
40 INJECTION, POWDER, LYOPHILIZED, FOR SOLUTION INTRAMUSCULAR; INTRAVENOUS EVERY 12 HOURS SCHEDULED
Status: DISCONTINUED | OUTPATIENT
Start: 2023-11-27 | End: 2023-11-28

## 2023-11-27 RX ADMIN — METHYLPREDNISOLONE SODIUM SUCCINATE 40 MG: 40 INJECTION, POWDER, FOR SOLUTION INTRAMUSCULAR; INTRAVENOUS at 05:22

## 2023-11-27 RX ADMIN — GUAIFENESIN 600 MG: 600 TABLET ORAL at 17:30

## 2023-11-27 RX ADMIN — METHOCARBAMOL TABLETS 500 MG: 500 TABLET, COATED ORAL at 08:59

## 2023-11-27 RX ADMIN — OXYCODONE HYDROCHLORIDE 10 MG: 5 TABLET ORAL at 11:33

## 2023-11-27 RX ADMIN — ENOXAPARIN SODIUM 40 MG: 40 INJECTION SUBCUTANEOUS at 08:58

## 2023-11-27 RX ADMIN — AZITHROMYCIN 500 MG: 500 TABLET, FILM COATED ORAL at 16:07

## 2023-11-27 RX ADMIN — LIDOCAINE 1 PATCH: 50 PATCH CUTANEOUS at 16:07

## 2023-11-27 RX ADMIN — ONDANSETRON 4 MG: 4 TABLET, ORALLY DISINTEGRATING ORAL at 17:32

## 2023-11-27 RX ADMIN — ONDANSETRON 4 MG: 4 TABLET, ORALLY DISINTEGRATING ORAL at 12:42

## 2023-11-27 RX ADMIN — LEVALBUTEROL HYDROCHLORIDE 1.25 MG: 1.25 SOLUTION RESPIRATORY (INHALATION) at 19:55

## 2023-11-27 RX ADMIN — ACETAMINOPHEN 650 MG: 325 TABLET, FILM COATED ORAL at 05:22

## 2023-11-27 RX ADMIN — ALBUTEROL SULFATE 2 PUFF: 90 AEROSOL, METERED RESPIRATORY (INHALATION) at 17:50

## 2023-11-27 RX ADMIN — METHOCARBAMOL TABLETS 500 MG: 500 TABLET, COATED ORAL at 17:30

## 2023-11-27 RX ADMIN — ACETAMINOPHEN 650 MG: 325 TABLET, FILM COATED ORAL at 17:30

## 2023-11-27 RX ADMIN — FAMOTIDINE 20 MG: 20 TABLET ORAL at 17:30

## 2023-11-27 RX ADMIN — OXYCODONE HYDROCHLORIDE 5 MG: 5 TABLET ORAL at 17:32

## 2023-11-27 RX ADMIN — ACETAMINOPHEN 650 MG: 325 TABLET, FILM COATED ORAL at 11:34

## 2023-11-27 RX ADMIN — OXYCODONE HYDROCHLORIDE 10 MG: 5 TABLET ORAL at 07:35

## 2023-11-27 RX ADMIN — LEVALBUTEROL HYDROCHLORIDE 1.25 MG: 1.25 SOLUTION RESPIRATORY (INHALATION) at 07:05

## 2023-11-27 RX ADMIN — OXYCODONE HYDROCHLORIDE 10 MG: 5 TABLET ORAL at 02:47

## 2023-11-27 RX ADMIN — FAMOTIDINE 20 MG: 20 TABLET ORAL at 08:59

## 2023-11-27 RX ADMIN — METHYLPREDNISOLONE SODIUM SUCCINATE 40 MG: 40 INJECTION, POWDER, FOR SOLUTION INTRAMUSCULAR; INTRAVENOUS at 21:32

## 2023-11-27 RX ADMIN — NICOTINE 1 PATCH: 14 PATCH, EXTENDED RELEASE TRANSDERMAL at 09:00

## 2023-11-27 RX ADMIN — IPRATROPIUM BROMIDE 0.5 MG: 0.5 SOLUTION RESPIRATORY (INHALATION) at 07:05

## 2023-11-27 RX ADMIN — OXYCODONE HYDROCHLORIDE 10 MG: 5 TABLET ORAL at 16:15

## 2023-11-27 RX ADMIN — OXYCODONE HYDROCHLORIDE 10 MG: 5 TABLET ORAL at 21:32

## 2023-11-27 RX ADMIN — PANTOPRAZOLE SODIUM 40 MG: 40 TABLET, DELAYED RELEASE ORAL at 08:59

## 2023-11-27 RX ADMIN — LEVALBUTEROL HYDROCHLORIDE 1.25 MG: 1.25 SOLUTION RESPIRATORY (INHALATION) at 13:14

## 2023-11-27 RX ADMIN — GUAIFENESIN 600 MG: 600 TABLET ORAL at 08:59

## 2023-11-27 RX ADMIN — SENNOSIDES AND DOCUSATE SODIUM 1 TABLET: 50; 8.6 TABLET ORAL at 21:32

## 2023-11-27 RX ADMIN — IPRATROPIUM BROMIDE 0.5 MG: 0.5 SOLUTION RESPIRATORY (INHALATION) at 13:14

## 2023-11-27 RX ADMIN — IPRATROPIUM BROMIDE 0.5 MG: 0.5 SOLUTION RESPIRATORY (INHALATION) at 19:55

## 2023-11-27 NOTE — PLAN OF CARE
Problem: PAIN - ADULT  Goal: Verbalizes/displays adequate comfort level or baseline comfort level  Description: Interventions:  - Encourage patient to monitor pain and request assistance  - Assess pain using appropriate pain scale  - Administer analgesics based on type and severity of pain and evaluate response  - Implement non-pharmacological measures as appropriate and evaluate response  - Consider cultural and social influences on pain and pain management  - Notify physician/advanced practitioner if interventions unsuccessful or patient reports new pain  Outcome: Progressing     Problem: INFECTION - ADULT  Goal: Absence or prevention of progression during hospitalization  Description: INTERVENTIONS:  - Assess and monitor for signs and symptoms of infection  - Monitor lab/diagnostic results  - Monitor all insertion sites, i.e. indwelling lines, tubes, and drains  - Monitor endotracheal if appropriate and nasal secretions for changes in amount and color  - [place] appropriate cooling/warming therapies per order  - Administer medications as ordered  - Instruct and encourage patient and family to use good hand hygiene technique  - Identify and instruct in appropriate isolation precautions for identified infection/condition  Outcome: Progressing

## 2023-11-27 NOTE — UTILIZATION REVIEW
Initial Clinical Review    Admission: Date/Time/Statement:   Admission Orders (From admission, onward)       Ordered        11/25/23 2008  INPATIENT ADMISSION  Once                          Orders Placed This Encounter   Procedures    INPATIENT ADMISSION     Standing Status:   Standing     Number of Occurrences:   1     Order Specific Question:   Level of Care     Answer:   Med Surg [16]     Order Specific Question:   Estimated length of stay     Answer:   More than 2 Midnights     Order Specific Question:   Certification     Answer:   I certify that inpatient services are medically necessary for this patient for a duration of greater than two midnights. See H&P and MD Progress Notes for additional information about the patient's course of treatment. ED Arrival Information       Expected   -    Arrival   11/25/2023 14:40    Acuity   Urgent              Means of arrival   Walk-In    Escorted by   Self    Service   Hospitalist    Admission type   Emergency              Arrival complaint   Shortness Of Breath             Chief Complaint   Patient presents with    Shortness of Breath     Patient c/o sob, episodes of palpitations, right leg swelling x 1 week following a trip and fall at home. Initial Presentation: 62 y.o. female to Ed from home w/ SOB and sputum production x1 week . Fever documented at home  . Family at home sick recently . Slipped in mud 1 week ago and seen in ED , has chronic L1 fx. PMHX SLE, fibromyalgia, chronic back pain secondary to chronic L1 compression fracture, ongoing tobacco history, COPD . Admitted IP status w/ COPD exacerbation plan for IV steroids, rocephin , zithromax , duonebs , mucinex . GERD PPI and pepcid. Date: 11/26   Day 2: c/o SOB, sputum production and back pain. Cont duonebs , mucinex , steroids , zithromax. Taper down steroids 11/27. Pain control . + wheezing , back tenderness .  O2 sat 95 % on 3l     ED Triage Vitals   Temperature Pulse Respirations Blood Pressure SpO2   11/25/23 1454 11/25/23 1454 11/25/23 1454 11/25/23 1454 11/25/23 1454   98.1 °F (36.7 °C) 93 22 162/77 95 %      Temp Source Heart Rate Source Patient Position - Orthostatic VS BP Location FiO2 (%)   11/25/23 1454 11/25/23 1454 -- -- --   Oral Monitor         Pain Score       11/25/23 1531       9          Wt Readings from Last 1 Encounters:   11/25/23 92.1 kg (203 lb 0.7 oz)     Additional Vital Signs:   1/27/23 07:19:01 98.1 °F (36.7 °C) -- -- 117/74 88 -- -- -- --   11/27/23 0705 -- -- -- -- -- 94 % 32 3 L/min Nasal cannula   11/26/23 22:58:39 98.2 °F (36.8 °C) -- -- 123/62 82 -- -- -- --   11/26/23 22:58:19 98.2 °F (36.8 °C) -- -- 123/62 82 -- -- -- --   11/26/23 2200 -- -- -- -- -- -- 32 3 L/min Nasal cannula   11/26/23 2007 -- -- -- -- -- 95 % 32 3 L/min Nasal cannula   11/26/23 15:14:51 98.5 °F (36.9 °C) -- 22 134/67 89 -- -- -- --   11/26/23 1305 -- -- -- -- -- 93 % 32 3 L/min  Nasal cannula    11/26/23 1018 -- -- -- -- -- 88 % Abnormal  32 3 L/min Nasal cannula   11/26/23 08:11:50 98.4 °F (36.9 °C) -- 22 124/66 85 -- -- -- --   11/26/23 0739 -- 86 19 -- -- 93 % -- -- --   11/26/23 0731 -- -- -- -- -- 94 % -- -- --   11/25/23 2205 -- -- -- -- -- 93 % 32 3 L/min Nasal cannula   11/25/23 20:59:09 98.3 °F (36.8 °C) -- -- 121/68 86 -- -- -- --   11/25/23 1931 -- 84 21 113/55 79 93 % -- -- --   11/25/23 1900 -- 88 20 129/61 88 94 % 32 3 L/min Nasal cannula   11/25/23 1600 -- 80 20 126/58 83 99 % -- -- --   11/25/23 1551 -- -- -- -- -- 95 % -- -- --   11/25/23 1545 -- 90 20 142/71 96 94 % -- -- --   11/25/23 1500 -- 96 20 162/77 110 96 % --       Pertinent Labs/Diagnostic Test Results:   PE Study with CT Abdomen and Pelvis with contrast   Final Result by Kayley Valente MD (11/25 1956)   No pulmonary embolism   Areas of segmental atelectasis left lingular and right middle and lower lobes   No acute consolidation   No acute inflammatory stranding.    Chronic L1 compression with stable appearance Thickening the distal esophagus may be due to reflux disease/esophagitis, unchanged from the April 14, 2021 correlate with the endoscopy         Workstation performed: [id number]         CT recon only lumbar spine (No Charge)   Final Result by Fela Garcia MD (11/25 1958)      No acute fracture or traumatic subluxation. Unchanged superior endplate compression fracture of L1 with mild retropulsion of the posterior cortex. Workstation performed: [id number]         XR chest 1 view portable   ED Interpretation by Ryder Adkins DO (11/25 1731)   NAD      Final Result by Russ Dumont MD (11/26 1050)      No acute cardiopulmonary disease. Workstation performed: [id number]         XR tibia fibula 2 views RIGHT   ED Interpretation by Ryder Adkins DO (11/25 1731)   NAD      Final Result by Russ Dumont MD (11/26 1051)      No acute osseous abnormality. Workstation performed: [id number]         XR femur 2 views RIGHT   ED Interpretation by Ryder Adkins DO (11/25 1731)   NAD      Final Result by Russ Dumont MD (11/26 1051)      No acute osseous abnormality.             Workstation performed: [id number]           Results from last 7 days   Lab Units 11/25/23  1655   SARS-COV-2  Negative     Results from last 7 days   Lab Units 11/27/23  0459 11/25/23  1655   WBC Thousand/uL 14.41* 8.86   HEMOGLOBIN g/dL 11.9 13.1   HEMATOCRIT % 37.7 41.3   PLATELETS Thousands/uL 334 291   NEUTROS ABS Thousands/µL 13.14* 5.95         Results from last 7 days   Lab Units 11/27/23  0459 11/25/23  1655   SODIUM mmol/L 140 141   POTASSIUM mmol/L 4.8 4.0   CHLORIDE mmol/L 107 106   CO2 mmol/L 29 28   ANION GAP mmol/L 4 7   BUN mg/dL 19 16   CREATININE mg/dL 0.73 0.68   EGFR ml/min/1.73sq m 91 96   CALCIUM mg/dL 8.9 8.8     Results from last 7 days   Lab Units 11/25/23  1655   AST U/L 10*   ALT U/L 6*   ALK PHOS U/L 92   TOTAL PROTEIN g/dL 6.6   ALBUMIN g/dL 3. 9   TOTAL BILIRUBIN mg/dL 0.32         Results from last 7 days   Lab Units 11/27/23  0459 11/25/23  1655   GLUCOSE RANDOM mg/dL 206* 92       Results from last 7 days   Lab Units 11/25/23  2127 11/25/23  1901 11/25/23  1655   HS TNI 0HR ng/L  --   --  3   HS TNI 2HR ng/L  --  3  --    HSTNI D2 ng/L  --  0  --    HS TNI 4HR ng/L 3  --   --    HSTNI D4 ng/L 0  --   --        Results from last 7 days   Lab Units 11/25/23  1655   LACTIC ACID mmol/L 1.0     Results from last 7 days   Lab Units 11/25/23  1655   BNP pg/mL 28           Results from last 7 days   Lab Units 11/25/23  1655   LIPASE u/L 15         Results from last 7 days   Lab Units 11/25/23  1655   INFLUENZA A PCR  Negative   INFLUENZA B PCR  Negative   RSV PCR  Negative     Results from last 7 days   Lab Units 11/25/23  1655 11/25/23  1653   BLOOD CULTURE  No Growth at 24 hrs. No Growth at 24 hrs. ED Treatment:   Medication Administration from 11/25/2023 1440 to 11/25/2023 2050         Date/Time Order Dose Route Action Action by Comments     11/25/2023 1550 EST albuterol inhalation solution 10 mg 10 mg Nebulization Given Neda Ormond, RT --     11/25/2023 1531 EST ipratropium (ATROVENT) 0.02 % inhalation solution 1 mg 1 mg Nebulization Given Neda Ormond, RT Heart neb     11/25/2023 1535 EST sodium chloride 0.9 % inhalation solution 12 mL 12 mL Nebulization Given Ciarra Barger, RN --     11/25/2023 1656 EST morphine injection 4 mg 4 mg Intravenous Given Fe Owens RN --     11/25/2023 1531 EST acetaminophen (TYLENOL) tablet 975 mg 975 mg Oral Given Ciarra Barger, RN --     11/25/2023 1811 EST morphine injection 6 mg 6 mg Intravenous Given Ciarra Barger, RN --     11/25/2023 1821 EST iohexol (OMNIPAQUE) 350 MG/ML injection (MULTI-DOSE) 100 mL 100 mL Intravenous Given Bear Singer --     11/25/2023 2025 EST dexamethasone (PF) (DECADRON) injection 10 mg 10 mg Intravenous Given Adarsh Salomon RN --     11/25/2023 2028 EST cefTRIAXone (ROCEPHIN) IVPB (premix in dextrose) 1,000 mg 50 mL 1,000 mg Intravenous New Bag Mitchell Sifuentes RN --     11/25/2023 2025 EST doxycycline hyclate (VIBRAMYCIN) capsule 100 mg 100 mg Oral Given Mitchell Sifuentes RN --          Past Medical History:   Diagnosis Date    Achalasia     Back pain     Cancer ([address])     Ovarian    Fibromyalgia     Lupus ([address])      Present on Admission:   COPD exacerbation ([address])   Smoker   Fall   Compression fracture of L1 vertebra (HCC)   Gastroesophageal reflux disease without esophagitis      Admitting Diagnosis: Dyspnea [R06.00]  SOB (shortness of breath) [R06.02]  Age/Sex: 62 y.o. female  Admission Orders:  Scheduled Medications:  acetaminophen, 650 mg, Oral, Q6H [facility]  azithromycin, 500 mg, Oral, Q24H  enoxaparin, 40 mg, Subcutaneous, Daily  famotidine, 20 mg, Oral, BID  guaiFENesin, 600 mg, Oral, BID  ipratropium, 0.5 mg, Nebulization, TID  levalbuterol, 1.25 mg, Nebulization, TID  lidocaine, 1 patch, Topical, Daily  methocarbamol, 500 mg, Oral, BID  methylPREDNISolone sodium succinate, 40 mg, Intravenous, Q8H  nicotine, 1 patch, Transdermal, Daily  pantoprazole, 40 mg, Oral, Daily  senna-docusate sodium, 1 tablet, Oral, HS      Continuous IV Infusions:     PRN Meds:  albuterol, 2 puff, Inhalation, Q4H PRN  ondansetron, 4 mg, Intravenous, Q6H PRN  oxyCODONE, 5 mg, Oral, Q4H PRN   Or  oxyCODONE, 10 mg, Oral, Q4H PRN    Reg diet   Up and OOB       None    Network Utilization Review Department  ATTENTION: Please call with any questions or concerns to [phone] and carefully listen to the prompts so that you are directed to the right person. All voicemails are confidential.   For Discharge needs, contact Care Management DC Support Team at [phone] opt. [phone]  Send all requests for admission clinical reviews, approved or denied determinations and any other requests to dedicated fax number below belonging to the campus where the patient is receiving treatment.  List of dedicated fax numbers for the Facilities:  [place] DENIALS (Administrative/Medical Necessity) [phone]   DISCHARGE SUPPORT TEAM (NETWORK) [id number] [facility] (Maternity/NICU/Pediatrics) [phone]   [address] [place] [address] Drive [address] [place] [address] 1000 [facility] [phone] [address] [place] [address] [address] [address] [facility] [address] [phone]

## 2023-11-27 NOTE — RESPIRATORY THERAPY NOTE
11/26/23 2007   Respiratory Protocol   Protocol Initiated? No   Protocol Selection Respiratory   Language Barrier? No   Medical & Social History Reviewed? Yes   Diagnostic Studies Reviewed? Yes   Physical Assessment Performed?  Yes   Respiratory Plan Mild Distress pathway   Respiratory Assessment   General Appearance Drowsy   Respiratory Pattern Normal   Chest Assessment Chest expansion symmetrical   Bilateral Breath Sounds Diminished   Cough None   Resp Comments resting quietly with even non labored respirations   O2 Device 3L o2 nc   Additional Assessments   SpO2 95 %

## 2023-11-27 NOTE — MALNUTRITION/BMI
This medical record reflects one or more clinical indicators suggestive of morbid obesity. BMI Findings:  Adult BMI Classifications: Morbid Obesity 40-44.9        Body mass index is 43.94 kg/m². See Nutrition note dated 11/27/2023 for additional details. Completed nutrition assessment is viewable in the nutrition documentation.

## 2023-11-27 NOTE — PROGRESS NOTES
[address]  Progress Note  Name: Franky Krishnan I  MRN: [id number]  Unit/Bed#: -01 I Date of Admission: 11/25/2023   Date of Service: 11/27/2023 I Hospital Day: 2    Assessment/Plan   Gastroesophageal reflux disease without esophagitis  Assessment & Plan  Continue Protonix and Pepcid    Fall  Assessment & Plan  Had a fall a week ago slipped in the mud onto her back. X-rays with no fracture. She has chronic L1 fracture. PT OT consults ordered  Per patient's request, ordered to bilateral hip x-ray to rule out any fracture due to patient reporting the right hip being involved in the recent fall. she is on pain medication at home for chronic back pain. Compression fracture of L1 vertebra (HCC)  Assessment & Plan  Chronic compression of L1. Continue pain meds   Imaging shows no worsening. Smoker  Assessment & Plan  Placed on nicotine patch, patient continues to smoke. * COPD exacerbation ([address])  Assessment & Plan  Been having cough and sputum production for about a week now. Has not been feeling well. Been using inhaler at home more often. COVID-19 negative flu negative, RSV negative  Continues to smoke. We will taper down IV steroids to 40 mg every 12 hours and Zithromax  DuoNebs every 6 hours  Mucinex twice daily  No evidence of infection, DC Rocephin  Reassess improvement of symptoms tomorrow               VTE Pharmacologic Prophylaxis: VTE Score: 5 High Risk (Score >/= 5) - Pharmacological DVT Prophylaxis Ordered: enoxaparin (Lovenox). Sequential Compression Devices Ordered. Mobility:   Basic Mobility Inpatient Raw Score: 21  JH-HLM Goal: 6: Walk 10 steps or more  JH-HLM Achieved: 6: Walk 10 steps or more  HLM Goal NOT achieved. Continue with multidisciplinary rounding and encourage appropriate mobility to improve upon [facility] goals. Patient Centered Rounds: I performed bedside rounds with nursing staff today.    Discussions with Specialists or Other Care Team Provider: Case management    Education and Discussions with Family / Patient:  Ongoing family updates. Total Time Spent on Date of Encounter in care of patient: 35 mins. This time was spent on one or more of the following: performing physical exam; counseling and coordination of care; obtaining or reviewing history; documenting in the medical record; reviewing/ordering tests, medications or procedures; communicating with other healthcare professionals and discussing with patient's family/caregivers. Current Length of Stay: 2 day(s)  Current Patient Status: Inpatient   Certification Statement: The patient will continue to require additional inpatient hospital stay due to COPD exacerbation requiring IV steroids  Discharge Plan: Anticipate discharge in 24-48 hrs to discharge location to be determined pending rehab evaluations. Code Status: Level 1 - Full Code    Subjective:   Don Cummins was seen and examined this morning. She reports that she feels somewhat similar to how she felt yesterday in terms of her breathing, but wants to try to wean down steroids. She continues to experience lower back pain. Objective:     Vitals:   Temp (24hrs), Av.2 °F (36.8 °C), Min:98.1 °F (36.7 °C), Max:98.4 °F (36.9 °C)    Temp:  [98.1 °F (36.7 °C)-98.4 °F (36.9 °C)] 98.4 °F (36.9 °C)  BP: (117-146)/(62-74) 146/72  SpO2:  [93 %-95 %] 93 %  Body mass index is 43.94 kg/m². Input and Output Summary (last 24 hours): Intake/Output Summary (Last 24 hours) at 2023 1604  Last data filed at 2023 1249  Gross per 24 hour   Intake 1080 ml   Output 400 ml   Net 680 ml       Physical Exam:   Physical Exam  Vitals and nursing note reviewed. Constitutional:       General: She is not in acute distress. Appearance: She is obese. She is ill-appearing. HENT:      Head: Normocephalic and atraumatic.    Eyes:      Conjunctiva/sclera: Conjunctivae normal.   Cardiovascular:      Rate and Rhythm: Normal rate and regular rhythm. Heart sounds: No murmur heard. Pulmonary:      Effort: Pulmonary effort is normal. No respiratory distress. Breath sounds: Decreased breath sounds present. Abdominal:      Palpations: Abdomen is soft. Tenderness: There is no abdominal tenderness. Musculoskeletal:         General: No swelling. Cervical back: Neck supple. Skin:     General: Skin is warm and dry. Neurological:      Mental Status: She is alert. Psychiatric:         Mood and Affect: Mood normal.          Additional Data:     Labs:  Results from last 7 days   Lab Units 11/27/23  0459   WBC Thousand/uL 14.41*   HEMOGLOBIN g/dL 11.9   HEMATOCRIT % 37.7   PLATELETS Thousands/uL 334   NEUTROS PCT % 91*   LYMPHS PCT % 4*   MONOS PCT % 4   EOS PCT % 0     Results from last 7 days   Lab Units 11/27/23  0459 11/25/23  1655   SODIUM mmol/L 140 141   POTASSIUM mmol/L 4.8 4.0   CHLORIDE mmol/L 107 106   CO2 mmol/L 29 28   BUN mg/dL 19 16   CREATININE mg/dL 0.73 0.68   ANION GAP mmol/L 4 7   CALCIUM mg/dL 8.9 8.8   ALBUMIN g/dL  --  3.9   TOTAL BILIRUBIN mg/dL  --  0.32   ALK PHOS U/L  --  92   ALT U/L  --  6*   AST U/L  --  10*   GLUCOSE RANDOM mg/dL 206* 92                 Results from last 7 days   Lab Units 11/25/23  1655   LACTIC ACID mmol/L 1.0       Lines/Drains:  Invasive Devices       Peripheral Intravenous Line  Duration             Peripheral IV 11/27/23 Dorsal (posterior); Left Wrist <1 day                          Imaging: Reviewed radiology reports from this admission including: chest CT scan    Recent Cultures (last 7 days):   Results from last 7 days   Lab Units 11/25/23  1655 11/25/23  1653   BLOOD CULTURE  No Growth at 24 hrs. No Growth at 24 hrs.        Last 24 Hours Medication List:   Current Facility-Administered Medications   Medication Dose Route Frequency Provider Last Rate    acetaminophen  650 mg Oral Q6H [facility] Dima Phillips MD      albuterol  2 puff Inhalation Q4H EDUARDO Appiah MD azithromycin  500 mg Oral Q24H Ahmet Acuña MD      enoxaparin  40 mg Subcutaneous Daily Yazmin Mckinney MD      famotidine  20 mg Oral BID Ramon Meir Galdamez MD      guaiFENesin  600 mg Oral BID Yazmin Mckinney MD      ipratropium  0.5 mg Nebulization TID Yazmin Mckinney MD      levalbuterol  1.25 mg Nebulization TID Yazmin Mckinney MD      lidocaine  1 patch Topical Daily Ahmet Acuña MD      methocarbamol  500 mg Oral BID Yazmin Mckinney MD      methylPREDNISolone sodium succinate  40 mg Intravenous Q12H [facility] Lorie Bolton MD      nicotine  1 patch Transdermal Daily Yazmin Mckinney MD      ondansetron  4 mg Oral Q6H PRN Lorie Bolton MD      oxyCODONE  5 mg Oral Q4H PRN Ahmet Acuña MD      Or    oxyCODONE  10 mg Oral Q4H PRN Ahmet Acuña MD      pantoprazole  40 mg Oral Daily Yazmin Mckinney MD      senna-docusate sodium  1 tablet Oral HS Ahmet Acuña MD      sodium chloride  1 spray Each Nare Q1H PRN Lorie Bolton MD          Today, Patient Was Seen By: Lorie Bolton MD    **Please Note: This note may have been constructed using a voice recognition system. **

## 2023-11-27 NOTE — ASSESSMENT & PLAN NOTE
Been having cough and sputum production for about a week now. Has not been feeling well. Been using inhaler at home more often. COVID-19 negative flu negative, RSV negative  Continues to smoke.     We will taper down IV steroids to 40 mg every 12 hours and Zithromax  DuoNebs every 6 hours  Mucinex twice daily  No evidence of infection, DC Rocephin  Reassess improvement of symptoms tomorrow

## 2023-11-27 NOTE — ASSESSMENT & PLAN NOTE
Had a fall a week ago slipped in the mud onto her back. X-rays with no fracture. She has chronic L1 fracture. PT OT consults ordered  Per patient's request, ordered to bilateral hip x-ray to rule out any fracture due to patient reporting the right hip being involved in the recent fall. she is on pain medication at home for chronic back pain.

## 2023-11-28 DIAGNOSIS — J44.1 COPD EXACERBATION (HCC): Primary | ICD-10-CM

## 2023-11-28 PROCEDURE — 94640 AIRWAY INHALATION TREATMENT: CPT

## 2023-11-28 PROCEDURE — 94760 N-INVAS EAR/PLS OXIMETRY 1: CPT

## 2023-11-28 PROCEDURE — 99232 SBSQ HOSP IP/OBS MODERATE 35: CPT | Performed by: STUDENT IN AN ORGANIZED HEALTH CARE EDUCATION/TRAINING PROGRAM

## 2023-11-28 RX ORDER — BISACODYL 5 MG/1
5 TABLET, DELAYED RELEASE ORAL DAILY PRN
Qty: 30 TABLET | Refills: 0 | Status: SHIPPED | OUTPATIENT
Start: 2023-11-28 | End: 2023-11-29 | Stop reason: SDUPTHER

## 2023-11-28 RX ORDER — ONDANSETRON 2 MG/ML
4 INJECTION INTRAMUSCULAR; INTRAVENOUS EVERY 4 HOURS PRN
Status: DISCONTINUED | OUTPATIENT
Start: 2023-11-28 | End: 2023-11-29

## 2023-11-28 RX ORDER — OXYCODONE HYDROCHLORIDE 10 MG/1
10 TABLET ORAL EVERY 4 HOURS PRN
Qty: 20 TABLET | Refills: 0 | Status: SHIPPED | OUTPATIENT
Start: 2023-11-28 | End: 2023-11-29 | Stop reason: SDUPTHER

## 2023-11-28 RX ORDER — BISACODYL 5 MG/1
5 TABLET, DELAYED RELEASE ORAL DAILY PRN
Status: DISCONTINUED | OUTPATIENT
Start: 2023-11-28 | End: 2023-11-29 | Stop reason: HOSPADM

## 2023-11-28 RX ORDER — PREDNISONE 20 MG/1
TABLET ORAL
Qty: 15 TABLET | Refills: 0 | Status: SHIPPED | OUTPATIENT
Start: 2023-11-28 | End: 2023-11-29 | Stop reason: SDUPTHER

## 2023-11-28 RX ORDER — METHYLPREDNISOLONE SODIUM SUCCINATE 40 MG/ML
40 INJECTION, POWDER, LYOPHILIZED, FOR SOLUTION INTRAMUSCULAR; INTRAVENOUS DAILY
Status: DISCONTINUED | OUTPATIENT
Start: 2023-11-29 | End: 2023-11-29 | Stop reason: HOSPADM

## 2023-11-28 RX ORDER — HYDROMORPHONE HCL/PF 1 MG/ML
0.5 SYRINGE (ML) INJECTION EVERY 4 HOURS PRN
Status: DISCONTINUED | OUTPATIENT
Start: 2023-11-28 | End: 2023-11-29 | Stop reason: HOSPADM

## 2023-11-28 RX ADMIN — PANTOPRAZOLE SODIUM 40 MG: 40 TABLET, DELAYED RELEASE ORAL at 08:21

## 2023-11-28 RX ADMIN — GUAIFENESIN 600 MG: 600 TABLET ORAL at 17:20

## 2023-11-28 RX ADMIN — IPRATROPIUM BROMIDE 0.5 MG: 0.5 SOLUTION RESPIRATORY (INHALATION) at 07:31

## 2023-11-28 RX ADMIN — OXYCODONE HYDROCHLORIDE 10 MG: 5 TABLET ORAL at 09:38

## 2023-11-28 RX ADMIN — METHOCARBAMOL TABLETS 500 MG: 500 TABLET, COATED ORAL at 17:20

## 2023-11-28 RX ADMIN — GUAIFENESIN 600 MG: 600 TABLET ORAL at 08:21

## 2023-11-28 RX ADMIN — ACETAMINOPHEN 650 MG: 325 TABLET, FILM COATED ORAL at 11:26

## 2023-11-28 RX ADMIN — FAMOTIDINE 20 MG: 20 TABLET ORAL at 08:21

## 2023-11-28 RX ADMIN — IPRATROPIUM BROMIDE 0.5 MG: 0.5 SOLUTION RESPIRATORY (INHALATION) at 14:02

## 2023-11-28 RX ADMIN — ACETAMINOPHEN 650 MG: 325 TABLET, FILM COATED ORAL at 17:20

## 2023-11-28 RX ADMIN — LEVALBUTEROL HYDROCHLORIDE 1.25 MG: 1.25 SOLUTION RESPIRATORY (INHALATION) at 07:31

## 2023-11-28 RX ADMIN — DICLOFENAC SODIUM 2 G: 10 GEL TOPICAL at 08:24

## 2023-11-28 RX ADMIN — ONDANSETRON 4 MG: 2 INJECTION INTRAMUSCULAR; INTRAVENOUS at 17:17

## 2023-11-28 RX ADMIN — LEVALBUTEROL HYDROCHLORIDE 1.25 MG: 1.25 SOLUTION RESPIRATORY (INHALATION) at 20:00

## 2023-11-28 RX ADMIN — METHOCARBAMOL TABLETS 500 MG: 500 TABLET, COATED ORAL at 08:21

## 2023-11-28 RX ADMIN — OXYCODONE HYDROCHLORIDE 10 MG: 5 TABLET ORAL at 05:14

## 2023-11-28 RX ADMIN — ACETAMINOPHEN 650 MG: 325 TABLET, FILM COATED ORAL at 05:07

## 2023-11-28 RX ADMIN — ACETAMINOPHEN 650 MG: 325 TABLET, FILM COATED ORAL at 00:32

## 2023-11-28 RX ADMIN — LEVALBUTEROL HYDROCHLORIDE 1.25 MG: 1.25 SOLUTION RESPIRATORY (INHALATION) at 14:02

## 2023-11-28 RX ADMIN — ONDANSETRON 4 MG: 2 INJECTION INTRAMUSCULAR; INTRAVENOUS at 11:27

## 2023-11-28 RX ADMIN — OXYCODONE HYDROCHLORIDE 10 MG: 5 TABLET ORAL at 15:07

## 2023-11-28 RX ADMIN — METHYLPREDNISOLONE SODIUM SUCCINATE 40 MG: 40 INJECTION, POWDER, FOR SOLUTION INTRAMUSCULAR; INTRAVENOUS at 08:20

## 2023-11-28 RX ADMIN — DICLOFENAC SODIUM 2 G: 10 GEL TOPICAL at 11:27

## 2023-11-28 RX ADMIN — IPRATROPIUM BROMIDE 0.5 MG: 0.5 SOLUTION RESPIRATORY (INHALATION) at 20:00

## 2023-11-28 RX ADMIN — HYDROMORPHONE HYDROCHLORIDE 0.5 MG: 1 INJECTION, SOLUTION INTRAMUSCULAR; INTRAVENOUS; SUBCUTANEOUS at 17:17

## 2023-11-28 RX ADMIN — FAMOTIDINE 20 MG: 20 TABLET ORAL at 17:20

## 2023-11-28 RX ADMIN — ENOXAPARIN SODIUM 40 MG: 40 INJECTION SUBCUTANEOUS at 08:20

## 2023-11-28 RX ADMIN — DICLOFENAC SODIUM 2 G: 10 GEL TOPICAL at 17:23

## 2023-11-28 NOTE — PLAN OF CARE
Problem: PAIN - ADULT  Goal: Verbalizes/displays adequate comfort level or baseline comfort level  Description: Interventions:  - Encourage patient to monitor pain and request assistance  - Assess pain using appropriate pain scale  - Administer analgesics based on type and severity of pain and evaluate response  - Implement non-pharmacological measures as appropriate and evaluate response  - Consider cultural and social influences on pain and pain management  - Notify physician/advanced practitioner if interventions unsuccessful or patient reports new pain  Outcome: Progressing     Problem: INFECTION - ADULT  Goal: Absence or prevention of progression during hospitalization  Description: INTERVENTIONS:  - Assess and monitor for signs and symptoms of infection  - Monitor lab/diagnostic results  - Monitor all insertion sites, i.e. indwelling lines, tubes, and drains  - Monitor endotracheal if appropriate and nasal secretions for changes in amount and color  - [place] appropriate cooling/warming therapies per order  - Administer medications as ordered  - Instruct and encourage patient and family to use good hand hygiene technique  - Identify and instruct in appropriate isolation precautions for identified infection/condition  Outcome: Progressing  Goal: Absence of fever/infection during neutropenic period  Description: INTERVENTIONS:  - Monitor WBC    Outcome: Progressing     Problem: DISCHARGE PLANNING  Goal: Discharge to home or other facility with appropriate resources  Description: INTERVENTIONS:  - Identify barriers to discharge w/patient and caregiver  - Arrange for needed discharge resources and transportation as appropriate  - Identify discharge learning needs (meds, wound care, etc.)  - Arrange for interpretive services to assist at discharge as needed  - Refer to Case Management Department for coordinating discharge planning if the patient needs post-hospital services based on physician/advanced practitioner order or complex needs related to functional status, cognitive ability, or social support system  Outcome: Progressing

## 2023-11-28 NOTE — PROGRESS NOTES
[address]  Progress Note  Name: Jules Hanna I  MRN: [id number]  Unit/Bed#: -01 I Date of Admission: 11/25/2023   Date of Service: 11/28/2023 I Hospital Day: 3    Assessment/Plan   * COPD exacerbation [facility])  Assessment & Plan  Present on admission  Significant improvement today, minimal wheeze  Adjust to solumedrol daily tomorrow  There is concern that patient might not be on the required supplemental oxygen, coordinate with      [address]  Had a fall a week ago slipped in the mud onto her back. X-rays with no fracture. She has chronic L1 fracture. PT OT consults ordered  Per patient's request, ordered to bilateral hip x-ray to rule out any fracture due to patient reporting the right hip being involved in the recent fall. Compression fracture of L1 vertebra (HCC)  Assessment & Plan  Chronic compression of L1. Continue pain meds   Imaging shows no worsening. VTE Pharmacologic Prophylaxis: VTE Score: 5 High Risk (Score >/= 5) - Pharmacological DVT Prophylaxis Ordered: enoxaparin (Lovenox). Sequential Compression Devices Ordered. Mobility:   Basic Mobility Inpatient Raw Score: 23  JH-HLM Goal: 7: Walk 25 feet or more  JH-HLM Achieved: 8: Walk 250 feet ot more  HLM Goal achieved. Continue to encourage appropriate mobility. Patient Centered Rounds: I performed bedside rounds with nursing staff today. Discussions with Specialists or Other Care Team Provider: susanna    Education and Discussions with Family / Patient: Patient declined call to . Total Time Spent on Date of Encounter in care of patient: 30+ mins.  This time was spent on one or more of the following: performing physical exam; counseling and coordination of care; obtaining or reviewing history; documenting in the medical record; reviewing/ordering tests, medications or procedures; communicating with other healthcare professionals and discussing with patient's family/caregivers. Current Length of Stay: 3 day(s)  Current Patient Status: Inpatient   Certification Statement: The patient will continue to require additional inpatient hospital stay due to IV steroid taper  Discharge Plan: Anticipate discharge in 24-48 hrs to home. Code Status: Level 1 - Full Code    Subjective:   Patient feels better today     Objective:     Vitals:   Temp (24hrs), Av.5 °F (36.9 °C), Min:98.4 °F (36.9 °C), Max:98.5 °F (36.9 °C)    Temp:  [98.4 °F (36.9 °C)-98.5 °F (36.9 °C)] 98.5 °F (36.9 °C)  HR:  [61-71] 65  Resp:  [15-17] 17  BP: (120-146)/(58-72) 128/59  SpO2:  [93 %-99 %] 96 %  Body mass index is 43.94 kg/m². Input and Output Summary (last 24 hours): Intake/Output Summary (Last 24 hours) at 2023 1307  Last data filed at 2023 0035  Gross per 24 hour   Intake 240 ml   Output --   Net 240 ml       Physical Exam:   Physical Exam  Constitutional:       General: She is not in acute distress. Appearance: Normal appearance. She is not toxic-appearing. Cardiovascular:      Rate and Rhythm: Normal rate and regular rhythm. Heart sounds: Normal heart sounds. No murmur heard. Pulmonary:      Effort: Pulmonary effort is normal. No respiratory distress. Breath sounds: Normal breath sounds. No wheezing. Abdominal:      General: Abdomen is flat. There is no distension. Palpations: Abdomen is soft. Tenderness: There is no abdominal tenderness. Neurological:      General: No focal deficit present. Mental Status: She is alert and oriented to person, place, and time. Mental status is at baseline. Motor: No weakness.           Additional Data:     Labs:  Results from last 7 days   Lab Units 23  0459   WBC Thousand/uL 14.41*   HEMOGLOBIN g/dL 11.9   HEMATOCRIT % 37.7   PLATELETS Thousands/uL 334   NEUTROS PCT % 91*   LYMPHS PCT % 4*   MONOS PCT % 4   EOS PCT % 0     Results from last 7 days   Lab Units 239 23  7909 SODIUM mmol/L 140 141   POTASSIUM mmol/L 4.8 4.0   CHLORIDE mmol/L 107 106   CO2 mmol/L 29 28   BUN mg/dL 19 16   CREATININE mg/dL 0.73 0.68   ANION GAP mmol/L 4 7   CALCIUM mg/dL 8.9 8.8   ALBUMIN g/dL  --  3.9   TOTAL BILIRUBIN mg/dL  --  0.32   ALK PHOS U/L  --  92   ALT U/L  --  6*   AST U/L  --  10*   GLUCOSE RANDOM mg/dL 206* 92         Results from last 7 days   Lab Units 11/27/23  2214   POC GLUCOSE mg/dl 180*         Results from last 7 days   Lab Units 11/25/23  1655   LACTIC ACID mmol/L 1.0       Lines/Drains:  Invasive Devices       Peripheral Intravenous Line  Duration             Peripheral IV 11/27/23 Dorsal (posterior); Left Wrist 1 day                          Imaging: No pertinent imaging reviewed. Recent Cultures (last 7 days):   Results from last 7 days   Lab Units 11/25/23  1655 11/25/23  1653   BLOOD CULTURE  No Growth at 48 hrs. No Growth at 48 hrs.        Last 24 Hours Medication List:   Current Facility-Administered Medications   Medication Dose Route Frequency Provider Last Rate    acetaminophen  650 mg Oral Q6H [address] Anant Zamora MD      albuterol  2 puff Inhalation Q4H PRN Ramon Bach MD      bisacodyl  5 mg Oral Daily PRN Talya Grajeda MD      Diclofenac Sodium  2 g Topical 4x Daily Latisha Botello PA-C      enoxaparin  40 mg Subcutaneous Daily Karol Murguia MD      famotidine  20 mg Oral BID Ramon Bach MD      guaiFENesin  600 mg Oral BID Karol Murguia MD      HYDROmorphone  0.5 mg Intravenous Q4H PRN Talya Grajeda MD      ipratropium  0.5 mg Nebulization TID Karol Murguia MD      levalbuterol  1.25 mg Nebulization TID Karol Murguia MD      lidocaine  1 patch Topical Daily Anant MD Noah      methocarbamol  500 mg Oral BID MD Jaqui Torres ON 11/29/2023] methylPREDNISolone sodium succinate  40 mg Intravenous Daily Talya Grajeda MD      nicotine  1 patch Transdermal Daily Karol Murguia MD ondansetron  4 mg Intravenous Q4H PRN Jw Fonseca MD      oxyCODONE  5 mg Oral Q4H PRN Perico Rhodes MD      Or    oxyCODONE  10 mg Oral Q4H PRN Perico Rhodes MD      pantoprazole  40 mg Oral Daily Emma Leonard MD      senna-docusate sodium  1 tablet Oral HS Perico Rhodes MD      sodium chloride  1 spray Each Nare Q1H PRN Endy Camarena MD          Today, Patient Was Seen By: Yoav Barnett MD    **Please Note: This note may have been constructed using a voice recognition system. **

## 2023-11-28 NOTE — ASSESSMENT & PLAN NOTE
Faxed to jennifer   Had a fall a week ago slipped in the mud onto her back. X-rays with no fracture. She has chronic L1 fracture. PT OT consults ordered  Per patient's request, ordered to bilateral hip x-ray to rule out any fracture due to patient reporting the right hip being involved in the recent fall.

## 2023-11-28 NOTE — RESPIRATORY THERAPY NOTE
RT Protocol Note  Maddi Echols 62 y.o. female MRN: [id number]  Unit/Bed#: -01 Encounter: [id number]    Assessment    Principal Problem:    COPD exacerbation ([address])  Active Problems:    Smoker    Compression fracture of L1 vertebra ([address])    Fall    Gastroesophageal reflux disease without esophagitis    Osteoporosis      Home Pulmonary Medications  Home Devices/Therapy: Home O2    Past Medical History:   Diagnosis Date    Achalasia     Back pain     Cancer ([address])     Ovarian    Fibromyalgia     Lupus ([address])      Social History     Socioeconomic History    Marital status: /Civil Union     Spouse name: None    Number of children: None    Years of education: None    Highest education level: None   Occupational History    None   Tobacco Use    Smoking status: Every Day     Packs/day: 0.50     Types: Cigarettes    Smokeless tobacco: Never   Vaping Use    Vaping Use: Never used   Substance and Sexual Activity    Alcohol use: Never    Drug use: Never    Sexual activity: Yes   Other Topics Concern    None   Social History Narrative    None     Social Determinants of Health     Financial Resource Strain: Not on file   Food Insecurity: Food Insecurity Present (11/27/2023)    Hunger Vital Sign     Worried About Running Out of Food in the Last Year: Sometimes true     Ran Out of Food in the Last Year: Sometimes true   Transportation Needs: No Transportation Needs (11/27/2023)    PRAPARE - Transportation     Lack of Transportation (Medical): No     Lack of Transportation (Non-Medical):  No   Physical Activity: Not on file   Stress: Not on file   Social Connections: Not on file   Intimate Partner Violence: Not on file   Housing Stability: Low Risk  (11/27/2023)    Housing Stability Vital Sign     Unable to Pay for Housing in the Last Year: No     Number of Places Lived in the Last Year: 1     Unstable Housing in the Last Year: No       Subjective         Objective    Physical Exam:   Assessment Type: Pre-treatment  General Appearance: Alert, Awake  Respiratory Pattern: Normal  Chest Assessment: Chest expansion symmetrical  Bilateral Breath Sounds: Diminished, Coarse  R Breath Sounds: Diminished  L Breath Sounds: Diminished  Cough: None    Vitals:  Blood pressure 146/72, pulse 86, temperature 98.4 °F (36.9 °C), resp. rate 22, height 4' 9" (1.448 m), weight 92.1 kg (203 lb 0.7 oz), SpO2 99 %.             O2 Device: 3L o2 nc     Plan    Respiratory Plan: Mild Distress pathway        Resp Comments: Continue treatments as ordered for COPD mamagement

## 2023-11-28 NOTE — RESPIRATORY THERAPY NOTE
RT Protocol Note  Damari Plane 62 y.o. female MRN: [id number]  Unit/Bed#: -01 Encounter: [id number]    Assessment    Principal Problem:    COPD exacerbation ([address])  Active Problems:    Smoker    Compression fracture of L1 vertebra ([address])    Fall    Gastroesophageal reflux disease without esophagitis    Osteoporosis    Physical Exam:   Assessment Type: (P) Post-treatment  General Appearance: (P) Awake, Alert  Respiratory Pattern: (P) Normal  Chest Assessment: (P) Chest expansion symmetrical  Bilateral Breath Sounds: (P) Diminished, Coarse    Vitals:  Blood pressure 128/59, pulse (P) 65, temperature 98.5 °F (36.9 °C), temperature source Oral, resp. rate (P) 17, height 4' 9" (1.448 m), weight 92.1 kg (203 lb 0.7 oz), SpO2 (P) 96 %.       O2 Device: 3L o2 nc     Plan    Respiratory Plan: (P) Mild Distress pathway        Resp Comments: (P) will cont txs as TID

## 2023-11-28 NOTE — ASSESSMENT & PLAN NOTE
Present on admission  Significant improvement today, minimal wheeze  Adjust to solumedrol daily tomorrow  There is concern that patient might not be on the required supplemental oxygen, coordinate with

## 2023-11-29 VITALS
WEIGHT: 203.04 LBS | TEMPERATURE: 97.9 F | DIASTOLIC BLOOD PRESSURE: 69 MMHG | HEART RATE: 77 BPM | RESPIRATION RATE: 15 BRPM | SYSTOLIC BLOOD PRESSURE: 144 MMHG | OXYGEN SATURATION: 97 % | HEIGHT: 57 IN | BODY MASS INDEX: 43.8 KG/M2

## 2023-11-29 LAB
ALBUMIN SERPL BCP-MCNC: 3.7 G/DL (ref 3.5–5)
ALP SERPL-CCNC: 72 U/L (ref 34–104)
ALT SERPL W P-5'-P-CCNC: 60 U/L (ref 7–52)
ANION GAP SERPL CALCULATED.3IONS-SCNC: 4 MMOL/L
AST SERPL W P-5'-P-CCNC: 18 U/L (ref 13–39)
BASOPHILS # BLD AUTO: 0.02 THOUSANDS/ÂΜL (ref 0–0.1)
BASOPHILS NFR BLD AUTO: 0 % (ref 0–1)
BILIRUB SERPL-MCNC: 0.39 MG/DL (ref 0.2–1)
BUN SERPL-MCNC: 21 MG/DL (ref 5–25)
CALCIUM SERPL-MCNC: 8 MG/DL (ref 8.4–10.2)
CHLORIDE SERPL-SCNC: 105 MMOL/L (ref 96–108)
CO2 SERPL-SCNC: 34 MMOL/L (ref 21–32)
CREAT SERPL-MCNC: 0.79 MG/DL (ref 0.6–1.3)
EOSINOPHIL # BLD AUTO: 0 THOUSAND/ÂΜL (ref 0–0.61)
EOSINOPHIL NFR BLD AUTO: 0 % (ref 0–6)
ERYTHROCYTE [DISTWIDTH] IN BLOOD BY AUTOMATED COUNT: 15.8 % (ref 11.6–15.1)
GFR SERPL CREATININE-BSD FRML MDRD: 82 ML/MIN/1.73SQ M
GLUCOSE SERPL-MCNC: 116 MG/DL (ref 65–140)
HCT VFR BLD AUTO: 39 % (ref 34.8–46.1)
HGB BLD-MCNC: 12.3 G/DL (ref 11.5–15.4)
IMM GRANULOCYTES # BLD AUTO: 0.09 THOUSAND/UL (ref 0–0.2)
IMM GRANULOCYTES NFR BLD AUTO: 1 % (ref 0–2)
LYMPHOCYTES # BLD AUTO: 0.79 THOUSANDS/ÂΜL (ref 0.6–4.47)
LYMPHOCYTES NFR BLD AUTO: 11 % (ref 14–44)
MAGNESIUM SERPL-MCNC: 2.2 MG/DL (ref 1.9–2.7)
MCH RBC QN AUTO: 29.8 PG (ref 26.8–34.3)
MCHC RBC AUTO-ENTMCNC: 31.5 G/DL (ref 31.4–37.4)
MCV RBC AUTO: 94 FL (ref 82–98)
MONOCYTES # BLD AUTO: 0.75 THOUSAND/ÂΜL (ref 0.17–1.22)
MONOCYTES NFR BLD AUTO: 11 % (ref 4–12)
NEUTROPHILS # BLD AUTO: 5.27 THOUSANDS/ÂΜL (ref 1.85–7.62)
NEUTS SEG NFR BLD AUTO: 77 % (ref 43–75)
NRBC BLD AUTO-RTO: 0 /100 WBCS
PLATELET # BLD AUTO: 255 THOUSANDS/UL (ref 149–390)
PMV BLD AUTO: 8.9 FL (ref 8.9–12.7)
POTASSIUM SERPL-SCNC: 3.9 MMOL/L (ref 3.5–5.3)
PROT SERPL-MCNC: 6 G/DL (ref 6.4–8.4)
RBC # BLD AUTO: 4.13 MILLION/UL (ref 3.81–5.12)
SODIUM SERPL-SCNC: 143 MMOL/L (ref 135–147)
WBC # BLD AUTO: 6.92 THOUSAND/UL (ref 4.31–10.16)

## 2023-11-29 PROCEDURE — 85025 COMPLETE CBC W/AUTO DIFF WBC: CPT | Performed by: STUDENT IN AN ORGANIZED HEALTH CARE EDUCATION/TRAINING PROGRAM

## 2023-11-29 PROCEDURE — 97162 PT EVAL MOD COMPLEX 30 MIN: CPT

## 2023-11-29 PROCEDURE — 80053 COMPREHEN METABOLIC PANEL: CPT | Performed by: STUDENT IN AN ORGANIZED HEALTH CARE EDUCATION/TRAINING PROGRAM

## 2023-11-29 PROCEDURE — 99239 HOSP IP/OBS DSCHRG MGMT >30: CPT | Performed by: STUDENT IN AN ORGANIZED HEALTH CARE EDUCATION/TRAINING PROGRAM

## 2023-11-29 PROCEDURE — 94664 DEMO&/EVAL PT USE INHALER: CPT

## 2023-11-29 PROCEDURE — 83735 ASSAY OF MAGNESIUM: CPT | Performed by: STUDENT IN AN ORGANIZED HEALTH CARE EDUCATION/TRAINING PROGRAM

## 2023-11-29 PROCEDURE — 94760 N-INVAS EAR/PLS OXIMETRY 1: CPT

## 2023-11-29 PROCEDURE — 94640 AIRWAY INHALATION TREATMENT: CPT

## 2023-11-29 PROCEDURE — 97166 OT EVAL MOD COMPLEX 45 MIN: CPT

## 2023-11-29 RX ORDER — ONDANSETRON 4 MG/1
4 TABLET, FILM COATED ORAL EVERY 8 HOURS PRN
Qty: 20 TABLET | Refills: 0 | Status: SHIPPED | OUTPATIENT
Start: 2023-11-29

## 2023-11-29 RX ORDER — OXYCODONE HYDROCHLORIDE 10 MG/1
10 TABLET ORAL EVERY 4 HOURS PRN
Qty: 20 TABLET | Refills: 0 | Status: SHIPPED | OUTPATIENT
Start: 2023-11-29 | End: 2023-11-29

## 2023-11-29 RX ORDER — OXYCODONE HYDROCHLORIDE 5 MG/1
10 TABLET ORAL EVERY 4 HOURS PRN
Status: DISCONTINUED | OUTPATIENT
Start: 2023-11-29 | End: 2023-11-29 | Stop reason: HOSPADM

## 2023-11-29 RX ORDER — BISACODYL 5 MG/1
5 TABLET, DELAYED RELEASE ORAL DAILY PRN
Qty: 30 TABLET | Refills: 0 | Status: SHIPPED | OUTPATIENT
Start: 2023-11-29

## 2023-11-29 RX ORDER — OXYCODONE HYDROCHLORIDE 5 MG/1
5 TABLET ORAL EVERY 6 HOURS PRN
Qty: 25 TABLET | Refills: 0 | Status: SHIPPED | OUTPATIENT
Start: 2023-11-29 | End: 2023-12-09

## 2023-11-29 RX ORDER — PREDNISONE 20 MG/1
TABLET ORAL
Qty: 15 TABLET | Refills: 0 | Status: SHIPPED | OUTPATIENT
Start: 2023-11-29 | End: 2023-12-11

## 2023-11-29 RX ADMIN — OXYCODONE HYDROCHLORIDE 10 MG: 5 TABLET ORAL at 00:19

## 2023-11-29 RX ADMIN — TRIMETHOBENZAMIDE HYDROCHLORIDE 200 MG: 100 INJECTION INTRAMUSCULAR at 16:11

## 2023-11-29 RX ADMIN — OXYCODONE HYDROCHLORIDE 10 MG: 5 TABLET ORAL at 16:11

## 2023-11-29 RX ADMIN — ENOXAPARIN SODIUM 40 MG: 40 INJECTION SUBCUTANEOUS at 08:54

## 2023-11-29 RX ADMIN — OXYCODONE HYDROCHLORIDE 10 MG: 5 TABLET ORAL at 08:53

## 2023-11-29 RX ADMIN — FAMOTIDINE 20 MG: 20 TABLET ORAL at 08:54

## 2023-11-29 RX ADMIN — METHOCARBAMOL TABLETS 500 MG: 500 TABLET, COATED ORAL at 08:54

## 2023-11-29 RX ADMIN — GUAIFENESIN 600 MG: 600 TABLET ORAL at 08:54

## 2023-11-29 RX ADMIN — ONDANSETRON 4 MG: 2 INJECTION INTRAMUSCULAR; INTRAVENOUS at 08:54

## 2023-11-29 RX ADMIN — LEVALBUTEROL HYDROCHLORIDE 1.25 MG: 1.25 SOLUTION RESPIRATORY (INHALATION) at 07:11

## 2023-11-29 RX ADMIN — PANTOPRAZOLE SODIUM 40 MG: 40 TABLET, DELAYED RELEASE ORAL at 08:54

## 2023-11-29 RX ADMIN — OXYCODONE HYDROCHLORIDE 10 MG: 5 TABLET ORAL at 04:34

## 2023-11-29 RX ADMIN — ACETAMINOPHEN 650 MG: 325 TABLET, FILM COATED ORAL at 00:19

## 2023-11-29 RX ADMIN — DICLOFENAC SODIUM 2 G: 10 GEL TOPICAL at 09:00

## 2023-11-29 RX ADMIN — IPRATROPIUM BROMIDE 0.5 MG: 0.5 SOLUTION RESPIRATORY (INHALATION) at 07:11

## 2023-11-29 RX ADMIN — METHYLPREDNISOLONE SODIUM SUCCINATE 40 MG: 40 INJECTION, POWDER, FOR SOLUTION INTRAMUSCULAR; INTRAVENOUS at 08:51

## 2023-11-29 RX ADMIN — ONDANSETRON 4 MG: 2 INJECTION INTRAMUSCULAR; INTRAVENOUS at 04:38

## 2023-11-29 NOTE — CASE MANAGEMENT
Case Management Discharge Planning Note    Patient name Farhad Pryor  Location /-82 MRN [id number]  : 1965 Date 2023       Current Admission Date: 2023  Current Admission Diagnosis:COPD exacerbation [facility])   Patient Active Problem List    Diagnosis Date Noted    Osteoporosis 2023    Gastroesophageal reflux disease without esophagitis 2023    Chest pain 01/15/2023    Acute respiratory failure ([address]) 01/15/2023    Volume overload 01/15/2023    Ambulatory dysfunction 2023    Compression fracture of L1 vertebra ([address]) 2023    COPD exacerbation ([address]) 2023    Fall 2023    Systemic lupus erythematosus with lung involvement ([address]) 12/10/2022    Respiratory failure with hypoxia ([address]) 10/06/2022    Hypokalemia 10/04/2022    Chest pain, unspecified 2022    Chronic obstructive pulmonary disease with acute exacerbation ([address]) 2022    Smoker 2022      LOS (days): 4  Geometric Mean LOS (GMLOS) (days):   Days to GMLOS:     OBJECTIVE:  Risk of Unplanned Readmission Score: 14.37         Current admission status: Inpatient   Preferred Pharmacy:   [address] [place] [address] - [address] [place] [address]  [address]  [facility] [address]  Phone: [phone] Fax: [phone]    [id number] [address]  Phone: [phone] Fax: [address] [facility],  [address] [place] [address]  Phone: [phone] Fax: [phone]    Primary Care Provider: Jani Shah DO    Primary Insurance: D[employer] PENDING  Secondary Insurance:     DISCHARGE DETAILS:     CM provided patient with meds to bed at bedside with nurse Antonio Kerr.

## 2023-11-29 NOTE — OCCUPATIONAL THERAPY NOTE
Occupational Therapy Evaluation     Patient Name: Lesley Saba  [id number]'X Date: 11/29/2023  Problem List  Principal Problem:    COPD exacerbation ([address])  Active Problems:    Smoker    Compression fracture of L1 vertebra ([address])    Fall    Gastroesophageal reflux disease without esophagitis    Osteoporosis    Past Medical History  Past Medical History:   Diagnosis Date    Achalasia     Back pain     Cancer ([address])     Ovarian    Fibromyalgia     Lupus ([address])      Past Surgical History  Past Surgical History:   Procedure Laterality Date    HYSTERECTOMY      NECK SURGERY             11/29/23 0908   OT Last Visit   OT Visit Date 11/29/23   Note Type   Note type Evaluation   Pain Assessment   Pain Assessment Tool 0-10   Pain Score 8   Pain Location/Orientation Location: Back  (chronic)   Restrictions/Precautions   Weight Bearing Precautions Per Order No   Braces or Orthoses   (hx of TLSO, but pt doesn't wear)   Other Precautions Multiple lines;O2;Fall Risk  (2L O2 NC)   Home Living   Type of Home   (Camper, does not have bathroom or running water. Pt goes elsewhere for facilities)   Home Layout One level  (2 LISA)   [place]  (Home O2 prn, 2L)   Additional Comments Pt reports that she and her spouse are currently living in a camper that does not have running water or toilet. They go elsewhere for facilities. She also declines to wear O2 at all times at home. Prior Function   Level of [place] Independent with ADLs; Independent with functional mobility; Independent with IADLS   Lives With Spouse   Receives Help From Family   IADLs Independent with driving; Independent with meal prep; Independent with medication management   Falls in the last 6 months 1 to 4  (1 fall leading to admission)   Vocational Unemployed   General   Family/Caregiver Present No   Subjective   Subjective Pt agreeable to therapy evaluation   ADL   Where Assessed Other (Comment)  (Assist levels for some self care tasks are based on functional assessment of performance skills and deficits observed during session.)   Eating Assistance 7  Independent   Grooming Assistance 5  Supervision/Setup   Grooming Deficit Setup  (at sink level)   UB Dressing Assistance 5  Supervision/Setup   UB Dressing Deficit Setup   LB Dressing Assistance 4  Minimal Assistance   LB Dressing Deficit Setup;Supervision/safety; Increased time to complete   Toileting Assistance  6  Modified independent   Bed Mobility   Supine to Sit 6  Modified independent   Additional items HOB elevated; Bedrails   Sit to Supine 6  Modified independent   Transfers   Sit to Stand 6  Modified independent   Stand to Sit 6  Modified independent   Stand pivot 5  Supervision   Toilet transfer 6  Modified independent   Additional items Standard toilet  (grab bar)   Functional Mobility   Functional Mobility 5  Supervision   Additional items   (without AD, pt reaching out for unilateral UE support)   Activity Tolerance   Activity Tolerance Patient limited by fatigue   Medical Staff Made Aware Al Elizabeth PT  (Pt seen for co-evaluation with Physical Therapist due to pt's medical complexity, functional limitations and limited activity tolerance.)   RUE Assessment   RUE Assessment WFL   LUE Assessment   LUE Assessment WFL   Hand Function   Gross Motor Coordination Functional   Fine Motor Coordination Functional   Sensation   Light Touch   (pt reports buring sensation in [place] feet)   Vision-Basic Assessment   Current Vision   (pt reports she is supposed to wear glasses, but hasn't been able to go somewhere to get them)   Psychosocial   Psychosocial (WDL) X   Patient Behaviors/Mood Irritable; Anxious   Cognition   Overall Cognitive Status WFL   Arousal/Participation Alert; Responsive   Attention Attends with cues to redirect   Orientation Level Oriented X4   Memory Within functional limits   Following Commands Follows one step commands without difficulty   Assessment   Limitation Decreased endurance;Decreased high-level ADLs   Assessment Pt is a 62 y.o. female seen for OT evaluation s/p admit to [facility] on 11/25/2023 w/ COPD exacerbation ([address]). Comorbidities affecting pt's functional performance at time of assessment include: obesity, previous surgery, COPD, and smoker, chronic L1 compression fracture, falls . Personal factors affecting pt at time of IE include:steps to enter environment, limited home support, difficulty performing IADLS , flat affect, decreased initiation and engagement , financial barriers, and health management . Prior to admission, pt was independent with basic ADLs and functional mobility with use of no AD vs cane. Upon evaluation: Pt requires close supervision without AD for ambulation, and modified independence to supervision during some basic ADLs 2* the following deficits impacting occupational performance: decreased strength, decreased balance, decreased tolerance, and increased O2 requirements . Pt is near her functional baseline, and is currently not receptive to compensatory techniques. Pt with no further acute OT needs, will complete orders at this time. Please reconsult if pt has a change in functional status. From OT standpoint, recommendation at time of d/c would be Level 3 Minimum Resource Intensity vs no further OT needs. Plan   OT Frequency Eval only   Discharge Recommendation   Rehab Resource Intensity Level, OT III (Minimum Resource Intensity)   AM-PAC Daily Activity Inpatient   Lower Body Dressing 3   Bathing 3   Toileting 4   Upper Body Dressing 4   Grooming 4   Eating 4   Daily Activity Raw Score 22   Daily Activity Standardized Score (Calc for Raw Score >=11) 47. 1   AM-PAC Applied Cognition Inpatient   Following a Speech/Presentation 4   Understanding Ordinary Conversation 4   Taking Medications 4   Remembering Where Things Are Placed or Put Away 4   Remembering List of 4-5 Errands 3   Taking Care of Complicated Tasks 3   Applied Cognition Raw Score 22   Applied Cognition Standardized Score 47.83       Aline Akhtar, OTR/L

## 2023-11-29 NOTE — RESPIRATORY THERAPY NOTE
RT Protocol Note  Lesley Saba 62 y.o. female MRN: [id number]  Unit/Bed#: -01 Encounter: [id number]    Assessment    Principal Problem:    COPD exacerbation ([address])  Active Problems:    Smoker    Compression fracture of L1 vertebra ([address])    Fall    Gastroesophageal reflux disease without esophagitis    Osteoporosis      Home Pulmonary Medications:     11/28/23 2000   Respiratory Protocol   Protocol Initiated? No   Protocol Selection Respiratory   Language Barrier? No   Medical & Social History Reviewed? Yes   Diagnostic Studies Reviewed? Yes   Physical Assessment Performed? Yes   Home Devices/Therapy Home O2   Respiratory Plan Mild Distress pathway   Respiratory Assessment   Assessment Type During-treatment   General Appearance Alert; Awake   Respiratory Pattern Normal   Chest Assessment Chest expansion symmetrical   Bilateral Breath Sounds Diminished;Coarse   Location Specific No   Cough None   Resp Comments Continue with current tx plan   O2 Device NC   Cough Description   Sputum Amount None   Additional Assessments   Pulse 72   Respirations 18   SpO2 97 %       Home Devices/Therapy: Home O2    Past Medical History:   Diagnosis Date    Achalasia     Back pain     Cancer ([address])     Ovarian    Fibromyalgia     Lupus ([address])      Social History     Socioeconomic History    Marital status: /Civil Union     Spouse name: None    Number of children: None    Years of education: None    Highest education level: None   Occupational History    None   Tobacco Use    Smoking status: Every Day     Packs/day: 0.50     Types: Cigarettes    Smokeless tobacco: Never   Vaping Use    Vaping Use: Never used   Substance and Sexual Activity    Alcohol use: Never    Drug use: Never    Sexual activity: Yes   Other Topics Concern    None   Social History Narrative    None     Social Determinants of Health     Financial Resource Strain: Not on file   Food Insecurity: Food Insecurity Present (11/27/2023)    Hunger Vital Sign Worried About [place] in the Last Year: Sometimes true     Jolly of Food in the Last Year: Sometimes true   Transportation Needs: No Transportation Needs (11/27/2023)    PRAPARE - Transportation     Lack of Transportation (Medical): No     Lack of Transportation (Non-Medical): No   Physical Activity: Not on file   Stress: Not on file   Social Connections: Not on file   Intimate Partner Violence: Not on file   Housing Stability: Low Risk  (11/27/2023)    Housing Stability Vital Sign     Unable to Pay for Housing in the Last Year: No     Number of Places Lived in the Last Year: 1     Unstable Housing in the Last Year: No       Subjective         Objective    Physical Exam:   Assessment Type: During-treatment  General Appearance: Alert, Awake  Respiratory Pattern: Normal  Chest Assessment: Chest expansion symmetrical  Bilateral Breath Sounds: Diminished, Coarse  Location Specific: No  Cough: None  O2 Device: NC    Vitals:  Blood pressure 128/60, pulse 72, temperature 98.5 °F (36.9 °C), temperature source Oral, resp. rate 18, height 4' 9" (1.448 m), weight 92.1 kg (203 lb 0.7 oz), SpO2 97 %. Imaging and other studies: I have personally reviewed pertinent reports.       O2 Device: NC     Plan    Respiratory Plan: Mild Distress pathway        Resp Comments: Continue with current tx plan

## 2023-11-29 NOTE — PLAN OF CARE
Problem: PHYSICAL THERAPY ADULT  Goal: Performs mobility at highest level of function for planned discharge setting. See evaluation for individualized goals. Description: Treatment/Interventions: Functional transfer training, Elevations, Therapeutic exercise, Endurance training, Patient/family training, Bed mobility, Gait training, OT  Equipment Recommended:  (reccomend RW trial in upcoming sessions)       See flowsheet documentation for full assessment, interventions and recommendations. Note: Prognosis: Good  Problem List: Decreased endurance, Impaired balance, Decreased mobility, Decreased safety awareness  Assessment: Pt is 62 y.o. female seen for moderate-complexity PT evaluation on 11/29/2023 s/p [facility] w/ COPD exacerbation ([address]). PT was consulted to assess pt's functional mobility and d/c needs. Order placed for PT eval and tx, w/ up and OOB as tolerated order. PTA, pt was living with her spouse in a camper with 2 LISA and reports independence with ADLs, IADLs, and mobility without AD at baseline. At time of eval, pt completed sit > sup Ricci, STS Ricci, and ambulated 50' supervision without AD; however, pt completing mobility with furniture walking and overall unsteadiness. Pt declined use of AD; pt recommends RW trial as able. Upon evaluation, pt presenting with impaired functional mobility d/t decreased endurance, impaired balance, decreased mobility, and activity intolerance. Pertinent PMHx and current co-morbidities affecting pt's physical performance at time of assessment include: COPD exacerbation, fall, chronic compression fracture L1. Personal factors affecting pt at time of eval include: stairs to enter home, limited home support, and positive fall history. The following objective measures performed on IE also reveal limitations: AM-PAC 6-Clicks: 23/52.  Pt's clinical presentation is currently evolving seen in pt's presentation of abnormal lab value(s), tolerance to only 50 feet of ambulation, and ongoing medical assessment. Overall, pt's rehab potential and prognosis to return to PLOF is good as impacted by objective findings, warranting pt to receive further skilled PT interventions to address impairments, activity limitations, and participation restrictions. Pt to benefit from continued PT services to address deficits as defined above and maximize level of functional independent mobility and consistency. From PT/mobility standpoint, recommendation at time of d/c would be level 3, minimal resource intensity in order to facilitate return to PLOF. Rehab Resource Intensity Level, PT: III (Minimum Resource Intensity)    See flowsheet documentation for full assessment.       Snehal Lozano; PT, DPT

## 2023-11-29 NOTE — CASE MANAGEMENT
Case Management Discharge Planning Note    Patient name Jose R Padgett  Location /-64 MRN [id number]  : 1965 Date 2023       Current Admission Date: 2023  Current Admission Diagnosis:COPD exacerbation [facility])   Patient Active Problem List    Diagnosis Date Noted    Osteoporosis 2023    Gastroesophageal reflux disease without esophagitis 2023    Chest pain 01/15/2023    Acute respiratory failure ([address]) 01/15/2023    Volume overload 01/15/2023    Ambulatory dysfunction 2023    Compression fracture of L1 vertebra ([address]) 2023    COPD exacerbation ([address]) 2023    Fall 2023    Systemic lupus erythematosus with lung involvement ([address]) 12/10/2022    Respiratory failure with hypoxia ([address]) 10/06/2022    Hypokalemia 10/04/2022    Chest pain, unspecified 2022    Chronic obstructive pulmonary disease with acute exacerbation ([address]) 2022    Smoker 2022      LOS (days): 4  Geometric Mean LOS (GMLOS) (days):   Days to GMLOS:     OBJECTIVE:  Risk of Unplanned Readmission Score: 14.18         Current admission status: Inpatient   Preferred Pharmacy:   [address] [place] [address] [address] [place] [id number]  Phone: [phone] Fax: [phone]    [facility]pharmacy #[id number]- [place] [address]  Phone: [phone] Fax: [phone]    [facility] [place], [place] - [address] [facility],  [address],  [address] [place] [address]  Phone: [phone] Fax: [phone]    Primary Care Provider: Daniele Sharif DO    Primary Insurance: DOROTHY ANN PENDING  Secondary Insurance:     DISCHARGE DETAILS:     CM spoke with Mohan Ha from [facility]. Patients medications are 29.10 total. CM informed  of case management who approved financial assistance for patient.      [id number] is where dank faxed approved form to. DANK called [phone]. DANK sent message to nurse agarwal to inform nurse on discharge patients meds will have to be picked up from pharmacy downstairs.

## 2023-11-29 NOTE — CASE MANAGEMENT
Case Management Discharge Planning Note    Patient name Farhad Pryor  Location /-46 MRN [id number]  : 1965 Date 2023       Current Admission Date: 2023  Current Admission Diagnosis:COPD exacerbation [facility])   Patient Active Problem List    Diagnosis Date Noted    Osteoporosis 2023    Gastroesophageal reflux disease without esophagitis 2023    Chest pain 01/15/2023    Acute respiratory failure ([address]) 01/15/2023    Volume overload 01/15/2023    Ambulatory dysfunction 2023    Compression fracture of L1 vertebra ([address]) 2023    COPD exacerbation ([address]) 2023    Fall 2023    Systemic lupus erythematosus with lung involvement ([address]) 12/10/2022    Respiratory failure with hypoxia ([address]) 10/06/2022    Hypokalemia 10/04/2022    Chest pain, unspecified 2022    Chronic obstructive pulmonary disease with acute exacerbation ([address]) 2022    Smoker 2022      LOS (days): 4  Geometric Mean LOS (GMLOS) (days):   Days to GMLOS:     OBJECTIVE:  Risk of Unplanned Readmission Score: 14.37         Current admission status: Inpatient   Preferred Pharmacy:   [address] - [facility] [address] [place] [id number]  Phone: [phone] Fax: [phone]    [id number]- [address] [place] [address]  Phone: [phone] Fax: [address] [place] [address] - [address] [place] [address],  [facility],  [address] [place] [address]  Phone: [phone] Fax: [phone]    Primary Care Provider: Jani Shah DO    Primary Insurance: DOROTHY ANN PENDING  Secondary Insurance:     DISCHARGE DETAILS:      CM confirmed with [facility] provider that patient was walking around with no O2 and does not need it to go home.  CM spoke with [facility] who informed cm that patients meds already left [facility] and should be delivered shortly to pharmacy. CM left voicemail for area on aging for referral. CM awaiting a call back.

## 2023-11-29 NOTE — PHYSICAL THERAPY NOTE
PHYSICAL THERAPY EVALUATION  NAME:  Win Navarro  DATE: 11/29/23    AGE:   62 y.o. Mrn:   [id number]  ADMIT DX:  Dyspnea [R06.00]  SOB (shortness of breath) [R06.02]  Problem List:   Patient Active Problem List   Diagnosis    Chest pain, unspecified    Chronic obstructive pulmonary disease with acute exacerbation (HCC)    Smoker    Hypokalemia    Respiratory failure with hypoxia (HCC)    Compression fracture of L1 vertebra (HCC)    COPD exacerbation (HCC)    Fall    Systemic lupus erythematosus with lung involvement ([address])    Ambulatory dysfunction    Chest pain    Acute respiratory failure (HCC)    Volume overload    Gastroesophageal reflux disease without esophagitis    Osteoporosis       Past Medical History  Past Medical History:   Diagnosis Date    Achalasia     Back pain     Cancer ([address])     Ovarian    Fibromyalgia     Lupus ([address])        Past Surgical History  Past Surgical History:   Procedure Laterality Date    HYSTERECTOMY      NECK SURGERY         Length Of Stay: 4  Performed at least 2 patient identifiers during session: Name and Birthday       11/29/23 0934   PT Last Visit   PT Visit Date 11/29/23   Note Type   Note type Evaluation   Pain Assessment   Pain Assessment Tool 0-10   Pain Score 8   Pain Location/Orientation Location: Back  (chronic)   Pain Onset/Description Onset: Ongoing;Frequency: Constant/Continuous   Patient's Stated Pain Goal No pain   Hospital Pain Intervention(s) Repositioned; Ambulation/increased activity   Restrictions/Precautions   Weight Bearing Precautions Per Order No   Braces or Orthoses   (pt reports she has a TLSO; however, she does not wear)   Other Precautions Multiple lines;O2;Fall Risk;Pain  (2L O2 - pt reports she does not wear at baseline)   Home Living   Type of Home   ([facility])   Home Layout One level;Stairs to enter with rails; Performs ADLs on one level; Able to live on main level with bedroom/bathroom  (2 LISA)   Bathroom Shower/Tub   (pt states no bathroom/shower/toilet access in [facility])   David Pisano   Additional Comments pt reports no AD used at baseline   Prior Function   Level of Wise Independent with ADLs; Independent with functional mobility; Independent with IADLS   Lives With Spouse   Receives Help From Family   IADLs Independent with driving; Independent with meal prep; Independent with medication management   Falls in the last 6 months 1 to 4  (1 per pt)   General   Family/Caregiver Present No   Cognition   Overall Cognitive Status [facility]   Arousal/Participation Alert   Attention Attends with cues to redirect   Orientation Level Oriented X4   Memory Within functional limits   Following Commands Follows one step commands without difficulty   Comments Pt agreeable to PT evaluation   RLE Assessment   RLE Assessment WFL   LLE Assessment   LLE Assessment WFL   Bed Mobility   Sit to Supine 6  Modified independent   Additional items Increased time required   Transfers   Sit to Stand 6  Modified independent   Additional items Increased time required   Stand to Sit 6  Modified independent   Additional items Increased time required   Toilet transfer 5  Supervision   Additional items Increased time required;Standard toilet;Verbal cues  (grab bars)   Additional Comments no AD used during transfers   Ambulation/Elevation   Gait pattern Wide BRAXTON; Decreased foot clearance  (unsteady)   Gait Assistance 5  Supervision   Additional items Assist x 1;Verbal cues   Assistive Device None  (pt declined AD use during ambulation - PT reccomends trial with RW as able)   Distance 50'  (Spo2 94% post ambulation)   Stair Management Assistance Not tested   Ambulation/Elevation Additional Comments PT observed furniture walking in room   Balance   Static Sitting Good   Dynamic Sitting Fair +   Static Standing Fair   Dynamic Standing [address]   Activity Tolerance   Activity Tolerance Patient limited by fatigue   Medical Staff Made Aware Pt seen as a co-eval with OT Guy Alvarez due to the patient's co-morbidities, clinical presentation, and present impairments which are a regression from the patient's baseline. Assessment   Prognosis Good   Problem List Decreased endurance; Impaired balance;Decreased mobility; Decreased safety awareness   Assessment Pt is 62 y.o. female seen for moderate-complexity PT evaluation on 11/29/2023 s/p [facility] on 11/25/2023 w/ COPD exacerbation ([address]). PT was consulted to assess pt's functional mobility and d/c needs. Order placed for PT eval and tx, w/ up and OOB as tolerated order. PTA, pt was living with her spouse in a camper with 2 LISA and reports independence with ADLs, IADLs, and mobility without AD at baseline. At time of eval, pt completed sit > sup Ricci, STS Ricci, and ambulated 50' supervision without AD; however, pt completing mobility with furniture walking and overall unsteadiness. Pt declined use of AD; pt recommends RW trial as able. Upon evaluation, pt presenting with impaired functional mobility d/t decreased endurance, impaired balance, decreased mobility, and activity intolerance. Pertinent PMHx and current co-morbidities affecting pt's physical performance at time of assessment include: COPD exacerbation, fall, chronic compression fracture L1. Personal factors affecting pt at time of eval include: stairs to enter home, limited home support, and positive fall history. The following objective measures performed on IE also reveal limitations: AM-PAC 6-Clicks: 84/19. Pt's clinical presentation is currently evolving seen in pt's presentation of abnormal lab value(s), tolerance to only 50 feet of ambulation, and ongoing medical assessment. Overall, pt's rehab potential and prognosis to return to PLOF is good as impacted by objective findings, warranting pt to receive further skilled PT interventions to address impairments, activity limitations, and participation restrictions.  Pt to benefit from continued PT services to address deficits as defined above and maximize level of functional independent mobility and consistency. From PT/mobility standpoint, recommendation at time of d/c would be level 3, minimal resource intensity in order to facilitate return to PLOF. Goals   STG Expiration Date 12/09/23   Short Term Goal #1 In 10 days: Perform all bed mobility tasks independently to decrease caregiver burden, Perform all transfers independently to improve independence, Ambulate > 150 ft. with least restrictive assistive device modified independent w/o LOB and w/ normalized gait pattern 100% of the time, Navigate 2 stairs modified independent with unilateral handrail to facilitate return to previous living environment, and Increase all balance 1/2 grade to decrease risk for falls   PT Treatment Day 0   Plan   Treatment/Interventions Functional transfer training;Elevations; Therapeutic exercise; Endurance training;Patient/family training;Bed mobility;Gait training;OT   PT Frequency 2-3x/wk   Discharge Recommendation   Rehab Resource Intensity Level, PT III (Minimum Resource Intensity)   Equipment Recommended   (reccomend RW trial in upcoming sessions)   AM-PAC Basic Mobility Inpatient   Turning in Flat Bed Without Bedrails 4   Lying on Back to Sitting on Edge of Flat Bed Without Bedrails 4   Moving Bed to Chair 4   Standing Up From Chair Using Arms 4   Walk in Room 3   Climb 3-5 Stairs With Railing 3   Basic Mobility Inpatient Raw Score 22   Basic Mobility Standardized Score 47.4   Highest Level Of Mobility   JH-HLM Goal 7: Walk 25 feet or more   JH-HLM Achieved 7: Walk 25 feet or more       Time In: 0917  Time Out: 0929  Total Evaluation Minutes: 12    Josh Reyes, PT

## 2023-11-29 NOTE — PLAN OF CARE
Problem: PAIN - ADULT  Goal: Verbalizes/displays adequate comfort level or baseline comfort level  Description: Interventions:  - Encourage patient to monitor pain and request assistance  - Assess pain using appropriate pain scale  - Administer analgesics based on type and severity of pain and evaluate response  - Implement non-pharmacological measures as appropriate and evaluate response  - Consider cultural and social influences on pain and pain management  - Notify physician/advanced practitioner if interventions unsuccessful or patient reports new pain  Outcome: Progressing     Problem: INFECTION - ADULT  Goal: Absence or prevention of progression during hospitalization  Description: INTERVENTIONS:  - Assess and monitor for signs and symptoms of infection  - Monitor lab/diagnostic results  - Monitor all insertion sites, i.e. indwelling lines, tubes, and drains  - Monitor endotracheal if appropriate and nasal secretions for changes in amount and color  - [place] appropriate cooling/warming therapies per order  - Administer medications as ordered  - Instruct and encourage patient and family to use good hand hygiene technique  - Identify and instruct in appropriate isolation precautions for identified infection/condition  Outcome: Progressing  Goal: Absence of fever/infection during neutropenic period  Description: INTERVENTIONS:  - Monitor WBC    Outcome: Progressing     Problem: DISCHARGE PLANNING  Goal: Discharge to home or other facility with appropriate resources  Description: INTERVENTIONS:  - Identify barriers to discharge w/patient and caregiver  - Arrange for needed discharge resources and transportation as appropriate  - Identify discharge learning needs (meds, wound care, etc.)  - Arrange for interpretive services to assist at discharge as needed  - Refer to Case Management Department for coordinating discharge planning if the patient needs post-hospital services based on physician/advanced practitioner order or complex needs related to functional status, cognitive ability, or social support system  Outcome: Progressing

## 2023-11-30 ENCOUNTER — HOSPITAL ENCOUNTER (EMERGENCY)
Facility: HOSPITAL | Age: 58
Discharge: HOME | End: 2023-11-30
Attending: STUDENT IN AN ORGANIZED HEALTH CARE EDUCATION/TRAINING PROGRAM

## 2023-11-30 ENCOUNTER — APPOINTMENT (EMERGENCY)
Dept: RADIOLOGY | Facility: HOSPITAL | Age: 58
End: 2023-11-30

## 2023-11-30 VITALS
DIASTOLIC BLOOD PRESSURE: 76 MMHG | SYSTOLIC BLOOD PRESSURE: 161 MMHG | OXYGEN SATURATION: 93 % | RESPIRATION RATE: 22 BRPM | HEART RATE: 75 BPM | TEMPERATURE: 98.5 F

## 2023-11-30 DIAGNOSIS — R06.02 SOB (SHORTNESS OF BREATH): ICD-10-CM

## 2023-11-30 DIAGNOSIS — R07.9 CHEST PAIN: Primary | ICD-10-CM

## 2023-11-30 LAB
2HR DELTA HS TROPONIN: 1 NG/L
ANION GAP SERPL CALCULATED.3IONS-SCNC: 8 MMOL/L
ATRIAL RATE: 74 BPM
BASOPHILS # BLD MANUAL: 0 THOUSAND/UL (ref 0–0.1)
BASOPHILS NFR MAR MANUAL: 0 % (ref 0–1)
BNP SERPL-MCNC: 68 PG/ML (ref 0–100)
BUN SERPL-MCNC: 14 MG/DL (ref 5–25)
CALCIUM SERPL-MCNC: 8 MG/DL (ref 8.4–10.2)
CARDIAC TROPONIN I PNL SERPL HS: 3 NG/L
CARDIAC TROPONIN I PNL SERPL HS: 4 NG/L
CHLORIDE SERPL-SCNC: 99 MMOL/L (ref 96–108)
CO2 SERPL-SCNC: 30 MMOL/L (ref 21–32)
CREAT SERPL-MCNC: 0.77 MG/DL (ref 0.6–1.3)
EOSINOPHIL # BLD MANUAL: 0 THOUSAND/UL (ref 0–0.4)
EOSINOPHIL NFR BLD MANUAL: 0 % (ref 0–6)
ERYTHROCYTE [DISTWIDTH] IN BLOOD BY AUTOMATED COUNT: 15.5 % (ref 11.6–15.1)
GFR SERPL CREATININE-BSD FRML MDRD: 85 ML/MIN/1.73SQ M
GLUCOSE SERPL-MCNC: 186 MG/DL (ref 65–140)
HCT VFR BLD AUTO: 42.3 % (ref 34.8–46.1)
HGB BLD-MCNC: 13.5 G/DL (ref 11.5–15.4)
LYMPHOCYTES # BLD AUTO: 0.67 THOUSAND/UL (ref 0.6–4.47)
LYMPHOCYTES # BLD AUTO: 6 % (ref 14–44)
MCH RBC QN AUTO: 29.7 PG (ref 26.8–34.3)
MCHC RBC AUTO-ENTMCNC: 31.9 G/DL (ref 31.4–37.4)
MCV RBC AUTO: 93 FL (ref 82–98)
MONOCYTES # BLD AUTO: 0.56 THOUSAND/UL (ref 0–1.22)
MONOCYTES NFR BLD: 5 % (ref 4–12)
NEUTROPHILS # BLD MANUAL: 9.88 THOUSAND/UL (ref 1.85–7.62)
NEUTS BAND NFR BLD MANUAL: 2 % (ref 0–8)
NEUTS SEG NFR BLD AUTO: 87 % (ref 43–75)
P AXIS: 77 DEGREES
PLATELET # BLD AUTO: 274 THOUSANDS/UL (ref 149–390)
PLATELET BLD QL SMEAR: ADEQUATE
PMV BLD AUTO: 8.9 FL (ref 8.9–12.7)
POTASSIUM SERPL-SCNC: 4.2 MMOL/L (ref 3.5–5.3)
PR INTERVAL: 126 MS
QRS AXIS: 21 DEGREES
QRSD INTERVAL: 88 MS
QT INTERVAL: 372 MS
QTC INTERVAL: 412 MS
RBC # BLD AUTO: 4.54 MILLION/UL (ref 3.81–5.12)
RBC MORPH BLD: NORMAL
SODIUM SERPL-SCNC: 137 MMOL/L (ref 135–147)
T WAVE AXIS: 64 DEGREES
VENTRICULAR RATE: 74 BPM
WBC # BLD AUTO: 11.1 THOUSAND/UL (ref 4.31–10.16)

## 2023-11-30 PROCEDURE — 83880 ASSAY OF NATRIURETIC PEPTIDE: CPT | Performed by: STUDENT IN AN ORGANIZED HEALTH CARE EDUCATION/TRAINING PROGRAM

## 2023-11-30 PROCEDURE — 71045 X-RAY EXAM CHEST 1 VIEW: CPT

## 2023-11-30 PROCEDURE — 99285 EMERGENCY DEPT VISIT HI MDM: CPT

## 2023-11-30 PROCEDURE — 99285 EMERGENCY DEPT VISIT HI MDM: CPT | Performed by: STUDENT IN AN ORGANIZED HEALTH CARE EDUCATION/TRAINING PROGRAM

## 2023-11-30 PROCEDURE — 85027 COMPLETE CBC AUTOMATED: CPT | Performed by: STUDENT IN AN ORGANIZED HEALTH CARE EDUCATION/TRAINING PROGRAM

## 2023-11-30 PROCEDURE — 80048 BASIC METABOLIC PNL TOTAL CA: CPT | Performed by: STUDENT IN AN ORGANIZED HEALTH CARE EDUCATION/TRAINING PROGRAM

## 2023-11-30 PROCEDURE — 85007 BL SMEAR W/DIFF WBC COUNT: CPT | Performed by: STUDENT IN AN ORGANIZED HEALTH CARE EDUCATION/TRAINING PROGRAM

## 2023-11-30 PROCEDURE — 84484 ASSAY OF TROPONIN QUANT: CPT | Performed by: STUDENT IN AN ORGANIZED HEALTH CARE EDUCATION/TRAINING PROGRAM

## 2023-11-30 PROCEDURE — 36415 COLL VENOUS BLD VENIPUNCTURE: CPT | Performed by: STUDENT IN AN ORGANIZED HEALTH CARE EDUCATION/TRAINING PROGRAM

## 2023-11-30 PROCEDURE — 93005 ELECTROCARDIOGRAM TRACING: CPT

## 2023-11-30 RX ORDER — HYDROCODONE BITARTRATE AND ACETAMINOPHEN 5; 325 MG/1; MG/1
1 TABLET ORAL ONCE
Status: COMPLETED | OUTPATIENT
Start: 2023-11-30 | End: 2023-11-30

## 2023-11-30 RX ORDER — LIDOCAINE 50 MG/G
1 PATCH TOPICAL ONCE
Status: DISCONTINUED | OUTPATIENT
Start: 2023-11-30 | End: 2023-11-30 | Stop reason: HOSPADM

## 2023-11-30 RX ORDER — OXYCODONE HYDROCHLORIDE 5 MG/1
5 TABLET ORAL ONCE
Status: COMPLETED | OUTPATIENT
Start: 2023-11-30 | End: 2023-11-30

## 2023-11-30 RX ORDER — SODIUM CHLORIDE 9 MG/ML
3 INJECTION INTRAVENOUS
Status: DISCONTINUED | OUTPATIENT
Start: 2023-11-30 | End: 2023-11-30 | Stop reason: HOSPADM

## 2023-11-30 RX ADMIN — LIDOCAINE 1 PATCH: 50 PATCH CUTANEOUS at 18:19

## 2023-11-30 RX ADMIN — HYDROCODONE BITARTRATE AND ACETAMINOPHEN 1 TABLET: 5; 325 TABLET ORAL at 16:29

## 2023-11-30 RX ADMIN — OXYCODONE HYDROCHLORIDE 5 MG: 5 TABLET ORAL at 18:39

## 2023-11-30 NOTE — ASSESSMENT & PLAN NOTE
Present on admission  Started on IV solumedrol in the ED  Significant improvement today, minimal wheeze  Adjusted from IV solumedrol to PO prednisone on discharge  Patient does report having home oxygen  She was able to ambulate the floors without oxygen  Stable for discharge with prednisone taper

## 2023-11-30 NOTE — DISCHARGE INSTRUCTIONS
Continues over-the-counter medication as needed for discomfort. Can alternate between Tylenol Motrin. Please use the prescription that was provided to you by the hospital team.    Follow-up with your primary care practitioner for reevaluation. Return here for new or worsening pain such as increased work of breathing, chest discomfort or sweating.

## 2023-11-30 NOTE — ASSESSMENT & PLAN NOTE
Chronic compression of L1. Continue pain meds   Imaging shows no worsening.   Due to chronic pain, I prescribed oxycodone which is helping  She does have associated nausea which improved with zofran/tigan  Stable for discharge with PO zofran

## 2023-11-30 NOTE — ED PROVIDER NOTES
History  Chief Complaint   Patient presents with    Chest Pain     Pt reports CP ongoing. States she was just discharged yesterday following admission for same. Hx of COPD. HPI    Patient is a 45-year-old female presenting for department for chest pain and shortness of breath. Patient was discharged from the hospital yesterday for similar symptoms. Patient says there has been no change in her symptoms however they have been persistent and she feels unwell. She denies any fever chills nausea or vomiting. She denies any cough or cold. Discomfort of the chest is substernal and nonreproducible. History includes lupus and fibromyalgia. Prior to Admission Medications   Prescriptions Last Dose Informant Patient Reported? Taking? Diclofenac Sodium (VOLTAREN) 1 %   No No   Sig: Apply 2 g topically 4 (four) times a day   albuterol (PROVENTIL HFA,VENTOLIN HFA) 90 mcg/act inhaler  Self No No   Sig: Inhale 2 puffs every 4 (four) hours as needed for wheezing   benzonatate (TESSALON PERLES) 100 mg capsule  Self No No   Sig: Take 1 capsule (100 mg total) by mouth 3 (three) times a day as needed for cough   bisacodyl (DULCOLAX) 5 mg EC tablet   No No   Sig: Take 1 tablet (5 mg total) by mouth daily as needed for constipation   famotidine (PEPCID) 20 mg tablet   No No   Sig: Take 1 tablet (20 mg total) by mouth 2 (two) times a day   fluticasone-vilanterol (BREO ELLIPTA) 100-25 mcg/inh inhaler  Self No No   Sig: Inhale 1 puff daily Rinse mouth after use. Do not start before October 8, 2022.    Patient not taking: Reported on 1/15/2023   guaiFENesin (MUCINEX) 600 mg 12 hr tablet  Self No No   Sig: Take 1 tablet (600 mg total) by mouth 2 (two) times a day   ibuprofen (MOTRIN) 800 mg tablet  Self No No   Sig: Take 1 tablet (800 mg total) by mouth every 8 (eight) hours as needed for moderate pain   ipratropium-albuterol (DUO-NEB) 0.5-2.5 mg/3 mL nebulizer solution  Self No No   Sig: Take 3 mL by nebulization 4 (four) times a day   lidocaine (LIDODERM) 5 %  Self No No   Sig: Apply 1 patch topically daily as needed (back pain) Remove & Discard patch within 12 hours or as directed by MD   methocarbamol (ROBAXIN) 500 mg tablet  Self No No   Sig: Take 1 tablet (500 mg total) by mouth 2 (two) times a day   methocarbamol (ROBAXIN) 500 mg tablet  Self No No   Sig: Take 1 tablet (500 mg total) by mouth 2 (two) times a day   nicotine (NICODERM CQ) 14 mg/24hr TD 24 hr patch  Self No No   Sig: Place 1 patch on the skin daily Do not start before October 8, 2022. Patient not taking: Reported on 11/29/2023   ondansetron (ZOFRAN) 4 mg tablet   No No   Sig: Take 1 tablet (4 mg total) by mouth every 8 (eight) hours as needed for nausea or vomiting   oxyCODONE (Roxicodone) 5 immediate release tablet   No No   Sig: Take 1 tablet (5 mg total) by mouth every 6 (six) hours as needed for moderate pain for up to 10 days Max Daily Amount: 20 mg   pantoprazole (PROTONIX) 40 mg tablet  Self No No   Sig: Take 1 tablet (40 mg total) by mouth daily   predniSONE 20 mg tablet   No No   Sig: Take 2 tablets (40 mg total) by mouth daily for 3 days, THEN 1.5 tablets (30 mg total) daily for 3 days, THEN 1 tablet (20 mg total) daily for 3 days, THEN 0.5 tablets (10 mg total) daily for 3 days. tiotropium (SPIRIVA) 18 mcg inhalation capsule  Self Yes No   Sig: Place 18 mcg into inhaler and inhale daily      Facility-Administered Medications: None       Past Medical History:   Diagnosis Date    Achalasia     Back pain     Cancer ([address] )     Ovarian    Fibromyalgia     Lupus ([address])        Past Surgical History:   Procedure Laterality Date    HYSTERECTOMY      NECK SURGERY         History reviewed. No pertinent family history. I have reviewed and agree with the history as documented.     E-Cigarette/Vaping    E-Cigarette Use Never User      E-Cigarette/Vaping Substances     Social History     Tobacco Use    Smoking status: Every Day     Packs/day: 0.50     Types: Cigarettes Smokeless tobacco: Never   Vaping Use    Vaping Use: Never used   Substance Use Topics    Alcohol use: Never    Drug use: Never       Review of Systems   Constitutional:  Negative for chills and fever. HENT:  Negative for ear pain and sore throat. Eyes:  Negative for pain and visual disturbance. Respiratory:  Positive for shortness of breath. Negative for cough. Cardiovascular:  Positive for chest pain. Negative for palpitations. Gastrointestinal:  Negative for abdominal pain and vomiting. Genitourinary:  Negative for dysuria and hematuria. Musculoskeletal:  Negative for arthralgias and back pain. Skin:  Negative for color change and rash. Neurological:  Negative for seizures and syncope. All other systems reviewed and are negative. Physical Exam  Physical Exam  Vitals and nursing note reviewed. Constitutional:       General: She is not in acute distress. Appearance: She is well-developed. HENT:      Head: Normocephalic and atraumatic. Eyes:      Conjunctiva/sclera: Conjunctivae normal.   Cardiovascular:      Rate and Rhythm: Normal rate. Heart sounds: No murmur heard. Pulmonary:      Effort: Pulmonary effort is normal. No respiratory distress. Breath sounds: Normal breath sounds. Abdominal:      Palpations: Abdomen is soft. Tenderness: There is no abdominal tenderness. Musculoskeletal:         General: No swelling. Cervical back: Neck supple. Skin:     General: Skin is warm and dry. Capillary Refill: Capillary refill takes less than 2 seconds. Neurological:      General: No focal deficit present. Mental Status: She is alert and oriented to person, place, and time.    Psychiatric:         Mood and Affect: Mood normal.         Vital Signs  ED Triage Vitals [11/30/23 1424]   Temperature Pulse Respirations Blood Pressure SpO2   98.5 °F (36.9 °C) 74 20 144/64 92 %      Temp Source Heart Rate Source Patient Position - Orthostatic VS BP Location FiO2 (%)   Temporal Monitor Sitting Left arm --      Pain Score       9           Vitals:    11/30/23 1424 11/30/23 1437 11/30/23 1445   BP: 144/64 161/76 161/76   Pulse: 74 78 84   Patient Position - Orthostatic VS: Sitting Sitting          Visual Acuity      ED Medications  Medications   sodium chloride (PF) 0.9 % injection 3 mL (has no administration in time range)       Diagnostic Studies  Results Reviewed       Procedure Component Value Units Date/Time    B-Type Natriuretic Peptide(BNP) [[id number]]  (Normal) Collected: 11/30/23 1510    Lab Status: Final result Specimen: Blood from Hand, Right Updated: 11/30/23 1543     BNP 68 pg/mL     HS Troponin I 2hr [[id number]]     Lab Status: No result Specimen: Blood     HS Troponin 0hr (reflex protocol) [[id number]]  (Normal) Collected: 11/30/23 1510    Lab Status: Final result Specimen: Blood from Hand, Right Updated: 11/30/23 1543     hs TnI 0hr 3 ng/L     Basic metabolic panel [[id number]]  (Abnormal) Collected: 11/30/23 1501    Lab Status: Final result Specimen: Blood from Arm, Left Updated: 11/30/23 1531     Sodium 137 mmol/L      Potassium 4.2 mmol/L      Chloride 99 mmol/L      CO2 30 mmol/L      ANION GAP 8 mmol/L      BUN 14 mg/dL      Creatinine 0.77 mg/dL      Glucose 186 mg/dL      Calcium 8.0 mg/dL      eGFR 85 ml/min/1.73sq m     Narrative:      [facility] guidelines for Chronic Kidney Disease (CKD):     Stage 1 with normal or high GFR (GFR > 90 mL/min/1.73 square meters)    Stage 2 Mild CKD (GFR = 60-89 mL/min/1.73 square meters)    Stage 3A Moderate CKD (GFR = 45-59 mL/min/1.73 square meters)    Stage 3B Moderate CKD (GFR = 30-44 mL/min/1.73 square meters)    Stage 4 Severe CKD (GFR = 15-29 mL/min/1.73 square meters)    Stage 5 End Stage CKD (GFR <15 mL/min/1.73 square meters)  Note: GFR calculation is accurate only with a steady state creatinine    Manual Differential(PHLEBS Do Not Order) [[id number]]  (Abnormal) Collected: 11/30/23 1501    Lab Status: Final result Specimen: Blood from Arm, Left Updated: 11/30/23 1524     Segmented % 87 %      Bands % 2 %      Lymphocytes % 6 %      Monocytes % 5 %      Eosinophils, % 0 %      Basophils % 0 %      Absolute Neutrophils 9.88 Thousand/uL      Lymphocytes Absolute 0.67 Thousand/uL      Monocytes Absolute 0.56 Thousand/uL      Eosinophils Absolute 0.00 Thousand/uL      Basophils Absolute 0.00 Thousand/uL      Total Counted --     RBC Morphology Normal     Platelet Estimate Adequate    CBC and differential [[id number]]  (Abnormal) Collected: 11/30/23 1501    Lab Status: Final result Specimen: Blood from Arm, Left Updated: 11/30/23 1505     WBC 11.10 Thousand/uL      RBC 4.54 Million/uL      Hemoglobin 13.5 g/dL      Hematocrit 42.3 %      MCV 93 fL      MCH 29.7 pg      MCHC 31.9 g/dL      RDW 15.5 %      MPV 8.9 fL      Platelets 279 Thousands/uL                    X-ray chest 1 view portable    (Results Pending)              Procedures  Procedures         ED Course                                             Medical Decision Making  Amount and/or Complexity of Data Reviewed  Labs: ordered. Radiology: ordered. Risk  Prescription drug management. Disposition  Final diagnoses:   None     ED Disposition       None          Follow-up Information    None         Patient's Medications   Discharge Prescriptions    No medications on file       No discharge procedures on file.     PDMP Review         Value Time User    PDMP Reviewed  Yes 1/17/2023  1:10 PM Antonio Royal MD            ED Provider  Electronically Signed by Troponin negative x 2. Patient was treated with medication for her back discomfort. Chest x-ray is nonacute with noted atelectasis. Spoke to patient about smoking cessation and feeling up medications prescribed by the her recent inpatient hospitalization. Discussed symptomatic management as well as return follow-up precautions. Patient verbalized understanding. Disposition discharge    Amount and/or Complexity of Data Reviewed  Labs: ordered. Radiology: ordered and independent interpretation performed. ECG/medicine tests: ordered and independent interpretation performed. Risk  Prescription drug management. Disposition  Final diagnoses:   Chest pain   SOB (shortness of breath)     Time reflects when diagnosis was documented in both MDM as applicable and the Disposition within this note       Time User Action Codes Description Comment    11/30/2023  6:26 PM Renetta GHOSH Add [R07.9] Chest pain     11/30/2023  6:26 PM Renetta GHOSH Add [R06.02] SOB (shortness of breath)           ED Disposition       ED Disposition   Discharge    Condition   Stable    Date/Time   Thu Nov 30, 2023 [address] [facility] discharge to home/self care.                    Follow-up Information       Follow up With Specialties Details Why [address], DO General Practice   [address]  [address]  [facility] [address]  [phone]              Discharge Medication List as of 11/30/2023  6:32 PM        CONTINUE these medications which have NOT CHANGED    Details   albuterol (PROVENTIL HFA,VENTOLIN HFA) 90 mcg/act inhaler Inhale 2 puffs every 4 (four) hours as needed for wheezing, Starting Sat 10/8/2022, Normal      benzonatate (TESSALON PERLES) 100 mg capsule Take 1 capsule (100 mg total) by mouth 3 (three) times a day as needed for cough, Starting Fri 10/7/2022, Normal      bisacodyl (DULCOLAX) 5 mg EC tablet Take 1 tablet (5 mg total) by mouth daily as needed for constipation, Starting Wed 11/29/2023, Normal      Diclofenac Sodium (VOLTAREN) 1 % Apply 2 g topically 4 (four) times a day, Starting Wed 11/29/2023, Normal      famotidine (PEPCID) 20 mg tablet Take 1 tablet (20 mg total) by mouth 2 (two) times a day, Starting Sun 4/9/2023, Normal      fluticasone-vilanterol (BREO ELLIPTA) 100-25 mcg/inh inhaler Inhale 1 puff daily Rinse mouth after use.  Do not start before October 8, 2022., Starting Sat 10/8/2022, Normal      guaiFENesin (MUCINEX) 600 mg 12 hr tablet Take 1 tablet (600 mg total) by mouth 2 (two) times a day, Starting Fri 10/7/2022, Normal      ibuprofen (MOTRIN) 800 mg tablet Take 1 tablet (800 mg total) by mouth every 8 (eight) hours as needed for moderate pain, Starting Thu 3/2/2023, Normal      ipratropium-albuterol (DUO-NEB) 0.5-2.5 mg/3 mL nebulizer solution Take 3 mL by nebulization 4 (four) times a day, Starting Fri 10/7/2022, Normal      lidocaine (LIDODERM) 5 % Apply 1 patch topically daily as needed (back pain) Remove & Discard patch within 12 hours or as directed by MD, Starting Mon 1/9/2023, Normal      !! methocarbamol (ROBAXIN) 500 mg tablet Take 1 tablet (500 mg total) by mouth 2 (two) times a day, Starting Tue 1/17/2023, Normal      !! methocarbamol (ROBAXIN) 500 mg tablet Take 1 tablet (500 mg total) by mouth 2 (two) times a day, Starting Thu 3/2/2023, Normal      nicotine (NICODERM CQ) 14 mg/24hr TD 24 hr patch Place 1 patch on the skin daily Do not start before October 8, 2022., Starting Sat 10/8/2022, Normal      ondansetron (ZOFRAN) 4 mg tablet Take 1 tablet (4 mg total) by mouth every 8 (eight) hours as needed for nausea or vomiting, Starting Wed 11/29/2023, Normal      oxyCODONE (Roxicodone) 5 immediate release tablet Take 1 tablet (5 mg total) by mouth every 6 (six) hours as needed for moderate pain for up to 10 days Max Daily Amount: 20 mg, Starting Wed 11/29/2023, Until Sat 12/9/2023 at 2359, Normal      pantoprazole (PROTONIX) 40 mg tablet Take 1 tablet (40 mg total) by mouth daily, Starting Sat 10/8/2022, Normal      predniSONE 20 mg tablet Multiple Dosages:Starting Wed 11/29/2023, Until Fri 12/1/2023 at 2359, THEN Starting Sat 12/2/2023, Until Mon 12/4/2023 at 2359, THEN Starting Tue 12/5/2023, Until Thu 12/7/2023 at 2359, THEN Starting Fri 12/8/2023, Until Sun 12/10/2023 at 2359Take  2 tablets (40 mg total) by mouth daily for 3 days, THEN 1.5 tablets (30 mg total) daily for 3 days, THEN 1 tablet (20 mg total) daily for 3 days, THEN 0.5 tablets (10 mg total) daily for 3 days. , Normal      tiotropium (SPIRIVA) 18 mcg inhalation capsule Place 18 mcg into inhaler and inhale daily, Historical Med       !! - Potential duplicate medications found. Please discuss with provider. No discharge procedures on file.     PDMP Review         Value Time User    PDMP Reviewed  Yes 1/17/2023  1:10 PM Radha Barnes MD            ED Provider  Electronically Signed by             Rodriguez Cruz DO  12/15/23 [id number]

## 2023-11-30 NOTE — DISCHARGE SUMMARY
[address]  Discharge- Maura Mar 1965, 62 y.o. female MRN: [id number]  Unit/Bed#: -01 Encounter: [id number]  Primary Care Provider: Alex Henriquez DO   Date and time admitted to hospital: 11/25/2023  2:48 PM    * COPD exacerbation [facility])  Assessment & Plan  Present on admission  Started on IV solumedrol in the ED  Significant improvement today, minimal wheeze  Adjusted from IV solumedrol to PO prednisone on discharge  Patient does report having home oxygen  She was able to ambulate the floors without oxygen  Stable for discharge with prednisone taper    Gastroesophageal reflux disease without esophagitis  Assessment & Plan  Continue Protonix and Pepcid    Compression fracture of L1 vertebra (HCC)  Assessment & Plan  Chronic compression of L1. Continue pain meds   Imaging shows no worsening. Due to chronic pain, I prescribed oxycodone which is helping  She does have associated nausea which improved with zofran/tigan  Stable for discharge with PO zofran      Smoker  Assessment & Plan  Placed on nicotine patch, patient continues to smoke. Counseled on smoking cessation        Medical Problems       Resolved Problems  Date Reviewed: 11/27/2023   None       Discharging Physician / Practitioner: Kobe Baca MD  PCP: Alex Henriquez DO  Admission Date:   Admission Orders (From admission, onward)       Ordered        11/25/23 2008  INPATIENT ADMISSION  Once                          Discharge Date: 11/29/23    Consultations During Hospital Stay:  None      Procedures Performed:   imaging    Significant Findings / Test Results:   Principal Problem:    COPD exacerbation ([address])  Active Problems:    Smoker    Compression fracture of L1 vertebra ([address])    Fall    Gastroesophageal reflux disease without esophagitis    Osteoporosis  Resolved Problems:    * No resolved hospital problems.  *        Incidental Findings:   See above    Test Results Pending at Discharge (will require follow up):   na     Outpatient Tests Requested: Follow up with PCP     Complications:  na    Reason for Admission: shortness of breath     Hospital Course:   Yasmany Tam is a 62 y.o. female patient who originally presented to the hospital on 11/25/2023 due to shortness of breath secondary to COPD exacerbation which responded with IV steroids and nebulizer treatments. Patient is on home oxygen per self report. She was able to ambulate the floors without oxygen, but was labored after exertion. Stable for discharge with recommendation to continue home oxygen at continuously at 2 liters rather than PRN nightly. She reports she felt much better and was ready for discharge. She did have some nausea which I attributed to oxycodone use and poor diet. Nurses reported she was eating sticks of butter and crackers throughout admission. I prescribed zofran for her on discharge. Condition at Discharge: good    Discharge Day Visit / Exam:   * Please refer to separate progress note for these details *    Discussion with Family: Updated  () at bedside. Discharge instructions/Information to patient and family:   See after visit summary for information provided to patient and family. Provisions for Follow-Up Care:  See after visit summary for information related to follow-up care and any pertinent home health orders. Mobility at time of Discharge:   Basic Mobility Inpatient Raw Score: 22  JH-HLM Goal: 7: Walk 25 feet or more  JH-HLM Achieved: 7: Walk 25 feet or more  HLM Goal achieved. Continue to encourage appropriate mobility. Disposition:   Home    Planned Readmission: none      Discharge Statement:  I spent 30+ minutes discharging the patient. This time was spent on the day of discharge. I had direct contact with the patient on the day of discharge.  Greater than 50% of the total time was spent examining patient, answering all patient questions, arranging and discussing plan of care with patient as well as directly providing post-discharge instructions. Additional time then spent on discharge activities. Discharge Medications:  See after visit summary for reconciled discharge medications provided to patient and/or family.       **Please Note: This note may have been constructed using a voice recognition system**

## 2023-12-01 LAB
BACTERIA BLD CULT: NORMAL
BACTERIA BLD CULT: NORMAL

## 2023-12-09 ENCOUNTER — HOSPITAL ENCOUNTER (EMERGENCY)
Facility: HOSPITAL | Age: 58
Discharge: HOME | End: 2023-12-09
Attending: STUDENT IN AN ORGANIZED HEALTH CARE EDUCATION/TRAINING PROGRAM

## 2023-12-09 ENCOUNTER — APPOINTMENT (OUTPATIENT)
Dept: RADIOLOGY | Facility: HOSPITAL | Age: 58
End: 2023-12-09

## 2023-12-09 VITALS
RESPIRATION RATE: 20 BRPM | TEMPERATURE: 98.3 F | SYSTOLIC BLOOD PRESSURE: 135 MMHG | HEART RATE: 96 BPM | OXYGEN SATURATION: 96 % | DIASTOLIC BLOOD PRESSURE: 63 MMHG

## 2023-12-09 DIAGNOSIS — J18.9 PNEUMONIA: ICD-10-CM

## 2023-12-09 DIAGNOSIS — R06.02 SOB (SHORTNESS OF BREATH): Primary | ICD-10-CM

## 2023-12-09 LAB
2HR DELTA HS TROPONIN: -1 NG/L
ANION GAP SERPL CALCULATED.3IONS-SCNC: 7 MMOL/L
ATRIAL RATE: 89 BPM
BASOPHILS # BLD AUTO: 0.04 THOUSANDS/ÂΜL (ref 0–0.1)
BASOPHILS NFR BLD AUTO: 0 % (ref 0–1)
BNP SERPL-MCNC: 12 PG/ML (ref 0–100)
BUN SERPL-MCNC: 15 MG/DL (ref 5–25)
CALCIUM SERPL-MCNC: 9.2 MG/DL (ref 8.4–10.2)
CARDIAC TROPONIN I PNL SERPL HS: 3 NG/L
CARDIAC TROPONIN I PNL SERPL HS: 4 NG/L
CHLORIDE SERPL-SCNC: 100 MMOL/L (ref 96–108)
CO2 SERPL-SCNC: 32 MMOL/L (ref 21–32)
CREAT SERPL-MCNC: 0.78 MG/DL (ref 0.6–1.3)
D DIMER PPP FEU-MCNC: <0.27 UG/ML FEU
EOSINOPHIL # BLD AUTO: 0.01 THOUSAND/ÂΜL (ref 0–0.61)
EOSINOPHIL NFR BLD AUTO: 0 % (ref 0–6)
ERYTHROCYTE [DISTWIDTH] IN BLOOD BY AUTOMATED COUNT: 16.3 % (ref 11.6–15.1)
FLUAV RNA RESP QL NAA+PROBE: NEGATIVE
FLUBV RNA RESP QL NAA+PROBE: NEGATIVE
GFR SERPL CREATININE-BSD FRML MDRD: 84 ML/MIN/1.73SQ M
GLUCOSE SERPL-MCNC: 170 MG/DL (ref 65–140)
HCT VFR BLD AUTO: 47.8 % (ref 34.8–46.1)
HGB BLD-MCNC: 15.2 G/DL (ref 11.5–15.4)
IMM GRANULOCYTES # BLD AUTO: 0.19 THOUSAND/UL (ref 0–0.2)
IMM GRANULOCYTES NFR BLD AUTO: 1 % (ref 0–2)
LYMPHOCYTES # BLD AUTO: 1.04 THOUSANDS/ÂΜL (ref 0.6–4.47)
LYMPHOCYTES NFR BLD AUTO: 6 % (ref 14–44)
MCH RBC QN AUTO: 29.9 PG (ref 26.8–34.3)
MCHC RBC AUTO-ENTMCNC: 31.8 G/DL (ref 31.4–37.4)
MCV RBC AUTO: 94 FL (ref 82–98)
MONOCYTES # BLD AUTO: 0.59 THOUSAND/ÂΜL (ref 0.17–1.22)
MONOCYTES NFR BLD AUTO: 4 % (ref 4–12)
NEUTROPHILS # BLD AUTO: 14.64 THOUSANDS/ÂΜL (ref 1.85–7.62)
NEUTS SEG NFR BLD AUTO: 89 % (ref 43–75)
NRBC BLD AUTO-RTO: 0 /100 WBCS
P AXIS: 79 DEGREES
PLATELET # BLD AUTO: 301 THOUSANDS/UL (ref 149–390)
PMV BLD AUTO: 8.9 FL (ref 8.9–12.7)
POTASSIUM SERPL-SCNC: 4.3 MMOL/L (ref 3.5–5.3)
PR INTERVAL: 132 MS
QRS AXIS: 5 DEGREES
QRSD INTERVAL: 82 MS
QT INTERVAL: 356 MS
QTC INTERVAL: 433 MS
RBC # BLD AUTO: 5.08 MILLION/UL (ref 3.81–5.12)
RSV RNA RESP QL NAA+PROBE: NEGATIVE
SARS-COV-2 RNA RESP QL NAA+PROBE: NEGATIVE
SODIUM SERPL-SCNC: 139 MMOL/L (ref 135–147)
T WAVE AXIS: 78 DEGREES
VENTRICULAR RATE: 89 BPM
WBC # BLD AUTO: 16.51 THOUSAND/UL (ref 4.31–10.16)

## 2023-12-09 PROCEDURE — 83880 ASSAY OF NATRIURETIC PEPTIDE: CPT | Performed by: STUDENT IN AN ORGANIZED HEALTH CARE EDUCATION/TRAINING PROGRAM

## 2023-12-09 PROCEDURE — 36415 COLL VENOUS BLD VENIPUNCTURE: CPT | Performed by: STUDENT IN AN ORGANIZED HEALTH CARE EDUCATION/TRAINING PROGRAM

## 2023-12-09 PROCEDURE — 93005 ELECTROCARDIOGRAM TRACING: CPT

## 2023-12-09 PROCEDURE — 99285 EMERGENCY DEPT VISIT HI MDM: CPT

## 2023-12-09 PROCEDURE — 71046 X-RAY EXAM CHEST 2 VIEWS: CPT

## 2023-12-09 PROCEDURE — 84484 ASSAY OF TROPONIN QUANT: CPT | Performed by: STUDENT IN AN ORGANIZED HEALTH CARE EDUCATION/TRAINING PROGRAM

## 2023-12-09 PROCEDURE — 80048 BASIC METABOLIC PNL TOTAL CA: CPT | Performed by: STUDENT IN AN ORGANIZED HEALTH CARE EDUCATION/TRAINING PROGRAM

## 2023-12-09 PROCEDURE — 96374 THER/PROPH/DIAG INJ IV PUSH: CPT

## 2023-12-09 PROCEDURE — 99285 EMERGENCY DEPT VISIT HI MDM: CPT | Performed by: STUDENT IN AN ORGANIZED HEALTH CARE EDUCATION/TRAINING PROGRAM

## 2023-12-09 PROCEDURE — 96375 TX/PRO/DX INJ NEW DRUG ADDON: CPT

## 2023-12-09 PROCEDURE — 0241U HB NFCT DS VIR RESP RNA 4 TRGT: CPT | Performed by: STUDENT IN AN ORGANIZED HEALTH CARE EDUCATION/TRAINING PROGRAM

## 2023-12-09 PROCEDURE — 94640 AIRWAY INHALATION TREATMENT: CPT

## 2023-12-09 PROCEDURE — 85379 FIBRIN DEGRADATION QUANT: CPT | Performed by: STUDENT IN AN ORGANIZED HEALTH CARE EDUCATION/TRAINING PROGRAM

## 2023-12-09 PROCEDURE — 85025 COMPLETE CBC W/AUTO DIFF WBC: CPT | Performed by: STUDENT IN AN ORGANIZED HEALTH CARE EDUCATION/TRAINING PROGRAM

## 2023-12-09 RX ORDER — MORPHINE SULFATE 4 MG/ML
4 INJECTION, SOLUTION INTRAMUSCULAR; INTRAVENOUS ONCE
Status: COMPLETED | OUTPATIENT
Start: 2023-12-09 | End: 2023-12-09

## 2023-12-09 RX ORDER — MAGNESIUM HYDROXIDE/ALUMINUM HYDROXICE/SIMETHICONE 120; 1200; 1200 MG/30ML; MG/30ML; MG/30ML
30 SUSPENSION ORAL ONCE
Status: COMPLETED | OUTPATIENT
Start: 2023-12-09 | End: 2023-12-09

## 2023-12-09 RX ORDER — HYDROCODONE BITARTRATE AND ACETAMINOPHEN 5; 325 MG/1; MG/1
1 TABLET ORAL ONCE
Status: COMPLETED | OUTPATIENT
Start: 2023-12-09 | End: 2023-12-09

## 2023-12-09 RX ORDER — SODIUM CHLORIDE 9 MG/ML
3 INJECTION INTRAVENOUS
Status: DISCONTINUED | OUTPATIENT
Start: 2023-12-09 | End: 2023-12-09 | Stop reason: HOSPADM

## 2023-12-09 RX ORDER — ONDANSETRON 2 MG/ML
4 INJECTION INTRAMUSCULAR; INTRAVENOUS ONCE
Status: COMPLETED | OUTPATIENT
Start: 2023-12-09 | End: 2023-12-09

## 2023-12-09 RX ORDER — LIDOCAINE HYDROCHLORIDE 20 MG/ML
15 SOLUTION OROPHARYNGEAL ONCE
Status: COMPLETED | OUTPATIENT
Start: 2023-12-09 | End: 2023-12-09

## 2023-12-09 RX ORDER — IPRATROPIUM BROMIDE AND ALBUTEROL SULFATE 2.5; .5 MG/3ML; MG/3ML
3 SOLUTION RESPIRATORY (INHALATION) ONCE
Status: COMPLETED | OUTPATIENT
Start: 2023-12-09 | End: 2023-12-09

## 2023-12-09 RX ADMIN — IPRATROPIUM BROMIDE AND ALBUTEROL SULFATE 3 ML: 2.5; .5 SOLUTION RESPIRATORY (INHALATION) at 13:19

## 2023-12-09 RX ADMIN — ONDANSETRON 4 MG: 2 INJECTION INTRAMUSCULAR; INTRAVENOUS at 13:19

## 2023-12-09 RX ADMIN — HYDROCODONE BITARTRATE AND ACETAMINOPHEN 1 TABLET: 5; 325 TABLET ORAL at 16:06

## 2023-12-09 RX ADMIN — LIDOCAINE HYDROCHLORIDE 15 ML: 20 SOLUTION ORAL; TOPICAL at 15:45

## 2023-12-09 RX ADMIN — ALUMINUM HYDROXIDE, MAGNESIUM HYDROXIDE, AND SIMETHICONE 30 ML: 200; 200; 20 SUSPENSION ORAL at 15:45

## 2023-12-09 RX ADMIN — MORPHINE SULFATE 4 MG: 4 INJECTION INTRAVENOUS at 13:19

## 2023-12-09 NOTE — ED PROVIDER NOTES
History  Chief Complaint   Patient presents with    Shortness of Breath     Pt complains of SOB that started a week ago. Pts PCP said to go to the ER if pt felt any worse     HPI    Prior to Admission Medications   Prescriptions Last Dose Informant Patient Reported? Taking? Diclofenac Sodium (VOLTAREN) 1 %   No No   Sig: Apply 2 g topically 4 (four) times a day   albuterol (PROVENTIL HFA,VENTOLIN HFA) 90 mcg/act inhaler  Self No No   Sig: Inhale 2 puffs every 4 (four) hours as needed for wheezing   benzonatate (TESSALON PERLES) 100 mg capsule  Self No No   Sig: Take 1 capsule (100 mg total) by mouth 3 (three) times a day as needed for cough   bisacodyl (DULCOLAX) 5 mg EC tablet   No No   Sig: Take 1 tablet (5 mg total) by mouth daily as needed for constipation   famotidine (PEPCID) 20 mg tablet   No No   Sig: Take 1 tablet (20 mg total) by mouth 2 (two) times a day   fluticasone-vilanterol (BREO ELLIPTA) 100-25 mcg/inh inhaler  Self No No   Sig: Inhale 1 puff daily Rinse mouth after use. Do not start before October 8, 2022.    Patient not taking: Reported on 1/15/2023   guaiFENesin (MUCINEX) 600 mg 12 hr tablet  Self No No   Sig: Take 1 tablet (600 mg total) by mouth 2 (two) times a day   ibuprofen (MOTRIN) 800 mg tablet  Self No No   Sig: Take 1 tablet (800 mg total) by mouth every 8 (eight) hours as needed for moderate pain   ipratropium-albuterol (DUO-NEB) 0.5-2.5 mg/3 mL nebulizer solution  Self No No   Sig: Take 3 mL by nebulization 4 (four) times a day   lidocaine (LIDODERM) 5 %  Self No No   Sig: Apply 1 patch topically daily as needed (back pain) Remove & Discard patch within 12 hours or as directed by MD   methocarbamol (ROBAXIN) 500 mg tablet  Self No No   Sig: Take 1 tablet (500 mg total) by mouth 2 (two) times a day   methocarbamol (ROBAXIN) 500 mg tablet  Self No No   Sig: Take 1 tablet (500 mg total) by mouth 2 (two) times a day   nicotine (NICODERM CQ) 14 mg/24hr TD 24 hr patch  Self No No   Sig: Place 1 patch on the skin daily Do not start before October 8, 2022. Patient not taking: Reported on 11/29/2023   ondansetron (ZOFRAN) 4 mg tablet   No No   Sig: Take 1 tablet (4 mg total) by mouth every 8 (eight) hours as needed for nausea or vomiting   oxyCODONE (Roxicodone) 5 immediate release tablet   No No   Sig: Take 1 tablet (5 mg total) by mouth every 6 (six) hours as needed for moderate pain for up to 10 days Max Daily Amount: 20 mg   pantoprazole (PROTONIX) 40 mg tablet  Self No No   Sig: Take 1 tablet (40 mg total) by mouth daily   predniSONE 20 mg tablet   No No   Sig: Take 2 tablets (40 mg total) by mouth daily for 3 days, THEN 1.5 tablets (30 mg total) daily for 3 days, THEN 1 tablet (20 mg total) daily for 3 days, THEN 0.5 tablets (10 mg total) daily for 3 days. tiotropium (SPIRIVA) 18 mcg inhalation capsule  Self Yes No   Sig: Place 18 mcg into inhaler and inhale daily      Facility-Administered Medications: None       Past Medical History:   Diagnosis Date    Achalasia     Back pain     Cancer ([address] )     Ovarian    Fibromyalgia     Lupus ([address] )        Past Surgical History:   Procedure Laterality Date    HYSTERECTOMY      NECK SURGERY         History reviewed. No pertinent family history. I have reviewed and agree with the history as documented.     E-Cigarette/Vaping    E-Cigarette Use Never User      E-Cigarette/Vaping Substances     Social History     Tobacco Use    Smoking status: Every Day     Packs/day: 0.50     Types: Cigarettes    Smokeless tobacco: Never   Vaping Use    Vaping Use: Never used   Substance Use Topics    Alcohol use: Never    Drug use: Never       Review of Systems    Physical Exam  Physical Exam    Vital Signs  ED Triage Vitals   Temperature Pulse Respirations Blood Pressure SpO2   12/09/23 1218 12/09/23 1218 12/09/23 1218 12/09/23 1218 12/09/23 1218   98.3 °F (36.8 °C) 105 20 141/90 94 %      Temp src Heart Rate Source Patient Position - Orthostatic VS BP Location FiO2 (%) -- 12/09/23 1218 12/09/23 1500 -- --    Monitor Sitting        Pain Score       12/09/23 1319       7           Vitals:    12/09/23 1500 12/09/23 1530 12/09/23 1630 12/09/23 1645   BP: 115/56 100/53 109/56 135/63   Pulse: 78 70 78 96   Patient Position - Orthostatic VS: Sitting            Visual Acuity      ED Medications  Medications   sodium chloride (PF) 0.9 % injection 3 mL (has no administration in time range)   morphine injection 4 mg (4 mg Intravenous Given 12/9/23 1319)   ondansetron (ZOFRAN) injection 4 mg (4 mg Intravenous Given 12/9/23 1319)   ipratropium-albuterol (DUO-NEB) 0.5-2.5 mg/3 mL inhalation solution 3 mL (3 mL Nebulization Given 12/9/23 1319)   Lidocaine Viscous HCl (XYLOCAINE) 2 % mucosal solution 15 mL (15 mL Swish & Spit Given 12/9/23 1545)   aluminum-magnesium hydroxide-simethicone (MAALOX) oral suspension 30 mL (30 mL Oral Given 12/9/23 1545)   HYDROcodone-acetaminophen (NORCO) 5-325 mg per tablet 1 tablet (1 tablet Oral Given 12/9/23 1606)       Diagnostic Studies  Results Reviewed       Procedure Component Value Units Date/Time    HS Troponin I 2hr [[id number]]  (Normal) Collected: 12/09/23 1515    Lab Status: Final result Specimen: Blood from Arm, Right Updated: 12/09/23 1550     hs TnI 2hr 3 ng/L      Delta 2hr hsTnI -1 ng/L     HS Troponin I 4hr [[id number]]     Lab Status: No result Specimen: Blood     B-Type Natriuretic Peptide(BNP) [[id number]]  (Normal) Collected: 12/09/23 1314    Lab Status: Final result Specimen: Blood from Arm, Right Updated: 12/09/23 1357     BNP 12 pg/mL     D-dimer, quantitative [[id number]]  (Normal) Collected: 12/09/23 1314    Lab Status: Final result Specimen: Blood from Arm, Right Updated: 12/09/23 1355     D-Dimer, Quant <0.27 ug/ml FEU     Narrative:       In the evaluation for possible pulmonary embolism, in the appropriate (Well's Score of 4 or less) patient, the age adjusted d-dimer cutoff for this patient can be calculated as:    Age x 0.01 (in ug/mL) for Age-adjusted D-dimer exclusion threshold for a patient over 50 years.     HS Troponin 0hr (reflex protocol) [[id number]]  (Normal) Collected: 12/09/23 1314    Lab Status: Final result Specimen: Blood from Arm, Right Updated: 12/09/23 1354     hs TnI 0hr 4 ng/L     Basic metabolic panel [[id number]]  (Abnormal) Collected: 12/09/23 1314    Lab Status: Final result Specimen: Blood from Arm, Right Updated: 12/09/23 1346     Sodium 139 mmol/L      Potassium 4.3 mmol/L      Chloride 100 mmol/L      CO2 32 mmol/L      ANION GAP 7 mmol/L      BUN 15 mg/dL      Creatinine 0.78 mg/dL      Glucose 170 mg/dL      Calcium 9.2 mg/dL      eGFR 84 ml/min/1.73sq m     Narrative:      [place] guidelines for Chronic Kidney Disease (CKD):     Stage 1 with normal or high GFR (GFR > 90 mL/min/1.73 square meters)    Stage 2 Mild CKD (GFR = 60-89 mL/min/1.73 square meters)    Stage 3A Moderate CKD (GFR = 45-59 mL/min/1.73 square meters)    Stage 3B Moderate CKD (GFR = 30-44 mL/min/1.73 square meters)    Stage 4 Severe CKD (GFR = 15-29 mL/min/1.73 square meters)    Stage 5 End Stage CKD (GFR <15 mL/min/1.73 square meters)  Note: GFR calculation is accurate only with a steady state creatinine    CBC and differential [[id number]]  (Abnormal) Collected: 12/09/23 1314    Lab Status: Final result Specimen: Blood from Arm, Right Updated: 12/09/23 1330     WBC 16.51 Thousand/uL      RBC 5.08 Million/uL      Hemoglobin 15.2 g/dL      Hematocrit 47.8 %      MCV 94 fL      MCH 29.9 pg      MCHC 31.8 g/dL      RDW 16.3 %      MPV 8.9 fL      Platelets 365 Thousands/uL      nRBC 0 /100 WBCs      Neutrophils Relative 89 %      Immat GRANS % 1 %      Lymphocytes Relative 6 %      Monocytes Relative 4 %      Eosinophils Relative 0 %      Basophils Relative 0 %      Neutrophils Absolute 14.64 Thousands/µL      Immature Grans Absolute 0.19 Thousand/uL      Lymphocytes Absolute 1.04 Thousands/µL      Monocytes Absolute 0.59 Thousand/µL      Eosinophils Absolute 0.01 Thousand/µL      Basophils Absolute 0.04 Thousands/µL     FLU/RSV/COVID - if FLU/RSV clinically relevant [[id number]]  (Normal) Collected: 12/09/23 1214    Lab Status: Final result Specimen: Nares from Nose Updated: 12/09/23 1322     SARS-CoV-2 Negative     INFLUENZA A PCR Negative     INFLUENZA B PCR Negative     RSV PCR Negative    Narrative:      FOR PEDIATRIC PATIENTS - copy/paste COVID Guidelines URL to browser: [email or web address]    SARS-CoV-2 assay is a Nucleic Acid Amplification assay intended for the  qualitative detection of nucleic acid from SARS-CoV-2 in nasopharyngeal  swabs. Results are for the presumptive identification of SARS-CoV-2 RNA. Positive results are indicative of infection with SARS-CoV-2, the virus  causing COVID-19, but do not rule out bacterial infection or co-infection  with other viruses. Laboratories within the [facility] and its  territories are required to report all positive results to the appropriate  public health authorities. Negative results do not preclude SARS-CoV-2  infection and should not be used as the sole basis for treatment or other  patient management decisions. Negative results must be combined with  clinical observations, patient history, and epidemiological information. This test has not been FDA cleared or approved. This test has been authorized by FDA under an Emergency Use Authorization  (EUA). This test is only authorized for the duration of time the  declaration that circumstances exist justifying the authorization of the  emergency use of an in vitro diagnostic tests for detection of SARS-CoV-2  virus and/or diagnosis of COVID-19 infection under section 564(b)(1) of  the Act, 21 U. S.C. 387NPH-8(O)(7), unless the authorization is terminated  or revoked sooner. The test has been validated but independent review by FDA  and CLIA is pending.     Test performed using [employer] GeneXpert: This RT-PCR assay targets N2,  a region unique to SARS-CoV-2. A conserved region in the E-gene was chosen  for pan-Sarbecovirus detection which includes SARS-CoV-2. According to CMS-2020-01-R, this platform meets the definition of high-throughput technology. XR chest 2 views    (Results Pending)              Procedures  Procedures         ED Course                               SBIRT 20yo+      Flowsheet Row Most Recent Value   Initial Alcohol Screen: US AUDIT-C     1. How often do you have a drink containing alcohol? 0 Filed at: 12/09/2023 1212   2. How many drinks containing alcohol do you have on a typical day you are drinking? 0 Filed at: 12/09/2023 1212   3b. FEMALE Any Age, or MALE 65+: How often do you have 4 or more drinks on one occassion? 0 Filed at: 12/09/2023 1212   Audit-C Score 0 Filed at: 12/09/2023 1212   EULALIO: How many times in the past year have you. .. Used an illegal drug or used a prescription medication for non-medical reasons? Never Filed at: 12/09/2023 1212                      Medical Decision Making  EKG: rate 89, NSR without signs of acute ischemic change. Compared to 11/23. EKG: rate 72, NSR without signs of acute ischemic change. Amount and/or Complexity of Data Reviewed  Labs: ordered. Radiology: ordered. Risk  OTC drugs. Prescription drug management. Disposition  Final diagnoses:   SOB (shortness of breath)     Time reflects when diagnosis was documented in both MDM as applicable and the Disposition within this note       Time User Action Codes Description Comment    12/9/2023  4:07 PM Antonia Zee Add [R06.02] SOB (shortness of breath)           ED Disposition       ED Disposition   Discharge    Condition   Stable    Date/Time   Sat Dec 9, 2023 [address] discharge to home/self care.                    Follow-up Information       Follow up With Specialties Details Why Contact Info Additional Information    Alondra Villarreal,  General Practice Schedule an appointment as soon as possible for a visit  As needed, If symptoms worsen [address]  [address]  [address] Pulmonology Schedule an appointment as soon as possible for a visit   [facility] [address] Pulmonary [facility], [address], [address] [place], [place], [address] Cardiology Schedule an appointment as soon as possible for a visit  As needed, If symptoms worsen [facility] [id number]  [address] [facility], [address], [address] [place], [place], [address] [phone]            Discharge Medication List as of 12/9/2023  4:25 PM        CONTINUE these medications which have NOT CHANGED    Details   albuterol (PROVENTIL HFA,VENTOLIN HFA) 90 mcg/act inhaler Inhale 2 puffs every 4 (four) hours as needed for wheezing, Starting Sat 10/8/2022, Normal      benzonatate (TESSALON PERLES) 100 mg capsule Take 1 capsule (100 mg total) by mouth 3 (three) times a day as needed for cough, Starting Fri 10/7/2022, Normal      bisacodyl (DULCOLAX) 5 mg EC tablet Take 1 tablet (5 mg total) by mouth daily as needed for constipation, Starting Wed 11/29/2023, Normal      Diclofenac Sodium (VOLTAREN) 1 % Apply 2 g topically 4 (four) times a day, Starting Wed 11/29/2023, Normal      famotidine (PEPCID) 20 mg tablet Take 1 tablet (20 mg total) by mouth 2 (two) times a day, Starting Sun 4/9/2023, Normal      fluticasone-vilanterol (BREO ELLIPTA) 100-25 mcg/inh inhaler Inhale 1 puff daily Rinse mouth after use.  Do not start before October 8, 2022., Starting Sat 10/8/2022, Normal      guaiFENesin (MUCINEX) 600 mg 12 hr tablet Take 1 tablet (600 mg total) by mouth 2 (two) times a day, Starting Fri 10/7/2022, Normal      ibuprofen (MOTRIN) 800 mg tablet Take 1 tablet (800 mg total) by mouth every 8 (eight) hours as needed for moderate pain, Starting Thu 3/2/2023, Normal      ipratropium-albuterol (DUO-NEB) 0.5-2.5 mg/3 mL nebulizer solution Take 3 mL by nebulization 4 (four) times a day, Starting Fri 10/7/2022, Normal      lidocaine (LIDODERM) 5 % Apply 1 patch topically daily as needed (back pain) Remove & Discard patch within 12 hours or as directed by MD, Starting Mon 1/9/2023, Normal      !! methocarbamol (ROBAXIN) 500 mg tablet Take 1 tablet (500 mg total) by mouth 2 (two) times a day, Starting Tue 1/17/2023, Normal      !! methocarbamol (ROBAXIN) 500 mg tablet Take 1 tablet (500 mg total) by mouth 2 (two) times a day, Starting Thu 3/2/2023, Normal      nicotine (NICODERM CQ) 14 mg/24hr TD 24 hr patch Place 1 patch on the skin daily Do not start before October 8, 2022., Starting Sat 10/8/2022, Normal      ondansetron (ZOFRAN) 4 mg tablet Take 1 tablet (4 mg total) by mouth every 8 (eight) hours as needed for nausea or vomiting, Starting Wed 11/29/2023, Normal      oxyCODONE (Roxicodone) 5 immediate release tablet Take 1 tablet (5 mg total) by mouth every 6 (six) hours as needed for moderate pain for up to 10 days Max Daily Amount: 20 mg, Starting Wed 11/29/2023, Until Sat 12/9/2023 at 2359, Normal      pantoprazole (PROTONIX) 40 mg tablet Take 1 tablet (40 mg total) by mouth daily, Starting Sat 10/8/2022, Normal      predniSONE 20 mg tablet Multiple Dosages:Starting Wed 11/29/2023, Until Fri 12/1/2023 at 2359, THEN Starting Sat 12/2/2023, Until Mon 12/4/2023 at 2359, THEN Starting Tue 12/5/2023, Until Thu 12/7/2023 at 2359, THEN Starting Fri 12/8/2023, Until Sun 12/10/2023 at 2359Take  2 tablets (40 mg total) by mouth daily for 3 days, THEN 1.5 tablets (30 mg total) daily for 3 days, THEN 1 tablet (20 mg total) daily for 3 days, THEN 0.5 tablets (10 mg total) daily for 3 days. , Normal      tiotropium (SPIRIVA) 18 mcg inhalation capsule Place 18 mcg into inhaler and inhale daily, Historical Med       !! - Potential duplicate medications found. Please discuss with provider. No discharge procedures on file.     PDMP Review         Value Time User    PDMP Reviewed  Yes 1/17/2023  1:10 PM Nery Kincaid MD            ED Provider  Electronically Signed by skin daily Do not start before October 8, 2022., Starting Sat 10/8/2022, Normal      ondansetron (ZOFRAN) 4 mg tablet Take 1 tablet (4 mg total) by mouth every 8 (eight) hours as needed for nausea or vomiting, Starting Wed 11/29/2023, Normal      oxyCODONE (Roxicodone) 5 immediate release tablet Take 1 tablet (5 mg total) by mouth every 6 (six) hours as needed for moderate pain for up to 10 days Max Daily Amount: 20 mg, Starting Wed 11/29/2023, Until Sat 12/9/2023 at 2359, Normal      pantoprazole (PROTONIX) 40 mg tablet Take 1 tablet (40 mg total) by mouth daily, Starting Sat 10/8/2022, Normal      predniSONE 20 mg tablet Multiple Dosages:Starting Wed 11/29/2023, Until Fri 12/1/2023 at 2359, THEN Starting Sat 12/2/2023, Until Mon 12/4/2023 at 2359, THEN Starting Tue 12/5/2023, Until Thu 12/7/2023 at 2359, THEN Starting Fri 12/8/2023, Until Sun 12/10/2023 at 2359Take  2 tablets (40 mg total) by mouth daily for 3 days, THEN 1.5 tablets (30 mg total) daily for 3 days, THEN 1 tablet (20 mg total) daily for 3 days, THEN 0.5 tablets (10 mg total) daily for 3 days., Normal      tiotropium (SPIRIVA) 18 mcg inhalation capsule Place 18 mcg into inhaler and inhale daily, Historical Med       !! - Potential duplicate medications found. Please discuss with provider.          No discharge procedures on file.    PDMP Review         Value Time User    PDMP Reviewed  Yes 1/17/2023  1:10 PM Rachel Márquez MD            ED Provider  Electronically Signed by             Claudine Odom DO  01/03/24 0730

## 2023-12-09 NOTE — DISCHARGE INSTRUCTIONS
Continue to use your medications as prescribed. Follow-up with your primary care physician. Follow-up with cardiology and pulmonology. Return if you have any new or worse symptoms such as shortness of breath, difficulty or chest pain.

## 2023-12-10 ENCOUNTER — HOSPITAL ENCOUNTER (EMERGENCY)
Facility: HOSPITAL | Age: 58
Discharge: HOME | End: 2023-12-10
Attending: EMERGENCY MEDICINE

## 2023-12-10 VITALS
SYSTOLIC BLOOD PRESSURE: 112 MMHG | OXYGEN SATURATION: 95 % | RESPIRATION RATE: 24 BRPM | TEMPERATURE: 98.2 F | DIASTOLIC BLOOD PRESSURE: 70 MMHG | HEART RATE: 99 BPM

## 2023-12-10 DIAGNOSIS — J18.9 PNEUMONIA: Primary | ICD-10-CM

## 2023-12-10 LAB
ATRIAL RATE: 72 BPM
P AXIS: 75 DEGREES
PR INTERVAL: 134 MS
QRS AXIS: 6 DEGREES
QRSD INTERVAL: 82 MS
QT INTERVAL: 390 MS
QTC INTERVAL: 427 MS
T WAVE AXIS: 55 DEGREES
VENTRICULAR RATE: 72 BPM

## 2023-12-10 PROCEDURE — 93005 ELECTROCARDIOGRAM TRACING: CPT

## 2023-12-10 PROCEDURE — 99284 EMERGENCY DEPT VISIT MOD MDM: CPT | Performed by: EMERGENCY MEDICINE

## 2023-12-10 PROCEDURE — 99285 EMERGENCY DEPT VISIT HI MDM: CPT

## 2023-12-10 PROCEDURE — 94640 AIRWAY INHALATION TREATMENT: CPT

## 2023-12-10 RX ORDER — DOXYCYCLINE HYCLATE 100 MG/1
100 CAPSULE ORAL 2 TIMES DAILY
Qty: 10 CAPSULE | Refills: 0 | Status: SHIPPED | OUTPATIENT
Start: 2023-12-10 | End: 2023-12-15

## 2023-12-10 RX ORDER — ALBUTEROL SULFATE 2.5 MG/3ML
5 SOLUTION RESPIRATORY (INHALATION) ONCE
Status: COMPLETED | OUTPATIENT
Start: 2023-12-10 | End: 2023-12-10

## 2023-12-10 RX ORDER — DOXYCYCLINE HYCLATE 100 MG/1
100 CAPSULE ORAL 2 TIMES DAILY
Qty: 14 CAPSULE | Refills: 0 | Status: SHIPPED | OUTPATIENT
Start: 2023-12-10 | End: 2023-12-17

## 2023-12-10 RX ORDER — DOXYCYCLINE HYCLATE 100 MG/1
100 CAPSULE ORAL ONCE
Status: COMPLETED | OUTPATIENT
Start: 2023-12-10 | End: 2023-12-10

## 2023-12-10 RX ADMIN — IPRATROPIUM BROMIDE 0.5 MG: 0.5 SOLUTION RESPIRATORY (INHALATION) at 18:52

## 2023-12-10 RX ADMIN — ALBUTEROL SULFATE 5 MG: 2.5 SOLUTION RESPIRATORY (INHALATION) at 18:52

## 2023-12-10 RX ADMIN — DOXYCYCLINE HYCLATE 100 MG: 100 CAPSULE ORAL at 18:52

## 2023-12-10 NOTE — RESULT ENCOUNTER NOTE
Patient called and notified of CXR results concerning for possible infiltrate. Script for doxycycline sent to pharmacy. Advised f/u with PCP. Call back complete.

## 2023-12-11 LAB
ATRIAL RATE: 89 BPM
ATRIAL RATE: 92 BPM
P AXIS: 77 DEGREES
P AXIS: 79 DEGREES
PR INTERVAL: 132 MS
PR INTERVAL: 134 MS
QRS AXIS: 18 DEGREES
QRS AXIS: 5 DEGREES
QRSD INTERVAL: 80 MS
QRSD INTERVAL: 82 MS
QT INTERVAL: 348 MS
QT INTERVAL: 356 MS
QTC INTERVAL: 430 MS
QTC INTERVAL: 433 MS
T WAVE AXIS: 66 DEGREES
T WAVE AXIS: 78 DEGREES
VENTRICULAR RATE: 89 BPM
VENTRICULAR RATE: 92 BPM

## 2023-12-11 NOTE — ED PROVIDER NOTES
History  Chief Complaint   Patient presents with    Shortness of Breath     Pt seen here yesterday for SOB, advised to come back in due to seeing PNA on imaging on yesterday. C/o associated chest pain and pain with inspiration      80-year-old female with shortness of breath. Patient was seen recently for shortness of breath, was called back and had a prescription called in for pneumonia. Patient states she had difficulty filling the prescription and so came here instead. Prior to Admission Medications   Prescriptions Last Dose Informant Patient Reported? Taking? Diclofenac Sodium (VOLTAREN) 1 %   No No   Sig: Apply 2 g topically 4 (four) times a day   albuterol (PROVENTIL HFA,VENTOLIN HFA) 90 mcg/act inhaler  Self No No   Sig: Inhale 2 puffs every 4 (four) hours as needed for wheezing   benzonatate (TESSALON PERLES) 100 mg capsule  Self No No   Sig: Take 1 capsule (100 mg total) by mouth 3 (three) times a day as needed for cough   bisacodyl (DULCOLAX) 5 mg EC tablet   No No   Sig: Take 1 tablet (5 mg total) by mouth daily as needed for constipation   doxycycline hyclate (VIBRAMYCIN) 100 mg capsule   No No   Sig: Take 1 capsule (100 mg total) by mouth 2 (two) times a day for 7 days   famotidine (PEPCID) 20 mg tablet   No No   Sig: Take 1 tablet (20 mg total) by mouth 2 (two) times a day   fluticasone-vilanterol (BREO ELLIPTA) 100-25 mcg/inh inhaler  Self No No   Sig: Inhale 1 puff daily Rinse mouth after use. Do not start before October 8, 2022.    Patient not taking: Reported on 1/15/2023   guaiFENesin (MUCINEX) 600 mg 12 hr tablet  Self No No   Sig: Take 1 tablet (600 mg total) by mouth 2 (two) times a day   ibuprofen (MOTRIN) 800 mg tablet  Self No No   Sig: Take 1 tablet (800 mg total) by mouth every 8 (eight) hours as needed for moderate pain   ipratropium-albuterol (DUO-NEB) 0.5-2.5 mg/3 mL nebulizer solution  Self No No   Sig: Take 3 mL by nebulization 4 (four) times a day   lidocaine (LIDODERM) 5 % Self No No   Sig: Apply 1 patch topically daily as needed (back pain) Remove & Discard patch within 12 hours or as directed by MD   methocarbamol (ROBAXIN) 500 mg tablet  Self No No   Sig: Take 1 tablet (500 mg total) by mouth 2 (two) times a day   methocarbamol (ROBAXIN) 500 mg tablet  Self No No   Sig: Take 1 tablet (500 mg total) by mouth 2 (two) times a day   nicotine (NICODERM CQ) 14 mg/24hr TD 24 hr patch  Self No No   Sig: Place 1 patch on the skin daily Do not start before October 8, 2022. Patient not taking: Reported on 11/29/2023   ondansetron (ZOFRAN) 4 mg tablet   No No   Sig: Take 1 tablet (4 mg total) by mouth every 8 (eight) hours as needed for nausea or vomiting   oxyCODONE (Roxicodone) 5 immediate release tablet   No No   Sig: Take 1 tablet (5 mg total) by mouth every 6 (six) hours as needed for moderate pain for up to 10 days Max Daily Amount: 20 mg   pantoprazole (PROTONIX) 40 mg tablet  Self No No   Sig: Take 1 tablet (40 mg total) by mouth daily   predniSONE 20 mg tablet   No No   Sig: Take 2 tablets (40 mg total) by mouth daily for 3 days, THEN 1.5 tablets (30 mg total) daily for 3 days, THEN 1 tablet (20 mg total) daily for 3 days, THEN 0.5 tablets (10 mg total) daily for 3 days. tiotropium (SPIRIVA) 18 mcg inhalation capsule  Self Yes No   Sig: Place 18 mcg into inhaler and inhale daily      Facility-Administered Medications: None       Past Medical History:   Diagnosis Date    Achalasia     Back pain     Cancer ([address] [facility])     Ovarian    Fibromyalgia     Lupus ([address])        Past Surgical History:   Procedure Laterality Date    HYSTERECTOMY      NECK SURGERY         History reviewed. No pertinent family history. I have reviewed and agree with the history as documented.     E-Cigarette/Vaping    E-Cigarette Use Never User      E-Cigarette/Vaping Substances     Social History     Tobacco Use    Smoking status: Every Day     Packs/day: 0.50     Types: Cigarettes    Smokeless tobacco: Never Vaping Use    Vaping Use: Never used   Substance Use Topics    Alcohol use: Never    Drug use: Never       Review of Systems   Constitutional:  Negative for appetite change, chills, fatigue and fever. HENT:  Negative for sneezing and sore throat. Eyes:  Negative for visual disturbance. Respiratory:  Positive for cough and shortness of breath. Negative for choking, chest tightness and wheezing. Cardiovascular:  Negative for chest pain and palpitations. Gastrointestinal:  Negative for abdominal pain, constipation, diarrhea, nausea and vomiting. Genitourinary:  Negative for difficulty urinating and dysuria. Neurological:  Negative for dizziness, weakness, light-headedness, numbness and headaches. All other systems reviewed and are negative. Physical Exam  Physical Exam  Vitals and nursing note reviewed. Constitutional:       General: She is not in acute distress. Appearance: She is well-developed. She is not diaphoretic. HENT:      Head: Normocephalic and atraumatic. Eyes:      Pupils: Pupils are equal, round, and reactive to light. Neck:      Vascular: No JVD. Trachea: No tracheal deviation. Cardiovascular:      Rate and Rhythm: Normal rate and regular rhythm. Heart sounds: Normal heart sounds. No murmur heard. No friction rub. No gallop. Pulmonary:      Effort: Pulmonary effort is normal. No respiratory distress. Breath sounds: Wheezing present. No rales. Abdominal:      General: Bowel sounds are normal. There is no distension. Palpations: Abdomen is soft. Tenderness: There is no abdominal tenderness. There is no guarding or rebound. Skin:     General: Skin is warm and dry. Coloration: Skin is not pale. Neurological:      Mental Status: She is alert and oriented to person, place, and time. Cranial Nerves: No cranial nerve deficit. Motor: No abnormal muscle tone.    Psychiatric:         Behavior: Behavior normal.         Vital Signs  ED Triage Vitals [12/10/23 1759]   Temperature Pulse Respirations Blood Pressure SpO2   98.2 °F (36.8 °C) 99 (!) 24 112/70 95 %      Temp Source Heart Rate Source Patient Position - Orthostatic VS BP Location FiO2 (%)   Oral Monitor Lying Right arm --      Pain Score       --           Vitals:    12/10/23 1759   BP: 112/70   Pulse: 99   Patient Position - Orthostatic VS: Lying         Visual Acuity      ED Medications  Medications   albuterol inhalation solution 5 mg (5 mg Nebulization Given 12/10/23 1852)   ipratropium (ATROVENT) 0.02 % inhalation solution 0.5 mg (0.5 mg Nebulization Given 12/10/23 1852)   doxycycline hyclate (VIBRAMYCIN) capsule 100 mg (100 mg Oral Given 12/10/23 1852)       Diagnostic Studies  Results Reviewed       None                   No orders to display              Procedures  Procedures         ED Course                                             Medical Decision Making  55-year-old female with pneumonia based off of recent chest x-ray, she is wheezing here, will give neb, will give first dose of doxycycline while in the emergency department, discharged with paper prescription that she can take to different pharmacy as well as good Rx card to help provide more affordable prescription option. Risk  Prescription drug management. Disposition  Final diagnoses:   Pneumonia     Time reflects when diagnosis was documented in both MDM as applicable and the Disposition within this note       Time User Action Codes Description Comment    12/10/2023  7:04 PM Kody Rose Add [J18.9] Pneumonia           ED Disposition       ED Disposition   Discharge    Condition   Stable    Date/Time   Sun Dec 10, 2023  7:04 PM    [facility] discharge to home/self care.                    Follow-up Information       Follow up With Specialties Details Why Contact Angélica Zabala DO General Practice   [address]  [address]  [facility] [address]  [phone]              Discharge Medication List as of 12/10/2023  7:04 PM        START taking these medications    Details   !! doxycycline hyclate (VIBRAMYCIN) 100 mg capsule Take 1 capsule (100 mg total) by mouth 2 (two) times a day for 5 days, Starting Sun 12/10/2023, Until Fri 12/15/2023, Print       !! - Potential duplicate medications found. Please discuss with provider. CONTINUE these medications which have NOT CHANGED    Details   albuterol (PROVENTIL HFA,VENTOLIN HFA) 90 mcg/act inhaler Inhale 2 puffs every 4 (four) hours as needed for wheezing, Starting Sat 10/8/2022, Normal      benzonatate (TESSALON PERLES) 100 mg capsule Take 1 capsule (100 mg total) by mouth 3 (three) times a day as needed for cough, Starting Fri 10/7/2022, Normal      bisacodyl (DULCOLAX) 5 mg EC tablet Take 1 tablet (5 mg total) by mouth daily as needed for constipation, Starting Wed 11/29/2023, Normal      Diclofenac Sodium (VOLTAREN) 1 % Apply 2 g topically 4 (four) times a day, Starting Wed 11/29/2023, Normal      !! doxycycline hyclate (VIBRAMYCIN) 100 mg capsule Take 1 capsule (100 mg total) by mouth 2 (two) times a day for 7 days, Starting Sun 12/10/2023, Until Sun 12/17/2023, Normal      famotidine (PEPCID) 20 mg tablet Take 1 tablet (20 mg total) by mouth 2 (two) times a day, Starting Sun 4/9/2023, Normal      fluticasone-vilanterol (BREO ELLIPTA) 100-25 mcg/inh inhaler Inhale 1 puff daily Rinse mouth after use.  Do not start before October 8, 2022., Starting Sat 10/8/2022, Normal      guaiFENesin (MUCINEX) 600 mg 12 hr tablet Take 1 tablet (600 mg total) by mouth 2 (two) times a day, Starting Fri 10/7/2022, Normal      ibuprofen (MOTRIN) 800 mg tablet Take 1 tablet (800 mg total) by mouth every 8 (eight) hours as needed for moderate pain, Starting Thu 3/2/2023, Normal      ipratropium-albuterol (DUO-NEB) 0.5-2.5 mg/3 mL nebulizer solution Take 3 mL by nebulization 4 (four) times a day, Starting Fri 10/7/2022, Normal      lidocaine (LIDODERM) 5 % Apply 1 patch topically daily as needed (back pain) Remove & Discard patch within 12 hours or as directed by MD, Starting Mon 1/9/2023, Normal      !! methocarbamol (ROBAXIN) 500 mg tablet Take 1 tablet (500 mg total) by mouth 2 (two) times a day, Starting Tue 1/17/2023, Normal      !! methocarbamol (ROBAXIN) 500 mg tablet Take 1 tablet (500 mg total) by mouth 2 (two) times a day, Starting Thu 3/2/2023, Normal      nicotine (NICODERM CQ) 14 mg/24hr TD 24 hr patch Place 1 patch on the skin daily Do not start before October 8, 2022., Starting Sat 10/8/2022, Normal      ondansetron (ZOFRAN) 4 mg tablet Take 1 tablet (4 mg total) by mouth every 8 (eight) hours as needed for nausea or vomiting, Starting Wed 11/29/2023, Normal      pantoprazole (PROTONIX) 40 mg tablet Take 1 tablet (40 mg total) by mouth daily, Starting Sat 10/8/2022, Normal      predniSONE 20 mg tablet Multiple Dosages:Starting Wed 11/29/2023, Until Fri 12/1/2023 at 2359, THEN Starting Sat 12/2/2023, Until Mon 12/4/2023 at 2359, THEN Starting Tue 12/5/2023, Until Thu 12/7/2023 at 2359, THEN Starting Fri 12/8/2023, Until Sun 12/10/2023 at 2359Take  2 tablets (40 mg total) by mouth daily for 3 days, THEN 1.5 tablets (30 mg total) daily for 3 days, THEN 1 tablet (20 mg total) daily for 3 days, THEN 0.5 tablets (10 mg total) daily for 3 days. , Normal      tiotropium (SPIRIVA) 18 mcg inhalation capsule Place 18 mcg into inhaler and inhale daily, Historical Med       !! - Potential duplicate medications found. Please discuss with provider. STOP taking these medications       oxyCODONE (Roxicodone) 5 immediate release tablet Comments:   Reason for Stopping:               No discharge procedures on file.     PDMP Review         Value Time User    PDMP Reviewed  Yes 1/17/2023  1:10 PM Ofelia Mathew MD            ED Provider  Electronically Signed by             Maribel Reveles MD  12/11/23 8669

## 2024-01-14 ENCOUNTER — APPOINTMENT (EMERGENCY)
Dept: CT IMAGING | Facility: HOSPITAL | Age: 59
DRG: 720 | End: 2024-01-14
Payer: COMMERCIAL

## 2024-01-14 ENCOUNTER — HOSPITAL ENCOUNTER (INPATIENT)
Facility: HOSPITAL | Age: 59
LOS: 3 days | Discharge: HOME | DRG: 720 | End: 2024-01-17
Attending: EMERGENCY MEDICINE | Admitting: STUDENT IN AN ORGANIZED HEALTH CARE EDUCATION/TRAINING PROGRAM
Payer: COMMERCIAL

## 2024-01-14 ENCOUNTER — APPOINTMENT (EMERGENCY)
Dept: RADIOLOGY | Facility: HOSPITAL | Age: 59
DRG: 720 | End: 2024-01-14
Payer: COMMERCIAL

## 2024-01-14 DIAGNOSIS — M54.9 OTHER ACUTE BACK PAIN: ICD-10-CM

## 2024-01-14 DIAGNOSIS — N12 PYELONEPHRITIS: Primary | ICD-10-CM

## 2024-01-14 DIAGNOSIS — J44.1 COPD EXACERBATION (HCC): ICD-10-CM

## 2024-01-14 DIAGNOSIS — J96.11 CHRONIC RESPIRATORY FAILURE WITH HYPOXIA (HCC): ICD-10-CM

## 2024-01-14 DIAGNOSIS — E87.6 HYPOKALEMIA: ICD-10-CM

## 2024-01-14 PROBLEM — E87.8 ELECTROLYTE ABNORMALITY: Status: ACTIVE | Noted: 2024-01-14

## 2024-01-14 PROBLEM — E11.9 DIABETES (HCC): Status: ACTIVE | Noted: 2024-01-14

## 2024-01-14 PROBLEM — J96.10 CHRONIC RESPIRATORY FAILURE (HCC): Status: ACTIVE | Noted: 2024-01-14

## 2024-01-14 LAB
2HR DELTA HS TROPONIN: 0 NG/L
4HR DELTA HS TROPONIN: 2 NG/L
ALBUMIN SERPL BCP-MCNC: 3.9 G/DL (ref 3.5–5)
ALP SERPL-CCNC: 91 U/L (ref 34–104)
ALT SERPL W P-5'-P-CCNC: 23 U/L (ref 7–52)
ANION GAP SERPL CALCULATED.3IONS-SCNC: 9 MMOL/L
APTT PPP: 33 SECONDS (ref 23–37)
AST SERPL W P-5'-P-CCNC: 10 U/L (ref 13–39)
BACTERIA UR QL AUTO: ABNORMAL /HPF
BASOPHILS # BLD AUTO: 0.01 THOUSANDS/ÂΜL (ref 0–0.1)
BASOPHILS NFR BLD AUTO: 0 % (ref 0–1)
BILIRUB SERPL-MCNC: 0.91 MG/DL (ref 0.2–1)
BILIRUB UR QL STRIP: NEGATIVE
BNP SERPL-MCNC: 32 PG/ML (ref 0–100)
BUN SERPL-MCNC: 10 MG/DL (ref 5–25)
CALCIUM SERPL-MCNC: 9.1 MG/DL (ref 8.4–10.2)
CARDIAC TROPONIN I PNL SERPL HS: 7 NG/L
CARDIAC TROPONIN I PNL SERPL HS: 7 NG/L
CARDIAC TROPONIN I PNL SERPL HS: 9 NG/L
CHLORIDE SERPL-SCNC: 103 MMOL/L (ref 96–108)
CLARITY UR: ABNORMAL
CO2 SERPL-SCNC: 30 MMOL/L (ref 21–32)
COLOR UR: YELLOW
CREAT SERPL-MCNC: 0.7 MG/DL (ref 0.6–1.3)
EOSINOPHIL # BLD AUTO: 0.06 THOUSAND/ÂΜL (ref 0–0.61)
EOSINOPHIL NFR BLD AUTO: 1 % (ref 0–6)
ERYTHROCYTE [DISTWIDTH] IN BLOOD BY AUTOMATED COUNT: 14 % (ref 11.6–15.1)
FLUAV RNA RESP QL NAA+PROBE: NEGATIVE
FLUBV RNA RESP QL NAA+PROBE: NEGATIVE
GFR SERPL CREATININE-BSD FRML MDRD: 95 ML/MIN/1.73SQ M
GLUCOSE SERPL-MCNC: 106 MG/DL (ref 65–140)
GLUCOSE SERPL-MCNC: 139 MG/DL (ref 65–140)
GLUCOSE SERPL-MCNC: 203 MG/DL (ref 65–140)
GLUCOSE SERPL-MCNC: 218 MG/DL (ref 65–140)
GLUCOSE UR STRIP-MCNC: ABNORMAL MG/DL
HCT VFR BLD AUTO: 39.1 % (ref 34.8–46.1)
HGB BLD-MCNC: 13.2 G/DL (ref 11.5–15.4)
HGB UR QL STRIP.AUTO: NEGATIVE
IMM GRANULOCYTES # BLD AUTO: 0.03 THOUSAND/UL (ref 0–0.2)
IMM GRANULOCYTES NFR BLD AUTO: 1 % (ref 0–2)
INR PPP: 0.97 (ref 0.84–1.19)
KETONES UR STRIP-MCNC: NEGATIVE MG/DL
LACTATE SERPL-SCNC: 0.8 MMOL/L (ref 0.5–2)
LACTATE SERPL-SCNC: 2.6 MMOL/L (ref 0.5–2)
LEUKOCYTE ESTERASE UR QL STRIP: NEGATIVE
LIPASE SERPL-CCNC: 10 U/L (ref 11–82)
LYMPHOCYTES # BLD AUTO: 0.9 THOUSANDS/ÂΜL (ref 0.6–4.47)
LYMPHOCYTES NFR BLD AUTO: 14 % (ref 14–44)
MCH RBC QN AUTO: 30.7 PG (ref 26.8–34.3)
MCHC RBC AUTO-ENTMCNC: 33.8 G/DL (ref 31.4–37.4)
MCV RBC AUTO: 91 FL (ref 82–98)
MONOCYTES # BLD AUTO: 0.46 THOUSAND/ÂΜL (ref 0.17–1.22)
MONOCYTES NFR BLD AUTO: 7 % (ref 4–12)
MUCOUS THREADS UR QL AUTO: ABNORMAL
NEUTROPHILS # BLD AUTO: 5.16 THOUSANDS/ÂΜL (ref 1.85–7.62)
NEUTS SEG NFR BLD AUTO: 77 % (ref 43–75)
NITRITE UR QL STRIP: NEGATIVE
NON-SQ EPI CELLS URNS QL MICRO: ABNORMAL /HPF
NRBC BLD AUTO-RTO: 0 /100 WBCS
PH UR STRIP.AUTO: 6 [PH]
PLATELET # BLD AUTO: 281 THOUSANDS/UL (ref 149–390)
PMV BLD AUTO: 9 FL (ref 8.9–12.7)
POTASSIUM SERPL-SCNC: 2.8 MMOL/L (ref 3.5–5.3)
PROCALCITONIN SERPL-MCNC: 0.06 NG/ML
PROT SERPL-MCNC: 7 G/DL (ref 6.4–8.4)
PROT UR STRIP-MCNC: ABNORMAL MG/DL
PROTHROMBIN TIME: 13.5 SECONDS (ref 11.6–14.5)
RBC # BLD AUTO: 4.3 MILLION/UL (ref 3.81–5.12)
RBC #/AREA URNS AUTO: ABNORMAL /HPF
RSV RNA RESP QL NAA+PROBE: NEGATIVE
SARS-COV-2 RNA RESP QL NAA+PROBE: NEGATIVE
SODIUM SERPL-SCNC: 142 MMOL/L (ref 135–147)
SP GR UR STRIP.AUTO: 1.03 (ref 1–1.03)
UROBILINOGEN UR STRIP-ACNC: 8 MG/DL
WBC # BLD AUTO: 6.62 THOUSAND/UL (ref 4.31–10.16)
WBC #/AREA URNS AUTO: ABNORMAL /HPF

## 2024-01-14 PROCEDURE — 96375 TX/PRO/DX INJ NEW DRUG ADDON: CPT

## 2024-01-14 PROCEDURE — 82948 REAGENT STRIP/BLOOD GLUCOSE: CPT

## 2024-01-14 PROCEDURE — 83690 ASSAY OF LIPASE: CPT | Performed by: EMERGENCY MEDICINE

## 2024-01-14 PROCEDURE — 71250 CT THORAX DX C-: CPT

## 2024-01-14 PROCEDURE — 99223 1ST HOSP IP/OBS HIGH 75: CPT | Performed by: STUDENT IN AN ORGANIZED HEALTH CARE EDUCATION/TRAINING PROGRAM

## 2024-01-14 PROCEDURE — 74176 CT ABD & PELVIS W/O CONTRAST: CPT

## 2024-01-14 PROCEDURE — 83880 ASSAY OF NATRIURETIC PEPTIDE: CPT | Performed by: EMERGENCY MEDICINE

## 2024-01-14 PROCEDURE — 94664 DEMO&/EVAL PT USE INHALER: CPT

## 2024-01-14 PROCEDURE — 81001 URINALYSIS AUTO W/SCOPE: CPT | Performed by: EMERGENCY MEDICINE

## 2024-01-14 PROCEDURE — 83605 ASSAY OF LACTIC ACID: CPT | Performed by: EMERGENCY MEDICINE

## 2024-01-14 PROCEDURE — 0241U HB NFCT DS VIR RESP RNA 4 TRGT: CPT | Performed by: EMERGENCY MEDICINE

## 2024-01-14 PROCEDURE — 85610 PROTHROMBIN TIME: CPT | Performed by: EMERGENCY MEDICINE

## 2024-01-14 PROCEDURE — 87040 BLOOD CULTURE FOR BACTERIA: CPT | Performed by: EMERGENCY MEDICINE

## 2024-01-14 PROCEDURE — 96376 TX/PRO/DX INJ SAME DRUG ADON: CPT

## 2024-01-14 PROCEDURE — 80053 COMPREHEN METABOLIC PANEL: CPT | Performed by: EMERGENCY MEDICINE

## 2024-01-14 PROCEDURE — 94640 AIRWAY INHALATION TREATMENT: CPT

## 2024-01-14 PROCEDURE — 96366 THER/PROPH/DIAG IV INF ADDON: CPT

## 2024-01-14 PROCEDURE — 85025 COMPLETE CBC W/AUTO DIFF WBC: CPT | Performed by: EMERGENCY MEDICINE

## 2024-01-14 PROCEDURE — 36415 COLL VENOUS BLD VENIPUNCTURE: CPT | Performed by: EMERGENCY MEDICINE

## 2024-01-14 PROCEDURE — 71045 X-RAY EXAM CHEST 1 VIEW: CPT

## 2024-01-14 PROCEDURE — 96365 THER/PROPH/DIAG IV INF INIT: CPT

## 2024-01-14 PROCEDURE — G1004 CDSM NDSC: HCPCS

## 2024-01-14 PROCEDURE — 99285 EMERGENCY DEPT VISIT HI MDM: CPT | Performed by: EMERGENCY MEDICINE

## 2024-01-14 PROCEDURE — 93005 ELECTROCARDIOGRAM TRACING: CPT

## 2024-01-14 PROCEDURE — 84145 PROCALCITONIN (PCT): CPT | Performed by: EMERGENCY MEDICINE

## 2024-01-14 PROCEDURE — 84484 ASSAY OF TROPONIN QUANT: CPT | Performed by: EMERGENCY MEDICINE

## 2024-01-14 PROCEDURE — 99285 EMERGENCY DEPT VISIT HI MDM: CPT

## 2024-01-14 PROCEDURE — 85730 THROMBOPLASTIN TIME PARTIAL: CPT | Performed by: EMERGENCY MEDICINE

## 2024-01-14 RX ORDER — CEFTRIAXONE 1 G/50ML
1000 INJECTION, SOLUTION INTRAVENOUS ONCE
Status: COMPLETED | OUTPATIENT
Start: 2024-01-14 | End: 2024-01-14

## 2024-01-14 RX ORDER — OXYCODONE HYDROCHLORIDE AND ACETAMINOPHEN 5; 325 MG/1; MG/1
2 TABLET ORAL EVERY 4 HOURS PRN
Status: DISCONTINUED | OUTPATIENT
Start: 2024-01-14 | End: 2024-01-15

## 2024-01-14 RX ORDER — MORPHINE SULFATE 4 MG/ML
4 INJECTION, SOLUTION INTRAMUSCULAR; INTRAVENOUS ONCE
Status: COMPLETED | OUTPATIENT
Start: 2024-01-14 | End: 2024-01-14

## 2024-01-14 RX ORDER — POTASSIUM CHLORIDE 14.9 MG/ML
20 INJECTION INTRAVENOUS ONCE
Status: COMPLETED | OUTPATIENT
Start: 2024-01-14 | End: 2024-01-14

## 2024-01-14 RX ORDER — HEPARIN SODIUM 5000 [USP'U]/ML
5000 INJECTION, SOLUTION INTRAVENOUS; SUBCUTANEOUS EVERY 8 HOURS SCHEDULED
Status: DISCONTINUED | OUTPATIENT
Start: 2024-01-14 | End: 2024-01-17 | Stop reason: HOSPADM

## 2024-01-14 RX ORDER — LEVALBUTEROL INHALATION SOLUTION 1.25 MG/3ML
1.25 SOLUTION RESPIRATORY (INHALATION)
Status: DISCONTINUED | OUTPATIENT
Start: 2024-01-14 | End: 2024-01-17 | Stop reason: HOSPADM

## 2024-01-14 RX ORDER — CEFTRIAXONE 1 G/50ML
1000 INJECTION, SOLUTION INTRAVENOUS EVERY 24 HOURS
Status: DISCONTINUED | OUTPATIENT
Start: 2024-01-15 | End: 2024-01-17 | Stop reason: HOSPADM

## 2024-01-14 RX ORDER — OXYCODONE HYDROCHLORIDE AND ACETAMINOPHEN 5; 325 MG/1; MG/1
1 TABLET ORAL EVERY 4 HOURS PRN
Status: DISCONTINUED | OUTPATIENT
Start: 2024-01-14 | End: 2024-01-15

## 2024-01-14 RX ORDER — POTASSIUM CHLORIDE 20 MEQ/1
40 TABLET, EXTENDED RELEASE ORAL
Status: COMPLETED | OUTPATIENT
Start: 2024-01-14 | End: 2024-01-14

## 2024-01-14 RX ORDER — ACETAMINOPHEN 325 MG/1
650 TABLET ORAL EVERY 6 HOURS PRN
Status: DISCONTINUED | OUTPATIENT
Start: 2024-01-14 | End: 2024-01-15

## 2024-01-14 RX ORDER — INSULIN LISPRO 100 [IU]/ML
1-6 INJECTION, SOLUTION INTRAVENOUS; SUBCUTANEOUS
Status: DISCONTINUED | OUTPATIENT
Start: 2024-01-14 | End: 2024-01-17 | Stop reason: HOSPADM

## 2024-01-14 RX ORDER — ONDANSETRON 2 MG/ML
4 INJECTION INTRAMUSCULAR; INTRAVENOUS ONCE
Status: COMPLETED | OUTPATIENT
Start: 2024-01-14 | End: 2024-01-14

## 2024-01-14 RX ORDER — ACETAMINOPHEN 325 MG/1
975 TABLET ORAL ONCE
Status: COMPLETED | OUTPATIENT
Start: 2024-01-14 | End: 2024-01-14

## 2024-01-14 RX ORDER — IPRATROPIUM BROMIDE AND ALBUTEROL SULFATE 2.5; .5 MG/3ML; MG/3ML
3 SOLUTION RESPIRATORY (INHALATION)
Status: DISCONTINUED | OUTPATIENT
Start: 2024-01-14 | End: 2024-01-14

## 2024-01-14 RX ORDER — ONDANSETRON 2 MG/ML
4 INJECTION INTRAMUSCULAR; INTRAVENOUS EVERY 6 HOURS PRN
Status: DISCONTINUED | OUTPATIENT
Start: 2024-01-14 | End: 2024-01-17 | Stop reason: HOSPADM

## 2024-01-14 RX ORDER — NICOTINE 21 MG/24HR
1 PATCH, TRANSDERMAL 24 HOURS TRANSDERMAL DAILY
Status: DISCONTINUED | OUTPATIENT
Start: 2024-01-15 | End: 2024-01-17 | Stop reason: HOSPADM

## 2024-01-14 RX ADMIN — POTASSIUM CHLORIDE 20 MEQ: 14.9 INJECTION, SOLUTION INTRAVENOUS at 17:38

## 2024-01-14 RX ADMIN — LEVALBUTEROL HYDROCHLORIDE 1.25 MG: 1.25 SOLUTION RESPIRATORY (INHALATION) at 21:16

## 2024-01-14 RX ADMIN — IPRATROPIUM BROMIDE 0.5 MG: 0.5 SOLUTION RESPIRATORY (INHALATION) at 21:16

## 2024-01-14 RX ADMIN — INSULIN LISPRO 2 UNITS: 100 INJECTION, SOLUTION INTRAVENOUS; SUBCUTANEOUS at 21:52

## 2024-01-14 RX ADMIN — SODIUM CHLORIDE 1000 ML: 0.9 INJECTION, SOLUTION INTRAVENOUS at 15:25

## 2024-01-14 RX ADMIN — ONDANSETRON 4 MG: 2 INJECTION INTRAMUSCULAR; INTRAVENOUS at 14:11

## 2024-01-14 RX ADMIN — POTASSIUM CHLORIDE 40 MEQ: 1500 TABLET, EXTENDED RELEASE ORAL at 17:33

## 2024-01-14 RX ADMIN — HEPARIN SODIUM 5000 UNITS: 5000 INJECTION INTRAVENOUS; SUBCUTANEOUS at 17:34

## 2024-01-14 RX ADMIN — MORPHINE SULFATE 4 MG: 4 INJECTION INTRAVENOUS at 16:48

## 2024-01-14 RX ADMIN — MORPHINE SULFATE 4 MG: 4 INJECTION INTRAVENOUS at 14:11

## 2024-01-14 RX ADMIN — HEPARIN SODIUM 5000 UNITS: 5000 INJECTION INTRAVENOUS; SUBCUTANEOUS at 21:42

## 2024-01-14 RX ADMIN — ACETAMINOPHEN 975 MG: 325 TABLET, FILM COATED ORAL at 13:51

## 2024-01-14 RX ADMIN — OXYCODONE HYDROCHLORIDE AND ACETAMINOPHEN 1 TABLET: 5; 325 TABLET ORAL at 21:42

## 2024-01-14 RX ADMIN — CEFTRIAXONE 1000 MG: 1 INJECTION, SOLUTION INTRAVENOUS at 14:24

## 2024-01-14 NOTE — H&P
"[facility]  H&P  Name: Nanci Navarro 58 y.o. female I MRN: [id number]  Unit/Bed#: ED 10 I Date of Admission: 1/14/2024   Date of Service: 1/14/2024 I Hospital Day: 0      Assessment/Plan   * Sepsis (HCC)  Assessment & Plan  Tachycardia, tachypnea, lactic acidosis with suspected UTI  Started on ceftriaxone in ED, continue  Follow up urine culture  Received 1 liter fluid bolus    Electrolyte abnormality  Assessment & Plan  Hypokalemic in ED   Received dose of Kcl in ED  Start kdur TID and monitor on telemetry    Chronic respiratory failure (HCC)  Assessment & Plan  On 1-2 liters of oxygen at home secondary to COPD  Not in exacerbation currently   Monitor oxygen saturations     Diabetes (HCC)  Assessment & Plan  Lab Results   Component Value Date    HGBA1C 6.0 (H) 12/15/2023       No results for input(s): \"POCGLU\" in the last 72 hours.    Blood Sugar Average: Last 72 hrs:  hyperglycemic in ED  Start sliding scale  Monitor glucose levels            VTE Pharmacologic Prophylaxis:   Moderate Risk (Score 3-4) - Pharmacological DVT Prophylaxis Ordered: heparin.  Code Status: Level 1 - Full Code   Discussion with family: Patient declined call to .     Anticipated Length of Stay: Patient will be admitted on an inpatient basis with an anticipated length of stay of greater than 2 midnights secondary to antibiotics, IV fluids .    Total Time Spent on Date of Encounter in care of patient: 30+ mins. This time was spent on one or more of the following: performing physical exam; counseling and coordination of care; obtaining or reviewing history; documenting in the medical record; reviewing/ordering tests, medications or procedures; communicating with other healthcare professionals and discussing with patient's family/caregivers.    Chief Complaint: flank pain, fevers, chills    History of Present Illness:  Nanci Navarro is a 58 y.o. female with a PMH of copd with chronic respiratory " failure, dm who presents with left sided flank pain, dysuria, fevers, chills that were first noted at 3 AM today. Patient reports she has decreased urinary output and hesitancy. She came to the ED for further evaluation. In the ED she was noted to meet sepsis criteria and admitted to [facility] for further management.    Review of Systems:  Review of Systems   Constitutional:  Positive for fatigue and fever. Negative for chills.   HENT:  Negative for ear pain and sore throat.    Eyes:  Negative for pain and visual disturbance.   Respiratory:  Negative for cough and shortness of breath.    Cardiovascular:  Negative for chest pain and palpitations.   Gastrointestinal:  Negative for abdominal pain and vomiting.   Genitourinary:  Positive for decreased urine volume and flank pain. Negative for dysuria and hematuria.   Musculoskeletal:  Negative for arthralgias and back pain.   Skin:  Negative for color change and rash.   Neurological:  Negative for seizures and syncope.   All other systems reviewed and are negative.      Past Medical and Surgical History:   Past Medical History:   Diagnosis Date    Achalasia     Back pain     Cancer (HCC)     Ovarian    Fibromyalgia     Lupus (HCC)        Past Surgical History:   Procedure Laterality Date    HYSTERECTOMY      NECK SURGERY         Meds/Allergies:  Prior to Admission medications    Medication Sig Start Date End Date Taking? Authorizing Provider   albuterol (PROVENTIL HFA,VENTOLIN HFA) 90 mcg/act inhaler Inhale 2 puffs every 4 (four) hours as needed for wheezing 10/8/22   Brad Jaffe MD   benzonatate (TESSALON PERLES) 100 mg capsule Take 1 capsule (100 mg total) by mouth 3 (three) times a day as needed for cough 10/7/22   Brad Jaffe MD   bisacodyl (DULCOLAX) 5 mg EC tablet Take 1 tablet (5 mg total) by mouth daily as needed for constipation 11/29/23   Daniel Chaney MD   Diclofenac Sodium (VOLTAREN) 1 % Apply 2 g topically 4 (four) times a day 11/29/23   Daniel  Mary Chaney MD   famotidine (PEPCID) 20 mg tablet Take 1 tablet (20 mg total) by mouth 2 (two) times a day 4/9/23   Wiliam Brandt MD   fluticasone-vilanterol (BREO ELLIPTA) 100-25 mcg/inh inhaler Inhale 1 puff daily Rinse mouth after use. Do not start before October 8, 2022.  Patient not taking: Reported on 1/15/2023 10/8/22   Brad Jaffe MD   guaiFENesin (MUCINEX) 600 mg 12 hr tablet Take 1 tablet (600 mg total) by mouth 2 (two) times a day 10/7/22   Brad Jaffe MD   ibuprofen (MOTRIN) 800 mg tablet Take 1 tablet (800 mg total) by mouth every 8 (eight) hours as needed for moderate pain 3/2/23   Joanne Gill DO   ipratropium-albuterol (DUO-NEB) 0.5-2.5 mg/3 mL nebulizer solution Take 3 mL by nebulization 4 (four) times a day 10/7/22   Brad Jaffe MD   lidocaine (LIDODERM) 5 % Apply 1 patch topically daily as needed (back pain) Remove & Discard patch within 12 hours or as directed by MD 1/9/23   Beverly Garcia PA-C   methocarbamol (ROBAXIN) 500 mg tablet Take 1 tablet (500 mg total) by mouth 2 (two) times a day 1/17/23   Rachel Márquez MD   methocarbamol (ROBAXIN) 500 mg tablet Take 1 tablet (500 mg total) by mouth 2 (two) times a day 3/2/23   Joanne Gill DO   nicotine (NICODERM CQ) 14 mg/24hr TD 24 hr patch Place 1 patch on the skin daily Do not start before October 8, 2022.  Patient not taking: Reported on 11/29/2023 10/8/22   Brad Jaffe MD   ondansetron (ZOFRAN) 4 mg tablet Take 1 tablet (4 mg total) by mouth every 8 (eight) hours as needed for nausea or vomiting 11/29/23   Daniel Chaney MD   pantoprazole (PROTONIX) 40 mg tablet Take 1 tablet (40 mg total) by mouth daily 10/8/22   Daron NICOLAS MD   tiotropium (SPIRIVA) 18 mcg inhalation capsule Place 18 mcg into inhaler and inhale daily    Historical Provider, MD JONES have reviewed home medications using recent Epic encounter.    Allergies:   Allergies   Allergen Reactions    Valium [Diazepam] Anaphylaxis        Social History:  Marital Status: /Civil Union   Occupation: na  Patient Pre-hospital Living Situation: Home  Patient Pre-hospital Level of Mobility: walks  Patient Pre-hospital Diet Restrictions: diabetic  Substance Use History:   Social History     Substance and Sexual Activity   Alcohol Use Never     Social History     Tobacco Use   Smoking Status Every Day    Current packs/day: 0.50    Types: Cigarettes   Smokeless Tobacco Never     Social History     Substance and Sexual Activity   Drug Use Never       Family History:  History reviewed. No pertinent family history.    Physical Exam:     Vitals:   Blood Pressure: 123/59 (01/14/24 1600)  Pulse: 92 (01/14/24 1600)  Temperature: 98.8 °F (37.1 °C) (01/14/24 1306)  Temp Source: Oral (01/14/24 1306)  Respirations: 22 (01/14/24 1600)  SpO2: 92 % (01/14/24 1600)    Physical Exam  Constitutional:       General: She is not in acute distress.     Appearance: Normal appearance. She is not toxic-appearing.   Cardiovascular:      Rate and Rhythm: Normal rate and regular rhythm.      Heart sounds: Normal heart sounds. No murmur heard.  Pulmonary:      Effort: Pulmonary effort is normal. No respiratory distress.      Breath sounds: Normal breath sounds. No wheezing.   Abdominal:      General: Abdomen is flat. There is no distension.      Palpations: Abdomen is soft.      Tenderness: There is no abdominal tenderness.   Neurological:      General: No focal deficit present.      Mental Status: She is alert and oriented to person, place, and time. Mental status is at baseline.      Motor: No weakness.          Additional Data:     Lab Results:  Results from last 7 days   Lab Units 01/14/24  1402   WBC Thousand/uL 6.62   HEMOGLOBIN g/dL 13.2   HEMATOCRIT % 39.1   PLATELETS Thousands/uL 281   NEUTROS PCT % 77*   LYMPHS PCT % 14   MONOS PCT % 7   EOS PCT % 1     Results from last 7 days   Lab Units 01/14/24  1402   SODIUM mmol/L 142   POTASSIUM mmol/L 2.8*   CHLORIDE  mmol/L 103   CO2 mmol/L 30   BUN mg/dL 10   CREATININE mg/dL 0.70   ANION GAP mmol/L 9   CALCIUM mg/dL 9.1   ALBUMIN g/dL 3.9   TOTAL BILIRUBIN mg/dL 0.91   ALK PHOS U/L 91   ALT U/L 23   AST U/L 10*   GLUCOSE RANDOM mg/dL 203*     Results from last 7 days   Lab Units 01/14/24  1405   INR  0.97             Results from last 7 days   Lab Units 01/14/24  1402   LACTIC ACID mmol/L 2.6*   PROCALCITONIN ng/ml 0.06       Lines/Drains:  Invasive Devices       Peripheral Intravenous Line  Duration             Peripheral IV 01/14/24 Distal;Left;Ventral (anterior) Forearm <1 day    Peripheral IV 01/14/24 Right Antecubital <1 day                        Imaging: Reviewed radiology reports from this admission including: abdominal/pelvic CT  CT chest abdomen pelvis wo contrast   Final Result by Clovis Bullard MD (01/14 1644)      No acute disease in the lungs.      Stable thickening of the esophageal wall consistent with esophagitis.      No acute inflammatory process identified in the abdomen or pelvis.      Stable hepatomegaly.                              Workstation performed: [id number]         XR chest portable    (Results Pending)       EKG and Other Studies Reviewed on Admission:   EKG:  pending scan into epic chart.    ** Please Note: This note has been constructed using a voice recognition system. **

## 2024-01-14 NOTE — ASSESSMENT & PLAN NOTE
On 1-2 liters of oxygen at home secondary to COPD  Not in exacerbation currently   Monitor oxygen saturations

## 2024-01-14 NOTE — ASSESSMENT & PLAN NOTE
"Lab Results   Component Value Date    HGBA1C 6.0 (H) 12/15/2023       No results for input(s): \"POCGLU\" in the last 72 hours.    Blood Sugar Average: Last 72 hrs:  hyperglycemic in ED  Start sliding scale  Monitor glucose levels   "

## 2024-01-14 NOTE — ASSESSMENT & PLAN NOTE
Tachycardia, tachypnea, lactic acidosis with suspected UTI  CT AP did not show any bladder wall thickening  Did show some esophagitis   Started on ceftriaxone in ED, continue  Follow up urine culture  Received 1 liter fluid bolus

## 2024-01-14 NOTE — ED PROVIDER NOTES
History  Chief Complaint   Patient presents with    Flank Pain     C/o left sided flank pain for the past couple of days. Also c/o increasing sob and fevers.      HPI patient is a 58-year-old female history of COPD presents with left flank pain that started a few days ago patient reports she was awoken at about 3 AM with sudden onset of left-sided flank pain with some dysuria and then some blood in her urine.  She reports a sense like she has to urinate but cannot urinate.  She reports history of kidney infections in the past especially with the left kidney after history of falling down some steps and traumatizing the left kidney.  Patient reports some underlying COPD and reports that now that her kidney hurts or her breathing has become worse.  History of bilateral lower lobe atelectasis in the past.  Patient reports fever last night.  She reports temperature to 103  Past history of COPD, kidney infections, lupus, ovarian cancer  Family history noncontributory  Social history, smoker, denies drug abuse    Prior to Admission Medications   Prescriptions Last Dose Informant Patient Reported? Taking?   Diclofenac Sodium (VOLTAREN) 1 %   No No   Sig: Apply 2 g topically 4 (four) times a day   albuterol (PROVENTIL HFA,VENTOLIN HFA) 90 mcg/act inhaler  Self No No   Sig: Inhale 2 puffs every 4 (four) hours as needed for wheezing   benzonatate (TESSALON PERLES) 100 mg capsule  Self No No   Sig: Take 1 capsule (100 mg total) by mouth 3 (three) times a day as needed for cough   bisacodyl (DULCOLAX) 5 mg EC tablet   No No   Sig: Take 1 tablet (5 mg total) by mouth daily as needed for constipation   famotidine (PEPCID) 20 mg tablet   No No   Sig: Take 1 tablet (20 mg total) by mouth 2 (two) times a day   fluticasone-vilanterol (BREO ELLIPTA) 100-25 mcg/inh inhaler  Self No No   Sig: Inhale 1 puff daily Rinse mouth after use. Do not start before October 8, 2022.   Patient not taking: Reported on 1/15/2023   guaiFENesin (MUCINEX)  600 mg 12 hr tablet  Self No No   Sig: Take 1 tablet (600 mg total) by mouth 2 (two) times a day   ibuprofen (MOTRIN) 800 mg tablet  Self No No   Sig: Take 1 tablet (800 mg total) by mouth every 8 (eight) hours as needed for moderate pain   ipratropium-albuterol (DUO-NEB) 0.5-2.5 mg/3 mL nebulizer solution  Self No No   Sig: Take 3 mL by nebulization 4 (four) times a day   lidocaine (LIDODERM) 5 %  Self No No   Sig: Apply 1 patch topically daily as needed (back pain) Remove & Discard patch within 12 hours or as directed by MD   methocarbamol (ROBAXIN) 500 mg tablet  Self No No   Sig: Take 1 tablet (500 mg total) by mouth 2 (two) times a day   methocarbamol (ROBAXIN) 500 mg tablet  Self No No   Sig: Take 1 tablet (500 mg total) by mouth 2 (two) times a day   nicotine (NICODERM CQ) 14 mg/24hr TD 24 hr patch  Self No No   Sig: Place 1 patch on the skin daily Do not start before October 8, 2022.   Patient not taking: Reported on 11/29/2023   ondansetron (ZOFRAN) 4 mg tablet   No No   Sig: Take 1 tablet (4 mg total) by mouth every 8 (eight) hours as needed for nausea or vomiting   pantoprazole (PROTONIX) 40 mg tablet  Self No No   Sig: Take 1 tablet (40 mg total) by mouth daily   tiotropium (SPIRIVA) 18 mcg inhalation capsule  Self Yes No   Sig: Place 18 mcg into inhaler and inhale daily      Facility-Administered Medications: None       Past Medical History:   Diagnosis Date    Achalasia     Back pain     Cancer (HCC)     Ovarian    Fibromyalgia     Lupus (HCC)        Past Surgical History:   Procedure Laterality Date    HYSTERECTOMY      NECK SURGERY         History reviewed. No pertinent family history.  I have reviewed and agree with the history as documented.    E-Cigarette/Vaping    E-Cigarette Use Never User      E-Cigarette/Vaping Substances     Social History     Tobacco Use    Smoking status: Every Day     Current packs/day: 0.50     Types: Cigarettes    Smokeless tobacco: Never   Vaping Use    Vaping status:  Never Used   Substance Use Topics    Alcohol use: Never    Drug use: Never       Review of Systems   Constitutional:  Positive for fever. Negative for diaphoresis and fatigue.   HENT:  Negative for congestion, ear pain, nosebleeds and sore throat.    Eyes:  Negative for photophobia, pain, discharge and visual disturbance.   Respiratory:  Negative for cough, choking, chest tightness, shortness of breath and wheezing.    Cardiovascular:  Negative for chest pain and palpitations.   Gastrointestinal:  Negative for abdominal distention, abdominal pain, diarrhea and vomiting.   Genitourinary:  Positive for dysuria, flank pain, frequency and hematuria.   Musculoskeletal:  Negative for back pain, gait problem and joint swelling.   Skin:  Negative for color change and rash.   Neurological:  Negative for dizziness, syncope and headaches.   Psychiatric/Behavioral:  Negative for behavioral problems and confusion. The patient is not nervous/anxious.    All other systems reviewed and are negative.      Physical Exam  Physical Exam  Vitals and nursing note reviewed.   Constitutional:       Appearance: She is well-developed.   HENT:      Head: Normocephalic.      Right Ear: External ear normal.      Left Ear: External ear normal.      Nose: Nose normal.      Mouth/Throat:      Mouth: Mucous membranes are moist.      Pharynx: Oropharynx is clear.   Eyes:      General: Lids are normal.      Extraocular Movements: Extraocular movements intact.      Pupils: Pupils are equal, round, and reactive to light.   Cardiovascular:      Rate and Rhythm: Tachycardia present.      Pulses: Normal pulses.      Heart sounds: Normal heart sounds.   Pulmonary:      Effort: Pulmonary effort is normal. No respiratory distress.      Breath sounds: Normal breath sounds.   Abdominal:      General: Abdomen is flat. Bowel sounds are normal.      Tenderness: There is no abdominal tenderness.      Comments: Left flank tenderness   Musculoskeletal:          General: No deformity. Normal range of motion.      Cervical back: Normal range of motion and neck supple.   Skin:     General: Skin is warm and dry.   Neurological:      Mental Status: She is alert and oriented to person, place, and time.   Psychiatric:         Mood and Affect: Mood normal.         Vital Signs  ED Triage Vitals   Temperature Pulse Respirations Blood Pressure SpO2   01/14/24 1306 01/14/24 1306 01/14/24 1306 01/14/24 1306 01/14/24 1306   98.8 °F (37.1 °C) (!) 120 22 141/78 92 %      Temp Source Heart Rate Source Patient Position - Orthostatic VS BP Location FiO2 (%)   01/14/24 1306 01/14/24 1306 01/14/24 1306 01/14/24 1306 --   Oral Monitor Sitting Left arm       Pain Score       01/14/24 1351       9           Vitals:    01/14/24 1330 01/14/24 1415 01/14/24 1600 01/14/24 1700   BP: 157/62 128/58 123/59 116/57   Pulse: (!) 118 105 92 89   Patient Position - Orthostatic VS: Lying Lying           Visual Acuity      ED Medications  Medications   potassium chloride 20 mEq IVPB (premix) (has no administration in time range)   acetaminophen (TYLENOL) tablet 650 mg (has no administration in time range)   ondansetron (ZOFRAN) injection 4 mg (has no administration in time range)   nicotine (NICODERM CQ) 14 mg/24hr TD 24 hr patch 1 patch (has no administration in time range)   heparin (porcine) subcutaneous injection 5,000 Units (has no administration in time range)   cefTRIAXone (ROCEPHIN) IVPB (premix in dextrose) 1,000 mg 50 mL (has no administration in time range)   potassium chloride (K-DUR,KLOR-CON) CR tablet 40 mEq (has no administration in time range)   insulin lispro (HumaLOG) 100 units/mL subcutaneous injection 1-6 Units (has no administration in time range)   insulin lispro (HumaLOG) 100 units/mL subcutaneous injection 1-6 Units (has no administration in time range)   morphine injection 4 mg (4 mg Intravenous Given 1/14/24 1411)   ondansetron (ZOFRAN) injection 4 mg (4 mg Intravenous Given 1/14/24  1411)   acetaminophen (TYLENOL) tablet 975 mg (975 mg Oral Given 1/14/24 1351)   cefTRIAXone (ROCEPHIN) IVPB (premix in dextrose) 1,000 mg 50 mL (1,000 mg Intravenous New Bag 1/14/24 1424)   sodium chloride 0.9 % bolus 1,000 mL (1,000 mL Intravenous New Bag 1/14/24 1525)   morphine injection 4 mg (4 mg Intravenous Given 1/14/24 1648)       Diagnostic Studies  Results Reviewed       Procedure Component Value Units Date/Time    HS Troponin I 2hr [[id number]] Collected: 01/14/24 1650    Lab Status: In process Specimen: Blood from Arm, Left Updated: 01/14/24 1700    Lactic acid 2 Hours [[id number]] Collected: 01/14/24 1650    Lab Status: In process Specimen: Blood from Arm, Left Updated: 01/14/24 1700    HS Troponin I 4hr [[id number]]     Lab Status: No result Specimen: Blood     FLU/RSV/COVID - if FLU/RSV clinically relevant [[id number]]  (Normal) Collected: 01/14/24 1348    Lab Status: Final result Specimen: Nares from Nose Updated: 01/14/24 1540     SARS-CoV-2 Negative     INFLUENZA A PCR Negative     INFLUENZA B PCR Negative     RSV PCR Negative    Narrative:      FOR PEDIATRIC PATIENTS - copy/paste COVID Guidelines URL to browser: [email or web address]    SARS-CoV-2 assay is a Nucleic Acid Amplification assay intended for the  qualitative detection of nucleic acid from SARS-CoV-2 in nasopharyngeal  swabs. Results are for the presumptive identification of SARS-CoV-2 RNA.    Positive results are indicative of infection with SARS-CoV-2, the virus  causing COVID-19, but do not rule out bacterial infection or co-infection  with other viruses. Laboratories within the [place] and its  territories are required to report all positive results to the appropriate  public health authorities. Negative results do not preclude SARS-CoV-2  infection and should not be used as the sole basis for treatment or other  patient management decisions. Negative results must be combined  with  clinical observations, patient history, and epidemiological information.  This test has not been FDA cleared or approved.    This test has been authorized by FDA under an Emergency Use Authorization  (EUA). This test is only authorized for the duration of time the  declaration that circumstances exist justifying the authorization of the  emergency use of an in vitro diagnostic tests for detection of SARS-CoV-2  virus and/or diagnosis of COVID-19 infection under section 564(b)(1) of  the Act, 21 U.S.C. 360bbb-3(b)(1), unless the authorization is terminated  or revoked sooner. The test has been validated but independent review by FDA  and CLIA is pending.    Test performed using [employer] Mobile GeneXpert: This RT-PCR assay targets N2,  a region unique to SARS-CoV-2. A conserved region in the E-gene was chosen  for pan-Sarbecovirus detection which includes SARS-CoV-2.    According to CMS-2020-01-R, this platform meets the definition of high-throughput technology.    Urine Microscopic [[id number]]  (Abnormal) Collected: 01/14/24 1512    Lab Status: Final result Specimen: Urine, Clean Catch Updated: 01/14/24 1522     RBC, UA 2-4 /hpf      WBC, UA 4-10 /hpf      Epithelial Cells Moderate /hpf      Bacteria, UA Occasional /hpf      MUCUS THREADS Innumerable    UA w Reflex to Microscopic w Reflex to Culture [[id number]]  (Abnormal) Collected: 01/14/24 1512    Lab Status: Final result Specimen: Urine, Clean Catch Updated: 01/14/24 1518     Color, UA Yellow     Clarity, UA Turbid     Specific Gravity, UA 1.028     pH, UA 6.0     Leukocytes, UA Negative     Nitrite, UA Negative     Protein, UA 30 (1+) mg/dl      Glucose, UA Trace mg/dl      Ketones, UA Negative mg/dl      Urobilinogen, UA 8.0 mg/dl      Bilirubin, UA Negative     Occult Blood, UA Negative    Procalcitonin [[id number]]  (Normal) Collected: 01/14/24 1402    Lab Status: Final result Specimen: Blood from Arm, Left Updated: 01/14/24 1457     Procalcitonin 0.06 ng/ml      HS Troponin 0hr (reflex protocol) [[id number]]  (Normal) Collected: 01/14/24 1417    Lab Status: Final result Specimen: Blood from Arm, Left Updated: 01/14/24 1454     hs TnI 0hr 7 ng/L     B-Type Natriuretic Peptide(BNP) [[id number]]  (Normal) Collected: 01/14/24 1417    Lab Status: Final result Specimen: Blood from Arm, Left Updated: 01/14/24 1453     BNP 32 pg/mL     Lactic acid [[id number]]  (Abnormal) Collected: 01/14/24 1402    Lab Status: Final result Specimen: Blood from Arm, Left Updated: 01/14/24 1448     LACTIC ACID 2.6 mmol/L     Narrative:      Result may be elevated if tourniquet was used during collection.    Comprehensive metabolic panel [[id number]]  (Abnormal) Collected: 01/14/24 1402    Lab Status: Final result Specimen: Blood from Arm, Left Updated: 01/14/24 1446     Sodium 142 mmol/L      Potassium 2.8 mmol/L      Chloride 103 mmol/L      CO2 30 mmol/L      ANION GAP 9 mmol/L      BUN 10 mg/dL      Creatinine 0.70 mg/dL      Glucose 203 mg/dL      Calcium 9.1 mg/dL      AST 10 U/L      ALT 23 U/L      Alkaline Phosphatase 91 U/L      Total Protein 7.0 g/dL      Albumin 3.9 g/dL      Total Bilirubin 0.91 mg/dL      eGFR 95 ml/min/1.73sq m     Narrative:      National Kidney Disease Foundation guidelines for Chronic Kidney Disease (CKD):     Stage 1 with normal or high GFR (GFR > 90 mL/min/1.73 square meters)    Stage 2 Mild CKD (GFR = 60-89 mL/min/1.73 square meters)    Stage 3A Moderate CKD (GFR = 45-59 mL/min/1.73 square meters)    Stage 3B Moderate CKD (GFR = 30-44 mL/min/1.73 square meters)    Stage 4 Severe CKD (GFR = 15-29 mL/min/1.73 square meters)    Stage 5 End Stage CKD (GFR <15 mL/min/1.73 square meters)  Note: GFR calculation is accurate only with a steady state creatinine    Lipase [[id number]]  (Abnormal) Collected: 01/14/24 1402    Lab Status: Final result Specimen: Blood from Arm, Left Updated: 01/14/24 1446     Lipase 10 u/L     Protime-INR [[id number]]  (Normal) Collected: 01/14/24  1405    Lab Status: Final result Specimen: Blood from Arm, Left Updated: 01/14/24 1440     Protime 13.5 seconds      INR 0.97    APTT [[id number]]  (Normal) Collected: 01/14/24 1405    Lab Status: Final result Specimen: Blood from Arm, Left Updated: 01/14/24 1440     PTT 33 seconds     CBC and differential [[id number]]  (Abnormal) Collected: 01/14/24 1402    Lab Status: Final result Specimen: Blood from Arm, Left Updated: 01/14/24 1426     WBC 6.62 Thousand/uL      RBC 4.30 Million/uL      Hemoglobin 13.2 g/dL      Hematocrit 39.1 %      MCV 91 fL      MCH 30.7 pg      MCHC 33.8 g/dL      RDW 14.0 %      MPV 9.0 fL      Platelets 281 Thousands/uL      nRBC 0 /100 WBCs      Neutrophils Relative 77 %      Immat GRANS % 1 %      Lymphocytes Relative 14 %      Monocytes Relative 7 %      Eosinophils Relative 1 %      Basophils Relative 0 %      Neutrophils Absolute 5.16 Thousands/µL      Immature Grans Absolute 0.03 Thousand/uL      Lymphocytes Absolute 0.90 Thousands/µL      Monocytes Absolute 0.46 Thousand/µL      Eosinophils Absolute 0.06 Thousand/µL      Basophils Absolute 0.01 Thousands/µL     Blood culture #2 [[id number]] Collected: 01/14/24 1405    Lab Status: In process Specimen: Blood from Arm, Left Updated: 01/14/24 1421    Blood culture #1 [[id number]] Collected: 01/14/24 1414    Lab Status: In process Specimen: Blood from Arm, Left Updated: 01/14/24 1421                   CT chest abdomen pelvis wo contrast   Final Result by Clovis Bullard MD (01/14 1644)      No acute disease in the lungs.      Stable thickening of the esophageal wall consistent with esophagitis.      No acute inflammatory process identified in the abdomen or pelvis.      Stable hepatomegaly.                              Workstation performed: [id number]         XR chest portable    (Results Pending)              Procedures  ECG 12 Lead Documentation Only    Date/Time: 1/14/2024 1:25 PM    Performed by: Art Tucker MD  Authorized by: Art  MD Garrett    Indications / Diagnosis:  Flank pain left-sided  ECG reviewed by me, the ED Provider: yes    Patient location:  ED  Previous ECG:     Previous ECG:  Compared to current    Comparison ECG info:  December 10, 2023    Similarity:  Changes noted  Interpretation:     Interpretation: abnormal    Rate:     ECG rate:  118    ECG rate assessment: tachycardic    Rhythm:     Rhythm: sinus tachycardia    Comments:      Sinus tachycardia nonspecific ST-T wave changes no ST elevations           ED Course       CT scan shows no stone, patient does have white cells in her urine, left flank pain and reports fever at home.  Will require IV antibiotics.    COVID flu and RSV were negative.      Urinalysis showed 4-10 white cells there was moderate epithelial cells  Sepulveda was elevated 2.6 initially, given IV fluids, no hypotension, sign of severe sepsis  BNP was normal no sign of heart failure, cardiac troponin was normal no sign of cardiac ischemia  Electrolytes showed a low potassium at 2.8 consistent hypokalemia, blood sugar was 206.  White count was normal at 6.6 no sign of inflammation hemoglobin was not normal at 13 Asceniv anemia.                  SBIRT 22yo+      Flowsheet Row Most Recent Value   Initial Alcohol Screen: US AUDIT-C     1. How often do you have a drink containing alcohol? 0 Filed at: 01/14/2024 1310   2. How many drinks containing alcohol do you have on a typical day you are drinking?  0 Filed at: 01/14/2024 1310   3a. Male UNDER 65: How often do you have five or more drinks on one occasion? 0 Filed at: 01/14/2024 1310   3b. FEMALE Any Age, or MALE 65+: How often do you have 4 or more drinks on one occassion? 0 Filed at: 01/14/2024 1310   Audit-C Score 0 Filed at: 01/14/2024 1310   EULALIO: How many times in the past year have you...    Used an illegal drug or used a prescription medication for non-medical reasons? Never Filed at: 01/14/2024 1310                      Medical Decision Making  Medical  decision making 58-year-old female presents emergency department with left flank pain.  Patient reports blood in her urine recently urinary symptoms of urgency and frequency.  She reports fever to 103 last night.  Urinalysis did show some white cells although contaminated, patient presented with significant pain and tachycardia.  She is improved with morphine here.  CT did not show kidney stone.  Discussed with hospitalist service will require IV antibiotics, analgesics for pain control.  Discussed with the patient.    Amount and/or Complexity of Data Reviewed  Labs: ordered.  Radiology: ordered.    Risk  OTC drugs.  Prescription drug management.  Decision regarding hospitalization.             Disposition  Final diagnoses:   Pyelonephritis   Hypokalemia     Time reflects when diagnosis was documented in both MDM as applicable and the Disposition within this note       Time User Action Codes Description Comment    1/14/2024  4:49 PM Art Tucker [N12] Pyelonephritis     1/14/2024  4:52 PM Art Tucker [E87.6] Hypokalemia           ED Disposition       ED Disposition   Admit    Condition   Stable    Date/Time   Sun Jan 14, 2024 9721    Comment   Case was discussed with hospitalist and the patient's admission status was agreed to be Admission Status: inpatient status to the service of Dr. Daniel Chaney   .               Follow-up Information    None         Patient's Medications   Discharge Prescriptions    No medications on file       No discharge procedures on file.    PDMP Review         Value Time User    PDMP Reviewed  Yes 1/17/2023  1:10 PM Rachel Márquez MD            ED Provider  Electronically Signed by             Art Tucker MD  01/14/24 2003

## 2024-01-15 LAB
ANION GAP SERPL CALCULATED.3IONS-SCNC: 5 MMOL/L
ATRIAL RATE: 117 BPM
BUN SERPL-MCNC: 17 MG/DL (ref 5–25)
CALCIUM SERPL-MCNC: 8.7 MG/DL (ref 8.4–10.2)
CHLORIDE SERPL-SCNC: 104 MMOL/L (ref 96–108)
CO2 SERPL-SCNC: 31 MMOL/L (ref 21–32)
CREAT SERPL-MCNC: 0.88 MG/DL (ref 0.6–1.3)
ERYTHROCYTE [DISTWIDTH] IN BLOOD BY AUTOMATED COUNT: 14.6 % (ref 11.6–15.1)
GFR SERPL CREATININE-BSD FRML MDRD: 72 ML/MIN/1.73SQ M
GLUCOSE SERPL-MCNC: 100 MG/DL (ref 65–140)
GLUCOSE SERPL-MCNC: 153 MG/DL (ref 65–140)
GLUCOSE SERPL-MCNC: 162 MG/DL (ref 65–140)
GLUCOSE SERPL-MCNC: 169 MG/DL (ref 65–140)
GLUCOSE SERPL-MCNC: 205 MG/DL (ref 65–140)
HCT VFR BLD AUTO: 34.9 % (ref 34.8–46.1)
HGB BLD-MCNC: 11.1 G/DL (ref 11.5–15.4)
MAGNESIUM SERPL-MCNC: 1.9 MG/DL (ref 1.9–2.7)
MCH RBC QN AUTO: 29.8 PG (ref 26.8–34.3)
MCHC RBC AUTO-ENTMCNC: 31.8 G/DL (ref 31.4–37.4)
MCV RBC AUTO: 94 FL (ref 82–98)
P AXIS: 83 DEGREES
PLATELET # BLD AUTO: 261 THOUSANDS/UL (ref 149–390)
PMV BLD AUTO: 8.9 FL (ref 8.9–12.7)
POTASSIUM SERPL-SCNC: 3.8 MMOL/L (ref 3.5–5.3)
PR INTERVAL: 136 MS
QRS AXIS: 36 DEGREES
QRSD INTERVAL: 82 MS
QT INTERVAL: 330 MS
QTC INTERVAL: 460 MS
RBC # BLD AUTO: 3.73 MILLION/UL (ref 3.81–5.12)
SODIUM SERPL-SCNC: 140 MMOL/L (ref 135–147)
T WAVE AXIS: 87 DEGREES
VENTRICULAR RATE: 117 BPM
WBC # BLD AUTO: 6.88 THOUSAND/UL (ref 4.31–10.16)

## 2024-01-15 PROCEDURE — 94640 AIRWAY INHALATION TREATMENT: CPT

## 2024-01-15 PROCEDURE — 85027 COMPLETE CBC AUTOMATED: CPT | Performed by: STUDENT IN AN ORGANIZED HEALTH CARE EDUCATION/TRAINING PROGRAM

## 2024-01-15 PROCEDURE — 80048 BASIC METABOLIC PNL TOTAL CA: CPT | Performed by: STUDENT IN AN ORGANIZED HEALTH CARE EDUCATION/TRAINING PROGRAM

## 2024-01-15 PROCEDURE — 99232 SBSQ HOSP IP/OBS MODERATE 35: CPT | Performed by: STUDENT IN AN ORGANIZED HEALTH CARE EDUCATION/TRAINING PROGRAM

## 2024-01-15 PROCEDURE — 94664 DEMO&/EVAL PT USE INHALER: CPT

## 2024-01-15 PROCEDURE — 87086 URINE CULTURE/COLONY COUNT: CPT | Performed by: STUDENT IN AN ORGANIZED HEALTH CARE EDUCATION/TRAINING PROGRAM

## 2024-01-15 PROCEDURE — 94760 N-INVAS EAR/PLS OXIMETRY 1: CPT

## 2024-01-15 PROCEDURE — 83735 ASSAY OF MAGNESIUM: CPT | Performed by: STUDENT IN AN ORGANIZED HEALTH CARE EDUCATION/TRAINING PROGRAM

## 2024-01-15 PROCEDURE — 82948 REAGENT STRIP/BLOOD GLUCOSE: CPT

## 2024-01-15 RX ORDER — OXYCODONE HYDROCHLORIDE 10 MG/1
10 TABLET ORAL EVERY 4 HOURS PRN
Status: DISCONTINUED | OUTPATIENT
Start: 2024-01-15 | End: 2024-01-16

## 2024-01-15 RX ORDER — ACETAMINOPHEN 325 MG/1
650 TABLET ORAL EVERY 6 HOURS
Status: DISCONTINUED | OUTPATIENT
Start: 2024-01-15 | End: 2024-01-17 | Stop reason: HOSPADM

## 2024-01-15 RX ORDER — LANOLIN ALCOHOL/MO/W.PET/CERES
6 CREAM (GRAM) TOPICAL
Status: DISCONTINUED | OUTPATIENT
Start: 2024-01-15 | End: 2024-01-17 | Stop reason: HOSPADM

## 2024-01-15 RX ORDER — OXYCODONE HYDROCHLORIDE 5 MG/1
5 TABLET ORAL EVERY 4 HOURS PRN
Status: DISCONTINUED | OUTPATIENT
Start: 2024-01-15 | End: 2024-01-17 | Stop reason: HOSPADM

## 2024-01-15 RX ORDER — LIDOCAINE 50 MG/G
1 PATCH TOPICAL DAILY
Status: DISCONTINUED | OUTPATIENT
Start: 2024-01-15 | End: 2024-01-17 | Stop reason: HOSPADM

## 2024-01-15 RX ORDER — HYDROMORPHONE HCL/PF 1 MG/ML
0.5 SYRINGE (ML) INJECTION EVERY 6 HOURS PRN
Status: DISCONTINUED | OUTPATIENT
Start: 2024-01-15 | End: 2024-01-15

## 2024-01-15 RX ORDER — ALBUTEROL SULFATE 90 UG/1
2 AEROSOL, METERED RESPIRATORY (INHALATION) EVERY 4 HOURS PRN
Status: DISCONTINUED | OUTPATIENT
Start: 2024-01-15 | End: 2024-01-17 | Stop reason: HOSPADM

## 2024-01-15 RX ORDER — HYDROMORPHONE HCL/PF 1 MG/ML
0.5 SYRINGE (ML) INJECTION EVERY 4 HOURS PRN
Status: DISCONTINUED | OUTPATIENT
Start: 2024-01-15 | End: 2024-01-16

## 2024-01-15 RX ADMIN — ACETAMINOPHEN 650 MG: 325 TABLET, FILM COATED ORAL at 23:51

## 2024-01-15 RX ADMIN — LEVALBUTEROL HYDROCHLORIDE 1.25 MG: 1.25 SOLUTION RESPIRATORY (INHALATION) at 19:16

## 2024-01-15 RX ADMIN — HYDROMORPHONE HYDROCHLORIDE 0.5 MG: 1 INJECTION, SOLUTION INTRAMUSCULAR; INTRAVENOUS; SUBCUTANEOUS at 17:05

## 2024-01-15 RX ADMIN — IPRATROPIUM BROMIDE 0.5 MG: 0.5 SOLUTION RESPIRATORY (INHALATION) at 07:51

## 2024-01-15 RX ADMIN — LEVALBUTEROL HYDROCHLORIDE 1.25 MG: 1.25 SOLUTION RESPIRATORY (INHALATION) at 07:51

## 2024-01-15 RX ADMIN — IPRATROPIUM BROMIDE 0.5 MG: 0.5 SOLUTION RESPIRATORY (INHALATION) at 14:27

## 2024-01-15 RX ADMIN — HEPARIN SODIUM 5000 UNITS: 5000 INJECTION INTRAVENOUS; SUBCUTANEOUS at 05:04

## 2024-01-15 RX ADMIN — LEVALBUTEROL HYDROCHLORIDE 1.25 MG: 1.25 SOLUTION RESPIRATORY (INHALATION) at 14:27

## 2024-01-15 RX ADMIN — OXYCODONE HYDROCHLORIDE AND ACETAMINOPHEN 2 TABLET: 5; 325 TABLET ORAL at 08:05

## 2024-01-15 RX ADMIN — ALBUTEROL SULFATE 2 PUFF: 90 AEROSOL, METERED RESPIRATORY (INHALATION) at 16:38

## 2024-01-15 RX ADMIN — ACETAMINOPHEN 650 MG: 325 TABLET, FILM COATED ORAL at 12:32

## 2024-01-15 RX ADMIN — OXYCODONE HYDROCHLORIDE 10 MG: 10 TABLET ORAL at 14:10

## 2024-01-15 RX ADMIN — ACETAMINOPHEN 650 MG: 325 TABLET, FILM COATED ORAL at 18:40

## 2024-01-15 RX ADMIN — HEPARIN SODIUM 5000 UNITS: 5000 INJECTION INTRAVENOUS; SUBCUTANEOUS at 14:10

## 2024-01-15 RX ADMIN — INSULIN LISPRO 1 UNITS: 100 INJECTION, SOLUTION INTRAVENOUS; SUBCUTANEOUS at 11:42

## 2024-01-15 RX ADMIN — HYDROMORPHONE HYDROCHLORIDE 0.5 MG: 1 INJECTION, SOLUTION INTRAMUSCULAR; INTRAVENOUS; SUBCUTANEOUS at 23:51

## 2024-01-15 RX ADMIN — OXYCODONE HYDROCHLORIDE 10 MG: 10 TABLET ORAL at 18:40

## 2024-01-15 RX ADMIN — CEFTRIAXONE 1000 MG: 1 INJECTION, SOLUTION INTRAVENOUS at 14:10

## 2024-01-15 RX ADMIN — HEPARIN SODIUM 5000 UNITS: 5000 INJECTION INTRAVENOUS; SUBCUTANEOUS at 22:27

## 2024-01-15 RX ADMIN — IPRATROPIUM BROMIDE 0.5 MG: 0.5 SOLUTION RESPIRATORY (INHALATION) at 19:16

## 2024-01-15 RX ADMIN — Medication 6 MG: at 22:27

## 2024-01-15 RX ADMIN — LIDOCAINE 5% 1 PATCH: 700 PATCH TOPICAL at 12:31

## 2024-01-15 RX ADMIN — INSULIN LISPRO 2 UNITS: 100 INJECTION, SOLUTION INTRAVENOUS; SUBCUTANEOUS at 17:06

## 2024-01-15 RX ADMIN — OXYCODONE HYDROCHLORIDE 10 MG: 10 TABLET ORAL at 22:27

## 2024-01-15 RX ADMIN — INSULIN LISPRO 1 UNITS: 100 INJECTION, SOLUTION INTRAVENOUS; SUBCUTANEOUS at 22:36

## 2024-01-15 RX ADMIN — OXYCODONE HYDROCHLORIDE AND ACETAMINOPHEN 1 TABLET: 5; 325 TABLET ORAL at 02:09

## 2024-01-15 NOTE — MALNUTRITION/BMI
This medical record reflects one or more clinical indicators suggestive of morbid obesity.       BMI Findings:  Adult BMI Classifications: Morbid Obesity 40-44.9        Body mass index is 43.41 kg/m².     See Nutrition note dated 1/15/2024 for additional details.  Completed nutrition assessment is viewable in the nutrition documentation.

## 2024-01-15 NOTE — PLAN OF CARE
Problem: RESPIRATORY - ADULT  Goal: Achieves optimal ventilation and oxygenation  Description: INTERVENTIONS:  - Assess for changes in respiratory status  - Assess for changes in mentation and behavior  - Position to facilitate oxygenation and minimize respiratory effort  - Oxygen administered by appropriate delivery if ordered  - Initiate smoking cessation education as indicated  - Encourage broncho-pulmonary hygiene including cough, deep breathe, Incentive Spirometry  - Assess the need for suctioning and aspirate as needed  - Assess and instruct to report SOB or any respiratory difficulty  - Respiratory Therapy support as indicated  Outcome: Progressing     Problem: GENITOURINARY - ADULT  Goal: Maintains or returns to baseline urinary function  Description: INTERVENTIONS:  - Assess urinary function  - Encourage oral fluids to ensure adequate hydration if ordered  - Administer IV fluids as ordered to ensure adequate hydration  - Administer ordered medications as needed  - Offer frequent toileting  - Follow urinary retention protocol if ordered  Outcome: Progressing     Problem: PAIN - ADULT  Goal: Verbalizes/displays adequate comfort level or baseline comfort level  Description: Interventions:  - Encourage patient to monitor pain and request assistance  - Assess pain using appropriate pain scale  - Administer analgesics based on type and severity of pain and evaluate response  - Implement non-pharmacological measures as appropriate and evaluate response  - Consider cultural and social influences on pain and pain management  - Notify physician/advanced practitioner if interventions unsuccessful or patient reports new pain  Outcome: Progressing     Problem: INFECTION - ADULT  Goal: Absence or prevention of progression during hospitalization  Description: INTERVENTIONS:  - Assess and monitor for signs and symptoms of infection  - Monitor lab/diagnostic results  - Monitor all insertion sites, i.e. indwelling lines,  tubes, and drains  - Monitor endotracheal if appropriate and nasal secretions for changes in amount and color  - [place] appropriate cooling/warming therapies per order  - Administer medications as ordered  - Instruct and encourage patient and family to use good hand hygiene technique  - Identify and instruct in appropriate isolation precautions for identified infection/condition  Outcome: Progressing

## 2024-01-15 NOTE — PROGRESS NOTES
[facility]  Progress Note  Name: Nanci Navarro I  MRN: [id number]  Unit/Bed#: -01 I Date of Admission: 1/14/2024   Date of Service: 1/15/2024 I Hospital Day: 1    Assessment/Plan   * Sepsis (HCC)  Assessment & Plan  Tachycardia, tachypnea, lactic acidosis with suspected UTI  CT AP did not show any bladder wall thickening or other signs of infection   Did show some esophagitis   Started on ceftriaxone in ED to treat possible urinary source given her dysuria and flank pain. No signs of pyelo on CTAP   UA with 4-10 WBC, occasional bacteria, neg LE, neg nitrates. Does not appear that urine culture was sent -- will resend   Received 1 liter fluid bolus, lactate normalized   Continue ctx for empiric tx of UTI possible early pyelo? Given her L flank pain   Pain: Scheduled Tylenol, Oxy 5/10 for moderate/severe pain, lidocaine patch.     Electrolyte abnormality  Assessment & Plan  Hypokalemic in ED to 2.8   Received dose of Kcl in ED  Resolved with repletion     Chronic respiratory failure (HCC)  Assessment & Plan  On 1-2 liters of oxygen at home secondary to COPD but she reports using it only PRN  Not in exacerbation currently   Monitor oxygen saturations     Diabetes (HCC)  Assessment & Plan  Lab Results   Component Value Date    HGBA1C 6.0 (H) 12/15/2023       Recent Labs     01/14/24  1740 01/14/24  1808 01/14/24  2045   POCGLU 106 139 218*       Blood Sugar Average: Last 72 hrs:  (P) [id number] in ED  Start sliding scale  Monitor glucose levels              VTE Pharmacologic Prophylaxis:   Moderate Risk (Score 3-4) - Pharmacological DVT Prophylaxis Ordered: heparin.    Mobility:   Basic Mobility Inpatient Raw Score: 21  JH-HLM Goal: 6: Walk 10 steps or more  JH-HLM Achieved: 6: Walk 10 steps or more  HLM Goal achieved. Continue to encourage appropriate mobility.    Patient Centered Rounds: I performed bedside rounds with nursing staff today.   Discussions with  Specialists or Other Care Team Provider: none    Education and Discussions with Family / Patient: Attempted to update  () via phone. Left voicemail.     Total Time Spent on Date of Encounter in care of patient: 40 mins. This time was spent on one or more of the following: performing physical exam; counseling and coordination of care; obtaining or reviewing history; documenting in the medical record; reviewing/ordering tests, medications or procedures; communicating with other healthcare professionals and discussing with patient's family/caregivers.    Current Length of Stay: 1 day(s)  Current Patient Status: Inpatient   Certification Statement: The patient will continue to require additional inpatient hospital stay due to UTI, possible pyelo with uncontrolled pain  Discharge Plan: Anticipate discharge in 24-48 hrs to home with home services.    Code Status: Level 1 - Full Code    Subjective:   Reporting severe L flank pain. This has been ongoing for last several days. Opioids are helping. Dysuria resolving.     Objective:     Vitals:   Temp (24hrs), Av.6 °F (37 °C), Min:97.8 °F (36.6 °C), Max:99.4 °F (37.4 °C)    Temp:  [97.8 °F (36.6 °C)-99.4 °F (37.4 °C)] 98 °F (36.7 °C)  HR:  [] 80  Resp:  [18-22] 18  BP: ()/(57-78) 112/67  SpO2:  [90 %-96 %] 95 %  Body mass index is 43.41 kg/m².     Input and Output Summary (last 24 hours):     Intake/Output Summary (Last 24 hours) at 1/15/2024 1245  Last data filed at 1/15/2024 0810  Gross per 24 hour   Intake 720 ml   Output 100 ml   Net 620 ml       Physical Exam:   Physical Exam   No acute distress  Nonlabored, on 2 L nasal cannula, mild wheezing  Left flank pain with palpation  Nontender nondistended abdomen  Alert and oriented x 3  Tearful when discussing her medical conditions      Additional Data:     Labs:  Results from last 7 days   Lab Units 01/15/24  0432 24  1402   WBC Thousand/uL 6.88 6.62   HEMOGLOBIN g/dL 11.1* 13.2    HEMATOCRIT % 34.9 39.1   PLATELETS Thousands/uL 261 281   NEUTROS PCT %  --  77*   LYMPHS PCT %  --  14   MONOS PCT %  --  7   EOS PCT %  --  1     Results from last 7 days   Lab Units 01/15/24  0432 01/14/24  1402   SODIUM mmol/L 140 142   POTASSIUM mmol/L 3.8 2.8*   CHLORIDE mmol/L 104 103   CO2 mmol/L 31 30   BUN mg/dL 17 10   CREATININE mg/dL 0.88 0.70   ANION GAP mmol/L 5 9   CALCIUM mg/dL 8.7 9.1   ALBUMIN g/dL  --  3.9   TOTAL BILIRUBIN mg/dL  --  0.91   ALK PHOS U/L  --  91   ALT U/L  --  23   AST U/L  --  10*   GLUCOSE RANDOM mg/dL 153* 203*     Results from last 7 days   Lab Units 01/14/24  1405   INR  0.97     Results from last 7 days   Lab Units 01/15/24  1135 01/15/24  0738 01/14/24  2045 01/14/24  1808 01/14/24  1740   POC GLUCOSE mg/dl 162* 100 218* 139 106         Results from last 7 days   Lab Units 01/14/24  1650 01/14/24  1402   LACTIC ACID mmol/L 0.8 2.6*   PROCALCITONIN ng/ml  --  0.06       Lines/Drains:  Invasive Devices       Peripheral Intravenous Line  Duration             Peripheral IV 01/14/24 Distal;Left;Ventral (anterior) Forearm <1 day    Peripheral IV 01/14/24 Right Antecubital <1 day                      Telemetry:  Telemetry Orders (From admission, onward)               24 Hour Telemetry Monitoring  Continuous x 24 Hours (Telem)        Question:  Reason for 24 Hour Telemetry  Answer:  Metabolic/electrolyte disturbance with high probability of dysrhythmia. K level <3 or >6 OR KCL infusion >10mEq/hr                     Telemetry Reviewed: Normal Sinus Rhythm  Indication for Continued Telemetry Use: No indication for continued use. Will discontinue.              Imaging: Reviewed radiology reports from this admission including: abdominal/pelvic CT    Recent Cultures (last 7 days):   Results from last 7 days   Lab Units 01/14/24  1414 01/14/24  1405   BLOOD CULTURE  Received in Microbiology Lab. Culture in Progress. Received in Microbiology Lab. Culture in Progress.       Last 24  Hours Medication List:   Current Facility-Administered Medications   Medication Dose Route Frequency Provider Last Rate    acetaminophen  650 mg Oral Q6H Calvin Casarez MD      albuterol  2 puff Inhalation Q4H PRN Calvin Casarez MD      cefTRIAXone  1,000 mg Intravenous Q24H Daniel Chaney MD      heparin (porcine)  5,000 Units Subcutaneous Q8H TANVIR Daniel Chaney MD      insulin lispro  1-6 Units Subcutaneous TID With Meals Daniel Chaney MD      insulin lispro  1-6 Units Subcutaneous HS Daniel Chaney MD      ipratropium  0.5 mg Nebulization TID Daniel Chaney MD      levalbuterol  1.25 mg Nebulization TID Daniel Chaney MD      lidocaine  1 patch Topical Daily Calvin Casarez MD      nicotine  1 patch Transdermal Daily Daniel Chaney MD      ondansetron  4 mg Intravenous Q6H PRN Daniel Cahney MD      oxyCODONE  10 mg Oral Q4H PRN Calvin Casarez MD      oxyCODONE  5 mg Oral Q4H PRN Calvin Casarez MD          Today, Patient Was Seen By: Calvin Casarez MD    **Please Note: This note may have been constructed using a voice recognition system.**

## 2024-01-15 NOTE — ASSESSMENT & PLAN NOTE
On 1-2 liters of oxygen at home secondary to COPD but she reports using it only PRN  Not in exacerbation currently   Monitor oxygen saturations

## 2024-01-15 NOTE — CASE MANAGEMENT
Case Management Assessment & Discharge Planning Note    Patient name Nanci Navarro  Location /-01 MRN [id number]  : 1965 Date 1/15/2024       Current Admission Date: 2024  Current Admission Diagnosis:Sepsis (HCC)   Patient Active Problem List    Diagnosis Date Noted    Diabetes (HCC) 2024    Chronic respiratory failure (HCC) 2024    Electrolyte abnormality 2024    Osteoporosis 2023    Gastroesophageal reflux disease without esophagitis 2023    Chest pain 01/15/2023    Acute respiratory failure (HCC) 01/15/2023    Volume overload 01/15/2023    Ambulatory dysfunction 2023    Compression fracture of L1 vertebra (HCC) 2023    COPD exacerbation ([facility]) 2023    Sepsis ([facility]) 2023    Fall 2023    Systemic lupus erythematosus with lung involvement ([facility]) 12/10/2022    Respiratory failure with hypoxia ([facility]) 10/06/2022    Hypokalemia 10/04/2022    Chest pain, unspecified 2022    Chronic obstructive pulmonary disease with acute exacerbation ([facility]) 2022    Smoker 2022      LOS (days): 1  Geometric Mean LOS (GMLOS) (days):   Days to GMLOS:     OBJECTIVE:    Risk of Unplanned Readmission Score: 18.06         Current admission status: Inpatient       Preferred Pharmacy:   [facility] - [address]  [address]  [address]  [facility] [address]  Phone: [phone] Fax: [phone]    [id number]  [place] - [address]  [address] [address]  Phone: [phone] Fax: [phone]    [facility]  [place] - [address] [facility],  [address],  Ground Floor [facility] [address]  Phone: [phone] Fax: [phone]    [id number] - [place] - [address]  [address]  [facility] [address]  Phone: [phone] Fax: [phone]    Primary Care  Provider: Renan Montero, DO    Primary Insurance:   Secondary Insurance:     ASSESSMENT:  Active Health Care Proxies       Nhan Navarro Alternate Health Care Representative - Spouse   Primary Phone: [phone] (Mobile)                 Advance Directives  Does patient have a Health Care POA?: No  Was patient offered paperwork?: Yes (not interested)  Does patient currently have a Health Care decision maker?: Yes, please see Health Care Proxy section  Does patient have Advance Directives?: No  Was patient offered paperwork?: Yes         Readmission Root Cause  30 Day Readmission: No    Patient Information  Admitted from:: Home  Mental Status: Alert  During Assessment patient was accompanied by: Not accompanied during assessment  Assessment information provided by:: Patient  Primary Caregiver: Self  Support Systems: Spouse/significant other  County of Residence: [place]  What city do you live in?: [place]  Home entry access options. Select all that apply.: Stairs  Number of steps to enter home.: 2  Do the steps have railings?: Yes  Type of Current Residence: Travel Trailer/ Mobile Home  Living Arrangements: Lives w/ Spouse/significant other  Is patient a ?: No    Activities of Daily Living Prior to Admission  Functional Status: Independent  Completes ADLs independently?: Yes  Ambulates independently?: Yes  Does patient use assisted devices?: Yes  Assisted Devices (DME) used: Home Oxygen concentrator  DME Company Name (respiratory supplies): [employer]  O2 Rate(s): 2L  Does patient currently own DME?: No  Does patient have a history of Outpatient Therapy (PT/OT)?: Yes  Does the patient have a history of Short-Term Rehab?: No  Does patient have a history of HHC?: No  Does patient currently have HHC?: No         Patient Information Continued  Income Source: Unemployed  Does patient have prescription coverage?: No  Does patient receive dialysis treatments?: No  Does patient have a history of substance  abuse?: No  Does patient have a history of Mental Health Diagnosis?: No         Means of Transportation  Means of Transport to Appts:: Drives Self      Housing Stability: Low Risk  (1/15/2024)    Housing Stability Vital Sign     Unable to Pay for Housing in the Last Year: No     Number of Places Lived in the Last Year: 1     Unstable Housing in the Last Year: No   Food Insecurity: Food Insecurity Present (1/15/2024)    Hunger Vital Sign     Worried About Running Out of Food in the Last Year: Sometimes true     Ran Out of Food in the Last Year: Sometimes true   Transportation Needs: No Transportation Needs (1/15/2024)    PRAPARE - Transportation     Lack of Transportation (Medical): No     Lack of Transportation (Non-Medical): No   Utilities: At Risk (1/15/2024)    [facility] Utilities     Threatened with loss of utilities: Yes       DISCHARGE DETAILS:    Discharge planning discussed with:: patient  Freedom of Choice: Yes  Comments - Freedom of Choice: Home no needs. no insurance. [employer] Counselors emailed to do MA shabbir  CM contacted family/caregiver?: No- see comments                            Other Referral/Resources/Interventions Provided:  Referral Comments: Home no needs. no insurance. FInancial Counselors emailed to do MA shabbir         Treatment Team Recommendation: Home  Discharge Destination Plan:: Home  Transport at Discharge : Family, Automobile

## 2024-01-15 NOTE — PLAN OF CARE
Problem: RESPIRATORY - ADULT  Goal: Achieves optimal ventilation and oxygenation  Description: INTERVENTIONS:  - Assess for changes in respiratory status  - Assess for changes in mentation and behavior  - Position to facilitate oxygenation and minimize respiratory effort  - Oxygen administered by appropriate delivery if ordered  - Initiate smoking cessation education as indicated  - Encourage broncho-pulmonary hygiene including cough, deep breathe, Incentive Spirometry  - Assess the need for suctioning and aspirate as needed  - Assess and instruct to report SOB or any respiratory difficulty  - Respiratory Therapy support as indicated  Outcome: Progressing     Problem: INFECTION - ADULT  Goal: Absence or prevention of progression during hospitalization  Description: INTERVENTIONS:  - Assess and monitor for signs and symptoms of infection  - Monitor lab/diagnostic results  - Monitor all insertion sites, i.e. indwelling lines, tubes, and drains  - Monitor endotracheal if appropriate and nasal secretions for changes in amount and color  - [place] appropriate cooling/warming therapies per order  - Administer medications as ordered  - Instruct and encourage patient and family to use good hand hygiene technique  - Identify and instruct in appropriate isolation precautions for identified infection/condition  Outcome: Progressing     Problem: Knowledge Deficit  Goal: Patient/family/caregiver demonstrates understanding of disease process, treatment plan, medications, and discharge instructions  Description: Complete learning assessment and assess knowledge base.  Interventions:  - Provide teaching at level of understanding  - Provide teaching via preferred learning methods  Outcome: Progressing

## 2024-01-15 NOTE — UTILIZATION REVIEW
Initial Clinical Review    Admission: Date/Time/Statement:   Admission Orders (From admission, onward)       Ordered        01/14/24 1654  INPATIENT ADMISSION  Once                          Orders Placed This Encounter   Procedures    INPATIENT ADMISSION     Standing Status:   Standing     Number of Occurrences:   1     Order Specific Question:   Level of Care     Answer:   Med Surg [16]     Order Specific Question:   Estimated length of stay     Answer:   More than 2 Midnights     Order Specific Question:   Certification     Answer:   I certify that inpatient services are medically necessary for this patient for a duration of greater than two midnights. See H&P and MD Progress Notes for additional information about the patient's course of treatment.     ED Arrival Information       Expected   -    Arrival   1/14/2024 13:04    Acuity   Emergent              Means of arrival   Wheelchair    Escorted by   Family Member    Service   Hospitalist    Admission type   Emergency              Arrival complaint   SOB/ FLANK PAIN             Chief Complaint   Patient presents with    Flank Pain     C/o left sided flank pain for the past couple of days. Also c/o increasing sob and fevers.        Initial Presentation: 58 y.o. female to ED from home w/ PMH of copd with chronic respiratory failure, dm who presents with left sided flank pain, dysuria, fevers, chills that were first noted at 3 AM today. Patient reports she has decreased urinary output and hesitancy.  on arrival + tachypnea, tachycardia and lactic acidosis w/ suspected UTI . Plan to start ceftriaxone , f/u urine cx . Given 1 l IVF bolus . Hypokalemic replete an monitor , tele . On 1-2 L O2 sec to COPD at home , monitor . DM SSI and monitor .     ED Triage Vitals   Temperature Pulse Respirations Blood Pressure SpO2   01/14/24 1306 01/14/24 1306 01/14/24 1306 01/14/24 1306 01/14/24 1306   98.8 °F (37.1 °C) (!) 120 22 141/78 92 %      Temp Source Heart Rate Source  Patient Position - Orthostatic VS BP Location FiO2 (%)   01/14/24 1306 01/14/24 1306 01/14/24 1306 01/14/24 1306 --   Oral Monitor Sitting Left arm       Pain Score       01/14/24 1351       9          Wt Readings from Last 1 Encounters:   01/14/24 91 kg (200 lb 9.9 oz)     Additional Vital Signs:   01/15/24 0754 -- 82 22 -- -- -- -- -- -- --   01/15/24 0753 -- -- -- -- -- 93 % 24 1 L/min Nasal cannula --   01/15/24 07:39:33 97.8 °F (36.6 °C) 82 -- 114/72 86 93 % -- -- -- --   01/15/24 02:58:21 98.6 °F (37 °C) 89 18 113/73 86 95 % -- -- -- --   01/15/24 02:01:01 -- 87 -- 118/75 89 92 % -- -- -- --   01/15/24 00:12:17 98.9 °F (37.2 °C) 92 18 93/62 72 90 % -- -- -- --   01/15/24 00:11:42 98.9 °F (37.2 °C) 93 18 93/62 72 90 % -- -- -- --   01/14/24 2116 -- -- -- -- -- 94 % 24 1 L/min Nasal cannula --   01/14/24 20:45:14 -- 83 -- 127/68 88 95 % -- -- -- --   01/14/24 1953 -- -- -- -- -- 95 % 24 1 L/min Nasal cannula --   01/14/24 18:24:21 99.4 °F (37.4 °C) 95 -- -- -- 96 % -- -- -- --   01/14/24 1821 98.4 °F (36.9 °C) 98 18 127/69 88 96 % -- -- -- --   01/14/24 18:12:27 -- 98 -- 127/69 88 95 % -- -- -- --   01/14/24 18:12:01 -- 94 -- 127/69 88 94 % -- -- -- --   01/14/24 1700 -- 89 22 116/57 80 92 % -- -- Nasal cannula --   01/14/24 1600 -- 92 22 123/59 85 92 % 24 1 L/min None (Room air) --   01/14/24 1415 -- 105 18 128/58 84 93 % 28 2 L/min Nasal cannula Lying   01/14/24 1330 -- 118 Abnormal  20 157/62 88 93 % -- -- None (      Pertinent Labs/Diagnostic Test Results:   1/14 EKG  Interpretation: abnormal    Rate:     ECG rate:  118     ECG rate assessment: tachycardic    Rhythm:     Rhythm: sinus tachycardia    Comments:      Sinus tachycardia nonspecific ST-T wave changes no ST elevations   CT chest abdomen pelvis wo contrast   Final Result by Clovis Bullard MD (01/14 3457)      No acute disease in the lungs.      Stable thickening of the esophageal wall consistent with esophagitis.      No acute inflammatory process  identified in the abdomen or pelvis.      Stable hepatomegaly.                              Workstation performed: [id number]         XR chest portable    (Results Pending)     Results from last 7 days   Lab Units 01/14/24  1348   SARS-COV-2  Negative     Results from last 7 days   Lab Units 01/15/24  0432 01/14/24  1402   WBC Thousand/uL 6.88 6.62   HEMOGLOBIN g/dL 11.1* 13.2   HEMATOCRIT % 34.9 39.1   PLATELETS Thousands/uL 261 281   NEUTROS ABS Thousands/µL  --  5.16         Results from last 7 days   Lab Units 01/15/24  0432 01/14/24  1402   SODIUM mmol/L 140 142   POTASSIUM mmol/L 3.8 2.8*   CHLORIDE mmol/L 104 103   CO2 mmol/L 31 30   ANION GAP mmol/L 5 9   BUN mg/dL 17 10   CREATININE mg/dL 0.88 0.70   EGFR ml/min/1.73sq m 72 95   CALCIUM mg/dL 8.7 9.1   MAGNESIUM mg/dL 1.9  --      Results from last 7 days   Lab Units 01/14/24  1402   AST U/L 10*   ALT U/L 23   ALK PHOS U/L 91   TOTAL PROTEIN g/dL 7.0   ALBUMIN g/dL 3.9   TOTAL BILIRUBIN mg/dL 0.91     Results from last 7 days   Lab Units 01/15/24  0738 01/14/24  2045 01/14/24  1808 01/14/24  1740   POC GLUCOSE mg/dl 100 218* 139 106     Results from last 7 days   Lab Units 01/15/24  0432 01/14/24  1402   GLUCOSE RANDOM mg/dL 153* 203*       Results from last 7 days   Lab Units 01/14/24  1834 01/14/24  1650 01/14/24  1417   HS TNI 0HR ng/L  --   --  7   HS TNI 2HR ng/L  --  7  --    HSTNI D2 ng/L  --  0  --    HS TNI 4HR ng/L 9  --   --    HSTNI D4 ng/L 2  --   --      Results from last 7 days   Lab Units 01/14/24  1405   PROTIME seconds 13.5   INR  0.97   PTT seconds 33     Results from last 7 days   Lab Units 01/14/24  1402   PROCALCITONIN ng/ml 0.06     Results from last 7 days   Lab Units 01/14/24  1650 01/14/24  1402   LACTIC ACID mmol/L 0.8 2.6*     Results from last 7 days   Lab Units 01/14/24  1417   BNP pg/mL 32     Results from last 7 days   Lab Units 01/14/24  1402   LIPASE u/L 10*     Results from last 7 days   Lab Units 01/14/24  1512   CLARITY  UA  Turbid   COLOR UA  Yellow   SPEC GRAV UA  1.028   PH UA  6.0   GLUCOSE UA mg/dl Trace*   KETONES UA mg/dl Negative   BLOOD UA  Negative   PROTEIN UA mg/dl 30 (1+)*   NITRITE UA  Negative   BILIRUBIN UA  Negative   UROBILINOGEN UA (BE) mg/dl 8.0*   LEUKOCYTES UA  Negative   WBC UA /hpf 4-10*   RBC UA /hpf 2-4*   BACTERIA UA /hpf Occasional   EPITHELIAL CELLS WET PREP /hpf Moderate*   MUCUS THREADS  Innumerable*     Results from last 7 days   Lab Units 01/14/24  1348   INFLUENZA A PCR  Negative   INFLUENZA B PCR  Negative   RSV PCR  Negative       Results from last 7 days   Lab Units 01/14/24  1414 01/14/24  1405   BLOOD CULTURE  Received in Microbiology Lab. Culture in Progress. Received in Microbiology Lab. Culture in Progress.       ED Treatment:   Medication Administration from 01/14/2024 1304 to 01/14/2024 1804         Date/Time Order Dose Route Action     01/14/2024 1411 EST morphine injection 4 mg 4 mg Intravenous Given     01/14/2024 1411 EST ondansetron (ZOFRAN) injection 4 mg 4 mg Intravenous Given     01/14/2024 1351 EST acetaminophen (TYLENOL) tablet 975 mg 975 mg Oral Given     01/14/2024 1424 EST cefTRIAXone (ROCEPHIN) IVPB (premix in dextrose) 1,000 mg 50 mL 1,000 mg Intravenous New Bag     01/14/2024 1525 EST sodium chloride 0.9 % bolus 1,000 mL 1,000 mL Intravenous New Bag     01/14/2024 1648 EST morphine injection 4 mg 4 mg Intravenous Given     01/14/2024 1738 EST potassium chloride 20 mEq IVPB (premix) 20 mEq Intravenous New Bag     01/14/2024 1734 EST heparin (porcine) subcutaneous injection 5,000 Units 5,000 Units Subcutaneous Given     01/14/2024 1733 EST potassium chloride (K-DUR,KLOR-CON) CR tablet 40 mEq 40 mEq Oral Given          Past Medical History:   Diagnosis Date    Achalasia     Back pain     Cancer (HCC)     Ovarian    Fibromyalgia     Lupus (HCC)      Present on Admission:   Sepsis (HCC)      Admitting Diagnosis: Hypokalemia [E87.6]  SOB (shortness of breath)  [R06.02]  Pyelonephritis [N12]  Flank pain [R10.9]  Age/Sex: 58 y.o. female  Admission Orders:  Scheduled Medications:  cefTRIAXone, 1,000 mg, Intravenous, Q24H  heparin (porcine), 5,000 Units, Subcutaneous, Q8H TANVIR  insulin lispro, 1-6 Units, Subcutaneous, TID With Meals  insulin lispro, 1-6 Units, Subcutaneous, HS  ipratropium, 0.5 mg, Nebulization, TID  levalbuterol, 1.25 mg, Nebulization, TID  nicotine, 1 patch, Transdermal, Daily      Continuous IV Infusions:     PRN Meds:  acetaminophen, 650 mg, Oral, Q6H PRN  albuterol, 2 puff, Inhalation, Q4H PRN  ondansetron, 4 mg, Intravenous, Q6H PRN  oxyCODONE-acetaminophen, 1 tablet, Oral, Q4H PRN  oxyCODONE-acetaminophen, 2 tablet, Oral, Q4H PRN    Fingerstick ac and hs   Reg diet   Act as jennifer   Tele  SCD   Up and OOB    None    Network Utilization Review Department  ATTENTION: Please call with any questions or concerns to [phone] and carefully listen to the prompts so that you are directed to the right person. All voicemails are confidential.   For Discharge needs, contact Care Management DC Support Team at [phone] opt. [phone]  Send all requests for admission clinical reviews, approved or denied determinations and any other requests to dedicated fax number below belonging to the campus where the patient is receiving treatment. List of dedicated fax numbers for the Facilities:  FACILITY NAME UR FAX NUMBER   ADMISSION DENIALS (Administrative/Medical Necessity) [phone]   DISCHARGE SUPPORT TEAM (NETWORK) [phone]   PARENT CHILD HEALTH (Maternity/NICU/Pediatrics) [phone]   [facility] [phone]   [facility] [phone]   [facility] [phone]   [facility] [phone]   [facility] [phone]   [facility] [phone]   [facility] [phone]   [facility]  [facility] [phone]   [facility] [phone]   [facility] [phone]   [facility] [phone]

## 2024-01-15 NOTE — ASSESSMENT & PLAN NOTE
Tachycardia, tachypnea, lactic acidosis with suspected UTI  CT AP did not show any bladder wall thickening or other signs of infection   Did show some esophagitis   Started on ceftriaxone in ED to treat possible urinary source given her dysuria and flank pain. No signs of pyelo on CTAP   UA with 4-10 WBC, occasional bacteria, neg LE, neg nitrates. Does not appear that urine culture was sent -- will resend   Received 1 liter fluid bolus, lactate normalized   Continue ctx for empiric tx of UTI possible early pyelo? Given her L flank pain   Pain: Scheduled Tylenol, Oxy 5/10 for moderate/severe pain, lidocaine patch.   If pain persists despite medications can consider repeat imaging tomorrow
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
## 2024-01-15 NOTE — ASSESSMENT & PLAN NOTE
Lab Results   Component Value Date    HGBA1C 6.0 (H) 12/15/2023       Recent Labs     01/14/24  1740 01/14/24  1808 01/14/24 2045   POCGLU 106 139 218*       Blood Sugar Average: Last 72 hrs:  (P) [id number] in ED  Start sliding scale  Monitor glucose levels

## 2024-01-15 NOTE — RESPIRATORY THERAPY NOTE
RT Protocol Note  Nanci Navarro 58 y.o. female MRN: [id number]  Unit/Bed#: -01 Encounter: [id number]    Assessment    Principal Problem:    Sepsis (HCC)  Active Problems:    Diabetes (HCC)    Chronic respiratory failure (HCC)    Electrolyte abnormality      Home Pulmonary Medications:  Inhalers/neb    Home Devices/Therapy: Home O2 (PRN o2)    Past Medical History:   Diagnosis Date    Achalasia     Back pain     Cancer (HCC)     Ovarian    Fibromyalgia     Lupus (HCC)      Social History     Socioeconomic History    Marital status: /Civil Union     Spouse name: None    Number of children: None    Years of education: None    Highest education level: None   Occupational History    None   Tobacco Use    Smoking status: Every Day     Current packs/day: 0.50     Types: Cigarettes    Smokeless tobacco: Never   Vaping Use    Vaping status: Never Used   Substance and Sexual Activity    Alcohol use: Never    Drug use: Never    Sexual activity: Yes   Other Topics Concern    None   Social History Narrative    None     Social Determinants of Health     Financial Resource Strain: Unknown (12/23/2023)    Received from [facility]    Overall Financial Resource Strain (CARDIA)     Difficulty of Paying Living Expenses: Patient refused   Food Insecurity: Unknown (12/23/2023)    Received from [facility]    Hunger Vital Sign     Worried About Running Out of Food in the Last Year: Patient refused     Ran Out of Food in the Last Year: Patient refused   Recent Concern: Food Insecurity - Food Insecurity Present (11/27/2023)    Hunger Vital Sign     Worried About Running Out of Food in the Last Year: Sometimes true     Ran Out of Food in the Last Year: Sometimes true   Transportation Needs: No Transportation Needs (12/23/2023)    Received from [facility]    PRAPARE - Transportation     Lack of Transportation (Medical): No     Lack of Transportation (Non-Medical): No  "  Physical Activity: Not on file   Stress: Not on file   Social Connections: Not on file   Intimate Partner Violence: Unknown (12/23/2023)    Received from [facility]    Humiliation, Afraid, Rape, and Kick questionnaire     Fear of Current or Ex-Partner: Patient refused     Emotionally Abused: Patient refused     Physically Abused: Patient refused     Sexually Abused: Patient refused   Housing Stability: Unknown (12/23/2023)    Received from [facility]    Housing Stability Vital Sign     Unable to Pay for Housing in the Last Year: Patient refused     Number of Places Lived in the Last Year: 1     Unstable Housing in the Last Year: Patient refused       Subjective         Objective    Physical Exam:   Assessment Type: During-treatment  General Appearance: Drowsy  Respiratory Pattern: Labored, Tachypneic  Chest Assessment: Chest expansion symmetrical  Bilateral Breath Sounds: Diminished  Cough: None  O2 Device: 1L o2 nc    Vitals:  Blood pressure 114/72, pulse 82, temperature 97.8 °F (36.6 °C), resp. rate 22, height 4' 9\" (1.448 m), weight 91 kg (200 lb 9.9 oz), SpO2 93%.          Imaging and other studies: I have personally reviewed pertinent reports.      O2 Device: 1L o2 nc     Plan    Respiratory Plan: Home Bronchodilator Patient pathway        Resp Comments: Pt resting w/ mild labored breathing.  Offers no complaints.  Will cont w/ sherin tx.   "

## 2024-01-16 LAB
ANION GAP SERPL CALCULATED.3IONS-SCNC: 6 MMOL/L
BASOPHILS # BLD AUTO: 0.02 THOUSANDS/ÂΜL (ref 0–0.1)
BASOPHILS NFR BLD AUTO: 0 % (ref 0–1)
BUN SERPL-MCNC: 21 MG/DL (ref 5–25)
CALCIUM SERPL-MCNC: 9.4 MG/DL (ref 8.4–10.2)
CHLORIDE SERPL-SCNC: 103 MMOL/L (ref 96–108)
CO2 SERPL-SCNC: 30 MMOL/L (ref 21–32)
CREAT SERPL-MCNC: 0.79 MG/DL (ref 0.6–1.3)
EOSINOPHIL # BLD AUTO: 0.14 THOUSAND/ÂΜL (ref 0–0.61)
EOSINOPHIL NFR BLD AUTO: 2 % (ref 0–6)
ERYTHROCYTE [DISTWIDTH] IN BLOOD BY AUTOMATED COUNT: 14.4 % (ref 11.6–15.1)
GFR SERPL CREATININE-BSD FRML MDRD: 82 ML/MIN/1.73SQ M
GLUCOSE SERPL-MCNC: 112 MG/DL (ref 65–140)
GLUCOSE SERPL-MCNC: 125 MG/DL (ref 65–140)
GLUCOSE SERPL-MCNC: 153 MG/DL (ref 65–140)
GLUCOSE SERPL-MCNC: 187 MG/DL (ref 65–140)
HCT VFR BLD AUTO: 34 % (ref 34.8–46.1)
HGB BLD-MCNC: 11 G/DL (ref 11.5–15.4)
IMM GRANULOCYTES # BLD AUTO: 0.05 THOUSAND/UL (ref 0–0.2)
IMM GRANULOCYTES NFR BLD AUTO: 1 % (ref 0–2)
LYMPHOCYTES # BLD AUTO: 1.2 THOUSANDS/ÂΜL (ref 0.6–4.47)
LYMPHOCYTES NFR BLD AUTO: 19 % (ref 14–44)
MAGNESIUM SERPL-MCNC: 2 MG/DL (ref 1.9–2.7)
MCH RBC QN AUTO: 30.3 PG (ref 26.8–34.3)
MCHC RBC AUTO-ENTMCNC: 32.4 G/DL (ref 31.4–37.4)
MCV RBC AUTO: 94 FL (ref 82–98)
MONOCYTES # BLD AUTO: 0.7 THOUSAND/ÂΜL (ref 0.17–1.22)
MONOCYTES NFR BLD AUTO: 11 % (ref 4–12)
NEUTROPHILS # BLD AUTO: 4.36 THOUSANDS/ÂΜL (ref 1.85–7.62)
NEUTS SEG NFR BLD AUTO: 67 % (ref 43–75)
NRBC BLD AUTO-RTO: 0 /100 WBCS
PLATELET # BLD AUTO: 267 THOUSANDS/UL (ref 149–390)
PMV BLD AUTO: 8.9 FL (ref 8.9–12.7)
POTASSIUM SERPL-SCNC: 4.3 MMOL/L (ref 3.5–5.3)
PROCALCITONIN SERPL-MCNC: 0.06 NG/ML
RBC # BLD AUTO: 3.63 MILLION/UL (ref 3.81–5.12)
SODIUM SERPL-SCNC: 139 MMOL/L (ref 135–147)
WBC # BLD AUTO: 6.47 THOUSAND/UL (ref 4.31–10.16)

## 2024-01-16 PROCEDURE — 84145 PROCALCITONIN (PCT): CPT | Performed by: STUDENT IN AN ORGANIZED HEALTH CARE EDUCATION/TRAINING PROGRAM

## 2024-01-16 PROCEDURE — 94664 DEMO&/EVAL PT USE INHALER: CPT

## 2024-01-16 PROCEDURE — 85025 COMPLETE CBC W/AUTO DIFF WBC: CPT | Performed by: STUDENT IN AN ORGANIZED HEALTH CARE EDUCATION/TRAINING PROGRAM

## 2024-01-16 PROCEDURE — 99232 SBSQ HOSP IP/OBS MODERATE 35: CPT | Performed by: HOSPITALIST

## 2024-01-16 PROCEDURE — 83735 ASSAY OF MAGNESIUM: CPT | Performed by: STUDENT IN AN ORGANIZED HEALTH CARE EDUCATION/TRAINING PROGRAM

## 2024-01-16 PROCEDURE — 94760 N-INVAS EAR/PLS OXIMETRY 1: CPT

## 2024-01-16 PROCEDURE — 80048 BASIC METABOLIC PNL TOTAL CA: CPT | Performed by: STUDENT IN AN ORGANIZED HEALTH CARE EDUCATION/TRAINING PROGRAM

## 2024-01-16 PROCEDURE — 82948 REAGENT STRIP/BLOOD GLUCOSE: CPT

## 2024-01-16 PROCEDURE — 94640 AIRWAY INHALATION TREATMENT: CPT

## 2024-01-16 RX ORDER — METOCLOPRAMIDE HYDROCHLORIDE 5 MG/ML
10 INJECTION INTRAMUSCULAR; INTRAVENOUS ONCE
Status: COMPLETED | OUTPATIENT
Start: 2024-01-16 | End: 2024-01-16

## 2024-01-16 RX ORDER — PHENAZOPYRIDINE HYDROCHLORIDE 100 MG/1
100 TABLET, FILM COATED ORAL
Status: DISCONTINUED | OUTPATIENT
Start: 2024-01-16 | End: 2024-01-17 | Stop reason: HOSPADM

## 2024-01-16 RX ORDER — OXYBUTYNIN CHLORIDE 5 MG/1
5 TABLET ORAL 3 TIMES DAILY
Status: DISCONTINUED | OUTPATIENT
Start: 2024-01-16 | End: 2024-01-17 | Stop reason: HOSPADM

## 2024-01-16 RX ORDER — HYDROMORPHONE HCL/PF 1 MG/ML
0.5 SYRINGE (ML) INJECTION EVERY 6 HOURS PRN
Status: DISCONTINUED | OUTPATIENT
Start: 2024-01-16 | End: 2024-01-17 | Stop reason: HOSPADM

## 2024-01-16 RX ADMIN — OXYCODONE HYDROCHLORIDE 10 MG: 10 TABLET ORAL at 08:06

## 2024-01-16 RX ADMIN — OXYBUTYNIN CHLORIDE 5 MG: 5 TABLET ORAL at 08:06

## 2024-01-16 RX ADMIN — OXYCODONE HYDROCHLORIDE 5 MG: 5 TABLET ORAL at 21:26

## 2024-01-16 RX ADMIN — LEVALBUTEROL HYDROCHLORIDE 1.25 MG: 1.25 SOLUTION RESPIRATORY (INHALATION) at 19:21

## 2024-01-16 RX ADMIN — ALBUTEROL SULFATE 2 PUFF: 90 AEROSOL, METERED RESPIRATORY (INHALATION) at 04:56

## 2024-01-16 RX ADMIN — OXYCODONE HYDROCHLORIDE 5 MG: 5 TABLET ORAL at 12:37

## 2024-01-16 RX ADMIN — HEPARIN SODIUM 5000 UNITS: 5000 INJECTION INTRAVENOUS; SUBCUTANEOUS at 15:32

## 2024-01-16 RX ADMIN — ONDANSETRON 4 MG: 2 INJECTION INTRAMUSCULAR; INTRAVENOUS at 18:43

## 2024-01-16 RX ADMIN — CEFTRIAXONE 1000 MG: 1 INJECTION, SOLUTION INTRAVENOUS at 15:32

## 2024-01-16 RX ADMIN — ACETAMINOPHEN 650 MG: 325 TABLET, FILM COATED ORAL at 18:43

## 2024-01-16 RX ADMIN — PHENAZOPYRIDINE 100 MG: 100 TABLET ORAL at 17:14

## 2024-01-16 RX ADMIN — LEVALBUTEROL HYDROCHLORIDE 1.25 MG: 1.25 SOLUTION RESPIRATORY (INHALATION) at 07:17

## 2024-01-16 RX ADMIN — ACETAMINOPHEN 650 MG: 325 TABLET, FILM COATED ORAL at 12:37

## 2024-01-16 RX ADMIN — PHENAZOPYRIDINE 100 MG: 100 TABLET ORAL at 08:06

## 2024-01-16 RX ADMIN — ACETAMINOPHEN 650 MG: 325 TABLET, FILM COATED ORAL at 06:42

## 2024-01-16 RX ADMIN — INSULIN LISPRO 1 UNITS: 100 INJECTION, SOLUTION INTRAVENOUS; SUBCUTANEOUS at 21:00

## 2024-01-16 RX ADMIN — IPRATROPIUM BROMIDE 0.5 MG: 0.5 SOLUTION RESPIRATORY (INHALATION) at 07:17

## 2024-01-16 RX ADMIN — INSULIN LISPRO 1 UNITS: 100 INJECTION, SOLUTION INTRAVENOUS; SUBCUTANEOUS at 17:12

## 2024-01-16 RX ADMIN — HYDROMORPHONE HYDROCHLORIDE 0.5 MG: 1 INJECTION, SOLUTION INTRAMUSCULAR; INTRAVENOUS; SUBCUTANEOUS at 17:15

## 2024-01-16 RX ADMIN — IPRATROPIUM BROMIDE 0.5 MG: 0.5 SOLUTION RESPIRATORY (INHALATION) at 19:22

## 2024-01-16 RX ADMIN — OXYBUTYNIN CHLORIDE 5 MG: 5 TABLET ORAL at 21:27

## 2024-01-16 RX ADMIN — OXYBUTYNIN CHLORIDE 5 MG: 5 TABLET ORAL at 17:14

## 2024-01-16 RX ADMIN — METOCLOPRAMIDE 10 MG: 5 INJECTION, SOLUTION INTRAMUSCULAR; INTRAVENOUS at 21:30

## 2024-01-16 RX ADMIN — LIDOCAINE 5% 1 PATCH: 700 PATCH TOPICAL at 08:08

## 2024-01-16 RX ADMIN — Medication 6 MG: at 21:27

## 2024-01-16 RX ADMIN — OXYCODONE HYDROCHLORIDE 10 MG: 10 TABLET ORAL at 03:42

## 2024-01-16 RX ADMIN — INSULIN LISPRO 1 UNITS: 100 INJECTION, SOLUTION INTRAVENOUS; SUBCUTANEOUS at 12:38

## 2024-01-16 RX ADMIN — PHENAZOPYRIDINE 100 MG: 100 TABLET ORAL at 12:38

## 2024-01-16 NOTE — ASSESSMENT & PLAN NOTE
On 1-2 liters of oxygen at home secondary to COPD but she reports using it only PRN  Not in exacerbation currently   Patient now requiring 2-3L NC O2

## 2024-01-16 NOTE — PLAN OF CARE
Problem: RESPIRATORY - ADULT  Goal: Achieves optimal ventilation and oxygenation  Description: INTERVENTIONS:  - Assess for changes in respiratory status  - Assess for changes in mentation and behavior  - Position to facilitate oxygenation and minimize respiratory effort  - Oxygen administered by appropriate delivery if ordered  - Initiate smoking cessation education as indicated  - Encourage broncho-pulmonary hygiene including cough, deep breathe, Incentive Spirometry  - Assess the need for suctioning and aspirate as needed  - Assess and instruct to report SOB or any respiratory difficulty  - Respiratory Therapy support as indicated  Outcome: Progressing     Problem: GENITOURINARY - ADULT  Goal: Maintains or returns to baseline urinary function  Description: INTERVENTIONS:  - Assess urinary function  - Encourage oral fluids to ensure adequate hydration if ordered  - Administer IV fluids as ordered to ensure adequate hydration  - Administer ordered medications as needed  - Offer frequent toileting  - Follow urinary retention protocol if ordered  Outcome: Progressing     Problem: PAIN - ADULT  Goal: Verbalizes/displays adequate comfort level or baseline comfort level  Description: Interventions:  - Encourage patient to monitor pain and request assistance  - Assess pain using appropriate pain scale  - Administer analgesics based on type and severity of pain and evaluate response  - Implement non-pharmacological measures as appropriate and evaluate response  - Consider cultural and social influences on pain and pain management  - Notify physician/advanced practitioner if interventions unsuccessful or patient reports new pain  Outcome: Progressing     Problem: INFECTION - ADULT  Goal: Absence or prevention of progression during hospitalization  Description: INTERVENTIONS:  - Assess and monitor for signs and symptoms of infection  - Monitor lab/diagnostic results  - Monitor all insertion sites, i.e. indwelling lines,  Care Coordination Rounds held 8/13/2019    Treatment team members present today include , , Charge Nurse, and nurse caring for Tanja Brock    Other care providers present:    Patient Active Problem List:     Unspecified hered of foot     Status post vascular bypass     Persistent atrial fibrillation (HCC)     Coronary artery disease involving coronary bypass graft of native heart     Unsteadiness     Weakness     Chronic systolic heart failure (HCC)     CKD (chronic kidney dise tubes, and drains  - Monitor endotracheal if appropriate and nasal secretions for changes in amount and color  - Hulen appropriate cooling/warming therapies per order  - Administer medications as ordered  - Instruct and encourage patient and family to use good hand hygiene technique  - Identify and instruct in appropriate isolation precautions for identified infection/condition  Outcome: Progressing

## 2024-01-16 NOTE — CASE MANAGEMENT
Case Management Progress Note    Patient name Nanci Navarro  Location /-01 MRN [id number]  : 1965 Date 2024       LOS (days): 2  Geometric Mean LOS (GMLOS) (days):   Days to GMLOS:        OBJECTIVE:        Current admission status: Inpatient  Preferred Pharmacy:   [facility] - [place], [place] - [address]  [address]  [address]  [facility] [address]  Phone: [phone] Fax: [phone]    [facility]/pharmacy #[id number] - [place] - [address]  [address]  [facility] [address]  Phone: [phone] Fax: [phone]    [facility] [place] - [address],  [address],  [address]ound Floor [facility] [address]  Phone: [phone] Fax: [phone]    [facility]/pharmacy #[id number] [facility] [place] - [address]  [address]  [facility] [address]  Phone: [phone] Fax: [phone]    Primary Care Provider: Renan Montero DO    Primary Insurance: DOROTHY ANN PENDING  Secondary Insurance:     PROGRESS NOTE:    Per rounding with SLIM, patient is afebrile and on IV abx. SLIM is decreasing her pain meds, and she is anticipated to return home in 24 hours. No CM needs anticipated at this time. CM department will continue to follow patient through discharge.

## 2024-01-16 NOTE — PLAN OF CARE
Problem: PAIN - ADULT  Goal: Verbalizes/displays adequate comfort level or baseline comfort level  Description: Interventions:  - Encourage patient to monitor pain and request assistance  - Assess pain using appropriate pain scale  - Administer analgesics based on type and severity of pain and evaluate response  - Implement non-pharmacological measures as appropriate and evaluate response  - Consider cultural and social influences on pain and pain management  - Notify physician/advanced practitioner if interventions unsuccessful or patient reports new pain  Outcome: Progressing     Problem: GENITOURINARY - ADULT  Goal: Maintains or returns to baseline urinary function  Description: INTERVENTIONS:  - Assess urinary function  - Encourage oral fluids to ensure adequate hydration if ordered  - Administer IV fluids as ordered to ensure adequate hydration  - Administer ordered medications as needed  - Offer frequent toileting  - Follow urinary retention protocol if ordered  Outcome: Progressing     Problem: DISCHARGE PLANNING  Goal: Discharge to home or other facility with appropriate resources  Description: INTERVENTIONS:  - Identify barriers to discharge w/patient and caregiver  - Arrange for needed discharge resources and transportation as appropriate  - Identify discharge learning needs (meds, wound care, etc.)  - Arrange for interpretive services to assist at discharge as needed  - Refer to Case Management Department for coordinating discharge planning if the patient needs post-hospital services based on physician/advanced practitioner order or complex needs related to functional status, cognitive ability, or social support system  Outcome: Progressing

## 2024-01-16 NOTE — ASSESSMENT & PLAN NOTE
Lab Results   Component Value Date    HGBA1C 6.0 (H) 12/15/2023       Recent Labs     01/15/24  1135 01/15/24  1629 01/15/24  2121 01/16/24  0725   POCGLU 162* 205* 169* 112         Blood Sugar Average: Last 72 hrs:  (P) 151.375hyperglycemic in ED  SSI/accuchecks/diabetic diet

## 2024-01-16 NOTE — QUICK NOTE
Patient with ongoing complaints of pain. Do not recommend adjusting pain medication further other than IV dilaudid increased frequency to q4h from q6h. Can trial adding pyridium and ditropan to see if this can relieve some of her urinary symptoms of pain.

## 2024-01-16 NOTE — PROGRESS NOTES
[facility]  Progress Note  Name: Nanci Navarro I  MRN: [id number]  Unit/Bed#: -Radha I Date of Admission: 1/14/2024   Date of Service: 1/16/2024 I Hospital Day: 2    Assessment/Plan   * Sepsis (HCC)  Assessment & Plan  Tachycardia, tachypnea, lactic acidosis with suspected UTI  CT AP did not show any bladder wall thickening or other signs of infection   Did show some esophagitis   Started on ceftriaxone in ED to treat possible urinary source given her dysuria and flank pain. No signs of pyelo on CTAP   UA with 4-10 WBC, occasional bacteria, neg LE, neg nitrates.  Urine culture was not sent on admission.  It has now been sent after antibiotic therapy  Received 1 liter fluid bolus, lactate normalized   BCxs NGTD  Continue ctx for empiric tx of UTI possible early pyelo? Given her L flank pain   Pain: Scheduled Tylenol, Oxy 5/10 for moderate/severe pain, lidocaine patch.   At this point I am less convinced that the patient has a urinary tract infection or pyelonephritis.  Seems more likely that her pain is musculoskeletal.  Continue current pain management regimen.  PT  If BCxs are NG at 48 hours will transition to PO abx      Electrolyte abnormality  Assessment & Plan  Hypokalemic in ED to 2.8   Received dose of Kcl in ED  Resolved with repletion     Chronic respiratory failure (HCC)  Assessment & Plan  On 1-2 liters of oxygen at home secondary to COPD but she reports using it only PRN  Not in exacerbation currently   Patient now requiring 2-3L NC O2    Diabetes (HCC)  Assessment & Plan  Lab Results   Component Value Date    HGBA1C 6.0 (H) 12/15/2023       Recent Labs     01/15/24  1135 01/15/24  1629 01/15/24  2121 01/16/24  0725   POCGLU 162* 205* 169* 112         Blood Sugar Average: Last 72 hrs:  (P) 151.375hyperglycemic in ED  SSI/accuchecks/diabetic diet           VTE Pharmacologic Prophylaxis:   Heparin    Mobility:   Basic Mobility Inpatient Raw Score: 21  JH-HLM Goal: 6: Walk 10  steps or more  JH-HLM Achieved: 6: Walk 10 steps or more  HLM Goal achieved. Continue to encourage appropriate mobility.    Patient Centered Rounds: I performed bedside rounds with nursing staff today.     Education and Discussions with Family / Patient: Updated  () via phone.    Total Time Spent on Date of Encounter in care of patient: 38 mins. This time was spent on one or more of the following: performing physical exam; counseling and coordination of care; obtaining or reviewing history; documenting in the medical record; reviewing/ordering tests, medications or procedures; communicating with other healthcare professionals and discussing with patient's family/caregivers.    Current Length of Stay: 2 day(s)  Current Patient Status: Inpatient   Certification Statement: The patient will continue to require additional inpatient hospital stay due to L flank pain  Discharge Plan: Anticipate discharge tomorrow to home.    Code Status: Level 1 - Full Code    Subjective:   C/o persistent left flank pain.    Objective:     Vitals:   Temp (24hrs), Av.7 °F (37.1 °C), Min:98 °F (36.7 °C), Max:99.3 °F (37.4 °C)    Temp:  [98 °F (36.7 °C)-99.3 °F (37.4 °C)] 99.3 °F (37.4 °C)  HR:  [] 95  Resp:  [17-18] 17  BP: (108-137)/(67-73) 124/69  SpO2:  [90 %-100 %] 90 %  Body mass index is 43.41 kg/m².     Input and Output Summary (last 24 hours):     Intake/Output Summary (Last 24 hours) at 2024 1039  Last data filed at 2024 0631  Gross per 24 hour   Intake 520 ml   Output 625 ml   Net -105 ml       Physical Exam:   Gen: NAD, AAOx3, well developed, well nourished  Eyes: EOMI, PERRLA, no scleral icterus  ENMT:  no nasal discharge, no otic discharge, moist mucous membranes  Neck:  Supple  Cardiovascular:  Regular rate and rhythm, normal S1-S2, no murmurs, rubs, or gallops  Lungs:  Clear to auscultation bilaterally, no wheezes, or rales, or rhonchi  Abdomen:  Positive bowel sounds, soft, nontender,  nondistended   Skin:  Intact, no obvious lesions or rashes, no edema  MSK: L CVA TTP  Neuro: Cranial nerves 2-12 are intact, non-focal, moves all 4 extremities      Additional Data:     Labs:  Results from last 7 days   Lab Units 01/16/24  0623   WBC Thousand/uL 6.47   HEMOGLOBIN g/dL 11.0*   HEMATOCRIT % 34.0*   PLATELETS Thousands/uL 267   NEUTROS PCT % 67   LYMPHS PCT % 19   MONOS PCT % 11   EOS PCT % 2     Results from last 7 days   Lab Units 01/16/24  0623 01/15/24  0432 01/14/24  1402   SODIUM mmol/L 139   < > 142   POTASSIUM mmol/L 4.3   < > 2.8*   CHLORIDE mmol/L 103   < > 103   CO2 mmol/L 30   < > 30   BUN mg/dL 21   < > 10   CREATININE mg/dL 0.79   < > 0.70   ANION GAP mmol/L 6   < > 9   CALCIUM mg/dL 9.4   < > 9.1   ALBUMIN g/dL  --   --  3.9   TOTAL BILIRUBIN mg/dL  --   --  0.91   ALK PHOS U/L  --   --  91   ALT U/L  --   --  23   AST U/L  --   --  10*   GLUCOSE RANDOM mg/dL 125   < > 203*    < > = values in this interval not displayed.     Results from last 7 days   Lab Units 01/14/24  1405   INR  0.97     Results from last 7 days   Lab Units 01/16/24  0725 01/15/24  2121 01/15/24  1629 01/15/24  1135 01/15/24  0738 01/14/24  2045 01/14/24  1808 01/14/24  1740   POC GLUCOSE mg/dl 112 169* 205* 162* 100 218* 139 106         Results from last 7 days   Lab Units 01/16/24  0623 01/14/24  1650 01/14/24  1402   LACTIC ACID mmol/L  --  0.8 2.6*   PROCALCITONIN ng/ml 0.06  --  0.06       Lines/Drains:  Invasive Devices       Peripheral Intravenous Line  Duration             Peripheral IV 01/14/24 Distal;Left;Ventral (anterior) Forearm 1 day                      Recent Cultures (last 7 days):   Results from last 7 days   Lab Units 01/14/24  1414 01/14/24  1405   BLOOD CULTURE  No Growth at 24 hrs. No Growth at 24 hrs.       Last 24 Hours Medication List:   Current Facility-Administered Medications   Medication Dose Route Frequency Provider Last Rate    acetaminophen  650 mg Oral Q6H Calvin Casarez MD      albuterol   2 puff Inhalation Q4H PRN Calvin Casarez MD      cefTRIAXone  1,000 mg Intravenous Q24H Daniel Chaney MD 1,000 mg (01/15/24 1410)    heparin (porcine)  5,000 Units Subcutaneous Q8H [facility] Daniel Chaney MD      HYDROmorphone  0.5 mg Intravenous Q4H PRN NII Aparicio      insulin lispro  1-6 Units Subcutaneous TID With Meals Daniel Chaney MD      insulin lispro  1-6 Units Subcutaneous HS Daniel Chaney MD      ipratropium  0.5 mg Nebulization TID Daniel Chaney MD      levalbuterol  1.25 mg Nebulization TID Daniel Chaney MD      lidocaine  1 patch Topical Daily Calvin Casarez MD      melatonin  6 mg Oral HS NII Aparicio      nicotine  1 patch Transdermal Daily Daniel Chaney MD      ondansetron  4 mg Intravenous Q6H PRN Daniel Chaney MD      oxybutynin  5 mg Oral TID NII Aparicio      oxyCODONE  10 mg Oral Q4H PRN Calvin Casarez MD      oxyCODONE  5 mg Oral Q4H PRN Calvin Casarez MD      phenazopyridine  100 mg Oral TID With Meals NII Aparicio          Today, Patient Was Seen By: Grant Alonso MD    **Please Note: This note may have been constructed using a voice recognition system.**

## 2024-01-16 NOTE — RESPIRATORY THERAPY NOTE
"RT Protocol Note  Nanci Navarro 58 y.o. female MRN: [id number]  Unit/Bed#: -01 Encounter: [id number]    Assessment    Principal Problem:    Sepsis (HCC)  Active Problems:    Diabetes (HCC)    Chronic respiratory failure (HCC)    Electrolyte abnormality  Physical Exam:   Assessment Type: Post-treatment  General Appearance: Alert, Awake  Respiratory Pattern: Dyspnea with exertion  Chest Assessment: Chest expansion symmetrical  Bilateral Breath Sounds: Diminished  O2 Device: nc    Vitals:  Blood pressure 124/69, pulse 98, temperature 98.7 °F (37.1 °C), resp. rate 17, height 4' 9\" (1.448 m), weight 91 kg (200 lb 9.9 oz), SpO2 93%.    O2 Device: nc     Plan    Respiratory Plan: Home Bronchodilator Patient pathway        Resp Comments: will cont w txs as scheduled no distress noted BBS diminished   "

## 2024-01-16 NOTE — ASSESSMENT & PLAN NOTE
Tachycardia, tachypnea, lactic acidosis with suspected UTI  CT AP did not show any bladder wall thickening or other signs of infection   Did show some esophagitis   Started on ceftriaxone in ED to treat possible urinary source given her dysuria and flank pain. No signs of pyelo on CTAP   UA with 4-10 WBC, occasional bacteria, neg LE, neg nitrates.  Urine culture was not sent on admission.  It has now been sent after antibiotic therapy  Received 1 liter fluid bolus, lactate normalized   BCxs NGTD  Continue ctx for empiric tx of UTI possible early pyelo? Given her L flank pain   Pain: Scheduled Tylenol, Oxy 5/10 for moderate/severe pain, lidocaine patch.   At this point I am less convinced that the patient has a urinary tract infection or pyelonephritis.  Seems more likely that her pain is musculoskeletal.  Continue current pain management regimen.  PT  If BCxs are NG at 48 hours will transition to PO abx

## 2024-01-17 VITALS
OXYGEN SATURATION: 88 % | HEART RATE: 85 BPM | RESPIRATION RATE: 20 BRPM | WEIGHT: 200.62 LBS | SYSTOLIC BLOOD PRESSURE: 117 MMHG | BODY MASS INDEX: 43.28 KG/M2 | HEIGHT: 57 IN | DIASTOLIC BLOOD PRESSURE: 54 MMHG | TEMPERATURE: 98.4 F

## 2024-01-17 LAB
BACTERIA UR CULT: NORMAL
GLUCOSE SERPL-MCNC: 105 MG/DL (ref 65–140)
GLUCOSE SERPL-MCNC: 269 MG/DL (ref 65–140)

## 2024-01-17 PROCEDURE — 82948 REAGENT STRIP/BLOOD GLUCOSE: CPT

## 2024-01-17 PROCEDURE — 94760 N-INVAS EAR/PLS OXIMETRY 1: CPT

## 2024-01-17 PROCEDURE — 97166 OT EVAL MOD COMPLEX 45 MIN: CPT

## 2024-01-17 PROCEDURE — 97163 PT EVAL HIGH COMPLEX 45 MIN: CPT

## 2024-01-17 PROCEDURE — 94640 AIRWAY INHALATION TREATMENT: CPT

## 2024-01-17 PROCEDURE — 94664 DEMO&/EVAL PT USE INHALER: CPT

## 2024-01-17 PROCEDURE — 99239 HOSP IP/OBS DSCHRG MGMT >30: CPT | Performed by: HOSPITALIST

## 2024-01-17 RX ORDER — GABAPENTIN 100 MG/1
100 CAPSULE ORAL 3 TIMES DAILY
Status: DISCONTINUED | OUTPATIENT
Start: 2024-01-17 | End: 2024-01-17

## 2024-01-17 RX ORDER — TIOTROPIUM BROMIDE 18 UG/1
18 CAPSULE ORAL; RESPIRATORY (INHALATION) DAILY
Qty: 30 CAPSULE | Refills: 0 | Status: ON HOLD | OUTPATIENT
Start: 2024-01-17 | End: 2024-02-16

## 2024-01-17 RX ORDER — METHOCARBAMOL 500 MG/1
500 TABLET, FILM COATED ORAL 4 TIMES DAILY PRN
Qty: 12 TABLET | Refills: 0 | Status: SHIPPED | OUTPATIENT
Start: 2024-01-17 | End: 2024-01-17

## 2024-01-17 RX ORDER — ACETAMINOPHEN 325 MG/1
650 TABLET ORAL EVERY 6 HOURS
Status: ON HOLD
Start: 2024-01-17

## 2024-01-17 RX ORDER — CEPHALEXIN 500 MG/1
500 CAPSULE ORAL EVERY 6 HOURS SCHEDULED
Qty: 4 CAPSULE | Refills: 0 | Status: SHIPPED | OUTPATIENT
Start: 2024-01-18 | End: 2024-01-17

## 2024-01-17 RX ORDER — FLUTICASONE FUROATE AND VILANTEROL 100; 25 UG/1; UG/1
1 POWDER RESPIRATORY (INHALATION) DAILY
Qty: 60 BLISTER | Refills: 0 | Status: ON HOLD | OUTPATIENT
Start: 2024-01-17 | End: 2024-02-16

## 2024-01-17 RX ORDER — TIOTROPIUM BROMIDE 18 UG/1
18 CAPSULE ORAL; RESPIRATORY (INHALATION) DAILY
Qty: 30 CAPSULE | Refills: 0 | Status: SHIPPED | OUTPATIENT
Start: 2024-01-17 | End: 2024-01-17

## 2024-01-17 RX ORDER — CEPHALEXIN 500 MG/1
500 CAPSULE ORAL EVERY 6 HOURS SCHEDULED
Qty: 4 CAPSULE | Refills: 0 | Status: ON HOLD | OUTPATIENT
Start: 2024-01-18 | End: 2024-01-19

## 2024-01-17 RX ORDER — IPRATROPIUM BROMIDE AND ALBUTEROL SULFATE 2.5; .5 MG/3ML; MG/3ML
3 SOLUTION RESPIRATORY (INHALATION) 4 TIMES DAILY
Qty: 360 ML | Refills: 0 | Status: SHIPPED | OUTPATIENT
Start: 2024-01-17 | End: 2024-01-17

## 2024-01-17 RX ORDER — METHOCARBAMOL 500 MG/1
500 TABLET, FILM COATED ORAL ONCE
Status: COMPLETED | OUTPATIENT
Start: 2024-01-17 | End: 2024-01-17

## 2024-01-17 RX ORDER — METHOCARBAMOL 500 MG/1
500 TABLET, FILM COATED ORAL 4 TIMES DAILY PRN
Qty: 12 TABLET | Refills: 0 | Status: ON HOLD | OUTPATIENT
Start: 2024-01-17

## 2024-01-17 RX ORDER — FLUTICASONE FUROATE AND VILANTEROL 100; 25 UG/1; UG/1
1 POWDER RESPIRATORY (INHALATION) DAILY
Qty: 60 BLISTER | Refills: 0 | Status: SHIPPED | OUTPATIENT
Start: 2024-01-17 | End: 2024-01-17

## 2024-01-17 RX ORDER — IPRATROPIUM BROMIDE AND ALBUTEROL SULFATE 2.5; .5 MG/3ML; MG/3ML
3 SOLUTION RESPIRATORY (INHALATION) 4 TIMES DAILY
Qty: 120 ML | Refills: 0 | Status: SHIPPED | OUTPATIENT
Start: 2024-01-17 | End: 2024-01-17

## 2024-01-17 RX ORDER — IPRATROPIUM BROMIDE AND ALBUTEROL SULFATE 2.5; .5 MG/3ML; MG/3ML
3 SOLUTION RESPIRATORY (INHALATION) 4 TIMES DAILY
Qty: 360 ML | Refills: 0 | Status: ON HOLD | OUTPATIENT
Start: 2024-01-17 | End: 2024-02-16

## 2024-01-17 RX ADMIN — METHOCARBAMOL 500 MG: 500 TABLET ORAL at 14:45

## 2024-01-17 RX ADMIN — LIDOCAINE 5% 1 PATCH: 700 PATCH TOPICAL at 08:18

## 2024-01-17 RX ADMIN — IPRATROPIUM BROMIDE 0.5 MG: 0.5 SOLUTION RESPIRATORY (INHALATION) at 07:41

## 2024-01-17 RX ADMIN — HYDROMORPHONE HYDROCHLORIDE 0.5 MG: 1 INJECTION, SOLUTION INTRAMUSCULAR; INTRAVENOUS; SUBCUTANEOUS at 00:52

## 2024-01-17 RX ADMIN — HEPARIN SODIUM 5000 UNITS: 5000 INJECTION INTRAVENOUS; SUBCUTANEOUS at 05:50

## 2024-01-17 RX ADMIN — PHENAZOPYRIDINE 100 MG: 100 TABLET ORAL at 08:18

## 2024-01-17 RX ADMIN — OXYCODONE HYDROCHLORIDE 5 MG: 5 TABLET ORAL at 04:00

## 2024-01-17 RX ADMIN — LEVALBUTEROL HYDROCHLORIDE 1.25 MG: 1.25 SOLUTION RESPIRATORY (INHALATION) at 14:46

## 2024-01-17 RX ADMIN — LEVALBUTEROL HYDROCHLORIDE 1.25 MG: 1.25 SOLUTION RESPIRATORY (INHALATION) at 07:41

## 2024-01-17 RX ADMIN — OXYBUTYNIN CHLORIDE 5 MG: 5 TABLET ORAL at 08:18

## 2024-01-17 RX ADMIN — ACETAMINOPHEN 650 MG: 325 TABLET, FILM COATED ORAL at 00:52

## 2024-01-17 RX ADMIN — IPRATROPIUM BROMIDE 0.5 MG: 0.5 SOLUTION RESPIRATORY (INHALATION) at 14:46

## 2024-01-17 RX ADMIN — ACETAMINOPHEN 650 MG: 325 TABLET, FILM COATED ORAL at 13:30

## 2024-01-17 RX ADMIN — OXYCODONE HYDROCHLORIDE 5 MG: 5 TABLET ORAL at 08:18

## 2024-01-17 RX ADMIN — INSULIN LISPRO 3 UNITS: 100 INJECTION, SOLUTION INTRAVENOUS; SUBCUTANEOUS at 13:00

## 2024-01-17 RX ADMIN — ACETAMINOPHEN 650 MG: 325 TABLET, FILM COATED ORAL at 05:50

## 2024-01-17 NOTE — ASSESSMENT & PLAN NOTE
On 1-2 liters of oxygen at home secondary to COPD but she reports using it only PRN  Not in exacerbation currently   Patient did require up to 3L NC O2.  Narcotics were likely contributing.  Recommend titrating oxygen to a pulse ox of 91 to 92%.
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
## 2024-01-17 NOTE — ASSESSMENT & PLAN NOTE
Lab Results   Component Value Date    HGBA1C 6.0 (H) 12/15/2023       Recent Labs     01/15/24  2121 01/16/24  0725 01/16/24  1057 01/16/24  1626   POCGLU 169* 112 187* 153*         Blood Sugar Average: Last 72 hrs:  (P) 155.1  Diet controlled  SSI/accuchecks/diabetic diet

## 2024-01-17 NOTE — PLAN OF CARE
Problem: RESPIRATORY - ADULT  Goal: Achieves optimal ventilation and oxygenation  Description: INTERVENTIONS:  - Assess for changes in respiratory status  - Assess for changes in mentation and behavior  - Position to facilitate oxygenation and minimize respiratory effort  - Oxygen administered by appropriate delivery if ordered  - Initiate smoking cessation education as indicated  - Encourage broncho-pulmonary hygiene including cough, deep breathe, Incentive Spirometry  - Assess the need for suctioning and aspirate as needed  - Assess and instruct to report SOB or any respiratory difficulty  - Respiratory Therapy support as indicated  Outcome: Adequate for Discharge     Problem: GENITOURINARY - ADULT  Goal: Maintains or returns to baseline urinary function  Description: INTERVENTIONS:  - Assess urinary function  - Encourage oral fluids to ensure adequate hydration if ordered  - Administer IV fluids as ordered to ensure adequate hydration  - Administer ordered medications as needed  - Offer frequent toileting  - Follow urinary retention protocol if ordered  Outcome: Adequate for Discharge     Problem: PAIN - ADULT  Goal: Verbalizes/displays adequate comfort level or baseline comfort level  Description: Interventions:  - Encourage patient to monitor pain and request assistance  - Assess pain using appropriate pain scale  - Administer analgesics based on type and severity of pain and evaluate response  - Implement non-pharmacological measures as appropriate and evaluate response  - Consider cultural and social influences on pain and pain management  - Notify physician/advanced practitioner if interventions unsuccessful or patient reports new pain  Outcome: Adequate for Discharge     Problem: INFECTION - ADULT  Goal: Absence or prevention of progression during hospitalization  Description: INTERVENTIONS:  - Assess and monitor for signs and symptoms of infection  - Monitor lab/diagnostic results  - Monitor all  insertion sites, i.e. indwelling lines, tubes, and drains  - Monitor endotracheal if appropriate and nasal secretions for changes in amount and color  - [place] appropriate cooling/warming therapies per order  - Administer medications as ordered  - Instruct and encourage patient and family to use good hand hygiene technique  - Identify and instruct in appropriate isolation precautions for identified infection/condition  Outcome: Adequate for Discharge     Problem: SAFETY ADULT  Goal: Patient will remain free of falls  Description: INTERVENTIONS:  - Educate patient/family on patient safety including physical limitations  - Instruct patient to call for assistance with activity   - Consult OT/PT to assist with strengthening/mobility   - Keep Call bell within reach  - Keep bed low and locked with side rails adjusted as appropriate  - Keep care items and personal belongings within reach  - Initiate and maintain comfort rounds  - Make Fall Risk Sign visible to staff  - Offer Toileting every 2 Hours, in advance of need  - Initiate/Maintain BED CHAIR alarm  - Obtain necessary fall risk management equipment:   - Apply yellow socks and bracelet for high fall risk patients  - Consider moving patient to room near nurses station  Outcome: Adequate for Discharge     Problem: DISCHARGE PLANNING  Goal: Discharge to home or other facility with appropriate resources  Description: INTERVENTIONS:  - Identify barriers to discharge w/patient and caregiver  - Arrange for needed discharge resources and transportation as appropriate  - Identify discharge learning needs (meds, wound care, etc.)  - Arrange for interpretive services to assist at discharge as needed  - Refer to Case Management Department for coordinating discharge planning if the patient needs post-hospital services based on physician/advanced practitioner order or complex needs related to functional status, cognitive ability, or social support system  Outcome: Adequate for  Discharge     Problem: Knowledge Deficit  Goal: Patient/family/caregiver demonstrates understanding of disease process, treatment plan, medications, and discharge instructions  Description: Complete learning assessment and assess knowledge base.  Interventions:  - Provide teaching at level of understanding  - Provide teaching via preferred learning methods  Outcome: Adequate for Discharge
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
## 2024-01-17 NOTE — RESPIRATORY THERAPY NOTE
RT Protocol Note  Nanci Navarro 58 y.o. female MRN: [id number]  Unit/Bed#: -01 Encounter: [id number]    Assessment    Principal Problem:    Sepsis (HCC)  Active Problems:    Diabetes (HCC)    Chronic respiratory failure (HCC)    Electrolyte abnormality      Home Pulmonary Medications:    Home Devices/Therapy: Home O2    Past Medical History:   Diagnosis Date    Achalasia     Back pain     Cancer (HCC)     Ovarian    Fibromyalgia     Lupus (HCC)      Social History     Socioeconomic History    Marital status: /Civil Union     Spouse name: None    Number of children: None    Years of education: None    Highest education level: None   Occupational History    None   Tobacco Use    Smoking status: Every Day     Current packs/day: 0.50     Types: Cigarettes    Smokeless tobacco: Never   Vaping Use    Vaping status: Never Used   Substance and Sexual Activity    Alcohol use: Never    Drug use: Never    Sexual activity: Yes   Other Topics Concern    None   Social History Narrative    None     Social Determinants of Health     Financial Resource Strain: Unknown (12/23/2023)    Received from [facility]    Overall Financial Resource Strain (CARDIA)     Difficulty of Paying Living Expenses: Patient refused   Food Insecurity: Food Insecurity Present (1/15/2024)    Hunger Vital Sign     Worried About Running Out of Food in the Last Year: Sometimes true     Ran Out of Food in the Last Year: Sometimes true   Transportation Needs: No Transportation Needs (1/15/2024)    PRAPARE - Transportation     Lack of Transportation (Medical): No     Lack of Transportation (Non-Medical): No   Physical Activity: Not on file   Stress: Not on file   Social Connections: Not on file   Intimate Partner Violence: Unknown (12/23/2023)    Received from [facility]    Humiliation, Afraid, Rape, and Kick questionnaire     Fear of Current or Ex-Partner: Patient refused     Emotionally Abused: Patient refused  "    Physically Abused: Patient refused     Sexually Abused: Patient refused   Housing Stability: Low Risk  (1/15/2024)    Housing Stability Vital Sign     Unable to Pay for Housing in the Last Year: No     Number of Places Lived in the Last Year: 1     Unstable Housing in the Last Year: No       Subjective         Objective    Physical Exam:        Vitals:  Blood pressure 117/54, pulse 85, temperature 98.4 °F (36.9 °C), resp. rate 20, height 4' 9\" (1.448 m), weight 91 kg (200 lb 9.9 oz), SpO2 93%.          Imaging and other studies: I have personally reviewed pertinent reports.      O2 Device: NC     Plan    Respiratory Plan: Home Bronchodilator Patient pathway        Resp Comments: will continue with tid xop/atr   "

## 2024-01-17 NOTE — RESPIRATORY THERAPY NOTE
RT Protocol Note  Nanci Navarro 58 y.o. female MRN: [id number]  Unit/Bed#: -01 Encounter: [id number]    Assessment    Principal Problem:    Sepsis (HCC)  Active Problems:    Diabetes (HCC)    Chronic respiratory failure (HCC)    Electrolyte abnormality      Home Pulmonary Medications:     01/16/24 1922   Respiratory Protocol   Protocol Initiated? No   Protocol Selection Respiratory   Language Barrier? No   Medical & Social History Reviewed? Yes   Diagnostic Studies Reviewed? Yes   Physical Assessment Performed? Yes   Home Devices/Therapy Home O2   Respiratory Plan Home Bronchodilator Patient pathway   Respiratory Assessment   Assessment Type During-treatment   General Appearance Alert;Awake   Respiratory Pattern Dyspnea with exertion   Chest Assessment Chest expansion symmetrical   Bilateral Breath Sounds Diminished   Location Specific No   Cough None   Resp Comments Will continue with current tx plan   O2 Device NC   Cough Description   Sputum Amount None   Additional Assessments   Pulse (!) 112   Respirations 20   SpO2 94 %       Home Devices/Therapy: Home O2    Past Medical History:   Diagnosis Date    Achalasia     Back pain     Cancer (HCC)     Ovarian    Fibromyalgia     Lupus (HCC)      Social History     Socioeconomic History    Marital status: /Civil Union     Spouse name: None    Number of children: None    Years of education: None    Highest education level: None   Occupational History    None   Tobacco Use    Smoking status: Every Day     Current packs/day: 0.50     Types: Cigarettes    Smokeless tobacco: Never   Vaping Use    Vaping status: Never Used   Substance and Sexual Activity    Alcohol use: Never    Drug use: Never    Sexual activity: Yes   Other Topics Concern    None   Social History Narrative    None     Social Determinants of Health     Financial Resource Strain: Unknown (12/23/2023)    Received from [facility]    Overall Financial Resource Strain (CARDIA)     " Difficulty of Paying Living Expenses: Patient refused   Food Insecurity: Food Insecurity Present (1/15/2024)    Hunger Vital Sign     Worried About Running Out of Food in the Last Year: Sometimes true     Ran Out of Food in the Last Year: Sometimes true   Transportation Needs: No Transportation Needs (1/15/2024)    PRAPARE - Transportation     Lack of Transportation (Medical): No     Lack of Transportation (Non-Medical): No   Physical Activity: Not on file   Stress: Not on file   Social Connections: Not on file   Intimate Partner Violence: Unknown (12/23/2023)    Received from [facility]    Humiliation, Afraid, Rape, and Kick questionnaire     Fear of Current or Ex-Partner: Patient refused     Emotionally Abused: Patient refused     Physically Abused: Patient refused     Sexually Abused: Patient refused   Housing Stability: Low Risk  (1/15/2024)    Housing Stability Vital Sign     Unable to Pay for Housing in the Last Year: No     Number of Places Lived in the Last Year: 1     Unstable Housing in the Last Year: No       Subjective         Objective    Physical Exam:   Assessment Type: During-treatment  General Appearance: Alert, Awake  Respiratory Pattern: Dyspnea with exertion  Chest Assessment: Chest expansion symmetrical  Bilateral Breath Sounds: Diminished  Location Specific: No  Cough: None  O2 Device: NC    Vitals:  Blood pressure 121/67, pulse (!) 112, temperature 98.9 °F (37.2 °C), resp. rate 20, height 4' 9\" (1.448 m), weight 91 kg (200 lb 9.9 oz), SpO2 94%.          Imaging and other studies: I have personally reviewed pertinent reports.      O2 Device: NC     Plan    Respiratory Plan: Home Bronchodilator Patient pathway        Resp Comments: Will continue with current tx plan   "

## 2024-01-17 NOTE — ASSESSMENT & PLAN NOTE
Tachycardia, tachypnea, lactic acidosis with suspected UTI on admission  CT AP did not show any bladder wall thickening or other signs of infection   Did show some esophagitis   Started on ceftriaxone in ED to treat possible urinary source given her dysuria and flank pain. No signs of pyelo on CTAP   UA with 4-10 WBC, occasional bacteria, neg LE, neg nitrates.    Received 1 liter fluid bolus, lactate normalized   Patient was afebrile without leukocytosis throughout hospitalization  BCxs NG x 48  UCx with mixed contaminants  The patient received 4 days of Rocephin while hospitalized and will complete 5 days total of antibiotic therapy with Keflex on discharge.  Pain: Scheduled Tylenol, lidocaine patch.  Also received OxyContin (down titrated) and IV Dilaudid as needed. Will not be discharging on narcotic therapy as risk rightly outweighs benefit.  At this point it is much less likely that the patient has an acute urinary tract infection or pyelonephritis.  Seems more likely that her pain is musculoskeletal.  Patient was offered Neurontin but states she had a rash in the past when given Neurontin by pain management.

## 2024-01-17 NOTE — PLAN OF CARE
Problem: OCCUPATIONAL THERAPY ADULT  Goal: Performs self-care activities at highest level of function for planned discharge setting.  See evaluation for individualized goals.  Description: Treatment Interventions: ADL retraining, UE strengthening/ROM, Functional transfer training, Endurance training, Patient/family training, Compensatory technique education, Activityengagement          See flowsheet documentation for full assessment, interventions and recommendations.   Note: Limitation: Decreased ADL status, Decreased UE strength, Decreased endurance, Decreased self-care trans, Decreased high-level ADLs  Prognosis: Good  Assessment: Patient is a 58 y.o. female seen for OT evaluation s/p admit to [facility]  on 1/14/2024 w/Sepsis (HCC). Commorbidities affecting patient's functional performance at time of assessment include: diabetes, chronic respiratory failure, and electrolyte abnormality. Orders placed for OT evaluation and treatment and activity as tolerated . Performed at least two patient identifiers during session including name and wristband.  Prior to admission, Patient reporting being independent with ADLs/IADLs, ambulatory with no AD, and lives with  in [facility]. Personal factors affecting patient at time of initial evaluation include: steps to enter, difficulty performing ADLs, and difficulty performing IADLs. Upon evaluation, patient requires supervision and minimal  assist for UB ADLs, minimal  and moderate assist for LB ADLs, transfers and functional ambulation in room and bathroom with contact guard assist, with the use of  no AD .  Patient is oriented x 4.  Occupational performance is affected by the following deficits: decreased muscle strength, dynamic sit/ stand balance deficit with poor standing tolerance time for self care and functional mobility, decreased activity tolerance, increased pain, and delayed righting and equilibrium reactions. Patient to benefit from continued  Occupational Therapy treatment while in the hospital to address deficits as defined above and maximize level of functional independence with ADLs and functional mobility. Occupational Performance areas to address include: grooming , bathing/ shower, dressing, toilet hygiene, transfer to all surfaces, functional ambulation, medication routine/ management, IADLS: Household maintenance, IADLs: safety procedures, and IADLs: meal prep/ clean up. From OT standpoint, recommendation at time of d/c would be Level III (minimim resource intensity).     Rehab Resource Intensity Level, OT: III (Minimum Resource Intensity)        Homa RAMOS, OTR/L

## 2024-01-17 NOTE — PLAN OF CARE
Problem: PHYSICAL THERAPY ADULT  Goal: Performs mobility at highest level of function for planned discharge setting.  See evaluation for individualized goals.  Description: Treatment/Interventions: Functional transfer training, Elevations, Therapeutic exercise, Endurance training, Bed mobility, Gait training, OT  Equipment Recommended:  (recommend RW vs SPC trial; pt declined trial this date)       See flowsheet documentation for full assessment, interventions and recommendations.  Note: Prognosis: Fair  Problem List: Decreased strength, Decreased endurance, Impaired balance, Decreased mobility, Pain, Decreased safety awareness  Assessment: Pt is 58 y.o. female seen for high-complexity PT evaluation on 1/17/2024 s/p admit to [facility] on 1/14/2024 w/ Sepsis (HCC). PT was consulted to assess pt's functional mobility and d/c needs. Order placed for PT eval and tx, w/ up and OOB as tolerated order. PTA, pt was living with her spouse in a camper with 2 LISA and reports independence at baseline with ADLs, IADLs, and functional mobility without AD. At time of eval, patient required [place] for sup <> sit, CGA for STS and ambulation 10' without AD; however, pt was furniture walking in the room and PT would recommend trial with RW and SPC to increase balance and independence, pt declined this date. Upon evaluation, pt presenting with impaired functional mobility d/t decreased strength, decreased endurance, impaired balance, decreased mobility, decreased safety awareness, pain, and activity intolerance. Pertinent PMHx and current co-morbidities affecting pt's physical performance at time of assessment include: sepsis, electrolyte abnormality, chronic respiratory failure, diabetes. Personal factors affecting pt at time of eval include: stairs to enter home, positive fall history, and compliance. The following objective measures performed on IE also reveal limitations: AM-PAC 6-Clicks: 19/24. Pt's clinical  presentation is currently unstable/unpredictable seen in pt's presentation of abnormal lab value(s), need for input for task focus and mobility technique, tolerance to only 10 feet of ambulation, abdominal pain impacting overall mobility status, and ongoing medical assessment. Overall, pt's rehab potential and prognosis to return to PLOF is good as impacted by objective findings, warranting pt to receive further skilled PT interventions to address impairments, activity limitations, and participation restrictions. Pt to benefit from continued PT services to address deficits as defined above and maximize level of functional independent mobility and consistency. From PT/mobility standpoint, recommendation at time of d/c would be level 3, minimal resource intensity in order to facilitate return to PLOF.        Rehab Resource Intensity Level, PT: III (Minimum Resource Intensity)    See flowsheet documentation for full assessment.      Ernestian Cody; PT, DPT

## 2024-01-17 NOTE — DISCHARGE SUMMARY
[facility]  Discharge- Nanci Navarro 1965, 58 y.o. female MRN: [id number]  Unit/Bed#: MS [facility] Encounter: [id number]  Primary Care Provider: Renan Montero,    Date and time admitted to hospital: 1/14/2024  1:16 PM    * Sepsis ([facility])  Assessment & Plan  Tachycardia, tachypnea, lactic acidosis with suspected UTI on admission  CT AP did not show any bladder wall thickening or other signs of infection   Did show some esophagitis   Started on ceftriaxone in ED to treat possible urinary source given her dysuria and flank pain. No signs of pyelo on CTAP   UA with 4-10 WBC, occasional bacteria, neg LE, neg nitrates.    Received 1 liter fluid bolus, lactate normalized   Patient was afebrile without leukocytosis throughout hospitalization  BCxs NG x 48  UCx with mixed contaminants  The patient received 4 days of Rocephin while hospitalized and will complete 5 days total of antibiotic therapy with Keflex on discharge.  Pain: Scheduled Tylenol, lidocaine patch.  Also received OxyContin (down titrated) and IV Dilaudid as needed. Will not be discharging on narcotic therapy as risk rightly outweighs benefit.  At this point it is much less likely that the patient has an acute urinary tract infection or pyelonephritis.  Seems more likely that her pain is musculoskeletal.  Patient was offered Neurontin but states she had a rash in the past when given Neurontin by pain management.      Electrolyte abnormality  Assessment & Plan  -Hypokalemia resolved with repletion    Chronic respiratory failure ([facility])  Assessment & Plan  On 1-2 liters of oxygen at home secondary to COPD but she reports using it only PRN  Not in exacerbation currently   Patient did require up to 3L NC O2.  Narcotics were likely contributing.  Recommend titrating oxygen to a pulse ox of 91 to 92%.    Diabetes ([facility])  Assessment & Plan  Lab Results   Component Value Date    HGBA1C 6.0 (H) 12/15/2023       Recent Labs      01/15/24  2121 01/16/24  0725 01/16/24  1057 01/16/24  1626   POCGLU 169* 112 187* 153*         Blood Sugar Average: Last 72 hrs:  (P) 155.1  Diet controlled  SSI/accuchecks/diabetic diet      Medical Problems       Resolved Problems  Date Reviewed: 1/17/2024   None       Discharging Physician / Practitioner: Grant Alonso MD  PCP: Renan Montero,   Admission Date:   Admission Orders (From admission, onward)       Ordered        01/14/24 1654  INPATIENT ADMISSION  Once                          Discharge Date: 01/17/24    Consultations During Hospital Stay:  None    Procedures Performed:   None    Significant Findings / Test Results:   CT C/A/P: No acute disease in the lungs. Stable thickening of the esophageal wall consistent with esophagitis. No acute inflammatory process identified in the abdomen or pelvis. Stable hepatomegaly.     Incidental Findings:   None    Test Results Pending at Discharge (will require follow up):   None     Outpatient Tests Requested:  None    Complications:  None    Reason for Admission: Fever, chills, left flank pain    Hospital Course:   Nanci Navarro is a 58 y.o. female patient who originally presented to the hospital on 1/14/2024 due to fevers, chills, left flank pain as outlined in the H&P done on admission.  Hospital course per problem list:    Please see above list of diagnoses and related plan for additional information.     Condition at Discharge: good    Discharge Day Visit / Exam:   * Please refer to separate progress note for these details *      Discharge instructions/Information to patient and family:   See after visit summary for information provided to patient and family.      Provisions for Follow-Up Care:  See after visit summary for information related to follow-up care and any pertinent home health orders.       Disposition:   Home     Discharge Statement:  I spent 35 minutes discharging the patient. This time was spent on the day of discharge. I had  direct contact with the patient on the day of discharge. Greater than 50% of the total time was spent examining patient, answering all patient questions, arranging and discussing plan of care with patient as well as directly providing post-discharge instructions.  Additional time then spent on discharge activities.    Discharge Medications:  See after visit summary for reconciled discharge medications provided to patient and/or family.      **Please Note: This note may have been constructed using a voice recognition system**

## 2024-01-17 NOTE — CASE MANAGEMENT
Case Management Discharge Planning Note    Patient name Nanci Navarro  Location /-01 MRN [id number]  : 1965 Date 2024       Current Admission Date: 2024  Current Admission Diagnosis:Sepsis (HCC)   Patient Active Problem List    Diagnosis Date Noted    Diabetes (HCC) 2024    Chronic respiratory failure (HCC) 2024    Electrolyte abnormality 2024    Osteoporosis 2023    Gastroesophageal reflux disease without esophagitis 2023    Chest pain 01/15/2023    Acute respiratory failure (HCC) 01/15/2023    Volume overload 01/15/2023    Ambulatory dysfunction 2023    Compression fracture of L1 vertebra (HCC) 2023    COPD exacerbation ([facility]) 2023    Sepsis ([facility]) 2023    Fall 2023    Systemic lupus erythematosus with lung involvement ([facility]) 12/10/2022    Respiratory failure with hypoxia ([facility]) 10/06/2022    Hypokalemia 10/04/2022    Chest pain, unspecified 2022    Chronic obstructive pulmonary disease with acute exacerbation ([facility]) 2022    Smoker 2022      LOS (days): 3  Geometric Mean LOS (GMLOS) (days):   Days to GMLOS:     OBJECTIVE:  Risk of Unplanned Readmission Score: 18.81         Current admission status: Inpatient   Preferred Pharmacy:   [facility] - [address]  [address]  [facility] [address]  Phone: [phone] Fax: [phone]    [id number]  [facility]O - [address]  [address] [place] [address]  Phone: [phone] Fax: [phone]    [facility]  [place] - [address] [facility],  [address] [facility],  Ground Floor [facility] [address]  Phone: [phone] Fax: [phone]    [id number] - [place] - [address]  [address]  [facility] [address]  Phone: [phone] Fax: [phone]    [facility]  D[place]HY MCHUGH - [address]  [address]  [place] [address]  Phone: [phone] Fax: [phone]    Primary Care Provider: Renan Montero DO    Primary Insurance: DOROTHY ANN PENDING  Secondary Insurance:     DISCHARGE DETAILS:     CM met with patient at bedside and patient stated she cannot afford her medications. CM informed  of  Vangie who informed CM that medications would be provided to patient.  of case management was informed total price provided to CM by [employer] was 977. 31. Patient inform CM will call to let pt know meds were delivered. CM informed by pt that  will come to pick it up.     CM faxed financial assistance form to [facility] [phone].

## 2024-01-17 NOTE — NURSING NOTE
PT A&Ox4 with no outward signs of pain or acute distress noted. Primary nurse observed pt noding off while sitting upright in bed and noted Pox desaturating into the low 80% S/P oxy 5mg administered as per MD orders for moderate pain. SLIM aware and in agreement for patient safety concern.  Nurse administered Tylenol and Robaxin as per MD orders for subsequent c/o pain.  Pox maintained at 97% on 2L/min O2 via NC (chronic). Pt resting comfortably in bed with call bell within reach

## 2024-01-17 NOTE — PHYSICAL THERAPY NOTE
"PHYSICAL THERAPY EVALUATION  NAME:  Nanci Navarro  DATE: 01/17/24    AGE:   58 y.o.  Mrn:   [id number]  ADMIT DX:  Hypokalemia [E87.6]  SOB (shortness of breath) [R06.02]  Pyelonephritis [N12]  Flank pain [R10.9]  Problem List:   Patient Active Problem List   Diagnosis    Chest pain, unspecified    Chronic obstructive pulmonary disease with acute exacerbation (HCC)    Smoker    Hypokalemia    Respiratory failure with hypoxia (HCC)    Compression fracture of L1 vertebra (HCC)    COPD exacerbation (HCC)    Sepsis (HCC)    Fall    Systemic lupus erythematosus with lung involvement (HCC)    Ambulatory dysfunction    Chest pain    Acute respiratory failure (HCC)    Volume overload    Gastroesophageal reflux disease without esophagitis    Osteoporosis    Diabetes (HCC)    Chronic respiratory failure (HCC)    Electrolyte abnormality       Past Medical History  Past Medical History:   Diagnosis Date    Achalasia     Back pain     Cancer (HCC)     Ovarian    Fibromyalgia     Lupus (HCC)        Past Surgical History  Past Surgical History:   Procedure Laterality Date    HYSTERECTOMY      NECK SURGERY         Length Of Stay: 3  Performed at least 2 patient identifiers during session: Name and Birthday       01/17/24 0933   PT Last Visit   PT Visit Date 01/17/24   Note Type   Note type Evaluation   Pain Assessment   Pain Assessment Tool 0-10   Pain Score 8   Pain Location/Orientation Orientation: Left;Orientation: Lower  (flank)   Pain Onset/Description Onset: Ongoing;Frequency: Constant/Continuous   Patient's Stated Pain Goal No pain   Hospital Pain Intervention(s) Ambulation/increased activity;Repositioned;Medication (See MAR)   Multiple Pain Sites Yes   Pain 2   Pain Score 2 9   Pain Location/Orientation 2   (headache)   Pain Onset/Description 2   (\"on again off again\")   Hospital Pain Intervention(s) 2 Ambulation/increased activity;Repositioned;Medication (See MAR)   Pain 3   Pain Score 3 6   Pain Location/Orientation 3 " Location: Chest   Pain Onset/Description 3 Frequency: Intermittent   Restrictions/Precautions   Weight Bearing Precautions Per Order No   Other Precautions Fall Risk;Pain;Multiple lines;O2;Chair Alarm;Bed Alarm  (3LO2 via nc currently. Pt does not use O2 at home)   Home Living   Type of Home Other (Comment)  (yuliana)   Home Layout One level;Stairs to enter with rails;Performs ADLs on one level;Able to live on main level with bedroom/bathroom  (2 LISA)   Bathroom Shower/Tub Tub/shower unit  (at a friends house)   Bathroom Toilet Standard   Bathroom Equipment   (none per pt)   Bathroom Accessibility Accessible   Home Equipment Walker   Additional Comments pt reports no AD used at baseline   Prior Function   Level of Darke Independent with ADLs;Independent with functional mobility;Independent with IADLS   Lives With Spouse   Receives Help From Family   IADLs Independent with meal prep;Independent with driving;Independent with medication management   Falls in the last 6 months 1 to 4  (2 per pt)   General   Family/Caregiver Present No   Cognition   Overall Cognitive Status WFL   Arousal/Participation Alert   Attention Within functional limits   Orientation Level Oriented X4   Memory Within functional limits   Following Commands Follows all commands and directions without difficulty   Comments Pt agreeable to PT evaluation   RLE Assessment   RLE Assessment X   Strength RLE   RLE Overall Strength 4-/5  (grossly assesed during functional mobility)   LLE Assessment   LLE Assessment X   Strength LLE   LLE Overall Strength 4-/5  (grossly assesed during functional mobility)   Vision-Basic Assessment   Current Vision   (wears glassess all the time; however, pt does not currently know where they are)   Coordination   Sensation WFL   Light Touch   RLE Light Touch Grossly intact   LLE Light Touch Grossly intact   Bed Mobility   Supine to Sit 4  Minimal assistance   Additional items Assist x 1;HOB elevated;Increased time  required;Verbal cues   Sit to Supine 4  Minimal assistance   Additional items Assist x 1;HOB elevated;Increased time required;Verbal cues   Additional Comments pt without complaints of lightheadedness, SOB, chest pain, dizziness throughout session   Transfers   Sit to Stand   (CGA)   Additional items Assist x 1;Verbal cues   Stand to Sit   (CGA)   Additional items Assist x 1;Verbal cues   Stand pivot   (CGA)   Additional items Assist x 1;Verbal cues   Additional Comments no AD used during transfers   Ambulation/Elevation   Gait pattern Improper Weight shift;Decreased foot clearance;Narrow BRAXTON;Decreased hip extension;Decreased heel strike   Gait Assistance   (CGA)   Additional items Assist x 1;Verbal cues   Assistive Device None   Distance 10'   Stair Management Assistance Not tested   Ambulation/Elevation Additional Comments Spo2 desaturation to 84% during ambulation with increase to 92% with seated rest and pursed lip breathing after ~2 min static break   Balance   Static Sitting Fair +   Dynamic Sitting Fair   Static Standing Fair   Dynamic Standing Fair -   Ambulatory Fair -   Activity Tolerance   Activity Tolerance Patient limited by pain   Medical Staff Made Aware Pt seen as a co-eval with OT Homa due to the patient's co-morbidities, clinical presentation, and present impairments which are a regression from the patient's baseline.   Assessment   Prognosis Fair   Problem List Decreased strength;Decreased endurance;Impaired balance;Decreased mobility;Pain;Decreased safety awareness   Assessment Pt is 58 y.o. female seen for high-complexity PT evaluation on 1/17/2024 s/p admit to [facility] on 1/14/2024 w/ Sepsis (HCC). PT was consulted to assess pt's functional mobility and d/c needs. Order placed for PT eval and tx, w/ up and OOB as tolerated order. PTA, pt was living with her spouse in a camper with 2 LISA and reports independence at baseline with ADLs, IADLs, and functional mobility without AD.  At time of eval, patient required Corbin for sup <> sit, CGA for STS and ambulation 10' without AD; however, pt was furniture walking in the room and PT would recommend trial with RW and SPC to increase balance and independence, pt declined this date. Upon evaluation, pt presenting with impaired functional mobility d/t decreased strength, decreased endurance, impaired balance, decreased mobility, decreased safety awareness, pain, and activity intolerance. Pertinent PMHx and current co-morbidities affecting pt's physical performance at time of assessment include: sepsis, electrolyte abnormality, chronic respiratory failure, diabetes. Personal factors affecting pt at time of eval include: stairs to enter home, positive fall history, and compliance. The following objective measures performed on IE also reveal limitations: AM-PAC 6-Clicks: 19/24. Pt's clinical presentation is currently unstable/unpredictable seen in pt's presentation of abnormal lab value(s), need for input for task focus and mobility technique, tolerance to only 10 feet of ambulation, abdominal pain impacting overall mobility status, and ongoing medical assessment. Overall, pt's rehab potential and prognosis to return to PLOF is good as impacted by objective findings, warranting pt to receive further skilled PT interventions to address impairments, activity limitations, and participation restrictions. Pt to benefit from continued PT services to address deficits as defined above and maximize level of functional independent mobility and consistency. From PT/mobility standpoint, recommendation at time of d/c would be level 3, minimal resource intensity in order to facilitate return to PLOF.   Goals   Patient Goals to have less pain   STG Expiration Date 01/27/24   Short Term Goal #1 In 10 days: Perform all bed mobility tasks modified independent to decrease caregiver burden, Perform all transfers modified independent to improve independence, Ambulate > 60  ft. with least restrictive assistive device modified independent w/o LOB and w/ normalized gait pattern 100% of the time, Navigate 2 stairs modified independent with unilateral handrail to facilitate return to previous living environment, and Increase all balance 1/2 grade to decrease risk for falls   PT Treatment Day 0   Plan   Treatment/Interventions Functional transfer training;Elevations;Therapeutic exercise;Endurance training;Bed mobility;Gait training;OT   PT Frequency 2-3x/wk   Discharge Recommendation   Rehab Resource Intensity Level, PT III (Minimum Resource Intensity)   Equipment Recommended   (recommend RW vs SPC trial; pt declined trial this date)   AM-PAC Basic Mobility Inpatient   Turning in Flat Bed Without Bedrails 4   Lying on Back to Sitting on Edge of Flat Bed Without Bedrails 3   Moving Bed to Chair 3   Standing Up From Chair Using Arms 3   Walk in Room 3   Climb 3-5 Stairs With Railing 3   Basic Mobility Inpatient Raw Score 19   Basic Mobility Standardized Score 42.48   Highest Level Of Mobility   JH-HLM Goal 6: Walk 10 steps or more   JH-HLM Achieved 6: Walk 10 steps or more       Time In: 0933  Time Out: 0951  Total Evaluation Minutes: 18    Ernestina Cody, PT

## 2024-01-18 ENCOUNTER — APPOINTMENT (EMERGENCY)
Dept: CT IMAGING | Facility: HOSPITAL | Age: 59
DRG: 140 | End: 2024-01-18
Payer: COMMERCIAL

## 2024-01-18 ENCOUNTER — APPOINTMENT (EMERGENCY)
Dept: RADIOLOGY | Facility: HOSPITAL | Age: 59
DRG: 140 | End: 2024-01-18
Payer: COMMERCIAL

## 2024-01-18 ENCOUNTER — HOSPITAL ENCOUNTER (INPATIENT)
Facility: HOSPITAL | Age: 59
LOS: 19 days | Discharge: HOME | DRG: 140 | End: 2024-02-07
Attending: EMERGENCY MEDICINE | Admitting: INTERNAL MEDICINE
Payer: COMMERCIAL

## 2024-01-18 DIAGNOSIS — J96.01 ACUTE RESPIRATORY FAILURE WITH HYPOXIA (HCC): ICD-10-CM

## 2024-01-18 DIAGNOSIS — E87.70 HYPERVOLEMIA, UNSPECIFIED HYPERVOLEMIA TYPE: ICD-10-CM

## 2024-01-18 DIAGNOSIS — Z02.9 DISCHARGE PLANNING ISSUES: ICD-10-CM

## 2024-01-18 DIAGNOSIS — R46.89 NON-COMPLIANT BEHAVIOR: ICD-10-CM

## 2024-01-18 DIAGNOSIS — R06.02 SOB (SHORTNESS OF BREATH): Primary | ICD-10-CM

## 2024-01-18 DIAGNOSIS — D64.9 ANEMIA, UNSPECIFIED TYPE: ICD-10-CM

## 2024-01-18 DIAGNOSIS — R10.13 EPIGASTRIC PAIN: ICD-10-CM

## 2024-01-18 DIAGNOSIS — J96.11 CHRONIC RESPIRATORY FAILURE WITH HYPOXIA (HCC): ICD-10-CM

## 2024-01-18 DIAGNOSIS — G89.29 CHRONIC MIDLINE LOW BACK PAIN, UNSPECIFIED WHETHER SCIATICA PRESENT: ICD-10-CM

## 2024-01-18 DIAGNOSIS — F17.200 SMOKER: ICD-10-CM

## 2024-01-18 DIAGNOSIS — J44.1 COPD EXACERBATION (HCC): ICD-10-CM

## 2024-01-18 DIAGNOSIS — M54.50 CHRONIC MIDLINE LOW BACK PAIN, UNSPECIFIED WHETHER SCIATICA PRESENT: ICD-10-CM

## 2024-01-18 DIAGNOSIS — E11.9 TYPE 2 DIABETES MELLITUS WITHOUT COMPLICATION, WITHOUT LONG-TERM CURRENT USE OF INSULIN (HCC): ICD-10-CM

## 2024-01-18 DIAGNOSIS — J96.21 ACUTE ON CHRONIC RESPIRATORY FAILURE WITH HYPOXIA (HCC): ICD-10-CM

## 2024-01-18 DIAGNOSIS — K20.90 ESOPHAGITIS: ICD-10-CM

## 2024-01-18 DIAGNOSIS — K21.9 GASTROESOPHAGEAL REFLUX DISEASE WITHOUT ESOPHAGITIS: ICD-10-CM

## 2024-01-18 LAB
2HR DELTA HS TROPONIN: 1 NG/L
4HR DELTA HS TROPONIN: 0 NG/L
ALBUMIN SERPL BCP-MCNC: 3.5 G/DL (ref 3.5–5)
ALP SERPL-CCNC: 82 U/L (ref 34–104)
ALT SERPL W P-5'-P-CCNC: 18 U/L (ref 7–52)
ANION GAP SERPL CALCULATED.3IONS-SCNC: 7 MMOL/L
APTT PPP: 33 SECONDS (ref 23–37)
AST SERPL W P-5'-P-CCNC: 12 U/L (ref 13–39)
BASE EX.OXY STD BLDV CALC-SCNC: 72.7 % (ref 60–80)
BASE EXCESS BLDV CALC-SCNC: 7.2 MMOL/L
BASOPHILS # BLD AUTO: 0.02 THOUSANDS/ÂΜL (ref 0–0.1)
BASOPHILS NFR BLD AUTO: 0 % (ref 0–1)
BILIRUB SERPL-MCNC: 0.46 MG/DL (ref 0.2–1)
BNP SERPL-MCNC: 65 PG/ML (ref 0–100)
BUN SERPL-MCNC: 9 MG/DL (ref 5–25)
CALCIUM SERPL-MCNC: 8.8 MG/DL (ref 8.4–10.2)
CARDIAC TROPONIN I PNL SERPL HS: 5 NG/L
CARDIAC TROPONIN I PNL SERPL HS: 5 NG/L
CARDIAC TROPONIN I PNL SERPL HS: 6 NG/L
CHLORIDE SERPL-SCNC: 103 MMOL/L (ref 96–108)
CO2 SERPL-SCNC: 31 MMOL/L (ref 21–32)
CREAT SERPL-MCNC: 0.55 MG/DL (ref 0.6–1.3)
EOSINOPHIL # BLD AUTO: 0.07 THOUSAND/ÂΜL (ref 0–0.61)
EOSINOPHIL NFR BLD AUTO: 1 % (ref 0–6)
ERYTHROCYTE [DISTWIDTH] IN BLOOD BY AUTOMATED COUNT: 13.7 % (ref 11.6–15.1)
FLUAV RNA RESP QL NAA+PROBE: NEGATIVE
FLUBV RNA RESP QL NAA+PROBE: NEGATIVE
GFR SERPL CREATININE-BSD FRML MDRD: 103 ML/MIN/1.73SQ M
GLUCOSE SERPL-MCNC: 173 MG/DL (ref 65–140)
HCO3 BLDV-SCNC: 32 MMOL/L (ref 24–30)
HCT VFR BLD AUTO: 34.8 % (ref 34.8–46.1)
HGB BLD-MCNC: 11.3 G/DL (ref 11.5–15.4)
IMM GRANULOCYTES # BLD AUTO: 0.05 THOUSAND/UL (ref 0–0.2)
IMM GRANULOCYTES NFR BLD AUTO: 1 % (ref 0–2)
INR PPP: 0.99 (ref 0.84–1.19)
LACTATE SERPL-SCNC: 1 MMOL/L (ref 0.5–2)
LIPASE SERPL-CCNC: 7 U/L (ref 11–82)
LYMPHOCYTES # BLD AUTO: 0.87 THOUSANDS/ÂΜL (ref 0.6–4.47)
LYMPHOCYTES NFR BLD AUTO: 14 % (ref 14–44)
MCH RBC QN AUTO: 29.8 PG (ref 26.8–34.3)
MCHC RBC AUTO-ENTMCNC: 32.5 G/DL (ref 31.4–37.4)
MCV RBC AUTO: 92 FL (ref 82–98)
MONOCYTES # BLD AUTO: 0.44 THOUSAND/ÂΜL (ref 0.17–1.22)
MONOCYTES NFR BLD AUTO: 7 % (ref 4–12)
NEUTROPHILS # BLD AUTO: 4.65 THOUSANDS/ÂΜL (ref 1.85–7.62)
NEUTS SEG NFR BLD AUTO: 77 % (ref 43–75)
NRBC BLD AUTO-RTO: 0 /100 WBCS
O2 CT BLDV-SCNC: 12 ML/DL
PCO2 BLDV: 46.3 MM HG (ref 42–50)
PH BLDV: 7.46 [PH] (ref 7.3–7.4)
PLATELET # BLD AUTO: 324 THOUSANDS/UL (ref 149–390)
PMV BLD AUTO: 8.8 FL (ref 8.9–12.7)
PO2 BLDV: 37.6 MM HG (ref 35–45)
POTASSIUM SERPL-SCNC: 3.3 MMOL/L (ref 3.5–5.3)
PROCALCITONIN SERPL-MCNC: 0.06 NG/ML
PROT SERPL-MCNC: 6.6 G/DL (ref 6.4–8.4)
PROTHROMBIN TIME: 13.7 SECONDS (ref 11.6–14.5)
RBC # BLD AUTO: 3.79 MILLION/UL (ref 3.81–5.12)
RSV RNA RESP QL NAA+PROBE: NEGATIVE
SARS-COV-2 RNA RESP QL NAA+PROBE: NEGATIVE
SODIUM SERPL-SCNC: 141 MMOL/L (ref 135–147)
WBC # BLD AUTO: 6.1 THOUSAND/UL (ref 4.31–10.16)

## 2024-01-18 PROCEDURE — 74177 CT ABD & PELVIS W/CONTRAST: CPT

## 2024-01-18 PROCEDURE — 71260 CT THORAX DX C+: CPT

## 2024-01-18 PROCEDURE — 82805 BLOOD GASES W/O2 SATURATION: CPT

## 2024-01-18 PROCEDURE — 96365 THER/PROPH/DIAG IV INF INIT: CPT

## 2024-01-18 PROCEDURE — 85610 PROTHROMBIN TIME: CPT

## 2024-01-18 PROCEDURE — 85730 THROMBOPLASTIN TIME PARTIAL: CPT

## 2024-01-18 PROCEDURE — 87040 BLOOD CULTURE FOR BACTERIA: CPT

## 2024-01-18 PROCEDURE — 84145 PROCALCITONIN (PCT): CPT

## 2024-01-18 PROCEDURE — 99285 EMERGENCY DEPT VISIT HI MDM: CPT

## 2024-01-18 PROCEDURE — G1004 CDSM NDSC: HCPCS

## 2024-01-18 PROCEDURE — 83880 ASSAY OF NATRIURETIC PEPTIDE: CPT

## 2024-01-18 PROCEDURE — 36415 COLL VENOUS BLD VENIPUNCTURE: CPT

## 2024-01-18 PROCEDURE — 80053 COMPREHEN METABOLIC PANEL: CPT | Performed by: EMERGENCY MEDICINE

## 2024-01-18 PROCEDURE — 83690 ASSAY OF LIPASE: CPT

## 2024-01-18 PROCEDURE — 83605 ASSAY OF LACTIC ACID: CPT

## 2024-01-18 PROCEDURE — 93005 ELECTROCARDIOGRAM TRACING: CPT

## 2024-01-18 PROCEDURE — 85025 COMPLETE CBC W/AUTO DIFF WBC: CPT | Performed by: EMERGENCY MEDICINE

## 2024-01-18 PROCEDURE — 94640 AIRWAY INHALATION TREATMENT: CPT

## 2024-01-18 PROCEDURE — 84484 ASSAY OF TROPONIN QUANT: CPT | Performed by: EMERGENCY MEDICINE

## 2024-01-18 PROCEDURE — 71045 X-RAY EXAM CHEST 1 VIEW: CPT

## 2024-01-18 PROCEDURE — 0241U HB NFCT DS VIR RESP RNA 4 TRGT: CPT

## 2024-01-18 RX ORDER — POTASSIUM CHLORIDE 20 MEQ/1
20 TABLET, EXTENDED RELEASE ORAL ONCE
Status: COMPLETED | OUTPATIENT
Start: 2024-01-18 | End: 2024-01-18

## 2024-01-18 RX ORDER — ALBUTEROL SULFATE 2.5 MG/3ML
5 SOLUTION RESPIRATORY (INHALATION) ONCE
Status: COMPLETED | OUTPATIENT
Start: 2024-01-18 | End: 2024-01-18

## 2024-01-18 RX ORDER — ACETAMINOPHEN 10 MG/ML
1000 INJECTION, SOLUTION INTRAVENOUS ONCE
Status: COMPLETED | OUTPATIENT
Start: 2024-01-18 | End: 2024-01-18

## 2024-01-18 RX ADMIN — ACETAMINOPHEN 1000 MG: 10 INJECTION INTRAVENOUS at 21:29

## 2024-01-18 RX ADMIN — POTASSIUM CHLORIDE 20 MEQ: 1500 TABLET, EXTENDED RELEASE ORAL at 22:07

## 2024-01-18 RX ADMIN — ALBUTEROL SULFATE 5 MG: 2.5 SOLUTION RESPIRATORY (INHALATION) at 22:07

## 2024-01-18 RX ADMIN — IPRATROPIUM BROMIDE 0.5 MG: 0.5 SOLUTION RESPIRATORY (INHALATION) at 22:07

## 2024-01-18 RX ADMIN — IOHEXOL 100 ML: 350 INJECTION, SOLUTION INTRAVENOUS at 21:55

## 2024-01-18 NOTE — CASE MANAGEMENT
Case Management Discharge Planning Note    Patient name Nanci Navarro  Location /-01 MRN [id number]  : 1965 Date 2024       Current Admission Date: 2024  Current Admission Diagnosis:Sepsis (HCC)   Patient Active Problem List    Diagnosis Date Noted    Diabetes (HCC) 2024    Chronic respiratory failure (HCC) 2024    Electrolyte abnormality 2024    Osteoporosis 2023    Gastroesophageal reflux disease without esophagitis 2023    Chest pain 01/15/2023    Acute respiratory failure (HCC) 01/15/2023    Volume overload 01/15/2023    Ambulatory dysfunction 2023    Compression fracture of L1 vertebra (HCC) 2023    COPD exacerbation ([facility]) 2023    Sepsis ([facility]) 2023    Fall 2023    Systemic lupus erythematosus with lung involvement ([facility]) 12/10/2022    Respiratory failure with hypoxia ([facility]) 10/06/2022    Hypokalemia 10/04/2022    Chest pain, unspecified 2022    Chronic obstructive pulmonary disease with acute exacerbation ([facility]) 2022    Smoker 2022      LOS (days): 3  Geometric Mean LOS (GMLOS) (days):   Days to GMLOS:     OBJECTIVE:  Risk of Unplanned Readmission Score: 18.81         Current admission status: Inpatient   Preferred Pharmacy:   [facility] - [address]  [address]  [address]  [facility] [address]  Phone: [phone] Fax: [phone]    [id number]  [place] SUAZO - [address].  [address] [place] [address]  Phone: [phone] Fax: [phone]    [facility]  [facility] - [address] [facility],  [address] [facility],  Ground Floor [facility] [address]  Phone: [phone] Fax: [phone]    [id number] - [place] - [address]  [address]  [facility] [address]  Phone: [phone] Fax: [phone]    [facility]  [facility] - [address]  [address]  [place] [address]  Phone: [phone] Fax: [phone]    Primary Care Provider: Renan Montero,     Primary Insurance: DOROTHY ANN PENDING  Secondary Insurance:     DISCHARGE DETAILS:    CM informed by pharmacy that medication delivery should be around 5 pm. CM attempted to call  again and  did not  the phone. This time cm was able to leave message with cm contact information.     Nhan Navarro  [phone]

## 2024-01-18 NOTE — CASE MANAGEMENT
Case Management Discharge Planning Note    Patient name Nanci Navarro  Location /-01 MRN [id number]  : 1965 Date 2024       Current Admission Date: 2024  Current Admission Diagnosis:Sepsis (HCC)   Patient Active Problem List    Diagnosis Date Noted    Diabetes (HCC) 2024    Chronic respiratory failure (HCC) 2024    Electrolyte abnormality 2024    Osteoporosis 2023    Gastroesophageal reflux disease without esophagitis 2023    Chest pain 01/15/2023    Acute respiratory failure (HCC) 01/15/2023    Volume overload 01/15/2023    Ambulatory dysfunction 2023    Compression fracture of L1 vertebra (HCC) 2023    COPD exacerbation ([facility]) 2023    Sepsis ([facility]) 2023    Fall 2023    Systemic lupus erythematosus with lung involvement ([facility]) 12/10/2022    Respiratory failure with hypoxia ([facility]) 10/06/2022    Hypokalemia 10/04/2022    Chest pain, unspecified 2022    Chronic obstructive pulmonary disease with acute exacerbation ([facility]) 2022    Smoker 2022      LOS (days): 3  Geometric Mean LOS (GMLOS) (days):   Days to GMLOS:     OBJECTIVE:  Risk of Unplanned Readmission Score: 18.81         Current admission status: Inpatient   Preferred Pharmacy:   [facility] - [address]  [address]  [facility] [address]  Phone: [phone] Fax: [phone]    [id number]  [place] SUAZO - [address]  [address] [place] [address]  Phone: [phone] Fax: [phone]    [facility]  [facility] - [address] [facility],  [address] [facility],  Ground Floor [facility] [address]  Phone: [phone] Fax: [phone]    [id number] - [facility] - [address]  [address]  [facility] [address]  Phone: [phone] Fax: [phone]    [facility]  D[facility] MCHUGH - [address]  [address]  [place] [address]  Phone: [phone] Fax: [phone]    Primary Care Provider: Renan Montero DO    Primary Insurance: DOROTHY ANN PENDING  Secondary Insurance:     DISCHARGE DETAILS:     CM attempted to call Nhan Navarro (Spouse) [phone]. This is also patients primary number. CM was unable to leave a voicemail due to voicemail box being full.

## 2024-01-18 NOTE — CASE MANAGEMENT
Case Management Discharge Planning Note    Patient name Nanci Navarro  Location /-01 MRN [id number]  : 1965 Date 2024       Current Admission Date: 2024  Current Admission Diagnosis:Sepsis (HCC)   Patient Active Problem List    Diagnosis Date Noted    Diabetes (HCC) 2024    Chronic respiratory failure (HCC) 2024    Electrolyte abnormality 2024    Osteoporosis 2023    Gastroesophageal reflux disease without esophagitis 2023    Chest pain 01/15/2023    Acute respiratory failure (HCC) 01/15/2023    Volume overload 01/15/2023    Ambulatory dysfunction 2023    Compression fracture of L1 vertebra (HCC) 2023    COPD exacerbation ([facility]) 2023    Sepsis ([facility]) 2023    Fall 2023    Systemic lupus erythematosus with lung involvement ([facility]) 12/10/2022    Respiratory failure with hypoxia ([facility]) 10/06/2022    Hypokalemia 10/04/2022    Chest pain, unspecified 2022    Chronic obstructive pulmonary disease with acute exacerbation ([facility]) 2022    Smoker 2022      LOS (days): 3  Geometric Mean LOS (GMLOS) (days):   Days to GMLOS:     OBJECTIVE:  Risk of Unplanned Readmission Score: 18.81         Current admission status: Inpatient   Preferred Pharmacy:   [facility] [place] - [address]  [address]  [address]  [facility] [address]  Phone: [phone] Fax: [phone]    [id number]  [facility]O - [address].  [address] [place] [address]  Phone: [phone] Fax: [phone]    [facility]  [facility] - [address] [facility],  [address] [facility],  Ground Floor [facility] [address]  Phone: [phone] Fax: [phone]    [id number] - [facility] - [address]  [address]  [facility] [address]  Phone: [phone] Fax: [phone]    [facility]  [facility] MCH[facility] - [address]  [address]  [place] [address]  Phone: [phone] Fax: [phone]    Primary Care Provider: Renan Montero DO    Primary Insurance: DOROTHY ANN PENDING  Secondary Insurance:     DISCHARGE DETAILS:     CM contacted [facility] pharmacy and spoke with Niki who stated that they received the financial eligibility form for patient. Niki informed cm that they would start working on it so it can be on the 3pm delivery.

## 2024-01-19 PROBLEM — R10.9 FLANK PAIN: Status: ACTIVE | Noted: 2024-01-19

## 2024-01-19 LAB
ANION GAP SERPL CALCULATED.3IONS-SCNC: 7 MMOL/L
ATRIAL RATE: 95 BPM
BUN SERPL-MCNC: 9 MG/DL (ref 5–25)
CALCIUM SERPL-MCNC: 8.8 MG/DL (ref 8.4–10.2)
CHLORIDE SERPL-SCNC: 101 MMOL/L (ref 96–108)
CO2 SERPL-SCNC: 34 MMOL/L (ref 21–32)
CREAT SERPL-MCNC: 0.71 MG/DL (ref 0.6–1.3)
ERYTHROCYTE [DISTWIDTH] IN BLOOD BY AUTOMATED COUNT: 14 % (ref 11.6–15.1)
GFR SERPL CREATININE-BSD FRML MDRD: 94 ML/MIN/1.73SQ M
GLUCOSE SERPL-MCNC: 128 MG/DL (ref 65–140)
GLUCOSE SERPL-MCNC: 132 MG/DL (ref 65–140)
GLUCOSE SERPL-MCNC: 156 MG/DL (ref 65–140)
GLUCOSE SERPL-MCNC: 171 MG/DL (ref 65–140)
GLUCOSE SERPL-MCNC: 194 MG/DL (ref 65–140)
HCT VFR BLD AUTO: 33.8 % (ref 34.8–46.1)
HGB BLD-MCNC: 11.2 G/DL (ref 11.5–15.4)
MCH RBC QN AUTO: 30.6 PG (ref 26.8–34.3)
MCHC RBC AUTO-ENTMCNC: 33.1 G/DL (ref 31.4–37.4)
MCV RBC AUTO: 92 FL (ref 82–98)
P AXIS: 73 DEGREES
PLATELET # BLD AUTO: 334 THOUSANDS/UL (ref 149–390)
PMV BLD AUTO: 8.8 FL (ref 8.9–12.7)
POTASSIUM SERPL-SCNC: 3.5 MMOL/L (ref 3.5–5.3)
PR INTERVAL: 140 MS
QRS AXIS: 44 DEGREES
QRSD INTERVAL: 84 MS
QT INTERVAL: 368 MS
QTC INTERVAL: 462 MS
RBC # BLD AUTO: 3.66 MILLION/UL (ref 3.81–5.12)
SODIUM SERPL-SCNC: 142 MMOL/L (ref 135–147)
T WAVE AXIS: 114 DEGREES
VENTRICULAR RATE: 95 BPM
WBC # BLD AUTO: 6.61 THOUSAND/UL (ref 4.31–10.16)

## 2024-01-19 PROCEDURE — 94640 AIRWAY INHALATION TREATMENT: CPT

## 2024-01-19 PROCEDURE — 94760 N-INVAS EAR/PLS OXIMETRY 1: CPT

## 2024-01-19 PROCEDURE — 82948 REAGENT STRIP/BLOOD GLUCOSE: CPT

## 2024-01-19 PROCEDURE — 36415 COLL VENOUS BLD VENIPUNCTURE: CPT | Performed by: INTERNAL MEDICINE

## 2024-01-19 PROCEDURE — 85027 COMPLETE CBC AUTOMATED: CPT | Performed by: INTERNAL MEDICINE

## 2024-01-19 PROCEDURE — 93010 ELECTROCARDIOGRAM REPORT: CPT | Performed by: INTERNAL MEDICINE

## 2024-01-19 PROCEDURE — 99222 1ST HOSP IP/OBS MODERATE 55: CPT | Performed by: INTERNAL MEDICINE

## 2024-01-19 PROCEDURE — 80048 BASIC METABOLIC PNL TOTAL CA: CPT | Performed by: INTERNAL MEDICINE

## 2024-01-19 PROCEDURE — 94664 DEMO&/EVAL PT USE INHALER: CPT

## 2024-01-19 RX ORDER — POTASSIUM CHLORIDE 20 MEQ/1
40 TABLET, EXTENDED RELEASE ORAL DAILY
Status: COMPLETED | OUTPATIENT
Start: 2024-01-19 | End: 2024-01-20

## 2024-01-19 RX ORDER — LEVALBUTEROL INHALATION SOLUTION 1.25 MG/3ML
1.25 SOLUTION RESPIRATORY (INHALATION)
Status: DISCONTINUED | OUTPATIENT
Start: 2024-01-19 | End: 2024-01-19

## 2024-01-19 RX ORDER — ACETAMINOPHEN 325 MG/1
650 TABLET ORAL EVERY 6 HOURS PRN
Status: DISCONTINUED | OUTPATIENT
Start: 2024-01-19 | End: 2024-02-04

## 2024-01-19 RX ORDER — IPRATROPIUM BROMIDE AND ALBUTEROL SULFATE 2.5; .5 MG/3ML; MG/3ML
3 SOLUTION RESPIRATORY (INHALATION) 4 TIMES DAILY
Status: DISCONTINUED | OUTPATIENT
Start: 2024-01-19 | End: 2024-01-19

## 2024-01-19 RX ORDER — KETOROLAC TROMETHAMINE 30 MG/ML
15 INJECTION, SOLUTION INTRAMUSCULAR; INTRAVENOUS EVERY 6 HOURS PRN
Status: DISPENSED | OUTPATIENT
Start: 2024-01-19 | End: 2024-01-21

## 2024-01-19 RX ORDER — FUROSEMIDE 10 MG/ML
40 INJECTION INTRAMUSCULAR; INTRAVENOUS DAILY
Status: COMPLETED | OUTPATIENT
Start: 2024-01-19 | End: 2024-01-20

## 2024-01-19 RX ORDER — DOCUSATE SODIUM 100 MG/1
100 CAPSULE, LIQUID FILLED ORAL 2 TIMES DAILY
Status: DISCONTINUED | OUTPATIENT
Start: 2024-01-19 | End: 2024-02-07 | Stop reason: HOSPADM

## 2024-01-19 RX ORDER — FLUTICASONE FUROATE AND VILANTEROL 100; 25 UG/1; UG/1
1 POWDER RESPIRATORY (INHALATION) DAILY
Status: DISCONTINUED | OUTPATIENT
Start: 2024-01-19 | End: 2024-02-07 | Stop reason: HOSPADM

## 2024-01-19 RX ORDER — METHOCARBAMOL 500 MG/1
500 TABLET, FILM COATED ORAL 4 TIMES DAILY PRN
Status: DISCONTINUED | OUTPATIENT
Start: 2024-01-19 | End: 2024-01-31

## 2024-01-19 RX ORDER — ENOXAPARIN SODIUM 100 MG/ML
40 INJECTION SUBCUTANEOUS DAILY
Status: DISCONTINUED | OUTPATIENT
Start: 2024-01-19 | End: 2024-01-21

## 2024-01-19 RX ORDER — BISACODYL 5 MG/1
5 TABLET, DELAYED RELEASE ORAL DAILY PRN
Status: DISCONTINUED | OUTPATIENT
Start: 2024-01-19 | End: 2024-02-07 | Stop reason: HOSPADM

## 2024-01-19 RX ORDER — LEVALBUTEROL INHALATION SOLUTION 1.25 MG/3ML
1.25 SOLUTION RESPIRATORY (INHALATION)
Status: DISCONTINUED | OUTPATIENT
Start: 2024-01-19 | End: 2024-01-24

## 2024-01-19 RX ORDER — CEPHALEXIN 500 MG/1
500 CAPSULE ORAL EVERY 6 HOURS SCHEDULED
Status: COMPLETED | OUTPATIENT
Start: 2024-01-19 | End: 2024-01-19

## 2024-01-19 RX ORDER — ONDANSETRON 2 MG/ML
4 INJECTION INTRAMUSCULAR; INTRAVENOUS EVERY 6 HOURS PRN
Status: DISCONTINUED | OUTPATIENT
Start: 2024-01-19 | End: 2024-02-07 | Stop reason: HOSPADM

## 2024-01-19 RX ORDER — ALBUTEROL SULFATE 90 UG/1
2 AEROSOL, METERED RESPIRATORY (INHALATION) EVERY 4 HOURS PRN
Status: DISCONTINUED | OUTPATIENT
Start: 2024-01-19 | End: 2024-02-07 | Stop reason: HOSPADM

## 2024-01-19 RX ORDER — NICOTINE 21 MG/24HR
1 PATCH, TRANSDERMAL 24 HOURS TRANSDERMAL DAILY
Status: DISCONTINUED | OUTPATIENT
Start: 2024-01-19 | End: 2024-02-07 | Stop reason: HOSPADM

## 2024-01-19 RX ORDER — INSULIN LISPRO 100 [IU]/ML
1-5 INJECTION, SOLUTION INTRAVENOUS; SUBCUTANEOUS
Status: DISCONTINUED | OUTPATIENT
Start: 2024-01-19 | End: 2024-02-07 | Stop reason: HOSPADM

## 2024-01-19 RX ORDER — INSULIN LISPRO 100 [IU]/ML
2-12 INJECTION, SOLUTION INTRAVENOUS; SUBCUTANEOUS
Status: DISCONTINUED | OUTPATIENT
Start: 2024-01-19 | End: 2024-02-07 | Stop reason: HOSPADM

## 2024-01-19 RX ORDER — PANTOPRAZOLE SODIUM 40 MG/1
40 TABLET, DELAYED RELEASE ORAL DAILY
Status: DISCONTINUED | OUTPATIENT
Start: 2024-01-19 | End: 2024-01-31

## 2024-01-19 RX ORDER — TRAMADOL HYDROCHLORIDE 50 MG/1
50 TABLET ORAL EVERY 6 HOURS PRN
Status: DISCONTINUED | OUTPATIENT
Start: 2024-01-19 | End: 2024-01-28

## 2024-01-19 RX ADMIN — DOCUSATE SODIUM 100 MG: 100 CAPSULE, LIQUID FILLED ORAL at 17:09

## 2024-01-19 RX ADMIN — Medication 2 G: at 08:19

## 2024-01-19 RX ADMIN — INSULIN LISPRO 2 UNITS: 100 INJECTION, SOLUTION INTRAVENOUS; SUBCUTANEOUS at 17:51

## 2024-01-19 RX ADMIN — CEPHALEXIN 500 MG: 500 CAPSULE ORAL at 20:55

## 2024-01-19 RX ADMIN — KETOROLAC TROMETHAMINE 15 MG: 30 INJECTION, SOLUTION INTRAMUSCULAR; INTRAVENOUS at 04:11

## 2024-01-19 RX ADMIN — Medication 2 G: at 22:18

## 2024-01-19 RX ADMIN — Medication 2 G: at 17:19

## 2024-01-19 RX ADMIN — Medication 2 G: at 12:54

## 2024-01-19 RX ADMIN — LEVALBUTEROL HYDROCHLORIDE 1.25 MG: 1.25 SOLUTION RESPIRATORY (INHALATION) at 08:34

## 2024-01-19 RX ADMIN — CEPHALEXIN 500 MG: 500 CAPSULE ORAL at 02:09

## 2024-01-19 RX ADMIN — NICOTINE 1 PATCH: 14 PATCH, EXTENDED RELEASE TRANSDERMAL at 08:19

## 2024-01-19 RX ADMIN — TRAMADOL HYDROCHLORIDE 50 MG: 50 TABLET, COATED ORAL at 15:24

## 2024-01-19 RX ADMIN — INSULIN LISPRO 2 UNITS: 100 INJECTION, SOLUTION INTRAVENOUS; SUBCUTANEOUS at 12:53

## 2024-01-19 RX ADMIN — LEVALBUTEROL HYDROCHLORIDE 1.25 MG: 1.25 SOLUTION RESPIRATORY (INHALATION) at 19:54

## 2024-01-19 RX ADMIN — PANTOPRAZOLE SODIUM 40 MG: 40 TABLET, DELAYED RELEASE ORAL at 08:18

## 2024-01-19 RX ADMIN — CEPHALEXIN 500 MG: 500 CAPSULE ORAL at 15:24

## 2024-01-19 RX ADMIN — TRAMADOL HYDROCHLORIDE 50 MG: 50 TABLET, COATED ORAL at 08:11

## 2024-01-19 RX ADMIN — TRAMADOL HYDROCHLORIDE 50 MG: 50 TABLET, COATED ORAL at 02:09

## 2024-01-19 RX ADMIN — TRAMADOL HYDROCHLORIDE 50 MG: 50 TABLET, COATED ORAL at 22:19

## 2024-01-19 RX ADMIN — IPRATROPIUM BROMIDE 0.5 MG: 0.5 SOLUTION RESPIRATORY (INHALATION) at 19:54

## 2024-01-19 RX ADMIN — KETOROLAC TROMETHAMINE 15 MG: 30 INJECTION, SOLUTION INTRAMUSCULAR; INTRAVENOUS at 10:31

## 2024-01-19 RX ADMIN — IPRATROPIUM BROMIDE 0.5 MG: 0.5 SOLUTION RESPIRATORY (INHALATION) at 08:34

## 2024-01-19 RX ADMIN — FUROSEMIDE 40 MG: 10 INJECTION, SOLUTION INTRAMUSCULAR; INTRAVENOUS at 02:08

## 2024-01-19 RX ADMIN — FLUTICASONE FUROATE AND VILANTEROL TRIFENATATE 1 PUFF: 100; 25 POWDER RESPIRATORY (INHALATION) at 09:14

## 2024-01-19 RX ADMIN — POTASSIUM CHLORIDE 40 MEQ: 1500 TABLET, EXTENDED RELEASE ORAL at 02:08

## 2024-01-19 RX ADMIN — CEPHALEXIN 500 MG: 500 CAPSULE ORAL at 08:18

## 2024-01-19 RX ADMIN — ENOXAPARIN SODIUM 40 MG: 40 INJECTION SUBCUTANEOUS at 08:18

## 2024-01-19 RX ADMIN — ONDANSETRON 4 MG: 2 INJECTION INTRAMUSCULAR; INTRAVENOUS at 02:08

## 2024-01-19 RX ADMIN — DOCUSATE SODIUM 100 MG: 100 CAPSULE, LIQUID FILLED ORAL at 08:18

## 2024-01-19 RX ADMIN — KETOROLAC TROMETHAMINE 15 MG: 30 INJECTION, SOLUTION INTRAMUSCULAR; INTRAVENOUS at 17:08

## 2024-01-19 NOTE — ED PROVIDER NOTES
"History  Chief Complaint   Patient presents with    Shortness of Breath     Patient reports being admitted for a kidney infection at this hospital and discharged yesterday, reports checking their oxygen at home overnight and seeing it was \"82% and my heart rate was 140\". Patient reports sensation of bloating, mid sternal chest pain and heart \"fluttering\".      Patient is a 58-year-old female with a past medical history of COPD, diabetes, chronic respiratory failure and recent admission who presents to the emergency room today for worsening symptoms.  Patient was recently admitted for sepsis from 1/14/2024-1/17/2024. However, during admission seems as pain was more musculoskeletal versus acute urinary tract infection or pyelonephritis.  Patient remained afebrile without leukocytosis throughout hospital stay. Today, patient reports low O2 sats of 83% and a heart rate of 145 bpm.  Patient reports she called her PCP who advised her to come back to the emergency room to be re-evaluated.  Reports fever of 103F.  Reports chest pain, palpitations, shortness of breath, cough, abdominal pain and bloating.  Associated bilateral leg swelling.  Associated back pain. Has not taken any medication for symptom relief.  Patient reports that she has not been using home oxygen.      Shortness of Breath  Associated symptoms: abdominal pain, chest pain, cough and fever    Associated symptoms: no headaches, no rash and no vomiting        Prior to Admission Medications   Prescriptions Last Dose Informant Patient Reported? Taking?   Diclofenac Sodium (VOLTAREN) 1 %   No No   Sig: Apply 2 g topically 4 (four) times a day   Fluticasone Furoate-Vilanterol (Breo Ellipta) 100-25 mcg/actuation inhaler   No No   Sig: Inhale 1 puff daily Rinse mouth after use.   acetaminophen (TYLENOL) 325 mg tablet   No No   Sig: Take 2 tablets (650 mg total) by mouth every 6 (six) hours   albuterol (PROVENTIL HFA,VENTOLIN HFA) 90 mcg/act inhaler  Self No No "   Sig: Inhale 2 puffs every 4 (four) hours as needed for wheezing   bisacodyl (DULCOLAX) 5 mg EC tablet   No No   Sig: Take 1 tablet (5 mg total) by mouth daily as needed for constipation   cephalexin (KEFLEX) 500 mg capsule   No No   Sig: Take 1 capsule (500 mg total) by mouth every 6 (six) hours for 1 day Do not start before January 18, 2024.   ipratropium-albuterol (DUO-NEB) 0.5-2.5 mg/3 mL nebulizer solution   No No   Sig: Take 3 mL by nebulization 4 (four) times a day   methocarbamol (ROBAXIN) 500 mg tablet   No No   Sig: Take 1 tablet (500 mg total) by mouth 4 (four) times a day as needed for muscle spasms for up to 12 doses   nicotine (NICODERM CQ) 14 mg/24hr TD 24 hr patch  Self No No   Sig: Place 1 patch on the skin daily Do not start before October 8, 2022.   pantoprazole (PROTONIX) 40 mg tablet  Self No No   Sig: Take 1 tablet (40 mg total) by mouth daily   tiotropium (Spiriva HandiHaler) 18 mcg inhalation capsule   No No   Sig: Place 1 capsule (18 mcg total) into inhaler and inhale daily      Facility-Administered Medications: None       Past Medical History:   Diagnosis Date    Achalasia     Back pain     Cancer (HCC)     Ovarian    Fibromyalgia     Lupus (HCC)        Past Surgical History:   Procedure Laterality Date    HYSTERECTOMY      NECK SURGERY         History reviewed. No pertinent family history.  I have reviewed and agree with the history as documented.    E-Cigarette/Vaping    E-Cigarette Use Never User      E-Cigarette/Vaping Substances     Social History     Tobacco Use    Smoking status: Every Day     Current packs/day: 0.50     Types: Cigarettes    Smokeless tobacco: Never   Vaping Use    Vaping status: Never Used   Substance Use Topics    Alcohol use: Never    Drug use: Never       Review of Systems   Constitutional:  Positive for fever.   HENT:  Negative for trouble swallowing and voice change.    Eyes:  Negative for photophobia and redness.   Respiratory:  Positive for cough and  shortness of breath.    Cardiovascular:  Positive for chest pain, palpitations and leg swelling (Bilateral).   Gastrointestinal:  Positive for abdominal distention (Described as bloating) and abdominal pain. Negative for nausea and vomiting.   Genitourinary:  Negative for dysuria, flank pain and hematuria.   Musculoskeletal:  Positive for back pain. Negative for arthralgias and joint swelling.   Skin:  Negative for color change and rash.   Neurological:  Negative for facial asymmetry, speech difficulty and headaches.   Psychiatric/Behavioral:  Negative for confusion.        Physical Exam  Physical Exam  Vitals and nursing note reviewed.   Constitutional:       General: She is not in acute distress.     Appearance: She is well-developed.   HENT:      Head: Normocephalic and atraumatic.      Mouth/Throat:      Mouth: Mucous membranes are moist.   Eyes:      Conjunctiva/sclera: Conjunctivae normal.   Cardiovascular:      Rate and Rhythm: Regular rhythm. Tachycardia present.      Heart sounds: No murmur heard.  Pulmonary:      Effort: Pulmonary effort is normal. No respiratory distress.      Breath sounds: Rhonchi (Bilateral) present.   Abdominal:      Palpations: Abdomen is soft.      Tenderness: There is no abdominal tenderness.   Musculoskeletal:         General: No swelling.      Cervical back: Neck supple.      Right lower leg: Edema present.      Left lower leg: Edema present.   Skin:     General: Skin is warm and dry.      Capillary Refill: Capillary refill takes less than 2 seconds.   Neurological:      Mental Status: She is alert and oriented to person, place, and time.   Psychiatric:         Mood and Affect: Mood normal.         Vital Signs  ED Triage Vitals [01/18/24 1847]   Temperature Pulse Respirations Blood Pressure SpO2   98.1 °F (36.7 °C) 102 (!) 24 164/72 96 %      Temp Source Heart Rate Source Patient Position - Orthostatic VS BP Location FiO2 (%)   Temporal Monitor Sitting Left arm --      Pain Score        --           Vitals:    01/18/24 2341 01/19/24 0045 01/19/24 0100 01/19/24 0200   BP: 130/61 122/60 130/62 138/69   Pulse: 84 86 83 83   Patient Position - Orthostatic VS:             Visual Acuity      ED Medications  Medications   acetaminophen (TYLENOL) tablet 650 mg (has no administration in time range)   docusate sodium (COLACE) capsule 100 mg (has no administration in time range)   ondansetron (ZOFRAN) injection 4 mg (4 mg Intravenous Given 1/19/24 0208)   enoxaparin (LOVENOX) subcutaneous injection 40 mg (has no administration in time range)   cephalexin (KEFLEX) capsule 500 mg (500 mg Oral Given 1/19/24 0209)   bisacodyl (DULCOLAX) EC tablet 5 mg (has no administration in time range)   Diclofenac Sodium (VOLTAREN) 1 % topical gel 2 g (has no administration in time range)   Fluticasone Furoate-Vilanterol 100-25 mcg/actuation 1 puff (has no administration in time range)   ipratropium-albuterol (DUO-NEB) 0.5-2.5 mg/3 mL inhalation solution 3 mL (has no administration in time range)   methocarbamol (ROBAXIN) tablet 500 mg (has no administration in time range)   nicotine (NICODERM CQ) 14 mg/24hr TD 24 hr patch 1 patch (has no administration in time range)   pantoprazole (PROTONIX) EC tablet 40 mg (has no administration in time range)   ketorolac (TORADOL) injection 15 mg (has no administration in time range)   traMADol (ULTRAM) tablet 50 mg (50 mg Oral Given 1/19/24 0209)   furosemide (LASIX) injection 40 mg (40 mg Intravenous Given 1/19/24 0208)   potassium chloride (K-DUR,KLOR-CON) CR tablet 40 mEq (40 mEq Oral Given 1/19/24 0208)   insulin lispro (HumaLOG) 100 units/mL subcutaneous injection 2-12 Units (has no administration in time range)   insulin lispro (HumaLOG) 100 units/mL subcutaneous injection 1-5 Units (has no administration in time range)   albuterol inhalation solution 5 mg (5 mg Nebulization Given 1/18/24 2207)   ipratropium (ATROVENT) 0.02 % inhalation solution 0.5 mg (0.5 mg Nebulization  Given 1/18/24 2207)   potassium chloride (K-DUR,KLOR-CON) CR tablet 20 mEq (20 mEq Oral Given 1/18/24 2207)   acetaminophen (Ofirmev) injection 1,000 mg (0 mg Intravenous Stopped 1/18/24 2145)   iohexol (OMNIPAQUE) 350 MG/ML injection (MULTI-DOSE) 100 mL (100 mL Intravenous Given 1/18/24 2155)       Diagnostic Studies  Results Reviewed       Procedure Component Value Units Date/Time    UA w Reflex to Microscopic w Reflex to Culture [[id number]]     Lab Status: No result Specimen: Urine     HS Troponin I 4hr [[id number]]  (Normal) Collected: 01/18/24 2247    Lab Status: Final result Specimen: Blood from Arm, Left Updated: 01/18/24 2325     hs TnI 4hr 5 ng/L      Delta 4hr hsTnI 0 ng/L     Procalcitonin [[id number]]  (Normal) Collected: 01/18/24 2117    Lab Status: Final result Specimen: Blood from Arm, Left Updated: 01/18/24 2206     Procalcitonin 0.06 ng/ml     HS Troponin I 2hr [[id number]]  (Normal) Collected: 01/18/24 2117    Lab Status: Final result Specimen: Blood from Arm, Left Updated: 01/18/24 2204     hs TnI 2hr 6 ng/L      Delta 2hr hsTnI 1 ng/L     B-Type Natriuretic Peptide(BNP) [[id number]]  (Normal) Collected: 01/18/24 2117    Lab Status: Final result Specimen: Blood from Arm, Left Updated: 01/18/24 2202     BNP 65 pg/mL     FLU/RSV/COVID - if FLU/RSV clinically relevant [[id number]]  (Normal) Collected: 01/18/24 2047    Lab Status: Final result Specimen: Nares from Nose Updated: 01/18/24 2157     SARS-CoV-2 Negative     INFLUENZA A PCR Negative     INFLUENZA B PCR Negative     RSV PCR Negative    Narrative:      FOR PEDIATRIC PATIENTS - copy/paste COVID Guidelines URL to browser: [email or web address]    SARS-CoV-2 assay is a Nucleic Acid Amplification assay intended for the  qualitative detection of nucleic acid from SARS-CoV-2 in nasopharyngeal  swabs. Results are for the presumptive identification of SARS-CoV-2 RNA.    Positive results are indicative  of infection with SARS-CoV-2, the virus  causing COVID-19, but do not rule out bacterial infection or co-infection  with other viruses. Laboratories within the [place] and its  territories are required to report all positive results to the appropriate  public health authorities. Negative results do not preclude SARS-CoV-2  infection and should not be used as the sole basis for treatment or other  patient management decisions. Negative results must be combined with  clinical observations, patient history, and epidemiological information.  This test has not been FDA cleared or approved.    This test has been authorized by FDA under an Emergency Use Authorization  (EUA). This test is only authorized for the duration of time the  declaration that circumstances exist justifying the authorization of the  emergency use of an in vitro diagnostic tests for detection of SARS-CoV-2  virus and/or diagnosis of COVID-19 infection under section 564(b)(1) of  the Act, 21 U.S.C. 360bbb-3(b)(1), unless the authorization is terminated  or revoked sooner. The test has been validated but independent review by FDA  and CLIA is pending.    Test performed using [employer] GeneXpert: This RT-PCR assay targets N2,  a region unique to SARS-CoV-2. A conserved region in the E-gene was chosen  for pan-Sarbecovirus detection which includes SARS-CoV-2.    According to CMS-2020-01-R, this platform meets the definition of high-throughput technology.    Lipase [[id number]]  (Abnormal) Collected: 01/18/24 2117    Lab Status: Final result Specimen: Blood from Arm, Left Updated: 01/18/24 2156     Lipase 7 u/L     Lactic acid [[id number]]  (Normal) Collected: 01/18/24 2117    Lab Status: Final result Specimen: Blood from Arm, Left Updated: 01/18/24 2156     LACTIC ACID 1.0 mmol/L     Narrative:      Result may be elevated if tourniquet was used during collection.    Blood culture #1 [[id number]] Collected: 01/18/24 2142    Lab Status: In process  Specimen: Blood from Hand, Left Updated: 01/18/24 2150    Protime-INR [[id number]]  (Normal) Collected: 01/18/24 2117    Lab Status: Final result Specimen: Blood from Arm, Left Updated: 01/18/24 2145     Protime 13.7 seconds      INR 0.99    APTT [[id number]]  (Normal) Collected: 01/18/24 2117    Lab Status: Final result Specimen: Blood from Arm, Left Updated: 01/18/24 2145     PTT 33 seconds     Blood gas, Venous [[id number]]  (Abnormal) Collected: 01/18/24 2117    Lab Status: Final result Specimen: Blood from Arm, Left Updated: 01/18/24 2126     pH, Dwight 7.457     pCO2, Dwight 46.3 mm Hg      pO2, Dwight 37.6 mm Hg      HCO3, Dwight 32.0 mmol/L      Base Excess, Dwight 7.2 mmol/L      O2 Content, Dwight 12.0 ml/dL      O2 HGB, VENOUS 72.7 %     Blood culture #2 [[id number]] Collected: 01/18/24 2117    Lab Status: In process Specimen: Blood from Arm, Left Updated: 01/18/24 2122    UA w Reflex to Microscopic w Reflex to Culture [[id number]]     Lab Status: No result Specimen: Urine     HS Troponin 0hr (reflex protocol) [[id number]]  (Normal) Collected: 01/18/24 1856    Lab Status: Final result Specimen: Blood from Arm, Right Updated: 01/18/24 1938     hs TnI 0hr 5 ng/L     Comprehensive metabolic panel [[id number]]  (Abnormal) Collected: 01/18/24 1856    Lab Status: Final result Specimen: Blood from Arm, Right Updated: 01/18/24 1934     Sodium 141 mmol/L      Potassium 3.3 mmol/L      Chloride 103 mmol/L      CO2 31 mmol/L      ANION GAP 7 mmol/L      BUN 9 mg/dL      Creatinine 0.55 mg/dL      Glucose 173 mg/dL      Calcium 8.8 mg/dL      AST 12 U/L      ALT 18 U/L      Alkaline Phosphatase 82 U/L      Total Protein 6.6 g/dL      Albumin 3.5 g/dL      Total Bilirubin 0.46 mg/dL      eGFR 103 ml/min/1.73sq m     Narrative:      National Kidney Disease Foundation guidelines for Chronic Kidney Disease (CKD):     Stage 1 with normal or high GFR (GFR > 90 mL/min/1.73 square meters)    Stage 2 Mild CKD (GFR = 60-89 mL/min/1.73 square meters)     Stage 3A Moderate CKD (GFR = 45-59 mL/min/1.73 square meters)    Stage 3B Moderate CKD (GFR = 30-44 mL/min/1.73 square meters)    Stage 4 Severe CKD (GFR = 15-29 mL/min/1.73 square meters)    Stage 5 End Stage CKD (GFR <15 mL/min/1.73 square meters)  Note: GFR calculation is accurate only with a steady state creatinine    CBC and differential [[id number]]  (Abnormal) Collected: 01/18/24 1856    Lab Status: Final result Specimen: Blood from Arm, Right Updated: 01/18/24 1905     WBC 6.10 Thousand/uL      RBC 3.79 Million/uL      Hemoglobin 11.3 g/dL      Hematocrit 34.8 %      MCV 92 fL      MCH 29.8 pg      MCHC 32.5 g/dL      RDW 13.7 %      MPV 8.8 fL      Platelets 324 Thousands/uL      nRBC 0 /100 WBCs      Neutrophils Relative 77 %      Immat GRANS % 1 %      Lymphocytes Relative 14 %      Monocytes Relative 7 %      Eosinophils Relative 1 %      Basophils Relative 0 %      Neutrophils Absolute 4.65 Thousands/µL      Immature Grans Absolute 0.05 Thousand/uL      Lymphocytes Absolute 0.87 Thousands/µL      Monocytes Absolute 0.44 Thousand/µL      Eosinophils Absolute 0.07 Thousand/µL      Basophils Absolute 0.02 Thousands/µL                    CT chest abdomen pelvis w contrast   Final Result by Caio Martin MD (01/18 2330)      1.  Bibasilar atelectasis.   2.  Mild diffuse thickening of the esophagus compatible with esophagitis similar to prior exam.   3.  Diverticulosis without evidence of diverticulitis.         Workstation performed: [id number]         XR chest 1 view portable    (Results Pending)              Procedures  ECG 12 Lead Documentation Only    Date/Time: 1/18/2024 6:54 PM    Performed by: Becki Desai PA-C  Authorized by: Becki Desai PA-C    Indications / Diagnosis:  Cardiac/respiratory sxs and sepsis work up  ECG reviewed by me, the ED Provider: yes    Patient location:  ED  Interpretation:     Interpretation: normal    Rate:     ECG rate:  95    ECG rate assessment: normal    Rhythm:     " Rhythm: sinus rhythm    ST segments:     ST segments:  Non-specific  T waves:     T waves: non-specific             ED Course  ED Course as of 01/19/24 0239   u Jan 18, 2024 2017 Potassium(!): 3.3   2017 Hemoglobin(!): 11.3   2050 Patient requesting pain medication.  Discussed with patient that we are unable to give her narcotics at this time.  Per internal medicine hospitalist note, patient was told that she should not receive narcotic therapy as when she was previously given narcotics her O2 sats dropped.  Discussed with patient that as she is reporting  shortness of breath and low O2 sats at home that it would be inappropriate to give her narcotics at this time.   2219 Delta 2hr hsTnI: 1   2329 Delta 4hr hsTnI: 0             HEART Risk Score      Flowsheet Row Most Recent Value   Heart Score Risk Calculator    History 1 Filed at: 01/18/2024 2145   ECG 0 Filed at: 01/18/2024 2145   Age 1 Filed at: 01/18/2024 2145   Risk Factors 2 Filed at: 01/18/2024 2145   Troponin 0 Filed at: 01/18/2024 2145   HEART Score 4 Filed at: 01/18/2024 2145                                        Medical Decision Making  58-year-old female who was admitted here and discharged yesterday. Today, reports low O2 sats at home, 83%, and tachycardia, 145 bpm. Presents today for fever, chest pain, palpitations, shortness of breath, cough, abdominal pain and bloating. Associated bilateral leg swelling and back pain. On arrival, patient mildly tachycardiac ~102 bpm. Other vital signs stable and HR improved during ED course. However, sats decreased to 86% on room air. Patient was put on nasal cannula in the ED, and sats improved to 96%. Potassium 3.3, repleted p.o.   CT scan shows, \"IMPRESSION:    1. Bibasilar atelectasis.  2. Mild diffuse thickening of the esophagus compatible with esophagitis similar to prior exam.  3. Diverticulosis without evidence of diverticulitis\".  Discussed with internal medicine for admission.  Discussed admission " plan with patient.  Patient understands and agrees to admission at this time.    Amount and/or Complexity of Data Reviewed  Labs: ordered. Decision-making details documented in ED Course.  Radiology: ordered.    Risk  Prescription drug management.  Decision regarding hospitalization.             Disposition  Final diagnoses:   SOB (shortness of breath)     Time reflects when diagnosis was documented in both MDM as applicable and the Disposition within this note       Time User Action Codes Description Comment    1/19/2024 12:10 AM Becki Desai Add [R06.02] SOB (shortness of breath)           ED Disposition       ED Disposition   Admit    Condition   Stable    Date/Time   Fri Jan 19, 2024 0010    Comment   Case was discussed with Cara Sweeney DO and the patient's admission status was agreed to be Admission Status: inpatient status to the service of Dr. Sweeney .               Follow-up Information    None         Patient's Medications   Discharge Prescriptions    No medications on file       No discharge procedures on file.    PDMP Review         Value Time User    PDMP Reviewed  Yes 1/17/2023  1:10 PM Rachel Márquez MD            ED Provider  Electronically Signed by             Becki Desai PA-C  01/19/24 0239

## 2024-01-19 NOTE — CASE MANAGEMENT
Case Management Assessment & Discharge Planning Note    Patient name Nanci Navarro  Location ED 24/ED 24 MRN [id number]  : 1965 Date 2024       Current Admission Date: 2024  Current Admission Diagnosis:Chest pain, unspecified   Patient Active Problem List    Diagnosis Date Noted    Flank pain 2024    Diabetes (HCC) 2024    Chronic respiratory failure (HCC) 2024    Electrolyte abnormality 2024    Osteoporosis 2023    Gastroesophageal reflux disease without esophagitis 2023    Chest pain 01/15/2023    Acute respiratory failure (HCC) 01/15/2023    Volume overload 01/15/2023    Ambulatory dysfunction 2023    Compression fracture of L1 vertebra (HCC) 2023    COPD exacerbation (HCC) 2023    Sepsis ([facility]) 2023    Fall 2023    Systemic lupus erythematosus with lung involvement ([facility]) 12/10/2022    Respiratory failure with hypoxia ([facility]) 10/06/2022    Hypokalemia 10/04/2022    Chest pain, unspecified 2022    Chronic obstructive pulmonary disease with acute exacerbation (HCC) 2022    Smoker 2022      LOS (days): 0  Geometric Mean LOS (GMLOS) (days):   Days to GMLOS:     OBJECTIVE:  PATIENT READMITTED TO HOSPITAL  Risk of Unplanned Readmission Score: 19.47         Current admission status: Inpatient       Preferred Pharmacy:   [facility] - [address]  [address]  [address]  [facility] [address]  Phone: [phone] Fax: [phone]    [id number]  [place] - [address]  [address] [address]  Phone: [phone] Fax: [phone]    [facility]  [place] - [address],  [address],  [address]ound [address] [place] [facility] [address]  Phone: [phone] Fax: [phone]    [id number]  [place] - [address]  [address]  [place]  [address]  Phone: [phone] Fax: [phone]    [facility] [place] - [address]  [address] [facility] [address]  [place] [address]  Phone: [phone] Fax: [phone]    Primary Care Provider: Renan Montero,     Primary Insurance:   Secondary Insurance:     ASSESSMENT:  Active Health Care Proxies       Nhan Navarro  Alternate Health Care Representative - Spouse   Primary Phone: [phone] (Mobile)                 Advance Directives  Does patient have a Health Care POA?: No  Was patient offered paperwork?: No (not interested)  Does patient currently have a Health Care decision maker?: Yes, please see Health Care Proxy section  Does patient have Advance Directives?: No  Was patient offered paperwork?: Yes         Readmission Root Cause  30 Day Readmission: Yes  Who directed you to return to the hospital?: Self  Did you understand whom to contact if you had questions or problems?: Yes  Did you get your prescriptions before you left the hospital?: No  Reason:: Unable to get to hospital pharmacy  Were you able to get your prescriptions filled when you left the hospital?: No  Reason:: Unable to get to pharmacy  Did you take your medications as prescribed?: No  Were you able to get to your follow-up appointments?: No  Reason:: Compliance  During previous admission, was a post-acute recommendation made?: No    Patient Information  Admitted from:: Home  Mental Status: Alert  During Assessment patient was accompanied by: Not accompanied during assessment  Assessment information provided by:: Patient  Primary Caregiver: Self  Support Systems: Spouse/significant other  County of Residence: [place]  What city do you live in?: [place]  Home entry access options. Select all that apply.: Stairs  Number of steps to enter home.: 2  Do the steps have railings?: Yes  Type of Current Residence: Travel Trailer/ Mobile Home  Living Arrangements: Lives w/ Spouse/significant other  Is  patient a ?: No    Activities of Daily Living Prior to Admission  Functional Status: Independent  Completes ADLs independently?: Yes  Ambulates independently?: Yes  Does patient use assisted devices?: Yes  Assisted Devices (DME) used: Home Oxygen concentrator  DME Company Name (respiratory supplies): [employer]  O2 Rate(s): 2L  Does patient currently own DME?: No  Does patient have a history of Outpatient Therapy (PT/OT)?: Yes  Does the patient have a history of Short-Term Rehab?: No  Does patient have a history of HHC?: No  Does patient currently have HHC?: No         Patient Information Continued  Income Source: Unemployed  Does patient have prescription coverage?: No  Does patient receive dialysis treatments?: No  Does patient have a history of substance abuse?: No  Does patient have a history of Mental Health Diagnosis?: No         Means of Transportation  Means of Transport to Appts:: Drives Self      Housing Stability: Low Risk  (1/19/2024)    Housing Stability Vital Sign     Unable to Pay for Housing in the Last Year: No     Number of Places Lived in the Last Year: 1     Unstable Housing in the Last Year: No   Food Insecurity: Food Insecurity Present (1/19/2024)    Hunger Vital Sign     Worried About Running Out of Food in the Last Year: Sometimes true     Ran Out of Food in the Last Year: Sometimes true   Transportation Needs: No Transportation Needs (1/19/2024)    PRAPARE - Transportation     Lack of Transportation (Medical): No     Lack of Transportation (Non-Medical): No   Utilities: At Risk (1/19/2024)    AHC Utilities     Threatened with loss of utilities: Yes       DISCHARGE DETAILS:    Discharge planning discussed with:: patient  Freedom of Choice: Yes  Comments - Freedom of Choice: D/C needs tbd  CM contacted family/caregiver?: No- see comments                  Requested Home Health Care         Is the patient interested in HHC at discharge?: No    DME Referral Provided  Referral made for DME?:  No    Other Referral/Resources/Interventions Provided:  Referral Comments: D/C needs tbd         Treatment Team Recommendation: Home  Discharge Destination Plan:: Home  Transport at Discharge : Automobile, Family

## 2024-01-19 NOTE — ASSESSMENT & PLAN NOTE
Unclear etiology. She reports she feels her prior UTI was not adequately txt. Prior UA did not reflex to urine cx given only mild pyuria. Regardless received IV ctx and was dcd on keflex x 1 day  Will obtain UA  S/p CT a/p no acute pathology  Pt also report of chronic low back pain due to hx of L1 compression fx. Specifically asking for pain analgesics and s/p pdmp review scripts provided by multiple providers. Trial use of tramadol and IV toradol. Cautious with escalation of pain analgesics, as on last admission pt had episodes of desaturation on narcotics

## 2024-01-19 NOTE — H&P
[facility]  H&P  Name: Nanci Navarro 58 y.o. female I MRN: [id number]  Unit/Bed#: ED 24 I Date of Admission: 1/18/2024   Date of Service: 1/19/2024 I Hospital Day: 0      Assessment/Plan   * Chest pain, unspecified  Assessment & Plan  Along with sob  Atypical  S/p serial trop neg x 3  S/p EKG no acute st changes  S/p CT chest/abp/pelvis  No sign of copd exacerbation  Underlying chronic resp failure with use of O2 1-2L prn, possibly pt may now have increased o2 requirements  Nml bnp  Incentive spirometry  Noted esophagitis on CT scan but no sign of ALARM symptoms. On PPI, consider referral to GI for endoscopy eval    Flank pain  Assessment & Plan  Unclear etiology. She reports she feels her prior UTI was not adequately txt. Prior UA did not reflex to urine cx given only mild pyuria. Regardless received IV ctx and was dcd on keflex x 1 day  Will obtain UA  S/p CT a/p no acute pathology  Pt also report of chronic low back pain due to hx of L1 compression fx. Specifically asking for pain analgesics and s/p pdmp review scripts provided by multiple providers. Trial use of tramadol and IV toradol. Cautious with escalation of pain analgesics, as on last admission pt had episodes of desaturation on narcotics    Chronic respiratory failure (HCC)  Assessment & Plan  + chronic use of 1-2L prn though pt non compliant  Possibly pt may have increased o2 requirements  Home o2 eval prior to discharge    Volume overload  Assessment & Plan  Likely iatrogenic, recent admission for sepsis  S/p recent echo preserved ef and nml diastolic function  Will place on IV lasix daily, starting off x 2 days w/ potassium supplements  Monitor I/O    Hypokalemia  Assessment & Plan  Replete accordingly    Chronic obstructive pulmonary disease with acute exacerbation (HCC)  Assessment & Plan  No sign of exacerbation  + chronic use of 1-2L prn though pt non compliant  Cont pulm inhalers, duoneb  Smoking cessation       VTE  Prophylaxis: Enoxaparin (Lovenox)  / sequential compression device   Code Status: Full code  POLST: POLST is not applicable to this patient  Discussion with family: D/w patient     Anticipated Length of Stay:  Patient will be admitted on an Inpatient basis with an anticipated length of stay of  > 2 midnights.   Justification for Hospital Stay: Flank pain, cp, sob    Total Time for Visit, including Counseling / Coordination of Care: 60 minutes.  Greater than 50% of this total time spent on direct patient counseling and coordination of care.    Chief Complaint:   Chest, flank pain, sob    History of Present Illness:    Nanci Navarro is a 58 y.o. female medical hx significant for COPD, chronic resp failure, chronic back pain, fibromyalgia presents with cp, sob and flank pain. She was just discharged on 1/17 where she was for sepsis secondary to UTI w/ IV ctx. She reports that since being home she has noticed that her pox was low though she admits that she had not been compliant with her use of oxygen. She also reports of 1 day hx of substernal cp w/ no radiation, + sob. She also reports of L flank pain and states that she feels she still has a UTI. After txt with IV CTX she was discharged on keflex x 1 day which she admits she did not take. She also reports of LE edema and fullness in the face. She denies hx of HF.    Upon presentation to the ED, pox in the 80's but not w/o use of o2. S/p CT c/a/p.    Review of Systems:    Review of Systems   Respiratory:  Positive for shortness of breath.    Cardiovascular:  Positive for chest pain and leg swelling.   Genitourinary:  Positive for flank pain.   Musculoskeletal:  Positive for back pain.       Past Medical and Surgical History:     Past Medical History:   Diagnosis Date    Achalasia     Back pain     Cancer (HCC)     Ovarian    Fibromyalgia     Lupus (HCC)        Past Surgical History:   Procedure Laterality Date    HYSTERECTOMY      NECK SURGERY          Meds/Allergies:    Prior to Admission medications    Medication Sig Start Date End Date Taking? Authorizing Provider   acetaminophen (TYLENOL) 325 mg tablet Take 2 tablets (650 mg total) by mouth every 6 (six) hours 1/17/24   Grant Alonso MD   albuterol (PROVENTIL HFA,VENTOLIN HFA) 90 mcg/act inhaler Inhale 2 puffs every 4 (four) hours as needed for wheezing 10/8/22   Brad Jaffe MD   bisacodyl (DULCOLAX) 5 mg EC tablet Take 1 tablet (5 mg total) by mouth daily as needed for constipation 11/29/23   Daniel Chaney MD   cephalexin (KEFLEX) 500 mg capsule Take 1 capsule (500 mg total) by mouth every 6 (six) hours for 1 day Do not start before January 18, 2024. 1/18/24 1/19/24  Grant Alonso MD   Diclofenac Sodium (VOLTAREN) 1 % Apply 2 g topically 4 (four) times a day 11/29/23   Daniel Chaney MD   Fluticasone Furoate-Vilanterol (Breo Ellipta) 100-25 mcg/actuation inhaler Inhale 1 puff daily Rinse mouth after use. 1/17/24 2/16/24  Grant Alonso MD   ipratropium-albuterol (DUO-NEB) 0.5-2.5 mg/3 mL nebulizer solution Take 3 mL by nebulization 4 (four) times a day 1/17/24 2/16/24  Grant Alonso MD   methocarbamol (ROBAXIN) 500 mg tablet Take 1 tablet (500 mg total) by mouth 4 (four) times a day as needed for muscle spasms for up to 12 doses 1/17/24   Grant Alonso MD   nicotine (NICODERM CQ) 14 mg/24hr TD 24 hr patch Place 1 patch on the skin daily Do not start before October 8, 2022. 10/8/22   Brad Jaffe MD   pantoprazole (PROTONIX) 40 mg tablet Take 1 tablet (40 mg total) by mouth daily 10/8/22   Daron NICOLAS MD   tiotropium (Spiriva HandiHaler) 18 mcg inhalation capsule Place 1 capsule (18 mcg total) into inhaler and inhale daily 1/17/24 2/16/24  Grant Alonso MD     I have reviewed home medications using allscripts.    Allergies:   Allergies   Allergen Reactions    Valium [Diazepam] Anaphylaxis       Social History:     Marital Status: /Civil Union   Occupation:   Patient  Pre-hospital Living Situation: Resides with   Patient Pre-hospital Level of Mobility: indedependent  Patient Pre-hospital Diet Restrictions: none  Substance Use History:   Social History     Substance and Sexual Activity   Alcohol Use Never     Social History     Tobacco Use   Smoking Status Every Day    Current packs/day: 0.50    Types: Cigarettes   Smokeless Tobacco Never     Social History     Substance and Sexual Activity   Drug Use Never       Family History:    History reviewed. No pertinent family history.    Physical Exam:     Vitals:   Blood Pressure: 138/69 (01/19/24 0200)  Pulse: 83 (01/19/24 0200)  Temperature: 98.1 °F (36.7 °C) (01/18/24 1847)  Temp Source: Temporal (01/18/24 1847)  Respirations: 20 (01/19/24 0200)  SpO2: 96 % (01/19/24 0200)    Physical Exam  Constitutional:       Appearance: She is obese.   Cardiovascular:      Rate and Rhythm: Normal rate and regular rhythm.      Pulses: Normal pulses.      Heart sounds: Normal heart sounds.   Pulmonary:      Effort: Pulmonary effort is normal. No respiratory distress.      Breath sounds: Normal breath sounds. No wheezing or rales.   Abdominal:      General: Bowel sounds are normal. There is no distension.      Palpations: Abdomen is soft.      Tenderness: There is no abdominal tenderness. There is no guarding.   Musculoskeletal:      Cervical back: Normal range of motion and neck supple.      Right lower leg: Edema present.      Left lower leg: Edema present.   Skin:     General: Skin is warm and dry.   Neurological:      General: No focal deficit present.      Mental Status: She is alert and oriented to person, place, and time. Mental status is at baseline.      Cranial Nerves: No cranial nerve deficit.      Motor: No weakness.         Additional Data:     Lab Results: I have personally reviewed pertinent reports.      Results from last 7 days   Lab Units 01/18/24  1856   WBC Thousand/uL 6.10   HEMOGLOBIN g/dL 11.3*   HEMATOCRIT % 34.8    PLATELETS Thousands/uL 324   NEUTROS PCT % 77*   LYMPHS PCT % 14   MONOS PCT % 7   EOS PCT % 1     Results from last 7 days   Lab Units 01/18/24  1856   SODIUM mmol/L 141   POTASSIUM mmol/L 3.3*   CHLORIDE mmol/L 103   CO2 mmol/L 31   BUN mg/dL 9   CREATININE mg/dL 0.55*   ANION GAP mmol/L 7   CALCIUM mg/dL 8.8   ALBUMIN g/dL 3.5   TOTAL BILIRUBIN mg/dL 0.46   ALK PHOS U/L 82   ALT U/L 18   AST U/L 12*   GLUCOSE RANDOM mg/dL 173*     Results from last 7 days   Lab Units 01/18/24  2117   INR  0.99     Results from last 7 days   Lab Units 01/17/24  1050 01/17/24  0719 01/16/24  1626 01/16/24  1057 01/16/24  0725 01/15/24  2121 01/15/24  1629 01/15/24  1135 01/15/24  0738 01/14/24  2045 01/14/24  1808 01/14/24  1740   POC GLUCOSE mg/dl 269* 105 153* 187* 112 169* 205* 162* 100 218* 139 106         Results from last 7 days   Lab Units 01/18/24  2117 01/16/24  0623 01/14/24  1650 01/14/24  1402   LACTIC ACID mmol/L 1.0  --  0.8 2.6*   PROCALCITONIN ng/ml 0.06 0.06  --  0.06       Imaging: I have personally reviewed pertinent reports.      CT chest abdomen pelvis w contrast   Final Result by Caio Martin MD (01/18 4210)      1.  Bibasilar atelectasis.   2.  Mild diffuse thickening of the esophagus compatible with esophagitis similar to prior exam.   3.  Diverticulosis without evidence of diverticulitis.         Workstation performed: [id number]         XR chest 1 view portable    (Results Pending)       EKG, Pathology, and Other Studies Reviewed on Admission:   EKG: NSR @ 95bpm, nml axis    Allscripts / Epic Records Reviewed: Yes     ** Please Note: This note has been constructed using a voice recognition system. **

## 2024-01-19 NOTE — ASSESSMENT & PLAN NOTE
No sign of exacerbation  + chronic use of 1-2L prn though pt non compliant  Cont pulm inhalers, duoneb  Smoking cessation

## 2024-01-19 NOTE — ASSESSMENT & PLAN NOTE
Likely iatrogenic, recent admission for sepsis  S/p recent echo preserved ef and nml diastolic function  Will place on IV lasix daily, starting off x 2 days w/ potassium supplements  Monitor I/O

## 2024-01-19 NOTE — QUICK NOTE
Patient seen and examined after postmidnight admission.  Patient complains of persistent chest pain and back pain.  Specifically she points to her left chest and and states her back pain is in her left flank.  She states the Toradol is taking the edge off of the pain.  She states that her pain control must be better than it is now.    105/58, 77, 18, 96% on 2 L nasal cannula  No acute distress, awake and alert, appears chronically ill and older than stated age  Regular rate and rhythm, normal S1-S2  Clear to auscultation bilaterally  Cranial nerves II through XII are intact, nonfocal    Cr 0.71  Trop NEG x 3  Lactic Acid 1.0  Procal 0.06    CT C/A/P 1/18/24:   1.  Bibasilar atelectasis.   2.  Mild diffuse thickening of the esophagus compatible with esophagitis similar to prior exam.   3.  Diverticulosis without evidence of diverticulitis.     ECG (read by me): NSR @ 95, nl axis, nl NV/QRS intervals, QTc 462ms, no acute ST/TW changes.    Chest pain and flank pain:  -Troponin negative x 3  -ECG without acute ischemic changes  -CT C/A/P without etiology of pain (doubt esophagitis is the cause of the patient's sharp chest and back pain)  -Okay to continue Toradol and tramadol.  I did discuss with the patient again that escalating narcotic therapy is too dangerous as she desaturates increased oxygen requirements when she received narcotic therapy on the last admission.  -I also reviewed the [place] and do note that the patient has been receiving narcotics from multiple providers.  -PT/OT

## 2024-01-19 NOTE — RESPIRATORY THERAPY NOTE
RT Protocol Note  Nanci Navarro 58 y.o. female MRN: [id number]  Unit/Bed#: ED 24 Encounter: [id number]    Assessment    Principal Problem:    Chest pain, unspecified  Active Problems:    Chronic obstructive pulmonary disease with acute exacerbation (HCC)    Hypokalemia    Volume overload    Chronic respiratory failure (HCC)    Flank pain      Home Pulmonary Medications:     01/19/24 0256   Respiratory Protocol   Protocol Initiated? Yes   Protocol Selection Respiratory   Language Barrier? No   Medical & Social History Reviewed? Yes   Diagnostic Studies Reviewed? Yes   Physical Assessment Performed? Yes   Respiratory Plan Mild Distress pathway   Respiratory Assessment   Assessment Type Assess only   General Appearance Drowsy   Chest Assessment Chest expansion symmetrical   Bilateral Breath Sounds Diminished   Location Specific No   Cough None   Resp Comments Pt use inhalers and nebulizers at home. No Home O2 usage or cpap's. HX of COPD   O2 Device NC   Additional Assessments   Pulse 82   Respirations 18   SpO2 96 %            Past Medical History:   Diagnosis Date    Achalasia     Back pain     Cancer (HCC)     Ovarian    Fibromyalgia     Lupus (HCC)      Social History     Socioeconomic History    Marital status: /Civil Union     Spouse name: None    Number of children: None    Years of education: None    Highest education level: None   Occupational History    None   Tobacco Use    Smoking status: Every Day     Current packs/day: 0.50     Types: Cigarettes    Smokeless tobacco: Never   Vaping Use    Vaping status: Never Used   Substance and Sexual Activity    Alcohol use: Never    Drug use: Never    Sexual activity: Yes   Other Topics Concern    None   Social History Narrative    None     Social Determinants of Health     Financial Resource Strain: Unknown (12/23/2023)    Received from [facility]    Overall Financial Resource Strain (CARDIA)     Difficulty of Paying Living Expenses: Patient  refused   Food Insecurity: Food Insecurity Present (1/15/2024)    Hunger Vital Sign     Worried About Running Out of Food in the Last Year: Sometimes true     Ran Out of Food in the Last Year: Sometimes true   Transportation Needs: No Transportation Needs (1/15/2024)    PRAPARE - Transportation     Lack of Transportation (Medical): No     Lack of Transportation (Non-Medical): No   Physical Activity: Not on file   Stress: Not on file   Social Connections: Not on file   Intimate Partner Violence: Unknown (12/23/2023)    Received from [facility]    Humiliation, Afraid, Rape, and Kick questionnaire     Fear of Current or Ex-Partner: Patient refused     Emotionally Abused: Patient refused     Physically Abused: Patient refused     Sexually Abused: Patient refused   Housing Stability: Low Risk  (1/15/2024)    Housing Stability Vital Sign     Unable to Pay for Housing in the Last Year: No     Number of Places Lived in the Last Year: 1     Unstable Housing in the Last Year: No       Subjective         Objective    Physical Exam:   Assessment Type: Assess only  General Appearance: Drowsy  Chest Assessment: Chest expansion symmetrical  Bilateral Breath Sounds: Diminished  Location Specific: No  Cough: None  O2 Device: NC    Vitals:  Blood pressure 138/69, pulse 82, temperature 98.1 °F (36.7 °C), temperature source Temporal, resp. rate 18, SpO2 96%.          Imaging and other studies: I have personally reviewed pertinent reports.      O2 Device: NC     Plan    Respiratory Plan: Mild Distress pathway        Resp Comments: Pt use inhalers and nebulizers at home. No Home O2 usage or cpap's. HX of COPD

## 2024-01-19 NOTE — ASSESSMENT & PLAN NOTE
+ chronic use of 1-2L prn though pt non compliant  Possibly pt may have increased o2 requirements  Home o2 eval prior to discharge

## 2024-01-19 NOTE — CASE MANAGEMENT
Case Management Discharge Planning Note    Patient name Nanci Navarro  Location ED 24/ED 24 MRN [id number]  : 1965 Date 2024       Current Admission Date: 2024  Current Admission Diagnosis:Chest pain, unspecified   Patient Active Problem List    Diagnosis Date Noted    Flank pain 2024    Diabetes (HCC) 2024    Chronic respiratory failure (HCC) 2024    Electrolyte abnormality 2024    Osteoporosis 2023    Gastroesophageal reflux disease without esophagitis 2023    Chest pain 01/15/2023    Acute respiratory failure (HCC) 01/15/2023    Volume overload 01/15/2023    Ambulatory dysfunction 2023    Compression fracture of L1 vertebra (HCC) 2023    COPD exacerbation (HCC) 2023    Sepsis ([facility]) 2023    Fall 2023    Systemic lupus erythematosus with lung involvement ([facility]) 12/10/2022    Respiratory failure with hypoxia ([facility]) 10/06/2022    Hypokalemia 10/04/2022    Chest pain, unspecified 2022    Chronic obstructive pulmonary disease with acute exacerbation (HCC) 2022    Smoker 2022      LOS (days): 0  Geometric Mean LOS (GMLOS) (days):   Days to GMLOS:     OBJECTIVE:  Risk of Unplanned Readmission Score: 19.47         Current admission status: Inpatient   Preferred Pharmacy:   [facility] [place] - [address]  [address]  [address]  [facility] [address]  Phone: [phone] Fax: [phone]    [id number]  [place] - [address]  [address] [address]  Phone: [phone] Fax: [phone]    [facility]  [place] - [address],  [address],  [address]ound [address] [facility] [address]  Phone: [phone] Fax: [phone]    [id number] - [place] - [address]  [address]  [place] [address]  Phone: [phone] Fax:  [phone]    [facility] - [place] - [address]  [address]  [place] [address]  Phone: [phone] Fax: [phone]    Primary Care Provider: Renan Montero DO    Primary Insurance:   Secondary Insurance:     DISCHARGE DETAILS:     CM confirmed with [facility] pharmacy. Meds to beds from prior admission are at [facility].

## 2024-01-19 NOTE — ASSESSMENT & PLAN NOTE
Along with sob  Atypical  S/p serial trop neg x 3  S/p EKG no acute st changes  S/p CT chest/abp/pelvis  No sign of copd exacerbation  Underlying chronic resp failure with use of O2 1-2L prn, possibly pt may now have increased o2 requirements  Nml bnp  Incentive spirometry  Noted esophagitis on CT scan but no sign of ALARM symptoms. On PPI, consider referral to GI for endoscopy eval

## 2024-01-20 LAB
BACTERIA BLD CULT: NORMAL
BACTERIA BLD CULT: NORMAL
GLUCOSE SERPL-MCNC: 117 MG/DL (ref 65–140)
GLUCOSE SERPL-MCNC: 120 MG/DL (ref 65–140)
GLUCOSE SERPL-MCNC: 183 MG/DL (ref 65–140)
GLUCOSE SERPL-MCNC: 236 MG/DL (ref 65–140)

## 2024-01-20 PROCEDURE — 94664 DEMO&/EVAL PT USE INHALER: CPT

## 2024-01-20 PROCEDURE — 97166 OT EVAL MOD COMPLEX 45 MIN: CPT | Performed by: OCCUPATIONAL THERAPIST

## 2024-01-20 PROCEDURE — 94640 AIRWAY INHALATION TREATMENT: CPT

## 2024-01-20 PROCEDURE — 82948 REAGENT STRIP/BLOOD GLUCOSE: CPT

## 2024-01-20 PROCEDURE — 94760 N-INVAS EAR/PLS OXIMETRY 1: CPT

## 2024-01-20 PROCEDURE — 99233 SBSQ HOSP IP/OBS HIGH 50: CPT | Performed by: STUDENT IN AN ORGANIZED HEALTH CARE EDUCATION/TRAINING PROGRAM

## 2024-01-20 RX ADMIN — NICOTINE 1 PATCH: 14 PATCH, EXTENDED RELEASE TRANSDERMAL at 08:16

## 2024-01-20 RX ADMIN — Medication 2 G: at 11:49

## 2024-01-20 RX ADMIN — TRAMADOL HYDROCHLORIDE 50 MG: 50 TABLET, COATED ORAL at 22:19

## 2024-01-20 RX ADMIN — ENOXAPARIN SODIUM 40 MG: 40 INJECTION SUBCUTANEOUS at 08:16

## 2024-01-20 RX ADMIN — Medication 2 G: at 17:48

## 2024-01-20 RX ADMIN — FLUTICASONE FUROATE AND VILANTEROL TRIFENATATE 1 PUFF: 100; 25 POWDER RESPIRATORY (INHALATION) at 08:22

## 2024-01-20 RX ADMIN — LEVALBUTEROL HYDROCHLORIDE 1.25 MG: 1.25 SOLUTION RESPIRATORY (INHALATION) at 19:22

## 2024-01-20 RX ADMIN — ACETAMINOPHEN 650 MG: 325 TABLET, FILM COATED ORAL at 20:33

## 2024-01-20 RX ADMIN — Medication 2 G: at 08:07

## 2024-01-20 RX ADMIN — DOCUSATE SODIUM 100 MG: 100 CAPSULE, LIQUID FILLED ORAL at 17:48

## 2024-01-20 RX ADMIN — DOCUSATE SODIUM 100 MG: 100 CAPSULE, LIQUID FILLED ORAL at 08:18

## 2024-01-20 RX ADMIN — KETOROLAC TROMETHAMINE 15 MG: 30 INJECTION, SOLUTION INTRAMUSCULAR; INTRAVENOUS at 18:35

## 2024-01-20 RX ADMIN — METHOCARBAMOL TABLETS 500 MG: 500 TABLET, COATED ORAL at 20:33

## 2024-01-20 RX ADMIN — INSULIN LISPRO 4 UNITS: 100 INJECTION, SOLUTION INTRAVENOUS; SUBCUTANEOUS at 18:03

## 2024-01-20 RX ADMIN — KETOROLAC TROMETHAMINE 15 MG: 30 INJECTION, SOLUTION INTRAMUSCULAR; INTRAVENOUS at 03:56

## 2024-01-20 RX ADMIN — TRAMADOL HYDROCHLORIDE 50 MG: 50 TABLET, COATED ORAL at 16:06

## 2024-01-20 RX ADMIN — METHOCARBAMOL TABLETS 500 MG: 500 TABLET, COATED ORAL at 13:04

## 2024-01-20 RX ADMIN — KETOROLAC TROMETHAMINE 15 MG: 30 INJECTION, SOLUTION INTRAMUSCULAR; INTRAVENOUS at 11:48

## 2024-01-20 RX ADMIN — TRAMADOL HYDROCHLORIDE 50 MG: 50 TABLET, COATED ORAL at 08:18

## 2024-01-20 RX ADMIN — METHOCARBAMOL TABLETS 500 MG: 500 TABLET, COATED ORAL at 04:01

## 2024-01-20 RX ADMIN — LEVALBUTEROL HYDROCHLORIDE 1.25 MG: 1.25 SOLUTION RESPIRATORY (INHALATION) at 07:01

## 2024-01-20 RX ADMIN — IPRATROPIUM BROMIDE 0.5 MG: 0.5 SOLUTION RESPIRATORY (INHALATION) at 19:22

## 2024-01-20 RX ADMIN — FUROSEMIDE 40 MG: 10 INJECTION, SOLUTION INTRAMUSCULAR; INTRAVENOUS at 11:56

## 2024-01-20 RX ADMIN — PANTOPRAZOLE SODIUM 40 MG: 40 TABLET, DELAYED RELEASE ORAL at 08:17

## 2024-01-20 RX ADMIN — POTASSIUM CHLORIDE 40 MEQ: 1500 TABLET, EXTENDED RELEASE ORAL at 08:18

## 2024-01-20 RX ADMIN — IPRATROPIUM BROMIDE 0.5 MG: 0.5 SOLUTION RESPIRATORY (INHALATION) at 07:01

## 2024-01-20 RX ADMIN — INSULIN LISPRO 2 UNITS: 100 INJECTION, SOLUTION INTRAVENOUS; SUBCUTANEOUS at 12:45

## 2024-01-20 NOTE — RESPIRATORY THERAPY NOTE
RT Protocol Note  Nanci Navarro 58 y.o. female MRN: [id number]  Unit/Bed#: -01 Encounter: [id number]    Assessment    Principal Problem:    Chest pain, unspecified  Active Problems:    Chronic obstructive pulmonary disease with acute exacerbation (HCC)    Hypokalemia    Volume overload    Chronic respiratory failure (HCC)    Flank pain      Home Pulmonary Medications:  duoNebt QID  Breo       Past Medical History:   Diagnosis Date    Achalasia     Back pain     Cancer (HCC)     Ovarian    Fibromyalgia     Lupus (HCC)      Social History     Socioeconomic History    Marital status: /Civil Union     Spouse name: None    Number of children: None    Years of education: None    Highest education level: None   Occupational History    None   Tobacco Use    Smoking status: Every Day     Current packs/day: 0.50     Types: Cigarettes    Smokeless tobacco: Never   Vaping Use    Vaping status: Never Used   Substance and Sexual Activity    Alcohol use: Never    Drug use: Never    Sexual activity: Yes   Other Topics Concern    None   Social History Narrative    None     Social Determinants of Health     Financial Resource Strain: Unknown (12/23/2023)    Received from [facility]    Overall Financial Resource Strain (CARDIA)     Difficulty of Paying Living Expenses: Patient refused   Food Insecurity: Food Insecurity Present (1/19/2024)    Hunger Vital Sign     Worried About Running Out of Food in the Last Year: Sometimes true     Ran Out of Food in the Last Year: Sometimes true   Transportation Needs: No Transportation Needs (1/19/2024)    PRAPARE - Transportation     Lack of Transportation (Medical): No     Lack of Transportation (Non-Medical): No   Physical Activity: Not on file   Stress: Not on file   Social Connections: Not on file   Intimate Partner Violence: Unknown (12/23/2023)    Received from [facility]    Humiliation, Afraid, Rape, and Kick questionnaire     Fear of  Current or Ex-Partner: Patient refused     Emotionally Abused: Patient refused     Physically Abused: Patient refused     Sexually Abused: Patient refused   Housing Stability: Low Risk  (1/19/2024)    Housing Stability Vital Sign     Unable to Pay for Housing in the Last Year: No     Number of Places Lived in the Last Year: 1     Unstable Housing in the Last Year: No       Subjective         Objective    Physical Exam:   Assessment Type: Assess only  General Appearance: Alert, Awake  Respiratory Pattern: Normal  Chest Assessment: Chest expansion symmetrical  Bilateral Breath Sounds: Clear  O2 Device: NC    Vitals:  Blood pressure 105/58, pulse 77, temperature 98.1 °F (36.7 °C), temperature source Temporal, resp. rate 18, SpO2 96%.          Imaging and other studies:     O2 Device: NC     Plan    Respiratory Plan: Home Bronchodilator Patient pathway        Resp Comments: patient awake and alert in no apparent resp distress, pt states her breathing feels tight and that she takes txs TID when at home. WIll cchange txs to TID to reflect patient's home regimen.
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
## 2024-01-20 NOTE — RESPIRATORY THERAPY NOTE
RT Protocol Note  Nanci Navarro 58 y.o. female MRN: [id number]  Unit/Bed#: -01 Encounter: [id number]    Assessment    Principal Problem:    Chest pain, unspecified  Active Problems:    Chronic obstructive pulmonary disease with acute exacerbation (HCC)    Hypokalemia    Volume overload    Chronic respiratory failure (HCC)    Flank pain  Physical Exam:   Assessment Type: Post-treatment  General Appearance: Awake, Alert  Respiratory Pattern: Normal  Chest Assessment: Chest expansion symmetrical  Bilateral Breath Sounds: Diminished, Clear  O2 Device: nc    Vitals:  Blood pressure 124/64, pulse 70, temperature 98.9 °F (37.2 °C), resp. rate 17, weight 98.3 kg (216 lb 11.4 oz), SpO2 96%.    O2 Device: nc     Plan    Respiratory Plan: Home Bronchodilator Patient pathway        Resp Comments: pt offers no complaints at this time, will cont txs bid

## 2024-01-20 NOTE — PLAN OF CARE
Problem: Potential for Falls  Goal: Patient will remain free of falls  Description: INTERVENTIONS:  - Educate patient/family on patient safety including physical limitations  - Instruct patient to call for assistance with activity   - Consult OT/PT to assist with strengthening/mobility   - Keep Call bell within reach  - Keep bed low and locked with side rails adjusted as appropriate  - Keep care items and personal belongings within reach  - Initiate and maintain comfort rounds  - Make Fall Risk Sign visible to staff  - Offer Toileting every 2 Hours, in advance of need  - Initiate/Maintain bed alarm  - Obtain necessary fall risk management equipment:   - Apply yellow socks and bracelet for high fall risk patients  - Consider moving patient to room near nurses station  Outcome: Progressing

## 2024-01-20 NOTE — PLAN OF CARE
Problem: OCCUPATIONAL THERAPY ADULT  Goal: Performs self-care activities at highest level of function for planned discharge setting.  See evaluation for individualized goals.  Description: Treatment Interventions: ADL retraining, Functional transfer training, UE strengthening/ROM, Endurance training, Patient/family training, Equipment evaluation/education, Energy conservation          See flowsheet documentation for full assessment, interventions and recommendations.   1/20/2024 1329 by Jayleen Oliveira OT  Outcome: Progressing  Note: Limitation: Decreased UE strength, Decreased Safe judgement during ADL, Decreased endurance, Mood limitation  Prognosis: Fair  Assessment: Therapist found pt supine in bed. Pt agreed to participate in tx with encouragement, reasoning, and edu. Pt became upset and agitated with therapist when asked to sit at EOB. Pt utilized bedrails for supine to sit with S, F static sitting bal, F- dynamic when attempted LBD of socks, then said no due to inc back pain. Pt perf STS from bed with CG A, refused to use RW to bathroom; even after education from therapist of safety. Pt CG A handheld short distance to bathroom, F- dynamic standing bal. Min A toileting of t/f, and supportive standing during pericare. Pt again edu on use of RW for optimal safety. Pt pushed RW out of way. S for bed mobility sit to supine. All needs in reach, and call bell. Pt would benefit from continued skilled OT tx 2-3x/week in acute hospital setting. Pt would benefit from additional rehab upon d/c, but pt said no to any rehab setting. Therapist recommending d/c to home with home services and therapy.     Rehab Resource Intensity Level, OT: III (Minimum Resource Intensity)       1/20/2024 1328 by Jayleen Oliveira OT  Outcome: Progressing  Note: Limitation: Decreased UE strength, Decreased Safe judgement during ADL, Decreased endurance, Mood limitation  Prognosis: Fair  Assessment: Therapist found pt supine in bed. Pt agreed  to participate in tx with encouragement, reasoning, and edu. Pt became upset and agitated with therapist when asked to sit at EOB. Pt utilized bedrails for supine to sit with S, F static sitting bal, F- dynamic when attempted LBD of socks, then said no due to inc back pain. Pt perf STS from bed with CG A, refused to use RW to bathroom; even after education from therapist of safety. Pt CG A handheld short distance to bathroom, F- dynamic standing bal. Min A toileting of t/f, and supportive standing during pericare. Pt again edu on use of RW for optimal safety. Pt pushed RW out of way. S for bed mobility sit to supine. All needs in reach, and call bell. Pt would benefit from continued skilled OT tx 2-3x/week in acute hospital setting. Pt would benefit from additional rehab upon d/c, but pt said no to any rehab setting. Therapist recommending d/c to home with home services and therapy.     Rehab Resource Intensity Level, OT: III (Minimum Resource Intensity)

## 2024-01-20 NOTE — ASSESSMENT & PLAN NOTE
Likely iatrogenic, recent admission for sepsis  S/p recent echo preserved ef and nml diastolic function  Given dose of IV lasix, no further intervention

## 2024-01-20 NOTE — OCCUPATIONAL THERAPY NOTE
"    Occupational Therapy Evaluation     Patient Name: Nanci Navarro  Today's Date: 1/20/2024  Problem List  Principal Problem:    Chest pain, unspecified  Active Problems:    Chronic obstructive pulmonary disease with acute exacerbation (HCC)    Hypokalemia    Volume overload    Chronic respiratory failure (HCC)    Flank pain    Past Medical History  Past Medical History:   Diagnosis Date    Achalasia     Back pain     Cancer (HCC)     Ovarian    Fibromyalgia     Lupus (HCC)      Past Surgical History  Past Surgical History:   Procedure Laterality Date    HYSTERECTOMY      NECK SURGERY           01/20/24 0835   OT Last Visit   OT Visit Date 01/20/24   Note Type   Note type Evaluation   Pain Assessment   Pain Assessment Tool 0-10   Pain Score 7   Pain Location/Orientation Location: Back   Restrictions/Precautions   Weight Bearing Precautions Per Order No   Other Precautions Chair Alarm;Bed Alarm;O2;Fall Risk  (02 2L)   Home Living   Type of Home Mobile home   Home Layout One level  (2 LISA)   Bathroom Shower/Tub Tub/shower unit   Bathroom Toilet Standard   Bathroom Equipment   (reports none)   Home Equipment   (home 02 but noncompliant)   Prior Function   Level of [place] Independent with ADLs;Independent with functional mobility   Lives With Spouse   Receives Help From Family   IADLs Independent with medication management;Independent with meal prep;Independent with driving  (as per pt \"I can do everything\")   Falls in the last 6 months 0   General   Additional Pertinent History Nanci Navarro is a 58 y.o. female medical hx significant for COPD, chronic resp failure, chronic back pain, fibromyalgia presents with cp, sob and flank pain. She was just discharged on 1/17 where she was for sepsis secondary to UTI w/ IV ctx. She reports that since being home she has noticed that her pox was low though she admits that she had not been compliant with her use of oxygen. She also reports of 1 day hx of substernal cp " "w/ no radiation, + sob. She also reports of L flank pain and states that she feels she still has a UTI. After txt with IV CTX she was discharged on keflex x 1 day which she admits she did not take. She also reports of LE edema and fullness in the face. She denies hx of HF.   Subjective   Subjective \"i am not going to use the walker.\"   ADL   LB Dressing Assistance 2  Maximal Assistance   LB Dressing Deficit   (inc back pain)   Toileting Assistance  4  Minimal Assistance   Toileting Deficit Impulsive;Supervison/safety;Increased time to complete;Other (Comment)  (inc back pain)   Bed Mobility   Supine to Sit 5  Supervision   Additional items Bedrails;Increased time required;Impulsive   Sit to Supine 5  Supervision   Additional items Bedrails;Increased time required;Impulsive   Transfers   Sit to Stand 4  Minimal assistance   Stand to Sit 4  Minimal assistance   Toilet transfer 4  Minimal assistance   Additional Comments CG A for all transfers and functional mobility 2* pt refusing to use RW, perf handheld assist, dec balance, unsteady   Functional Mobility   Functional Mobility 4  Minimal assistance   Additional Comments CG A for all transfers and functional mobility 2* pt refusing to use RW, perf handheld assist, dec balance, unsteady   Balance   Static Sitting Fair -   Dynamic Sitting Poor +   Static Standing Fair -   Dynamic Standing Poor +   Activity Tolerance   Activity Tolerance Patient limited by fatigue;Patient limited by pain;Treatment limited secondary to agitation   Medical Staff Made Aware Zayra approved pt for tx; PT made aware of pt status   RUE Assessment   RUE Assessment WFL   LUE Assessment   LUE Assessment WFL   Hand Function   Gross Motor Coordination Functional   Fine Motor Coordination Functional   Psychosocial   Psychosocial (WDL) WDL   Cognition   Overall Cognitive Status WFL   Arousal/Participation Alert;Responsive  (agitated)   Attention Within functional limits   Orientation Level Oriented X4 " "  Following Commands   (can follow commands, but needs encourgement to perf)   Assessment   Limitation Decreased UE strength;Decreased Safe judgement during ADL;Decreased endurance;Mood limitation   Prognosis Fair   Assessment Therapist found pt supine in bed. Pt agreed to participate in tx with encouragement, reasoning, and edu. Pt became upset and agitated with therapist when asked to sit at EOB. Pt utilized bedrails for supine to sit with S, F static sitting bal, F- dynamic when attempted LBD of socks, then said no due to inc back pain. Pt perf STS from bed with CG A, refused to use RW to bathroom; even after education from therapist of safety. Pt CG A handheld short distance to bathroom, F- dynamic standing bal. Min A toileting of t/f, and supportive standing during pericare. Pt again edu on use of RW for optimal safety. Pt pushed RW out of way. S for bed mobility sit to supine. All needs in reach, and call bell. Pt would benefit from continued skilled OT tx 2-3x/week in acute hospital setting. Pt would benefit from additional rehab upon d/c, but pt said no to any rehab setting. Therapist recommending d/c to home with home services and therapy.   Goals   Patient Goals \"i dont want to get dependent on a walker.\"   LTG Time Frame 10-14   Long Term Goal 1- pt will utilized AE for LBD for Mod I as pain mgmt and ECT; 2-Pt will perf all transfers with S with most approp AD PRN; 3- Pt will demo inc activity jennifer of 5 min sinkside standing during grooming tasks with F static balance; 4- Pt will perf all aspects of toileting with Mod I; 5- Pt will be compliant with 02 reccomendation during ADL tasks.   Plan   Treatment Interventions ADL retraining;Functional transfer training;UE strengthening/ROM;Endurance training;Patient/family training;Equipment evaluation/education;Energy conservation   Goal Expiration Date 02/03/24   OT Treatment Day 0   OT Frequency 2-3x/wk   Discharge Recommendation   Rehab Resource Intensity " Level, OT III (Minimum Resource Intensity)   AM-PAC Daily Activity Inpatient   Lower Body Dressing 2   Bathing 2   Toileting 3   Upper Body Dressing 3   Grooming 3   Eating 4   Daily Activity Raw Score 17   Daily Activity Standardized Score (Calc for Raw Score >=11) 37.26    Jayleen Oliveira, MS OTR/L

## 2024-01-20 NOTE — UTILIZATION REVIEW
"Initial Clinical Review    Admission: Date/Time/Statement:   Admission Orders (From admission, onward)       Ordered        01/19/24 0010  INPATIENT ADMISSION  Once                          Orders Placed This Encounter   Procedures    INPATIENT ADMISSION     Standing Status:   Standing     Number of Occurrences:   1     Order Specific Question:   Level of Care     Answer:   Med Surg [16]     Order Specific Question:   Estimated length of stay     Answer:   More than 2 Midnights     Order Specific Question:   Certification     Answer:   I certify that inpatient services are medically necessary for this patient for a duration of greater than two midnights. See H&P and MD Progress Notes for additional information about the patient's course of treatment.     ED Arrival Information       Expected   -    Arrival   1/18/2024 18:38    Acuity   Urgent              Means of arrival   Walk-In    Escorted by   Family Member    Service   Hospitalist    Admission type   Emergency              Arrival complaint   Chest Pain, SOB             Chief Complaint   Patient presents with    Shortness of Breath     Patient reports being admitted for a kidney infection at this hospital and discharged yesterday, reports checking their oxygen at home overnight and seeing it was \"82% and my heart rate was 140\". Patient reports sensation of bloating, mid sternal chest pain and heart \"fluttering\".        Initial Presentation: 58 y.o. female with PMH of COPD, chronic resp failure on 1-2L NC at baseline, chronic back pain, and fibromyalgia who presented to the ED  on 1/18/24 with complaint of chest pain, shortness of breath and flank pain. Patient was recently admitted from 1/14-1/17/24 for sepsis secondary to UTI and was treated with ceftriaxone.  She reports that since being home she has noticed that her pulse ox was low though she admits that she had not been compliant with her use of oxygen. She also reports of one day history of of substernal " chest pain and shortness of breath. She also reports of L flank pain and states that she feels she still has a UTI. After treatment with IV ceftriaxone she was discharged on Keflex which she admits she did not take. PE: AOx3. Normal breath sounds. B/L LE edema.     1/19 Inpatient admission for evaluation and treatment of chest pain, flank pain, chronic respiratory failure, volume overload, hypokalemia:  Trial Tramadol and IV Toradol. Home O2 eval prior to discharge. Place on IV Lasix daily x 2 days with potassium supplements. Monitor I/O. Replete potassium.        ED Triage Vitals   Temperature Pulse Respirations Blood Pressure SpO2   01/18/24 1847 01/18/24 1847 01/18/24 1847 01/18/24 1847 01/18/24 1847   98.1 °F (36.7 °C) 102 (!) 24 164/72 96 % Room air      Temp Source Heart Rate Source Patient Position - Orthostatic VS BP Location FiO2 (%)   01/18/24 1847 01/18/24 1847 01/18/24 1847 01/18/24 1847 --   Temporal Monitor Sitting Left arm       Pain Score       01/19/24 0811       8          Wt Readings from Last 1 Encounters:   01/20/24 98.3 kg (216 lb 11.4 oz)     Additional Vital Signs:        Date/Time Temp Pulse Resp BP MAP (mmHg) SpO2 Calculated FIO2 (%) - Nasal Cannula Nasal Cannula O2 Flow Rate (L/min) O2 Device   01/20/24 15:01:03 98.6 °F (37 °C) 75 -- 96/46 Abnormal  63 Abnormal  95 % -- -- --   01/20/24 11:55:57 -- 70 -- 124/64 84 96 % -- -- --   01/20/24 1027 -- 78 -- -- -- 94 % -- -- --   01/20/24 08:26:23 -- 67 -- 107/57 74 98 % -- -- --   01/20/24 0714 -- 66 17 -- -- 96 % -- -- --   01/20/24 07:04:37 -- 60 -- 116/58 77 98 % -- -- --   01/20/24 0700 -- -- -- -- -- 90 % 28 2 L/min Nasal cannula   01/20/24 0659 -- -- -- -- -- 90 % -- -- --   01/19/24 21:33:49 98.9 °F (37.2 °C) 86 20 90/55 67 89 % Abnormal  -- -- --   01/19/24 1915 -- -- -- -- -- 89 % Abnormal   28 2 L/min Nasal cannula   SpO2: 02 removed by patient- re-applied 02 back up to 92 % at 01/19/24 1915 01/19/24 1400 -- 77 -- 105/58 77 96 % --  -- --   01/19/24 1230 -- 74 -- 118/70 79 98 % -- -- --   01/19/24 1207 -- 84 -- 102/51 74 95 % -- -- --   01/19/24 1148 -- 83 -- 105/54 74 91 % -- -- --   01/19/24 0834 -- -- -- -- -- 95 % 28 2 L/min Nasal cannula   01/19/24 0800 -- 76 -- 108/55 76 95 % -- -- --   01/19/24 0700 -- 69 -- 102/56 73 98 % -- -- --   01/19/24 0630 -- 75 18 115/59 84 97 % 28 2 L/min Nasal cannula   01/19/24 0615 -- 78 -- -- -- 97 % 28 2 L/min Nasal cannula   01/19/24 0600 -- 80 -- 115/58 82 98 % 28 2 L/min Nasal cannula   01/19/24 0530 -- 82 18 115/55 79 98 % 28 2 L/min Nasal cannula   01/19/24 0445 -- 85 -- -- -- 98 % -- -- --   01/19/24 0430 -- 93 -- 125/60 86 95 % -- -- --   01/19/24 0400 -- 82 18 118/57 82 96 % 28 2 L/min Nasal cannula   01/19/24 0300 -- 81 18 -- -- 96 % 28 2 L/min Nasal cannula   01/19/24 0256 -- 82 18 -- -- 96 % -- -- --   01/19/24 0245 -- 81 18 -- -- 96 % 28 2 L/min Nasal cannula   01/19/24 0200 -- 83 20 138/69 95 96 % 28 2 L/min Nasal cannula   01/19/24 0100 -- 83 20 130/62 89 98 % 44 6 L/min Nasal cannula   01/19/24 0045 -- 86 -- 122/60 87 96 % 44 6 L/min Nasal cannula   01/18/24 2341 -- 84 20 130/61 -- 98 %  44 6 L/min Nasal cannula   SpO2: pt oxygen decreased to 85% RA. Pt placed on 6lnc oxygen at 01/18/24 2341   01/18/24 2205 -- 75 18 158/79 -- -- -- -- None (Room air)   01/18/24 2105 -- 80 20 156/85 -- 96 % -- -- None (Room air)   01/18/24 2028 -- -- -- -- -- -- -- -- None (Room air)   01/18/24 2005 -- 79 20 169/78 -- 97 % -- -- None (Room air)       Pertinent Labs/Diagnostic Test Results:       CT chest abdomen pelvis w contrast   Final Result by Caio Martin MD (01/18 2330)      1.  Bibasilar atelectasis.   2.  Mild diffuse thickening of the esophagus compatible with esophagitis similar to prior exam.   3.  Diverticulosis without evidence of diverticulitis.         Workstation performed: [id number]         XR chest 1 view portable   Final Result by Patrick Osborne MD (01/19 0917)      Bibasilar atelectasis.                   Workstation performed: [id number]           1/18 EKG:    Normal sinus rhythm  Cannot rule out Anterior infarct (cited on or before 30-NOV-2023)  Nonspecific ST and T wave abnormality    Results from last 7 days   Lab Units 01/18/24 2047   SARS-COV-2  Negative     Results from last 7 days   Lab Units 01/19/24 0424 01/18/24  1856   WBC Thousand/uL 6.61 6.10   HEMOGLOBIN g/dL 11.2* 11.3*   HEMATOCRIT % 33.8* 34.8   PLATELETS Thousands/uL 334 324   NEUTROS ABS Thousands/µL  --  4.65         Results from last 7 days   Lab Units 01/19/24 0424 01/18/24  1856   SODIUM mmol/L 142 141   POTASSIUM mmol/L 3.5 3.3*   CHLORIDE mmol/L 101 103   CO2 mmol/L 34* 31   ANION GAP mmol/L 7 7   BUN mg/dL 9 9   CREATININE mg/dL 0.71 0.55*   EGFR ml/min/1.73sq m 94 103   CALCIUM mg/dL 8.8 8.8   MAGNESIUM mg/dL  --   --      Results from last 7 days   Lab Units 01/18/24  1856   AST U/L 12*   ALT U/L 18   ALK PHOS U/L 82   TOTAL PROTEIN g/dL 6.6   ALBUMIN g/dL 3.5   TOTAL BILIRUBIN mg/dL 0.46     Results from last 7 days   Lab Units 01/20/24  1101 01/20/24  0711 01/19/24 2037 01/19/24  1528 01/19/24  1146 01/19/24  0731   POC GLUCOSE mg/dl 183* 120 128 194* 156* 132     Results from last 7 days   Lab Units 01/19/24 0424 01/18/24  1856   GLUCOSE RANDOM mg/dL 171* 173*             Results from last 7 days   Lab Units 01/18/24 2117   PH NISHI  7.457*   PCO2 NISHI mm Hg 46.3   PO2 NISHI mm Hg 37.6   HCO3 NISHI mmol/L 32.0*   BASE EXC NISHI mmol/L 7.2   O2 CONTENT NISHI ml/dL 12.0   O2 HGB, VENOUS % 72.7             Results from last 7 days   Lab Units 01/18/24  2247 01/18/24 2117 01/18/24  1856   HS TNI 0HR ng/L  --   --  5   HS TNI 2HR ng/L  --  6  --    HSTNI D2 ng/L  --  1  --    HS TNI 4HR ng/L 5  --   --    HSTNI D4 ng/L 0  --   --          Results from last 7 days   Lab Units 01/18/24 2117   PROTIME seconds 13.7   INR  0.99   PTT seconds 33         Results from last 7 days   Lab Units 01/18/24 2117 01/16/24  0623    PROCALCITONIN ng/ml 0.06 0.06     Results from last 7 days   Lab Units 01/18/24  2117 01/14/24  1650   LACTIC ACID mmol/L 1.0 0.8             Results from last 7 days   Lab Units 01/18/24 2117   BNP pg/mL 65                     Results from last 7 days   Lab Units 01/18/24 2117   LIPASE u/L 7*                   Results from last 7 days   Lab Units 01/18/24 2047   INFLUENZA A PCR  Negative   INFLUENZA B PCR  Negative   RSV PCR  Negative       Results from last 7 days   Lab Units 01/18/24 2142 01/18/24 2117   BLOOD CULTURE  No Growth at 24 hrs. No Growth at 24 hrs.   URINE CULTURE   --   --                    ED Treatment:   Medication Administration from 01/18/2024 1838 to 01/19/2024 1448         Date/Time Order Dose Route Action     01/18/2024 2207 EST albuterol inhalation solution 5 mg 5 mg Nebulization Given     01/18/2024 2207 EST ipratropium (ATROVENT) 0.02 % inhalation solution 0.5 mg 0.5 mg Nebulization Given     01/18/2024 2207 EST potassium chloride (K-DUR,KLOR-CON) CR tablet 20 mEq 20 mEq Oral Given     01/18/2024 2145 EST acetaminophen (Ofirmev) injection 1,000 mg 0 mg Intravenous Stopped     01/18/2024 2129 EST acetaminophen (Ofirmev) injection 1,000 mg 1,000 mg Intravenous New Bag     01/18/2024 2155 EST iohexol (OMNIPAQUE) 350 MG/ML injection (MULTI-DOSE) 100 mL 100 mL Intravenous Given     01/19/2024 0818 EST docusate sodium (COLACE) capsule 100 mg 100 mg Oral Given     01/19/2024 0208 EST ondansetron (ZOFRAN) injection 4 mg 4 mg Intravenous Given     01/19/2024 0818 EST enoxaparin (LOVENOX) subcutaneous injection 40 mg 40 mg Subcutaneous Given     01/19/2024 0818 EST cephalexin (KEFLEX) capsule 500 mg 500 mg Oral Given     01/19/2024 0209 EST cephalexin (KEFLEX) capsule 500 mg 500 mg Oral Given     01/19/2024 1254 EST Diclofenac Sodium (VOLTAREN) 1 % topical gel 2 g 2 g Topical Given     01/19/2024 0819 EST Diclofenac Sodium (VOLTAREN) 1 % topical gel 2 g 2 g Topical Given     01/19/2024 0914  EST Fluticasone Furoate-Vilanterol 100-25 mcg/actuation 1 puff 1 puff Inhalation Given     01/19/2024 0819 EST nicotine (NICODERM CQ) 14 mg/24hr TD 24 hr patch 1 patch 1 patch Transdermal Medication Applied     01/19/2024 0818 EST pantoprazole (PROTONIX) EC tablet 40 mg 40 mg Oral Given     01/19/2024 1031 EST ketorolac (TORADOL) injection 15 mg 15 mg Intravenous Given     01/19/2024 0411 EST ketorolac (TORADOL) injection 15 mg 15 mg Intravenous Given     01/19/2024 0811 EST traMADol (ULTRAM) tablet 50 mg 50 mg Oral Given     01/19/2024 0209 EST traMADol (ULTRAM) tablet 50 mg 50 mg Oral Given     01/19/2024 0208 EST furosemide (LASIX) injection 40 mg 40 mg Intravenous Given     01/19/2024 0208 EST potassium chloride (K-DUR,KLOR-CON) CR tablet 40 mEq 40 mEq Oral Given     01/19/2024 1253 EST insulin lispro (HumaLOG) 100 units/mL subcutaneous injection 2-12 Units 2 Units Subcutaneous Given     01/19/2024 0737 EST insulin lispro (HumaLOG) 100 units/mL subcutaneous injection 2-12 Units -- Subcutaneous Not Given     01/19/2024 0834 EST levalbuterol (XOPENEX) inhalation solution 1.25 mg 1.25 mg Nebulization Given     01/19/2024 0834 EST ipratropium (ATROVENT) 0.02 % inhalation solution 0.5 mg 0.5 mg Nebulization Given          Past Medical History:   Diagnosis Date    Achalasia     Back pain     Cancer (HCC)     Ovarian    Fibromyalgia     Lupus (HCC)      Present on Admission:   Chest pain, unspecified   Chronic obstructive pulmonary disease with acute exacerbation (HCC)   Chronic respiratory failure (HCC)   Hypokalemia   Volume overload   Flank pain      Admitting Diagnosis: SOB (shortness of breath) [R06.02]  Age/Sex: 58 y.o. female      Admission Orders: SCD, PT/OT eval.         Scheduled Medications:      Diclofenac Sodium, 2 g, Topical, 4x Daily  docusate sodium, 100 mg, Oral, BID  enoxaparin, 40 mg, Subcutaneous, Daily  Fluticasone Furoate-Vilanterol, 1 puff, Inhalation, Daily  insulin lispro, 1-5 Units,  Subcutaneous, HS  insulin lispro, 2-12 Units, Subcutaneous, TID AC  ipratropium, 0.5 mg, Nebulization, BID  levalbuterol, 1.25 mg, Nebulization, BID  nicotine, 1 patch, Transdermal, Daily  pantoprazole, 40 mg, Oral, Daily      furosemide (LASIX) injection 40 mg  Dose: 40 mg  Freq: Daily Route: IV  Start: 01/19/24 0200 End: 01/20/24 1156         Continuous IV Infusions:     PRN Meds:      acetaminophen, 650 mg, Oral, Q6H PRN  albuterol, 2 puff, Inhalation, Q4H PRN  bisacodyl, 5 mg, Oral, Daily PRN  ketorolac, 15 mg, Intravenous, Q6H PRN  methocarbamol, 500 mg, Oral, 4x Daily PRN  ondansetron, 4 mg, Intravenous, Q6H PRN  traMADol, 50 mg, Oral, Q6H PRN      PRN Medications 01/19 01/20   ketorolac (TORADOL) injection 15 mg  Dose: 15 mg  Freq: Every 6 hours PRN Route: IV  PRN Reason: severe pain  Start: 01/19/24 0151 End: 01/21/24 0150   Admin Instructions:             1708      0356     1148        methocarbamol (ROBAXIN) tablet 500 mg  Dose: 500 mg  Freq: 4 times daily PRN Route: PO  PRN Reason: muscle spasms  Start: 01/19/24 0236     0401     1304        traMADol (ULTRAM) tablet 50 mg  Dose: 50 mg  Freq: Every 6 hours PRN Route: PO  PRN Reason: moderate pain  Start: 01/19/24 0152   Admin Instructions:             1524     2219      0818     1606                  Network Utilization Review Department  ATTENTION: Please call with any questions or concerns to [phone] and carefully listen to the prompts so that you are directed to the right person. All voicemails are confidential.   For Discharge needs, contact Care Management DC Support Team at [phone] opt. 2  Send all requests for admission clinical reviews, approved or denied determinations and any other requests to dedicated fax number below belonging to the campus where the patient is receiving treatment. List of dedicated fax numbers for the Facilities:  FACILITY NAME UR FAX NUMBER   ADMISSION DENIALS (Administrative/Medical Necessity) [phone]    DISCHARGE SUPPORT TEAM (NETWORK) [phone]   PARENT CHILD HEALTH (Maternity/NICU/Pediatrics) [phone]   [facility] [phone]   [facility] [phone]   [facility] [phone]   [facility] [phone]   [facility] [phone]   [facility] [phone]   [facility] [phone]   [facility] [phone]   [facility] [phone]   [facility] [phone]   [facility] [phone]

## 2024-01-20 NOTE — PLAN OF CARE

## 2024-01-21 PROBLEM — D64.9 ANEMIA: Status: ACTIVE | Noted: 2024-01-21

## 2024-01-21 LAB
ALBUMIN SERPL BCP-MCNC: 3.3 G/DL (ref 3.5–5)
ALP SERPL-CCNC: 81 U/L (ref 34–104)
ALT SERPL W P-5'-P-CCNC: 13 U/L (ref 7–52)
ANION GAP SERPL CALCULATED.3IONS-SCNC: 5 MMOL/L
AST SERPL W P-5'-P-CCNC: 8 U/L (ref 13–39)
BASOPHILS # BLD AUTO: 0.02 THOUSANDS/ÂΜL (ref 0–0.1)
BASOPHILS NFR BLD AUTO: 0 % (ref 0–1)
BILIRUB SERPL-MCNC: 0.39 MG/DL (ref 0.2–1)
BUN SERPL-MCNC: 32 MG/DL (ref 5–25)
CALCIUM ALBUM COR SERPL-MCNC: 9.2 MG/DL (ref 8.3–10.1)
CALCIUM SERPL-MCNC: 8.6 MG/DL (ref 8.4–10.2)
CHLORIDE SERPL-SCNC: 103 MMOL/L (ref 96–108)
CO2 SERPL-SCNC: 32 MMOL/L (ref 21–32)
CREAT SERPL-MCNC: 0.86 MG/DL (ref 0.6–1.3)
EOSINOPHIL # BLD AUTO: 0.09 THOUSAND/ÂΜL (ref 0–0.61)
EOSINOPHIL NFR BLD AUTO: 2 % (ref 0–6)
ERYTHROCYTE [DISTWIDTH] IN BLOOD BY AUTOMATED COUNT: 14.6 % (ref 11.6–15.1)
GFR SERPL CREATININE-BSD FRML MDRD: 74 ML/MIN/1.73SQ M
GLUCOSE SERPL-MCNC: 118 MG/DL (ref 65–140)
GLUCOSE SERPL-MCNC: 120 MG/DL (ref 65–140)
GLUCOSE SERPL-MCNC: 131 MG/DL (ref 65–140)
GLUCOSE SERPL-MCNC: 156 MG/DL (ref 65–140)
GLUCOSE SERPL-MCNC: 81 MG/DL (ref 65–140)
HCT VFR BLD AUTO: 30.2 % (ref 34.8–46.1)
HGB BLD-MCNC: 9.8 G/DL (ref 11.5–15.4)
IMM GRANULOCYTES # BLD AUTO: 0.05 THOUSAND/UL (ref 0–0.2)
IMM GRANULOCYTES NFR BLD AUTO: 1 % (ref 0–2)
LYMPHOCYTES # BLD AUTO: 1.04 THOUSANDS/ÂΜL (ref 0.6–4.47)
LYMPHOCYTES NFR BLD AUTO: 20 % (ref 14–44)
MAGNESIUM SERPL-MCNC: 2.2 MG/DL (ref 1.9–2.7)
MCH RBC QN AUTO: 30.6 PG (ref 26.8–34.3)
MCHC RBC AUTO-ENTMCNC: 32.5 G/DL (ref 31.4–37.4)
MCV RBC AUTO: 94 FL (ref 82–98)
MONOCYTES # BLD AUTO: 0.57 THOUSAND/ÂΜL (ref 0.17–1.22)
MONOCYTES NFR BLD AUTO: 11 % (ref 4–12)
NEUTROPHILS # BLD AUTO: 3.36 THOUSANDS/ÂΜL (ref 1.85–7.62)
NEUTS SEG NFR BLD AUTO: 66 % (ref 43–75)
NRBC BLD AUTO-RTO: 0 /100 WBCS
PLATELET # BLD AUTO: 321 THOUSANDS/UL (ref 149–390)
PMV BLD AUTO: 8.8 FL (ref 8.9–12.7)
POTASSIUM SERPL-SCNC: 3.8 MMOL/L (ref 3.5–5.3)
PROT SERPL-MCNC: 6.1 G/DL (ref 6.4–8.4)
RBC # BLD AUTO: 3.2 MILLION/UL (ref 3.81–5.12)
SODIUM SERPL-SCNC: 140 MMOL/L (ref 135–147)
WBC # BLD AUTO: 5.13 THOUSAND/UL (ref 4.31–10.16)

## 2024-01-21 PROCEDURE — 82948 REAGENT STRIP/BLOOD GLUCOSE: CPT

## 2024-01-21 PROCEDURE — 80053 COMPREHEN METABOLIC PANEL: CPT | Performed by: STUDENT IN AN ORGANIZED HEALTH CARE EDUCATION/TRAINING PROGRAM

## 2024-01-21 PROCEDURE — 99233 SBSQ HOSP IP/OBS HIGH 50: CPT | Performed by: STUDENT IN AN ORGANIZED HEALTH CARE EDUCATION/TRAINING PROGRAM

## 2024-01-21 PROCEDURE — 85025 COMPLETE CBC W/AUTO DIFF WBC: CPT | Performed by: STUDENT IN AN ORGANIZED HEALTH CARE EDUCATION/TRAINING PROGRAM

## 2024-01-21 PROCEDURE — 94664 DEMO&/EVAL PT USE INHALER: CPT

## 2024-01-21 PROCEDURE — 94640 AIRWAY INHALATION TREATMENT: CPT

## 2024-01-21 PROCEDURE — 94760 N-INVAS EAR/PLS OXIMETRY 1: CPT

## 2024-01-21 PROCEDURE — 83735 ASSAY OF MAGNESIUM: CPT | Performed by: STUDENT IN AN ORGANIZED HEALTH CARE EDUCATION/TRAINING PROGRAM

## 2024-01-21 RX ORDER — GUAIFENESIN 600 MG/1
600 TABLET, EXTENDED RELEASE ORAL EVERY 12 HOURS SCHEDULED
Status: DISCONTINUED | OUTPATIENT
Start: 2024-01-21 | End: 2024-02-07 | Stop reason: HOSPADM

## 2024-01-21 RX ORDER — HEPARIN SODIUM 5000 [USP'U]/ML
7500 INJECTION, SOLUTION INTRAVENOUS; SUBCUTANEOUS EVERY 8 HOURS SCHEDULED
Status: DISCONTINUED | OUTPATIENT
Start: 2024-01-21 | End: 2024-02-07 | Stop reason: HOSPADM

## 2024-01-21 RX ORDER — KETOROLAC TROMETHAMINE 30 MG/ML
15 INJECTION, SOLUTION INTRAMUSCULAR; INTRAVENOUS EVERY 6 HOURS PRN
Status: DISPENSED | OUTPATIENT
Start: 2024-01-21 | End: 2024-01-23

## 2024-01-21 RX ORDER — HYDROMORPHONE HCL/PF 1 MG/ML
0.5 SYRINGE (ML) INJECTION ONCE
Status: COMPLETED | OUTPATIENT
Start: 2024-01-21 | End: 2024-01-21

## 2024-01-21 RX ADMIN — KETOROLAC TROMETHAMINE 15 MG: 30 INJECTION, SOLUTION INTRAMUSCULAR at 21:31

## 2024-01-21 RX ADMIN — Medication 2 G: at 17:12

## 2024-01-21 RX ADMIN — TRAMADOL HYDROCHLORIDE 50 MG: 50 TABLET, COATED ORAL at 17:33

## 2024-01-21 RX ADMIN — METHOCARBAMOL TABLETS 500 MG: 500 TABLET, COATED ORAL at 10:12

## 2024-01-21 RX ADMIN — TRAMADOL HYDROCHLORIDE 50 MG: 50 TABLET, COATED ORAL at 05:01

## 2024-01-21 RX ADMIN — ACETAMINOPHEN 650 MG: 325 TABLET, FILM COATED ORAL at 19:55

## 2024-01-21 RX ADMIN — DOCUSATE SODIUM 100 MG: 100 CAPSULE, LIQUID FILLED ORAL at 08:52

## 2024-01-21 RX ADMIN — NICOTINE 1 PATCH: 14 PATCH, EXTENDED RELEASE TRANSDERMAL at 08:52

## 2024-01-21 RX ADMIN — KETOROLAC TROMETHAMINE 15 MG: 30 INJECTION, SOLUTION INTRAMUSCULAR; INTRAVENOUS at 01:46

## 2024-01-21 RX ADMIN — GUAIFENESIN 600 MG: 600 TABLET ORAL at 22:07

## 2024-01-21 RX ADMIN — DOCUSATE SODIUM 100 MG: 100 CAPSULE, LIQUID FILLED ORAL at 17:11

## 2024-01-21 RX ADMIN — HYDROMORPHONE HYDROCHLORIDE 0.5 MG: 1 INJECTION, SOLUTION INTRAMUSCULAR; INTRAVENOUS; SUBCUTANEOUS at 11:21

## 2024-01-21 RX ADMIN — KETOROLAC TROMETHAMINE 15 MG: 30 INJECTION, SOLUTION INTRAMUSCULAR at 15:01

## 2024-01-21 RX ADMIN — ACETAMINOPHEN 650 MG: 325 TABLET, FILM COATED ORAL at 03:39

## 2024-01-21 RX ADMIN — ACETAMINOPHEN 650 MG: 325 TABLET, FILM COATED ORAL at 10:12

## 2024-01-21 RX ADMIN — LEVALBUTEROL HYDROCHLORIDE 1.25 MG: 1.25 SOLUTION RESPIRATORY (INHALATION) at 19:01

## 2024-01-21 RX ADMIN — IPRATROPIUM BROMIDE 0.5 MG: 0.5 SOLUTION RESPIRATORY (INHALATION) at 19:01

## 2024-01-21 RX ADMIN — HEPARIN SODIUM 7500 UNITS: 5000 INJECTION INTRAVENOUS; SUBCUTANEOUS at 14:40

## 2024-01-21 RX ADMIN — ENOXAPARIN SODIUM 40 MG: 40 INJECTION SUBCUTANEOUS at 08:52

## 2024-01-21 RX ADMIN — INSULIN LISPRO 2 UNITS: 100 INJECTION, SOLUTION INTRAVENOUS; SUBCUTANEOUS at 17:12

## 2024-01-21 RX ADMIN — METHOCARBAMOL TABLETS 500 MG: 500 TABLET, COATED ORAL at 03:39

## 2024-01-21 RX ADMIN — Medication 2 G: at 11:22

## 2024-01-21 RX ADMIN — PANTOPRAZOLE SODIUM 40 MG: 40 TABLET, DELAYED RELEASE ORAL at 08:52

## 2024-01-21 RX ADMIN — IPRATROPIUM BROMIDE 0.5 MG: 0.5 SOLUTION RESPIRATORY (INHALATION) at 07:08

## 2024-01-21 RX ADMIN — FLUTICASONE FUROATE AND VILANTEROL TRIFENATATE 1 PUFF: 100; 25 POWDER RESPIRATORY (INHALATION) at 08:52

## 2024-01-21 RX ADMIN — LEVALBUTEROL HYDROCHLORIDE 1.25 MG: 1.25 SOLUTION RESPIRATORY (INHALATION) at 07:08

## 2024-01-21 RX ADMIN — METHOCARBAMOL TABLETS 500 MG: 500 TABLET, COATED ORAL at 19:55

## 2024-01-21 RX ADMIN — HEPARIN SODIUM 7500 UNITS: 5000 INJECTION INTRAVENOUS; SUBCUTANEOUS at 21:31

## 2024-01-21 NOTE — PLAN OF CARE
Problem: PAIN - ADULT  Goal: Verbalizes/displays adequate comfort level or baseline comfort level  Description: Interventions:  - Encourage patient to monitor pain and request assistance  - Assess pain using appropriate pain scale  - Administer analgesics based on type and severity of pain and evaluate response  - Implement non-pharmacological measures as appropriate and evaluate response  - Consider cultural and social influences on pain and pain management  - Notify physician/advanced practitioner if interventions unsuccessful or patient reports new pain  Outcome: Progressing     Problem: SAFETY ADULT  Goal: Patient will remain free of falls  Description: INTERVENTIONS:  - Educate patient/family on patient safety including physical limitations  - Instruct patient to call for assistance with activity   - Consult OT/PT to assist with strengthening/mobility   - Keep Call bell within reach  - Keep bed low and locked with side rails adjusted as appropriate  - Keep care items and personal belongings within reach  - Initiate and maintain comfort rounds  - Make Fall Risk Sign visible to staff  - Offer Toileting every 2 Hours, in advance of need  - Obtain necessary fall risk management equipment: BSC  - Apply yellow socks and bracelet for high fall risk patients  - Consider moving patient to room near nurses station  Outcome: Progressing  Goal: Maintain or return to baseline ADL function  Description: INTERVENTIONS:  -  Assess patient's ability to carry out ADLs; assess patient's baseline for ADL function and identify physical deficits which impact ability to perform ADLs (bathing, care of mouth/teeth, toileting, grooming, dressing, etc.)  - Assess/evaluate cause of self-care deficits   - Assess range of motion  - Assess patient's mobility; develop plan if impaired  - Assess patient's need for assistive devices and provide as appropriate  - Encourage maximum independence but intervene and supervise when necessary  -  Involve family in performance of ADLs  - Assess for home care needs following discharge   - Consider OT consult to assist with ADL evaluation and planning for discharge  - Provide patient education as appropriate  Outcome: Progressing  Goal: Maintains/Returns to pre admission functional level  Description: INTERVENTIONS:  - Perform AM-PAC 6 Click Basic Mobility/ Daily Activity assessment daily.  - Set and communicate daily mobility goal to care team and patient/family/caregiver.   - Collaborate with rehabilitation services on mobility goals if consulted  - Perform Range of Motion 4 times a day.  - Reposition patient every 2 hours.  - Dangle patient 3 times a day  - Stand patient 4 times a day  - Ambulate patient 3 times a day  - Out of bed to chair 3 times a day   - Out of bed for meals 3 times a day  - Out of bed for toileting  - Record patient progress and toleration of activity level   Outcome: Progressing     Problem: DISCHARGE PLANNING  Goal: Discharge to home or other facility with appropriate resources  Description: INTERVENTIONS:  - Identify barriers to discharge w/patient and caregiver  - Arrange for needed discharge resources and transportation as appropriate  - Identify discharge learning needs (meds, wound care, etc.)  - Arrange for interpretive services to assist at discharge as needed  - Refer to Case Management Department for coordinating discharge planning if the patient needs post-hospital services based on physician/advanced practitioner order or complex needs related to functional status, cognitive ability, or social support system  Outcome: Progressing     Problem: Knowledge Deficit  Goal: Patient/family/caregiver demonstrates understanding of disease process, treatment plan, medications, and discharge instructions  Description: Complete learning assessment and assess knowledge base.  Interventions:  - Provide teaching at level of understanding  - Provide teaching via preferred learning  methods  Outcome: Progressing

## 2024-01-21 NOTE — ASSESSMENT & PLAN NOTE
Likely atypical MSK pain  Troponin's negative, ECHO on 1/15 benign  Not related to exertion  Follow up with PCP as an outpatient  No further inpatient intervention

## 2024-01-21 NOTE — NURSING NOTE
Found patient resting comfortably in bed on 2L oxygen sating at 92% -96%. Ambulated patient in room on room air, patient oxygen saturation range between 82% - 91% while ambulating. Patient complained for back pain which she stated is chronic. No complain for SOB while ambulating. Patient placed back in bed on 2L oxygen sating at 95%.

## 2024-01-21 NOTE — RESPIRATORY THERAPY NOTE
RT Ventilator Management Note  Nanci Navarro 58 y.o. female MRN: [id number]  Unit/Bed#: -01 Encounter: [id number]      Daily Screen    No data found in the last 10 encounters.           Physical Exam:   Assessment Type: Post-treatment  General Appearance: Alert, Awake  Respiratory Pattern: Normal  Chest Assessment: Chest expansion symmetrical  Bilateral Breath Sounds: Coarse, Diminished  O2 Device: nc         01/21/24 0723   Respiratory Protocol   Protocol Initiated? No   Protocol Selection Respiratory   Language Barrier? No   Medical & Social History Reviewed? Yes   Diagnostic Studies Reviewed? Yes   Physical Assessment Performed? Yes   Respiratory Plan Home Bronchodilator Patient pathway   Respiratory Assessment   Assessment Type Post-treatment   General Appearance Alert;Awake   Respiratory Pattern Normal   Chest Assessment Chest expansion symmetrical   Bilateral Breath Sounds Coarse;Diminished   Resp Comments will cont w BID txs   O2 Device nc   Additional Assessments   Pulse 66   Respirations 17   SpO2 97 %       Resp Comments: will cont w BID txs
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
## 2024-01-21 NOTE — ASSESSMENT & PLAN NOTE
Reports following with a pain specialist as an outpatient  Toradol is helping with pain  Continue PRN
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
## 2024-01-21 NOTE — ASSESSMENT & PLAN NOTE
Likely MSK related pain  Patient has a L1 fracture and chronic pain  She follows with a pain specialist as an outpatient  PDMP does show some short courses of opiate prescriptions, but last prescription back in December  CT AP negative for acute pathology, already treated for suspected pyelo  No further intervention

## 2024-01-21 NOTE — PLAN OF CARE

## 2024-01-21 NOTE — ASSESSMENT & PLAN NOTE
Results from last 7 days   Lab Units 01/21/24  0514 01/19/24  0424 01/18/24  1856 01/16/24  0623 01/15/24  0432 01/14/24  1402   HEMOGLOBIN g/dL 9.8* 11.2* 11.3* 11.0* 11.1* 13.2     Drop in hemoglobin  No active bleeding  Monitor

## 2024-01-22 LAB
ALBUMIN SERPL BCP-MCNC: 3.5 G/DL (ref 3.5–5)
ALP SERPL-CCNC: 81 U/L (ref 34–104)
ALT SERPL W P-5'-P-CCNC: 12 U/L (ref 7–52)
ANION GAP SERPL CALCULATED.3IONS-SCNC: 5 MMOL/L
AST SERPL W P-5'-P-CCNC: 9 U/L (ref 13–39)
BASOPHILS # BLD AUTO: 0.03 THOUSANDS/ÂΜL (ref 0–0.1)
BASOPHILS NFR BLD AUTO: 1 % (ref 0–1)
BILIRUB SERPL-MCNC: 0.52 MG/DL (ref 0.2–1)
BUN SERPL-MCNC: 27 MG/DL (ref 5–25)
CALCIUM SERPL-MCNC: 8.9 MG/DL (ref 8.4–10.2)
CHLORIDE SERPL-SCNC: 105 MMOL/L (ref 96–108)
CO2 SERPL-SCNC: 32 MMOL/L (ref 21–32)
CREAT SERPL-MCNC: 0.78 MG/DL (ref 0.6–1.3)
EOSINOPHIL # BLD AUTO: 0.13 THOUSAND/ÂΜL (ref 0–0.61)
EOSINOPHIL NFR BLD AUTO: 3 % (ref 0–6)
ERYTHROCYTE [DISTWIDTH] IN BLOOD BY AUTOMATED COUNT: 14.4 % (ref 11.6–15.1)
GFR SERPL CREATININE-BSD FRML MDRD: 84 ML/MIN/1.73SQ M
GLUCOSE SERPL-MCNC: 120 MG/DL (ref 65–140)
GLUCOSE SERPL-MCNC: 128 MG/DL (ref 65–140)
GLUCOSE SERPL-MCNC: 336 MG/DL (ref 65–140)
GLUCOSE SERPL-MCNC: 94 MG/DL (ref 65–140)
GLUCOSE SERPL-MCNC: 98 MG/DL (ref 65–140)
HCT VFR BLD AUTO: 31.5 % (ref 34.8–46.1)
HGB BLD-MCNC: 10.1 G/DL (ref 11.5–15.4)
IMM GRANULOCYTES # BLD AUTO: 0.05 THOUSAND/UL (ref 0–0.2)
IMM GRANULOCYTES NFR BLD AUTO: 1 % (ref 0–2)
LYMPHOCYTES # BLD AUTO: 1.11 THOUSANDS/ÂΜL (ref 0.6–4.47)
LYMPHOCYTES NFR BLD AUTO: 22 % (ref 14–44)
MAGNESIUM SERPL-MCNC: 2.3 MG/DL (ref 1.9–2.7)
MCH RBC QN AUTO: 30 PG (ref 26.8–34.3)
MCHC RBC AUTO-ENTMCNC: 32.1 G/DL (ref 31.4–37.4)
MCV RBC AUTO: 94 FL (ref 82–98)
MONOCYTES # BLD AUTO: 0.54 THOUSAND/ÂΜL (ref 0.17–1.22)
MONOCYTES NFR BLD AUTO: 11 % (ref 4–12)
NEUTROPHILS # BLD AUTO: 3.24 THOUSANDS/ÂΜL (ref 1.85–7.62)
NEUTS SEG NFR BLD AUTO: 62 % (ref 43–75)
NRBC BLD AUTO-RTO: 0 /100 WBCS
PLATELET # BLD AUTO: 348 THOUSANDS/UL (ref 149–390)
PMV BLD AUTO: 8.8 FL (ref 8.9–12.7)
POTASSIUM SERPL-SCNC: 3.9 MMOL/L (ref 3.5–5.3)
PROT SERPL-MCNC: 6.1 G/DL (ref 6.4–8.4)
RBC # BLD AUTO: 3.37 MILLION/UL (ref 3.81–5.12)
SODIUM SERPL-SCNC: 142 MMOL/L (ref 135–147)
WBC # BLD AUTO: 5.1 THOUSAND/UL (ref 4.31–10.16)

## 2024-01-22 PROCEDURE — 83735 ASSAY OF MAGNESIUM: CPT | Performed by: STUDENT IN AN ORGANIZED HEALTH CARE EDUCATION/TRAINING PROGRAM

## 2024-01-22 PROCEDURE — 85025 COMPLETE CBC W/AUTO DIFF WBC: CPT | Performed by: STUDENT IN AN ORGANIZED HEALTH CARE EDUCATION/TRAINING PROGRAM

## 2024-01-22 PROCEDURE — 99239 HOSP IP/OBS DSCHRG MGMT >30: CPT | Performed by: STUDENT IN AN ORGANIZED HEALTH CARE EDUCATION/TRAINING PROGRAM

## 2024-01-22 PROCEDURE — 93005 ELECTROCARDIOGRAM TRACING: CPT

## 2024-01-22 PROCEDURE — 94760 N-INVAS EAR/PLS OXIMETRY 1: CPT

## 2024-01-22 PROCEDURE — 94640 AIRWAY INHALATION TREATMENT: CPT

## 2024-01-22 PROCEDURE — 97162 PT EVAL MOD COMPLEX 30 MIN: CPT

## 2024-01-22 PROCEDURE — 97535 SELF CARE MNGMENT TRAINING: CPT

## 2024-01-22 PROCEDURE — 94761 N-INVAS EAR/PLS OXIMETRY MLT: CPT

## 2024-01-22 PROCEDURE — 97530 THERAPEUTIC ACTIVITIES: CPT

## 2024-01-22 PROCEDURE — 82948 REAGENT STRIP/BLOOD GLUCOSE: CPT

## 2024-01-22 PROCEDURE — 80053 COMPREHEN METABOLIC PANEL: CPT | Performed by: STUDENT IN AN ORGANIZED HEALTH CARE EDUCATION/TRAINING PROGRAM

## 2024-01-22 RX ORDER — SUCRALFATE 1 G/1
1 TABLET ORAL
Status: DISCONTINUED | OUTPATIENT
Start: 2024-01-22 | End: 2024-02-07 | Stop reason: HOSPADM

## 2024-01-22 RX ORDER — HYDROMORPHONE HCL/PF 1 MG/ML
0.5 SYRINGE (ML) INJECTION ONCE
Status: COMPLETED | OUTPATIENT
Start: 2024-01-22 | End: 2024-01-22

## 2024-01-22 RX ADMIN — FLUTICASONE FUROATE AND VILANTEROL TRIFENATATE 1 PUFF: 100; 25 POWDER RESPIRATORY (INHALATION) at 08:35

## 2024-01-22 RX ADMIN — INSULIN LISPRO 4 UNITS: 100 INJECTION, SOLUTION INTRAVENOUS; SUBCUTANEOUS at 22:03

## 2024-01-22 RX ADMIN — PANTOPRAZOLE SODIUM 40 MG: 40 TABLET, DELAYED RELEASE ORAL at 08:37

## 2024-01-22 RX ADMIN — DOCUSATE SODIUM 100 MG: 100 CAPSULE, LIQUID FILLED ORAL at 08:37

## 2024-01-22 RX ADMIN — HEPARIN SODIUM 7500 UNITS: 5000 INJECTION INTRAVENOUS; SUBCUTANEOUS at 22:03

## 2024-01-22 RX ADMIN — TRAMADOL HYDROCHLORIDE 50 MG: 50 TABLET, COATED ORAL at 08:37

## 2024-01-22 RX ADMIN — ALBUTEROL SULFATE 2 PUFF: 90 AEROSOL, METERED RESPIRATORY (INHALATION) at 22:01

## 2024-01-22 RX ADMIN — METHOCARBAMOL TABLETS 500 MG: 500 TABLET, COATED ORAL at 11:45

## 2024-01-22 RX ADMIN — TRAMADOL HYDROCHLORIDE 50 MG: 50 TABLET, COATED ORAL at 22:16

## 2024-01-22 RX ADMIN — HEPARIN SODIUM 7500 UNITS: 5000 INJECTION INTRAVENOUS; SUBCUTANEOUS at 14:50

## 2024-01-22 RX ADMIN — NICOTINE 1 PATCH: 14 PATCH, EXTENDED RELEASE TRANSDERMAL at 08:37

## 2024-01-22 RX ADMIN — METHOCARBAMOL TABLETS 500 MG: 500 TABLET, COATED ORAL at 05:44

## 2024-01-22 RX ADMIN — GUAIFENESIN 600 MG: 600 TABLET ORAL at 22:02

## 2024-01-22 RX ADMIN — IPRATROPIUM BROMIDE 0.5 MG: 0.5 SOLUTION RESPIRATORY (INHALATION) at 20:10

## 2024-01-22 RX ADMIN — TRAMADOL HYDROCHLORIDE 50 MG: 50 TABLET, COATED ORAL at 01:31

## 2024-01-22 RX ADMIN — LEVALBUTEROL HYDROCHLORIDE 1.25 MG: 1.25 SOLUTION RESPIRATORY (INHALATION) at 20:10

## 2024-01-22 RX ADMIN — SUCRALFATE 1 G: 1 TABLET ORAL at 16:07

## 2024-01-22 RX ADMIN — KETOROLAC TROMETHAMINE 15 MG: 30 INJECTION, SOLUTION INTRAMUSCULAR at 20:44

## 2024-01-22 RX ADMIN — KETOROLAC TROMETHAMINE 15 MG: 30 INJECTION, SOLUTION INTRAMUSCULAR at 05:18

## 2024-01-22 RX ADMIN — HYDROMORPHONE HYDROCHLORIDE 0.5 MG: 1 INJECTION, SOLUTION INTRAMUSCULAR; INTRAVENOUS; SUBCUTANEOUS at 16:07

## 2024-01-22 RX ADMIN — DOCUSATE SODIUM 100 MG: 100 CAPSULE, LIQUID FILLED ORAL at 17:35

## 2024-01-22 RX ADMIN — HEPARIN SODIUM 7500 UNITS: 5000 INJECTION INTRAVENOUS; SUBCUTANEOUS at 05:18

## 2024-01-22 RX ADMIN — TRAMADOL HYDROCHLORIDE 50 MG: 50 TABLET, COATED ORAL at 14:50

## 2024-01-22 RX ADMIN — Medication 2 G: at 22:01

## 2024-01-22 RX ADMIN — LEVALBUTEROL HYDROCHLORIDE 1.25 MG: 1.25 SOLUTION RESPIRATORY (INHALATION) at 07:11

## 2024-01-22 RX ADMIN — GUAIFENESIN 600 MG: 600 TABLET ORAL at 08:37

## 2024-01-22 RX ADMIN — KETOROLAC TROMETHAMINE 15 MG: 30 INJECTION, SOLUTION INTRAMUSCULAR at 11:45

## 2024-01-22 RX ADMIN — IPRATROPIUM BROMIDE 0.5 MG: 0.5 SOLUTION RESPIRATORY (INHALATION) at 07:11
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
## 2024-01-22 NOTE — PLAN OF CARE
Problem: PHYSICAL THERAPY ADULT  Goal: Performs mobility at highest level of function for planned discharge setting.  See evaluation for individualized goals.  Description: Treatment/Interventions: Functional transfer training, Elevations, Therapeutic exercise, Endurance training, Patient/family training, Bed mobility, Gait training, Spoke to nursing  Equipment Recommended:  (PT recommends RW trial)       See flowsheet documentation for full assessment, interventions and recommendations.  Note: Prognosis: Good  Problem List: Decreased endurance, Impaired balance, Decreased mobility, Pain  Assessment: Pt is 58 y.o. female seen for moderate-complexity PT evaluation on 1/22/2024 s/p admit to [facility] on 1/18/2024 w/ Chest pain, unspecified. PT was consulted to assess pt's functional mobility and d/c needs. Order placed for PT eval and tx, w/ up and OOB as tolerated order. PTA, pt was living with her spouse in a one level camper with 2 LISA and reports independence with ADLs, IADLs, and functional mobility without AD at baseline. At time of eval, patient completed sup > sit supervision with HOB elevated and increased time to complete, pt progressed to STS transfers supervision, and ambulation 90' supervision without AD. Pt did reach for wall/HR in hallway for support during ambulation and PT recommended RW; however, pt declined use. Spo2 maintained >/= 92% on 2Lo2 throughout session. Upon evaluation, pt presenting with impaired functional mobility d/t decreased endurance, impaired balance, decreased mobility, pain, and activity intolerance. Pertinent PMHx and current co-morbidities affecting pt's physical performance at time of assessment include: unspecified chest pain, chronic obstructive pulmonary disease with acute exacerbation, anemia, compression fracture of L1. Personal factors affecting pt at time of eval include: stairs to enter home, positive fall history, and compliance. The following  objective measures performed on IE also reveal limitations: AM-PAC 6-Clicks: 19/24. Pt's clinical presentation is currently evolving seen in pt's presentation of tolerance to only 90 feet of ambulation, low back pain impacting overall mobility status, and ongoing medical assessment. Overall, pt's rehab potential and prognosis to return to PLOF is good as impacted by objective findings, warranting pt to receive further skilled PT interventions to address impairments, activity limitations, and participation restrictions. Pt to benefit from continued PT services to address deficits as defined above and maximize level of functional independent mobility and consistency. From PT/mobility standpoint, recommendation at time of d/c would be level 3, minimal resource intensity in order to facilitate return to PLOF.        Rehab Resource Intensity Level, PT: III (Minimum Resource Intensity)    See flowsheet documentation for full assessment.      Ernestina Cody; PT, DPT

## 2024-01-22 NOTE — PLAN OF CARE
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
## 2024-01-22 NOTE — ASSESSMENT & PLAN NOTE
Results from last 7 days   Lab Units 01/22/24  0535 01/21/24  0514 01/19/24  0424 01/18/24  1856 01/16/24  0623   HEMOGLOBIN g/dL 10.1* 9.8* 11.2* 11.3* 11.0*       Mild drop in hemoglobin from baseline, now satble  No active bleeding  Monitor as an outpatient
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
## 2024-01-22 NOTE — RESPIRATORY THERAPY NOTE
01/21/24 1901   Respiratory Protocol   Protocol Initiated? No   Protocol Selection Respiratory   Language Barrier? No   Medical & Social History Reviewed? Yes   Diagnostic Studies Reviewed? Yes   Physical Assessment Performed? Yes   Home Devices/Therapy Home O2   Respiratory Plan Home Bronchodilator Patient pathway   Respiratory Assessment   Assessment Type Pre-treatment   General Appearance Awake;Alert   Respiratory Pattern Normal   Chest Assessment Chest expansion symmetrical   Bilateral Breath Sounds Diminished;Clear   Resp Comments pt with pulmonary states she takes txs at home will continue with BID txs at this time   O2 Device NC
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
## 2024-01-22 NOTE — QUICK NOTE
Reports severe back pain today. Renew toradol and give 1 time dose of dilaudid      Patient reports her  has no contacted her today, so she doesn't have ability to get home

## 2024-01-22 NOTE — PLAN OF CARE
Problem: PAIN - ADULT  Goal: Verbalizes/displays adequate comfort level or baseline comfort level  Description: Interventions:  - Encourage patient to monitor pain and request assistance  - Assess pain using appropriate pain scale  - Administer analgesics based on type and severity of pain and evaluate response  - Implement non-pharmacological measures as appropriate and evaluate response  - Consider cultural and social influences on pain and pain management  - Notify physician/advanced practitioner if interventions unsuccessful or patient reports new pain  Outcome: Progressing     Problem: INFECTION - ADULT  Goal: Absence or prevention of progression during hospitalization  Description: INTERVENTIONS:  - Assess and monitor for signs and symptoms of infection  - Monitor lab/diagnostic results  - Monitor all insertion sites, i.e. indwelling lines, tubes, and drains  - Monitor endotracheal if appropriate and nasal secretions for changes in amount and color  - [place] appropriate cooling/warming therapies per order  - Administer medications as ordered  - Instruct and encourage patient and family to use good hand hygiene technique  - Identify and instruct in appropriate isolation precautions for identified infection/condition  Outcome: Progressing  Goal: Absence of fever/infection during neutropenic period  Description: INTERVENTIONS:  - Monitor WBC    Outcome: Progressing     Problem: SAFETY ADULT  Goal: Patient will remain free of falls  Description: INTERVENTIONS:  - Educate patient/family on patient safety including physical limitations  - Instruct patient to call for assistance with activity   - Consult OT/PT to assist with strengthening/mobility   - Keep Call bell within reach  - Keep bed low and locked with side rails adjusted as appropriate  - Keep care items and personal belongings within reach  - Initiate and maintain comfort rounds  - Make Fall Risk Sign visible to staff  - Offer Toileting every 2 Hours,  in advance of need  - Obtain necessary fall risk management equipment: BSC  - Apply yellow socks and bracelet for high fall risk patients  - Consider moving patient to room near nurses station  Outcome: Progressing  Goal: Maintain or return to baseline ADL function  Description: INTERVENTIONS:  -  Assess patient's ability to carry out ADLs; assess patient's baseline for ADL function and identify physical deficits which impact ability to perform ADLs (bathing, care of mouth/teeth, toileting, grooming, dressing, etc.)  - Assess/evaluate cause of self-care deficits   - Assess range of motion  - Assess patient's mobility; develop plan if impaired  - Assess patient's need for assistive devices and provide as appropriate  - Encourage maximum independence but intervene and supervise when necessary  - Involve family in performance of ADLs  - Assess for home care needs following discharge   - Consider OT consult to assist with ADL evaluation and planning for discharge  - Provide patient education as appropriate  Outcome: Progressing  Goal: Maintains/Returns to pre admission functional level  Description: INTERVENTIONS:  - Perform AM-PAC 6 Click Basic Mobility/ Daily Activity assessment daily.  - Set and communicate daily mobility goal to care team and patient/family/caregiver.   - Collaborate with rehabilitation services on mobility goals if consulted  - Perform Range of Motion 4 times a day.  - Reposition patient every 2 hours.  - Dangle patient 3 times a day  - Stand patient 3 times a day  - Ambulate patient 4 times a day  - Out of bed to chair 3 times a day   - Out of bed for meals 3 times a day  - Out of bed for toileting  - Record patient progress and toleration of activity level   Outcome: Progressing     Problem: DISCHARGE PLANNING  Goal: Discharge to home or other facility with appropriate resources  Description: INTERVENTIONS:  - Identify barriers to discharge w/patient and caregiver  - Arrange for needed discharge  resources and transportation as appropriate  - Identify discharge learning needs (meds, wound care, etc.)  - Arrange for interpretive services to assist at discharge as needed  - Refer to Case Management Department for coordinating discharge planning if the patient needs post-hospital services based on physician/advanced practitioner order or complex needs related to functional status, cognitive ability, or social support system  Outcome: Progressing     Problem: Knowledge Deficit  Goal: Patient/family/caregiver demonstrates understanding of disease process, treatment plan, medications, and discharge instructions  Description: Complete learning assessment and assess knowledge base.  Interventions:  - Provide teaching at level of understanding  - Provide teaching via preferred learning methods  Outcome: Progressing

## 2024-01-22 NOTE — ASSESSMENT & PLAN NOTE
Reports following with a pain specialist as an outpatient  Toradol is helping with pain  Now stable for discharge

## 2024-01-22 NOTE — RESPIRATORY THERAPY NOTE
Home Oxygen Qualifying Test     Patient name: Nanci Navarro        : 1965   Date of Test:  2024  Diagnosis:    Home Oxygen Test:    **Medicare Guidelines require item(s) 1-5 on all ambulatory patients or 1 and 2 on non-ambulatory patients.    1. Baseline SPO2 on Room Air at rest 91%     SPO2 during exertion on Room Air 86  %  During exertion monitor SPO2. If SPO2 increases >=89%, do not add supplemental oxygen    SPO2 on Oxygen at Rest n/a % at n/a LPM    SPO2 during exertion on Oxygen 94 % at 2 LPM    Test performed during exertion activity.      [x]  Supplemental Home Oxygen is indicated.    []  Client does not qualify for home oxygen.    Respiratory Additional Notes- pt will req 2L during exertion    Joanne Cedeno, RT

## 2024-01-23 ENCOUNTER — PATIENT OUTREACH (OUTPATIENT)
Dept: CASE MANAGEMENT | Facility: OTHER | Age: 59
End: 2024-01-23

## 2024-01-23 LAB
ALBUMIN SERPL BCP-MCNC: 3.2 G/DL (ref 3.5–5)
ALP SERPL-CCNC: 79 U/L (ref 34–104)
ALT SERPL W P-5'-P-CCNC: 10 U/L (ref 7–52)
ANION GAP SERPL CALCULATED.3IONS-SCNC: 5 MMOL/L
AST SERPL W P-5'-P-CCNC: 7 U/L (ref 13–39)
ATRIAL RATE: 83 BPM
ATRIAL RATE: 86 BPM
BASOPHILS # BLD AUTO: 0.03 THOUSANDS/ÂΜL (ref 0–0.1)
BASOPHILS NFR BLD AUTO: 1 % (ref 0–1)
BILIRUB SERPL-MCNC: 0.36 MG/DL (ref 0.2–1)
BUN SERPL-MCNC: 21 MG/DL (ref 5–25)
CALCIUM ALBUM COR SERPL-MCNC: 9 MG/DL (ref 8.3–10.1)
CALCIUM SERPL-MCNC: 8.4 MG/DL (ref 8.4–10.2)
CHLORIDE SERPL-SCNC: 106 MMOL/L (ref 96–108)
CO2 SERPL-SCNC: 29 MMOL/L (ref 21–32)
CREAT SERPL-MCNC: 0.76 MG/DL (ref 0.6–1.3)
EOSINOPHIL # BLD AUTO: 0.07 THOUSAND/ÂΜL (ref 0–0.61)
EOSINOPHIL NFR BLD AUTO: 1 % (ref 0–6)
ERYTHROCYTE [DISTWIDTH] IN BLOOD BY AUTOMATED COUNT: 14.3 % (ref 11.6–15.1)
GFR SERPL CREATININE-BSD FRML MDRD: 86 ML/MIN/1.73SQ M
GLUCOSE SERPL-MCNC: 120 MG/DL (ref 65–140)
GLUCOSE SERPL-MCNC: 122 MG/DL (ref 65–140)
GLUCOSE SERPL-MCNC: 124 MG/DL (ref 65–140)
GLUCOSE SERPL-MCNC: 140 MG/DL (ref 65–140)
GLUCOSE SERPL-MCNC: 158 MG/DL (ref 65–140)
HCT VFR BLD AUTO: 27.8 % (ref 34.8–46.1)
HGB BLD-MCNC: 8.9 G/DL (ref 11.5–15.4)
IMM GRANULOCYTES # BLD AUTO: 0.1 THOUSAND/UL (ref 0–0.2)
IMM GRANULOCYTES NFR BLD AUTO: 2 % (ref 0–2)
LYMPHOCYTES # BLD AUTO: 0.92 THOUSANDS/ÂΜL (ref 0.6–4.47)
LYMPHOCYTES NFR BLD AUTO: 17 % (ref 14–44)
MAGNESIUM SERPL-MCNC: 2 MG/DL (ref 1.9–2.7)
MCH RBC QN AUTO: 30 PG (ref 26.8–34.3)
MCHC RBC AUTO-ENTMCNC: 32 G/DL (ref 31.4–37.4)
MCV RBC AUTO: 94 FL (ref 82–98)
MONOCYTES # BLD AUTO: 0.65 THOUSAND/ÂΜL (ref 0.17–1.22)
MONOCYTES NFR BLD AUTO: 12 % (ref 4–12)
NEUTROPHILS # BLD AUTO: 3.57 THOUSANDS/ÂΜL (ref 1.85–7.62)
NEUTS SEG NFR BLD AUTO: 67 % (ref 43–75)
NRBC BLD AUTO-RTO: 0 /100 WBCS
P AXIS: 64 DEGREES
P AXIS: 64 DEGREES
PLATELET # BLD AUTO: 324 THOUSANDS/UL (ref 149–390)
PMV BLD AUTO: 8.4 FL (ref 8.9–12.7)
POTASSIUM SERPL-SCNC: 3.7 MMOL/L (ref 3.5–5.3)
PR INTERVAL: 146 MS
PR INTERVAL: 148 MS
PROT SERPL-MCNC: 5.7 G/DL (ref 6.4–8.4)
QRS AXIS: 11 DEGREES
QRS AXIS: 9 DEGREES
QRSD INTERVAL: 94 MS
QRSD INTERVAL: 96 MS
QT INTERVAL: 350 MS
QT INTERVAL: 356 MS
QTC INTERVAL: 418 MS
QTC INTERVAL: 418 MS
RBC # BLD AUTO: 2.97 MILLION/UL (ref 3.81–5.12)
SODIUM SERPL-SCNC: 140 MMOL/L (ref 135–147)
T WAVE AXIS: 44 DEGREES
T WAVE AXIS: 48 DEGREES
VENTRICULAR RATE: 83 BPM
VENTRICULAR RATE: 86 BPM
WBC # BLD AUTO: 5.34 THOUSAND/UL (ref 4.31–10.16)

## 2024-01-23 PROCEDURE — 94640 AIRWAY INHALATION TREATMENT: CPT

## 2024-01-23 PROCEDURE — 85025 COMPLETE CBC W/AUTO DIFF WBC: CPT | Performed by: STUDENT IN AN ORGANIZED HEALTH CARE EDUCATION/TRAINING PROGRAM

## 2024-01-23 PROCEDURE — 99233 SBSQ HOSP IP/OBS HIGH 50: CPT | Performed by: STUDENT IN AN ORGANIZED HEALTH CARE EDUCATION/TRAINING PROGRAM

## 2024-01-23 PROCEDURE — 83735 ASSAY OF MAGNESIUM: CPT | Performed by: STUDENT IN AN ORGANIZED HEALTH CARE EDUCATION/TRAINING PROGRAM

## 2024-01-23 PROCEDURE — 80053 COMPREHEN METABOLIC PANEL: CPT | Performed by: STUDENT IN AN ORGANIZED HEALTH CARE EDUCATION/TRAINING PROGRAM

## 2024-01-23 PROCEDURE — 82948 REAGENT STRIP/BLOOD GLUCOSE: CPT

## 2024-01-23 PROCEDURE — 94760 N-INVAS EAR/PLS OXIMETRY 1: CPT

## 2024-01-23 PROCEDURE — 94664 DEMO&/EVAL PT USE INHALER: CPT

## 2024-01-23 RX ORDER — FUROSEMIDE 40 MG/1
40 TABLET ORAL DAILY
Status: DISCONTINUED | OUTPATIENT
Start: 2024-01-23 | End: 2024-02-07 | Stop reason: HOSPADM

## 2024-01-23 RX ORDER — LIDOCAINE 50 MG/G
1 PATCH TOPICAL DAILY
Status: DISCONTINUED | OUTPATIENT
Start: 2024-01-23 | End: 2024-02-07 | Stop reason: HOSPADM

## 2024-01-23 RX ORDER — OXYCODONE HYDROCHLORIDE 5 MG/1
5 TABLET ORAL EVERY 6 HOURS PRN
Status: DISCONTINUED | OUTPATIENT
Start: 2024-01-23 | End: 2024-01-28

## 2024-01-23 RX ADMIN — METHOCARBAMOL TABLETS 500 MG: 500 TABLET, COATED ORAL at 11:15

## 2024-01-23 RX ADMIN — TRAMADOL HYDROCHLORIDE 50 MG: 50 TABLET, COATED ORAL at 06:21

## 2024-01-23 RX ADMIN — KETOROLAC TROMETHAMINE 15 MG: 30 INJECTION, SOLUTION INTRAMUSCULAR at 03:15

## 2024-01-23 RX ADMIN — METHOCARBAMOL TABLETS 500 MG: 500 TABLET, COATED ORAL at 03:17

## 2024-01-23 RX ADMIN — DOCUSATE SODIUM 100 MG: 100 CAPSULE, LIQUID FILLED ORAL at 09:46

## 2024-01-23 RX ADMIN — LEVALBUTEROL HYDROCHLORIDE 1.25 MG: 1.25 SOLUTION RESPIRATORY (INHALATION) at 19:31

## 2024-01-23 RX ADMIN — OXYCODONE HYDROCHLORIDE 5 MG: 5 TABLET ORAL at 23:42

## 2024-01-23 RX ADMIN — SUCRALFATE 1 G: 1 TABLET ORAL at 06:22

## 2024-01-23 RX ADMIN — OXYCODONE HYDROCHLORIDE 5 MG: 5 TABLET ORAL at 15:18

## 2024-01-23 RX ADMIN — GUAIFENESIN 600 MG: 600 TABLET ORAL at 22:19

## 2024-01-23 RX ADMIN — SUCRALFATE 1 G: 1 TABLET ORAL at 15:18

## 2024-01-23 RX ADMIN — TRAMADOL HYDROCHLORIDE 50 MG: 50 TABLET, COATED ORAL at 13:04

## 2024-01-23 RX ADMIN — ACETAMINOPHEN 650 MG: 325 TABLET, FILM COATED ORAL at 11:15

## 2024-01-23 RX ADMIN — DOCUSATE SODIUM 100 MG: 100 CAPSULE, LIQUID FILLED ORAL at 17:18

## 2024-01-23 RX ADMIN — FLUTICASONE FUROATE AND VILANTEROL TRIFENATATE 1 PUFF: 100; 25 POWDER RESPIRATORY (INHALATION) at 09:46

## 2024-01-23 RX ADMIN — IPRATROPIUM BROMIDE 0.5 MG: 0.5 SOLUTION RESPIRATORY (INHALATION) at 07:13

## 2024-01-23 RX ADMIN — HEPARIN SODIUM 7500 UNITS: 5000 INJECTION INTRAVENOUS; SUBCUTANEOUS at 06:21

## 2024-01-23 RX ADMIN — HEPARIN SODIUM 7500 UNITS: 5000 INJECTION INTRAVENOUS; SUBCUTANEOUS at 13:05

## 2024-01-23 RX ADMIN — PANTOPRAZOLE SODIUM 40 MG: 40 TABLET, DELAYED RELEASE ORAL at 09:46

## 2024-01-23 RX ADMIN — LEVALBUTEROL HYDROCHLORIDE 1.25 MG: 1.25 SOLUTION RESPIRATORY (INHALATION) at 07:13

## 2024-01-23 RX ADMIN — IPRATROPIUM BROMIDE 0.5 MG: 0.5 SOLUTION RESPIRATORY (INHALATION) at 19:31

## 2024-01-23 RX ADMIN — TRAMADOL HYDROCHLORIDE 50 MG: 50 TABLET, COATED ORAL at 19:29

## 2024-01-23 RX ADMIN — NICOTINE 1 PATCH: 14 PATCH, EXTENDED RELEASE TRANSDERMAL at 09:46

## 2024-01-23 RX ADMIN — GUAIFENESIN 600 MG: 600 TABLET ORAL at 09:46

## 2024-01-23 RX ADMIN — Medication 2 G: at 17:22

## 2024-01-23 NOTE — PLAN OF CARE
Problem: OCCUPATIONAL THERAPY ADULT  Goal: Performs self-care activities at highest level of function for planned discharge setting.  See evaluation for individualized goals.  Description: Treatment Interventions: ADL retraining, Functional transfer training, UE strengthening/ROM, Endurance training, Patient/family training, Equipment evaluation/education, Compensatory technique education, Continued evaluation, Energy conservation          See flowsheet documentation for full assessment, interventions and recommendations.   Note: Limitation: Decreased UE strength, Decreased Safe judgement during ADL, Decreased endurance, Mood limitation  Prognosis: Fair  Assessment: Patient participated in Skilled OT session this date with interventions consisting of ADL re training with the use of correct body mechnaics, Energy Conservation techniques, Work simplification skills , safety awareness and fall prevention techniques,  therapeutic activities to: increase activity tolerance, increase cardiovascular endurance , increase dynamic sit/ stand balance during functional activity , and increase OOB/ sitting tolerance . Patient agreeable to OT treatment session, upon arrival patient was found seated OOB to Recliner, alert, responsive , and in no apparent distress.  In comparison to previous session, patient with improvements in overall endurance and activity tolerance. Patient completed self care routine, standing sink side with standing tolerance time measured at 1 1/2 minutes x 2. Patient requires set up assist for UB ADLs and min assist for LBADLs, mostly lower leg and foot. Patient requiring verbal cues for safety, verbal cues for correct technique, verbal cues for pacing thru activity steps, and one step directives. Patient continues to be functioning below baseline level, occupational performance remains limited secondary to factors listed above and increased risk for falls and injury.   From OT standpoint, recommendation  at time of d/c would be level 3.   Patient to benefit from continued Occupational Therapy treatment while in the hospital to address deficits as defined above and maximize level of functional independence with ADLs and functional mobility.     Rehab Resource Intensity Level, OT: III (Minimum Resource Intensity)
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
## 2024-01-23 NOTE — ASSESSMENT & PLAN NOTE
Likely iatrogenic, recent admission for sepsis  S/p recent echo preserved ef and nml diastolic function  Patient is not on home diuretics.  Was given 2 doses of IV Lasix 40 mg each last dose was on 1/20/2023.  Currently in negative balance however noted with bilateral lower extremity edema.  Will start on Lasix 40 mg daily and continue monitor volume status renal function while inpatient.

## 2024-01-23 NOTE — ASSESSMENT & PLAN NOTE
Reports following with a pain specialist as an outpatient  Current on encounter patient does complain of having back pain requesting to increase pain regimen.  Continue with Tylenol, Robaxin, lidocaine patch, Toradol, added oxycodone for now.
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
## 2024-01-23 NOTE — PLAN OF CARE
Problem: Potential for Falls  Goal: Patient will remain free of falls  Description: INTERVENTIONS:  - Educate patient/family on patient safety including physical limitations  - Instruct patient to call for assistance with activity   - Consult OT/PT to assist with strengthening/mobility   - Keep Call bell within reach  - Keep bed low and locked with side rails adjusted as appropriate  - Keep care items and personal belongings within reach  - Initiate and maintain comfort rounds  - Make Fall Risk Sign visible to staff  - Offer Toileting every 2 Hours, in advance of need  - Initiate/Maintain bed alarm  - Obtain necessary fall risk management equipment:   - Apply yellow socks and bracelet for high fall risk patients  - Consider moving patient to room near nurses station  Outcome: Progressing     Problem: INFECTION - ADULT  Goal: Absence or prevention of progression during hospitalization  Description: INTERVENTIONS:  - Assess and monitor for signs and symptoms of infection  - Monitor lab/diagnostic results  - Monitor all insertion sites, i.e. indwelling lines, tubes, and drains  - Monitor endotracheal if appropriate and nasal secretions for changes in amount and color  - Serena appropriate cooling/warming therapies per order  - Administer medications as ordered  - Instruct and encourage patient and family to use good hand hygiene technique  - Identify and instruct in appropriate isolation precautions for identified infection/condition  Outcome: Progressing     Problem: SAFETY ADULT  Goal: Patient will remain free of falls  Description: INTERVENTIONS:  - Educate patient/family on patient safety including physical limitations  - Instruct patient to call for assistance with activity   - Consult OT/PT to assist with strengthening/mobility   - Keep Call bell within reach  - Keep bed low and locked with side rails adjusted as appropriate  - Keep care items and personal belongings within reach  - Initiate and maintain  comfort rounds  - Make Fall Risk Sign visible to staff  - Offer Toileting every 2 Hours, in advance of need  - Initiate/Maintain bed alarm  - Obtain necessary fall risk management equipment:   - Apply yellow socks and bracelet for high fall risk patients  - Consider moving patient to room near nurses station  Outcome: Progressing     Problem: Knowledge Deficit  Goal: Patient/family/caregiver demonstrates understanding of disease process, treatment plan, medications, and discharge instructions  Description: Complete learning assessment and assess knowledge base.  Interventions:  - Provide teaching at level of understanding  - Provide teaching via preferred learning methods  Outcome: Progressing

## 2024-01-23 NOTE — RESPIRATORY THERAPY NOTE
RT Protocol Note  Nanci Navarro 58 y.o. female MRN: [id number]  Unit/Bed#: -01 Encounter: [id number]    Assessment    Principal Problem:    Chest pain, unspecified  Active Problems:    Chronic obstructive pulmonary disease with acute exacerbation (HCC)    Hypokalemia    Compression fracture of L1 vertebra (HCC)    Volume overload    Chronic respiratory failure (HCC)    Flank pain    Anemia      Home Pulmonary Medications:  Duo QID  Home Devices/Therapy: Home O2    Past Medical History:   Diagnosis Date    Achalasia     Back pain     Cancer (HCC)     Ovarian    Fibromyalgia     Lupus (HCC)      Social History     Socioeconomic History    Marital status: /Civil Union     Spouse name: None    Number of children: None    Years of education: None    Highest education level: None   Occupational History    None   Tobacco Use    Smoking status: Every Day     Current packs/day: 0.50     Types: Cigarettes    Smokeless tobacco: Never   Vaping Use    Vaping status: Never Used   Substance and Sexual Activity    Alcohol use: Never    Drug use: Never    Sexual activity: Yes   Other Topics Concern    None   Social History Narrative    None     Social Determinants of Health     Financial Resource Strain: Unknown (12/23/2023)    [facility] from [facility]    Overall Financial Resource Strain (CARDIA)     Difficulty of Paying Living Expenses: Patient refused   Food Insecurity: Food Insecurity Present (1/19/2024)    Hunger Vital Sign     Worried About Running Out of Food in the Last Year: Sometimes true     Ran Out of Food in the Last Year: Sometimes true   Transportation Needs: No Transportation Needs (1/19/2024)    PRAPARE - Transportation     Lack of Transportation (Medical): No     Lack of Transportation (Non-Medical): No   Physical Activity: Not on file   Stress: Not on file   Social Connections: Not on file   Intimate Partner Violence: Unknown (12/23/2023)    Received from [facility]  "Network    Humiliation, Afraid, Rape, and Kick questionnaire     Fear of Current or Ex-Partner: Patient refused     Emotionally Abused: Patient refused     Physically Abused: Patient refused     Sexually Abused: Patient refused   Housing Stability: Low Risk  (1/19/2024)    Housing Stability Vital Sign     Unable to Pay for Housing in the Last Year: No     Number of Places Lived in the Last Year: 1     Unstable Housing in the Last Year: No       Subjective         Objective    Physical Exam:   Assessment Type: Post-treatment  General Appearance: Alert, Awake  Respiratory Pattern: Normal  Chest Assessment: Chest expansion symmetrical  Bilateral Breath Sounds: Diminished, Clear  O2 Device: NC    Vitals:  Blood pressure 119/50, pulse 72, temperature 98 °F (36.7 °C), resp. rate 18, height 4' 9\" (1.448 m), weight 95.8 kg (211 lb 3.2 oz), SpO2 97%.          Imaging and other studies:     O2 Device: NC     Plan    Respiratory Plan: Home Bronchodilator Patient pathway        Resp Comments: patient with history of pulmonary disease takes scheduled txs at home, continue with txs as ordered   "
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
## 2024-01-23 NOTE — ASSESSMENT & PLAN NOTE
Likely atypical MSK pain  Troponin's negative, ECHO on 1/15 benign  Not related to exertion  Currently asymptomatic.  Follow up with PCP as an outpatient

## 2024-01-23 NOTE — ASSESSMENT & PLAN NOTE
Results from last 7 days   Lab Units 01/23/24  0443 01/22/24  0535 01/21/24  0514 01/19/24  0424 01/18/24  1856   HEMOGLOBIN g/dL 8.9* 10.1* 9.8* 11.2* 11.3*       Baseline hemoglobin is around 11-12 range.  Hemoglobin slowly downtrending currently 8.9.  Denies hematuria, melena, hematochezia.  No signs of active overt bleeding noted.  Follow-up with iron panel, B12, folic acid.  Continue to trend CBC.

## 2024-01-23 NOTE — PLAN OF CARE
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
## 2024-01-23 NOTE — RESPIRATORY THERAPY NOTE
"RT Protocol Note  Nanci Navarro 58 y.o. female MRN: [id number]  Unit/Bed#: -01 Encounter: [id number]    Assessment    Principal Problem:    Chest pain, unspecified  Active Problems:    Chronic obstructive pulmonary disease with acute exacerbation (HCC)    Hypokalemia    Compression fracture of L1 vertebra (HCC)    Volume overload    Chronic respiratory failure (HCC)    Flank pain    Anemia    Physical Exam:   Assessment Type: During-treatment  General Appearance: Alert, Awake  Respiratory Pattern: Normal  Chest Assessment: Chest expansion symmetrical  Bilateral Breath Sounds: Clear  O2 Device: nc    Vitals:  Blood pressure (!) 100/47, pulse 65, temperature 98.5 °F (36.9 °C), temperature source Oral, resp. rate 20, height 4' 9\" (1.448 m), weight 97.2 kg (214 lb 4.6 oz), SpO2 99%.      O2 Device: nc     Plan    Respiratory Plan: Home Bronchodilator Patient pathway        Resp Comments: will cont w current txs titrated o2 to 1 L   "

## 2024-01-23 NOTE — OCCUPATIONAL THERAPY NOTE
"          Occupational Therapy Treatment Note        Patient Name: Nanci Navarro  Today's Date: 1/23/2024 01/22/24 0825   OT Last Visit   OT Visit Date 01/22/24   Note Type   Note Type Treatment   Pain Assessment   Pain Assessment Tool 0-10   Pain Score 9   Pain Location/Orientation Orientation: Lower;Location: Back   Pain Onset/Description Onset: Ongoing;Frequency: Constant/Continuous   Patient's Stated Pain Goal No pain   Hospital Pain Intervention(s) Repositioned;Ambulation/increased activity;Emotional support   Multiple Pain Sites No   Restrictions/Precautions   Weight Bearing Precautions Per Order No   Other Precautions O2;Fall Risk;Pain  (2 L O2)   ADL   Where Assessed Standing at sink   Eating Assistance 7  Independent   Grooming Assistance 5  Supervision/Setup   Grooming Deficit Increased time to complete;Steadying;Verbal cueing;Supervision/safety   UB Bathing Assistance 4  Minimal Assistance   UB Bathing Deficit Setup;Steadying;Supervision/safety;Increased time to complete   LB Bathing Assistance 4  Minimal Assistance   LB Bathing Deficit Setup;Supervision/safety;Increased time to complete   UB Dressing Assistance 5  Supervision/Setup   UB Dressing Deficit Setup;Verbal cueing;Supervision/safety;Increased time to complete   LB Dressing Assistance 4  Minimal Assistance   LB Dressing Deficit Setup;Increased time to complete   Toileting Assistance  5  Supervision/Setup   Toileting Deficit Setup;Increased time to complete   Functional Standing Tolerance   Time ~1 1/2 minutes   Activity sink level ADLs   Bed Mobility   Supine to Sit 5  Supervision   Additional items HOB elevated;Increased time required   Additional Comments pt without complaints of lightheadedness, chest pain, dizziness throughout session. Patient complained of back pain, stated \"I always have back pain\"   Transfers   Sit to Stand 5  Supervision   Additional items Assist x 1;Increased time required;Verbal cues   Stand to Sit 5  " Supervision   Additional items Assist x 1;Increased time required;Verbal cues   Additional Comments no AD used during transfers   Functional Mobility   Functional Mobility 5  Supervision   Additional Comments patient insisted on not using the walker, therapist provided close CG assist, recommended the use of a walker.   Toilet Transfers   Toilet Transfer From Other (Comment)  (Recliner chair)   Toilet Transfer Type To and from   Toilet Transfer to Standard toilet   Toilet Transfer Technique Ambulating   Toilet Transfers Supervision   Cognition   Overall Cognitive Status WFL   Arousal/Participation Alert;Responsive   Attention Within functional limits   Orientation Level Oriented X4   Memory Within functional limits   Following Commands Follows all commands and directions without difficulty   Comments Therapist completed the Short Blessed Cognitive test, patient obtained a score of 0, (0-4) indicating normal cognition.   Cognition Assessment Tools Other (Comment)  (Short Blessed Cognitive test)   Activity Tolerance   Activity Tolerance Patient limited by fatigue;Patient limited by pain   Assessment   Assessment Patient participated in Skilled OT session this date with interventions consisting of ADL re training with the use of correct body mechnaics, Energy Conservation techniques, Work simplification skills , safety awareness and fall prevention techniques,  therapeutic activities to: increase activity tolerance, increase cardiovascular endurance , increase dynamic sit/ stand balance during functional activity , and increase OOB/ sitting tolerance . Patient agreeable to OT treatment session, upon arrival patient was found seated OOB to Recliner, alert, responsive , and in no apparent distress.  In comparison to previous session, patient with improvements in overall endurance and activity tolerance. Patient completed self care routine, standing sink side with standing tolerance time measured at 1 1/2 minutes x 2.  Patient requires set up assist for UB ADLs and min assist for LBADLs, mostly lower leg and foot. Patient requiring verbal cues for safety, verbal cues for correct technique, verbal cues for pacing thru activity steps, and one step directives. Patient continues to be functioning below baseline level, occupational performance remains limited secondary to factors listed above and increased risk for falls and injury.   From OT standpoint, recommendation at time of d/c would be level 3.   Patient to benefit from continued Occupational Therapy treatment while in the hospital to address deficits as defined above and maximize level of functional independence with ADLs and functional mobility.   Plan   Treatment Interventions ADL retraining;Functional transfer training;UE strengthening/ROM;Endurance training;Patient/family training;Equipment evaluation/education;Compensatory technique education;Continued evaluation;Energy conservation   Goal Expiration Date 02/03/24   OT Frequency 2-3x/wk   Discharge Recommendation   Rehab Resource Intensity Level, OT III (Minimum Resource Intensity)   AM-PAC Daily Activity Inpatient   Lower Body Dressing 3   Bathing 3   Toileting 3   Upper Body Dressing 3   Grooming 4   Eating 4   Daily Activity Raw Score 20   Daily Activity Standardized Score (Calc for Raw Score >=11) 42.03   AM-PAC Applied Cognition Inpatient   Following a Speech/Presentation 4   Understanding Ordinary Conversation 4   Taking Medications 4   Remembering Where Things Are Placed or Put Away 4   Remembering List of 4-5 Errands 4   Taking Care of Complicated Tasks 4   Applied Cognition Raw Score 24   Applied Cognition Standardized Score 62.21

## 2024-01-23 NOTE — PROGRESS NOTES
OP RT CM received referral and in basket message from Lalita CM OP CM Coordinator. Patient is currently hospitalized.  I will outreach once discharged home.

## 2024-01-24 ENCOUNTER — APPOINTMENT (INPATIENT)
Dept: RADIOLOGY | Facility: HOSPITAL | Age: 59
DRG: 140 | End: 2024-01-24
Payer: COMMERCIAL

## 2024-01-24 LAB
ANION GAP SERPL CALCULATED.3IONS-SCNC: 5 MMOL/L
BACTERIA BLD CULT: NORMAL
BACTERIA BLD CULT: NORMAL
BUN SERPL-MCNC: 18 MG/DL (ref 5–25)
CALCIUM SERPL-MCNC: 8.7 MG/DL (ref 8.4–10.2)
CHLORIDE SERPL-SCNC: 104 MMOL/L (ref 96–108)
CO2 SERPL-SCNC: 30 MMOL/L (ref 21–32)
CREAT SERPL-MCNC: 0.72 MG/DL (ref 0.6–1.3)
ERYTHROCYTE [DISTWIDTH] IN BLOOD BY AUTOMATED COUNT: 14.3 % (ref 11.6–15.1)
FERRITIN SERPL-MCNC: 65 NG/ML (ref 11–307)
FOLATE SERPL-MCNC: 7 NG/ML
GFR SERPL CREATININE-BSD FRML MDRD: 92 ML/MIN/1.73SQ M
GLUCOSE SERPL-MCNC: 107 MG/DL (ref 65–140)
GLUCOSE SERPL-MCNC: 118 MG/DL (ref 65–140)
GLUCOSE SERPL-MCNC: 122 MG/DL (ref 65–140)
GLUCOSE SERPL-MCNC: 143 MG/DL (ref 65–140)
GLUCOSE SERPL-MCNC: 145 MG/DL (ref 65–140)
HCT VFR BLD AUTO: 28.2 % (ref 34.8–46.1)
HGB BLD-MCNC: 9.2 G/DL (ref 11.5–15.4)
IRON SATN MFR SERPL: 10 % (ref 15–50)
IRON SERPL-MCNC: 28 UG/DL (ref 50–212)
MCH RBC QN AUTO: 29.9 PG (ref 26.8–34.3)
MCHC RBC AUTO-ENTMCNC: 32.6 G/DL (ref 31.4–37.4)
MCV RBC AUTO: 92 FL (ref 82–98)
PLATELET # BLD AUTO: 378 THOUSANDS/UL (ref 149–390)
PMV BLD AUTO: 8.8 FL (ref 8.9–12.7)
POTASSIUM SERPL-SCNC: 3.8 MMOL/L (ref 3.5–5.3)
RBC # BLD AUTO: 3.08 MILLION/UL (ref 3.81–5.12)
SODIUM SERPL-SCNC: 139 MMOL/L (ref 135–147)
TIBC SERPL-MCNC: 268 UG/DL (ref 250–450)
UIBC SERPL-MCNC: 240 UG/DL (ref 155–355)
VIT B12 SERPL-MCNC: 171 PG/ML (ref 180–914)
WBC # BLD AUTO: 6.68 THOUSAND/UL (ref 4.31–10.16)

## 2024-01-24 PROCEDURE — 99232 SBSQ HOSP IP/OBS MODERATE 35: CPT | Performed by: STUDENT IN AN ORGANIZED HEALTH CARE EDUCATION/TRAINING PROGRAM

## 2024-01-24 PROCEDURE — 82948 REAGENT STRIP/BLOOD GLUCOSE: CPT

## 2024-01-24 PROCEDURE — 94640 AIRWAY INHALATION TREATMENT: CPT

## 2024-01-24 PROCEDURE — 82746 ASSAY OF FOLIC ACID SERUM: CPT | Performed by: STUDENT IN AN ORGANIZED HEALTH CARE EDUCATION/TRAINING PROGRAM

## 2024-01-24 PROCEDURE — 83540 ASSAY OF IRON: CPT | Performed by: STUDENT IN AN ORGANIZED HEALTH CARE EDUCATION/TRAINING PROGRAM

## 2024-01-24 PROCEDURE — 83550 IRON BINDING TEST: CPT | Performed by: STUDENT IN AN ORGANIZED HEALTH CARE EDUCATION/TRAINING PROGRAM

## 2024-01-24 PROCEDURE — 85027 COMPLETE CBC AUTOMATED: CPT | Performed by: STUDENT IN AN ORGANIZED HEALTH CARE EDUCATION/TRAINING PROGRAM

## 2024-01-24 PROCEDURE — 80048 BASIC METABOLIC PNL TOTAL CA: CPT | Performed by: STUDENT IN AN ORGANIZED HEALTH CARE EDUCATION/TRAINING PROGRAM

## 2024-01-24 PROCEDURE — 94760 N-INVAS EAR/PLS OXIMETRY 1: CPT

## 2024-01-24 PROCEDURE — 82728 ASSAY OF FERRITIN: CPT | Performed by: STUDENT IN AN ORGANIZED HEALTH CARE EDUCATION/TRAINING PROGRAM

## 2024-01-24 PROCEDURE — 71045 X-RAY EXAM CHEST 1 VIEW: CPT

## 2024-01-24 PROCEDURE — 94664 DEMO&/EVAL PT USE INHALER: CPT

## 2024-01-24 PROCEDURE — 82607 VITAMIN B-12: CPT | Performed by: STUDENT IN AN ORGANIZED HEALTH CARE EDUCATION/TRAINING PROGRAM

## 2024-01-24 RX ORDER — LANOLIN ALCOHOL/MO/W.PET/CERES
400 CREAM (GRAM) TOPICAL DAILY
Status: DISCONTINUED | OUTPATIENT
Start: 2024-01-24 | End: 2024-02-07 | Stop reason: HOSPADM

## 2024-01-24 RX ORDER — ALBUTEROL SULFATE 90 UG/1
2 AEROSOL, METERED RESPIRATORY (INHALATION) 4 TIMES DAILY
Status: DISCONTINUED | OUTPATIENT
Start: 2024-01-24 | End: 2024-02-07 | Stop reason: HOSPADM

## 2024-01-24 RX ADMIN — GUAIFENESIN 600 MG: 600 TABLET ORAL at 22:02

## 2024-01-24 RX ADMIN — CYANOCOBALAMIN TAB 500 MCG 500 MCG: 500 TAB at 12:37

## 2024-01-24 RX ADMIN — OXYCODONE HYDROCHLORIDE 5 MG: 5 TABLET ORAL at 15:57

## 2024-01-24 RX ADMIN — SUCRALFATE 1 G: 1 TABLET ORAL at 15:57

## 2024-01-24 RX ADMIN — OXYCODONE HYDROCHLORIDE 5 MG: 5 TABLET ORAL at 22:06

## 2024-01-24 RX ADMIN — FUROSEMIDE 40 MG: 40 TABLET ORAL at 09:26

## 2024-01-24 RX ADMIN — LEVALBUTEROL HYDROCHLORIDE 1.25 MG: 1.25 SOLUTION RESPIRATORY (INHALATION) at 19:51

## 2024-01-24 RX ADMIN — OXYCODONE HYDROCHLORIDE 5 MG: 5 TABLET ORAL at 09:34

## 2024-01-24 RX ADMIN — FOLIC ACID TAB 400 MCG 400 MCG: 400 TAB at 12:37

## 2024-01-24 RX ADMIN — HEPARIN SODIUM 7500 UNITS: 5000 INJECTION INTRAVENOUS; SUBCUTANEOUS at 22:02

## 2024-01-24 RX ADMIN — SUCRALFATE 1 G: 1 TABLET ORAL at 06:02

## 2024-01-24 RX ADMIN — FLUTICASONE FUROATE AND VILANTEROL TRIFENATATE 1 PUFF: 100; 25 POWDER RESPIRATORY (INHALATION) at 09:27

## 2024-01-24 RX ADMIN — TRAMADOL HYDROCHLORIDE 50 MG: 50 TABLET, COATED ORAL at 19:06

## 2024-01-24 RX ADMIN — TRAMADOL HYDROCHLORIDE 50 MG: 50 TABLET, COATED ORAL at 06:02

## 2024-01-24 RX ADMIN — DOCUSATE SODIUM 100 MG: 100 CAPSULE, LIQUID FILLED ORAL at 18:57

## 2024-01-24 RX ADMIN — LEVALBUTEROL HYDROCHLORIDE 1.25 MG: 1.25 SOLUTION RESPIRATORY (INHALATION) at 07:21

## 2024-01-24 RX ADMIN — TRAMADOL HYDROCHLORIDE 50 MG: 50 TABLET, COATED ORAL at 12:29

## 2024-01-24 RX ADMIN — HEPARIN SODIUM 7500 UNITS: 5000 INJECTION INTRAVENOUS; SUBCUTANEOUS at 15:44

## 2024-01-24 RX ADMIN — IPRATROPIUM BROMIDE 0.5 MG: 0.5 SOLUTION RESPIRATORY (INHALATION) at 19:51

## 2024-01-24 RX ADMIN — DOCUSATE SODIUM 100 MG: 100 CAPSULE, LIQUID FILLED ORAL at 09:26

## 2024-01-24 RX ADMIN — IPRATROPIUM BROMIDE 0.5 MG: 0.5 SOLUTION RESPIRATORY (INHALATION) at 07:21

## 2024-01-24 RX ADMIN — PANTOPRAZOLE SODIUM 40 MG: 40 TABLET, DELAYED RELEASE ORAL at 09:26

## 2024-01-24 RX ADMIN — GUAIFENESIN 600 MG: 600 TABLET ORAL at 09:26

## 2024-01-24 NOTE — ASSESSMENT & PLAN NOTE
Results from last 7 days   Lab Units 01/24/24  0438 01/23/24  0443 01/22/24  0535 01/21/24  0514 01/19/24  0424 01/18/24  1856   HEMOGLOBIN g/dL 9.2* 8.9* 10.1* 9.8* 11.2* 11.3*       Baseline hemoglobin is around 11-12 range.  Hemoglobin slowly downtrending currently 9.2  Denies hematuria, melena, hematochezia.  No signs of active overt bleeding noted.  Iron panel pending.  B12, folic acid noted low.  Will start on folic acid and B12 supplement.  Continue to trend CBC.

## 2024-01-24 NOTE — PLAN OF CARE
Problem: Potential for Falls  Goal: Patient will remain free of falls  Description: INTERVENTIONS:  - Educate patient/family on patient safety including physical limitations  - Instruct patient to call for assistance with activity   - Consult OT/PT to assist with strengthening/mobility   - Keep Call bell within reach  - Keep bed low and locked with side rails adjusted as appropriate  - Keep care items and personal belongings within reach  - Initiate and maintain comfort rounds  - Make Fall Risk Sign visible to staff  - Offer Toileting every 2 Hours, in advance of need  - Initiate/Maintain 2alarm  - Obtain necessary fall risk management equipment: 2  - Apply yellow socks and bracelet for high fall risk patients  - Consider moving patient to room near nurses station  Outcome: Progressing     Problem: PAIN - ADULT  Goal: Verbalizes/displays adequate comfort level or baseline comfort level  Description: Interventions:  - Encourage patient to monitor pain and request assistance  - Assess pain using appropriate pain scale  - Administer analgesics based on type and severity of pain and evaluate response  - Implement non-pharmacological measures as appropriate and evaluate response  - Consider cultural and social influences on pain and pain management  - Notify physician/advanced practitioner if interventions unsuccessful or patient reports new pain  Outcome: Progressing     Problem: INFECTION - ADULT  Goal: Absence or prevention of progression during hospitalization  Description: INTERVENTIONS:  - Assess and monitor for signs and symptoms of infection  - Monitor lab/diagnostic results  - Monitor all insertion sites, i.e. indwelling lines, tubes, and drains  - Monitor endotracheal if appropriate and nasal secretions for changes in amount and color  - [place] appropriate cooling/warming therapies per order  - Administer medications as ordered  - Instruct and encourage patient and family to use good hand hygiene  technique  - Identify and instruct in appropriate isolation precautions for identified infection/condition  Outcome: Progressing  Goal: Absence of fever/infection during neutropenic period  Description: INTERVENTIONS:  - Monitor WBC    Outcome: Progressing     Problem: SAFETY ADULT  Goal: Patient will remain free of falls  Description: INTERVENTIONS:  - Educate patient/family on patient safety including physical limitations  - Instruct patient to call for assistance with activity   - Consult OT/PT to assist with strengthening/mobility   - Keep Call bell within reach  - Keep bed low and locked with side rails adjusted as appropriate  - Keep care items and personal belongings within reach  - Initiate and maintain comfort rounds  - Make Fall Risk Sign visible to staff  - Offer Toileting every 2 Hours, in advance of need  - Initiate/Maintain 2alarm  - Obtain necessary fall risk management equipment: 2  - Apply yellow socks and bracelet for high fall risk patients  - Consider moving patient to room near nurses station  Outcome: Progressing  Goal: Maintain or return to baseline ADL function  Description: INTERVENTIONS:  -  Assess patient's ability to carry out ADLs; assess patient's baseline for ADL function and identify physical deficits which impact ability to perform ADLs (bathing, care of mouth/teeth, toileting, grooming, dressing, etc.)  - Assess/evaluate cause of self-care deficits   - Assess range of motion  - Assess patient's mobility; develop plan if impaired  - Assess patient's need for assistive devices and provide as appropriate  - Encourage maximum independence but intervene and supervise when necessary  - Involve family in performance of ADLs  - Assess for home care needs following discharge   - Consider OT consult to assist with ADL evaluation and planning for discharge  - Provide patient education as appropriate  Outcome: Progressing  Goal: Maintains/Returns to pre admission functional level  Description:  INTERVENTIONS:  - Perform AM-PAC 6 Click Basic Mobility/ Daily Activity assessment daily.  - Set and communicate daily mobility goal to care team and patient/family/caregiver.   - Collaborate with rehabilitation services on mobility goals if consulted  - Perform Range of Motion 2 times a day.  - Reposition patient every 2 hours.  - Dangle patient 2 times a day  - Stand patient 2 times a day  - Ambulate patient 2 times a day  - Out of bed to chair 2 times a day   - Out of bed for meals 2 times a day  - Out of bed for toileting  - Record patient progress and toleration of activity level   Outcome: Progressing     Problem: DISCHARGE PLANNING  Goal: Discharge to home or other facility with appropriate resources  Description: INTERVENTIONS:  - Identify barriers to discharge w/patient and caregiver  - Arrange for needed discharge resources and transportation as appropriate  - Identify discharge learning needs (meds, wound care, etc.)  - Arrange for interpretive services to assist at discharge as needed  - Refer to Case Management Department for coordinating discharge planning if the patient needs post-hospital services based on physician/advanced practitioner order or complex needs related to functional status, cognitive ability, or social support system  Outcome: Progressing     Problem: Knowledge Deficit  Goal: Patient/family/caregiver demonstrates understanding of disease process, treatment plan, medications, and discharge instructions  Description: Complete learning assessment and assess knowledge base.  Interventions:  - Provide teaching at level of understanding  - Provide teaching via preferred learning methods  Outcome: Progressing     Problem: RESPIRATORY - ADULT  Goal: Achieves optimal ventilation and oxygenation  Description: INTERVENTIONS:  - Assess for changes in respiratory status  - Assess for changes in mentation and behavior  - Position to facilitate oxygenation and minimize respiratory effort  - Oxygen  administered by appropriate delivery if ordered  - Initiate smoking cessation education as indicated  - Encourage broncho-pulmonary hygiene including cough, deep breathe, Incentive Spirometry  - Assess the need for suctioning and aspirate as needed  - Assess and instruct to report SOB or any respiratory difficulty  - Respiratory Therapy support as indicated  Outcome: Progressing     Problem: GASTROINTESTINAL - ADULT  Goal: Maintains or returns to baseline bowel function  Description: INTERVENTIONS:  - Assess bowel function  - Encourage oral fluids to ensure adequate hydration  - Administer IV fluids if ordered to ensure adequate hydration  - Administer ordered medications as needed  - Encourage mobilization and activity  - Consider nutritional services referral to assist patient with adequate nutrition and appropriate food choices  Outcome: Progressing     Problem: GENITOURINARY - ADULT  Goal: Absence of urinary retention  Description: INTERVENTIONS:  - Assess patient’s ability to void and empty bladder  - Monitor I/O  - Bladder scan as needed  - Discuss with physician/AP medications to alleviate retention as needed  - Discuss catheterization for long term situations as appropriate  Outcome: Progressing
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
## 2024-01-24 NOTE — ASSESSMENT & PLAN NOTE
Likely iatrogenic, recent admission for sepsis  2d  echo in 1/2023 noted with preserved ef and nml diastolic function  Patient is not on home diuretics.  Was given 2 doses of IV Lasix 40 mg each last dose was on 1/20/2023.  Chest x-ray report noted with atelectasis, trace effusion.  O2 saturation 93% on room air at rest.  Started on oral Lasix 40 mg daily, continue with low-salt, fluid restrict diet.  Lower extremity compression stocking.  Encourage frequent incentive spirometry use.

## 2024-01-24 NOTE — PLAN OF CARE
Problem: Potential for Falls  Goal: Patient will remain free of falls  Description: INTERVENTIONS:  - Educate patient/family on patient safety including physical limitations  - Instruct patient to call for assistance with activity   - Consult OT/PT to assist with strengthening/mobility   - Keep Call bell within reach  - Keep bed low and locked with side rails adjusted as appropriate  - Keep care items and personal belongings within reach  - Initiate and maintain comfort rounds  - Make Fall Risk Sign visible to staff  - Offer Toileting every 2 Hours, in advance of need  - Initiate/Maintain bed alarm  - Obtain necessary fall risk management equipment:   - Apply yellow socks and bracelet for high fall risk patients  - Consider moving patient to room near nurses station  Outcome: Progressing     Problem: INFECTION - ADULT  Goal: Absence or prevention of progression during hospitalization  Description: INTERVENTIONS:  - Assess and monitor for signs and symptoms of infection  - Monitor lab/diagnostic results  - Monitor all insertion sites, i.e. indwelling lines, tubes, and drains  - Monitor endotracheal if appropriate and nasal secretions for changes in amount and color  - [place] appropriate cooling/warming therapies per order  - Administer medications as ordered  - Instruct and encourage patient and family to use good hand hygiene technique  - Identify and instruct in appropriate isolation precautions for identified infection/condition  Outcome: Progressing     Problem: SAFETY ADULT  Goal: Patient will remain free of falls  Description: INTERVENTIONS:  - Educate patient/family on patient safety including physical limitations  - Instruct patient to call for assistance with activity   - Consult OT/PT to assist with strengthening/mobility   - Keep Call bell within reach  - Keep bed low and locked with side rails adjusted as appropriate  - Keep care items and personal belongings within reach  - Initiate and maintain  comfort rounds  - Make Fall Risk Sign visible to staff  - Offer Toileting every 2 Hours, in advance of need  - Initiate/Maintain bed alarm  - Obtain necessary fall risk management equipment:   - Apply yellow socks and bracelet for high fall risk patients  - Consider moving patient to room near nurses station  Outcome: Progressing     Problem: Knowledge Deficit  Goal: Patient/family/caregiver demonstrates understanding of disease process, treatment plan, medications, and discharge instructions  Description: Complete learning assessment and assess knowledge base.  Interventions:  - Provide teaching at level of understanding  - Provide teaching via preferred learning methods  Outcome: Progressing

## 2024-01-24 NOTE — RESPIRATORY THERAPY NOTE
RT Protocol Note  Nanci Navarro 58 y.o. female MRN: [id number]  Unit/Bed#: -01 Encounter: [id number]    Assessment    Principal Problem:    Chronic obstructive pulmonary disease with acute exacerbation (HCC)  Active Problems:    Chest pain, unspecified    Hypokalemia    Compression fracture of L1 vertebra (HCC)    Volume overload    Chronic respiratory failure (HCC)    Flank pain    Anemia      Home Pulmonary Medications:  Inhalers/neb    Home Devices/Therapy: Home O2    Past Medical History:   Diagnosis Date    Achalasia     Back pain     Cancer (HCC)     Ovarian    Fibromyalgia     Lupus (HCC)      Social History     Socioeconomic History    Marital status: /Civil Union     Spouse name: None    Number of children: None    Years of education: None    Highest education level: None   Occupational History    None   Tobacco Use    Smoking status: Every Day     Current packs/day: 0.50     Types: Cigarettes    Smokeless tobacco: Never   Vaping Use    Vaping status: Never Used   Substance and Sexual Activity    Alcohol use: Never    Drug use: Never    Sexual activity: Yes   Other Topics Concern    None   Social History Narrative    None     Social Determinants of Health     Financial Resource Strain: Unknown (12/23/2023)    Received from [facility]    Overall Financial Resource Strain (CARDIA)     Difficulty of Paying Living Expenses: Patient refused   Food Insecurity: Food Insecurity Present (1/19/2024)    Hunger Vital Sign     Worried About Running Out of Food in the Last Year: Sometimes true     Ran Out of Food in the Last Year: Sometimes true   Transportation Needs: No Transportation Needs (1/19/2024)    PRAPARE - Transportation     Lack of Transportation (Medical): No     Lack of Transportation (Non-Medical): No   Physical Activity: Not on file   Stress: Not on file   Social Connections: Not on file   Intimate Partner Violence: Unknown (12/23/2023)    [facility]    Humiliation, Afraid, Rape, and Kick questionnaire     Fear of Current or Ex-Partner: Patient refused     Emotionally Abused: Patient refused     Physically Abused: Patient refused     Sexually Abused: Patient refused   Housing Stability: Low Risk  (1/19/2024)    Housing Stability Vital Sign     Unable to Pay for Housing in the Last Year: No     Number of Places Lived in the Last Year: 1     Unstable Housing in the Last Year: No       Subjective         Objective    Physical Exam:   Assessment Type: During-treatment  General Appearance: Drowsy  Respiratory Pattern: Normal  Chest Assessment: Chest expansion symmetrical  Bilateral Breath Sounds: Diminished, Clear  Cough: None  O2 Device: RA    Vitals:  Blood pressure 126/55, pulse 81, temperature 98.5 °F (36.9 °C), temperature source Oral, resp. rate 18, height 4' 9\" (1.448 m), weight 102 kg (225 lb 8.5 oz), SpO2 90%.          Imaging and other studies: I have personally reviewed pertinent reports.      O2 Device: RA     Plan    Respiratory Plan: Home Bronchodilator Patient pathway        Resp Comments: Pt resting comfortably and in no apparent distress.  Offers no complaints.  Will cont w/ sherin tx.   "
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
## 2024-01-24 NOTE — NURSING NOTE
Pt. asking for excessive amounts of  drinks throughout the night, despite patient education  on 2,000 ml fluid restriction and  elevated blood sugars.

## 2024-01-24 NOTE — CASE MANAGEMENT
Case Management Progress Note    Patient name Nanci Navarro  Location /-01 MRN [id number]  : 1965 Date 2024       LOS (days): 5  Geometric Mean LOS (GMLOS) (days):   Days to GMLOS:        OBJECTIVE:        Current admission status: Inpatient  Preferred Pharmacy:   [facility] [place] - [address]  [address]  [address]  [facility] [address]  Phone: [phone] Fax: [phone]    [id number] [place] - [address]  [address] [address]  Phone: [phone] Fax: [phone]    [facility] [address],  [address],  [address] [place] [facility] [address]  Phone: [phone] Fax: [phone]    [id number]  [place] - [address]  [address]  [facility] [address]  Phone: [phone] Fax: [phone]    [facility]  [place], [place] - [address]  [address]  [place] [address]  Phone: [phone] Fax: [phone]    Primary Care Provider: Renan Montero DO    Primary Insurance: DOROTHY ANN PENDING  Secondary Insurance:     PROGRESS NOTE:  CM conferred with  [facility] ref: Meds to Bed delivery.  Pharmacy will TT CM when they arrive.  Anticipated time frame between 12- 4pm today.    Patient now resting comfortable on RA per Respiratory.
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
## 2024-01-25 ENCOUNTER — APPOINTMENT (INPATIENT)
Dept: NON INVASIVE DIAGNOSTICS | Facility: HOSPITAL | Age: 59
DRG: 140 | End: 2024-01-25
Payer: COMMERCIAL

## 2024-01-25 PROBLEM — E87.6 HYPOKALEMIA: Status: RESOLVED | Noted: 2022-10-04 | Resolved: 2024-01-25

## 2024-01-25 PROBLEM — R07.9 CHEST PAIN, UNSPECIFIED: Status: RESOLVED | Noted: 2022-09-11 | Resolved: 2024-01-25

## 2024-01-25 LAB
AMPHETAMINES SERPL QL SCN: NEGATIVE
ANION GAP SERPL CALCULATED.3IONS-SCNC: 6 MMOL/L
AORTIC ROOT: 2.6 CM
APICAL FOUR CHAMBER EJECTION FRACTION: 52 %
BARBITURATES UR QL: NEGATIVE
BENZODIAZ UR QL: NEGATIVE
BSA FOR ECHO PROCEDURE: 1.86 M2
BUN SERPL-MCNC: 14 MG/DL (ref 5–25)
CALCIUM SERPL-MCNC: 8.9 MG/DL (ref 8.4–10.2)
CHLORIDE SERPL-SCNC: 101 MMOL/L (ref 96–108)
CO2 SERPL-SCNC: 32 MMOL/L (ref 21–32)
COCAINE UR QL: NEGATIVE
CREAT SERPL-MCNC: 0.83 MG/DL (ref 0.6–1.3)
E WAVE DECELERATION TIME: 251 MS
E/A RATIO: 0.83
ERYTHROCYTE [DISTWIDTH] IN BLOOD BY AUTOMATED COUNT: 14.4 % (ref 11.6–15.1)
FRACTIONAL SHORTENING: 30 (ref 28–44)
GFR SERPL CREATININE-BSD FRML MDRD: 77 ML/MIN/1.73SQ M
GLUCOSE SERPL-MCNC: 108 MG/DL (ref 65–140)
GLUCOSE SERPL-MCNC: 121 MG/DL (ref 65–140)
GLUCOSE SERPL-MCNC: 122 MG/DL (ref 65–140)
GLUCOSE SERPL-MCNC: 136 MG/DL (ref 65–140)
GLUCOSE SERPL-MCNC: 141 MG/DL (ref 65–140)
HCT VFR BLD AUTO: 28.3 % (ref 34.8–46.1)
HGB BLD-MCNC: 9.3 G/DL (ref 11.5–15.4)
INTERVENTRICULAR SEPTUM IN DIASTOLE (PARASTERNAL SHORT AXIS VIEW): 1 CM
INTERVENTRICULAR SEPTUM: 1 CM (ref 0.6–1.1)
LAAS-AP2: 14.6 CM2
LAAS-AP4: 10.6 CM2
LEFT ATRIUM AREA SYSTOLE SINGLE PLANE A4C: 10.7 CM2
LEFT ATRIUM SIZE: 3.7 CM
LEFT ATRIUM VOLUME (MOD BIPLANE): 29 ML
LEFT ATRIUM VOLUME INDEX (MOD BIPLANE): 15.6 ML/M2
LEFT INTERNAL DIMENSION IN SYSTOLE: 3.5 CM (ref 2.1–4)
LEFT VENTRICULAR INTERNAL DIMENSION IN DIASTOLE: 5 CM (ref 3.5–6)
LEFT VENTRICULAR POSTERIOR WALL IN END DIASTOLE: 0.9 CM
LEFT VENTRICULAR STROKE VOLUME: 69 ML
LVSV (TEICH): 69 ML
MCH RBC QN AUTO: 30 PG (ref 26.8–34.3)
MCHC RBC AUTO-ENTMCNC: 32.9 G/DL (ref 31.4–37.4)
MCV RBC AUTO: 91 FL (ref 82–98)
METHADONE UR QL: NEGATIVE
MV E'TISSUE VEL-SEP: 9 CM/S
MV PEAK A VEL: 1.21 M/S
MV PEAK E VEL: 100 CM/S
MV STENOSIS PRESSURE HALF TIME: 73 MS
MV VALVE AREA P 1/2 METHOD: 3.01
OPIATES UR QL SCN: POSITIVE
OXYCODONE+OXYMORPHONE UR QL SCN: POSITIVE
PCP UR QL: NEGATIVE
PLATELET # BLD AUTO: 376 THOUSANDS/UL (ref 149–390)
PMV BLD AUTO: 8.5 FL (ref 8.9–12.7)
POTASSIUM SERPL-SCNC: 3.3 MMOL/L (ref 3.5–5.3)
RBC # BLD AUTO: 3.1 MILLION/UL (ref 3.81–5.12)
RIGHT ATRIUM AREA SYSTOLE A4C: 11.1 CM2
RIGHT VENTRICLE ID DIMENSION: 3.2 CM
SL CV LEFT ATRIUM LENGTH A2C: 4.8 CM
SL CV LV EF: 65
SL CV PED ECHO LEFT VENTRICLE DIASTOLIC VOLUME (MOD BIPLANE) 2D: 118 ML
SL CV PED ECHO LEFT VENTRICLE SYSTOLIC VOLUME (MOD BIPLANE) 2D: 50 ML
SODIUM SERPL-SCNC: 139 MMOL/L (ref 135–147)
THC UR QL: NEGATIVE
TRICUSPID ANNULAR PLANE SYSTOLIC EXCURSION: 2.6 CM
WBC # BLD AUTO: 6.22 THOUSAND/UL (ref 4.31–10.16)

## 2024-01-25 PROCEDURE — 94760 N-INVAS EAR/PLS OXIMETRY 1: CPT

## 2024-01-25 PROCEDURE — 94664 DEMO&/EVAL PT USE INHALER: CPT

## 2024-01-25 PROCEDURE — 80307 DRUG TEST PRSMV CHEM ANLYZR: CPT | Performed by: NURSE PRACTITIONER

## 2024-01-25 PROCEDURE — 93308 TTE F-UP OR LMTD: CPT | Performed by: INTERNAL MEDICINE

## 2024-01-25 PROCEDURE — 80048 BASIC METABOLIC PNL TOTAL CA: CPT | Performed by: STUDENT IN AN ORGANIZED HEALTH CARE EDUCATION/TRAINING PROGRAM

## 2024-01-25 PROCEDURE — 99232 SBSQ HOSP IP/OBS MODERATE 35: CPT | Performed by: NURSE PRACTITIONER

## 2024-01-25 PROCEDURE — 82948 REAGENT STRIP/BLOOD GLUCOSE: CPT

## 2024-01-25 PROCEDURE — 85027 COMPLETE CBC AUTOMATED: CPT | Performed by: STUDENT IN AN ORGANIZED HEALTH CARE EDUCATION/TRAINING PROGRAM

## 2024-01-25 PROCEDURE — 93308 TTE F-UP OR LMTD: CPT

## 2024-01-25 RX ORDER — POTASSIUM CHLORIDE 20 MEQ/1
40 TABLET, EXTENDED RELEASE ORAL ONCE
Status: COMPLETED | OUTPATIENT
Start: 2024-01-25 | End: 2024-01-25

## 2024-01-25 RX ADMIN — Medication 2 G: at 17:34

## 2024-01-25 RX ADMIN — ALBUTEROL SULFATE 2 PUFF: 90 AEROSOL, METERED RESPIRATORY (INHALATION) at 17:35

## 2024-01-25 RX ADMIN — POTASSIUM CHLORIDE 40 MEQ: 1500 TABLET, EXTENDED RELEASE ORAL at 10:44

## 2024-01-25 RX ADMIN — GUAIFENESIN 600 MG: 600 TABLET ORAL at 21:29

## 2024-01-25 RX ADMIN — FLUTICASONE FUROATE AND VILANTEROL TRIFENATATE 1 PUFF: 100; 25 POWDER RESPIRATORY (INHALATION) at 09:56

## 2024-01-25 RX ADMIN — OXYCODONE HYDROCHLORIDE 5 MG: 5 TABLET ORAL at 04:37

## 2024-01-25 RX ADMIN — GUAIFENESIN 600 MG: 600 TABLET ORAL at 09:52

## 2024-01-25 RX ADMIN — HEPARIN SODIUM 7500 UNITS: 5000 INJECTION INTRAVENOUS; SUBCUTANEOUS at 21:29

## 2024-01-25 RX ADMIN — CYANOCOBALAMIN TAB 500 MCG 500 MCG: 500 TAB at 09:52

## 2024-01-25 RX ADMIN — TRAMADOL HYDROCHLORIDE 50 MG: 50 TABLET, COATED ORAL at 23:58

## 2024-01-25 RX ADMIN — SUCRALFATE 1 G: 1 TABLET ORAL at 17:32

## 2024-01-25 RX ADMIN — METHOCARBAMOL TABLETS 500 MG: 500 TABLET, COATED ORAL at 21:29

## 2024-01-25 RX ADMIN — PANTOPRAZOLE SODIUM 40 MG: 40 TABLET, DELAYED RELEASE ORAL at 09:52

## 2024-01-25 RX ADMIN — DOCUSATE SODIUM 100 MG: 100 CAPSULE, LIQUID FILLED ORAL at 17:41

## 2024-01-25 RX ADMIN — OXYCODONE HYDROCHLORIDE 5 MG: 5 TABLET ORAL at 19:48

## 2024-01-25 RX ADMIN — ALBUTEROL SULFATE 2 PUFF: 90 AEROSOL, METERED RESPIRATORY (INHALATION) at 21:30

## 2024-01-25 RX ADMIN — TRAMADOL HYDROCHLORIDE 50 MG: 50 TABLET, COATED ORAL at 09:57

## 2024-01-25 RX ADMIN — TRAMADOL HYDROCHLORIDE 50 MG: 50 TABLET, COATED ORAL at 17:32

## 2024-01-25 RX ADMIN — ALBUTEROL SULFATE 2 PUFF: 90 AEROSOL, METERED RESPIRATORY (INHALATION) at 09:56

## 2024-01-25 RX ADMIN — TRAMADOL HYDROCHLORIDE 50 MG: 50 TABLET, COATED ORAL at 01:14

## 2024-01-25 RX ADMIN — OXYCODONE HYDROCHLORIDE 5 MG: 5 TABLET ORAL at 13:19

## 2024-01-25 RX ADMIN — FOLIC ACID TAB 400 MCG 400 MCG: 400 TAB at 09:52

## 2024-01-25 RX ADMIN — ACETAMINOPHEN 650 MG: 325 TABLET, FILM COATED ORAL at 21:29

## 2024-01-25 RX ADMIN — DOCUSATE SODIUM 100 MG: 100 CAPSULE, LIQUID FILLED ORAL at 09:52

## 2024-01-25 NOTE — ASSESSMENT & PLAN NOTE
Present on admission with a hemoglobin of 11.3, trending down to 9.3  Baseline appears to be 11-13  Iron panel collected  Iron 28, Iron Sat 10, Ferritin 65, TIBC 268  Folate 7, Vitamin B12 171  No signs of active bleeding at this time.  Transfuse for hemoglobin less than 7  Will continue with folic acid, vitamin b12 supplementation at this time  Will initiate patient on daily dose of ferrous sulfate.
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
## 2024-01-25 NOTE — CASE MANAGEMENT
Case Management Progress Note    Patient name Nanci Navarro  Location /-01 MRN [id number]  : 1965 Date 2024       LOS (days): 6  Geometric Mean LOS (GMLOS) (days):   Days to GMLOS:        OBJECTIVE:    Current admission status: Inpatient  Preferred Pharmacy:   [facility] [place] - [address]  [address]  [address]  [facility] [address]  Phone: [phone] Fax: [phone]    [id number] [place] - [address].  [address]  [facility] [address]  Phone: [phone] Fax: [phone]    [facility] - [address] [facility],  [address] [facility],  [address] [place] [facility] [address]  Phone: [phone] Fax: [phone]    [id number]  [place] - [address]  [address]  [facility] [address]  Phone: [phone] Fax: [phone]    [facility]  [place] - [address]  [address]  [place] [address]  Phone: [phone] Fax: [phone]    Primary Care Provider: Renan Montero DO  Primary Insurance: DOROTHY ANN PENDING  Secondary Insurance:     PROGRESS NOTE:  CM met with patient at bedside to review DCP.  Updated O2 study pending to determine home need.  Spouse is still looking for home unit.  CM reviewed 211 resource for assistance with back payment due with [employer].  CM will continue to follow and review for additional resources.
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
## 2024-01-25 NOTE — ASSESSMENT & PLAN NOTE
Reports following with a pain specialist as an outpatient  Continue with Tylenol, Robaxin, lidocaine patch, Toradol, added oxycodone for now.
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
## 2024-01-25 NOTE — MALNUTRITION/BMI
This medical record reflects one or more clinical indicators suggestive of morbid obesity.      BMI Findings:           Body mass index is 47.04 kg/m².     See Nutrition note dated 1/25/24 for additional details.  Completed nutrition assessment is viewable in the nutrition documentation.

## 2024-01-25 NOTE — ASSESSMENT & PLAN NOTE
Present on admission  Troponin levels negative x 3  EKG without signs of ischemia.  Limited echocardiogram ordered for today.   Follows with Dr. Salazar for Cardiology.

## 2024-01-25 NOTE — ASSESSMENT & PLAN NOTE
Non-compliant with chronic oxygen requirements  Home oxygen evaluation completed on 1/22  See full plan as noted above

## 2024-01-25 NOTE — PLAN OF CARE
Problem: RESPIRATORY - ADULT  Goal: Achieves optimal ventilation and oxygenation  Description: INTERVENTIONS:  - Assess for changes in respiratory status  - Assess for changes in mentation and behavior  - Position to facilitate oxygenation and minimize respiratory effort  - Oxygen administered by appropriate delivery if ordered  - Initiate smoking cessation education as indicated  - Encourage broncho-pulmonary hygiene including cough, deep breathe, Incentive Spirometry  - Assess the need for suctioning and aspirate as needed  - Assess and instruct to report SOB or any respiratory difficulty  - Respiratory Therapy support as indicated  Outcome: Progressing     Problem: INFECTION - ADULT  Goal: Absence or prevention of progression during hospitalization  Description: INTERVENTIONS:  - Assess and monitor for signs and symptoms of infection  - Monitor lab/diagnostic results  - Monitor all insertion sites, i.e. indwelling lines, tubes, and drains  - Monitor endotracheal if appropriate and nasal secretions for changes in amount and color  - [place] appropriate cooling/warming therapies per order  - Administer medications as ordered  - Instruct and encourage patient and family to use good hand hygiene technique  - Identify and instruct in appropriate isolation precautions for identified infection/condition  Outcome: Progressing  Goal: Absence of fever/infection during neutropenic period  Description: INTERVENTIONS:  - Monitor WBC    Outcome: Progressing     Problem: PAIN - ADULT  Goal: Verbalizes/displays adequate comfort level or baseline comfort level  Description: Interventions:  - Encourage patient to monitor pain and request assistance  - Assess pain using appropriate pain scale  - Administer analgesics based on type and severity of pain and evaluate response  - Implement non-pharmacological measures as appropriate and evaluate response  - Consider cultural and social influences on pain and pain management  -  Notify physician/advanced practitioner if interventions unsuccessful or patient reports new pain  Outcome: Progressing     Problem: DISCHARGE PLANNING  Goal: Discharge to home or other facility with appropriate resources  Description: INTERVENTIONS:  - Identify barriers to discharge w/patient and caregiver  - Arrange for needed discharge resources and transportation as appropriate  - Identify discharge learning needs (meds, wound care, etc.)  - Arrange for interpretive services to assist at discharge as needed  - Refer to Case Management Department for coordinating discharge planning if the patient needs post-hospital services based on physician/advanced practitioner order or complex needs related to functional status, cognitive ability, or social support system  Outcome: Progressing     Problem: Knowledge Deficit  Goal: Patient/family/caregiver demonstrates understanding of disease process, treatment plan, medications, and discharge instructions  Description: Complete learning assessment and assess knowledge base.  Interventions:  - Provide teaching at level of understanding  - Provide teaching via preferred learning methods  Outcome: Progressing

## 2024-01-25 NOTE — ASSESSMENT & PLAN NOTE
Noted to have bilateral lower extremity swelling.  Likely related to IVF administration for sepsis  No history of CHF, last known EF from Echo in January '23 shows an EF of 65% with normal diastolic function.  Will get a limited echo today.  Continue 40 mg PO Lasix daily.  Refusing compression stockings, will attempt ace wraps.  Chest x-ray with atelectasis, trace pleural effusions

## 2024-01-26 LAB
ERYTHROCYTE [DISTWIDTH] IN BLOOD BY AUTOMATED COUNT: 14.2 % (ref 11.6–15.1)
GLUCOSE SERPL-MCNC: 107 MG/DL (ref 65–140)
GLUCOSE SERPL-MCNC: 130 MG/DL (ref 65–140)
GLUCOSE SERPL-MCNC: 137 MG/DL (ref 65–140)
GLUCOSE SERPL-MCNC: 139 MG/DL (ref 65–140)
HCT VFR BLD AUTO: 27.8 % (ref 34.8–46.1)
HGB BLD-MCNC: 9 G/DL (ref 11.5–15.4)
MCH RBC QN AUTO: 29.6 PG (ref 26.8–34.3)
MCHC RBC AUTO-ENTMCNC: 32.4 G/DL (ref 31.4–37.4)
MCV RBC AUTO: 91 FL (ref 82–98)
PLATELET # BLD AUTO: 365 THOUSANDS/UL (ref 149–390)
PMV BLD AUTO: 9 FL (ref 8.9–12.7)
RBC # BLD AUTO: 3.04 MILLION/UL (ref 3.81–5.12)
WBC # BLD AUTO: 5.34 THOUSAND/UL (ref 4.31–10.16)

## 2024-01-26 PROCEDURE — 99232 SBSQ HOSP IP/OBS MODERATE 35: CPT | Performed by: NURSE PRACTITIONER

## 2024-01-26 PROCEDURE — 85027 COMPLETE CBC AUTOMATED: CPT | Performed by: NURSE PRACTITIONER

## 2024-01-26 PROCEDURE — 82948 REAGENT STRIP/BLOOD GLUCOSE: CPT

## 2024-01-26 RX ORDER — FERROUS SULFATE 325(65) MG
325 TABLET ORAL
Status: DISCONTINUED | OUTPATIENT
Start: 2024-01-26 | End: 2024-02-07 | Stop reason: HOSPADM

## 2024-01-26 RX ADMIN — FOLIC ACID TAB 400 MCG 400 MCG: 400 TAB at 10:26

## 2024-01-26 RX ADMIN — Medication 2 G: at 17:20

## 2024-01-26 RX ADMIN — METHOCARBAMOL TABLETS 500 MG: 500 TABLET, COATED ORAL at 22:05

## 2024-01-26 RX ADMIN — OXYCODONE HYDROCHLORIDE 5 MG: 5 TABLET ORAL at 17:20

## 2024-01-26 RX ADMIN — FUROSEMIDE 40 MG: 40 TABLET ORAL at 10:25

## 2024-01-26 RX ADMIN — METHOCARBAMOL TABLETS 500 MG: 500 TABLET, COATED ORAL at 13:32

## 2024-01-26 RX ADMIN — Medication 2 G: at 22:02

## 2024-01-26 RX ADMIN — ALBUTEROL SULFATE 2 PUFF: 90 AEROSOL, METERED RESPIRATORY (INHALATION) at 10:20

## 2024-01-26 RX ADMIN — GUAIFENESIN 600 MG: 600 TABLET ORAL at 10:25

## 2024-01-26 RX ADMIN — DOCUSATE SODIUM 100 MG: 100 CAPSULE, LIQUID FILLED ORAL at 10:25

## 2024-01-26 RX ADMIN — CYANOCOBALAMIN TAB 500 MCG 500 MCG: 500 TAB at 10:25

## 2024-01-26 RX ADMIN — ALBUTEROL SULFATE 2 PUFF: 90 AEROSOL, METERED RESPIRATORY (INHALATION) at 22:01

## 2024-01-26 RX ADMIN — TRAMADOL HYDROCHLORIDE 50 MG: 50 TABLET, COATED ORAL at 21:56

## 2024-01-26 RX ADMIN — Medication 2 G: at 13:32

## 2024-01-26 RX ADMIN — ALBUTEROL SULFATE 2 PUFF: 90 AEROSOL, METERED RESPIRATORY (INHALATION) at 13:32

## 2024-01-26 RX ADMIN — TRAMADOL HYDROCHLORIDE 50 MG: 50 TABLET, COATED ORAL at 13:32

## 2024-01-26 RX ADMIN — FLUTICASONE FUROATE AND VILANTEROL TRIFENATATE 1 PUFF: 100; 25 POWDER RESPIRATORY (INHALATION) at 10:20

## 2024-01-26 RX ADMIN — Medication 2 G: at 10:20

## 2024-01-26 RX ADMIN — HEPARIN SODIUM 7500 UNITS: 5000 INJECTION INTRAVENOUS; SUBCUTANEOUS at 05:29

## 2024-01-26 RX ADMIN — PANTOPRAZOLE SODIUM 40 MG: 40 TABLET, DELAYED RELEASE ORAL at 10:25

## 2024-01-26 RX ADMIN — GUAIFENESIN 600 MG: 600 TABLET ORAL at 21:57

## 2024-01-26 RX ADMIN — SUCRALFATE 1 G: 1 TABLET ORAL at 17:20

## 2024-01-26 RX ADMIN — OXYCODONE HYDROCHLORIDE 5 MG: 5 TABLET ORAL at 02:46

## 2024-01-26 RX ADMIN — DOCUSATE SODIUM 100 MG: 100 CAPSULE, LIQUID FILLED ORAL at 17:21

## 2024-01-26 RX ADMIN — OXYCODONE HYDROCHLORIDE 5 MG: 5 TABLET ORAL at 10:25

## 2024-01-26 RX ADMIN — HEPARIN SODIUM 7500 UNITS: 5000 INJECTION INTRAVENOUS; SUBCUTANEOUS at 13:32

## 2024-01-26 RX ADMIN — FERROUS SULFATE TAB 325 MG (65 MG ELEMENTAL FE) 325 MG: 325 (65 FE) TAB at 10:25

## 2024-01-26 RX ADMIN — ALBUTEROL SULFATE 2 PUFF: 90 AEROSOL, METERED RESPIRATORY (INHALATION) at 17:20

## 2024-01-26 RX ADMIN — TRAMADOL HYDROCHLORIDE 50 MG: 50 TABLET, COATED ORAL at 05:53
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
## 2024-01-26 NOTE — PLAN OF CARE
Problem: Potential for Falls  Goal: Patient will remain free of falls  Description: INTERVENTIONS:  - Educate patient/family on patient safety including physical limitations  - Instruct patient to call for assistance with activity   - Consult OT/PT to assist with strengthening/mobility   - Keep Call bell within reach  - Keep bed low and locked with side rails adjusted as appropriate  - Keep care items and personal belongings within reach  - Initiate and maintain comfort rounds  - Make Fall Risk Sign visible to staff  - Offer Toileting every  Hours, in advance of need  - Initiate/Maintain alarm  - Obtain necessary fall risk management equipment:   - Apply yellow socks and bracelet for high fall risk patients  - Consider moving patient to room near nurses station  Outcome: Progressing     Problem: PAIN - ADULT  Goal: Verbalizes/displays adequate comfort level or baseline comfort level  Description: Interventions:  - Encourage patient to monitor pain and request assistance  - Assess pain using appropriate pain scale  - Administer analgesics based on type and severity of pain and evaluate response  - Implement non-pharmacological measures as appropriate and evaluate response  - Consider cultural and social influences on pain and pain management  - Notify physician/advanced practitioner if interventions unsuccessful or patient reports new pain  Outcome: Progressing     Problem: INFECTION - ADULT  Goal: Absence or prevention of progression during hospitalization  Description: INTERVENTIONS:  - Assess and monitor for signs and symptoms of infection  - Monitor lab/diagnostic results  - Monitor all insertion sites, i.e. indwelling lines, tubes, and drains  - Monitor endotracheal if appropriate and nasal secretions for changes in amount and color  - [place]ats appropriate cooling/warming therapies per order  - Administer medications as ordered  - Instruct and encourage patient and family to use good hand hygiene technique  -  Identify and instruct in appropriate isolation precautions for identified infection/condition  Outcome: Progressing  Goal: Absence of fever/infection during neutropenic period  Description: INTERVENTIONS:  - Monitor WBC    Outcome: Progressing     Problem: SAFETY ADULT  Goal: Patient will remain free of falls  Description: INTERVENTIONS:  - Educate patient/family on patient safety including physical limitations  - Instruct patient to call for assistance with activity   - Consult OT/PT to assist with strengthening/mobility   - Keep Call bell within reach  - Keep bed low and locked with side rails adjusted as appropriate  - Keep care items and personal belongings within reach  - Initiate and maintain comfort rounds  - Make Fall Risk Sign visible to staff  - Offer Toileting every  Hours, in advance of need  - Initiate/Maintain alarm  - Obtain necessary fall risk management equipment:   - Apply yellow socks and bracelet for high fall risk patients  - Consider moving patient to room near nurses station  Outcome: Progressing  Goal: Maintain or return to baseline ADL function  Description: INTERVENTIONS:  -  Assess patient's ability to carry out ADLs; assess patient's baseline for ADL function and identify physical deficits which impact ability to perform ADLs (bathing, care of mouth/teeth, toileting, grooming, dressing, etc.)  - Assess/evaluate cause of self-care deficits   - Assess range of motion  - Assess patient's mobility; develop plan if impaired  - Assess patient's need for assistive devices and provide as appropriate  - Encourage maximum independence but intervene and supervise when necessary  - Involve family in performance of ADLs  - Assess for home care needs following discharge   - Consider OT consult to assist with ADL evaluation and planning for discharge  - Provide patient education as appropriate  Outcome: Progressing  Goal: Maintains/Returns to pre admission functional level  Description:  INTERVENTIONS:  - Perform AM-PAC 6 Click Basic Mobility/ Daily Activity assessment daily.  - Set and communicate daily mobility goal to care team and patient/family/caregiver.   - Collaborate with rehabilitation services on mobility goals if consulted  - Perform Range of Motion  times a day.  - Reposition patient every  hours.  - Dangle patient  times a day  - Stand patient  times a day  - Ambulate patient  times a day  - Out of bed to chair  times a day   - Out of bed for meals  times a day  - Out of bed for toileting  - Record patient progress and toleration of activity level   Outcome: Progressing     Problem: DISCHARGE PLANNING  Goal: Discharge to home or other facility with appropriate resources  Description: INTERVENTIONS:  - Identify barriers to discharge w/patient and caregiver  - Arrange for needed discharge resources and transportation as appropriate  - Identify discharge learning needs (meds, wound care, etc.)  - Arrange for interpretive services to assist at discharge as needed  - Refer to Case Management Department for coordinating discharge planning if the patient needs post-hospital services based on physician/advanced practitioner order or complex needs related to functional status, cognitive ability, or social support system  Outcome: Progressing     Problem: Knowledge Deficit  Goal: Patient/family/caregiver demonstrates understanding of disease process, treatment plan, medications, and discharge instructions  Description: Complete learning assessment and assess knowledge base.  Interventions:  - Provide teaching at level of understanding  - Provide teaching via preferred learning methods  Outcome: Progressing     Problem: RESPIRATORY - ADULT  Goal: Achieves optimal ventilation and oxygenation  Description: INTERVENTIONS:  - Assess for changes in respiratory status  - Assess for changes in mentation and behavior  - Position to facilitate oxygenation and minimize respiratory effort  - Oxygen  administered by appropriate delivery if ordered  - Initiate smoking cessation education as indicated  - Encourage broncho-pulmonary hygiene including cough, deep breathe, Incentive Spirometry  - Assess the need for suctioning and aspirate as needed  - Assess and instruct to report SOB or any respiratory difficulty  - Respiratory Therapy support as indicated  Outcome: Progressing     Problem: GASTROINTESTINAL - ADULT  Goal: Maintains or returns to baseline bowel function  Description: INTERVENTIONS:  - Assess bowel function  - Encourage oral fluids to ensure adequate hydration  - Administer IV fluids if ordered to ensure adequate hydration  - Administer ordered medications as needed  - Encourage mobilization and activity  - Consider nutritional services referral to assist patient with adequate nutrition and appropriate food choices  Outcome: Progressing     Problem: GENITOURINARY - ADULT  Goal: Absence of urinary retention  Description: INTERVENTIONS:  - Assess patient’s ability to void and empty bladder  - Monitor I/O  - Bladder scan as needed  - Discuss with physician/AP medications to alleviate retention as needed  - Discuss catheterization for long term situations as appropriate  Outcome: Progressing

## 2024-01-26 NOTE — PROGRESS NOTES
Spiritual Care Progress Note    2024  Patient: Nanci Navarro : 1965  Admission Date & Time: 2024  MRN: [id number] CSN: [id number]       visited patient per CM referral.  introduced self & role and offered support. Patient noted she had not slept well last night, requested  return another day for conversation. Spiritual care remains available to support.     24 1500   Clinical Encounter Type   Visited With Patient   Routine Visit Introduction   Referral From

## 2024-01-26 NOTE — ASSESSMENT & PLAN NOTE
Present on admission with a hemoglobin of 11.3, trending down to 9.3  Baseline appears to be 11-13  Iron panel collected  Iron 28, Iron Sat 10, Ferritin 65, TIBC 268  Folate 7, Vitamin B12 171  No signs of active bleeding at this time.  Transfuse for hemoglobin less than 7  Will continue with folic acid, vitamin b12 supplementation at this time  Continue daily ferrous sulfate supplementation

## 2024-01-26 NOTE — PROGRESS NOTES
Pt ambulated without 02. 92%at rest on RA. Upon ambulating, Pt O2 drop to 86% within 1 minute and HR increased to the 110s. Pt reports feeling SOB.

## 2024-01-26 NOTE — ASSESSMENT & PLAN NOTE
Noted to have bilateral lower extremity swelling.  Likely related to IVF administration for sepsis  No history of CHF, last known EF from Echo in January '23 shows an EF of 65% with normal diastolic function.  Echo (1/25/24): The left ventricular ejection fraction is 65%. Systolic function is normal. Wall motion is normal. Diastolic function is mildly abnormal, consistent with grade I (abnormal) relaxation.   Continue 40 mg PO Lasix daily.  Refusing compression stockings, will attempt ace wraps.  Chest x-ray with atelectasis, trace pleural effusions

## 2024-01-27 PROBLEM — Z75.8 DISCHARGE PLANNING ISSUES: Status: ACTIVE | Noted: 2024-01-27

## 2024-01-27 PROBLEM — Z02.9 DISCHARGE PLANNING ISSUES: Status: ACTIVE | Noted: 2024-01-27

## 2024-01-27 LAB
GLUCOSE SERPL-MCNC: 131 MG/DL (ref 65–140)
GLUCOSE SERPL-MCNC: 137 MG/DL (ref 65–140)
GLUCOSE SERPL-MCNC: 148 MG/DL (ref 65–140)
GLUCOSE SERPL-MCNC: 99 MG/DL (ref 65–140)

## 2024-01-27 PROCEDURE — 82948 REAGENT STRIP/BLOOD GLUCOSE: CPT

## 2024-01-27 PROCEDURE — 99232 SBSQ HOSP IP/OBS MODERATE 35: CPT | Performed by: NURSE PRACTITIONER

## 2024-01-27 RX ORDER — LEVALBUTEROL INHALATION SOLUTION 1.25 MG/3ML
1.25 SOLUTION RESPIRATORY (INHALATION) 2 TIMES DAILY
Status: DISCONTINUED | OUTPATIENT
Start: 2024-01-27 | End: 2024-01-27

## 2024-01-27 RX ORDER — KETOROLAC TROMETHAMINE 30 MG/ML
15 INJECTION, SOLUTION INTRAMUSCULAR; INTRAVENOUS EVERY 6 HOURS SCHEDULED
Status: DISPENSED | OUTPATIENT
Start: 2024-01-27 | End: 2024-01-28

## 2024-01-27 RX ADMIN — Medication 2 G: at 22:28

## 2024-01-27 RX ADMIN — KETOROLAC TROMETHAMINE 15 MG: 30 INJECTION, SOLUTION INTRAMUSCULAR at 10:55

## 2024-01-27 RX ADMIN — ALBUTEROL SULFATE 2 PUFF: 90 AEROSOL, METERED RESPIRATORY (INHALATION) at 08:08

## 2024-01-27 RX ADMIN — Medication 2 G: at 14:16

## 2024-01-27 RX ADMIN — DOCUSATE SODIUM 100 MG: 100 CAPSULE, LIQUID FILLED ORAL at 08:04

## 2024-01-27 RX ADMIN — GUAIFENESIN 600 MG: 600 TABLET ORAL at 20:33

## 2024-01-27 RX ADMIN — Medication 2 G: at 08:05

## 2024-01-27 RX ADMIN — FLUTICASONE FUROATE AND VILANTEROL TRIFENATATE 1 PUFF: 100; 25 POWDER RESPIRATORY (INHALATION) at 08:05

## 2024-01-27 RX ADMIN — ALBUTEROL SULFATE 2 PUFF: 90 AEROSOL, METERED RESPIRATORY (INHALATION) at 17:29

## 2024-01-27 RX ADMIN — ALBUTEROL SULFATE 2 PUFF: 90 AEROSOL, METERED RESPIRATORY (INHALATION) at 22:28

## 2024-01-27 RX ADMIN — HEPARIN SODIUM 7500 UNITS: 5000 INJECTION INTRAVENOUS; SUBCUTANEOUS at 14:13

## 2024-01-27 RX ADMIN — CYANOCOBALAMIN TAB 500 MCG 500 MCG: 500 TAB at 08:04

## 2024-01-27 RX ADMIN — OXYCODONE HYDROCHLORIDE 5 MG: 5 TABLET ORAL at 01:03

## 2024-01-27 RX ADMIN — FUROSEMIDE 40 MG: 40 TABLET ORAL at 08:04

## 2024-01-27 RX ADMIN — FOLIC ACID TAB 400 MCG 400 MCG: 400 TAB at 08:02

## 2024-01-27 RX ADMIN — TRAMADOL HYDROCHLORIDE 50 MG: 50 TABLET, COATED ORAL at 04:31

## 2024-01-27 RX ADMIN — OXYCODONE HYDROCHLORIDE 5 MG: 5 TABLET ORAL at 20:33

## 2024-01-27 RX ADMIN — KETOROLAC TROMETHAMINE 15 MG: 30 INJECTION, SOLUTION INTRAMUSCULAR at 17:28

## 2024-01-27 RX ADMIN — ALBUTEROL SULFATE 2 PUFF: 90 AEROSOL, METERED RESPIRATORY (INHALATION) at 14:17

## 2024-01-27 RX ADMIN — Medication 2 G: at 17:29

## 2024-01-27 RX ADMIN — OXYCODONE HYDROCHLORIDE 5 MG: 5 TABLET ORAL at 14:13

## 2024-01-27 RX ADMIN — PANTOPRAZOLE SODIUM 40 MG: 40 TABLET, DELAYED RELEASE ORAL at 08:04

## 2024-01-27 RX ADMIN — HEPARIN SODIUM 7500 UNITS: 5000 INJECTION INTRAVENOUS; SUBCUTANEOUS at 06:18

## 2024-01-27 RX ADMIN — SUCRALFATE 1 G: 1 TABLET ORAL at 17:29

## 2024-01-27 RX ADMIN — DOCUSATE SODIUM 100 MG: 100 CAPSULE, LIQUID FILLED ORAL at 17:29

## 2024-01-27 RX ADMIN — OXYCODONE HYDROCHLORIDE 5 MG: 5 TABLET ORAL at 08:00

## 2024-01-27 RX ADMIN — FERROUS SULFATE TAB 325 MG (65 MG ELEMENTAL FE) 325 MG: 325 (65 FE) TAB at 08:02

## 2024-01-27 RX ADMIN — SUCRALFATE 1 G: 1 TABLET ORAL at 06:18

## 2024-01-27 RX ADMIN — GUAIFENESIN 600 MG: 600 TABLET ORAL at 08:02

## 2024-01-27 RX ADMIN — ACETAMINOPHEN 650 MG: 325 TABLET, FILM COATED ORAL at 08:25
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
## 2024-01-27 NOTE — RESPIRATORY THERAPY NOTE
RT Protocol Note  Nanci Navarro 58 y.o. female MRN: [id number]  Unit/Bed#: -01 Encounter: [id number]    Assessment    Principal Problem:    Discharge planning issues  Active Problems:    Chronic obstructive pulmonary disease with acute exacerbation (HCC)    Compression fracture of L1 vertebra (HCC)    Volume overload    Chronic respiratory failure (HCC)    Flank pain    Anemia      Home Pulmonary Medications:    Home Devices/Therapy: Home O2    Past Medical History:   Diagnosis Date    Achalasia     Back pain     Cancer (HCC)     Ovarian    Fibromyalgia     Lupus (HCC)      Social History     Socioeconomic History    Marital status: /Civil Union     Spouse name: None    Number of children: None    Years of education: None    Highest education level: None   Occupational History    None   Tobacco Use    Smoking status: Every Day     Current packs/day: 0.50     Types: Cigarettes    Smokeless tobacco: Never   Vaping Use    Vaping status: Never Used   Substance and Sexual Activity    Alcohol use: Never    Drug use: Never    Sexual activity: Yes   Other Topics Concern    None   Social History Narrative    None     Social Determinants of Health     Financial Resource Strain: Unknown (12/23/2023)    Received from [facility]    Overall Financial Resource Strain (CARDIA)     Difficulty of Paying Living Expenses: Patient refused   Food Insecurity: Food Insecurity Present (1/19/2024)    Hunger Vital Sign     Worried About Running Out of Food in the Last Year: Sometimes true     Ran Out of Food in the Last Year: Sometimes true   Transportation Needs: No Transportation Needs (1/19/2024)    PRAPARE - Transportation     Lack of Transportation (Medical): No     Lack of Transportation (Non-Medical): No   Physical Activity: Not on file   Stress: Not on file   Social Connections: Not on file   Intimate Partner Violence: Unknown (12/23/2023)    Received from [facility]    Humiliation,  "Afraid, Rape, and Kick questionnaire     Fear of Current or Ex-Partner: Patient refused     Emotionally Abused: Patient refused     Physically Abused: Patient refused     Sexually Abused: Patient refused   Housing Stability: Low Risk  (1/19/2024)    Housing Stability Vital Sign     Unable to Pay for Housing in the Last Year: No     Number of Places Lived in the Last Year: 1     Unstable Housing in the Last Year: No       Subjective         Objective    Physical Exam:        Vitals:  Blood pressure 112/55, pulse 70, temperature 98.3 °F (36.8 °C), resp. rate 18, height 4' 9\" (1.448 m), weight 95 kg (209 lb 7 oz), SpO2 91%.          Imaging and other studies: I have personally reviewed pertinent reports.      O2 Device: RA     Plan    Respiratory Plan: Home Bronchodilator Patient pathway        Resp Comments: pt with history of COPD states she is ordered QID txs  for home use but does not usually takes them that way becuase she is unabel to run the compressor with no power. Pt's breath sounds have been clear and diminished with no apparent resp distress. Will change to albuterol MDI at this time.   "

## 2024-01-27 NOTE — ASSESSMENT & PLAN NOTE
Noted to have bilateral lower extremity swelling.  Likely related to IVF administration for sepsis  No history of CHF, last known EF from Echo in January '23 shows an EF of 65% with normal diastolic function.  Echo (1/25/24): The left ventricular ejection fraction is 65%. Systolic function is normal. Wall motion is normal. Diastolic function is mildly abnormal, consistent with grade I (abnormal) relaxation.   Continue 40 mg PO Lasix daily.  Ace wraps for compression to BLE  Chest x-ray with atelectasis, trace pleural effusions

## 2024-01-27 NOTE — ASSESSMENT & PLAN NOTE
Present on admission with a hemoglobin of 11.3, trending down to 9.3  Baseline appears to be 11-13  Iron panel collected  Iron 28, Iron Sat 10, Ferritin 65, TIBC 268  Folate 7, Vitamin B12 171  No signs of active bleeding at this time.  Transfuse for hemoglobin less than 7  Will continue with folic acid, vitamin b12 supplementation at this time  Continue daily ferrous sulfate supplementation  Check CBC in AM

## 2024-01-27 NOTE — PLAN OF CARE
Problem: Potential for Falls  Goal: Patient will remain free of falls  Description: INTERVENTIONS:  - Educate patient/family on patient safety including physical limitations  - Instruct patient to call for assistance with activity   - Consult OT/PT to assist with strengthening/mobility   - Keep Call bell within reach  - Keep bed low and locked with side rails adjusted as appropriate  - Keep care items and personal belongings within reach  - Initiate and maintain comfort rounds  - Make Fall Risk Sign visible to staff  - Offer Toileting every 2 Hours, in advance of need  - Initiate/Maintain bed and chair alarm  - Obtain necessary fall risk management equipment: non skid socks   - Apply yellow socks and bracelet for high fall risk patients  - Consider moving patient to room near nurses station  Outcome: Progressing     Problem: PAIN - ADULT  Goal: Verbalizes/displays adequate comfort level or baseline comfort level  Description: Interventions:  - Encourage patient to monitor pain and request assistance  - Assess pain using appropriate pain scale  - Administer analgesics based on type and severity of pain and evaluate response  - Implement non-pharmacological measures as appropriate and evaluate response  - Consider cultural and social influences on pain and pain management  - Notify physician/advanced practitioner if interventions unsuccessful or patient reports new pain  Outcome: Progressing     Problem: INFECTION - ADULT  Goal: Absence or prevention of progression during hospitalization  Description: INTERVENTIONS:  - Assess and monitor for signs and symptoms of infection  - Monitor lab/diagnostic results  - Monitor all insertion sites, i.e. indwelling lines, tubes, and drains  - Monitor endotracheal if appropriate and nasal secretions for changes in amount and color  - [place] appropriate cooling/warming therapies per order  - Administer medications as ordered  - Instruct and encourage patient and family to use  good hand hygiene technique  - Identify and instruct in appropriate isolation precautions for identified infection/condition  Outcome: Progressing  Goal: Absence of fever/infection during neutropenic period  Description: INTERVENTIONS:  - Monitor WBC    Outcome: Progressing     Problem: SAFETY ADULT  Goal: Patient will remain free of falls  Description: INTERVENTIONS:  - Educate patient/family on patient safety including physical limitations  - Instruct patient to call for assistance with activity   - Consult OT/PT to assist with strengthening/mobility   - Keep Call bell within reach  - Keep bed low and locked with side rails adjusted as appropriate  - Keep care items and personal belongings within reach  - Initiate and maintain comfort rounds  - Make Fall Risk Sign visible to staff  - Offer Toileting every 3 Hours, in advance of need  - Initiate/Maintain bed and chair alarm  - Obtain necessary fall risk management equipment: non skid socks   - Apply yellow socks and bracelet for high fall risk patients  - Consider moving patient to room near nurses station  Outcome: Progressing  Goal: Maintain or return to baseline ADL function  Description: INTERVENTIONS:  -  Assess patient's ability to carry out ADLs; assess patient's baseline for ADL function and identify physical deficits which impact ability to perform ADLs (bathing, care of mouth/teeth, toileting, grooming, dressing, etc.)  - Assess/evaluate cause of self-care deficits   - Assess range of motion  - Assess patient's mobility; develop plan if impaired  - Assess patient's need for assistive devices and provide as appropriate  - Encourage maximum independence but intervene and supervise when necessary  - Involve family in performance of ADLs  - Assess for home care needs following discharge   - Consider OT consult to assist with ADL evaluation and planning for discharge  - Provide patient education as appropriate  Outcome: Progressing  Goal: Maintains/Returns to  pre admission functional level  Description: INTERVENTIONS:  - Perform AM-PAC 6 Click Basic Mobility/ Daily Activity assessment daily.  - Set and communicate daily mobility goal to care team and patient/family/caregiver.   - Collaborate with rehabilitation services on mobility goals if consulted  - Perform Range of Motion 4 times a day.  - Reposition patient every 2 hours.  - Dangle patient 4 times a day  - Stand patient 3 times a day  - Ambulate patient 4 times a day  - Out of bed to chair 4 times a day   - Out of bed for meals 3 times a day  - Out of bed for toileting  - Record patient progress and toleration of activity level   Outcome: Progressing     Problem: DISCHARGE PLANNING  Goal: Discharge to home or other facility with appropriate resources  Description: INTERVENTIONS:  - Identify barriers to discharge w/patient and caregiver  - Arrange for needed discharge resources and transportation as appropriate  - Identify discharge learning needs (meds, wound care, etc.)  - Arrange for interpretive services to assist at discharge as needed  - Refer to Case Management Department for coordinating discharge planning if the patient needs post-hospital services based on physician/advanced practitioner order or complex needs related to functional status, cognitive ability, or social support system  Outcome: Progressing     Problem: Knowledge Deficit  Goal: Patient/family/caregiver demonstrates understanding of disease process, treatment plan, medications, and discharge instructions  Description: Complete learning assessment and assess knowledge base.  Interventions:  - Provide teaching at level of understanding  - Provide teaching via preferred learning methods  Outcome: Progressing     Problem: RESPIRATORY - ADULT  Goal: Achieves optimal ventilation and oxygenation  Description: INTERVENTIONS:  - Assess for changes in respiratory status  - Assess for changes in mentation and behavior  - Position to facilitate  oxygenation and minimize respiratory effort  - Oxygen administered by appropriate delivery if ordered  - Initiate smoking cessation education as indicated  - Encourage broncho-pulmonary hygiene including cough, deep breathe, Incentive Spirometry  - Assess the need for suctioning and aspirate as needed  - Assess and instruct to report SOB or any respiratory difficulty  - Respiratory Therapy support as indicated  Outcome: Progressing     Problem: GASTROINTESTINAL - ADULT  Goal: Maintains or returns to baseline bowel function  Description: INTERVENTIONS:  - Assess bowel function  - Encourage oral fluids to ensure adequate hydration  - Administer IV fluids if ordered to ensure adequate hydration  - Administer ordered medications as needed  - Encourage mobilization and activity  - Consider nutritional services referral to assist patient with adequate nutrition and appropriate food choices  Outcome: Progressing     Problem: GENITOURINARY - ADULT  Goal: Absence of urinary retention  Description: INTERVENTIONS:  - Assess patient’s ability to void and empty bladder  - Monitor I/O  - Bladder scan as needed  - Discuss with physician/AP medications to alleviate retention as needed  - Discuss catheterization for long term situations as appropriate  Outcome: Progressing

## 2024-01-28 ENCOUNTER — APPOINTMENT (INPATIENT)
Dept: ULTRASOUND IMAGING | Facility: HOSPITAL | Age: 59
DRG: 140 | End: 2024-01-28
Payer: COMMERCIAL

## 2024-01-28 PROBLEM — R10.9 FLANK PAIN: Status: RESOLVED | Noted: 2024-01-19 | Resolved: 2024-01-28

## 2024-01-28 LAB
BACTERIA UR QL AUTO: ABNORMAL /HPF
BILIRUB UR QL STRIP: NEGATIVE
CLARITY UR: CLEAR
COLOR UR: ABNORMAL
ERYTHROCYTE [DISTWIDTH] IN BLOOD BY AUTOMATED COUNT: 13.8 % (ref 11.6–15.1)
GLUCOSE SERPL-MCNC: 135 MG/DL (ref 65–140)
GLUCOSE SERPL-MCNC: 142 MG/DL (ref 65–140)
GLUCOSE SERPL-MCNC: 142 MG/DL (ref 65–140)
GLUCOSE SERPL-MCNC: 173 MG/DL (ref 65–140)
GLUCOSE UR STRIP-MCNC: NEGATIVE MG/DL
HCT VFR BLD AUTO: 30.3 % (ref 34.8–46.1)
HGB BLD-MCNC: 9.8 G/DL (ref 11.5–15.4)
HGB UR QL STRIP.AUTO: NEGATIVE
HYALINE CASTS #/AREA URNS LPF: ABNORMAL /LPF
KETONES UR STRIP-MCNC: NEGATIVE MG/DL
LEUKOCYTE ESTERASE UR QL STRIP: NEGATIVE
MCH RBC QN AUTO: 29.8 PG (ref 26.8–34.3)
MCHC RBC AUTO-ENTMCNC: 32.3 G/DL (ref 31.4–37.4)
MCV RBC AUTO: 92 FL (ref 82–98)
MUCOUS THREADS UR QL AUTO: ABNORMAL
NITRITE UR QL STRIP: NEGATIVE
NON-SQ EPI CELLS URNS QL MICRO: ABNORMAL /HPF
PH UR STRIP.AUTO: 5.5 [PH]
PLATELET # BLD AUTO: 431 THOUSANDS/UL (ref 149–390)
PMV BLD AUTO: 8.7 FL (ref 8.9–12.7)
PROT UR STRIP-MCNC: NEGATIVE MG/DL
RBC # BLD AUTO: 3.29 MILLION/UL (ref 3.81–5.12)
RBC #/AREA URNS AUTO: ABNORMAL /HPF
SP GR UR STRIP.AUTO: 1.02 (ref 1–1.03)
UROBILINOGEN UR STRIP-ACNC: <2 MG/DL
WBC # BLD AUTO: 5.64 THOUSAND/UL (ref 4.31–10.16)
WBC #/AREA URNS AUTO: ABNORMAL /HPF

## 2024-01-28 PROCEDURE — 76775 US EXAM ABDO BACK WALL LIM: CPT

## 2024-01-28 PROCEDURE — 81001 URINALYSIS AUTO W/SCOPE: CPT | Performed by: NURSE PRACTITIONER

## 2024-01-28 PROCEDURE — 99232 SBSQ HOSP IP/OBS MODERATE 35: CPT | Performed by: NURSE PRACTITIONER

## 2024-01-28 PROCEDURE — 85027 COMPLETE CBC AUTOMATED: CPT | Performed by: NURSE PRACTITIONER

## 2024-01-28 PROCEDURE — 94760 N-INVAS EAR/PLS OXIMETRY 1: CPT

## 2024-01-28 PROCEDURE — 94664 DEMO&/EVAL PT USE INHALER: CPT

## 2024-01-28 PROCEDURE — 82948 REAGENT STRIP/BLOOD GLUCOSE: CPT

## 2024-01-28 RX ORDER — OXYCODONE HYDROCHLORIDE 5 MG/1
5 TABLET ORAL EVERY 4 HOURS PRN
Status: DISCONTINUED | OUTPATIENT
Start: 2024-01-28 | End: 2024-02-05

## 2024-01-28 RX ORDER — KETOROLAC TROMETHAMINE 30 MG/ML
15 INJECTION, SOLUTION INTRAMUSCULAR; INTRAVENOUS EVERY 6 HOURS SCHEDULED
Status: COMPLETED | OUTPATIENT
Start: 2024-01-28 | End: 2024-01-29

## 2024-01-28 RX ADMIN — GUAIFENESIN 600 MG: 600 TABLET ORAL at 08:51

## 2024-01-28 RX ADMIN — ALBUTEROL SULFATE 2 PUFF: 90 AEROSOL, METERED RESPIRATORY (INHALATION) at 17:53

## 2024-01-28 RX ADMIN — DOCUSATE SODIUM 100 MG: 100 CAPSULE, LIQUID FILLED ORAL at 08:51

## 2024-01-28 RX ADMIN — ALBUTEROL SULFATE 2 PUFF: 90 AEROSOL, METERED RESPIRATORY (INHALATION) at 11:52

## 2024-01-28 RX ADMIN — FLUTICASONE FUROATE AND VILANTEROL TRIFENATATE 1 PUFF: 100; 25 POWDER RESPIRATORY (INHALATION) at 08:57

## 2024-01-28 RX ADMIN — DOCUSATE SODIUM 100 MG: 100 CAPSULE, LIQUID FILLED ORAL at 17:53

## 2024-01-28 RX ADMIN — ALBUTEROL SULFATE 2 PUFF: 90 AEROSOL, METERED RESPIRATORY (INHALATION) at 11:57

## 2024-01-28 RX ADMIN — ONDANSETRON 4 MG: 2 INJECTION INTRAMUSCULAR; INTRAVENOUS at 11:58

## 2024-01-28 RX ADMIN — SUCRALFATE 1 G: 1 TABLET ORAL at 09:19

## 2024-01-28 RX ADMIN — HEPARIN SODIUM 7500 UNITS: 5000 INJECTION INTRAVENOUS; SUBCUTANEOUS at 06:52

## 2024-01-28 RX ADMIN — KETOROLAC TROMETHAMINE 15 MG: 30 INJECTION, SOLUTION INTRAMUSCULAR at 17:49

## 2024-01-28 RX ADMIN — GUAIFENESIN 600 MG: 600 TABLET ORAL at 22:28

## 2024-01-28 RX ADMIN — OXYCODONE HYDROCHLORIDE 5 MG: 5 TABLET ORAL at 23:44

## 2024-01-28 RX ADMIN — FERROUS SULFATE TAB 325 MG (65 MG ELEMENTAL FE) 325 MG: 325 (65 FE) TAB at 09:19

## 2024-01-28 RX ADMIN — PANTOPRAZOLE SODIUM 40 MG: 40 TABLET, DELAYED RELEASE ORAL at 08:51

## 2024-01-28 RX ADMIN — OXYCODONE HYDROCHLORIDE 5 MG: 5 TABLET ORAL at 18:56

## 2024-01-28 RX ADMIN — METHOCARBAMOL TABLETS 500 MG: 500 TABLET, COATED ORAL at 12:18

## 2024-01-28 RX ADMIN — INSULIN LISPRO 2 UNITS: 100 INJECTION, SOLUTION INTRAVENOUS; SUBCUTANEOUS at 17:56

## 2024-01-28 RX ADMIN — Medication 2 G: at 08:57

## 2024-01-28 RX ADMIN — OXYCODONE HYDROCHLORIDE 5 MG: 5 TABLET ORAL at 03:19

## 2024-01-28 RX ADMIN — CYANOCOBALAMIN TAB 500 MCG 500 MCG: 500 TAB at 08:52

## 2024-01-28 RX ADMIN — SUCRALFATE 1 G: 1 TABLET ORAL at 15:03

## 2024-01-28 RX ADMIN — FOLIC ACID TAB 400 MCG 400 MCG: 400 TAB at 08:51

## 2024-01-28 RX ADMIN — Medication 2 G: at 11:44

## 2024-01-28 RX ADMIN — HEPARIN SODIUM 7500 UNITS: 5000 INJECTION INTRAVENOUS; SUBCUTANEOUS at 22:28

## 2024-01-28 RX ADMIN — OXYCODONE HYDROCHLORIDE 5 MG: 5 TABLET ORAL at 15:08

## 2024-01-28 RX ADMIN — TRAMADOL HYDROCHLORIDE 50 MG: 50 TABLET, COATED ORAL at 06:52

## 2024-01-28 RX ADMIN — ALBUTEROL SULFATE 2 PUFF: 90 AEROSOL, METERED RESPIRATORY (INHALATION) at 08:57

## 2024-01-28 RX ADMIN — KETOROLAC TROMETHAMINE 15 MG: 30 INJECTION, SOLUTION INTRAMUSCULAR at 23:53

## 2024-01-28 RX ADMIN — HEPARIN SODIUM 7500 UNITS: 5000 INJECTION INTRAVENOUS; SUBCUTANEOUS at 15:03

## 2024-01-28 RX ADMIN — FUROSEMIDE 40 MG: 40 TABLET ORAL at 08:51

## 2024-01-28 RX ADMIN — OXYCODONE HYDROCHLORIDE 5 MG: 5 TABLET ORAL at 08:50

## 2024-01-28 NOTE — ASSESSMENT & PLAN NOTE
Reports following with a pain specialist as an outpatient  Continue with Tylenol, Robaxin, Lidocaine patch, Toradol  Low dose Oxycodone for moderate/severe pain.

## 2024-01-28 NOTE — RESPIRATORY THERAPY NOTE
RT Protocol Note  Nanci Navarro 58 y.o. female MRN: [id number]  Unit/Bed#: -01 Encounter: [id number]    Assessment    Principal Problem:    Discharge planning issues  Active Problems:    Chronic obstructive pulmonary disease with acute exacerbation (HCC)    Compression fracture of L1 vertebra (HCC)    Volume overload    Chronic respiratory failure (HCC)    Anemia      Home Pulmonary Medications:    Home Devices/Therapy: Home O2    Past Medical History:   Diagnosis Date    Achalasia     Back pain     Cancer (HCC)     Ovarian    Fibromyalgia     Lupus (HCC)      Social History     Socioeconomic History    Marital status: /Civil Union     Spouse name: None    Number of children: None    Years of education: None    Highest education level: None   Occupational History    None   Tobacco Use    Smoking status: Every Day     Current packs/day: 0.50     Types: Cigarettes    Smokeless tobacco: Never   Vaping Use    Vaping status: Never Used   Substance and Sexual Activity    Alcohol use: Never    Drug use: Never    Sexual activity: Yes   Other Topics Concern    None   Social History Narrative    None     Social Determinants of Health     Financial Resource Strain: Unknown (12/23/2023)    Received from [facility]    Overall Financial Resource Strain (CARDIA)     Difficulty of Paying Living Expenses: Patient refused   Food Insecurity: Food Insecurity Present (1/19/2024)    Hunger Vital Sign     Worried About Running Out of Food in the Last Year: Sometimes true     Ran Out of Food in the Last Year: Sometimes true   Transportation Needs: No Transportation Needs (1/19/2024)    PRAPARE - Transportation     Lack of Transportation (Medical): No     Lack of Transportation (Non-Medical): No   Physical Activity: Not on file   Stress: Not on file   Social Connections: Not on file   Intimate Partner Violence: Unknown (12/23/2023)    Received from [facility]    Humiliation, Afraid, Rape,  "and Kick questionnaire     Fear of Current or Ex-Partner: Patient refused     Emotionally Abused: Patient refused     Physically Abused: Patient refused     Sexually Abused: Patient refused   Housing Stability: Low Risk  (1/19/2024)    Housing Stability Vital Sign     Unable to Pay for Housing in the Last Year: No     Number of Places Lived in the Last Year: 1     Unstable Housing in the Last Year: No       Subjective         Objective    Physical Exam:        Vitals:  Blood pressure 110/54, pulse 63, temperature 97.5 °F (36.4 °C), resp. rate 18, height 4' 9\" (1.448 m), weight 95.1 kg (209 lb 10.5 oz), SpO2 95%.          Imaging and other studies: I have personally reviewed pertinent reports.      O2 Device: RA     Plan    Respiratory Plan: Home Bronchodilator Patient pathway        Resp Comments: will continue with albuterol neb and inhaler   "
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
## 2024-01-28 NOTE — ASSESSMENT & PLAN NOTE
Present on admission with a hemoglobin of 11.3, trending down to 9.3  Baseline appears to be 11-13  Iron panel collected  Iron 28, Iron Sat 10, Ferritin 65, TIBC 268  Folate 7, Vitamin B12 171  No signs of active bleeding at this time.  Transfuse for hemoglobin less than 7  Will continue with folic acid, vitamin b12 supplementation at this time  Continue daily ferrous sulfate supplementation  Check CBC in AM  Hemoglobin 9.8 today.

## 2024-01-28 NOTE — RESPIRATORY THERAPY NOTE
RT Protocol Note  Nanci Navarro 58 y.o. female MRN: [id number]  Unit/Bed#: -01 Encounter: [id number]    Assessment    Principal Problem:    Discharge planning issues  Active Problems:    Chronic obstructive pulmonary disease with acute exacerbation (HCC)    Compression fracture of L1 vertebra (HCC)    Volume overload    Chronic respiratory failure (HCC)    Flank pain    Anemia      Home Pulmonary Medications:     01/27/24 2051   Respiratory Protocol   Protocol Initiated? No   Protocol Selection Respiratory   Language Barrier? No   Medical & Social History Reviewed? Yes   Diagnostic Studies Reviewed? Yes   Physical Assessment Performed? Yes   Home Devices/Therapy Home O2   Respiratory Plan Home Bronchodilator Patient pathway   Respiratory Assessment   Assessment Type Assess only   General Appearance Awake   Respiratory Pattern Normal   Chest Assessment Chest expansion symmetrical   Bilateral Breath Sounds Diminished   Cough None   Resp Comments Will continue with current tx plan   O2 Device RA   Additional Assessments   Pulse 61   Respirations 16   SpO2 91 %       Home Devices/Therapy: Home O2    Past Medical History:   Diagnosis Date    Achalasia     Back pain     Cancer (HCC)     Ovarian    Fibromyalgia     Lupus (HCC)      Social History     Socioeconomic History    Marital status: /Civil Union     Spouse name: None    Number of children: None    Years of education: None    Highest education level: None   Occupational History    None   Tobacco Use    Smoking status: Every Day     Current packs/day: 0.50     Types: Cigarettes    Smokeless tobacco: Never   Vaping Use    Vaping status: Never Used   Substance and Sexual Activity    Alcohol use: Never    Drug use: Never    Sexual activity: Yes   Other Topics Concern    None   Social History Narrative    None     Social Determinants of Health     Financial Resource Strain: Unknown (12/23/2023)    Received from [facility]    Overall  "Financial Resource Strain (CARDIA)     Difficulty of Paying Living Expenses: Patient refused   Food Insecurity: Food Insecurity Present (1/19/2024)    Hunger Vital Sign     Worried About Running Out of Food in the Last Year: Sometimes true     Ran Out of Food in the Last Year: Sometimes true   Transportation Needs: No Transportation Needs (1/19/2024)    PRAPARE - Transportation     Lack of Transportation (Medical): No     Lack of Transportation (Non-Medical): No   Physical Activity: Not on file   Stress: Not on file   Social Connections: Not on file   Intimate Partner Violence: Unknown (12/23/2023)    Received from [facility]    Humiliation, Afraid, Rape, and Kick questionnaire     Fear of Current or Ex-Partner: Patient refused     Emotionally Abused: Patient refused     Physically Abused: Patient refused     Sexually Abused: Patient refused   Housing Stability: Low Risk  (1/19/2024)    Housing Stability Vital Sign     Unable to Pay for Housing in the Last Year: No     Number of Places Lived in the Last Year: 1     Unstable Housing in the Last Year: No       Subjective         Objective    Physical Exam:   Assessment Type: Assess only  General Appearance: Awake  Respiratory Pattern: Normal  Chest Assessment: Chest expansion symmetrical  Bilateral Breath Sounds: Diminished  Cough: None  O2 Device: RA    Vitals:  Blood pressure 106/59, pulse 61, temperature 97.8 °F (36.6 °C), resp. rate 16, height 4' 9\" (1.448 m), weight 95 kg (209 lb 7 oz), SpO2 91%.          Imaging and other studies: I have personally reviewed pertinent reports.      O2 Device: RA     Plan    Respiratory Plan: Home Bronchodilator Patient pathway        Resp Comments: Will continue with current tx plan   "

## 2024-01-29 LAB
GLUCOSE SERPL-MCNC: 119 MG/DL (ref 65–140)
GLUCOSE SERPL-MCNC: 142 MG/DL (ref 65–140)
GLUCOSE SERPL-MCNC: 155 MG/DL (ref 65–140)

## 2024-01-29 PROCEDURE — 94664 DEMO&/EVAL PT USE INHALER: CPT

## 2024-01-29 PROCEDURE — 82948 REAGENT STRIP/BLOOD GLUCOSE: CPT

## 2024-01-29 PROCEDURE — 99232 SBSQ HOSP IP/OBS MODERATE 35: CPT | Performed by: PHYSICIAN ASSISTANT

## 2024-01-29 RX ORDER — MUSCLE RUB CREAM 100; 150 MG/G; MG/G
CREAM TOPICAL 4 TIMES DAILY PRN
Status: DISCONTINUED | OUTPATIENT
Start: 2024-01-29 | End: 2024-02-07 | Stop reason: HOSPADM

## 2024-01-29 RX ADMIN — KETOROLAC TROMETHAMINE 15 MG: 30 INJECTION, SOLUTION INTRAMUSCULAR at 06:38

## 2024-01-29 RX ADMIN — ALBUTEROL SULFATE 2 PUFF: 90 AEROSOL, METERED RESPIRATORY (INHALATION) at 11:39

## 2024-01-29 RX ADMIN — OXYCODONE HYDROCHLORIDE 5 MG: 5 TABLET ORAL at 13:50

## 2024-01-29 RX ADMIN — OXYCODONE HYDROCHLORIDE 5 MG: 5 TABLET ORAL at 23:32

## 2024-01-29 RX ADMIN — KETOROLAC TROMETHAMINE 15 MG: 30 INJECTION, SOLUTION INTRAMUSCULAR at 11:39

## 2024-01-29 RX ADMIN — FLUTICASONE FUROATE AND VILANTEROL TRIFENATATE 1 PUFF: 100; 25 POWDER RESPIRATORY (INHALATION) at 09:00

## 2024-01-29 RX ADMIN — GUAIFENESIN 600 MG: 600 TABLET ORAL at 21:57

## 2024-01-29 RX ADMIN — GUAIFENESIN 600 MG: 600 TABLET ORAL at 08:58

## 2024-01-29 RX ADMIN — ALBUTEROL SULFATE 2 PUFF: 90 AEROSOL, METERED RESPIRATORY (INHALATION) at 18:04

## 2024-01-29 RX ADMIN — PANTOPRAZOLE SODIUM 40 MG: 40 TABLET, DELAYED RELEASE ORAL at 08:58

## 2024-01-29 RX ADMIN — OXYCODONE HYDROCHLORIDE 5 MG: 5 TABLET ORAL at 08:58

## 2024-01-29 RX ADMIN — HEPARIN SODIUM 7500 UNITS: 5000 INJECTION INTRAVENOUS; SUBCUTANEOUS at 13:50

## 2024-01-29 RX ADMIN — FERROUS SULFATE TAB 325 MG (65 MG ELEMENTAL FE) 325 MG: 325 (65 FE) TAB at 08:58

## 2024-01-29 RX ADMIN — Medication 2 G: at 09:00

## 2024-01-29 RX ADMIN — OXYCODONE HYDROCHLORIDE 5 MG: 5 TABLET ORAL at 04:54

## 2024-01-29 RX ADMIN — SUCRALFATE 1 G: 1 TABLET ORAL at 18:05

## 2024-01-29 RX ADMIN — INSULIN LISPRO 1 UNITS: 100 INJECTION, SOLUTION INTRAVENOUS; SUBCUTANEOUS at 21:56

## 2024-01-29 RX ADMIN — ALBUTEROL SULFATE 2 PUFF: 90 AEROSOL, METERED RESPIRATORY (INHALATION) at 09:00

## 2024-01-29 RX ADMIN — FOLIC ACID TAB 400 MCG 400 MCG: 400 TAB at 08:58

## 2024-01-29 RX ADMIN — SUCRALFATE 1 G: 1 TABLET ORAL at 08:58

## 2024-01-29 RX ADMIN — CYANOCOBALAMIN TAB 500 MCG 500 MCG: 500 TAB at 08:58

## 2024-01-29 RX ADMIN — ALBUTEROL SULFATE 2 PUFF: 90 AEROSOL, METERED RESPIRATORY (INHALATION) at 21:57

## 2024-01-29 RX ADMIN — DOCUSATE SODIUM 100 MG: 100 CAPSULE, LIQUID FILLED ORAL at 08:58

## 2024-01-29 RX ADMIN — OXYCODONE HYDROCHLORIDE 5 MG: 5 TABLET ORAL at 18:05

## 2024-01-29 RX ADMIN — ACETAMINOPHEN 650 MG: 325 TABLET, FILM COATED ORAL at 22:16

## 2024-01-29 RX ADMIN — ONDANSETRON 4 MG: 2 INJECTION INTRAMUSCULAR; INTRAVENOUS at 18:12
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
## 2024-01-29 NOTE — RESPIRATORY THERAPY NOTE
"RT Protocol Note  Nanci Navarro 58 y.o. female MRN: [id number]  Unit/Bed#: -01 Encounter: [id number]    Assessment    Principal Problem:    Discharge planning issues  Active Problems:    Chronic obstructive pulmonary disease with acute exacerbation (HCC)    Compression fracture of L1 vertebra (HCC)    Volume overload    Chronic respiratory failure (HCC)    Anemia    Physical Exam:   Assessment Type: Assess only  General Appearance: Sleeping  Respiratory Pattern: Normal  Chest Assessment: Chest expansion symmetrical  O2 Device: ra    Vitals:  Blood pressure 99/51, pulse 71, temperature 98.5 °F (36.9 °C), resp. rate 15, height 4' 9\" (1.448 m), weight 95 kg (209 lb 7 oz), SpO2 92%.    O2 Device: ra     Plan    Respiratory Plan: Home Bronchodilator Patient pathway        Resp Comments: will cont w current orders   "

## 2024-01-29 NOTE — RESPIRATORY THERAPY NOTE
"RT Protocol Note  Nanci Navarro 58 y.o. female MRN: [id number]  Unit/Bed#: -01 Encounter: [id number]    Assessment    Principal Problem:    Discharge planning issues  Active Problems:    Chronic obstructive pulmonary disease with acute exacerbation (HCC)    Compression fracture of L1 vertebra (HCC)    Volume overload    Chronic respiratory failure (HCC)    Anemia  Physical Exam:   Assessment Type: Assess only  General Appearance: Awake  Respiratory Pattern: Normal  Chest Assessment: Chest expansion symmetrical  Bilateral Breath Sounds: Clear  O2 Device: ra    Vitals:  Blood pressure 112/57, pulse 69, temperature 97.9 °F (36.6 °C), resp. rate 17, height 4' 9\" (1.448 m), weight 95 kg (209 lb 7 oz), SpO2 96%.      O2 Device: ra     Plan    Respiratory Plan: No distress/Pulmonary history        Resp Comments: no indication for PEP therapy at this time. BBS clear. pt able to mobilize secretions. will d/c flutter valve   "
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
## 2024-01-29 NOTE — PLAN OF CARE
Problem: Potential for Falls  Goal: Patient will remain free of falls  Description: INTERVENTIONS:  - Educate patient/family on patient safety including physical limitations  - Instruct patient to call for assistance with activity   - Consult OT/PT to assist with strengthening/mobility   - Keep Call bell within reach  - Keep bed low and locked with side rails adjusted as appropriate  - Keep care items and personal belongings within reach  - Initiate and maintain comfort rounds  - Make Fall Risk Sign visible to staff  - Offer Toileting every 2 Hours, in advance of need  - Initiate/Maintain bed and chair alarm  - Obtain necessary fall risk management equipment: non skid socks  - Apply yellow socks and bracelet for high fall risk patients  - Consider moving patient to room near nurses station  Outcome: Progressing     Problem: PAIN - ADULT  Goal: Verbalizes/displays adequate comfort level or baseline comfort level  Description: Interventions:  - Encourage patient to monitor pain and request assistance  - Assess pain using appropriate pain scale  - Administer analgesics based on type and severity of pain and evaluate response  - Implement non-pharmacological measures as appropriate and evaluate response  - Consider cultural and social influences on pain and pain management  - Notify physician/advanced practitioner if interventions unsuccessful or patient reports new pain  Outcome: Progressing     Problem: INFECTION - ADULT  Goal: Absence or prevention of progression during hospitalization  Description: INTERVENTIONS:  - Assess and monitor for signs and symptoms of infection  - Monitor lab/diagnostic results  - Monitor all insertion sites, i.e. indwelling lines, tubes, and drains  - Monitor endotracheal if appropriate and nasal secretions for changes in amount and color  - [place] appropriate cooling/warming therapies per order  - Administer medications as ordered  - Instruct and encourage patient and family to use  good hand hygiene technique  - Identify and instruct in appropriate isolation precautions for identified infection/condition  Outcome: Progressing  Goal: Absence of fever/infection during neutropenic period  Description: INTERVENTIONS:  - Monitor WBC    Outcome: Progressing     Problem: SAFETY ADULT  Goal: Patient will remain free of falls  Description: INTERVENTIONS:  - Educate patient/family on patient safety including physical limitations  - Instruct patient to call for assistance with activity   - Consult OT/PT to assist with strengthening/mobility   - Keep Call bell within reach  - Keep bed low and locked with side rails adjusted as appropriate  - Keep care items and personal belongings within reach  - Initiate and maintain comfort rounds  - Make Fall Risk Sign visible to staff  - Offer Toileting every 2 Hours, in advance of need  - Initiate/Maintain bed and chair alarm  - Obtain necessary fall risk management equipment: non skid socks  - Apply yellow socks and bracelet for high fall risk patients  - Consider moving patient to room near nurses station  Outcome: Progressing  Goal: Maintain or return to baseline ADL function  Description: INTERVENTIONS:  -  Assess patient's ability to carry out ADLs; assess patient's baseline for ADL function and identify physical deficits which impact ability to perform ADLs (bathing, care of mouth/teeth, toileting, grooming, dressing, etc.)  - Assess/evaluate cause of self-care deficits   - Assess range of motion  - Assess patient's mobility; develop plan if impaired  - Assess patient's need for assistive devices and provide as appropriate  - Encourage maximum independence but intervene and supervise when necessary  - Involve family in performance of ADLs  - Assess for home care needs following discharge   - Consider OT consult to assist with ADL evaluation and planning for discharge  - Provide patient education as appropriate  Outcome: Progressing  Goal: Maintains/Returns to  pre admission functional level  Description: INTERVENTIONS:  - Perform AM-PAC 6 Click Basic Mobility/ Daily Activity assessment daily.  - Set and communicate daily mobility goal to care team and patient/family/caregiver.   - Collaborate with rehabilitation services on mobility goals if consulted  - Perform Range of Motion 4 times a day.  - Reposition patient every 2 hours.  - Dangle patient 4 times a day  - Stand patient 4 times a day  - Ambulate patient 4 times a day  - Out of bed to chair 4 times a day   - Out of bed for meals 3 times a day  - Out of bed for toileting  - Record patient progress and toleration of activity level   Outcome: Progressing     Problem: DISCHARGE PLANNING  Goal: Discharge to home or other facility with appropriate resources  Description: INTERVENTIONS:  - Identify barriers to discharge w/patient and caregiver  - Arrange for needed discharge resources and transportation as appropriate  - Identify discharge learning needs (meds, wound care, etc.)  - Arrange for interpretive services to assist at discharge as needed  - Refer to Case Management Department for coordinating discharge planning if the patient needs post-hospital services based on physician/advanced practitioner order or complex needs related to functional status, cognitive ability, or social support system  Outcome: Progressing     Problem: Knowledge Deficit  Goal: Patient/family/caregiver demonstrates understanding of disease process, treatment plan, medications, and discharge instructions  Description: Complete learning assessment and assess knowledge base.  Interventions:  - Provide teaching at level of understanding  - Provide teaching via preferred learning methods  Outcome: Progressing     Problem: RESPIRATORY - ADULT  Goal: Achieves optimal ventilation and oxygenation  Description: INTERVENTIONS:  - Assess for changes in respiratory status  - Assess for changes in mentation and behavior  - Position to facilitate  oxygenation and minimize respiratory effort  - Oxygen administered by appropriate delivery if ordered  - Initiate smoking cessation education as indicated  - Encourage broncho-pulmonary hygiene including cough, deep breathe, Incentive Spirometry  - Assess the need for suctioning and aspirate as needed  - Assess and instruct to report SOB or any respiratory difficulty  - Respiratory Therapy support as indicated  Outcome: Progressing     Problem: GASTROINTESTINAL - ADULT  Goal: Maintains or returns to baseline bowel function  Description: INTERVENTIONS:  - Assess bowel function  - Encourage oral fluids to ensure adequate hydration  - Administer IV fluids if ordered to ensure adequate hydration  - Administer ordered medications as needed  - Encourage mobilization and activity  - Consider nutritional services referral to assist patient with adequate nutrition and appropriate food choices  Outcome: Progressing     Problem: GENITOURINARY - ADULT  Goal: Absence of urinary retention  Description: INTERVENTIONS:  - Assess patient’s ability to void and empty bladder  - Monitor I/O  - Bladder scan as needed  - Discuss with physician/AP medications to alleviate retention as needed  - Discuss catheterization for long term situations as appropriate  Outcome: Progressing

## 2024-01-30 ENCOUNTER — APPOINTMENT (INPATIENT)
Dept: RADIOLOGY | Facility: HOSPITAL | Age: 59
DRG: 140 | End: 2024-01-30
Payer: COMMERCIAL

## 2024-01-30 LAB
GLUCOSE SERPL-MCNC: 113 MG/DL (ref 65–140)
GLUCOSE SERPL-MCNC: 131 MG/DL (ref 65–140)
GLUCOSE SERPL-MCNC: 139 MG/DL (ref 65–140)
GLUCOSE SERPL-MCNC: 159 MG/DL (ref 65–140)

## 2024-01-30 PROCEDURE — 94664 DEMO&/EVAL PT USE INHALER: CPT

## 2024-01-30 PROCEDURE — 94760 N-INVAS EAR/PLS OXIMETRY 1: CPT

## 2024-01-30 PROCEDURE — 99232 SBSQ HOSP IP/OBS MODERATE 35: CPT | Performed by: PHYSICIAN ASSISTANT

## 2024-01-30 PROCEDURE — 82948 REAGENT STRIP/BLOOD GLUCOSE: CPT

## 2024-01-30 PROCEDURE — 71045 X-RAY EXAM CHEST 1 VIEW: CPT

## 2024-01-30 RX ORDER — HYDROMORPHONE HCL/PF 1 MG/ML
0.5 SYRINGE (ML) INJECTION ONCE
Status: COMPLETED | OUTPATIENT
Start: 2024-01-30 | End: 2024-01-30

## 2024-01-30 RX ADMIN — DOCUSATE SODIUM 100 MG: 100 CAPSULE, LIQUID FILLED ORAL at 17:42

## 2024-01-30 RX ADMIN — HYDROMORPHONE HYDROCHLORIDE 0.5 MG: 1 INJECTION, SOLUTION INTRAMUSCULAR; INTRAVENOUS; SUBCUTANEOUS at 20:45

## 2024-01-30 RX ADMIN — GUAIFENESIN 600 MG: 600 TABLET ORAL at 20:14

## 2024-01-30 RX ADMIN — ALBUTEROL SULFATE 2 PUFF: 90 AEROSOL, METERED RESPIRATORY (INHALATION) at 09:48

## 2024-01-30 RX ADMIN — ALBUTEROL SULFATE 2 PUFF: 90 AEROSOL, METERED RESPIRATORY (INHALATION) at 12:51

## 2024-01-30 RX ADMIN — OXYCODONE HYDROCHLORIDE 5 MG: 5 TABLET ORAL at 15:27

## 2024-01-30 RX ADMIN — GUAIFENESIN 600 MG: 600 TABLET ORAL at 09:51

## 2024-01-30 RX ADMIN — DOCUSATE SODIUM 100 MG: 100 CAPSULE, LIQUID FILLED ORAL at 09:51

## 2024-01-30 RX ADMIN — FERROUS SULFATE TAB 325 MG (65 MG ELEMENTAL FE) 325 MG: 325 (65 FE) TAB at 08:31

## 2024-01-30 RX ADMIN — METHOCARBAMOL TABLETS 500 MG: 500 TABLET, COATED ORAL at 20:44

## 2024-01-30 RX ADMIN — CYANOCOBALAMIN TAB 500 MCG 500 MCG: 500 TAB at 09:51

## 2024-01-30 RX ADMIN — PANTOPRAZOLE SODIUM 40 MG: 40 TABLET, DELAYED RELEASE ORAL at 09:51

## 2024-01-30 RX ADMIN — HEPARIN SODIUM 7500 UNITS: 5000 INJECTION INTRAVENOUS; SUBCUTANEOUS at 21:54

## 2024-01-30 RX ADMIN — OXYCODONE HYDROCHLORIDE 5 MG: 5 TABLET ORAL at 20:14

## 2024-01-30 RX ADMIN — SUCRALFATE 1 G: 1 TABLET ORAL at 08:31

## 2024-01-30 RX ADMIN — OXYCODONE HYDROCHLORIDE 5 MG: 5 TABLET ORAL at 05:37

## 2024-01-30 RX ADMIN — FOLIC ACID TAB 400 MCG 400 MCG: 400 TAB at 09:51

## 2024-01-30 RX ADMIN — ALBUTEROL SULFATE 2 PUFF: 90 AEROSOL, METERED RESPIRATORY (INHALATION) at 17:42

## 2024-01-30 RX ADMIN — SUCRALFATE 1 G: 1 TABLET ORAL at 17:42

## 2024-01-30 RX ADMIN — Medication 2 G: at 09:50

## 2024-01-30 RX ADMIN — Medication 2 G: at 12:51

## 2024-01-30 RX ADMIN — INSULIN LISPRO 2 UNITS: 100 INJECTION, SOLUTION INTRAVENOUS; SUBCUTANEOUS at 12:51

## 2024-01-30 RX ADMIN — ACETAMINOPHEN 650 MG: 325 TABLET, FILM COATED ORAL at 20:13

## 2024-01-30 RX ADMIN — FLUTICASONE FUROATE AND VILANTEROL TRIFENATATE 1 PUFF: 100; 25 POWDER RESPIRATORY (INHALATION) at 09:48

## 2024-01-30 RX ADMIN — OXYCODONE HYDROCHLORIDE 5 MG: 5 TABLET ORAL at 09:52

## 2024-01-30 RX ADMIN — HEPARIN SODIUM 7500 UNITS: 5000 INJECTION INTRAVENOUS; SUBCUTANEOUS at 15:27

## 2024-01-30 NOTE — CASE MANAGEMENT
Case Management Progress Note    Patient name Nanci Navarro  Location /-01 MRN [id number]  : 1965 Date 2024       LOS (days): 11  Geometric Mean LOS (GMLOS) (days):   Days to GMLOS:        OBJECTIVE:    Current admission status: Inpatient  Preferred Pharmacy:   [facility] - [place] - [address]  [address]  [address]  [facility] [address]  Phone: [phone] Fax: [phone]    [id number] [place] - [address]  [address]  [facility] [address]  Phone: [phone] Fax: [phone]    [facility] [place] - [address] [facility],  [address],  Ground Floor [facility] [address]  Phone: [phone] Fax: [phone]    [id number] [place] - [address]  [address]  [facility] [address]  Phone: [phone] Fax: [phone]    [facility] [place]  [place] - [address]  [address]  [place] [address]  Phone: [phone] Fax: [phone]    Primary Care Provider: Renan Montero DO  Primary Insurance: [place]  Secondary Insurance:     PROGRESS NOTE:  CM contacted [facility] ([phone]) for resource support.  Message left requesting return call.  Will attempt again.     UPDATE @ 9851 - DANK spoke with Claribel at [facility] for resources (housing, electric assistance, etc.)  Claribel reviewing and will contact CM back.      SS card is not needed for [place] application.  Patient has [place] DL which can be used as proof of identification.  Proof of residency may be attainable via car insurance.  CM attempting to review with [employer] so patient can complete paper MA application here.

## 2024-01-30 NOTE — PLAN OF CARE
Problem: Potential for Falls  Goal: Patient will remain free of falls  Description: INTERVENTIONS:  - Educate patient/family on patient safety including physical limitations  - Instruct patient to call for assistance with activity   - Consult OT/PT to assist with strengthening/mobility   - Keep Call bell within reach  - Keep bed low and locked with side rails adjusted as appropriate  - Keep care items and personal belongings within reach  - Initiate and maintain comfort rounds  - Make Fall Risk Sign visible to staff  - Offer Toileting every 2 Hours, in advance of need  - Initiate/Maintain BED alarm  - Obtain necessary fall risk management equipment  - Apply yellow socks and bracelet for high fall risk patients  - Consider moving patient to room near nurses station  Outcome: Progressing     Problem: PAIN - ADULT  Goal: Verbalizes/displays adequate comfort level or baseline comfort level  Description: Interventions:  - Encourage patient to monitor pain and request assistance  - Assess pain using appropriate pain scale  - Administer analgesics based on type and severity of pain and evaluate response  - Implement non-pharmacological measures as appropriate and evaluate response  - Consider cultural and social influences on pain and pain management  - Notify physician/advanced practitioner if interventions unsuccessful or patient reports new pain  Outcome: Progressing     Problem: INFECTION - ADULT  Goal: Absence or prevention of progression during hospitalization  Description: INTERVENTIONS:  - Assess and monitor for signs and symptoms of infection  - Monitor lab/diagnostic results  - Monitor all insertion sites, i.e. indwelling lines, tubes, and drains  - Monitor endotracheal if appropriate and nasal secretions for changes in amount and color  - [place] appropriate cooling/warming therapies per order  - Administer medications as ordered  - Instruct and encourage patient and family to use good hand hygiene  technique  - Identify and instruct in appropriate isolation precautions for identified infection/condition  Outcome: Progressing  Goal: Absence of fever/infection during neutropenic period  Description: INTERVENTIONS:  - Monitor WBC    Outcome: Progressing     Problem: SAFETY ADULT  Goal: Patient will remain free of falls  Description: INTERVENTIONS:  - Educate patient/family on patient safety including physical limitations  - Instruct patient to call for assistance with activity   - Consult OT/PT to assist with strengthening/mobility   - Keep Call bell within reach  - Keep bed low and locked with side rails adjusted as appropriate  - Keep care items and personal belongings within reach  - Initiate and maintain comfort rounds  - Make Fall Risk Sign visible to staff  - Offer Toileting every 2 Hours, in advance of need  - Initiate/Maintain bed alarm  - Obtain necessary fall risk management equipment  - Apply yellow socks and bracelet for high fall risk patients  - Consider moving patient to room near nurses station  Outcome: Progressing  Goal: Maintain or return to baseline ADL function  Description: INTERVENTIONS:  -  Assess patient's ability to carry out ADLs; assess patient's baseline for ADL function and identify physical deficits which impact ability to perform ADLs (bathing, care of mouth/teeth, toileting, grooming, dressing, etc.)  - Assess/evaluate cause of self-care deficits   - Assess range of motion  - Assess patient's mobility; develop plan if impaired  - Assess patient's need for assistive devices and provide as appropriate  - Encourage maximum independence but intervene and supervise when necessary  - Involve family in performance of ADLs  - Assess for home care needs following discharge   - Consider OT consult to assist with ADL evaluation and planning for discharge  - Provide patient education as appropriate  Outcome: Progressing  Goal: Maintains/Returns to pre admission functional level  Description:  INTERVENTIONS:  - Perform AM-PAC 6 Click Basic Mobility/ Daily Activity assessment daily.  - Set and communicate daily mobility goal to care team and patient/family/caregiver.   - Collaborate with rehabilitation services on mobility goals if consulted  - Perform Range of Motion 3 times a day.  - Reposition patient every 2 hours.  - Dangle patient 3 times a day  - Stand patient 3 times a day  - Ambulate patient 3 times a day  - Out of bed to chair 3 times a day   - Out of bed for meals 3 times a day  - Out of bed for toileting  - Record patient progress and toleration of activity level   Outcome: Progressing     Problem: DISCHARGE PLANNING  Goal: Discharge to home or other facility with appropriate resources  Description: INTERVENTIONS:  - Identify barriers to discharge w/patient and caregiver  - Arrange for needed discharge resources and transportation as appropriate  - Identify discharge learning needs (meds, wound care, etc.)  - Arrange for interpretive services to assist at discharge as needed  - Refer to Case Management Department for coordinating discharge planning if the patient needs post-hospital services based on physician/advanced practitioner order or complex needs related to functional status, cognitive ability, or social support system  Outcome: Progressing     Problem: Knowledge Deficit  Goal: Patient/family/caregiver demonstrates understanding of disease process, treatment plan, medications, and discharge instructions  Description: Complete learning assessment and assess knowledge base.  Interventions:  - Provide teaching at level of understanding  - Provide teaching via preferred learning methods  Outcome: Progressing     Problem: RESPIRATORY - ADULT  Goal: Achieves optimal ventilation and oxygenation  Description: INTERVENTIONS:  - Assess for changes in respiratory status  - Assess for changes in mentation and behavior  - Position to facilitate oxygenation and minimize respiratory effort  - Oxygen  administered by appropriate delivery if ordered  - Initiate smoking cessation education as indicated  - Encourage broncho-pulmonary hygiene including cough, deep breathe, Incentive Spirometry  - Assess the need for suctioning and aspirate as needed  - Assess and instruct to report SOB or any respiratory difficulty  - Respiratory Therapy support as indicated  Outcome: Progressing     Problem: GASTROINTESTINAL - ADULT  Goal: Maintains or returns to baseline bowel function  Description: INTERVENTIONS:  - Assess bowel function  - Encourage oral fluids to ensure adequate hydration  - Administer IV fluids if ordered to ensure adequate hydration  - Administer ordered medications as needed  - Encourage mobilization and activity  - Consider nutritional services referral to assist patient with adequate nutrition and appropriate food choices  Outcome: Progressing     Problem: GENITOURINARY - ADULT  Goal: Absence of urinary retention  Description: INTERVENTIONS:  - Assess patient’s ability to void and empty bladder  - Monitor I/O  - Bladder scan as needed  - Discuss with physician/AP medications to alleviate retention as needed  - Discuss catheterization for long term situations as appropriate  Outcome: Progressing

## 2024-01-30 NOTE — PLAN OF CARE
Problem: Potential for Falls  Goal: Patient will remain free of falls  Description: INTERVENTIONS:  - Educate patient/family on patient safety including physical limitations  - Instruct patient to call for assistance with activity   - Consult OT/PT to assist with strengthening/mobility   - Keep Call bell within reach  - Keep bed low and locked with side rails adjusted as appropriate  - Keep care items and personal belongings within reach  - Initiate and maintain comfort rounds  - Make Fall Risk Sign visible to staff  - Offer Toileting every 4 Hours, in advance of need  - Initiate/Maintain bed alarm  - Obtain necessary fall risk management equipment:   - Apply yellow socks and bracelet for high fall risk patients  - Consider moving patient to room near nurses station  Outcome: Progressing     Problem: PAIN - ADULT  Goal: Verbalizes/displays adequate comfort level or baseline comfort level  Description: Interventions:  - Encourage patient to monitor pain and request assistance  - Assess pain using appropriate pain scale  - Administer analgesics based on type and severity of pain and evaluate response  - Implement non-pharmacological measures as appropriate and evaluate response  - Consider cultural and social influences on pain and pain management  - Notify physician/advanced practitioner if interventions unsuccessful or patient reports new pain  Outcome: Progressing
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
## 2024-01-30 NOTE — RESPIRATORY THERAPY NOTE
RT Ventilator Management Note  Nanci Navarro 58 y.o. female MRN: [id number]  Unit/Bed#: -01 Encounter: [id number]      Daily Screen    No data found in the last 10 encounters.           Physical Exam:   Assessment Type: Assess only  General Appearance: Awake  Respiratory Pattern: Spontaneous  Chest Assessment: Chest expansion symmetrical  Bilateral Breath Sounds: Clear  Cough: None  O2 Device: nc  Subjective Data: awake and alert      Resp Comments: will cont w current orders, no distress at this time       01/30/24 1105   Respiratory Protocol   Protocol Initiated? No   Protocol Selection Respiratory   Language Barrier? No   Medical & Social History Reviewed? Yes   Diagnostic Studies Reviewed? Yes   Physical Assessment Performed? Yes   Home Devices/Therapy Home O2   Respiratory Plan Home Bronchodilator Patient pathway   Respiratory Assessment   Assessment Type Assess only   General Appearance Awake   Respiratory Pattern Spontaneous   Chest Assessment Chest expansion symmetrical   Bilateral Breath Sounds Clear   Resp Comments will cont w current orders, no distress at this time   O2 Device nc   Additional Assessments   Pulse 77   Respirations 20   SpO2 93 %
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
## 2024-01-31 LAB
2HR DELTA HS TROPONIN: 0 NG/L
ANION GAP SERPL CALCULATED.3IONS-SCNC: 6 MMOL/L
BUN SERPL-MCNC: 13 MG/DL (ref 5–25)
CALCIUM SERPL-MCNC: 8.9 MG/DL (ref 8.4–10.2)
CARDIAC TROPONIN I PNL SERPL HS: 4 NG/L
CARDIAC TROPONIN I PNL SERPL HS: 4 NG/L
CHLORIDE SERPL-SCNC: 102 MMOL/L (ref 96–108)
CO2 SERPL-SCNC: 31 MMOL/L (ref 21–32)
CREAT SERPL-MCNC: 0.79 MG/DL (ref 0.6–1.3)
ERYTHROCYTE [DISTWIDTH] IN BLOOD BY AUTOMATED COUNT: 14.1 % (ref 11.6–15.1)
GFR SERPL CREATININE-BSD FRML MDRD: 82 ML/MIN/1.73SQ M
GLUCOSE SERPL-MCNC: 104 MG/DL (ref 65–140)
GLUCOSE SERPL-MCNC: 123 MG/DL (ref 65–140)
GLUCOSE SERPL-MCNC: 133 MG/DL (ref 65–140)
GLUCOSE SERPL-MCNC: 149 MG/DL (ref 65–140)
GLUCOSE SERPL-MCNC: 177 MG/DL (ref 65–140)
HCT VFR BLD AUTO: 29.9 % (ref 34.8–46.1)
HGB BLD-MCNC: 9.7 G/DL (ref 11.5–15.4)
MCH RBC QN AUTO: 30 PG (ref 26.8–34.3)
MCHC RBC AUTO-ENTMCNC: 32.4 G/DL (ref 31.4–37.4)
MCV RBC AUTO: 93 FL (ref 82–98)
PLATELET # BLD AUTO: 357 THOUSANDS/UL (ref 149–390)
PMV BLD AUTO: 8.4 FL (ref 8.9–12.7)
POTASSIUM SERPL-SCNC: 3.1 MMOL/L (ref 3.5–5.3)
PROCALCITONIN SERPL-MCNC: <0.05 NG/ML
RBC # BLD AUTO: 3.23 MILLION/UL (ref 3.81–5.12)
SODIUM SERPL-SCNC: 139 MMOL/L (ref 135–147)
WBC # BLD AUTO: 7 THOUSAND/UL (ref 4.31–10.16)

## 2024-01-31 PROCEDURE — 85027 COMPLETE CBC AUTOMATED: CPT | Performed by: NURSE PRACTITIONER

## 2024-01-31 PROCEDURE — 84484 ASSAY OF TROPONIN QUANT: CPT | Performed by: NURSE PRACTITIONER

## 2024-01-31 PROCEDURE — 99223 1ST HOSP IP/OBS HIGH 75: CPT | Performed by: INTERNAL MEDICINE

## 2024-01-31 PROCEDURE — 84145 PROCALCITONIN (PCT): CPT | Performed by: NURSE PRACTITIONER

## 2024-01-31 PROCEDURE — 82948 REAGENT STRIP/BLOOD GLUCOSE: CPT

## 2024-01-31 PROCEDURE — 99232 SBSQ HOSP IP/OBS MODERATE 35: CPT | Performed by: NURSE PRACTITIONER

## 2024-01-31 PROCEDURE — 80048 BASIC METABOLIC PNL TOTAL CA: CPT | Performed by: NURSE PRACTITIONER

## 2024-01-31 RX ORDER — HYDROMORPHONE HCL IN WATER/PF 6 MG/30 ML
0.2 PATIENT CONTROLLED ANALGESIA SYRINGE INTRAVENOUS ONCE
Status: COMPLETED | OUTPATIENT
Start: 2024-01-31 | End: 2024-01-31

## 2024-01-31 RX ORDER — PANTOPRAZOLE SODIUM 40 MG/1
40 TABLET, DELAYED RELEASE ORAL
Status: DISCONTINUED | OUTPATIENT
Start: 2024-01-31 | End: 2024-02-07 | Stop reason: HOSPADM

## 2024-01-31 RX ORDER — KETOROLAC TROMETHAMINE 30 MG/ML
15 INJECTION, SOLUTION INTRAMUSCULAR; INTRAVENOUS EVERY 6 HOURS SCHEDULED
Status: COMPLETED | OUTPATIENT
Start: 2024-01-31 | End: 2024-02-01

## 2024-01-31 RX ORDER — POTASSIUM CHLORIDE 20 MEQ/1
40 TABLET, EXTENDED RELEASE ORAL 2 TIMES DAILY
Status: COMPLETED | OUTPATIENT
Start: 2024-01-31 | End: 2024-01-31

## 2024-01-31 RX ORDER — POLYETHYLENE GLYCOL 3350 17 G/17G
17 POWDER, FOR SOLUTION ORAL DAILY
Status: DISCONTINUED | OUTPATIENT
Start: 2024-01-31 | End: 2024-02-02

## 2024-01-31 RX ORDER — METHOCARBAMOL 500 MG/1
500 TABLET, FILM COATED ORAL EVERY 6 HOURS SCHEDULED
Status: DISCONTINUED | OUTPATIENT
Start: 2024-01-31 | End: 2024-02-07 | Stop reason: HOSPADM

## 2024-01-31 RX ORDER — FAMOTIDINE 20 MG/1
20 TABLET, FILM COATED ORAL
Status: DISCONTINUED | OUTPATIENT
Start: 2024-01-31 | End: 2024-02-07 | Stop reason: HOSPADM

## 2024-01-31 RX ADMIN — PANTOPRAZOLE SODIUM 40 MG: 40 TABLET, DELAYED RELEASE ORAL at 17:57

## 2024-01-31 RX ADMIN — GUAIFENESIN 600 MG: 600 TABLET ORAL at 20:02

## 2024-01-31 RX ADMIN — KETOROLAC TROMETHAMINE 15 MG: 30 INJECTION, SOLUTION INTRAMUSCULAR at 12:42

## 2024-01-31 RX ADMIN — HEPARIN SODIUM 7500 UNITS: 5000 INJECTION INTRAVENOUS; SUBCUTANEOUS at 21:31

## 2024-01-31 RX ADMIN — POTASSIUM CHLORIDE 40 MEQ: 1500 TABLET, EXTENDED RELEASE ORAL at 12:41

## 2024-01-31 RX ADMIN — ALBUTEROL SULFATE 2 PUFF: 90 AEROSOL, METERED RESPIRATORY (INHALATION) at 17:55

## 2024-01-31 RX ADMIN — PANTOPRAZOLE SODIUM 40 MG: 40 TABLET, DELAYED RELEASE ORAL at 08:21

## 2024-01-31 RX ADMIN — POLYETHYLENE GLYCOL 3350 17 G: 17 POWDER, FOR SOLUTION ORAL at 10:23

## 2024-01-31 RX ADMIN — OXYCODONE HYDROCHLORIDE 5 MG: 5 TABLET ORAL at 08:18

## 2024-01-31 RX ADMIN — SUCRALFATE 1 G: 1 TABLET ORAL at 08:39

## 2024-01-31 RX ADMIN — SUCRALFATE 1 G: 1 TABLET ORAL at 17:56

## 2024-01-31 RX ADMIN — FUROSEMIDE 40 MG: 40 TABLET ORAL at 08:21

## 2024-01-31 RX ADMIN — METHOCARBAMOL TABLETS 500 MG: 500 TABLET, COATED ORAL at 03:30

## 2024-01-31 RX ADMIN — FAMOTIDINE 20 MG: 20 TABLET, FILM COATED ORAL at 21:31

## 2024-01-31 RX ADMIN — FLUTICASONE FUROATE AND VILANTEROL TRIFENATATE 1 PUFF: 100; 25 POWDER RESPIRATORY (INHALATION) at 08:19

## 2024-01-31 RX ADMIN — HYDROMORPHONE HYDROCHLORIDE 0.2 MG: 0.2 INJECTION, SOLUTION INTRAMUSCULAR; INTRAVENOUS; SUBCUTANEOUS at 10:23

## 2024-01-31 RX ADMIN — ALBUTEROL SULFATE 2 PUFF: 90 AEROSOL, METERED RESPIRATORY (INHALATION) at 12:41

## 2024-01-31 RX ADMIN — METHOCARBAMOL TABLETS 500 MG: 500 TABLET, COATED ORAL at 12:41

## 2024-01-31 RX ADMIN — KETOROLAC TROMETHAMINE 15 MG: 30 INJECTION, SOLUTION INTRAMUSCULAR at 17:57

## 2024-01-31 RX ADMIN — FOLIC ACID TAB 400 MCG 400 MCG: 400 TAB at 08:21

## 2024-01-31 RX ADMIN — FERROUS SULFATE TAB 325 MG (65 MG ELEMENTAL FE) 325 MG: 325 (65 FE) TAB at 08:21

## 2024-01-31 RX ADMIN — DOCUSATE SODIUM 100 MG: 100 CAPSULE, LIQUID FILLED ORAL at 17:56

## 2024-01-31 RX ADMIN — OXYCODONE HYDROCHLORIDE 5 MG: 5 TABLET ORAL at 15:08

## 2024-01-31 RX ADMIN — OXYCODONE HYDROCHLORIDE 5 MG: 5 TABLET ORAL at 20:02

## 2024-01-31 RX ADMIN — KETOROLAC TROMETHAMINE 15 MG: 30 INJECTION, SOLUTION INTRAMUSCULAR at 23:15

## 2024-01-31 RX ADMIN — GUAIFENESIN 600 MG: 600 TABLET ORAL at 08:22

## 2024-01-31 RX ADMIN — POTASSIUM CHLORIDE 40 MEQ: 1500 TABLET, EXTENDED RELEASE ORAL at 17:57

## 2024-01-31 RX ADMIN — ALBUTEROL SULFATE 2 PUFF: 90 AEROSOL, METERED RESPIRATORY (INHALATION) at 08:19

## 2024-01-31 RX ADMIN — METHOCARBAMOL TABLETS 500 MG: 500 TABLET, COATED ORAL at 17:57

## 2024-01-31 RX ADMIN — METHOCARBAMOL TABLETS 500 MG: 500 TABLET, COATED ORAL at 23:15

## 2024-01-31 RX ADMIN — OXYCODONE HYDROCHLORIDE 5 MG: 5 TABLET ORAL at 03:29

## 2024-01-31 RX ADMIN — ALBUTEROL SULFATE 2 PUFF: 90 AEROSOL, METERED RESPIRATORY (INHALATION) at 21:33

## 2024-01-31 RX ADMIN — CYANOCOBALAMIN TAB 500 MCG 500 MCG: 500 TAB at 08:22

## 2024-01-31 RX ADMIN — DOCUSATE SODIUM 100 MG: 100 CAPSULE, LIQUID FILLED ORAL at 08:39

## 2024-01-31 RX ADMIN — HEPARIN SODIUM 7500 UNITS: 5000 INJECTION INTRAVENOUS; SUBCUTANEOUS at 15:05
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
## 2024-01-31 NOTE — PLAN OF CARE
Problem: Potential for Falls  Goal: Patient will remain free of falls  Description: INTERVENTIONS:  - Educate patient/family on patient safety including physical limitations  - Instruct patient to call for assistance with activity   - Consult OT/PT to assist with strengthening/mobility   - Keep Call bell within reach  - Keep bed low and locked with side rails adjusted as appropriate  - Keep care items and personal belongings within reach  - Initiate and maintain comfort rounds  - Make Fall Risk Sign visible to staff  - Offer Toileting every 3 Hours, in advance of need  - Initiate/Maintain bed alarm  - Obtain necessary fall risk management equipment:   - Apply yellow socks and bracelet for high fall risk patients  - Consider moving patient to room near nurses station  Outcome: Progressing     Problem: PAIN - ADULT  Goal: Verbalizes/displays adequate comfort level or baseline comfort level  Description: Interventions:  - Encourage patient to monitor pain and request assistance  - Assess pain using appropriate pain scale  - Administer analgesics based on type and severity of pain and evaluate response  - Implement non-pharmacological measures as appropriate and evaluate response  - Consider cultural and social influences on pain and pain management  - Notify physician/advanced practitioner if interventions unsuccessful or patient reports new pain  Outcome: Progressing     Problem: INFECTION - ADULT  Goal: Absence or prevention of progression during hospitalization  Description: INTERVENTIONS:  - Assess and monitor for signs and symptoms of infection  - Monitor lab/diagnostic results  - Monitor all insertion sites, i.e. indwelling lines, tubes, and drains  - Monitor endotracheal if appropriate and nasal secretions for changes in amount and color  - [place] appropriate cooling/warming therapies per order  - Administer medications as ordered  - Instruct and encourage patient and family to use good hand hygiene  technique  - Identify and instruct in appropriate isolation precautions for identified infection/condition  Outcome: Progressing  Goal: Absence of fever/infection during neutropenic period  Description: INTERVENTIONS:  - Monitor WBC    Outcome: Progressing     Problem: SAFETY ADULT  Goal: Patient will remain free of falls  Description: INTERVENTIONS:  - Educate patient/family on patient safety including physical limitations  - Instruct patient to call for assistance with activity   - Consult OT/PT to assist with strengthening/mobility   - Keep Call bell within reach  - Keep bed low and locked with side rails adjusted as appropriate  - Keep care items and personal belongings within reach  - Initiate and maintain comfort rounds  - Make Fall Risk Sign visible to staff  - Offer Toileting every 3 Hours, in advance of need  - Initiate/Maintain bed alarm  - Obtain necessary fall risk management equipment:   - Apply yellow socks and bracelet for high fall risk patients  - Consider moving patient to room near nurses station  Outcome: Progressing  Goal: Maintain or return to baseline ADL function  Description: INTERVENTIONS:  -  Assess patient's ability to carry out ADLs; assess patient's baseline for ADL function and identify physical deficits which impact ability to perform ADLs (bathing, care of mouth/teeth, toileting, grooming, dressing, etc.)  - Assess/evaluate cause of self-care deficits   - Assess range of motion  - Assess patient's mobility; develop plan if impaired  - Assess patient's need for assistive devices and provide as appropriate  - Encourage maximum independence but intervene and supervise when necessary  - Involve family in performance of ADLs  - Assess for home care needs following discharge   - Consider OT consult to assist with ADL evaluation and planning for discharge  - Provide patient education as appropriate  Outcome: Progressing  Goal: Maintains/Returns to pre admission functional  level  Description: INTERVENTIONS:  - Perform AM-PAC 6 Click Basic Mobility/ Daily Activity assessment daily.  - Set and communicate daily mobility goal to care team and patient/family/caregiver.   - Collaborate with rehabilitation services on mobility goals if consulted  - Perform Range of Motion 3 times a day.  - Reposition patient every 2 hours.  - Dangle patient 3 times a day  - Stand patient 3 times a day  - Ambulate patient 3 times a day  - Out of bed to chair 3 times a day   - Out of bed for meals 3 times a day  - Out of bed for toileting  - Record patient progress and toleration of activity level   Outcome: Progressing     Problem: DISCHARGE PLANNING  Goal: Discharge to home or other facility with appropriate resources  Description: INTERVENTIONS:  - Identify barriers to discharge w/patient and caregiver  - Arrange for needed discharge resources and transportation as appropriate  - Identify discharge learning needs (meds, wound care, etc.)  - Arrange for interpretive services to assist at discharge as needed  - Refer to Case Management Department for coordinating discharge planning if the patient needs post-hospital services based on physician/advanced practitioner order or complex needs related to functional status, cognitive ability, or social support system  Outcome: Progressing     Problem: Knowledge Deficit  Goal: Patient/family/caregiver demonstrates understanding of disease process, treatment plan, medications, and discharge instructions  Description: Complete learning assessment and assess knowledge base.  Interventions:  - Provide teaching at level of understanding  - Provide teaching via preferred learning methods  Outcome: Progressing     Problem: RESPIRATORY - ADULT  Goal: Achieves optimal ventilation and oxygenation  Description: INTERVENTIONS:  - Assess for changes in respiratory status  - Assess for changes in mentation and behavior  - Position to facilitate oxygenation and minimize  respiratory effort  - Oxygen administered by appropriate delivery if ordered  - Initiate smoking cessation education as indicated  - Encourage broncho-pulmonary hygiene including cough, deep breathe, Incentive Spirometry  - Assess the need for suctioning and aspirate as needed  - Assess and instruct to report SOB or any respiratory difficulty  - Respiratory Therapy support as indicated  Outcome: Progressing     Problem: GASTROINTESTINAL - ADULT  Goal: Maintains or returns to baseline bowel function  Description: INTERVENTIONS:  - Assess bowel function  - Encourage oral fluids to ensure adequate hydration  - Administer IV fluids if ordered to ensure adequate hydration  - Administer ordered medications as needed  - Encourage mobilization and activity  - Consider nutritional services referral to assist patient with adequate nutrition and appropriate food choices  Outcome: Progressing     Problem: GENITOURINARY - ADULT  Goal: Absence of urinary retention  Description: INTERVENTIONS:  - Assess patient’s ability to void and empty bladder  - Monitor I/O  - Bladder scan as needed  - Discuss with physician/AP medications to alleviate retention as needed  - Discuss catheterization for long term situations as appropriate  Outcome: Progressing
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
## 2024-01-31 NOTE — ASSESSMENT & PLAN NOTE
Present on admission with a hemoglobin of 11.3, trending down to 9.3.  Stable at 9.7 today.  Baseline appears to be 11-13  Iron panel collected  Iron 28, Iron Sat 10, Ferritin 65, TIBC 268  Folate 7, Vitamin B12 171  No signs of active bleeding at this time.  Will continue with folic acid, vitamin b12 supplementation at this time  Continue daily ferrous sulfate supplementation

## 2024-01-31 NOTE — PLAN OF CARE
Problem: Potential for Falls  Goal: Patient will remain free of falls  Description: INTERVENTIONS:  - Educate patient/family on patient safety including physical limitations  - Instruct patient to call for assistance with activity   - Consult OT/PT to assist with strengthening/mobility   - Keep Call bell within reach  - Keep bed low and locked with side rails adjusted as appropriate  - Keep care items and personal belongings within reach  - Initiate and maintain comfort rounds  - Make Fall Risk Sign visible to staff  - Offer Toileting every 2 Hours, in advance of need  - Initiate/Maintain bed alarm  - Obtain necessary fall risk management equipment  - Apply yellow socks and bracelet for high fall risk patients  - Consider moving patient to room near nurses station  Outcome: Progressing     Problem: PAIN - ADULT  Goal: Verbalizes/displays adequate comfort level or baseline comfort level  Description: Interventions:  - Encourage patient to monitor pain and request assistance  - Assess pain using appropriate pain scale  - Administer analgesics based on type and severity of pain and evaluate response  - Implement non-pharmacological measures as appropriate and evaluate response  - Consider cultural and social influences on pain and pain management  - Notify physician/advanced practitioner if interventions unsuccessful or patient reports new pain  Outcome: Progressing     Problem: INFECTION - ADULT  Goal: Absence or prevention of progression during hospitalization  Description: INTERVENTIONS:  - Assess and monitor for signs and symptoms of infection  - Monitor lab/diagnostic results  - Monitor all insertion sites, i.e. indwelling lines, tubes, and drains  - Monitor endotracheal if appropriate and nasal secretions for changes in amount and color  - [place] appropriate cooling/warming therapies per order  - Administer medications as ordered  - Instruct and encourage patient and family to use good hand hygiene  technique  - Identify and instruct in appropriate isolation precautions for identified infection/condition  Outcome: Progressing  Goal: Absence of fever/infection during neutropenic period  Description: INTERVENTIONS:  - Monitor WBC    Outcome: Progressing     Problem: SAFETY ADULT  Goal: Patient will remain free of falls  Description: INTERVENTIONS:  - Educate patient/family on patient safety including physical limitations  - Instruct patient to call for assistance with activity   - Consult OT/PT to assist with strengthening/mobility   - Keep Call bell within reach  - Keep bed low and locked with side rails adjusted as appropriate  - Keep care items and personal belongings within reach  - Initiate and maintain comfort rounds  - Make Fall Risk Sign visible to staff  - Offer Toileting every 2 Hours, in advance of need  - Initiate/Maintain bed alarm  - Obtain necessary fall risk management equipment  - Apply yellow socks and bracelet for high fall risk patients  - Consider moving patient to room near nurses station  Outcome: Progressing  Goal: Maintain or return to baseline ADL function  Description: INTERVENTIONS:  -  Assess patient's ability to carry out ADLs; assess patient's baseline for ADL function and identify physical deficits which impact ability to perform ADLs (bathing, care of mouth/teeth, toileting, grooming, dressing, etc.)  - Assess/evaluate cause of self-care deficits   - Assess range of motion  - Assess patient's mobility; develop plan if impaired  - Assess patient's need for assistive devices and provide as appropriate  - Encourage maximum independence but intervene and supervise when necessary  - Involve family in performance of ADLs  - Assess for home care needs following discharge   - Consider OT consult to assist with ADL evaluation and planning for discharge  - Provide patient education as appropriate  Outcome: Progressing  Goal: Maintains/Returns to pre admission functional level  Description:  INTERVENTIONS:  - Perform AM-PAC 6 Click Basic Mobility/ Daily Activity assessment daily.  - Set and communicate daily mobility goal to care team and patient/family/caregiver.   - Collaborate with rehabilitation services on mobility goals if consulted  - Perform Range of Motion 3 times a day.  - Reposition patient every 2 hours.  - Dangle patient 3 times a day  - Stand patient 3 times a day  - Ambulate patient 3 times a day  - Out of bed to chair 3 times a day   - Out of bed for meals 3 times a day  - Out of bed for toileting  - Record patient progress and toleration of activity level   Outcome: Progressing     Problem: DISCHARGE PLANNING  Goal: Discharge to home or other facility with appropriate resources  Description: INTERVENTIONS:  - Identify barriers to discharge w/patient and caregiver  - Arrange for needed discharge resources and transportation as appropriate  - Identify discharge learning needs (meds, wound care, etc.)  - Arrange for interpretive services to assist at discharge as needed  - Refer to Case Management Department for coordinating discharge planning if the patient needs post-hospital services based on physician/advanced practitioner order or complex needs related to functional status, cognitive ability, or social support system  Outcome: Progressing     Problem: Knowledge Deficit  Goal: Patient/family/caregiver demonstrates understanding of disease process, treatment plan, medications, and discharge instructions  Description: Complete learning assessment and assess knowledge base.  Interventions:  - Provide teaching at level of understanding  - Provide teaching via preferred learning methods  Outcome: Progressing     Problem: RESPIRATORY - ADULT  Goal: Achieves optimal ventilation and oxygenation  Description: INTERVENTIONS:  - Assess for changes in respiratory status  - Assess for changes in mentation and behavior  - Position to facilitate oxygenation and minimize respiratory effort  - Oxygen  administered by appropriate delivery if ordered  - Initiate smoking cessation education as indicated  - Encourage broncho-pulmonary hygiene including cough, deep breathe, Incentive Spirometry  - Assess the need for suctioning and aspirate as needed  - Assess and instruct to report SOB or any respiratory difficulty  - Respiratory Therapy support as indicated  Outcome: Progressing     Problem: GASTROINTESTINAL - ADULT  Goal: Maintains or returns to baseline bowel function  Description: INTERVENTIONS:  - Assess bowel function  - Encourage oral fluids to ensure adequate hydration  - Administer IV fluids if ordered to ensure adequate hydration  - Administer ordered medications as needed  - Encourage mobilization and activity  - Consider nutritional services referral to assist patient with adequate nutrition and appropriate food choices  Outcome: Progressing     Problem: GENITOURINARY - ADULT  Goal: Absence of urinary retention  Description: INTERVENTIONS:  - Assess patient’s ability to void and empty bladder  - Monitor I/O  - Bladder scan as needed  - Discuss with physician/AP medications to alleviate retention as needed  - Discuss catheterization for long term situations as appropriate  Outcome: Progressing
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
## 2024-01-31 NOTE — PROGRESS NOTES
X2 Rns attempted insertion; maximum tries each. Current IV, WDL. No discomfort noted. ICU charge RN contacted for assistance.

## 2024-01-31 NOTE — RESPIRATORY THERAPY NOTE
Home Oxygen Qualifying Test     Patient name: Nanci Navarro        : 1965   Date of Test:  2024  Diagnosis: SOB   Home Oxygen Test:    **Medicare Guidelines require item(s) 1-5 on all ambulatory patients or 1 and 2 on non-ambulatory patients.    1. Baseline SPO2 on Room Air at rest 92 %       SPO2 during exertion on Room Air 87  %  During exertion monitor SPO2. If SPO2 increases >=89%, do not add supplemental oxygen    SPO2 on Oxygen at Rest 94 % at 2 LPM    SPO2 during exertion on Oxygen 91 % at 2 LPM    Test performed during exertion activity.      [x]  Supplemental Home Oxygen is indicated.    []  Client does not qualify for home oxygen.    Respiratory Additional Notes- spo2 drops to 87% during walk.  2LNC placed on patient. Patient maintains spo2 of 91% for duration of walk on 2LNC    Jose Mccracken, RT

## 2024-02-01 PROBLEM — R46.89 NON-COMPLIANT BEHAVIOR: Status: ACTIVE | Noted: 2024-02-01

## 2024-02-01 LAB
GLUCOSE SERPL-MCNC: 116 MG/DL (ref 65–140)
GLUCOSE SERPL-MCNC: 136 MG/DL (ref 65–140)
GLUCOSE SERPL-MCNC: 169 MG/DL (ref 65–140)
GLUCOSE SERPL-MCNC: 174 MG/DL (ref 65–140)

## 2024-02-01 PROCEDURE — 82948 REAGENT STRIP/BLOOD GLUCOSE: CPT

## 2024-02-01 PROCEDURE — 99233 SBSQ HOSP IP/OBS HIGH 50: CPT | Performed by: INTERNAL MEDICINE

## 2024-02-01 RX ORDER — HYDROMORPHONE HCL IN WATER/PF 6 MG/30 ML
0.2 PATIENT CONTROLLED ANALGESIA SYRINGE INTRAVENOUS EVERY 6 HOURS PRN
Status: DISCONTINUED | OUTPATIENT
Start: 2024-02-01 | End: 2024-02-01

## 2024-02-01 RX ORDER — HYDROMORPHONE HCL IN WATER/PF 6 MG/30 ML
0.2 PATIENT CONTROLLED ANALGESIA SYRINGE INTRAVENOUS EVERY 8 HOURS PRN
Status: DISCONTINUED | OUTPATIENT
Start: 2024-02-01 | End: 2024-02-04

## 2024-02-01 RX ORDER — HYDROMORPHONE HCL IN WATER/PF 6 MG/30 ML
0.2 PATIENT CONTROLLED ANALGESIA SYRINGE INTRAVENOUS ONCE
Status: DISCONTINUED | OUTPATIENT
Start: 2024-02-01 | End: 2024-02-01

## 2024-02-01 RX ADMIN — HYDROMORPHONE HYDROCHLORIDE 0.2 MG: 0.2 INJECTION, SOLUTION INTRAMUSCULAR; INTRAVENOUS; SUBCUTANEOUS at 12:36

## 2024-02-01 RX ADMIN — ALBUTEROL SULFATE 2 PUFF: 90 AEROSOL, METERED RESPIRATORY (INHALATION) at 17:49

## 2024-02-01 RX ADMIN — PANTOPRAZOLE SODIUM 40 MG: 40 TABLET, DELAYED RELEASE ORAL at 16:16

## 2024-02-01 RX ADMIN — OXYCODONE HYDROCHLORIDE 5 MG: 5 TABLET ORAL at 00:03

## 2024-02-01 RX ADMIN — OXYCODONE HYDROCHLORIDE 5 MG: 5 TABLET ORAL at 10:24

## 2024-02-01 RX ADMIN — HEPARIN SODIUM 7500 UNITS: 5000 INJECTION INTRAVENOUS; SUBCUTANEOUS at 16:16

## 2024-02-01 RX ADMIN — METHOCARBAMOL TABLETS 500 MG: 500 TABLET, COATED ORAL at 17:52

## 2024-02-01 RX ADMIN — KETOROLAC TROMETHAMINE 15 MG: 30 INJECTION, SOLUTION INTRAMUSCULAR at 06:15

## 2024-02-01 RX ADMIN — FAMOTIDINE 20 MG: 20 TABLET, FILM COATED ORAL at 22:03

## 2024-02-01 RX ADMIN — FOLIC ACID TAB 400 MCG 400 MCG: 400 TAB at 09:55

## 2024-02-01 RX ADMIN — OXYCODONE HYDROCHLORIDE 5 MG: 5 TABLET ORAL at 16:16

## 2024-02-01 RX ADMIN — GUAIFENESIN 600 MG: 600 TABLET ORAL at 09:55

## 2024-02-01 RX ADMIN — OXYCODONE HYDROCHLORIDE 5 MG: 5 TABLET ORAL at 22:27

## 2024-02-01 RX ADMIN — METHOCARBAMOL TABLETS 500 MG: 500 TABLET, COATED ORAL at 06:15

## 2024-02-01 RX ADMIN — SUCRALFATE 1 G: 1 TABLET ORAL at 06:46

## 2024-02-01 RX ADMIN — OXYCODONE HYDROCHLORIDE 5 MG: 5 TABLET ORAL at 04:03

## 2024-02-01 RX ADMIN — FERROUS SULFATE TAB 325 MG (65 MG ELEMENTAL FE) 325 MG: 325 (65 FE) TAB at 09:55

## 2024-02-01 RX ADMIN — LIDOCAINE 5% 1 PATCH: 700 PATCH TOPICAL at 09:50

## 2024-02-01 RX ADMIN — INSULIN LISPRO 2 UNITS: 100 INJECTION, SOLUTION INTRAVENOUS; SUBCUTANEOUS at 12:35

## 2024-02-01 RX ADMIN — METHOCARBAMOL TABLETS 500 MG: 500 TABLET, COATED ORAL at 12:36

## 2024-02-01 RX ADMIN — CYANOCOBALAMIN TAB 500 MCG 500 MCG: 500 TAB at 09:55

## 2024-02-01 RX ADMIN — GUAIFENESIN 600 MG: 600 TABLET ORAL at 20:27

## 2024-02-01 RX ADMIN — PANTOPRAZOLE SODIUM 40 MG: 40 TABLET, DELAYED RELEASE ORAL at 06:46

## 2024-02-01 RX ADMIN — FLUTICASONE FUROATE AND VILANTEROL TRIFENATATE 1 PUFF: 100; 25 POWDER RESPIRATORY (INHALATION) at 09:48

## 2024-02-01 RX ADMIN — ALBUTEROL SULFATE 2 PUFF: 90 AEROSOL, METERED RESPIRATORY (INHALATION) at 09:48

## 2024-02-01 RX ADMIN — HEPARIN SODIUM 7500 UNITS: 5000 INJECTION INTRAVENOUS; SUBCUTANEOUS at 06:15

## 2024-02-01 RX ADMIN — DOCUSATE SODIUM 100 MG: 100 CAPSULE, LIQUID FILLED ORAL at 09:55

## 2024-02-01 RX ADMIN — ONDANSETRON 4 MG: 2 INJECTION INTRAMUSCULAR; INTRAVENOUS at 06:29

## 2024-02-01 RX ADMIN — INSULIN LISPRO 2 UNITS: 100 INJECTION, SOLUTION INTRAVENOUS; SUBCUTANEOUS at 17:53

## 2024-02-01 RX ADMIN — DOCUSATE SODIUM 100 MG: 100 CAPSULE, LIQUID FILLED ORAL at 17:57

## 2024-02-01 RX ADMIN — SUCRALFATE 1 G: 1 TABLET ORAL at 16:16

## 2024-02-01 RX ADMIN — POLYETHYLENE GLYCOL 3350 17 G: 17 POWDER, FOR SOLUTION ORAL at 09:50

## 2024-02-01 RX ADMIN — MENTHOL, UNSPECIFIED FORM AND METHYL SALICYLATE: 10; 150 CREAM TOPICAL at 17:50

## 2024-02-01 RX ADMIN — ALBUTEROL SULFATE 2 PUFF: 90 AEROSOL, METERED RESPIRATORY (INHALATION) at 22:00

## 2024-02-01 RX ADMIN — HYDROMORPHONE HYDROCHLORIDE 0.2 MG: 0.2 INJECTION, SOLUTION INTRAMUSCULAR; INTRAVENOUS; SUBCUTANEOUS at 20:22

## 2024-02-01 RX ADMIN — ALBUTEROL SULFATE 2 PUFF: 90 AEROSOL, METERED RESPIRATORY (INHALATION) at 12:34

## 2024-02-01 NOTE — PLAN OF CARE
Problem: PAIN - ADULT  Goal: Verbalizes/displays adequate comfort level or baseline comfort level  Description: Interventions:  - Encourage patient to monitor pain and request assistance  - Assess pain using appropriate pain scale  - Administer analgesics based on type and severity of pain and evaluate response  - Implement non-pharmacological measures as appropriate and evaluate response  - Consider cultural and social influences on pain and pain management  - Notify physician/advanced practitioner if interventions unsuccessful or patient reports new pain  Outcome: Progressing     Problem: INFECTION - ADULT  Goal: Absence or prevention of progression during hospitalization  Description: INTERVENTIONS:  - Assess and monitor for signs and symptoms of infection  - Monitor lab/diagnostic results  - Monitor all insertion sites, i.e. indwelling lines, tubes, and drains  - Monitor endotracheal if appropriate and nasal secretions for changes in amount and color  - [place] appropriate cooling/warming therapies per order  - Administer medications as ordered  - Instruct and encourage patient and family to use good hand hygiene technique  - Identify and instruct in appropriate isolation precautions for identified infection/condition  Outcome: Progressing  Goal: Absence of fever/infection during neutropenic period  Description: INTERVENTIONS:  - Monitor WBC    Outcome: Progressing     Problem: DISCHARGE PLANNING  Goal: Discharge to home or other facility with appropriate resources  Description: INTERVENTIONS:  - Identify barriers to discharge w/patient and caregiver  - Arrange for needed discharge resources and transportation as appropriate  - Identify discharge learning needs (meds, wound care, etc.)  - Arrange for interpretive services to assist at discharge as needed  - Refer to Case Management Department for coordinating discharge planning if the patient needs post-hospital services based on physician/advanced  practitioner order or complex needs related to functional status, cognitive ability, or social support system  Outcome: Progressing     Problem: Knowledge Deficit  Goal: Patient/family/caregiver demonstrates understanding of disease process, treatment plan, medications, and discharge instructions  Description: Complete learning assessment and assess knowledge base.  Interventions:  - Provide teaching at level of understanding  - Provide teaching via preferred learning methods  Outcome: Progressing     Problem: GASTROINTESTINAL - ADULT  Goal: Maintains or returns to baseline bowel function  Description: INTERVENTIONS:  - Assess bowel function  - Encourage oral fluids to ensure adequate hydration  - Administer IV fluids if ordered to ensure adequate hydration  - Administer ordered medications as needed  - Encourage mobilization and activity  - Consider nutritional services referral to assist patient with adequate nutrition and appropriate food choices  Outcome: Progressing     Problem: RESPIRATORY - ADULT  Goal: Achieves optimal ventilation and oxygenation  Description: INTERVENTIONS:  - Assess for changes in respiratory status  - Assess for changes in mentation and behavior  - Position to facilitate oxygenation and minimize respiratory effort  - Oxygen administered by appropriate delivery if ordered  - Initiate smoking cessation education as indicated  - Encourage broncho-pulmonary hygiene including cough, deep breathe, Incentive Spirometry  - Assess the need for suctioning and aspirate as needed  - Assess and instruct to report SOB or any respiratory difficulty  - Respiratory Therapy support as indicated  Outcome: Progressing     Problem: GENITOURINARY - ADULT  Goal: Absence of urinary retention  Description: INTERVENTIONS:  - Assess patient’s ability to void and empty bladder  - Monitor I/O  - Bladder scan as needed  - Discuss with physician/AP medications to alleviate retention as needed  - Discuss catheterization  for long term situations as appropriate  Outcome: Progressing
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
## 2024-02-01 NOTE — OCCUPATIONAL THERAPY NOTE
Occupational Therapy Cancel Note     Patient Name: Nanci Navarro  Today's Date: 2/1/2024  Problem List  Principal Problem:    Discharge planning issues  Active Problems:    Chronic obstructive pulmonary disease with acute exacerbation (HCC)    Smoker    Compression fracture of L1 vertebra (HCC)    Volume overload    Chronic respiratory failure (HCC)    Anemia    Non-compliant behavior            02/01/24 1458   OT Last Visit   OT Visit Date 02/01/24   Note Type   Note Type Cancelled Session   Cancel Reasons Refusal  (Therapist attempted to see pt in PM for skilled OT tx. Pt reported being too tired and in too much pain right now to participate; Therapist to f/u tomorrow.)     Jayleen Oliveira MS, OTR/L CLT
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
## 2024-02-01 NOTE — CASE MANAGEMENT
Case Management Progress Note    Patient name Nanci Navarro  Location /-01 MRN [id number]  : 1965 Date 2024       LOS (days): 13  Geometric Mean LOS (GMLOS) (days):   Days to GMLOS:        OBJECTIVE:    Current admission status: Inpatient  Preferred Pharmacy:   [facility] [place] - [address]  [address]  [address]  [facility] [address]  Phone: [phone] Fax: [phone]    [id number] [place] - [address]  [address] [address]  Phone: [phone] Fax: [phone]    [facility] [place] [address],  [address],  Ground Floor [facility] [address]  Phone: [phone] Fax: [phone]    [id number] [place] - [address]  [address]  [facility] [address]  Phone: [phone] Fax: [phone]    [facility]  [place] - [address]  [address]  [place] [address]  Phone: [phone] Fax: [phone]    Primary Care Provider: Renan Montero DO  Primary Insurance: DOROTHY ANN PENDING  Secondary Insurance:     PROGRESS NOTE:  CM met with patient at bedside to provide MA application for completion along with 211 contact for coordinated entry.  Patient declined assistance with completing application or call to 211 to initiate referral.  CM continues to follow for care coordination.

## 2024-02-01 NOTE — PLAN OF CARE
Problem: Potential for Falls  Goal: Patient will remain free of falls  Description: INTERVENTIONS:  - Educate patient/family on patient safety including physical limitations  - Instruct patient to call for assistance with activity   - Consult OT/PT to assist with strengthening/mobility   - Keep Call bell within reach  - Keep bed low and locked with side rails adjusted as appropriate  - Keep care items and personal belongings within reach  - Initiate and maintain comfort rounds  - Make Fall Risk Sign visible to staff  - Offer Toileting every 2 Hours, in advance of need  - Initiate/Maintain bed alarm  - Obtain necessary fall risk management equipment  - Apply yellow socks and bracelet for high fall risk patients  - Consider moving patient to room near nurses station  Outcome: Progressing     Problem: PAIN - ADULT  Goal: Verbalizes/displays adequate comfort level or baseline comfort level  Description: Interventions:  - Encourage patient to monitor pain and request assistance  - Assess pain using appropriate pain scale  - Administer analgesics based on type and severity of pain and evaluate response  - Implement non-pharmacological measures as appropriate and evaluate response  - Consider cultural and social influences on pain and pain management  - Notify physician/advanced practitioner if interventions unsuccessful or patient reports new pain  Outcome: Progressing     Problem: INFECTION - ADULT  Goal: Absence or prevention of progression during hospitalization  Description: INTERVENTIONS:  - Assess and monitor for signs and symptoms of infection  - Monitor lab/diagnostic results  - Monitor all insertion sites, i.e. indwelling lines, tubes, and drains  - Monitor endotracheal if appropriate and nasal secretions for changes in amount and color  - Vance appropriate cooling/warming therapies per order  - Administer medications as ordered  - Instruct and encourage patient and family to use good hand hygiene  technique  - Identify and instruct in appropriate isolation precautions for identified infection/condition  Outcome: Progressing  Goal: Absence of fever/infection during neutropenic period  Description: INTERVENTIONS:  - Monitor WBC    Outcome: Progressing     Problem: SAFETY ADULT  Goal: Patient will remain free of falls  Description: INTERVENTIONS:  - Educate patient/family on patient safety including physical limitations  - Instruct patient to call for assistance with activity   - Consult OT/PT to assist with strengthening/mobility   - Keep Call bell within reach  - Keep bed low and locked with side rails adjusted as appropriate  - Keep care items and personal belongings within reach  - Initiate and maintain comfort rounds  - Make Fall Risk Sign visible to staff  - Offer Toileting every 2 Hours, in advance of need  - Initiate/Maintain bed alarm  - Obtain necessary fall risk management equipment  - Apply yellow socks and bracelet for high fall risk patients  - Consider moving patient to room near nurses station  Outcome: Progressing  Goal: Maintain or return to baseline ADL function  Description: INTERVENTIONS:  -  Assess patient's ability to carry out ADLs; assess patient's baseline for ADL function and identify physical deficits which impact ability to perform ADLs (bathing, care of mouth/teeth, toileting, grooming, dressing, etc.)  - Assess/evaluate cause of self-care deficits   - Assess range of motion  - Assess patient's mobility; develop plan if impaired  - Assess patient's need for assistive devices and provide as appropriate  - Encourage maximum independence but intervene and supervise when necessary  - Involve family in performance of ADLs  - Assess for home care needs following discharge   - Consider OT consult to assist with ADL evaluation and planning for discharge  - Provide patient education as appropriate  Outcome: Progressing  Goal: Maintains/Returns to pre admission functional level  Description:  INTERVENTIONS:  - Perform AM-PAC 6 Click Basic Mobility/ Daily Activity assessment daily.  - Set and communicate daily mobility goal to care team and patient/family/caregiver.   - Collaborate with rehabilitation services on mobility goals if consulted  - Perform Range of Motion 3 times a day.  - Reposition patient every 2 hours.  - Dangle patient 3 times a day  - Stand patient 3 times a day  - Ambulate patient 3 times a day  - Out of bed to chair 3 times a day   - Out of bed for meals 3 times a day  - Out of bed for toileting  - Record patient progress and toleration of activity level   Outcome: Progressing     Problem: DISCHARGE PLANNING  Goal: Discharge to home or other facility with appropriate resources  Description: INTERVENTIONS:  - Identify barriers to discharge w/patient and caregiver  - Arrange for needed discharge resources and transportation as appropriate  - Identify discharge learning needs (meds, wound care, etc.)  - Arrange for interpretive services to assist at discharge as needed  - Refer to Case Management Department for coordinating discharge planning if the patient needs post-hospital services based on physician/advanced practitioner order or complex needs related to functional status, cognitive ability, or social support system  Outcome: Progressing     Problem: Knowledge Deficit  Goal: Patient/family/caregiver demonstrates understanding of disease process, treatment plan, medications, and discharge instructions  Description: Complete learning assessment and assess knowledge base.  Interventions:  - Provide teaching at level of understanding  - Provide teaching via preferred learning methods  Outcome: Progressing     Problem: RESPIRATORY - ADULT  Goal: Achieves optimal ventilation and oxygenation  Description: INTERVENTIONS:  - Assess for changes in respiratory status  - Assess for changes in mentation and behavior  - Position to facilitate oxygenation and minimize respiratory effort  - Oxygen  administered by appropriate delivery if ordered  - Initiate smoking cessation education as indicated  - Encourage broncho-pulmonary hygiene including cough, deep breathe, Incentive Spirometry  - Assess the need for suctioning and aspirate as needed  - Assess and instruct to report SOB or any respiratory difficulty  - Respiratory Therapy support as indicated  Outcome: Progressing     Problem: GASTROINTESTINAL - ADULT  Goal: Maintains or returns to baseline bowel function  Description: INTERVENTIONS:  - Assess bowel function  - Encourage oral fluids to ensure adequate hydration  - Administer IV fluids if ordered to ensure adequate hydration  - Administer ordered medications as needed  - Encourage mobilization and activity  - Consider nutritional services referral to assist patient with adequate nutrition and appropriate food choices  Outcome: Progressing     Problem: GENITOURINARY - ADULT  Goal: Absence of urinary retention  Description: INTERVENTIONS:  - Assess patient’s ability to void and empty bladder  - Monitor I/O  - Bladder scan as needed  - Discuss with physician/AP medications to alleviate retention as needed  - Discuss catheterization for long term situations as appropriate  Outcome: Progressing

## 2024-02-01 NOTE — ASSESSMENT & PLAN NOTE
Is willing to stop smoking now that she has been here for 2 weeks without cigarettes  Tobacco cessation counseling provided for > 3 min  NRT ordered with patch and PRN nicorette gum

## 2024-02-01 NOTE — ASSESSMENT & PLAN NOTE
Complains of bilateral leg swelling, which is non-pitting at this time.  No history of CHF, last known EF from Echo in January '23 shows an EF of 65% with normal diastolic function.  Echo (1/25/24): The left ventricular ejection fraction is 65%. Systolic function is normal. Wall motion is normal. Diastolic function is mildly abnormal, consistent with grade I (abnormal) relaxation.   Continue 40 mg PO Lasix daily.  Ace wraps for compression to BLE  Chest x-ray with atelectasis, trace pleural effusions

## 2024-02-01 NOTE — CASE MANAGEMENT
Case Management Progress Note    Patient name Nanci Navarro  Location /-01 MRN [id number]  : 1965 Date 2024       LOS (days): 13  Geometric Mean LOS (GMLOS) (days):   Days to GMLOS:        OBJECTIVE:        Current admission status: Inpatient  Preferred Pharmacy:   [facility] [place] - [address]  [address]  [facility] [address]  Phone: [phone] Fax: [phone]    [id number]  [place] - [address].  [address] [address]  Phone: [phone] Fax: [phone]    [facility] [address] [facility] [address],  [address],  Ground Floor [facility] [address]  Phone: [phone] Fax: [phone]    [id number]  [place] - [address]  [address]  [facility] [address]  Phone: [phone] Fax: [phone]    [facility] [place]  [place] - [address]  [address]  [place] [address]  Phone: [phone] Fax: [phone]    Primary Care Provider: Renan Montero DO    Primary Insurance: DOROTHY ANN PENDING  Secondary Insurance:     PROGRESS NOTE:    CM attempted to call patient's  at [phone] and left a detailed message requesting a call back.

## 2024-02-02 LAB
ANION GAP SERPL CALCULATED.3IONS-SCNC: 6 MMOL/L
BUN SERPL-MCNC: 13 MG/DL (ref 5–25)
CALCIUM SERPL-MCNC: 8.7 MG/DL (ref 8.4–10.2)
CHLORIDE SERPL-SCNC: 104 MMOL/L (ref 96–108)
CO2 SERPL-SCNC: 28 MMOL/L (ref 21–32)
CREAT SERPL-MCNC: 0.87 MG/DL (ref 0.6–1.3)
ERYTHROCYTE [DISTWIDTH] IN BLOOD BY AUTOMATED COUNT: 14.5 % (ref 11.6–15.1)
GFR SERPL CREATININE-BSD FRML MDRD: 73 ML/MIN/1.73SQ M
GLUCOSE SERPL-MCNC: 103 MG/DL (ref 65–140)
GLUCOSE SERPL-MCNC: 110 MG/DL (ref 65–140)
GLUCOSE SERPL-MCNC: 132 MG/DL (ref 65–140)
GLUCOSE SERPL-MCNC: 166 MG/DL (ref 65–140)
GLUCOSE SERPL-MCNC: 171 MG/DL (ref 65–140)
HCT VFR BLD AUTO: 26.9 % (ref 34.8–46.1)
HGB BLD-MCNC: 8.8 G/DL (ref 11.5–15.4)
MAGNESIUM SERPL-MCNC: 2.1 MG/DL (ref 1.9–2.7)
MCH RBC QN AUTO: 30.1 PG (ref 26.8–34.3)
MCHC RBC AUTO-ENTMCNC: 32.7 G/DL (ref 31.4–37.4)
MCV RBC AUTO: 92 FL (ref 82–98)
PLATELET # BLD AUTO: 349 THOUSANDS/UL (ref 149–390)
PMV BLD AUTO: 8.8 FL (ref 8.9–12.7)
POTASSIUM SERPL-SCNC: 3.7 MMOL/L (ref 3.5–5.3)
RBC # BLD AUTO: 2.92 MILLION/UL (ref 3.81–5.12)
SODIUM SERPL-SCNC: 138 MMOL/L (ref 135–147)
TSH SERPL DL<=0.05 MIU/L-ACNC: 4.38 UIU/ML (ref 0.45–4.5)
WBC # BLD AUTO: 6.89 THOUSAND/UL (ref 4.31–10.16)

## 2024-02-02 PROCEDURE — 99222 1ST HOSP IP/OBS MODERATE 55: CPT | Performed by: INTERNAL MEDICINE

## 2024-02-02 PROCEDURE — 85027 COMPLETE CBC AUTOMATED: CPT | Performed by: NURSE PRACTITIONER

## 2024-02-02 PROCEDURE — 82948 REAGENT STRIP/BLOOD GLUCOSE: CPT

## 2024-02-02 PROCEDURE — 80048 BASIC METABOLIC PNL TOTAL CA: CPT | Performed by: NURSE PRACTITIONER

## 2024-02-02 PROCEDURE — 83735 ASSAY OF MAGNESIUM: CPT | Performed by: NURSE PRACTITIONER

## 2024-02-02 PROCEDURE — 99232 SBSQ HOSP IP/OBS MODERATE 35: CPT | Performed by: NURSE PRACTITIONER

## 2024-02-02 PROCEDURE — 84443 ASSAY THYROID STIM HORMONE: CPT | Performed by: PHYSICIAN ASSISTANT

## 2024-02-02 RX ORDER — POLYETHYLENE GLYCOL 3350 17 G/17G
17 POWDER, FOR SOLUTION ORAL
Status: DISCONTINUED | OUTPATIENT
Start: 2024-02-02 | End: 2024-02-07 | Stop reason: HOSPADM

## 2024-02-02 RX ADMIN — HYDROMORPHONE HYDROCHLORIDE 0.2 MG: 0.2 INJECTION, SOLUTION INTRAMUSCULAR; INTRAVENOUS; SUBCUTANEOUS at 21:18

## 2024-02-02 RX ADMIN — ALBUTEROL SULFATE 2 PUFF: 90 AEROSOL, METERED RESPIRATORY (INHALATION) at 09:28

## 2024-02-02 RX ADMIN — DOCUSATE SODIUM 100 MG: 100 CAPSULE, LIQUID FILLED ORAL at 17:45

## 2024-02-02 RX ADMIN — FOLIC ACID TAB 400 MCG 400 MCG: 400 TAB at 09:32

## 2024-02-02 RX ADMIN — OXYCODONE HYDROCHLORIDE 5 MG: 5 TABLET ORAL at 04:01

## 2024-02-02 RX ADMIN — METHOCARBAMOL TABLETS 500 MG: 500 TABLET, COATED ORAL at 17:45

## 2024-02-02 RX ADMIN — POLYETHYLENE GLYCOL 3350 17 G: 17 POWDER, FOR SOLUTION ORAL at 17:43

## 2024-02-02 RX ADMIN — FAMOTIDINE 20 MG: 20 TABLET, FILM COATED ORAL at 21:18

## 2024-02-02 RX ADMIN — CYANOCOBALAMIN TAB 500 MCG 500 MCG: 500 TAB at 09:32

## 2024-02-02 RX ADMIN — INSULIN LISPRO 2 UNITS: 100 INJECTION, SOLUTION INTRAVENOUS; SUBCUTANEOUS at 12:44

## 2024-02-02 RX ADMIN — FERROUS SULFATE TAB 325 MG (65 MG ELEMENTAL FE) 325 MG: 325 (65 FE) TAB at 08:00

## 2024-02-02 RX ADMIN — OXYCODONE HYDROCHLORIDE 5 MG: 5 TABLET ORAL at 23:57

## 2024-02-02 RX ADMIN — HYDROMORPHONE HYDROCHLORIDE 0.2 MG: 0.2 INJECTION, SOLUTION INTRAMUSCULAR; INTRAVENOUS; SUBCUTANEOUS at 06:24

## 2024-02-02 RX ADMIN — ALBUTEROL SULFATE 2 PUFF: 90 AEROSOL, METERED RESPIRATORY (INHALATION) at 17:44

## 2024-02-02 RX ADMIN — OXYCODONE HYDROCHLORIDE 5 MG: 5 TABLET ORAL at 15:25

## 2024-02-02 RX ADMIN — FLUTICASONE FUROATE AND VILANTEROL TRIFENATATE 1 PUFF: 100; 25 POWDER RESPIRATORY (INHALATION) at 09:28

## 2024-02-02 RX ADMIN — GUAIFENESIN 600 MG: 600 TABLET ORAL at 09:31

## 2024-02-02 RX ADMIN — GUAIFENESIN 600 MG: 600 TABLET ORAL at 21:18

## 2024-02-02 RX ADMIN — METHOCARBAMOL TABLETS 500 MG: 500 TABLET, COATED ORAL at 23:57

## 2024-02-02 RX ADMIN — SUCRALFATE 1 G: 1 TABLET ORAL at 06:23

## 2024-02-02 RX ADMIN — METHOCARBAMOL TABLETS 500 MG: 500 TABLET, COATED ORAL at 00:12

## 2024-02-02 RX ADMIN — INSULIN LISPRO 1 UNITS: 100 INJECTION, SOLUTION INTRAVENOUS; SUBCUTANEOUS at 21:19

## 2024-02-02 RX ADMIN — MENTHOL, UNSPECIFIED FORM AND METHYL SALICYLATE: 10; 150 CREAM TOPICAL at 09:35

## 2024-02-02 RX ADMIN — OXYCODONE HYDROCHLORIDE 5 MG: 5 TABLET ORAL at 10:03

## 2024-02-02 RX ADMIN — SUCRALFATE 1 G: 1 TABLET ORAL at 15:23

## 2024-02-02 RX ADMIN — METHOCARBAMOL TABLETS 500 MG: 500 TABLET, COATED ORAL at 06:23

## 2024-02-02 RX ADMIN — PANTOPRAZOLE SODIUM 40 MG: 40 TABLET, DELAYED RELEASE ORAL at 06:23

## 2024-02-02 RX ADMIN — ALBUTEROL SULFATE 2 PUFF: 90 AEROSOL, METERED RESPIRATORY (INHALATION) at 21:19

## 2024-02-02 RX ADMIN — METHOCARBAMOL TABLETS 500 MG: 500 TABLET, COATED ORAL at 12:47

## 2024-02-02 RX ADMIN — PANTOPRAZOLE SODIUM 40 MG: 40 TABLET, DELAYED RELEASE ORAL at 15:23

## 2024-02-02 RX ADMIN — OXYCODONE HYDROCHLORIDE 5 MG: 5 TABLET ORAL at 19:29

## 2024-02-02 RX ADMIN — POLYETHYLENE GLYCOL 3350 17 G: 17 POWDER, FOR SOLUTION ORAL at 09:30

## 2024-02-02 RX ADMIN — ALBUTEROL SULFATE 2 PUFF: 90 AEROSOL, METERED RESPIRATORY (INHALATION) at 12:45

## 2024-02-02 RX ADMIN — DOCUSATE SODIUM 100 MG: 100 CAPSULE, LIQUID FILLED ORAL at 09:32
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
## 2024-02-02 NOTE — ASSESSMENT & PLAN NOTE
Non-compliant with chronic oxygen requirements  Home oxygen evaluation completed on 1/22, repeated on 1/31 - still requiring 2 L of supplemental oxygen at discharge  See full plan as noted above
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
## 2024-02-02 NOTE — PLAN OF CARE
Problem: Potential for Falls  Goal: Patient will remain free of falls  Description: INTERVENTIONS:  - Educate patient/family on patient safety including physical limitations  - Instruct patient to call for assistance with activity   - Consult OT/PT to assist with strengthening/mobility   - Keep Call bell within reach  - Keep bed low and locked with side rails adjusted as appropriate  - Keep care items and personal belongings within reach  - Initiate and maintain comfort rounds  - Make Fall Risk Sign visible to staff  - Offer Toileting every 2 Hours, in advance of need  - Initiate/Maintain BED alarm  - Obtain necessary fall risk management equipment  - Apply yellow socks and bracelet for high fall risk patients  - Consider moving patient to room near nurses station  Outcome: Progressing     Problem: PAIN - ADULT  Goal: Verbalizes/displays adequate comfort level or baseline comfort level  Description: Interventions:  - Encourage patient to monitor pain and request assistance  - Assess pain using appropriate pain scale  - Administer analgesics based on type and severity of pain and evaluate response  - Implement non-pharmacological measures as appropriate and evaluate response  - Consider cultural and social influences on pain and pain management  - Notify physician/advanced practitioner if interventions unsuccessful or patient reports new pain  Outcome: Progressing     Problem: INFECTION - ADULT  Goal: Absence or prevention of progression during hospitalization  Description: INTERVENTIONS:  - Assess and monitor for signs and symptoms of infection  - Monitor lab/diagnostic results  - Monitor all insertion sites, i.e. indwelling lines, tubes, and drains  - Monitor endotracheal if appropriate and nasal secretions for changes in amount and color  - [place] appropriate cooling/warming therapies per order  - Administer medications as ordered  - Instruct and encourage patient and family to use good hand hygiene  technique  - Identify and instruct in appropriate isolation precautions for identified infection/condition  Outcome: Progressing  Goal: Absence of fever/infection during neutropenic period  Description: INTERVENTIONS:  - Monitor WBC    Outcome: Progressing     Problem: SAFETY ADULT  Goal: Patient will remain free of falls  Description: INTERVENTIONS:  - Educate patient/family on patient safety including physical limitations  - Instruct patient to call for assistance with activity   - Consult OT/PT to assist with strengthening/mobility   - Keep Call bell within reach  - Keep bed low and locked with side rails adjusted as appropriate  - Keep care items and personal belongings within reach  - Initiate and maintain comfort rounds  - Make Fall Risk Sign visible to staff  - Offer Toileting every 2 Hours, in advance of need  - Initiate/Maintain BED alarm  - Obtain necessary fall risk management equipment  - Apply yellow socks and bracelet for high fall risk patients  - Consider moving patient to room near nurses station  Outcome: Progressing  Goal: Maintain or return to baseline ADL function  Description: INTERVENTIONS:  -  Assess patient's ability to carry out ADLs; assess patient's baseline for ADL function and identify physical deficits which impact ability to perform ADLs (bathing, care of mouth/teeth, toileting, grooming, dressing, etc.)  - Assess/evaluate cause of self-care deficits   - Assess range of motion  - Assess patient's mobility; develop plan if impaired  - Assess patient's need for assistive devices and provide as appropriate  - Encourage maximum independence but intervene and supervise when necessary  - Involve family in performance of ADLs  - Assess for home care needs following discharge   - Consider OT consult to assist with ADL evaluation and planning for discharge  - Provide patient education as appropriate  Outcome: Progressing  Goal: Maintains/Returns to pre admission functional level  Description:  INTERVENTIONS:  - Perform AM-PAC 6 Click Basic Mobility/ Daily Activity assessment daily.  - Set and communicate daily mobility goal to care team and patient/family/caregiver.   - Collaborate with rehabilitation services on mobility goals if consulted  - Perform Range of Motion 3 times a day.  - Reposition patient every 2 hours.  - Dangle patient 3 times a day  - Stand patient 3 times a day  - Ambulate patient 3 times a day  - Out of bed to chair 3 times a day   - Out of bed for meals 3 times a day  - Out of bed for toileting  - Record patient progress and toleration of activity level   Outcome: Progressing     Problem: DISCHARGE PLANNING  Goal: Discharge to home or other facility with appropriate resources  Description: INTERVENTIONS:  - Identify barriers to discharge w/patient and caregiver  - Arrange for needed discharge resources and transportation as appropriate  - Identify discharge learning needs (meds, wound care, etc.)  - Arrange for interpretive services to assist at discharge as needed  - Refer to Case Management Department for coordinating discharge planning if the patient needs post-hospital services based on physician/advanced practitioner order or complex needs related to functional status, cognitive ability, or social support system  Outcome: Progressing     Problem: Knowledge Deficit  Goal: Patient/family/caregiver demonstrates understanding of disease process, treatment plan, medications, and discharge instructions  Description: Complete learning assessment and assess knowledge base.  Interventions:  - Provide teaching at level of understanding  - Provide teaching via preferred learning methods  Outcome: Progressing     Problem: RESPIRATORY - ADULT  Goal: Achieves optimal ventilation and oxygenation  Description: INTERVENTIONS:  - Assess for changes in respiratory status  - Assess for changes in mentation and behavior  - Position to facilitate oxygenation and minimize respiratory effort  - Oxygen  administered by appropriate delivery if ordered  - Initiate smoking cessation education as indicated  - Encourage broncho-pulmonary hygiene including cough, deep breathe, Incentive Spirometry  - Assess the need for suctioning and aspirate as needed  - Assess and instruct to report SOB or any respiratory difficulty  - Respiratory Therapy support as indicated  Outcome: Progressing     Problem: GASTROINTESTINAL - ADULT  Goal: Maintains or returns to baseline bowel function  Description: INTERVENTIONS:  - Assess bowel function  - Encourage oral fluids to ensure adequate hydration  - Administer IV fluids if ordered to ensure adequate hydration  - Administer ordered medications as needed  - Encourage mobilization and activity  - Consider nutritional services referral to assist patient with adequate nutrition and appropriate food choices  Outcome: Progressing     Problem: GENITOURINARY - ADULT  Goal: Absence of urinary retention  Description: INTERVENTIONS:  - Assess patient’s ability to void and empty bladder  - Monitor I/O  - Bladder scan as needed  - Discuss with physician/AP medications to alleviate retention as needed  - Discuss catheterization for long term situations as appropriate  Outcome: Progressing

## 2024-02-02 NOTE — ASSESSMENT & PLAN NOTE
Present on admission with a hemoglobin of 11.3  Baseline appears to be 11-13  Iron panel collected  Iron 28, Iron Sat 10, Ferritin 65, TIBC 268  Folate 7, Vitamin B12 171  No signs of active bleeding at this time.  Continue with Iron supplementation, Folic Acid and B12.  Hemoglobin noted to be 8.8 today.

## 2024-02-03 ENCOUNTER — ANESTHESIA (INPATIENT)
Dept: GASTROENTEROLOGY | Facility: HOSPITAL | Age: 59
DRG: 140 | End: 2024-02-03
Payer: COMMERCIAL

## 2024-02-03 ENCOUNTER — APPOINTMENT (INPATIENT)
Dept: GASTROENTEROLOGY | Facility: HOSPITAL | Age: 59
DRG: 140 | End: 2024-02-03
Payer: COMMERCIAL

## 2024-02-03 ENCOUNTER — ANESTHESIA EVENT (INPATIENT)
Dept: GASTROENTEROLOGY | Facility: HOSPITAL | Age: 59
DRG: 140 | End: 2024-02-03
Payer: COMMERCIAL

## 2024-02-03 PROBLEM — J96.11 CHRONIC RESPIRATORY FAILURE WITH HYPOXIA (HCC): Status: ACTIVE | Noted: 2024-01-14

## 2024-02-03 LAB
GLUCOSE SERPL-MCNC: 113 MG/DL (ref 65–140)
GLUCOSE SERPL-MCNC: 124 MG/DL (ref 65–140)
GLUCOSE SERPL-MCNC: 133 MG/DL (ref 65–140)
GLUCOSE SERPL-MCNC: 134 MG/DL (ref 65–140)
GLUCOSE SERPL-MCNC: 87 MG/DL (ref 65–140)
HCT VFR BLD AUTO: 28.8 % (ref 34.8–46.1)
HGB BLD-MCNC: 9.2 G/DL (ref 11.5–15.4)

## 2024-02-03 PROCEDURE — 82948 REAGENT STRIP/BLOOD GLUCOSE: CPT

## 2024-02-03 PROCEDURE — 0DB68ZX EXCISION OF STOMACH, VIA NATURAL OR ARTIFICIAL OPENING ENDOSCOPIC, DIAGNOSTIC: ICD-10-PCS | Performed by: INTERNAL MEDICINE

## 2024-02-03 PROCEDURE — 85014 HEMATOCRIT: CPT | Performed by: NURSE PRACTITIONER

## 2024-02-03 PROCEDURE — 88305 TISSUE EXAM BY PATHOLOGIST: CPT | Performed by: PATHOLOGY

## 2024-02-03 PROCEDURE — 85018 HEMOGLOBIN: CPT | Performed by: NURSE PRACTITIONER

## 2024-02-03 PROCEDURE — 43239 EGD BIOPSY SINGLE/MULTIPLE: CPT | Performed by: INTERNAL MEDICINE

## 2024-02-03 PROCEDURE — 0DB98ZX EXCISION OF DUODENUM, VIA NATURAL OR ARTIFICIAL OPENING ENDOSCOPIC, DIAGNOSTIC: ICD-10-PCS | Performed by: INTERNAL MEDICINE

## 2024-02-03 PROCEDURE — 99233 SBSQ HOSP IP/OBS HIGH 50: CPT | Performed by: INTERNAL MEDICINE

## 2024-02-03 RX ORDER — METOPROLOL SUCCINATE 25 MG/1
25 TABLET, EXTENDED RELEASE ORAL DAILY
COMMUNITY
Start: 2023-12-20

## 2024-02-03 RX ORDER — LIDOCAINE HYDROCHLORIDE 20 MG/ML
INJECTION, SOLUTION EPIDURAL; INFILTRATION; INTRACAUDAL; PERINEURAL AS NEEDED
Status: DISCONTINUED | OUTPATIENT
Start: 2024-02-03 | End: 2024-02-03

## 2024-02-03 RX ORDER — OXYCODONE HYDROCHLORIDE 5 MG/1
5 TABLET ORAL ONCE
Status: COMPLETED | OUTPATIENT
Start: 2024-02-03 | End: 2024-02-03

## 2024-02-03 RX ORDER — SODIUM CHLORIDE, SODIUM LACTATE, POTASSIUM CHLORIDE, CALCIUM CHLORIDE 600; 310; 30; 20 MG/100ML; MG/100ML; MG/100ML; MG/100ML
INJECTION, SOLUTION INTRAVENOUS CONTINUOUS PRN
Status: DISCONTINUED | OUTPATIENT
Start: 2024-02-03 | End: 2024-02-03

## 2024-02-03 RX ORDER — PROPOFOL 10 MG/ML
INJECTION, EMULSION INTRAVENOUS AS NEEDED
Status: DISCONTINUED | OUTPATIENT
Start: 2024-02-03 | End: 2024-02-03

## 2024-02-03 RX ADMIN — SUCRALFATE 1 G: 1 TABLET ORAL at 17:21

## 2024-02-03 RX ADMIN — ALBUTEROL SULFATE 2 PUFF: 90 AEROSOL, METERED RESPIRATORY (INHALATION) at 17:23

## 2024-02-03 RX ADMIN — FERROUS SULFATE TAB 325 MG (65 MG ELEMENTAL FE) 325 MG: 325 (65 FE) TAB at 13:24

## 2024-02-03 RX ADMIN — LIDOCAINE HYDROCHLORIDE 80 MG: 20 INJECTION, SOLUTION EPIDURAL; INFILTRATION; INTRACAUDAL; PERINEURAL at 11:51

## 2024-02-03 RX ADMIN — LIDOCAINE 5% 1 PATCH: 700 PATCH TOPICAL at 13:39

## 2024-02-03 RX ADMIN — HYDROMORPHONE HYDROCHLORIDE 0.2 MG: 0.2 INJECTION, SOLUTION INTRAMUSCULAR; INTRAVENOUS; SUBCUTANEOUS at 13:30

## 2024-02-03 RX ADMIN — PANTOPRAZOLE SODIUM 40 MG: 40 TABLET, DELAYED RELEASE ORAL at 17:21

## 2024-02-03 RX ADMIN — HYDROMORPHONE HYDROCHLORIDE 0.2 MG: 0.2 INJECTION, SOLUTION INTRAMUSCULAR; INTRAVENOUS; SUBCUTANEOUS at 22:15

## 2024-02-03 RX ADMIN — POLYETHYLENE GLYCOL 3350 17 G: 17 POWDER, FOR SOLUTION ORAL at 13:39

## 2024-02-03 RX ADMIN — FOLIC ACID TAB 400 MCG 400 MCG: 400 TAB at 13:24

## 2024-02-03 RX ADMIN — OXYCODONE HYDROCHLORIDE 5 MG: 5 TABLET ORAL at 17:21

## 2024-02-03 RX ADMIN — SODIUM CHLORIDE, SODIUM LACTATE, POTASSIUM CHLORIDE, AND CALCIUM CHLORIDE: .6; .31; .03; .02 INJECTION, SOLUTION INTRAVENOUS at 10:30

## 2024-02-03 RX ADMIN — Medication 2 G: at 13:40

## 2024-02-03 RX ADMIN — METHOCARBAMOL TABLETS 500 MG: 500 TABLET, COATED ORAL at 17:21

## 2024-02-03 RX ADMIN — OXYCODONE HYDROCHLORIDE 5 MG: 5 TABLET ORAL at 12:35

## 2024-02-03 RX ADMIN — FLUTICASONE FUROATE AND VILANTEROL TRIFENATATE 1 PUFF: 100; 25 POWDER RESPIRATORY (INHALATION) at 14:57

## 2024-02-03 RX ADMIN — FAMOTIDINE 20 MG: 20 TABLET, FILM COATED ORAL at 21:04

## 2024-02-03 RX ADMIN — METHOCARBAMOL TABLETS 500 MG: 500 TABLET, COATED ORAL at 05:54

## 2024-02-03 RX ADMIN — METHOCARBAMOL TABLETS 500 MG: 500 TABLET, COATED ORAL at 13:24

## 2024-02-03 RX ADMIN — ALBUTEROL SULFATE 2 PUFF: 90 AEROSOL, METERED RESPIRATORY (INHALATION) at 21:08

## 2024-02-03 RX ADMIN — DOCUSATE SODIUM 100 MG: 100 CAPSULE, LIQUID FILLED ORAL at 13:24

## 2024-02-03 RX ADMIN — PANTOPRAZOLE SODIUM 40 MG: 40 TABLET, DELAYED RELEASE ORAL at 06:34

## 2024-02-03 RX ADMIN — GUAIFENESIN 600 MG: 600 TABLET ORAL at 21:04

## 2024-02-03 RX ADMIN — OXYCODONE HYDROCHLORIDE 5 MG: 5 TABLET ORAL at 04:28

## 2024-02-03 RX ADMIN — PROPOFOL 20 MG: 10 INJECTION, EMULSION INTRAVENOUS at 11:56

## 2024-02-03 RX ADMIN — HYDROMORPHONE HYDROCHLORIDE 0.2 MG: 0.2 INJECTION, SOLUTION INTRAMUSCULAR; INTRAVENOUS; SUBCUTANEOUS at 05:51

## 2024-02-03 RX ADMIN — PROPOFOL 40 MG: 10 INJECTION, EMULSION INTRAVENOUS at 11:54

## 2024-02-03 RX ADMIN — OXYCODONE HYDROCHLORIDE 5 MG: 5 TABLET ORAL at 21:04

## 2024-02-03 RX ADMIN — FUROSEMIDE 40 MG: 40 TABLET ORAL at 08:15

## 2024-02-03 RX ADMIN — PROPOFOL 100 MG: 10 INJECTION, EMULSION INTRAVENOUS at 11:52

## 2024-02-03 RX ADMIN — GUAIFENESIN 600 MG: 600 TABLET ORAL at 13:24

## 2024-02-03 RX ADMIN — CYANOCOBALAMIN TAB 500 MCG 500 MCG: 500 TAB at 13:23

## 2024-02-03 RX ADMIN — ALBUTEROL SULFATE 2 PUFF: 90 AEROSOL, METERED RESPIRATORY (INHALATION) at 13:38

## 2024-02-03 NOTE — ASSESSMENT & PLAN NOTE
Recent Labs     02/02/24  0531 02/03/24  0447 02/04/24  0442   HGB 8.8* 9.2* 9.8*      Present on admission with a hemoglobin of 11.3  Baseline appears to be 11-13  Iron panel collected  Iron 28, Iron Sat 10, Ferritin 65, TIBC 268  Folate 7, Vitamin B12 171  Continue with Iron supplementation, Folic Acid and B12.  Underwent EGD 2/3 to rule out H. Pylori and celiac; follow up results  Improving   [FreeTextEntry1] : Mrs. Jimenes is an 84-year-old mild cognitive impairment, amnestic type and ataxia.  Her condition has certainly improved since last seen.  The reason is uncertain but Elizabeth, her daughter believes that it is due to the beneficial effects of donepezil.\par \par I suggested that they speak to Dr. Reji Timmons about possibly increasing the donepezil dose to 10 mg daily.  I suggested that she take all precautions to avoid falling.  I will also offered her enrollment in the IDEAS 2 study which would allow performance of an amyloid PET scan.  She will be reevaluated in 4 months.  Telephone contact will be maintained in the meantime. Patient

## 2024-02-03 NOTE — ASSESSMENT & PLAN NOTE
Is willing to stop smoking now she is required a lengthy hospital stay   Tobacco Use: High Risk (1/18/2024)    Patient History     Smoking Tobacco Use: Every Day     Smokeless Tobacco Use: Never     Passive Exposure: Not on file     Tobacco cessation counseling provided for > 3 min  NRT ordered with patch and PRN nicorette gum
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
## 2024-02-03 NOTE — PLAN OF CARE
Problem: PAIN - ADULT  Goal: Verbalizes/displays adequate comfort level or baseline comfort level  Description: Interventions:  - Encourage patient to monitor pain and request assistance  - Assess pain using appropriate pain scale  - Administer analgesics based on type and severity of pain and evaluate response  - Implement non-pharmacological measures as appropriate and evaluate response  - Consider cultural and social influences on pain and pain management  - Notify physician/advanced practitioner if interventions unsuccessful or patient reports new pain  Outcome: Progressing     Problem: INFECTION - ADULT  Goal: Absence or prevention of progression during hospitalization  Description: INTERVENTIONS:  - Assess and monitor for signs and symptoms of infection  - Monitor lab/diagnostic results  - Monitor all insertion sites, i.e. indwelling lines, tubes, and drains  - Monitor endotracheal if appropriate and nasal secretions for changes in amount and color  - Joes appropriate cooling/warming therapies per order  - Administer medications as ordered  - Instruct and encourage patient and family to use good hand hygiene technique  - Identify and instruct in appropriate isolation precautions for identified infection/condition  Outcome: Progressing  Goal: Absence of fever/infection during neutropenic period  Description: INTERVENTIONS:  - Monitor WBC    Outcome: Progressing     Problem: Knowledge Deficit  Goal: Patient/family/caregiver demonstrates understanding of disease process, treatment plan, medications, and discharge instructions  Description: Complete learning assessment and assess knowledge base.  Interventions:  - Provide teaching at level of understanding  - Provide teaching via preferred learning methods  Outcome: Progressing     Problem: RESPIRATORY - ADULT  Goal: Achieves optimal ventilation and oxygenation  Description: INTERVENTIONS:  - Assess for changes in respiratory status  - Assess for changes in  mentation and behavior  - Position to facilitate oxygenation and minimize respiratory effort  - Oxygen administered by appropriate delivery if ordered  - Initiate smoking cessation education as indicated  - Encourage broncho-pulmonary hygiene including cough, deep breathe, Incentive Spirometry  - Assess the need for suctioning and aspirate as needed  - Assess and instruct to report SOB or any respiratory difficulty  - Respiratory Therapy support as indicated  Outcome: Progressing     Problem: GASTROINTESTINAL - ADULT  Goal: Maintains or returns to baseline bowel function  Description: INTERVENTIONS:  - Assess bowel function  - Encourage oral fluids to ensure adequate hydration  - Administer IV fluids if ordered to ensure adequate hydration  - Administer ordered medications as needed  - Encourage mobilization and activity  - Consider nutritional services referral to assist patient with adequate nutrition and appropriate food choices  Outcome: Progressing     Problem: GENITOURINARY - ADULT  Goal: Absence of urinary retention  Description: INTERVENTIONS:  - Assess patient’s ability to void and empty bladder  - Monitor I/O  - Bladder scan as needed  - Discuss with physician/AP medications to alleviate retention as needed  - Discuss catheterization for long term situations as appropriate  Outcome: Progressing

## 2024-02-03 NOTE — PROGRESS NOTES
[facility]   Progress Note  Name: Nanci Navarro I  MRN: [id number]  Unit/Bed#: [facility]-01 I Date of Admission: 1/18/2024   Date of Service: 2/3/2024 I Hospital Day: 15    Chronic respiratory failure with hypoxia (HCC)  Assessment & Plan  Non-compliant with chronic oxygen requirements  Home oxygen evaluation completed on 1/22, repeated on 1/31 - still requiring 2 L of supplemental oxygen at discharge    Currently SpO2: 95 % on Nasal Cannula O2 Flow Rate (L/min): 2 L/min    See full plan as noted above    * Discharge planning issues  Assessment & Plan  Patient needs home oxygen on discharge; she has had it in the past, but her concentrator has been misplaced during a move.  Case management is working on safe discharge as she has open flame for heat in her home without electricity   Remains in the hospital until safe discharge plan in place.  Repeat ambulatory pulse ox completed on 1/31, requiring 2 L of oxygen with exertion.  Care team meeting completed on 2/1, plan in place to attempt to hook up electricity at the property for the [facility].    Anemia  Assessment & Plan  Recent Labs     02/02/24  0531 02/03/24  0447   HGB 8.8* 9.2*      Present on admission with a hemoglobin of 11.3  Baseline appears to be 11-13  Iron panel collected  Iron 28, Iron Sat 10, Ferritin 65, TIBC 268  Folate 7, Vitamin B12 171  Continue with Iron supplementation, Folic Acid and B12.  Underwent EGD 2/3 to rule out H. Pylori and celiac; follow up results    Volume overload  Assessment & Plan  Complains of bilateral leg swelling, which is non-pitting at this time.  No history of CHF, last known EF from Echo in January '23 shows an EF of 65% with normal diastolic function.  Echo (1/25/24): The left ventricular ejection fraction is 65%. Systolic function is normal. Wall motion is normal. Diastolic function is mildly abnormal, consistent with grade I (abnormal) relaxation.   Continue 40 mg PO Lasix daily  Ace wraps for  compression to BLE  Chest x-ray with atelectasis, trace pleural effusions    Compression fracture of L1 vertebra (HCC)  Assessment & Plan  Reports following with a pain specialist as an outpatient  Continue with Tylenol, Robaxin, Lidocaine patch, Toradol  Low dose Oxycodone for moderate/severe pain.    Smoker  Assessment & Plan  Is willing to stop smoking now that she has been here for 2 weeks without cigarettes  Tobacco Use: High Risk (1/18/2024)    Patient History     Smoking Tobacco Use: Every Day     Smokeless Tobacco Use: Never     Passive Exposure: Not on file     Tobacco cessation counseling provided for > 3 min  NRT ordered with patch and PRN nicorette gum    Chronic obstructive pulmonary disease with acute exacerbation (HCC)  Assessment & Plan  Patient presented to the ED with complaints of chest & flank pain with shortness of breath.  Recently hospitalized and treated for a UTI with IV Ceftriaxone.  Chest x-ray (1/18/24): Bibasilar atelectasis with further imaging remaining consistent with atelectasis on 1/24, 1/30  No sign of active exacerbation throughout hospitalization, even on admission.  Had been prescribed 1-2 L of supplemental oxygen as needed, however, has not been using it as there is an open fire for heat in her camper.  Repeat imaging completed on 1/24 with ongoing signs of atelectasis; extensive discussion regarding incentive spirometer use.  Continue mucinex, incentive spirometry, flutter valve, humidification to O2   Formal consult from pulmonology completed on 2/1  Unknown severity of COPD  Recommending OP follow-up for pulmonary function tests [none to review in records]  Continue with Nebulizer treatments ATC, Long acting inhaler [Breo]  Recommending to discharge home with Albuterol nebulizers 4x/daily, Breo and Home Oxygen.    Hypokalemia-resolved as of 1/25/2024  Assessment & Plan  Now resolved        VTE Pharmacologic Prophylaxis: VTE Score: 3 Moderate Risk (Score 3-4) -  Pharmacological DVT Prophylaxis Ordered: heparin.    Mobility:   Basic Mobility Inpatient Raw Score: 16  JH-HLM Goal: 5: Stand one or more mins  JH-HLM Achieved: 7: Walk 25 feet or more  HLM Goal achieved. Continue to encourage appropriate mobility.    Patient Centered Rounds: I performed bedside rounds with nursing staff today.  Discussions with Specialists or Other Care Team Provider:  IP CONSULT TO PULMONOLOGY  IP CONSULT TO GASTROENTEROLOGY      Education and Discussions with Family / Patient: patient     Total Time Spent on Date of Encounter in care of patient: 30+ mins. This time was spent on one or more of the following: performing physical exam; counseling and coordination of care; obtaining or reviewing history; documenting in the medical record; reviewing/ordering tests, medications or procedures; communicating with other healthcare professionals and discussing with patient's family/caregivers.    Current Length of Stay: 15 day(s)  Current Patient Status: Inpatient   Certification Statement: The patient will continue to require additional inpatient hospital stay due to plan as noted above  Discharge Plan: Anticipate discharge in 24-48 hrs to home with home services.    Code Status: Level 1 - Full Code    Subjective:   Offers no new complaints at this time. No acute events reported overnight. Understanding of plan.  All questions answered.    Objective:     Vitals:   Temp (24hrs), Av.2 °F (36.8 °C), Min:97.4 °F (36.3 °C), Max:98.9 °F (37.2 °C)    Temp:  [97.4 °F (36.3 °C)-98.9 °F (37.2 °C)] 98.2 °F (36.8 °C)  HR:  [59-78] 62  Resp:  [16-20] 20  BP: (103-126)/(48-74) 107/49  SpO2:  [95 %-100 %] 95 %  Body mass index is 46.28 kg/m².     Input and Output Summary (last 24 hours):     Intake/Output Summary (Last 24 hours) at 2/3/2024 1501  Last data filed at 2/3/2024 1156  Gross per 24 hour   Intake 300 ml   Output 0 ml   Net 300 ml       Physical Exam:   Physical Exam  Vitals and nursing note reviewed.    Constitutional:       General: She is not in acute distress.     Appearance: She is well-developed. She is obese. She is ill-appearing.   Cardiovascular:      Rate and Rhythm: Normal rate.      Pulses: Normal pulses.   Pulmonary:      Effort: Pulmonary effort is normal.   Abdominal:      General: Bowel sounds are normal. There is no distension.      Palpations: Abdomen is soft.      Tenderness: There is no abdominal tenderness.   Musculoskeletal:         General: No swelling. Normal range of motion.      Right lower leg: Edema (non pitting) present.      Left lower leg: Edema (non-pitting) present.   Skin:     General: Skin is warm and dry.   Neurological:      Mental Status: She is alert and oriented to person, place, and time.   Psychiatric:         Mood and Affect: Mood normal.          Additional Data:     Labs:  Results from last 7 days   Lab Units 02/03/24  0447 02/02/24  0531   WBC Thousand/uL  --  6.89   HEMOGLOBIN g/dL 9.2* 8.8*   HEMATOCRIT % 28.8* 26.9*   PLATELETS Thousands/uL  --  349     Results from last 7 days   Lab Units 02/02/24  0531   SODIUM mmol/L 138   POTASSIUM mmol/L 3.7   CHLORIDE mmol/L 104   CO2 mmol/L 28   BUN mg/dL 13   CREATININE mg/dL 0.87   ANION GAP mmol/L 6   CALCIUM mg/dL 8.7   GLUCOSE RANDOM mg/dL 110         Results from last 7 days   Lab Units 02/03/24  1332 02/03/24  1022 02/03/24  0710 02/02/24  2044 02/02/24  1620 02/02/24  1103 02/02/24  0647 02/01/24  2026 02/01/24  1650 02/01/24  1100 02/01/24  0803 01/31/24  2048   POC GLUCOSE mg/dl 87 113 134 166* 103 171* 132 116 174* 169* 136 133         Results from last 7 days   Lab Units 01/31/24  1455   PROCALCITONIN ng/ml <0.05       Lines/Drains:  Invasive Devices       Peripheral Intravenous Line  Duration             Peripheral IV 02/03/24 Right;Ventral (anterior) Forearm <1 day                          Imaging: Reviewed radiology reports from this admission including:   XR chest 1 view portable    Result Date:  1/19/2024  Impression: Bibasilar atelectasis. Workstation performed: [id number]     CT chest abdomen pelvis w contrast    Result Date: 1/18/2024  Impression: 1.  Bibasilar atelectasis. 2.  Mild diffuse thickening of the esophagus compatible with esophagitis similar to prior exam. 3.  Diverticulosis without evidence of diverticulitis. Workstation performed: [id number]       No Chest XR results available for this patient.     Results for orders placed during the hospital encounter of 01/14/23    Echo complete w/ contrast if indicated    Interpretation Summary    Left Ventricle: Left ventricular cavity size is normal. Wall thickness is normal. Systolic function is normal (65%). Wall motion is normal. Diastolic function is normal.    Right Ventricle: Right ventricular cavity size is normal. Systolic function is normal.    Mitral Valve: There is trace regurgitation.    Tricuspid Valve: There is trace regurgitation. The right ventricular systolic pressure is normal.      Recent Cultures (last 7 days):         Last 24 Hours Medication List:   Current Facility-Administered Medications   Medication Dose Route Frequency Provider Last Rate    acetaminophen  650 mg Oral Q6H PRN Cara Sweeney, DO      albuterol  2 puff Inhalation Q4H PRN Cara Sweeney, DO      albuterol  2 puff Inhalation 4x Daily Daron NICOLAS MD      bisacodyl  5 mg Oral Daily PRN Cara Sweeney, DO      cyanocobalamin  500 mcg Oral Daily Daron NICOLAS MD      Diclofenac Sodium  2 g Topical 4x Daily Cara Sweeney, DO      docusate sodium  100 mg Oral BID Cara Sweeney, DO      famotidine  20 mg Oral HS NII Vázquez      ferrous sulfate  325 mg Oral Daily With Breakfast NII Vázquez      Fluticasone Furoate-Vilanterol  1 puff Inhalation Daily Cara Sweeney, DO      folic acid  400 mcg Oral Daily Daron NICOLAS MD      furosemide  40 mg Oral Daily Daron NICOLAS MD       guaiFENesin  600 mg Oral Q12H TANVIR NII Sweet      heparin (porcine)  7,500 Units Subcutaneous Q8H [facility] Daniel Chaney MD      HYDROmorphone  0.2 mg Intravenous Q8H PRN NII Vázquez      insulin lispro  1-5 Units Subcutaneous HS Carajuana Sweeney, DO      insulin lispro  2-12 Units Subcutaneous TID AC Cara Sweeney, DO      lidocaine  1 patch Topical Daily Daron NICOLAS MD      menthol-methyl salicylate   Apply externally 4x Daily PRN Eileen Velásquez PA-C      methocarbamol  500 mg Oral Q6H TANVIR NII Vázquez      nicotine  1 patch Transdermal Daily Cara Sweeney, DO      nicotine polacrilex  2 mg Oral Q2H PRN Bob Mclaughlin DO      ondansetron  4 mg Intravenous Q6H PRN Cara Sweeney,       oxyCODONE  2.5 mg Oral Q4H PRN NII Vázquez      Or    oxyCODONE  5 mg Oral Q4H PRN NII Vázquez      pantoprazole  40 mg Oral BID AC NII Vázquez      polyethylene glycol  17 g Oral BID after meals Magalys Cordoba PA-C      sucralfate  1 g Oral BID AC Daniel Chaney MD          Today, Patient Was Seen By: Bob Mclaughlin DO    **Please Note: This note may have been constructed using a voice recognition system.**

## 2024-02-03 NOTE — ANESTHESIA PREPROCEDURE EVALUATION
Procedure:  EGD    57 yo F with PMHx of COPD (on home O2 2L NC PRN), ovarian ca, fibromyalgia, SLE, achalasia and BMI 46.28 admitted with SOB and tx for COPD exacerbation now improved s/p nebulizers, no steroids. Noted to have anemia, heartburn and dysphagia during admission now for EGD.     Denies previous complications from anesthesia, NESS. METS>4 prior to admission, NPO>8h.    Relevant Problems   CARDIO   (+) Chest pain      GI/HEPATIC   (+) Gastroesophageal reflux disease without esophagitis      HEMATOLOGY   (+) Anemia      PULMONARY   (+) Acute respiratory failure (HCC)   (+) COPD exacerbation (HCC)   (+) Chronic obstructive pulmonary disease with acute exacerbation (HCC)   (+) Chronic respiratory failure (HCC)   (+) Respiratory failure with hypoxia (HCC)   (+) Smoker      Lab Results   Component Value Date/Time    WBC 6.89 02/02/2024 05:31 AM    RBC 2.92 (L) 02/02/2024 05:31 AM    HGB 9.2 (L) 02/03/2024 04:47 AM    HCT 28.8 (L) 02/03/2024 04:47 AM     02/02/2024 05:31 AM    BANDSPCT 2 11/30/2023 03:01 PM     Lab Results   Component Value Date/Time    SODIUM 138 02/02/2024 05:31 AM    K 3.7 02/02/2024 05:31 AM     02/02/2024 05:31 AM    CO2 28 02/02/2024 05:31 AM    BUN 13 02/02/2024 05:31 AM    CREATININE 0.87 02/02/2024 05:31 AM    GLUC 110 02/02/2024 05:31 AM     CT chest abdomen pelvis w contrast  Result Date: 1/18/2024  Impression: 1.  Bibasilar atelectasis. 2.  Mild diffuse thickening of the esophagus compatible with esophagitis similar to prior exam. 3.  Diverticulosis without evidence of diverticulitis.     TTE (1/25/24):     Very technically difficult study.    Left Ventricle: Left ventricular cavity size is normal. Wall thickness is normal. The left ventricular ejection fraction is 65%. Systolic function is normal. Wall motion is normal. Diastolic function is mildly abnormal, consistent with grade I (abnormal) relaxation.    No significant valvular disease seen on this technically  difficult study.    No significant change since prior echo 1/15/2023.    Physical Exam    Airway    Mallampati score: III  TM Distance: >3 FB  Neck ROM: full     Dental        Cardiovascular  Rhythm: regular, Rate: normal    Pulmonary   Breath sounds clear to auscultation, No wheezes    Other Findings  post-pubertal.      Anesthesia Plan  ASA Score- 3     Anesthesia Type- IV sedation with anesthesia with ASA Monitors.         Additional Monitors:     Airway Plan:            Plan Factors-Exercise tolerance (METS): >4 METS.    Chart reviewed. EKG reviewed. Imaging results reviewed. Existing labs reviewed. Patient summary reviewed.    Patient is not a current smoker (Quit a few weeks ago).      Obstructive sleep apnea risk education given perioperatively.        Induction- intravenous.    Postoperative Plan-     Informed Consent- Anesthetic plan and risks discussed with patient.  I personally reviewed this patient with the CRNA. Discussed and agreed on the Anesthesia Plan with the CRNA..

## 2024-02-03 NOTE — PLAN OF CARE
Problem: Potential for Falls  Goal: Patient will remain free of falls  Description: INTERVENTIONS:  - Educate patient/family on patient safety including physical limitations  - Instruct patient to call for assistance with activity   - Consult OT/PT to assist with strengthening/mobility   - Keep Call bell within reach  - Keep bed low and locked with side rails adjusted as appropriate  - Keep care items and personal belongings within reach  - Initiate and maintain comfort rounds  - Make Fall Risk Sign visible to staff  - Offer Toileting every  Hours, in advance of need  - Initiate/Maintain alarm  - Obtain necessary fall risk management equipment:   - Apply yellow socks and bracelet for high fall risk patients  - Consider moving patient to room near nurses station  Outcome: Progressing     Problem: PAIN - ADULT  Goal: Verbalizes/displays adequate comfort level or baseline comfort level  Description: Interventions:  - Encourage patient to monitor pain and request assistance  - Assess pain using appropriate pain scale  - Administer analgesics based on type and severity of pain and evaluate response  - Implement non-pharmacological measures as appropriate and evaluate response  - Consider cultural and social influences on pain and pain management  - Notify physician/advanced practitioner if interventions unsuccessful or patient reports new pain  Outcome: Progressing     Problem: INFECTION - ADULT  Goal: Absence or prevention of progression during hospitalization  Description: INTERVENTIONS:  - Assess and monitor for signs and symptoms of infection  - Monitor lab/diagnostic results  - Monitor all insertion sites, i.e. indwelling lines, tubes, and drains  - Monitor endotracheal if appropriate and nasal secretions for changes in amount and color  - [place] appropriate cooling/warming therapies per order  - Administer medications as ordered  - Instruct and encourage patient and family to use good hand hygiene technique  -  Identify and instruct in appropriate isolation precautions for identified infection/condition  Outcome: Progressing  Goal: Absence of fever/infection during neutropenic period  Description: INTERVENTIONS:  - Monitor WBC    Outcome: Progressing     Problem: SAFETY ADULT  Goal: Patient will remain free of falls  Description: INTERVENTIONS:  - Educate patient/family on patient safety including physical limitations  - Instruct patient to call for assistance with activity   - Consult OT/PT to assist with strengthening/mobility   - Keep Call bell within reach  - Keep bed low and locked with side rails adjusted as appropriate  - Keep care items and personal belongings within reach  - Initiate and maintain comfort rounds  - Make Fall Risk Sign visible to staff  - Offer Toileting every  Hours, in advance of need  - Initiate/Maintain alarm  - Obtain necessary fall risk management equipment:   - Apply yellow socks and bracelet for high fall risk patients  - Consider moving patient to room near nurses station  Outcome: Progressing  Goal: Maintain or return to baseline ADL function  Description: INTERVENTIONS:  -  Assess patient's ability to carry out ADLs; assess patient's baseline for ADL function and identify physical deficits which impact ability to perform ADLs (bathing, care of mouth/teeth, toileting, grooming, dressing, etc.)  - Assess/evaluate cause of self-care deficits   - Assess range of motion  - Assess patient's mobility; develop plan if impaired  - Assess patient's need for assistive devices and provide as appropriate  - Encourage maximum independence but intervene and supervise when necessary  - Involve family in performance of ADLs  - Assess for home care needs following discharge   - Consider OT consult to assist with ADL evaluation and planning for discharge  - Provide patient education as appropriate  Outcome: Progressing  Goal: Maintains/Returns to pre admission functional level  Description:  INTERVENTIONS:  - Perform AM-PAC 6 Click Basic Mobility/ Daily Activity assessment daily.  - Set and communicate daily mobility goal to care team and patient/family/caregiver.   - Collaborate with rehabilitation services on mobility goals if consulted  - Perform Range of Motion  times a day.  - Reposition patient every  hours.  - Dangle patient  times a day  - Stand patient  times a day  - Ambulate patient  times a day  - Out of bed to chair  times a day   - Out of bed for meals times a day  - Out of bed for toileting  - Record patient progress and toleration of activity level   Outcome: Progressing     Problem: DISCHARGE PLANNING  Goal: Discharge to home or other facility with appropriate resources  Description: INTERVENTIONS:  - Identify barriers to discharge w/patient and caregiver  - Arrange for needed discharge resources and transportation as appropriate  - Identify discharge learning needs (meds, wound care, etc.)  - Arrange for interpretive services to assist at discharge as needed  - Refer to Case Management Department for coordinating discharge planning if the patient needs post-hospital services based on physician/advanced practitioner order or complex needs related to functional status, cognitive ability, or social support system  Outcome: Progressing     Problem: Knowledge Deficit  Goal: Patient/family/caregiver demonstrates understanding of disease process, treatment plan, medications, and discharge instructions  Description: Complete learning assessment and assess knowledge base.  Interventions:  - Provide teaching at level of understanding  - Provide teaching via preferred learning methods  Outcome: Progressing     Problem: RESPIRATORY - ADULT  Goal: Achieves optimal ventilation and oxygenation  Description: INTERVENTIONS:  - Assess for changes in respiratory status  - Assess for changes in mentation and behavior  - Position to facilitate oxygenation and minimize respiratory effort  - Oxygen  administered by appropriate delivery if ordered  - Initiate smoking cessation education as indicated  - Encourage broncho-pulmonary hygiene including cough, deep breathe, Incentive Spirometry  - Assess the need for suctioning and aspirate as needed  - Assess and instruct to report SOB or any respiratory difficulty  - Respiratory Therapy support as indicated  Outcome: Progressing     Problem: GASTROINTESTINAL - ADULT  Goal: Maintains or returns to baseline bowel function  Description: INTERVENTIONS:  - Assess bowel function  - Encourage oral fluids to ensure adequate hydration  - Administer IV fluids if ordered to ensure adequate hydration  - Administer ordered medications as needed  - Encourage mobilization and activity  - Consider nutritional services referral to assist patient with adequate nutrition and appropriate food choices  Outcome: Progressing     Problem: GENITOURINARY - ADULT  Goal: Absence of urinary retention  Description: INTERVENTIONS:  - Assess patient’s ability to void and empty bladder  - Monitor I/O  - Bladder scan as needed  - Discuss with physician/AP medications to alleviate retention as needed  - Discuss catheterization for long term situations as appropriate  Outcome: Progressing

## 2024-02-03 NOTE — ANESTHESIA POSTPROCEDURE EVALUATION
Post-Op Assessment Note    CV Status:  Stable  Pain Score: 0    Pain management: adequate       Mental Status:  Alert and awake   Hydration Status:  Stable   PONV Controlled:  None   Airway Patency:  Patent and adequate     Post Op Vitals Reviewed: Yes    No anethesia notable event occurred.    Staff: Anesthesiologist, CRNA               BP   107/58   Temp      Pulse  62   Resp   18   SpO2   99%

## 2024-02-03 NOTE — ASSESSMENT & PLAN NOTE
Reports following with a pain specialist as an outpatient  Continue with Tylenol, Robaxin, Lidocaine patch  Low dose Oxycodone for moderate/severe pain.

## 2024-02-03 NOTE — ASSESSMENT & PLAN NOTE
Non-compliant with chronic oxygen requirements  Home oxygen evaluation completed on 1/22, repeated on 1/31 - still requiring 2 L of supplemental oxygen at discharge    Currently SpO2: 92 % on Nasal Cannula O2 Flow Rate (L/min): 2 L/min    See full plan as noted above

## 2024-02-03 NOTE — ASSESSMENT & PLAN NOTE
Complains of bilateral leg swelling, which is non-pitting at this time.  No history of CHF, last known EF from Echo in January '23 shows an EF of 65% with normal diastolic function.  Echo (1/25/24): The left ventricular ejection fraction is 65%. Systolic function is normal. Wall motion is normal. Diastolic function is mildly abnormal, consistent with grade I (abnormal) relaxation.   Continue 40 mg PO Lasix daily  Ace wraps for compression to BLE  Chest x-ray with atelectasis, trace pleural effusions continue to encourage I-S

## 2024-02-04 PROBLEM — E66.813 CLASS 3 SEVERE OBESITY DUE TO EXCESS CALORIES WITH SERIOUS COMORBIDITY AND BODY MASS INDEX (BMI) OF 45.0 TO 49.9 IN ADULT (HCC): Status: ACTIVE | Noted: 2024-02-04

## 2024-02-04 PROBLEM — E66.01 MORBID OBESITY DUE TO EXCESS CALORIES (HCC): Status: ACTIVE | Noted: 2024-02-04

## 2024-02-04 LAB
ANION GAP SERPL CALCULATED.3IONS-SCNC: 8 MMOL/L
BUN SERPL-MCNC: 13 MG/DL (ref 5–25)
CALCIUM SERPL-MCNC: 8.9 MG/DL (ref 8.4–10.2)
CHLORIDE SERPL-SCNC: 99 MMOL/L (ref 96–108)
CO2 SERPL-SCNC: 31 MMOL/L (ref 21–32)
CREAT SERPL-MCNC: 0.9 MG/DL (ref 0.6–1.3)
ERYTHROCYTE [DISTWIDTH] IN BLOOD BY AUTOMATED COUNT: 13.8 % (ref 11.6–15.1)
GFR SERPL CREATININE-BSD FRML MDRD: 70 ML/MIN/1.73SQ M
GLUCOSE SERPL-MCNC: 124 MG/DL (ref 65–140)
GLUCOSE SERPL-MCNC: 128 MG/DL (ref 65–140)
GLUCOSE SERPL-MCNC: 137 MG/DL (ref 65–140)
GLUCOSE SERPL-MCNC: 137 MG/DL (ref 65–140)
GLUCOSE SERPL-MCNC: 150 MG/DL (ref 65–140)
HCT VFR BLD AUTO: 29.5 % (ref 34.8–46.1)
HGB BLD-MCNC: 9.8 G/DL (ref 11.5–15.4)
MAGNESIUM SERPL-MCNC: 2.2 MG/DL (ref 1.9–2.7)
MCH RBC QN AUTO: 30.3 PG (ref 26.8–34.3)
MCHC RBC AUTO-ENTMCNC: 33.2 G/DL (ref 31.4–37.4)
MCV RBC AUTO: 91 FL (ref 82–98)
PLATELET # BLD AUTO: 325 THOUSANDS/UL (ref 149–390)
PMV BLD AUTO: 8.7 FL (ref 8.9–12.7)
POTASSIUM SERPL-SCNC: 3.4 MMOL/L (ref 3.5–5.3)
RBC # BLD AUTO: 3.23 MILLION/UL (ref 3.81–5.12)
SODIUM SERPL-SCNC: 138 MMOL/L (ref 135–147)
WBC # BLD AUTO: 7.37 THOUSAND/UL (ref 4.31–10.16)

## 2024-02-04 PROCEDURE — 82948 REAGENT STRIP/BLOOD GLUCOSE: CPT

## 2024-02-04 PROCEDURE — 99233 SBSQ HOSP IP/OBS HIGH 50: CPT | Performed by: INTERNAL MEDICINE

## 2024-02-04 PROCEDURE — 83735 ASSAY OF MAGNESIUM: CPT | Performed by: INTERNAL MEDICINE

## 2024-02-04 PROCEDURE — 80048 BASIC METABOLIC PNL TOTAL CA: CPT | Performed by: INTERNAL MEDICINE

## 2024-02-04 PROCEDURE — 85027 COMPLETE CBC AUTOMATED: CPT | Performed by: INTERNAL MEDICINE

## 2024-02-04 RX ORDER — POTASSIUM CHLORIDE 20 MEQ/1
40 TABLET, EXTENDED RELEASE ORAL ONCE
Status: COMPLETED | OUTPATIENT
Start: 2024-02-04 | End: 2024-02-04

## 2024-02-04 RX ORDER — ACETAMINOPHEN 10 MG/ML
1000 INJECTION, SOLUTION INTRAVENOUS EVERY 6 HOURS PRN
Status: DISCONTINUED | OUTPATIENT
Start: 2024-02-04 | End: 2024-02-04

## 2024-02-04 RX ADMIN — GUAIFENESIN 600 MG: 600 TABLET ORAL at 21:42

## 2024-02-04 RX ADMIN — FUROSEMIDE 40 MG: 40 TABLET ORAL at 09:49

## 2024-02-04 RX ADMIN — POTASSIUM CHLORIDE 40 MEQ: 1500 TABLET, EXTENDED RELEASE ORAL at 13:30

## 2024-02-04 RX ADMIN — PANTOPRAZOLE SODIUM 40 MG: 40 TABLET, DELAYED RELEASE ORAL at 17:00

## 2024-02-04 RX ADMIN — ALBUTEROL SULFATE 2 PUFF: 90 AEROSOL, METERED RESPIRATORY (INHALATION) at 18:02

## 2024-02-04 RX ADMIN — CYANOCOBALAMIN TAB 500 MCG 500 MCG: 500 TAB at 09:39

## 2024-02-04 RX ADMIN — OXYCODONE HYDROCHLORIDE 5 MG: 5 TABLET ORAL at 01:40

## 2024-02-04 RX ADMIN — OXYCODONE HYDROCHLORIDE 5 MG: 5 TABLET ORAL at 09:37

## 2024-02-04 RX ADMIN — LIDOCAINE 5% 1 PATCH: 700 PATCH TOPICAL at 09:00

## 2024-02-04 RX ADMIN — FLUTICASONE FUROATE AND VILANTEROL TRIFENATATE 1 PUFF: 100; 25 POWDER RESPIRATORY (INHALATION) at 09:41

## 2024-02-04 RX ADMIN — FERROUS SULFATE TAB 325 MG (65 MG ELEMENTAL FE) 325 MG: 325 (65 FE) TAB at 08:00

## 2024-02-04 RX ADMIN — OXYCODONE HYDROCHLORIDE 5 MG: 5 TABLET ORAL at 22:11

## 2024-02-04 RX ADMIN — FAMOTIDINE 20 MG: 20 TABLET, FILM COATED ORAL at 21:42

## 2024-02-04 RX ADMIN — HYDROMORPHONE HYDROCHLORIDE 0.2 MG: 0.2 INJECTION, SOLUTION INTRAMUSCULAR; INTRAVENOUS; SUBCUTANEOUS at 06:28

## 2024-02-04 RX ADMIN — METHOCARBAMOL TABLETS 500 MG: 500 TABLET, COATED ORAL at 00:09

## 2024-02-04 RX ADMIN — SUCRALFATE 1 G: 1 TABLET ORAL at 17:00

## 2024-02-04 RX ADMIN — OXYCODONE HYDROCHLORIDE 5 MG: 5 TABLET ORAL at 13:38

## 2024-02-04 RX ADMIN — ALBUTEROL SULFATE 2 PUFF: 90 AEROSOL, METERED RESPIRATORY (INHALATION) at 21:42

## 2024-02-04 RX ADMIN — SUCRALFATE 1 G: 1 TABLET ORAL at 08:00

## 2024-02-04 RX ADMIN — GUAIFENESIN 600 MG: 600 TABLET ORAL at 09:39

## 2024-02-04 RX ADMIN — METHOCARBAMOL TABLETS 500 MG: 500 TABLET, COATED ORAL at 05:08

## 2024-02-04 RX ADMIN — ALBUTEROL SULFATE 2 PUFF: 90 AEROSOL, METERED RESPIRATORY (INHALATION) at 09:43

## 2024-02-04 RX ADMIN — OXYCODONE HYDROCHLORIDE 5 MG: 5 TABLET ORAL at 18:00

## 2024-02-04 RX ADMIN — POLYETHYLENE GLYCOL 3350 17 G: 17 POWDER, FOR SOLUTION ORAL at 09:00

## 2024-02-04 RX ADMIN — DOCUSATE SODIUM 100 MG: 100 CAPSULE, LIQUID FILLED ORAL at 09:49

## 2024-02-04 RX ADMIN — OXYCODONE HYDROCHLORIDE 5 MG: 5 TABLET ORAL at 05:08

## 2024-02-04 RX ADMIN — FOLIC ACID TAB 400 MCG 400 MCG: 400 TAB at 09:39

## 2024-02-04 RX ADMIN — PANTOPRAZOLE SODIUM 40 MG: 40 TABLET, DELAYED RELEASE ORAL at 08:00

## 2024-02-04 RX ADMIN — ALBUTEROL SULFATE 2 PUFF: 90 AEROSOL, METERED RESPIRATORY (INHALATION) at 13:00

## 2024-02-04 RX ADMIN — ACETAMINOPHEN 500 MG: 10 INJECTION INTRAVENOUS at 22:11

## 2024-02-04 RX ADMIN — METHOCARBAMOL TABLETS 500 MG: 500 TABLET, COATED ORAL at 13:00

## 2024-02-04 RX ADMIN — METHOCARBAMOL TABLETS 500 MG: 500 TABLET, COATED ORAL at 18:00

## 2024-02-04 NOTE — PLAN OF CARE
Problem: Potential for Falls  Goal: Patient will remain free of falls  Description: INTERVENTIONS:  - Educate patient/family on patient safety including physical limitations  - Instruct patient to call for assistance with activity   - Consult OT/PT to assist with strengthening/mobility   - Keep Call bell within reach  - Keep bed low and locked with side rails adjusted as appropriate  - Keep care items and personal belongings within reach  - Initiate and maintain comfort rounds  - Make Fall Risk Sign visible to staff  - Offer Toileting every    Hours, in advance of need  - Initiate/Maintain   alarm  - Obtain necessary fall risk management equipment:     - Apply yellow socks and bracelet for high fall risk patients  - Consider moving patient to room near nurses station  Outcome: Progressing     Problem: PAIN - ADULT  Goal: Verbalizes/displays adequate comfort level or baseline comfort level  Description: Interventions:  - Encourage patient to monitor pain and request assistance  - Assess pain using appropriate pain scale  - Administer analgesics based on type and severity of pain and evaluate response  - Implement non-pharmacological measures as appropriate and evaluate response  - Consider cultural and social influences on pain and pain management  - Notify physician/advanced practitioner if interventions unsuccessful or patient reports new pain  Outcome: Progressing     Problem: INFECTION - ADULT  Goal: Absence or prevention of progression during hospitalization  Description: INTERVENTIONS:  - Assess and monitor for signs and symptoms of infection  - Monitor lab/diagnostic results  - Monitor all insertion sites, i.e. indwelling lines, tubes, and drains  - Monitor endotracheal if appropriate and nasal secretions for changes in amount and color  - [place] appropriate cooling/warming therapies per order  - Administer medications as ordered  - Instruct and encourage patient and family to use good hand hygiene  technique  - Identify and instruct in appropriate isolation precautions for identified infection/condition  Outcome: Progressing  Goal: Absence of fever/infection during neutropenic period  Description: INTERVENTIONS:  - Monitor WBC    Outcome: Progressing     Problem: SAFETY ADULT  Goal: Patient will remain free of falls  Description: INTERVENTIONS:  - Educate patient/family on patient safety including physical limitations  - Instruct patient to call for assistance with activity   - Consult OT/PT to assist with strengthening/mobility   - Keep Call bell within reach  - Keep bed low and locked with side rails adjusted as appropriate  - Keep care items and personal belongings within reach  - Initiate and maintain comfort rounds  - Make Fall Risk Sign visible to staff  - Offer Toileting every    Hours, in advance of need  - Initiate/Maintain   alarm  - Obtain necessary fall risk management equipment:       - Apply yellow socks and bracelet for high fall risk patients  - Consider moving patient to room near nurses station  Outcome: Progressing  Goal: Maintain or return to baseline ADL function  Description: INTERVENTIONS:  -  Assess patient's ability to carry out ADLs; assess patient's baseline for ADL function and identify physical deficits which impact ability to perform ADLs (bathing, care of mouth/teeth, toileting, grooming, dressing, etc.)  - Assess/evaluate cause of self-care deficits   - Assess range of motion  - Assess patient's mobility; develop plan if impaired  - Assess patient's need for assistive devices and provide as appropriate  - Encourage maximum independence but intervene and supervise when necessary  - Involve family in performance of ADLs  - Assess for home care needs following discharge   - Consider OT consult to assist with ADL evaluation and planning for discharge  - Provide patient education as appropriate  Outcome: Progressing  Goal: Maintains/Returns to pre admission functional  level  Description: INTERVENTIONS:  - Perform AM-PAC 6 Click Basic Mobility/ Daily Activity assessment daily.  - Set and communicate daily mobility goal to care team and patient/family/caregiver.   - Collaborate with rehabilitation services on mobility goals if consulted  - Perform Range of Motion    times a day.  - Reposition patient every    hours.  - Dangle patient    times a day  - Stand patient    times a day  - Ambulate patient    times a day  - Out of bed to chair      times a day   - Out of bed for meals  times a day  - Out of bed for toileting  - Record patient progress and toleration of activity level   Outcome: Progressing     Problem: DISCHARGE PLANNING  Goal: Discharge to home or other facility with appropriate resources  Description: INTERVENTIONS:  - Identify barriers to discharge w/patient and caregiver  - Arrange for needed discharge resources and transportation as appropriate  - Identify discharge learning needs (meds, wound care, etc.)  - Arrange for interpretive services to assist at discharge as needed  - Refer to Case Management Department for coordinating discharge planning if the patient needs post-hospital services based on physician/advanced practitioner order or complex needs related to functional status, cognitive ability, or social support system  Outcome: Progressing     Problem: Knowledge Deficit  Goal: Patient/family/caregiver demonstrates understanding of disease process, treatment plan, medications, and discharge instructions  Description: Complete learning assessment and assess knowledge base.  Interventions:  - Provide teaching at level of understanding  - Provide teaching via preferred learning methods  Outcome: Progressing     Problem: RESPIRATORY - ADULT  Goal: Achieves optimal ventilation and oxygenation  Description: INTERVENTIONS:  - Assess for changes in respiratory status  - Assess for changes in mentation and behavior  - Position to facilitate oxygenation and minimize  respiratory effort  - Oxygen administered by appropriate delivery if ordered  - Initiate smoking cessation education as indicated  - Encourage broncho-pulmonary hygiene including cough, deep breathe, Incentive Spirometry  - Assess the need for suctioning and aspirate as needed  - Assess and instruct to report SOB or any respiratory difficulty  - Respiratory Therapy support as indicated  Outcome: Progressing     Problem: GASTROINTESTINAL - ADULT  Goal: Maintains or returns to baseline bowel function  Description: INTERVENTIONS:  - Assess bowel function  - Encourage oral fluids to ensure adequate hydration  - Administer IV fluids if ordered to ensure adequate hydration  - Administer ordered medications as needed  - Encourage mobilization and activity  - Consider nutritional services referral to assist patient with adequate nutrition and appropriate food choices  Outcome: Progressing     Problem: GENITOURINARY - ADULT  Goal: Absence of urinary retention  Description: INTERVENTIONS:  - Assess patient’s ability to void and empty bladder  - Monitor I/O  - Bladder scan as needed  - Discuss with physician/AP medications to alleviate retention as needed  - Discuss catheterization for long term situations as appropriate  Outcome: Progressing

## 2024-02-04 NOTE — PLAN OF CARE
Problem: Potential for Falls  Goal: Patient will remain free of falls  Description: INTERVENTIONS:  - Educate patient/family on patient safety including physical limitations  - Instruct patient to call for assistance with activity   - Consult OT/PT to assist with strengthening/mobility   - Keep Call bell within reach  - Keep bed low and locked with side rails adjusted as appropriate  - Keep care items and personal belongings within reach  - Initiate and maintain comfort rounds  - Make Fall Risk Sign visible to staff  - Offer Toileting every 2 Hours, in advance of need  - Obtain necessary fall risk management equipment: nonskid socks  - Apply yellow socks and bracelet for high fall risk patients  - Consider moving patient to room near nurses station  Outcome: Progressing     Problem: PAIN - ADULT  Goal: Verbalizes/displays adequate comfort level or baseline comfort level  Description: Interventions:  - Encourage patient to monitor pain and request assistance  - Assess pain using appropriate pain scale  - Administer analgesics based on type and severity of pain and evaluate response  - Implement non-pharmacological measures as appropriate and evaluate response  - Consider cultural and social influences on pain and pain management  - Notify physician/advanced practitioner if interventions unsuccessful or patient reports new pain  Outcome: Progressing     Problem: INFECTION - ADULT  Goal: Absence or prevention of progression during hospitalization  Description: INTERVENTIONS:  - Assess and monitor for signs and symptoms of infection  - Monitor lab/diagnostic results  - Monitor all insertion sites, i.e. indwelling lines, tubes, and drains  - Monitor endotracheal if appropriate and nasal secretions for changes in amount and color  - [place] appropriate cooling/warming therapies per order  - Administer medications as ordered  - Instruct and encourage patient and family to use good hand hygiene technique  - Identify and  instruct in appropriate isolation precautions for identified infection/condition  Outcome: Progressing  Goal: Absence of fever/infection during neutropenic period  Description: INTERVENTIONS:  - Monitor WBC    Outcome: Progressing     Problem: SAFETY ADULT  Goal: Patient will remain free of falls  Description: INTERVENTIONS:  - Educate patient/family on patient safety including physical limitations  - Instruct patient to call for assistance with activity   - Consult OT/PT to assist with strengthening/mobility   - Keep Call bell within reach  - Keep bed low and locked with side rails adjusted as appropriate  - Keep care items and personal belongings within reach  - Initiate and maintain comfort rounds  - Make Fall Risk Sign visible to staff  - Offer Toileting every 2 Hours, in advance of need  - Obtain necessary fall risk management equipment: nonskid socks  - Apply yellow socks and bracelet for high fall risk patients  - Consider moving patient to room near nurses station  Outcome: Progressing  Goal: Maintain or return to baseline ADL function  Description: INTERVENTIONS:  -  Assess patient's ability to carry out ADLs; assess patient's baseline for ADL function and identify physical deficits which impact ability to perform ADLs (bathing, care of mouth/teeth, toileting, grooming, dressing, etc.)  - Assess/evaluate cause of self-care deficits   - Assess range of motion  - Assess patient's mobility; develop plan if impaired  - Assess patient's need for assistive devices and provide as appropriate  - Encourage maximum independence but intervene and supervise when necessary  - Involve family in performance of ADLs  - Assess for home care needs following discharge   - Consider OT consult to assist with ADL evaluation and planning for discharge  - Provide patient education as appropriate  Outcome: Progressing  Goal: Maintains/Returns to pre admission functional level  Description: INTERVENTIONS:  - Perform AM-PAC 6 Click  Basic Mobility/ Daily Activity assessment daily.  - Set and communicate daily mobility goal to care team and patient/family/caregiver.   - Collaborate with rehabilitation services on mobility goals if consulted  - Perform Range of Motion 4 times a day.  - Reposition patient every 2 hours.  - Dangle patient 4 times a day  - Stand patient 4 times a day  - Ambulate patient 4 times a day  - Out of bed to chair 4 times a day   - Out of bed for meals 4 times a day  - Out of bed for toileting  - Record patient progress and toleration of activity level   Outcome: Progressing     Problem: DISCHARGE PLANNING  Goal: Discharge to home or other facility with appropriate resources  Description: INTERVENTIONS:  - Identify barriers to discharge w/patient and caregiver  - Arrange for needed discharge resources and transportation as appropriate  - Identify discharge learning needs (meds, wound care, etc.)  - Arrange for interpretive services to assist at discharge as needed  - Refer to Case Management Department for coordinating discharge planning if the patient needs post-hospital services based on physician/advanced practitioner order or complex needs related to functional status, cognitive ability, or social support system  Outcome: Progressing     Problem: Knowledge Deficit  Goal: Patient/family/caregiver demonstrates understanding of disease process, treatment plan, medications, and discharge instructions  Description: Complete learning assessment and assess knowledge base.  Interventions:  - Provide teaching at level of understanding  - Provide teaching via preferred learning methods  Outcome: Progressing     Problem: RESPIRATORY - ADULT  Goal: Achieves optimal ventilation and oxygenation  Description: INTERVENTIONS:  - Assess for changes in respiratory status  - Assess for changes in mentation and behavior  - Position to facilitate oxygenation and minimize respiratory effort  - Oxygen administered by appropriate delivery if  ordered  - Initiate smoking cessation education as indicated  - Encourage broncho-pulmonary hygiene including cough, deep breathe, Incentive Spirometry  - Assess the need for suctioning and aspirate as needed  - Assess and instruct to report SOB or any respiratory difficulty  - Respiratory Therapy support as indicated  Outcome: Progressing     Problem: GASTROINTESTINAL - ADULT  Goal: Maintains or returns to baseline bowel function  Description: INTERVENTIONS:  - Assess bowel function  - Encourage oral fluids to ensure adequate hydration  - Administer IV fluids if ordered to ensure adequate hydration  - Administer ordered medications as needed  - Encourage mobilization and activity  - Consider nutritional services referral to assist patient with adequate nutrition and appropriate food choices  Outcome: Progressing     Problem: GENITOURINARY - ADULT  Goal: Absence of urinary retention  Description: INTERVENTIONS:  - Assess patient’s ability to void and empty bladder  - Monitor I/O  - Bladder scan as needed  - Discuss with physician/AP medications to alleviate retention as needed  - Discuss catheterization for long term situations as appropriate  Outcome: Progressing

## 2024-02-04 NOTE — PLAN OF CARE
Problem: Potential for Falls  Goal: Patient will remain free of falls  Description: INTERVENTIONS:  - Educate patient/family on patient safety including physical limitations  - Instruct patient to call for assistance with activity   - Consult OT/PT to assist with strengthening/mobility   - Keep Call bell within reach  - Keep bed low and locked with side rails adjusted as appropriate  - Keep care items and personal belongings within reach  - Initiate and maintain comfort rounds  - Make Fall Risk Sign visible to staff  - Offer Toileting every    Hours, in advance of need  - Initiate/Maintain   alarm  - Obtain necessary fall risk management equipment:     - Apply yellow socks and bracelet for high fall risk patients  - Consider moving patient to room near nurses station  2/4/2024 0925 by Vangie Merrill RN  Outcome: Progressing  2/4/2024 0924 by Vangie Merrill RN  Outcome: Progressing     Problem: PAIN - ADULT  Goal: Verbalizes/displays adequate comfort level or baseline comfort level  Description: Interventions:  - Encourage patient to monitor pain and request assistance  - Assess pain using appropriate pain scale  - Administer analgesics based on type and severity of pain and evaluate response  - Implement non-pharmacological measures as appropriate and evaluate response  - Consider cultural and social influences on pain and pain management  - Notify physician/advanced practitioner if interventions unsuccessful or patient reports new pain  2/4/2024 0925 by Vangie Merrill RN  Outcome: Progressing  2/4/2024 0924 by Vangie Merrill RN  Outcome: Progressing     Problem: INFECTION - ADULT  Goal: Absence or prevention of progression during hospitalization  Description: INTERVENTIONS:  - Assess and monitor for signs and symptoms of infection  - Monitor lab/diagnostic results  - Monitor all insertion sites, i.e. indwelling lines, tubes, and drains  - Monitor endotracheal if appropriate and nasal secretions for  changes in amount and color  - [place] appropriate cooling/warming therapies per order  - Administer medications as ordered  - Instruct and encourage patient and family to use good hand hygiene technique  - Identify and instruct in appropriate isolation precautions for identified infection/condition  2/4/2024 0925 by Vangie Merrill RN  Outcome: Progressing  2/4/2024 0924 by Vangie Merrill RN  Outcome: Progressing  Goal: Absence of fever/infection during neutropenic period  Description: INTERVENTIONS:  - Monitor WBC    2/4/2024 0925 by Vangie Merrill RN  Outcome: Progressing  2/4/2024 0924 by Vangie Merrill RN  Outcome: Progressing     Problem: SAFETY ADULT  Goal: Patient will remain free of falls  Description: INTERVENTIONS:  - Educate patient/family on patient safety including physical limitations  - Instruct patient to call for assistance with activity   - Consult OT/PT to assist with strengthening/mobility   - Keep Call bell within reach  - Keep bed low and locked with side rails adjusted as appropriate  - Keep care items and personal belongings within reach  - Initiate and maintain comfort rounds  - Make Fall Risk Sign visible to staff  - Offer Toileting every    Hours, in advance of need  - Initiate/Maintain   alarm  - Obtain necessary fall risk management equipment:       - Apply yellow socks and bracelet for high fall risk patients  - Consider moving patient to room near nurses station  2/4/2024 0925 by Vangie Merrill RN  Outcome: Progressing  2/4/2024 0924 by Vangie Merrill RN  Outcome: Progressing  Goal: Maintain or return to baseline ADL function  Description: INTERVENTIONS:  -  Assess patient's ability to carry out ADLs; assess patient's baseline for ADL function and identify physical deficits which impact ability to perform ADLs (bathing, care of mouth/teeth, toileting, grooming, dressing, etc.)  - Assess/evaluate cause of self-care deficits   - Assess range of motion  - Assess patient's  mobility; develop plan if impaired  - Assess patient's need for assistive devices and provide as appropriate  - Encourage maximum independence but intervene and supervise when necessary  - Involve family in performance of ADLs  - Assess for home care needs following discharge   - Consider OT consult to assist with ADL evaluation and planning for discharge  - Provide patient education as appropriate  2/4/2024 0925 by Vangie Merrill RN  Outcome: Progressing  2/4/2024 0924 by Vangie Merrill RN  Outcome: Progressing  Goal: Maintains/Returns to pre admission functional level  Description: INTERVENTIONS:  - Perform AM-PAC 6 Click Basic Mobility/ Daily Activity assessment daily.  - Set and communicate daily mobility goal to care team and patient/family/caregiver.   - Collaborate with rehabilitation services on mobility goals if consulted  - Perform Range of Motion    times a day.  - Reposition patient every    hours.  - Dangle patient    times a day  - Stand patient    times a day  - Ambulate patient    times a day  - Out of bed to chair      times a day   - Out of bed for meals times a day  - Out of bed for toileting  - Record patient progress and toleration of activity level   2/4/2024 0925 by Vangie Merrill RN  Outcome: Progressing  2/4/2024 0924 by Vangie Merrill RN  Outcome: Progressing     Problem: DISCHARGE PLANNING  Goal: Discharge to home or other facility with appropriate resources  Description: INTERVENTIONS:  - Identify barriers to discharge w/patient and caregiver  - Arrange for needed discharge resources and transportation as appropriate  - Identify discharge learning needs (meds, wound care, etc.)  - Arrange for interpretive services to assist at discharge as needed  - Refer to Case Management Department for coordinating discharge planning if the patient needs post-hospital services based on physician/advanced practitioner order or complex needs related to functional status, cognitive ability, or  social support system  2/4/2024 0925 by Vangie Merrill RN  Outcome: Progressing  2/4/2024 0924 by Vangie Merrill RN  Outcome: Progressing     Problem: Knowledge Deficit  Goal: Patient/family/caregiver demonstrates understanding of disease process, treatment plan, medications, and discharge instructions  Description: Complete learning assessment and assess knowledge base.  Interventions:  - Provide teaching at level of understanding  - Provide teaching via preferred learning methods  2/4/2024 0925 by Vangie Merrill RN  Outcome: Progressing  2/4/2024 0924 by Vangie Merrill RN  Outcome: Progressing     Problem: RESPIRATORY - ADULT  Goal: Achieves optimal ventilation and oxygenation  Description: INTERVENTIONS:  - Assess for changes in respiratory status  - Assess for changes in mentation and behavior  - Position to facilitate oxygenation and minimize respiratory effort  - Oxygen administered by appropriate delivery if ordered  - Initiate smoking cessation education as indicated  - Encourage broncho-pulmonary hygiene including cough, deep breathe, Incentive Spirometry  - Assess the need for suctioning and aspirate as needed  - Assess and instruct to report SOB or any respiratory difficulty  - Respiratory Therapy support as indicated  2/4/2024 0925 by Vangie Merrill RN  Outcome: Progressing  2/4/2024 0924 by Vangie Merrill RN  Outcome: Progressing     Problem: GASTROINTESTINAL - ADULT  Goal: Maintains or returns to baseline bowel function  Description: INTERVENTIONS:  - Assess bowel function  - Encourage oral fluids to ensure adequate hydration  - Administer IV fluids if ordered to ensure adequate hydration  - Administer ordered medications as needed  - Encourage mobilization and activity  - Consider nutritional services referral to assist patient with adequate nutrition and appropriate food choices  2/4/2024 0925 by Vangie Merrill RN  Outcome: Progressing  2/4/2024 0924 by Vangie Merrill RN  Outcome:  Progressing     Problem: GENITOURINARY - ADULT  Goal: Absence of urinary retention  Description: INTERVENTIONS:  - Assess patient’s ability to void and empty bladder  - Monitor I/O  - Bladder scan as needed  - Discuss with physician/AP medications to alleviate retention as needed  - Discuss catheterization for long term situations as appropriate  2/4/2024 0925 by Vangie Merrill RN  Outcome: Progressing  2/4/2024 0924 by Vangie Merrill RN  Outcome: Progressing

## 2024-02-04 NOTE — PROGRESS NOTES
[facility]   Progress Note  Name: Nanci Navarro I  MRN: [id number]  Unit/Bed#: -01 I Date of Admission: 1/18/2024   Date of Service: 2/4/2024 I Hospital Day: 16    Chronic respiratory failure with hypoxia (HCC)  Assessment & Plan  Non-compliant with chronic oxygen requirements  Home oxygen evaluation completed on 1/22, repeated on 1/31 - still requiring 2 L of supplemental oxygen at discharge    Currently SpO2: 92 % on Nasal Cannula O2 Flow Rate (L/min): 2 L/min    See full plan as noted above    * Discharge planning issues  Assessment & Plan  Patient needs home oxygen on discharge; she has had it in the past, but her concentrator has been misplaced during a move.  Case management is working on safe discharge as she has open flame for heat in her home without electricity   Remains in the hospital until safe discharge plan in place.  Repeat ambulatory pulse ox completed on 1/31, requiring 2 L of oxygen with exertion.  Care team meeting completed on 2/1, plan in place to attempt to hook up electricity at the property for the [place].    Class 3 severe obesity due to excess calories with serious comorbidity and body mass index (BMI) of 45.0 to 49.9 in adult (HCC)  Assessment & Plan  Body mass index is 49.09 kg/m².    Recommend incorporating a more whole foods plant-predominant diet along with decreasing consumption of red meats and processed foods  Per AHA guidelines, recommend moderate-vigorous intensity exercise for 30 minutes a day for 5 days a week or a total of 150 min/week      Anemia  Assessment & Plan  Recent Labs     02/02/24  0531 02/03/24  0447 02/04/24  0442   HGB 8.8* 9.2* 9.8*      Present on admission with a hemoglobin of 11.3  Baseline appears to be 11-13  Iron panel collected  Iron 28, Iron Sat 10, Ferritin 65, TIBC 268  Folate 7, Vitamin B12 171  Continue with Iron supplementation, Folic Acid and B12.  Underwent EGD 2/3 to rule out H. Pylori and celiac; follow up  results    Volume overload  Assessment & Plan  Complains of bilateral leg swelling, which is non-pitting at this time.  No history of CHF, last known EF from Echo in January '23 shows an EF of 65% with normal diastolic function.  Echo (1/25/24): The left ventricular ejection fraction is 65%. Systolic function is normal. Wall motion is normal. Diastolic function is mildly abnormal, consistent with grade I (abnormal) relaxation.   Continue 40 mg PO Lasix daily  Ace wraps for compression to BLE  Chest x-ray with atelectasis, trace pleural effusions    Compression fracture of L1 vertebra (HCC)  Assessment & Plan  Reports following with a pain specialist as an outpatient  Continue with Tylenol, Robaxin, Lidocaine patch, Toradol  Low dose Oxycodone for moderate/severe pain.    Smoker  Assessment & Plan  Is willing to stop smoking now that she has been here for 2 weeks without cigarettes  Tobacco Use: High Risk (1/18/2024)    Patient History     Smoking Tobacco Use: Every Day     Smokeless Tobacco Use: Never     Passive Exposure: Not on file     Tobacco cessation counseling provided for > 3 min  NRT ordered with patch and PRN nicorette gum    Chronic obstructive pulmonary disease with acute exacerbation (HCC)  Assessment & Plan  Patient presented to the ED with complaints of chest & flank pain with shortness of breath.  Recently hospitalized and treated for a UTI with IV Ceftriaxone.  Chest x-ray (1/18/24): Bibasilar atelectasis with further imaging remaining consistent with atelectasis on 1/24, 1/30  No sign of active exacerbation throughout hospitalization, even on admission.  Had been prescribed 1-2 L of supplemental oxygen as needed, however, has not been using it as there is an open fire for heat in her camper.  Repeat imaging completed on 1/24 with ongoing signs of atelectasis; extensive discussion regarding incentive spirometer use.  Continue mucinex, incentive spirometry, flutter valve, humidification to O2    Formal consult from pulmonology completed on 2/1  Unknown severity of COPD  Recommending OP follow-up for pulmonary function tests [none to review in records]  Continue with Nebulizer treatments ATC, Long acting inhaler [Breo]  Recommending to discharge home with Albuterol nebulizers 4x/daily, Breo and Home Oxygen.    Hypokalemia-resolved as of 1/25/2024  Assessment & Plan  Now resolved        VTE Pharmacologic Prophylaxis: VTE Score: 3 Moderate Risk (Score 3-4) - Pharmacological DVT Prophylaxis Ordered: heparin.    Mobility:   Basic Mobility Inpatient Raw Score: 24  JH-HLM Goal: 8: Walk 250 feet or more  JH-HLM Achieved: 8: Walk 250 feet ot more  HLM Goal achieved. Continue to encourage appropriate mobility.    Patient Centered Rounds: I performed bedside rounds with nursing staff today.  Discussions with Specialists or Other Care Team Provider:  IP CONSULT TO PULMONOLOGY  IP CONSULT TO GASTROENTEROLOGY      Education and Discussions with Family / Patient: patient    Total Time Spent on Date of Encounter in care of patient: 30+ mins. This time was spent on one or more of the following: performing physical exam; counseling and coordination of care; obtaining or reviewing history; documenting in the medical record; reviewing/ordering tests, medications or procedures; communicating with other healthcare professionals and discussing with patient's family/caregivers.    Current Length of Stay: 16 day(s)  Current Patient Status: Inpatient   Certification Statement: The patient will continue to require additional inpatient hospital stay due to plan as noted above  Discharge Plan: Anticipate discharge in >72 hrs to home once electricity has been set up and open flame is not present for heating so that patient may be qualified for home O2    Code Status: Level 1 - Full Code    Subjective:   No acute events reported overnight. Understanding of plan.  All questions answered.    Objective:     Vitals:   Temp (24hrs),  Av.4 °F (36.9 °C), Min:98.1 °F (36.7 °C), Max:99 °F (37.2 °C)    Temp:  [98.1 °F (36.7 °C)-99 °F (37.2 °C)] 98.1 °F (36.7 °C)  HR:  [65-69] 65  Resp:  [18] 18  BP: (107-109)/(49-59) 109/59  SpO2:  [92 %-97 %] 92 %  Body mass index is 49.09 kg/m².     Input and Output Summary (last 24 hours):   No intake or output data in the 24 hours ending 24 1221      Physical Exam:   Physical Exam  Vitals and nursing note reviewed.   Constitutional:       General: She is not in acute distress.     Appearance: She is well-developed. She is obese. She is ill-appearing.   Cardiovascular:      Rate and Rhythm: Normal rate.      Pulses: Normal pulses.   Pulmonary:      Effort: Pulmonary effort is normal.   Abdominal:      General: Bowel sounds are normal. There is no distension.      Palpations: Abdomen is soft.      Tenderness: There is no abdominal tenderness.   Musculoskeletal:         General: No swelling. Normal range of motion.      Right lower leg: Edema (non pitting) present.      Left lower leg: Edema (non-pitting) present.   Skin:     General: Skin is warm and dry.   Neurological:      Mental Status: She is alert and oriented to person, place, and time.   Psychiatric:         Mood and Affect: Mood normal.          Additional Data:     Labs:  Results from last 7 days   Lab Units 24  0442   WBC Thousand/uL 7.37   HEMOGLOBIN g/dL 9.8*   HEMATOCRIT % 29.5*   PLATELETS Thousands/uL 325     Results from last 7 days   Lab Units 24  0442   SODIUM mmol/L 138   POTASSIUM mmol/L 3.4*   CHLORIDE mmol/L 99   CO2 mmol/L 31   BUN mg/dL 13   CREATININE mg/dL 0.90   ANION GAP mmol/L 8   CALCIUM mg/dL 8.9   GLUCOSE RANDOM mg/dL 150*         Results from last 7 days   Lab Units 24  1134 24  0706 24  2103 24  1633 24  1332 24  1022 24  0710 24  2044 24  1620 24  1103 24  0647 24   POC GLUCOSE mg/dl 137 128 124 133 87 113 134 166* 103 171* 132 116          Results from last 7 days   Lab Units 01/31/24  1455   PROCALCITONIN ng/ml <0.05       Lines/Drains:  Invasive Devices       Peripheral Intravenous Line  Duration             Peripheral IV 02/03/24 Right;Ventral (anterior) Forearm 1 day                        Imaging: Reviewed radiology reports from this admission including:   XR chest 1 view portable    Result Date: 1/19/2024  Impression: Bibasilar atelectasis. Workstation performed: [id number]     CT chest abdomen pelvis w contrast    Result Date: 1/18/2024  Impression: 1.  Bibasilar atelectasis. 2.  Mild diffuse thickening of the esophagus compatible with esophagitis similar to prior exam. 3.  Diverticulosis without evidence of diverticulitis. Workstation performed: [id number]       No Chest XR results available for this patient.     Results for orders placed during the hospital encounter of 01/14/23    Echo complete w/ contrast if indicated    Interpretation Summary    Left Ventricle: Left ventricular cavity size is normal. Wall thickness is normal. Systolic function is normal (65%). Wall motion is normal. Diastolic function is normal.    Right Ventricle: Right ventricular cavity size is normal. Systolic function is normal.    Mitral Valve: There is trace regurgitation.    Tricuspid Valve: There is trace regurgitation. The right ventricular systolic pressure is normal.      Recent Cultures (last 7 days):         Last 24 Hours Medication List:   Current Facility-Administered Medications   Medication Dose Route Frequency Provider Last Rate    acetaminophen  650 mg Oral Q6H PRN Cara Sweeney, DO      albuterol  2 puff Inhalation Q4H PRN Cara Sweeney, DO      albuterol  2 puff Inhalation 4x Daily Daron NICOLAS MD      bisacodyl  5 mg Oral Daily PRN Cara Sweeney, DO      cyanocobalamin  500 mcg Oral Daily Daron NICOLAS MD      Diclofenac Sodium  2 g Topical 4x Daily Cara Sweeney, DO      docusate sodium  100 mg  Oral BID Cara Sweeney, DO      famotidine  20 mg Oral HS NII Vázquez      ferrous sulfate  325 mg Oral Daily With Breakfast NII Vázquez      Fluticasone Furoate-Vilanterol  1 puff Inhalation Daily Cara Sweeney, DO      folic acid  400 mcg Oral Daily Daron NICOLAS MD      furosemide  40 mg Oral Daily Daron NICOLAS MD      guaiFENesin  600 mg Oral Q12H TANVIR NII Sweet      heparin (porcine)  7,500 Units Subcutaneous Q8H [facility] Daniel Chaney MD      HYDROmorphone  0.2 mg Intravenous Q8H PRN NII Vázquez      insulin lispro  1-5 Units Subcutaneous HS Cara Sweeney, DO      insulin lispro  2-12 Units Subcutaneous TID AC Cara Sweeney, DO      lidocaine  1 patch Topical Daily Daron NICOLAS MD      menthol-methyl salicylate   Apply externally 4x Daily PRN Eileen Velásquez PA-C      methocarbamol  500 mg Oral Q6H [facility] NII Vázquez      nicotine  1 patch Transdermal Daily Cara Sweeney, DO      nicotine polacrilex  2 mg Oral Q2H PRN Bob Mclaughlin DO      ondansetron  4 mg Intravenous Q6H PRN Cara Sweeney, DO      oxyCODONE  2.5 mg Oral Q4H PRN NII Vázquez      Or    oxyCODONE  5 mg Oral Q4H PRN NII Vázquez      pantoprazole  40 mg Oral BID AC NII Vázquez      polyethylene glycol  17 g Oral BID after meals Magalys Cordoba PA-C      potassium chloride  40 mEq Oral Once Bob Mclaughlin,       sucralfate  1 g Oral BID AC Daniel Chaney MD          Today, Patient Was Seen By: Bob Mclaughlin DO    **Please Note: This note may have been constructed using a voice recognition system.**

## 2024-02-04 NOTE — ASSESSMENT & PLAN NOTE
Body mass index is 49.09 kg/m².    Recommend incorporating a more whole foods plant-predominant diet along with decreasing consumption of red meats and processed foods    Per AHA guidelines, recommend moderate-vigorous intensity exercise for 30 minutes a day for 5 days a week or a total of 150 min/week

## 2024-02-05 PROBLEM — M54.9 CHRONIC BACK PAIN: Status: ACTIVE | Noted: 2024-02-05

## 2024-02-05 PROBLEM — G89.29 CHRONIC BACK PAIN: Status: ACTIVE | Noted: 2024-02-05

## 2024-02-05 LAB
ANION GAP SERPL CALCULATED.3IONS-SCNC: 9 MMOL/L
BUN SERPL-MCNC: 14 MG/DL (ref 5–25)
CALCIUM SERPL-MCNC: 9.2 MG/DL (ref 8.4–10.2)
CHLORIDE SERPL-SCNC: 99 MMOL/L (ref 96–108)
CO2 SERPL-SCNC: 31 MMOL/L (ref 21–32)
CREAT SERPL-MCNC: 0.83 MG/DL (ref 0.6–1.3)
ERYTHROCYTE [DISTWIDTH] IN BLOOD BY AUTOMATED COUNT: 14 % (ref 11.6–15.1)
GFR SERPL CREATININE-BSD FRML MDRD: 77 ML/MIN/1.73SQ M
GLUCOSE SERPL-MCNC: 109 MG/DL (ref 65–140)
GLUCOSE SERPL-MCNC: 114 MG/DL (ref 65–140)
GLUCOSE SERPL-MCNC: 141 MG/DL (ref 65–140)
GLUCOSE SERPL-MCNC: 158 MG/DL (ref 65–140)
GLUCOSE SERPL-MCNC: 170 MG/DL (ref 65–140)
HCT VFR BLD AUTO: 32.1 % (ref 34.8–46.1)
HGB BLD-MCNC: 10.4 G/DL (ref 11.5–15.4)
MAGNESIUM SERPL-MCNC: 2.4 MG/DL (ref 1.9–2.7)
MCH RBC QN AUTO: 29.5 PG (ref 26.8–34.3)
MCHC RBC AUTO-ENTMCNC: 32.4 G/DL (ref 31.4–37.4)
MCV RBC AUTO: 91 FL (ref 82–98)
PLATELET # BLD AUTO: 303 THOUSANDS/UL (ref 149–390)
PMV BLD AUTO: 8.8 FL (ref 8.9–12.7)
POTASSIUM SERPL-SCNC: 3.6 MMOL/L (ref 3.5–5.3)
RBC # BLD AUTO: 3.53 MILLION/UL (ref 3.81–5.12)
SODIUM SERPL-SCNC: 139 MMOL/L (ref 135–147)
WBC # BLD AUTO: 6.34 THOUSAND/UL (ref 4.31–10.16)

## 2024-02-05 PROCEDURE — 82948 REAGENT STRIP/BLOOD GLUCOSE: CPT

## 2024-02-05 PROCEDURE — 85027 COMPLETE CBC AUTOMATED: CPT | Performed by: INTERNAL MEDICINE

## 2024-02-05 PROCEDURE — 83735 ASSAY OF MAGNESIUM: CPT | Performed by: INTERNAL MEDICINE

## 2024-02-05 PROCEDURE — 80048 BASIC METABOLIC PNL TOTAL CA: CPT | Performed by: INTERNAL MEDICINE

## 2024-02-05 PROCEDURE — 99233 SBSQ HOSP IP/OBS HIGH 50: CPT | Performed by: NURSE PRACTITIONER

## 2024-02-05 RX ORDER — OXYCODONE HYDROCHLORIDE 10 MG/1
10 TABLET ORAL EVERY 4 HOURS PRN
Status: DISCONTINUED | OUTPATIENT
Start: 2024-02-05 | End: 2024-02-07 | Stop reason: HOSPADM

## 2024-02-05 RX ORDER — OXYCODONE HYDROCHLORIDE 5 MG/1
5 TABLET ORAL EVERY 4 HOURS PRN
Status: DISCONTINUED | OUTPATIENT
Start: 2024-02-05 | End: 2024-02-07 | Stop reason: HOSPADM

## 2024-02-05 RX ORDER — HYDROMORPHONE HCL/PF 1 MG/ML
0.5 SYRINGE (ML) INJECTION ONCE
Status: COMPLETED | OUTPATIENT
Start: 2024-02-05 | End: 2024-02-05

## 2024-02-05 RX ADMIN — LIDOCAINE 5% 1 PATCH: 700 PATCH TOPICAL at 08:41

## 2024-02-05 RX ADMIN — OXYCODONE HYDROCHLORIDE 5 MG: 5 TABLET ORAL at 05:15

## 2024-02-05 RX ADMIN — MENTHOL, UNSPECIFIED FORM AND METHYL SALICYLATE: 10; 150 CREAM TOPICAL at 18:37

## 2024-02-05 RX ADMIN — CYANOCOBALAMIN TAB 500 MCG 500 MCG: 500 TAB at 08:42

## 2024-02-05 RX ADMIN — INSULIN LISPRO 1 UNITS: 100 INJECTION, SOLUTION INTRAVENOUS; SUBCUTANEOUS at 22:13

## 2024-02-05 RX ADMIN — INSULIN LISPRO 2 UNITS: 100 INJECTION, SOLUTION INTRAVENOUS; SUBCUTANEOUS at 12:00

## 2024-02-05 RX ADMIN — FAMOTIDINE 20 MG: 20 TABLET, FILM COATED ORAL at 22:12

## 2024-02-05 RX ADMIN — FOLIC ACID TAB 400 MCG 400 MCG: 400 TAB at 08:42

## 2024-02-05 RX ADMIN — OXYCODONE HYDROCHLORIDE 10 MG: 10 TABLET ORAL at 20:16

## 2024-02-05 RX ADMIN — DOCUSATE SODIUM 100 MG: 100 CAPSULE, LIQUID FILLED ORAL at 08:42

## 2024-02-05 RX ADMIN — ALBUTEROL SULFATE 2 PUFF: 90 AEROSOL, METERED RESPIRATORY (INHALATION) at 08:41

## 2024-02-05 RX ADMIN — ALBUTEROL SULFATE 2 PUFF: 90 AEROSOL, METERED RESPIRATORY (INHALATION) at 22:12

## 2024-02-05 RX ADMIN — HYDROMORPHONE HYDROCHLORIDE 0.5 MG: 1 INJECTION, SOLUTION INTRAMUSCULAR; INTRAVENOUS; SUBCUTANEOUS at 18:38

## 2024-02-05 RX ADMIN — POLYETHYLENE GLYCOL 3350 17 G: 17 POWDER, FOR SOLUTION ORAL at 17:00

## 2024-02-05 RX ADMIN — ALBUTEROL SULFATE 2 PUFF: 90 AEROSOL, METERED RESPIRATORY (INHALATION) at 13:00

## 2024-02-05 RX ADMIN — SUCRALFATE 1 G: 1 TABLET ORAL at 08:00

## 2024-02-05 RX ADMIN — FERROUS SULFATE TAB 325 MG (65 MG ELEMENTAL FE) 325 MG: 325 (65 FE) TAB at 08:30

## 2024-02-05 RX ADMIN — METHOCARBAMOL TABLETS 500 MG: 500 TABLET, COATED ORAL at 05:15

## 2024-02-05 RX ADMIN — GUAIFENESIN 600 MG: 600 TABLET ORAL at 22:12

## 2024-02-05 RX ADMIN — POLYETHYLENE GLYCOL 3350 17 G: 17 POWDER, FOR SOLUTION ORAL at 08:41

## 2024-02-05 RX ADMIN — GUAIFENESIN 600 MG: 600 TABLET ORAL at 08:42

## 2024-02-05 RX ADMIN — METHOCARBAMOL TABLETS 500 MG: 500 TABLET, COATED ORAL at 23:11

## 2024-02-05 RX ADMIN — SUCRALFATE 1 G: 1 TABLET ORAL at 17:00

## 2024-02-05 RX ADMIN — METHOCARBAMOL TABLETS 500 MG: 500 TABLET, COATED ORAL at 13:00

## 2024-02-05 RX ADMIN — METHOCARBAMOL TABLETS 500 MG: 500 TABLET, COATED ORAL at 18:34

## 2024-02-05 RX ADMIN — ALBUTEROL SULFATE 2 PUFF: 90 AEROSOL, METERED RESPIRATORY (INHALATION) at 18:37

## 2024-02-05 RX ADMIN — FLUTICASONE FUROATE AND VILANTEROL TRIFENATATE 1 PUFF: 100; 25 POWDER RESPIRATORY (INHALATION) at 08:41

## 2024-02-05 RX ADMIN — PANTOPRAZOLE SODIUM 40 MG: 40 TABLET, DELAYED RELEASE ORAL at 08:00

## 2024-02-05 RX ADMIN — PANTOPRAZOLE SODIUM 40 MG: 40 TABLET, DELAYED RELEASE ORAL at 17:00

## 2024-02-05 RX ADMIN — OXYCODONE HYDROCHLORIDE 5 MG: 5 TABLET ORAL at 13:18

## 2024-02-05 RX ADMIN — OXYCODONE HYDROCHLORIDE 5 MG: 5 TABLET ORAL at 09:07

## 2024-02-05 RX ADMIN — DOCUSATE SODIUM 100 MG: 100 CAPSULE, LIQUID FILLED ORAL at 18:34

## 2024-02-05 NOTE — PLAN OF CARE
Problem: Potential for Falls  Goal: Patient will remain free of falls  Description: INTERVENTIONS:  - Educate patient/family on patient safety including physical limitations  - Instruct patient to call for assistance with activity   - Consult OT/PT to assist with strengthening/mobility   - Keep Call bell within reach  - Keep bed low and locked with side rails adjusted as appropriate  - Keep care items and personal belongings within reach  - Initiate and maintain comfort rounds  - Make Fall Risk Sign visible to staff  - Offer Toileting every    Hours, in advance of need  - Initiate/Maintain   alarm  - Obtain necessary fall risk management equipment:     - Apply yellow socks and bracelet for high fall risk patients  - Consider moving patient to room near nurses station  Outcome: Progressing     Problem: PAIN - ADULT  Goal: Verbalizes/displays adequate comfort level or baseline comfort level  Description: Interventions:  - Encourage patient to monitor pain and request assistance  - Assess pain using appropriate pain scale  - Administer analgesics based on type and severity of pain and evaluate response  - Implement non-pharmacological measures as appropriate and evaluate response  - Consider cultural and social influences on pain and pain management  - Notify physician/advanced practitioner if interventions unsuccessful or patient reports new pain  Outcome: Progressing     Problem: INFECTION - ADULT  Goal: Absence or prevention of progression during hospitalization  Description: INTERVENTIONS:  - Assess and monitor for signs and symptoms of infection  - Monitor lab/diagnostic results  - Monitor all insertion sites, i.e. indwelling lines, tubes, and drains  - Monitor endotracheal if appropriate and nasal secretions for changes in amount and color  - [place] appropriate cooling/warming therapies per order  - Administer medications as ordered  - Instruct and encourage patient and family to use good hand hygiene  technique  - Identify and instruct in appropriate isolation precautions for identified infection/condition  Outcome: Progressing  Goal: Absence of fever/infection during neutropenic period  Description: INTERVENTIONS:  - Monitor WBC    Outcome: Progressing     Problem: SAFETY ADULT  Goal: Patient will remain free of falls  Description: INTERVENTIONS:  - Educate patient/family on patient safety including physical limitations  - Instruct patient to call for assistance with activity   - Consult OT/PT to assist with strengthening/mobility   - Keep Call bell within reach  - Keep bed low and locked with side rails adjusted as appropriate  - Keep care items and personal belongings within reach  - Initiate and maintain comfort rounds  - Make Fall Risk Sign visible to staff  - Offer Toileting every    Hours, in advance of need  - Initiate/Maintain   alarm  - Obtain necessary fall risk management equipment:   - Apply yellow socks and bracelet for high fall risk patients  - Consider moving patient to room near nurses station  Outcome: Progressing  Goal: Maintain or return to baseline ADL function  Description: INTERVENTIONS:  -  Assess patient's ability to carry out ADLs; assess patient's baseline for ADL function and identify physical deficits which impact ability to perform ADLs (bathing, care of mouth/teeth, toileting, grooming, dressing, etc.)  - Assess/evaluate cause of self-care deficits   - Assess range of motion  - Assess patient's mobility; develop plan if impaired  - Assess patient's need for assistive devices and provide as appropriate  - Encourage maximum independence but intervene and supervise when necessary  - Involve family in performance of ADLs  - Assess for home care needs following discharge   - Consider OT consult to assist with ADL evaluation and planning for discharge  - Provide patient education as appropriate  Outcome: Progressing  Goal: Maintains/Returns to pre admission functional level  Description:  INTERVENTIONS:  - Perform AM-PAC 6 Click Basic Mobility/ Daily Activity assessment daily.  - Set and communicate daily mobility goal to care team and patient/family/caregiver.   - Collaborate with rehabilitation services on mobility goals if consulted  - Perform Range of Motion    times a day.  - Reposition patient every      hours.  - Dangle patient    times a day  - Stand patient    times a day  - Ambulate patient    times a day  - Out of bed to chair    times a day   - Out of bed for meals    times a day  - Out of bed for toileting  - Record patient progress and toleration of activity level   Outcome: Progressing     Problem: DISCHARGE PLANNING  Goal: Discharge to home or other facility with appropriate resources  Description: INTERVENTIONS:  - Identify barriers to discharge w/patient and caregiver  - Arrange for needed discharge resources and transportation as appropriate  - Identify discharge learning needs (meds, wound care, etc.)  - Arrange for interpretive services to assist at discharge as needed  - Refer to Case Management Department for coordinating discharge planning if the patient needs post-hospital services based on physician/advanced practitioner order or complex needs related to functional status, cognitive ability, or social support system  Outcome: Progressing     Problem: Knowledge Deficit  Goal: Patient/family/caregiver demonstrates understanding of disease process, treatment plan, medications, and discharge instructions  Description: Complete learning assessment and assess knowledge base.  Interventions:  - Provide teaching at level of understanding  - Provide teaching via preferred learning methods  Outcome: Progressing     Problem: RESPIRATORY - ADULT  Goal: Achieves optimal ventilation and oxygenation  Description: INTERVENTIONS:  - Assess for changes in respiratory status  - Assess for changes in mentation and behavior  - Position to facilitate oxygenation and minimize respiratory effort  -  Oxygen administered by appropriate delivery if ordered  - Initiate smoking cessation education as indicated  - Encourage broncho-pulmonary hygiene including cough, deep breathe, Incentive Spirometry  - Assess the need for suctioning and aspirate as needed  - Assess and instruct to report SOB or any respiratory difficulty  - Respiratory Therapy support as indicated  Outcome: Progressing     Problem: GASTROINTESTINAL - ADULT  Goal: Maintains or returns to baseline bowel function  Description: INTERVENTIONS:  - Assess bowel function  - Encourage oral fluids to ensure adequate hydration  - Administer IV fluids if ordered to ensure adequate hydration  - Administer ordered medications as needed  - Encourage mobilization and activity  - Consider nutritional services referral to assist patient with adequate nutrition and appropriate food choices  Outcome: Progressing     Problem: GENITOURINARY - ADULT  Goal: Absence of urinary retention  Description: INTERVENTIONS:  - Assess patient’s ability to void and empty bladder  - Monitor I/O  - Bladder scan as needed  - Discuss with physician/AP medications to alleviate retention as needed  - Discuss catheterization for long term situations as appropriate  Outcome: Progressing

## 2024-02-05 NOTE — CASE MANAGEMENT
Case Management Progress Note    Patient name Nanci Navarro  Location /-01 MRN [id number]  : 1965 Date 2024       LOS (days): 17  Geometric Mean LOS (GMLOS) (days):   Days to GMLOS:        OBJECTIVE:    Current admission status: Inpatient  Preferred Pharmacy:   [facility] - [place], [place] - [address]  [address]  [address]  [facility] [address]  Phone: [phone] Fax: [phone]    [id number] [place] - [address]  [address] [address]  Phone: [phone] Fax: [phone]    [facility] [place] [address] [facility],  [address],  [address] [place] [facility] [address]  Phone: [phone] Fax: [phone]    [id number] [place] - [address]  [address]  [facility] [address]  Phone: [phone] Fax: [phone]    [facility]  [place], [place] - [address]  [address]  [place] [address]  Phone: [phone] Fax: [phone]    Primary Care Provider: Renan Montero DO  Primary Insurance: [employer]ENDING  Secondary Insurance:     PROGRESS NOTE:  CM and CC met with patient at bedside to review DCP status.  Per administration, the [facility] has denied the permit for electric as the property demo is not complete.  Patient states she has been working with the town to meet their requirements and will call to follow up.  Administration has approved for 2 weeks paid in [employer] for patient and spouse along with a completely paid portable concentrator.  Patient will need to complete [place] application and contact [facility] for housing support, prior to d/c.  Assigned CM available to assist in process, patient is agreeable.  Plan for d/c is Wednesday, . Patient will be provided with additional resources including shelter list - [facility], [facility] [place], [place], & [facility]  already given.  Patient states she needs to review with her spouse but he is not due to hospital until after dinner.  CM providing written status of d/c plan for patient to review with spouse.  CM will continue to follow for d/c planning and coordination.     Patient also requesting to see SLIM provider for the follow questions/concerns:  pain control, pulm re: COPD status & disease trajectory, nephro re: kidney pain, and ambulation support.  SLIM updated.

## 2024-02-05 NOTE — ASSESSMENT & PLAN NOTE
Patient has a longstanding history of chronic back pain  Patient is admission with was on IV Dilaudid but has since been discontinued   Patient endorses she was once on a pain contract however her provider due to [place] regulations would no longer do pain contract for long-term pain medication inpatient was weaned off she continues to have longstanding back pain issues  And is currently on oxycodone 2.5 mg for moderate pain and oxycodone 5 mg for severe pain as needed for every 4 hours.  Will increase oxycodone to 5 mg for moderate pain and 10 mg for severe pain with the understanding it is at the discretion of the discharging provider on what prescription she may or may not be given upon discharge.  Patient is having significant back pain we will give a one-time dose of IV Dilaudid now 0.5 mg.

## 2024-02-05 NOTE — PLAN OF CARE
Problem: Potential for Falls  Goal: Patient will remain free of falls  Description: INTERVENTIONS:  - Educate patient/family on patient safety including physical limitations  - Instruct patient to call for assistance with activity   - Consult OT/PT to assist with strengthening/mobility   - Keep Call bell within reach  - Keep bed low and locked with side rails adjusted as appropriate  - Keep care items and personal belongings within reach  - Initiate and maintain comfort rounds  - Make Fall Risk Sign visible to staff  - Offer Toileting every 2 Hours, in advance of need  - Obtain necessary fall risk management equipment: nonskid socks  - Apply yellow socks and bracelet for high fall risk patients  - Consider moving patient to room near nurses station  Outcome: Progressing     Problem: PAIN - ADULT  Goal: Verbalizes/displays adequate comfort level or baseline comfort level  Description: Interventions:  - Encourage patient to monitor pain and request assistance  - Assess pain using appropriate pain scale  - Administer analgesics based on type and severity of pain and evaluate response  - Implement non-pharmacological measures as appropriate and evaluate response  - Consider cultural and social influences on pain and pain management  - Notify physician/advanced practitioner if interventions unsuccessful or patient reports new pain  Outcome: Progressing     Problem: INFECTION - ADULT  Goal: Absence or prevention of progression during hospitalization  Description: INTERVENTIONS:  - Assess and monitor for signs and symptoms of infection  - Monitor lab/diagnostic results  - Monitor all insertion sites, i.e. indwelling lines, tubes, and drains  - Monitor endotracheal if appropriate and nasal secretions for changes in amount and color  - [place] appropriate cooling/warming therapies per order  - Administer medications as ordered  - Instruct and encourage patient and family to use good hand hygiene technique  - Identify and  instruct in appropriate isolation precautions for identified infection/condition  Outcome: Progressing  Goal: Absence of fever/infection during neutropenic period  Description: INTERVENTIONS:  - Monitor WBC    Outcome: Progressing     Problem: SAFETY ADULT  Goal: Patient will remain free of falls  Description: INTERVENTIONS:  - Educate patient/family on patient safety including physical limitations  - Instruct patient to call for assistance with activity   - Consult OT/PT to assist with strengthening/mobility   - Keep Call bell within reach  - Keep bed low and locked with side rails adjusted as appropriate  - Keep care items and personal belongings within reach  - Initiate and maintain comfort rounds  - Make Fall Risk Sign visible to staff  - Offer Toileting every 2 Hours, in advance of need  - Obtain necessary fall risk management equipment: nonskid socks  - Apply yellow socks and bracelet for high fall risk patients  - Consider moving patient to room near nurses station  Outcome: Progressing  Goal: Maintain or return to baseline ADL function  Description: INTERVENTIONS:  -  Assess patient's ability to carry out ADLs; assess patient's baseline for ADL function and identify physical deficits which impact ability to perform ADLs (bathing, care of mouth/teeth, toileting, grooming, dressing, etc.)  - Assess/evaluate cause of self-care deficits   - Assess range of motion  - Assess patient's mobility; develop plan if impaired  - Assess patient's need for assistive devices and provide as appropriate  - Encourage maximum independence but intervene and supervise when necessary  - Involve family in performance of ADLs  - Assess for home care needs following discharge   - Consider OT consult to assist with ADL evaluation and planning for discharge  - Provide patient education as appropriate  Outcome: Progressing  Goal: Maintains/Returns to pre admission functional level  Description: INTERVENTIONS:  - Perform AM-PAC 6 Click  Basic Mobility/ Daily Activity assessment daily.  - Set and communicate daily mobility goal to care team and patient/family/caregiver.   - Collaborate with rehabilitation services on mobility goals if consulted  - Perform Range of Motion 4 times a day.  - Reposition patient every 2 hours.  - Dangle patient 4 times a day  - Stand patient 4 times a day  - Ambulate patient 4 times a day  - Out of bed to chair 4 times a day   - Out of bed for meals 4 times a day  - Out of bed for toileting  - Record patient progress and toleration of activity level   Outcome: Progressing     Problem: DISCHARGE PLANNING  Goal: Discharge to home or other facility with appropriate resources  Description: INTERVENTIONS:  - Identify barriers to discharge w/patient and caregiver  - Arrange for needed discharge resources and transportation as appropriate  - Identify discharge learning needs (meds, wound care, etc.)  - Arrange for interpretive services to assist at discharge as needed  - Refer to Case Management Department for coordinating discharge planning if the patient needs post-hospital services based on physician/advanced practitioner order or complex needs related to functional status, cognitive ability, or social support system  Outcome: Progressing     Problem: Knowledge Deficit  Goal: Patient/family/caregiver demonstrates understanding of disease process, treatment plan, medications, and discharge instructions  Description: Complete learning assessment and assess knowledge base.  Interventions:  - Provide teaching at level of understanding  - Provide teaching via preferred learning methods  Outcome: Progressing     Problem: RESPIRATORY - ADULT  Goal: Achieves optimal ventilation and oxygenation  Description: INTERVENTIONS:  - Assess for changes in respiratory status  - Assess for changes in mentation and behavior  - Position to facilitate oxygenation and minimize respiratory effort  - Oxygen administered by appropriate delivery if  ordered  - Initiate smoking cessation education as indicated  - Encourage broncho-pulmonary hygiene including cough, deep breathe, Incentive Spirometry  - Assess the need for suctioning and aspirate as needed  - Assess and instruct to report SOB or any respiratory difficulty  - Respiratory Therapy support as indicated  Outcome: Progressing     Problem: GASTROINTESTINAL - ADULT  Goal: Maintains or returns to baseline bowel function  Description: INTERVENTIONS:  - Assess bowel function  - Encourage oral fluids to ensure adequate hydration  - Administer IV fluids if ordered to ensure adequate hydration  - Administer ordered medications as needed  - Encourage mobilization and activity  - Consider nutritional services referral to assist patient with adequate nutrition and appropriate food choices  Outcome: Progressing     Problem: GENITOURINARY - ADULT  Goal: Absence of urinary retention  Description: INTERVENTIONS:  - Assess patient’s ability to void and empty bladder  - Monitor I/O  - Bladder scan as needed  - Discuss with physician/AP medications to alleviate retention as needed  - Discuss catheterization for long term situations as appropriate  Outcome: Progressing

## 2024-02-05 NOTE — PROGRESS NOTES
Critical access hospital  Progress Note  Name: Nanci Navraro I  MRN: [id number]  Unit/Bed#: -01 I Date of Admission: 1/18/2024   Date of Service: 2/5/2024 I Hospital Day: 17    Assessment/Plan   Chronic back pain  Assessment & Plan  Patient has a longstanding history of chronic back pain  Patient is admission with was on IV Dilaudid but has since been discontinued   Patient endorses she was once on a pain contract however her provider due to [employer] regulations would no longer do pain contract for long-term pain medication inpatient was weaned off she continues to have longstanding back pain issues  And is currently on oxycodone 2.5 mg for moderate pain and oxycodone 5 mg for severe pain as needed for every 4 hours.  Will increase oxycodone to 5 mg for moderate pain and 10 mg for severe pain with the understanding it is at the discretion of the discharging provider on what prescription she may or may not be given upon discharge.  Patient is having significant back pain we will give a one-time dose of IV Dilaudid now 0.5 mg.    Class 3 severe obesity due to excess calories with serious comorbidity and body mass index (BMI) of 45.0 to 49.9 in adult (HCC)  Assessment & Plan  Body mass index is 49.09 kg/m².    Recommend incorporating a more whole foods plant-predominant diet along with decreasing consumption of red meats and processed foods    Per AHA guidelines, recommend moderate-vigorous intensity exercise for 30 minutes a day for 5 days a week or a total of 150 min/week      Anemia  Assessment & Plan  Recent Labs     02/02/24  0531 02/03/24  0447 02/04/24  0442   HGB 8.8* 9.2* 9.8*      Present on admission with a hemoglobin of 11.3  Baseline appears to be 11-13  Iron panel collected  Iron 28, Iron Sat 10, Ferritin 65, TIBC 268  Folate 7, Vitamin B12 171  Continue with Iron supplementation, Folic Acid and B12.  Underwent EGD 2/3 to rule out H. Pylori and celiac; follow up results  Improving    Chronic  respiratory failure with hypoxia (HCC)  Assessment & Plan  Non-compliant with chronic oxygen requirements  Home oxygen evaluation completed on 1/22, repeated on 1/31 - still requiring 2 L of supplemental oxygen at discharge    Currently SpO2: 92 % on Nasal Cannula O2 Flow Rate (L/min): 2 L/min    See full plan as noted above    Volume overload  Assessment & Plan  Complains of bilateral leg swelling, which is non-pitting at this time.  No history of CHF, last known EF from Echo in January '23 shows an EF of 65% with normal diastolic function.  Echo (1/25/24): The left ventricular ejection fraction is 65%. Systolic function is normal. Wall motion is normal. Diastolic function is mildly abnormal, consistent with grade I (abnormal) relaxation.   Continue 40 mg PO Lasix daily  Ace wraps for compression to BLE  Chest x-ray with atelectasis, trace pleural effusions continue to encourage I-S    Compression fracture of L1 vertebra (HCC)  Assessment & Plan  Reports following with a pain specialist as an outpatient  Continue with Tylenol, Robaxin, Lidocaine patch  Low dose Oxycodone for moderate/severe pain.    Smoker  Assessment & Plan  Is willing to stop smoking now she is required a lengthy hospital stay   Tobacco Use: High Risk (1/18/2024)    Patient History     Smoking Tobacco Use: Every Day     Smokeless Tobacco Use: Never     Passive Exposure: Not on file     Tobacco cessation counseling provided for > 3 min  NRT ordered with patch and PRN nicorette gum    Chronic obstructive pulmonary disease with acute exacerbation (HCC)  Assessment & Plan  Patient presented to the ED with complaints of chest & flank pain with shortness of breath.  Recently hospitalized and treated for a UTI with IV Ceftriaxone.  Chest x-ray (1/18/24): Bibasilar atelectasis with further imaging remaining consistent with atelectasis on 1/24, 1/30  No sign of active exacerbation throughout hospitalization, even on admission.  Had been prescribed 1-2 L  of supplemental oxygen as needed, however, has not been using it as there is an open fire for heat in her camper.  Repeat imaging completed on 1/24 with ongoing signs of atelectasis; extensive discussion regarding incentive spirometer use.  Continue mucinex, incentive spirometry, flutter valve, humidification to O2   Formal consult from pulmonology completed on 2/1  Unknown severity of COPD  Recommending OP follow-up for pulmonary function tests [none to review in records]  Continue with Nebulizer treatments ATC, Long acting inhaler [Breo]  Recommended to discharge home with Albuterol nebulizers 4x/daily, Breo and Home Oxygen.    * Discharge planning issues  Assessment & Plan  Patient needs home oxygen on discharge; she has had it in the past, but her concentrator has been misplaced during a move.  Case management is working on safe discharge as she has open flame for heat in her home without electricity   Remains in the hospital until safe discharge plan in place.  Repeat ambulatory pulse ox completed on 1/31, requiring 2 L of oxygen with exertion.  Care team meeting completed on 2/1, plan initially to return electricity however this is now not an option  Continue multidisciplinary approach to discharge planning    Hypokalemia-resolved as of 1/25/2024  Assessment & Plan  Now resolved               VTE Pharmacologic Prophylaxis: VTE Score: 3 Moderate Risk (Score 3-4) - Pharmacological DVT Prophylaxis Ordered: heparin.    Mobility:   Basic Mobility Inpatient Raw Score: 24  JH-HLM Goal: 8: Walk 250 feet or more  JH-HLM Achieved: 6: Walk 10 steps or more  HLM Goal NOT achieved. Continue with multidisciplinary rounding and encourage appropriate mobility to improve upon HLM goals.    Patient Centered Rounds: I performed bedside rounds with nursing staff today.   Discussions with Specialists or Other Care Team Provider: Case management    Education and Discussions with Family / Patient: Patient declined call to contact  person.     Total Time Spent on Date of Encounter in care of patient: 60 mins. This time was spent on one or more of the following: performing physical exam; counseling and coordination of care; obtaining or reviewing history; documenting in the medical record; reviewing/ordering tests, medications or procedures; communicating with other healthcare professionals and discussing with patient's family/caregivers.    Current Length of Stay: 17 day(s)  Current Patient Status: Inpatient   Certification Statement: The patient will continue to require additional inpatient hospital stay due to acute respiratory failure with need for home oxygen  Discharge Plan:  Stable pending safe dispo plan per case management    Code Status: Level 1 - Full Code    Subjective:   Patient lengthy discussion she continues to ask questions about wanting to see nephrology for her back pain pulmonology discussion regarding her COPD and ongoing oxygen use.  Further discussion on her pain management    Patient educated on her infection was stemming from the bladder and not her kidneys and discussed her chronic back pain which she states she has she fractured her back many years ago was on a pain contract with her primary care provider however due to regulation changes slowly weaned her off and no longer would prescribe chronic pain medication.  She has been trying to seek pain management steroid injections, was offered medical marijuana which she is not interested in.  Encourage possible second opinion referral to pain management provider that may better suit her pain management needs was discussed which she said she cannot financially do at this time.  Her COPD at length and the use for oxygen how it supports organs and until further testing can be done outpatient her long-term goal with oxygen but cannot be determined at this time.  Patient reports she is not sure she will follow-up due to financial reasons and not being insured.  Understands  that insurance is being sought for her and should continue to see providers in the outpatient world.    She did mention to me that she was planning to return to her trailer case management is working on a safe discharge plan and patient again encouraged to have a lengthy discussion with her  tonight.    Objective:     Vitals:   Temp (24hrs), Av.2 °F (36.8 °C), Min:98.2 °F (36.8 °C), Max:98.2 °F (36.8 °C)    Temp:  [98.2 °F (36.8 °C)] 98.2 °F (36.8 °C)  HR:  [58-80] 74  Resp:  [18] 18  BP: ()/(49-75) 107/49  SpO2:  [93 %-97 %] 95 %  Body mass index is 44.7 kg/m².     Input and Output Summary (last 24 hours):   No intake or output data in the 24 hours ending 24 180    Physical Exam:   Physical Exam  Vitals and nursing note reviewed.   Constitutional:       General: She is not in acute distress.     Appearance: She is well-developed.   HENT:      Head: Normocephalic and atraumatic.   Eyes:      Conjunctiva/sclera: Conjunctivae normal.   Cardiovascular:      Rate and Rhythm: Normal rate and regular rhythm.      Heart sounds: No murmur heard.  Pulmonary:      Effort: Pulmonary effort is normal. No respiratory distress.      Breath sounds: Normal breath sounds.   Abdominal:      Palpations: Abdomen is soft.      Tenderness: There is no abdominal tenderness.   Musculoskeletal:         General: No swelling.      Cervical back: Neck supple.   Skin:     General: Skin is warm and dry.      Capillary Refill: Capillary refill takes less than 2 seconds.   Neurological:      Mental Status: She is alert and oriented to person, place, and time.   Psychiatric:         Mood and Affect: Mood normal.          Additional Data:     Labs:  Results from last 7 days   Lab Units 24  0517   WBC Thousand/uL 6.34   HEMOGLOBIN g/dL 10.4*   HEMATOCRIT % 32.1*   PLATELETS Thousands/uL 303     Results from last 7 days   Lab Units 24  0517   SODIUM mmol/L 139   POTASSIUM mmol/L 3.6   CHLORIDE mmol/L 99   CO2 mmol/L  31   BUN mg/dL 14   CREATININE mg/dL 0.83   ANION GAP mmol/L 9   CALCIUM mg/dL 9.2   GLUCOSE RANDOM mg/dL 109         Results from last 7 days   Lab Units 02/05/24  1646 02/05/24  1119 02/05/24  0717 02/04/24  2107 02/04/24  1558 02/04/24  1134 02/04/24  0706 02/03/24  2103 02/03/24  1633 02/03/24  1332 02/03/24  1022 02/03/24  0710   POC GLUCOSE mg/dl 141* 158* 114 137 124 137 128 124 133 87 113 134         Results from last 7 days   Lab Units 01/31/24  1455   PROCALCITONIN ng/ml <0.05       Lines/Drains:  Invasive Devices       Peripheral Intravenous Line  Duration             Peripheral IV 02/04/24 Left;Proximal;Ventral (anterior) Forearm 1 day                          Imaging: No pertinent imaging reviewed.    Recent Cultures (last 7 days):         Last 24 Hours Medication List:   Current Facility-Administered Medications   Medication Dose Route Frequency Provider Last Rate    acetaminophen  500 mg Intravenous Q4H PRN Bob Mclaughlin  mg (02/04/24 2211)    albuterol  2 puff Inhalation Q4H PRN Cara Sweeney DO      albuterol  2 puff Inhalation 4x Daily Daron NICOLAS MD      bisacodyl  5 mg Oral Daily PRN Cara Sweeney, DO      cyanocobalamin  500 mcg Oral Daily Daron NICOLAS MD      Diclofenac Sodium  2 g Topical 4x Daily Cara Sweeney DO      docusate sodium  100 mg Oral BID Cara Sweeney DO      famotidine  20 mg Oral HS NII Vázquez      ferrous sulfate  325 mg Oral Daily With Breakfast NII Vázquez      Fluticasone Furoate-Vilanterol  1 puff Inhalation Daily Cara Sweeney DO      folic acid  400 mcg Oral Daily Daron NICOLAS MD      furosemide  40 mg Oral Daily Daron NICOLAS MD      guaiFENesin  600 mg Oral Q12H NII Coles      heparin (porcine)  7,500 Units Subcutaneous Q8H Pending sale to [facility] Daniel Chaney MD      HYDROmorphone  0.5 mg Intravenous Once NII Aparicio      insulin lispro  1-5 Units  Subcutaneous HS Cara Sweeney, DO      insulin lispro  2-12 Units Subcutaneous TID AC Cara Sweeney, DO      lidocaine  1 patch Topical Daily Daron NICOLAS MD      menthol-methyl salicylate   Apply externally 4x Daily PRN Eileen Velásquez PA-C      methocarbamol  500 mg Oral Q6H TANVIR NII Vázquez      nicotine  1 patch Transdermal Daily Carajuana Sweeney, DO      nicotine polacrilex  2 mg Oral Q2H PRN Bob Mclaughlin, DO      ondansetron  4 mg Intravenous Q6H PRN Cara Sweeney, DO      oxyCODONE  5 mg Oral Q4H PRN NII Aparicio      Or    oxyCODONE  10 mg Oral Q4H PRN NII Aparicio      pantoprazole  40 mg Oral BID AC NII Vázquez      polyethylene glycol  17 g Oral BID after meals Magalys Cordoba PA-C      sucralfate  1 g Oral BID AC Daniel Chaney MD          Today, Patient Was Seen By: NII Aparicio    **Please Note: This note may have been constructed using a voice recognition system.**

## 2024-02-05 NOTE — CASE MANAGEMENT
Case Management Progress Note    Patient name Nanci Navarro  Location /-01 MRN [id number]  : 1965 Date 2024       LOS (days): 17  Geometric Mean LOS (GMLOS) (days):   Days to GMLOS:        OBJECTIVE:        Current admission status: Inpatient  Preferred Pharmacy:   [facility] [place] - [address]  [address]  [address]  [facility] [address]  Phone: [phone] Fax: [phone]    [id number] [place] - [address]  [address] [address]  Phone: [phone] Fax: [phone]    [facility] [place] [address] [facility],  [address] [facility],  Ground Floor [address] [facility] [address]  Phone: [phone] Fax: [phone]    [id number] [place] - [address]  [address]  [facility] [address]  Phone: [phone] Fax: [phone]    [facility] [place]  [place] - [address]  [address]  [place] [address]  Phone: [phone] Fax: [phone]    Primary Care Provider: Renan Montero DO    Primary Insurance: DOROTHY ANN PENDING  Secondary Insurance:     PROGRESS NOTE:    CM met with hospital president and engineering via Teams to discuss next steps. The plan is as followed:     Engineering is contacting the [facility] today to confirm they would approve electric at the property. He is also contacting the contractor to arrange setting up the electric source at the property. The  is anticipated to go to the home today to assess the property and what will be needed.   is looking into a motel that we can send patient and  to for about 2-3 weeks while the electric is being set up.   Hospital president is going to contact [facility] to discuss the financial piece. He will bring a heater in tomorrow that he is going to donate to the family to  replace their open flame heat source.  Assigned CM will need to meet with the patient to review the above plan and complete the MA application. She will also need to inform the patient that patient and  will be responsible for calling [employer] and setting up the new account/coordinating the power being turned back on. Hospital is just providing them with the electric capability.  The team hopes to have this in place by Wednesday, 2/7.    CM informed the treatment team of the same via email.     CM then met with patient at bedside. Per engineering, the [facility] needs documentation from the demo team. Patient reported that the [facility] should have this documentation already, and she was working with a lady named Nanci through the [facility]. Engineering plans to call the [facility] back to discuss this information. CM department will continue to follow patient through discharge.

## 2024-02-06 ENCOUNTER — PATIENT OUTREACH (OUTPATIENT)
Dept: CASE MANAGEMENT | Facility: OTHER | Age: 59
End: 2024-02-06

## 2024-02-06 LAB
ERYTHROCYTE [DISTWIDTH] IN BLOOD BY AUTOMATED COUNT: 13.8 % (ref 11.6–15.1)
GLUCOSE SERPL-MCNC: 111 MG/DL (ref 65–140)
GLUCOSE SERPL-MCNC: 134 MG/DL (ref 65–140)
GLUCOSE SERPL-MCNC: 135 MG/DL (ref 65–140)
GLUCOSE SERPL-MCNC: 248 MG/DL (ref 65–140)
HCT VFR BLD AUTO: 30.3 % (ref 34.8–46.1)
HGB BLD-MCNC: 9.6 G/DL (ref 11.5–15.4)
MCH RBC QN AUTO: 29.1 PG (ref 26.8–34.3)
MCHC RBC AUTO-ENTMCNC: 31.7 G/DL (ref 31.4–37.4)
MCV RBC AUTO: 92 FL (ref 82–98)
PLATELET # BLD AUTO: 335 THOUSANDS/UL (ref 149–390)
PMV BLD AUTO: 8.8 FL (ref 8.9–12.7)
RBC # BLD AUTO: 3.3 MILLION/UL (ref 3.81–5.12)
WBC # BLD AUTO: 7.33 THOUSAND/UL (ref 4.31–10.16)

## 2024-02-06 PROCEDURE — 82948 REAGENT STRIP/BLOOD GLUCOSE: CPT

## 2024-02-06 PROCEDURE — 85027 COMPLETE CBC AUTOMATED: CPT | Performed by: NURSE PRACTITIONER

## 2024-02-06 PROCEDURE — 99233 SBSQ HOSP IP/OBS HIGH 50: CPT | Performed by: NURSE PRACTITIONER

## 2024-02-06 PROCEDURE — 88305 TISSUE EXAM BY PATHOLOGIST: CPT | Performed by: PATHOLOGY

## 2024-02-06 RX ORDER — KETOROLAC TROMETHAMINE 30 MG/ML
30 INJECTION, SOLUTION INTRAMUSCULAR; INTRAVENOUS ONCE
Status: COMPLETED | OUTPATIENT
Start: 2024-02-06 | End: 2024-02-06

## 2024-02-06 RX ADMIN — OXYCODONE HYDROCHLORIDE 10 MG: 10 TABLET ORAL at 06:30

## 2024-02-06 RX ADMIN — GUAIFENESIN 600 MG: 600 TABLET ORAL at 09:09

## 2024-02-06 RX ADMIN — OXYCODONE HYDROCHLORIDE 10 MG: 10 TABLET ORAL at 16:51

## 2024-02-06 RX ADMIN — FLUTICASONE FUROATE AND VILANTEROL TRIFENATATE 1 PUFF: 100; 25 POWDER RESPIRATORY (INHALATION) at 09:07

## 2024-02-06 RX ADMIN — ALBUTEROL SULFATE 2 PUFF: 90 AEROSOL, METERED RESPIRATORY (INHALATION) at 17:51

## 2024-02-06 RX ADMIN — METHOCARBAMOL TABLETS 500 MG: 500 TABLET, COATED ORAL at 17:51

## 2024-02-06 RX ADMIN — KETOROLAC TROMETHAMINE 30 MG: 30 INJECTION, SOLUTION INTRAMUSCULAR at 15:18

## 2024-02-06 RX ADMIN — OXYCODONE HYDROCHLORIDE 10 MG: 10 TABLET ORAL at 22:58

## 2024-02-06 RX ADMIN — METHOCARBAMOL TABLETS 500 MG: 500 TABLET, COATED ORAL at 06:30

## 2024-02-06 RX ADMIN — ALBUTEROL SULFATE 2 PUFF: 90 AEROSOL, METERED RESPIRATORY (INHALATION) at 09:07

## 2024-02-06 RX ADMIN — ALBUTEROL SULFATE 2 PUFF: 90 AEROSOL, METERED RESPIRATORY (INHALATION) at 22:00

## 2024-02-06 RX ADMIN — LIDOCAINE 5% 1 PATCH: 700 PATCH TOPICAL at 09:08

## 2024-02-06 RX ADMIN — Medication 2 G: at 09:07

## 2024-02-06 RX ADMIN — PANTOPRAZOLE SODIUM 40 MG: 40 TABLET, DELAYED RELEASE ORAL at 16:51

## 2024-02-06 RX ADMIN — FERROUS SULFATE TAB 325 MG (65 MG ELEMENTAL FE) 325 MG: 325 (65 FE) TAB at 09:10

## 2024-02-06 RX ADMIN — DOCUSATE SODIUM 100 MG: 100 CAPSULE, LIQUID FILLED ORAL at 09:09

## 2024-02-06 RX ADMIN — SUCRALFATE 1 G: 1 TABLET ORAL at 09:16

## 2024-02-06 RX ADMIN — FOLIC ACID TAB 400 MCG 400 MCG: 400 TAB at 09:09

## 2024-02-06 RX ADMIN — INSULIN LISPRO 2 UNITS: 100 INJECTION, SOLUTION INTRAVENOUS; SUBCUTANEOUS at 23:00

## 2024-02-06 RX ADMIN — ALBUTEROL SULFATE 2 PUFF: 90 AEROSOL, METERED RESPIRATORY (INHALATION) at 12:15

## 2024-02-06 RX ADMIN — CYANOCOBALAMIN TAB 500 MCG 500 MCG: 500 TAB at 09:10

## 2024-02-06 RX ADMIN — POLYETHYLENE GLYCOL 3350 17 G: 17 POWDER, FOR SOLUTION ORAL at 17:51

## 2024-02-06 RX ADMIN — GUAIFENESIN 600 MG: 600 TABLET ORAL at 22:59

## 2024-02-06 RX ADMIN — DOCUSATE SODIUM 100 MG: 100 CAPSULE, LIQUID FILLED ORAL at 17:51

## 2024-02-06 RX ADMIN — METHOCARBAMOL TABLETS 500 MG: 500 TABLET, COATED ORAL at 12:15

## 2024-02-06 RX ADMIN — OXYCODONE HYDROCHLORIDE 10 MG: 10 TABLET ORAL at 11:41

## 2024-02-06 RX ADMIN — SUCRALFATE 1 G: 1 TABLET ORAL at 16:51

## 2024-02-06 RX ADMIN — FAMOTIDINE 20 MG: 20 TABLET, FILM COATED ORAL at 22:59

## 2024-02-06 RX ADMIN — OXYCODONE HYDROCHLORIDE 10 MG: 10 TABLET ORAL at 00:50

## 2024-02-06 RX ADMIN — PANTOPRAZOLE SODIUM 40 MG: 40 TABLET, DELAYED RELEASE ORAL at 09:16

## 2024-02-06 NOTE — ASSESSMENT & PLAN NOTE
Recent Labs     02/04/24  0442 02/05/24  0517 02/06/24  0528   HGB 9.8* 10.4* 9.6*        Present on admission with a hemoglobin of 11.3  Baseline appears to be 11-13  Iron panel collected  Iron 28, Iron Sat 10, Ferritin 65, TIBC 268  Folate 7, Vitamin B12 171  Continue with Iron supplementation, Folic Acid and B12.  Underwent EGD 2/3 to rule out H. Pylori and celiac; follow up results  Improving

## 2024-02-06 NOTE — ASSESSMENT & PLAN NOTE
Is willing to stop smoking now she is required a lengthy hospital stay   Tobacco Use: High Risk (1/18/2024)    Patient History    • Smoking Tobacco Use: Every Day    • Smokeless Tobacco Use: Never    • Passive Exposure: Not on file     Tobacco cessation counseling provided for > 3 min  NRT ordered with patch and PRN nicorette gum

## 2024-02-06 NOTE — PROGRESS NOTES
OP RT CM received incoming in basket message.  Chart review performed.  Patient is hospitalized.  I will outreach once discharged to home.

## 2024-02-06 NOTE — CASE MANAGEMENT
Case Management Progress Note    Patient name Nanci Navarro  Location /-01 MRN [id number]  : 1965 Date 2024       LOS (days): 18  Geometric Mean LOS (GMLOS) (days):   Days to GMLOS:        OBJECTIVE:        Current admission status: Inpatient  Preferred Pharmacy:   [facility] [place] - [address]  [address]  [address]  [facility] [address]  Phone: [phone] Fax: [phone]    [id number] [place] - [address]  [address]  [facility] [address]  Phone: [phone] Fax: [phone]    [facility] [place] - [address] [facility],  [address],  First Floor [facility] [address]  Phone: [phone] Fax: [phone]    [id number]  [place] - [address]  [address]  [facility] [address]  Phone: [phone] Fax: [phone]    [facility] [place] - [address]  [address]  [place] [address]  Phone: [phone] Fax: [phone]    Primary Care Provider: Renan Montero DO    Primary Insurance: DOROTHY ANN PENDING  Secondary Insurance:     PROGRESS NOTE:  CM met with patient at bedside x5 today to review DCP and needs.  CM assisted with MA application.  Additional information needed to complete application, patient to obtain - CM provided written list of what is needed.  Patient updated on [facility]'s response to permit request and housing.  DCP reviewed and written list provided as discussed with patient and spouse at bedside this morning.  CM to review again with patient and spouse in the morning.  Patient declined assistance with call to [phone] today x2.  CM will attempt again in the morning.

## 2024-02-06 NOTE — ASSESSMENT & PLAN NOTE
Patient has a longstanding history of chronic back pain  Patient  was started on IV Dilaudid but has since been discontinued   Patient endorses she was once on a pain contract however her provider due to [place] regulations would no longer do pain contracts for long-term pain medication and the patient was weaned off she continues to have longstanding back pain issues  And is currently on oxycodone 2.5 mg for moderate pain and oxycodone 5 mg for severe pain as needed for every 4 hours.  Will increase oxycodone to 5 mg for moderate pain and 10 mg for severe pain with the understanding it is at the discretion of the discharging provider on what prescription she may or may not be given upon discharge.    Patient given a one time dose of IV Dilaudid 0.5 mg; patient was explained why no further doses are recommended after the one time dose.   no

## 2024-02-06 NOTE — PLAN OF CARE
Problem: Potential for Falls  Goal: Patient will remain free of falls  Description: INTERVENTIONS:  - Educate patient/family on patient safety including physical limitations  - Instruct patient to call for assistance with activity   - Consult OT/PT to assist with strengthening/mobility   - Keep Call bell within reach  - Keep bed low and locked with side rails adjusted as appropriate  - Keep care items and personal belongings within reach  - Initiate and maintain comfort rounds  - Make Fall Risk Sign visible to staff  - Offer Toileting every 2 Hours, in advance of need  - Initiate/Maintain bed alarm  - Obtain necessary fall risk management equipment  - Apply yellow socks and bracelet for high fall risk patients  - Consider moving patient to room near nurses station  Outcome: Progressing     Problem: PAIN - ADULT  Goal: Verbalizes/displays adequate comfort level or baseline comfort level  Description: Interventions:  - Encourage patient to monitor pain and request assistance  - Assess pain using appropriate pain scale  - Administer analgesics based on type and severity of pain and evaluate response  - Implement non-pharmacological measures as appropriate and evaluate response  - Consider cultural and social influences on pain and pain management  - Notify physician/advanced practitioner if interventions unsuccessful or patient reports new pain  Outcome: Progressing     Problem: INFECTION - ADULT  Goal: Absence or prevention of progression during hospitalization  Description: INTERVENTIONS:  - Assess and monitor for signs and symptoms of infection  - Monitor lab/diagnostic results  - Monitor all insertion sites, i.e. indwelling lines, tubes, and drains  - Monitor endotracheal if appropriate and nasal secretions for changes in amount and color  - Sparks appropriate cooling/warming therapies per order  - Administer medications as ordered  - Instruct and encourage patient and family to use good hand hygiene  technique  - Identify and instruct in appropriate isolation precautions for identified infection/condition  Outcome: Progressing  Goal: Absence of fever/infection during neutropenic period  Description: INTERVENTIONS:  - Monitor WBC    Outcome: Progressing     Problem: SAFETY ADULT  Goal: Patient will remain free of falls  Description: INTERVENTIONS:  - Educate patient/family on patient safety including physical limitations  - Instruct patient to call for assistance with activity   - Consult OT/PT to assist with strengthening/mobility   - Keep Call bell within reach  - Keep bed low and locked with side rails adjusted as appropriate  - Keep care items and personal belongings within reach  - Initiate and maintain comfort rounds  - Make Fall Risk Sign visible to staff  - Offer Toileting every 2 Hours, in advance of need  - Initiate/Maintain bed alarm  - Obtain necessary fall risk management equipment  - Apply yellow socks and bracelet for high fall risk patients  - Consider moving patient to room near nurses station  Outcome: Progressing  Goal: Maintain or return to baseline ADL function  Description: INTERVENTIONS:  -  Assess patient's ability to carry out ADLs; assess patient's baseline for ADL function and identify physical deficits which impact ability to perform ADLs (bathing, care of mouth/teeth, toileting, grooming, dressing, etc.)  - Assess/evaluate cause of self-care deficits   - Assess range of motion  - Assess patient's mobility; develop plan if impaired  - Assess patient's need for assistive devices and provide as appropriate  - Encourage maximum independence but intervene and supervise when necessary  - Involve family in performance of ADLs  - Assess for home care needs following discharge   - Consider OT consult to assist with ADL evaluation and planning for discharge  - Provide patient education as appropriate  Outcome: Progressing  Goal: Maintains/Returns to pre admission functional level  Description:  INTERVENTIONS:  - Perform AM-PAC 6 Click Basic Mobility/ Daily Activity assessment daily.  - Set and communicate daily mobility goal to care team and patient/family/caregiver.   - Collaborate with rehabilitation services on mobility goals if consulted  - Perform Range of Motion 3 times a day.  - Reposition patient every 2 hours.  - Dangle patient 3 times a day  - Stand patient 3 times a day  - Ambulate patient 3 times a day  - Out of bed to chair 3 times a day   - Out of bed for meals 3 times a day  - Out of bed for toileting  - Record patient progress and toleration of activity level   Outcome: Progressing     Problem: DISCHARGE PLANNING  Goal: Discharge to home or other facility with appropriate resources  Description: INTERVENTIONS:  - Identify barriers to discharge w/patient and caregiver  - Arrange for needed discharge resources and transportation as appropriate  - Identify discharge learning needs (meds, wound care, etc.)  - Arrange for interpretive services to assist at discharge as needed  - Refer to Case Management Department for coordinating discharge planning if the patient needs post-hospital services based on physician/advanced practitioner order or complex needs related to functional status, cognitive ability, or social support system  Outcome: Progressing     Problem: Knowledge Deficit  Goal: Patient/family/caregiver demonstrates understanding of disease process, treatment plan, medications, and discharge instructions  Description: Complete learning assessment and assess knowledge base.  Interventions:  - Provide teaching at level of understanding  - Provide teaching via preferred learning methods  Outcome: Progressing     Problem: RESPIRATORY - ADULT  Goal: Achieves optimal ventilation and oxygenation  Description: INTERVENTIONS:  - Assess for changes in respiratory status  - Assess for changes in mentation and behavior  - Position to facilitate oxygenation and minimize respiratory effort  - Oxygen  administered by appropriate delivery if ordered  - Initiate smoking cessation education as indicated  - Encourage broncho-pulmonary hygiene including cough, deep breathe, Incentive Spirometry  - Assess the need for suctioning and aspirate as needed  - Assess and instruct to report SOB or any respiratory difficulty  - Respiratory Therapy support as indicated  Outcome: Progressing     Problem: GASTROINTESTINAL - ADULT  Goal: Maintains or returns to baseline bowel function  Description: INTERVENTIONS:  - Assess bowel function  - Encourage oral fluids to ensure adequate hydration  - Administer IV fluids if ordered to ensure adequate hydration  - Administer ordered medications as needed  - Encourage mobilization and activity  - Consider nutritional services referral to assist patient with adequate nutrition and appropriate food choices  Outcome: Progressing     Problem: GENITOURINARY - ADULT  Goal: Absence of urinary retention  Description: INTERVENTIONS:  - Assess patient’s ability to void and empty bladder  - Monitor I/O  - Bladder scan as needed  - Discuss with physician/AP medications to alleviate retention as needed  - Discuss catheterization for long term situations as appropriate  Outcome: Progressing

## 2024-02-06 NOTE — PROGRESS NOTES
[facility]  Progress Note  Name: Nanci Navarro I  MRN: [id number]  Unit/Bed#: -01 I Date of Admission: 1/18/2024   Date of Service: 2/6/2024 I Hospital Day: 18    Assessment/Plan   * Discharge planning issues  Assessment & Plan  Patient needs home oxygen on discharge; she has had it in the past, but her concentrator has been misplaced during a move.  Case management is working on safe discharge as she has open flame for heat in her home without electricity   Remains in the hospital until safe discharge plan in place.  Repeat ambulatory pulse ox completed on 1/31, requiring 2 L of oxygen with exertion.  Care team meeting completed on 2/1, again on 2/6 to explain options plan initially to return electricity however this is now not an option as cannot be obtained for the property given other permits that are open.  Further she is not allowed to live in a camper on the property per [facility] restriction.  Continue multidisciplinary approach to discharge planning    Chronic back pain  Assessment & Plan  Patient has a longstanding history of chronic back pain  Patient  was started on IV Dilaudid but has since been discontinued   Patient endorses she was once on a pain contract however her provider due to [employer] regulations would no longer do pain contracts for long-term pain medication and the patient was weaned off she continues to have longstanding back pain issues  And is currently on oxycodone 2.5 mg for moderate pain and oxycodone 5 mg for severe pain as needed for every 4 hours.  Will increase oxycodone to 5 mg for moderate pain and 10 mg for severe pain with the understanding it is at the discretion of the discharging provider on what prescription she may or may not be given upon discharge.    Patient given a one time dose of IV Dilaudid 0.5 mg; patient was explained why no further doses are recommended after the one time dose.    Class 3 severe obesity due to excess calories with  serious comorbidity and body mass index (BMI) of 45.0 to 49.9 in adult ([facility])  Assessment & Plan  Body mass index is 44.89 kg/m².    Recommend incorporating a more whole foods plant-predominant diet along with decreasing consumption of red meats and processed foods  Per AHA guidelines, recommend moderate-vigorous intensity exercise for 30 minutes a day for 5 days a week or a total of 150 min/week      Anemia  Assessment & Plan  Recent Labs     02/04/24  0442 02/05/24  0517 02/06/24  0528   HGB 9.8* 10.4* 9.6*        Present on admission with a hemoglobin of 11.3  Baseline appears to be 11-13  Iron panel collected  Iron 28, Iron Sat 10, Ferritin 65, TIBC 268  Folate 7, Vitamin B12 171  Continue with Iron supplementation, Folic Acid and B12.  Underwent EGD 2/3 to rule out H. Pylori and celiac; follow up results  Improving    Chronic respiratory failure with hypoxia ([facility])  Assessment & Plan  Non-compliant with chronic oxygen requirements  Home oxygen evaluation completed on 1/22, repeated on 1/31 - still requiring 2 L of supplemental oxygen at discharge    Currently SpO2: 97 % on Nasal Cannula O2 Flow Rate (L/min): 2 L/min    See full plan as noted above    Volume overload  Assessment & Plan  Complains of bilateral leg swelling, which is non-pitting at this time.  No history of CHF, last known EF from Echo in January '23 shows an EF of 65% with normal diastolic function.  Echo (1/25/24): The left ventricular ejection fraction is 65%. Systolic function is normal. Wall motion is normal. Diastolic function is mildly abnormal, consistent with grade I (abnormal) relaxation.   Continue 40 mg PO Lasix daily  Ace wraps for compression to BLE  Chest x-ray with atelectasis, trace pleural effusions continue to encourage I-S    Compression fracture of L1 vertebra ([facility])  Assessment & Plan  Reports following with a pain specialist as an outpatient  Continue with Tylenol, Robaxin, Lidocaine patch  Low dose Oxycodone for  moderate/severe pain.    Smoker  Assessment & Plan  Is willing to stop smoking now she is required a lengthy hospital stay   Tobacco Use: High Risk (1/18/2024)    Patient History     Smoking Tobacco Use: Every Day     Smokeless Tobacco Use: Never     Passive Exposure: Not on file     Tobacco cessation counseling provided for > 3 min  NRT ordered with patch and PRN nicorette gum    Chronic obstructive pulmonary disease with acute exacerbation (HCC)  Assessment & Plan  Patient presented to the ED with complaints of chest & flank pain with shortness of breath.  Recently hospitalized and treated for a UTI with IV Ceftriaxone.  Chest x-ray (1/18/24): Bibasilar atelectasis with further imaging remaining consistent with atelectasis on 1/24, 1/30  No sign of active exacerbation throughout hospitalization, even on admission.  Had been prescribed 1-2 L of supplemental oxygen as needed, however, has not been using it as there is an open fire for heat in her camper.  Repeat imaging completed on 1/24 with ongoing signs of atelectasis; extensive discussion regarding incentive spirometer use.  Continue mucinex, incentive spirometry, flutter valve, humidification to O2   Formal consult from pulmonology completed on 2/1  Unknown severity of COPD  Recommending OP follow-up for pulmonary function tests [none to review in records]  Continue with Nebulizer treatments ATC, Long acting inhaler [Breo]  Recommended to discharge home with Albuterol nebulizers 4x/daily, Breo and Home Oxygen.               VTE Pharmacologic Prophylaxis: VTE Score: 3 Moderate Risk (Score 3-4) - Pharmacological DVT Prophylaxis Ordered: heparin.    Mobility:   Basic Mobility Inpatient Raw Score: 24  JH-HLM Goal: 8: Walk 250 feet or more  JH-HLM Achieved: 7: Walk 25 feet or more  HLM Goal NOT achieved. Continue with multidisciplinary rounding and encourage appropriate mobility to improve upon HLM goals.    Patient Centered Rounds: I performed bedside rounds with  nursing staff today.   Discussions with Specialists or Other Care Team Provider: Case management engineering at bedside    Education and Discussions with Family / Patient:  Family's  on phone at time of team meeting and hung up.     Total Time Spent on Date of Encounter in care of patient: 60 mins. This time was spent on one or more of the following: performing physical exam; counseling and coordination of care; obtaining or reviewing history; documenting in the medical record; reviewing/ordering tests, medications or procedures; communicating with other healthcare professionals and discussing with patient's family/caregivers.    Current Length of Stay: 18 day(s)  Current Patient Status: Inpatient   Certification Statement: The patient will continue to require additional inpatient hospital stay due to discharge planning issue  Discharge Plan: Anticipate discharge tomorrow to Per CM    Code Status: Level 1 - Full Code    Subjective:   In with patient engineering and case management lengthy discussion regarding findings were explained at length with the patient via a team meeting with engineering, barriers were explained to patient that there is already a permanent so [employer] would not issue a new one until they cleared the recent permit on the property.  Further they were made aware through the [facility] that they cannot have a camper on the property as a form of residence for [facility].  Patient keeps stating that she will just discharged home without oxygen and leave.    Objective:     Vitals:   Temp (24hrs), Av.1 °F (36.7 °C), Min:98 °F (36.7 °C), Max:98.2 °F (36.8 °C)    Temp:  [98 °F (36.7 °C)-98.2 °F (36.8 °C)] 98.1 °F (36.7 °C)  HR:  [65-82] 65  Resp:  [18] 18  BP: (101-112)/(50-53) 112/53  SpO2:  [94 %-97 %] 97 %  Body mass index is 44.89 kg/m².     Input and Output Summary (last 24 hours):   No intake or output data in the 24 hours ending 24 1601    Physical Exam:   Physical  Exam  Vitals and nursing note reviewed.   Constitutional:       General: She is not in acute distress.     Appearance: She is well-developed.   HENT:      Head: Normocephalic and atraumatic.   Eyes:      Conjunctiva/sclera: Conjunctivae normal.   Cardiovascular:      Rate and Rhythm: Normal rate and regular rhythm.      Heart sounds: No murmur heard.  Pulmonary:      Effort: Pulmonary effort is normal. No respiratory distress.      Breath sounds: Normal breath sounds.   Abdominal:      Palpations: Abdomen is soft.      Tenderness: There is no abdominal tenderness.   Musculoskeletal:         General: Tenderness present. No swelling.      Cervical back: Neck supple.   Skin:     General: Skin is warm and dry.      Capillary Refill: Capillary refill takes less than 2 seconds.   Neurological:      Mental Status: She is alert and oriented to person, place, and time.   Psychiatric:         Mood and Affect: Mood normal.          Additional Data:     Labs:  Results from last 7 days   Lab Units 02/06/24  0528   WBC Thousand/uL 7.33   HEMOGLOBIN g/dL 9.6*   HEMATOCRIT % 30.3*   PLATELETS Thousands/uL 335     Results from last 7 days   Lab Units 02/05/24  0517   SODIUM mmol/L 139   POTASSIUM mmol/L 3.6   CHLORIDE mmol/L 99   CO2 mmol/L 31   BUN mg/dL 14   CREATININE mg/dL 0.83   ANION GAP mmol/L 9   CALCIUM mg/dL 9.2   GLUCOSE RANDOM mg/dL 109         Results from last 7 days   Lab Units 02/06/24  1104 02/06/24  0738 02/05/24  2116 02/05/24  1646 02/05/24  1119 02/05/24  0717 02/04/24  2107 02/04/24  1558 02/04/24  1134 02/04/24  0706 02/03/24  2103 02/03/24  1633   POC GLUCOSE mg/dl 134 111 170* 141* 158* 114 137 124 137 128 124 133         Results from last 7 days   Lab Units 01/31/24  1455   PROCALCITONIN ng/ml <0.05       Lines/Drains:  Invasive Devices       Peripheral Intravenous Line  Duration             Peripheral IV 02/05/24 Dorsal (posterior);Right Forearm <1 day                          Imaging: No pertinent imaging  reviewed.    Recent Cultures (last 7 days):         Last 24 Hours Medication List:   Current Facility-Administered Medications   Medication Dose Route Frequency Provider Last Rate    acetaminophen  500 mg Intravenous Q4H PRN Bob Mclaughlin,  mg (02/04/24 2211)    albuterol  2 puff Inhalation Q4H PRN Cara Sweeney, DO      albuterol  2 puff Inhalation 4x Daily Daron NICOLAS MD      bisacodyl  5 mg Oral Daily PRN Cara Sweeney, DO      cyanocobalamin  500 mcg Oral Daily Daron NICOLAS MD      Diclofenac Sodium  2 g Topical 4x Daily Cara Sweeney, DO      docusate sodium  100 mg Oral BID Cara Sweeney, DO      famotidine  20 mg Oral HS NII Vázquez      ferrous sulfate  325 mg Oral Daily With Breakfast NII Vázquez      Fluticasone Furoate-Vilanterol  1 puff Inhalation Daily Cara Sweeney, DO      folic acid  400 mcg Oral Daily Daron NICOLAS MD      furosemide  40 mg Oral Daily Daron NICOLAS MD      guaiFENesin  600 mg Oral Q12H [facility] NII Sweet      heparin (porcine)  7,500 Units Subcutaneous Q8H [facility] Daniel Chaney MD      insulin lispro  1-5 Units Subcutaneous HS Cara Sweeney, DO      insulin lispro  2-12 Units Subcutaneous TID AC Cara Sweeney,       lidocaine  1 patch Topical Daily Daron NICOLAS MD      menthol-methyl salicylate   Apply externally 4x Daily PRN Eileen Velásquez PA-C      methocarbamol  500 mg Oral Q6H TANVIR NII Vázquez      nicotine  1 patch Transdermal Daily Cara Sweeney, DO      nicotine polacrilex  2 mg Oral Q2H PRN Bob Mclaughlin, DO      ondansetron  4 mg Intravenous Q6H PRN Cara Sweeney, DO      oxyCODONE  5 mg Oral Q4H PRN NII Aparicio      Or    oxyCODONE  10 mg Oral Q4H PRN NII Aparicio      pantoprazole  40 mg Oral BID AC NII Vázquez      polyethylene glycol  17 g Oral BID after meals [employer] GOODRICH  JUVENTINO [place]      sucralfate  1 g Oral BID AC Daniel Chaney MD          Today, Patient Was Seen By: NII Aparicio    **Please Note: This note may have been constructed using a voice recognition system.**

## 2024-02-06 NOTE — ASSESSMENT & PLAN NOTE
Body mass index is 44.89 kg/m².    Recommend incorporating a more whole foods plant-predominant diet along with decreasing consumption of red meats and processed foods  Per AHA guidelines, recommend moderate-vigorous intensity exercise for 30 minutes a day for 5 days a week or a total of 150 min/week

## 2024-02-06 NOTE — ASSESSMENT & PLAN NOTE
Patient needs home oxygen on discharge; she has had it in the past, but her concentrator has been misplaced during a move.  Case management is working on safe discharge as she has open flame for heat in her home without electricity   Remains in the hospital until safe discharge plan in place.  Repeat ambulatory pulse ox completed on 1/31, requiring 2 L of oxygen with exertion.  Care team meeting completed on 2/1, again on 2/6 to explain options plan initially to return electricity however this is now not an option as cannot be obtained for the property given other permits that are open.  Further she is not allowed to live in a camper on the property per [facility] restriction.  Continue multidisciplinary approach to discharge planning

## 2024-02-06 NOTE — ASSESSMENT & PLAN NOTE
Non-compliant with chronic oxygen requirements  Home oxygen evaluation completed on 1/22, repeated on 1/31 - still requiring 2 L of supplemental oxygen at discharge    Currently SpO2: 97 % on Nasal Cannula O2 Flow Rate (L/min): 2 L/min    See full plan as noted above

## 2024-02-07 ENCOUNTER — TELEPHONE (OUTPATIENT)
Dept: GASTROENTEROLOGY | Facility: CLINIC | Age: 59
End: 2024-02-07

## 2024-02-07 VITALS
TEMPERATURE: 98.7 F | DIASTOLIC BLOOD PRESSURE: 42 MMHG | WEIGHT: 207.45 LBS | SYSTOLIC BLOOD PRESSURE: 110 MMHG | OXYGEN SATURATION: 96 % | HEIGHT: 57 IN | BODY MASS INDEX: 44.76 KG/M2 | HEART RATE: 74 BPM | RESPIRATION RATE: 18 BRPM

## 2024-02-07 PROBLEM — J96.01 ACUTE RESPIRATORY FAILURE WITH HYPOXIA (HCC): Status: ACTIVE | Noted: 2024-02-07

## 2024-02-07 PROBLEM — R06.02 SOB (SHORTNESS OF BREATH): Status: ACTIVE | Noted: 2024-02-07

## 2024-02-07 LAB
DME PARACHUTE DELIVERY DATE ACTUAL: NORMAL
DME PARACHUTE DELIVERY DATE REQUESTED: NORMAL
DME PARACHUTE ITEM DESCRIPTION: NORMAL
DME PARACHUTE ITEM DESCRIPTION: NORMAL
DME PARACHUTE ORDER STATUS: NORMAL
DME PARACHUTE SUPPLIER NAME: NORMAL
DME PARACHUTE SUPPLIER PHONE: NORMAL
GLUCOSE SERPL-MCNC: 112 MG/DL (ref 65–140)
GLUCOSE SERPL-MCNC: 117 MG/DL (ref 65–140)
GLUCOSE SERPL-MCNC: 131 MG/DL (ref 65–140)

## 2024-02-07 PROCEDURE — 99239 HOSP IP/OBS DSCHRG MGMT >30: CPT | Performed by: NURSE PRACTITIONER

## 2024-02-07 PROCEDURE — 82948 REAGENT STRIP/BLOOD GLUCOSE: CPT

## 2024-02-07 RX ORDER — INSULIN LISPRO 100 [IU]/ML
1-5 INJECTION, SOLUTION INTRAVENOUS; SUBCUTANEOUS
Qty: 3 ML | Refills: 0 | Status: SHIPPED | OUTPATIENT
Start: 2024-02-07 | End: 2024-02-07

## 2024-02-07 RX ORDER — INSULIN LISPRO 100 [IU]/ML
2-12 INJECTION, SOLUTION INTRAVENOUS; SUBCUTANEOUS
Qty: 3 ML | Refills: 0 | Status: SHIPPED | OUTPATIENT
Start: 2024-02-07 | End: 2024-02-07

## 2024-02-07 RX ORDER — FLUTICASONE FUROATE AND VILANTEROL 100; 25 UG/1; UG/1
1 POWDER RESPIRATORY (INHALATION) DAILY
Qty: 60 BLISTER | Refills: 0 | Status: SHIPPED | OUTPATIENT
Start: 2024-02-08

## 2024-02-07 RX ORDER — FAMOTIDINE 20 MG/1
20 TABLET, FILM COATED ORAL
Qty: 30 TABLET | Refills: 0 | Status: SHIPPED | OUTPATIENT
Start: 2024-02-07

## 2024-02-07 RX ORDER — LANOLIN ALCOHOL/MO/W.PET/CERES
400 CREAM (GRAM) TOPICAL DAILY
Qty: 30 TABLET | Refills: 0 | Status: SHIPPED | OUTPATIENT
Start: 2024-02-08

## 2024-02-07 RX ORDER — GUAIFENESIN 600 MG/1
600 TABLET, EXTENDED RELEASE ORAL EVERY 12 HOURS SCHEDULED
Qty: 60 TABLET | Refills: 0 | Status: SHIPPED | OUTPATIENT
Start: 2024-02-07

## 2024-02-07 RX ORDER — FUROSEMIDE 40 MG/1
40 TABLET ORAL DAILY
Qty: 30 TABLET | Refills: 0 | Status: SHIPPED | OUTPATIENT
Start: 2024-02-08 | End: 2024-03-09

## 2024-02-07 RX ORDER — DOCUSATE SODIUM 100 MG/1
100 CAPSULE, LIQUID FILLED ORAL 2 TIMES DAILY
Qty: 60 CAPSULE | Refills: 0 | Status: SHIPPED | OUTPATIENT
Start: 2024-02-07

## 2024-02-07 RX ORDER — OXYCODONE HYDROCHLORIDE 5 MG/1
5 TABLET ORAL EVERY 4 HOURS PRN
Qty: 5 TABLET | Refills: 0 | Status: SHIPPED | OUTPATIENT
Start: 2024-02-07 | End: 2024-02-17

## 2024-02-07 RX ORDER — FERROUS SULFATE 325(65) MG
325 TABLET ORAL
Qty: 30 TABLET | Refills: 0 | Status: SHIPPED | OUTPATIENT
Start: 2024-02-08

## 2024-02-07 RX ADMIN — DOCUSATE SODIUM 100 MG: 100 CAPSULE, LIQUID FILLED ORAL at 08:04

## 2024-02-07 RX ADMIN — SUCRALFATE 1 G: 1 TABLET ORAL at 16:40

## 2024-02-07 RX ADMIN — CYANOCOBALAMIN TAB 500 MCG 500 MCG: 500 TAB at 08:04

## 2024-02-07 RX ADMIN — MENTHOL, UNSPECIFIED FORM AND METHYL SALICYLATE: 10; 150 CREAM TOPICAL at 03:09

## 2024-02-07 RX ADMIN — FERROUS SULFATE TAB 325 MG (65 MG ELEMENTAL FE) 325 MG: 325 (65 FE) TAB at 08:03

## 2024-02-07 RX ADMIN — POLYETHYLENE GLYCOL 3350 17 G: 17 POWDER, FOR SOLUTION ORAL at 08:04

## 2024-02-07 RX ADMIN — METHOCARBAMOL TABLETS 500 MG: 500 TABLET, COATED ORAL at 05:30

## 2024-02-07 RX ADMIN — GUAIFENESIN 600 MG: 600 TABLET ORAL at 08:04

## 2024-02-07 RX ADMIN — METHOCARBAMOL TABLETS 500 MG: 500 TABLET, COATED ORAL at 00:37

## 2024-02-07 RX ADMIN — OXYCODONE HYDROCHLORIDE 10 MG: 10 TABLET ORAL at 03:09

## 2024-02-07 RX ADMIN — ALBUTEROL SULFATE 2 PUFF: 90 AEROSOL, METERED RESPIRATORY (INHALATION) at 11:24

## 2024-02-07 RX ADMIN — OXYCODONE HYDROCHLORIDE 10 MG: 10 TABLET ORAL at 16:39

## 2024-02-07 RX ADMIN — FLUTICASONE FUROATE AND VILANTEROL TRIFENATATE 1 PUFF: 100; 25 POWDER RESPIRATORY (INHALATION) at 08:08

## 2024-02-07 RX ADMIN — LIDOCAINE 5% 1 PATCH: 700 PATCH TOPICAL at 08:03

## 2024-02-07 RX ADMIN — METHOCARBAMOL TABLETS 500 MG: 500 TABLET, COATED ORAL at 11:23

## 2024-02-07 RX ADMIN — PANTOPRAZOLE SODIUM 40 MG: 40 TABLET, DELAYED RELEASE ORAL at 08:03

## 2024-02-07 RX ADMIN — FOLIC ACID TAB 400 MCG 400 MCG: 400 TAB at 08:04

## 2024-02-07 RX ADMIN — ALBUTEROL SULFATE 2 PUFF: 90 AEROSOL, METERED RESPIRATORY (INHALATION) at 08:07

## 2024-02-07 RX ADMIN — OXYCODONE HYDROCHLORIDE 10 MG: 10 TABLET ORAL at 12:34

## 2024-02-07 RX ADMIN — ALBUTEROL SULFATE 2 PUFF: 90 AEROSOL, METERED RESPIRATORY (INHALATION) at 16:42

## 2024-02-07 RX ADMIN — PANTOPRAZOLE SODIUM 40 MG: 40 TABLET, DELAYED RELEASE ORAL at 16:39

## 2024-02-07 RX ADMIN — SUCRALFATE 1 G: 1 TABLET ORAL at 08:04

## 2024-02-07 RX ADMIN — OXYCODONE HYDROCHLORIDE 10 MG: 10 TABLET ORAL at 08:04

## 2024-02-07 RX ADMIN — METHOCARBAMOL TABLETS 500 MG: 500 TABLET, COATED ORAL at 16:41

## 2024-02-07 NOTE — CASE MANAGEMENT
Case Management Discharge Planning Note    Patient name Nanci Navarro  Location /-01 MRN [id number]  : 1965 Date 2024       Current Admission Date: 2024  Current Admission Diagnosis:Discharge planning issues   Patient Active Problem List    Diagnosis Date Noted    SOB (shortness of breath) 2024    Acute respiratory failure with hypoxia (HCC) 2024    Chronic back pain 2024    Class 3 severe obesity due to excess calories with serious comorbidity and body mass index (BMI) of 45.0 to 49.9 in adult (HCC) 2024    Non-compliant behavior 2024    Discharge planning issues 2024    Anemia 2024    Diabetes (HCC) 2024    Chronic respiratory failure with hypoxia ([facility]) 2024    Electrolyte abnormality 2024    Osteoporosis 2023    Gastroesophageal reflux disease without esophagitis 2023    Chest pain 01/15/2023    Acute respiratory failure (HCC) 01/15/2023    Volume overload 01/15/2023    Ambulatory dysfunction 2023    Compression fracture of L1 vertebra (HCC) 2023    COPD exacerbation ([facility]) 2023    Sepsis ([facility]) 2023    Fall 2023    Systemic lupus erythematosus with lung involvement ([facility]) 12/10/2022    Respiratory failure with hypoxia ([facility]) 10/06/2022    Chronic obstructive pulmonary disease with acute exacerbation ([facility]) 2022    Smoker 2022      LOS (days): 19  Geometric Mean LOS (GMLOS) (days):   Days to GMLOS:     OBJECTIVE:  Risk of Unplanned Readmission Score: 35.51         Current admission status: Inpatient   Preferred Pharmacy:   [facility] [place] - [address]  [address]  [address]  [facility] [address]  Phone: [phone] Fax: [phone]    [facility] [place] [facility] [address] [address]  Phone: [phone] Fax: [phone]    [facility] [address],  [address],  First Floor [facility] [place]  [place] [address]  Phone: [phone] Fax: [phone]    [id number] [place] - [address]  [address]  [place] [address]  Phone: [phone] Fax: [phone]    [facility] [place] [facility] [place] - [address]  [address]  [place] [address]  Phone: [phone] Fax: [phone]    Primary Care Provider: Renan Montero DO  Primary Insurance: [employer] ANN PENDING  Secondary Insurance:     DISCHARGE DETAILS:  CM met with patient and spouse at bedside x3 for review of DCP.  Patient requesting shirt for d/c which was obtained by Unit Manager from ED donation area.      CM contacted hospital financial counselor, Home for update on prior MA application.  CM advised to contact [facility] for update as patient was referred last admission.  CM contacted Thelma at [facility] ([phone]) who stated that patient needs to provide work history for the last year as well as the SSA 1099 or SS award letter for spouse.  Once items are received by [facility], application can be resubmitted for approval.  CM updated patient and family and provided contact info for Thelma.     CM assisted patient with 211 call at bedside for coordinated entry.  Patient completed telephone interview process for expedited housing.     Medications from [facility] reviewed with RN and provided to patient.  Nebulizer also supplied as patient states hers is broken and unusable.    Patient provided with hotel contact information and check-in/check-out times.      AMPAC is 24.  [place] referral placed for C, however patient is currently uninsured with no accepting providers at this time.      POC delivered to bedside by [employer].  Patient aware unit is for 2L O2 on exertion.

## 2024-02-07 NOTE — TELEPHONE ENCOUNTER
Attempted to call pt but no answer.  VM left with message as per Dr. Lamb and to call us back if any questions.  Office # provided.      Please call the patient with the biopsy results.  Biopsies of the small intestine were benign and negative for celiac disease or cancer.  The biopsies of the stomach were benign and negative for Helicobacter pylori or for cancer.

## 2024-02-07 NOTE — TELEPHONE ENCOUNTER
----- Message from Wiliam Lamb DO sent at 2/7/2024  7:57 AM EST -----  Please call the patient with the biopsy results.  Biopsies of the small intestine were benign and negative for celiac disease or cancer.  The biopsies of the stomach were benign and negative for Helicobacter pylori or for cancer.

## 2024-02-07 NOTE — CASE MANAGEMENT
Case Management Discharge Planning Note    Patient name Nanci Navarro  Location /-01 MRN [id number]  : 1965 Date 2024       Current Admission Date: 2024  Current Admission Diagnosis:Discharge planning issues   Patient Active Problem List    Diagnosis Date Noted    Chronic back pain 2024    Class 3 severe obesity due to excess calories with serious comorbidity and body mass index (BMI) of 45.0 to 49.9 in adult (HCC) 2024    Non-compliant behavior 2024    Discharge planning issues 2024    Anemia 2024    Diabetes ([facility]) 2024    Chronic respiratory failure with hypoxia ([facility]) 2024    Electrolyte abnormality 2024    Osteoporosis 2023    Gastroesophageal reflux disease without esophagitis 2023    Chest pain 01/15/2023    Acute respiratory failure ([facility]) 01/15/2023    Volume overload 01/15/2023    Ambulatory dysfunction 2023    Compression fracture of L1 vertebra ([facility]) 2023    COPD exacerbation ([facility]) 2023    Sepsis ([facility]) 2023    Fall 2023    Systemic lupus erythematosus with lung involvement ([facility]) 12/10/2022    Respiratory failure with hypoxia ([facility]) 10/06/2022    Chronic obstructive pulmonary disease with acute exacerbation ([facility]) 2022    Smoker 2022      LOS (days): 19  Geometric Mean LOS (GMLOS) (days):   Days to GMLOS:     OBJECTIVE:  Risk of Unplanned Readmission Score: 25.62         Current admission status: Inpatient   Preferred Pharmacy:   [facility] [place] - [address]  [address]  [address]  [facility] [address]  Phone: [phone] Fax: [phone]    [id number] [place] - [address]  [address]  [place] [address]  Phone: [phone] Fax: [phone]    [facility]  [place] - [address],  [address],  First Floor [facility] [place] [place] [address]  Phone:  [phone] Fax: [phone]    [id number] [place] - [address]  [address]  [place] [address]  Phone: [phone] Fax: [phone]    [facility] [place] - [address]  [address]  [place] [address]  Phone: [phone] Fax: [phone]    Primary Care Provider: Renan Montero DO    Primary Insurance: DOROTHY ANN PENDING  Secondary Insurance:     DISCHARGE DETAILS:    Other Referral/Resources/Interventions Provided:  Interventions: DME  Referral Comments: CM completed the indigent DME form and faxed to [facility]. DANK then confirmed with Elton from [facility] that the POC will be delivered to the patient's bedside today.

## 2024-02-07 NOTE — PLAN OF CARE
Problem: Potential for Falls  Goal: Patient will remain free of falls  Description: INTERVENTIONS:  - Educate patient/family on patient safety including physical limitations  - Instruct patient to call for assistance with activity   - Consult OT/PT to assist with strengthening/mobility   - Keep Call bell within reach  - Keep bed low and locked with side rails adjusted as appropriate  - Keep care items and personal belongings within reach  - Initiate and maintain comfort rounds  - Make Fall Risk Sign visible to staff  - Offer Toileting every  Hours, in advance of need  - Initiate/Maintain alarm  - Obtain necessary fall risk management equipment:  - Apply yellow socks and bracelet for high fall risk patients  - Consider moving patient to room near nurses station  Outcome: Progressing     Problem: PAIN - ADULT  Goal: Verbalizes/displays adequate comfort level or baseline comfort level  Description: Interventions:  - Encourage patient to monitor pain and request assistance  - Assess pain using appropriate pain scale  - Administer analgesics based on type and severity of pain and evaluate response  - Implement non-pharmacological measures as appropriate and evaluate response  - Consider cultural and social influences on pain and pain management  - Notify physician/advanced practitioner if interventions unsuccessful or patient reports new pain  Outcome: Progressing     Problem: INFECTION - ADULT  Goal: Absence or prevention of progression during hospitalization  Description: INTERVENTIONS:  - Assess and monitor for signs and symptoms of infection  - Monitor lab/diagnostic results  - Monitor all insertion sites, i.e. indwelling lines, tubes, and drains  - Monitor endotracheal if appropriate and nasal secretions for changes in amount and color  - [place] appropriate cooling/warming therapies per order  - Administer medications as ordered  - Instruct and encourage patient and family to use good hand hygiene technique  -  Identify and instruct in appropriate isolation precautions for identified infection/condition  Outcome: Progressing  Goal: Absence of fever/infection during neutropenic period  Description: INTERVENTIONS:  - Monitor WBC    Outcome: Progressing     Problem: SAFETY ADULT  Goal: Patient will remain free of falls  Description: INTERVENTIONS:  - Educate patient/family on patient safety including physical limitations  - Instruct patient to call for assistance with activity   - Consult OT/PT to assist with strengthening/mobility   - Keep Call bell within reach  - Keep bed low and locked with side rails adjusted as appropriate  - Keep care items and personal belongings within reach  - Initiate and maintain comfort rounds  - Make Fall Risk Sign visible to staff  - Offer Toileting every  Hours, in advance of need  - Initiate/Maintain alarm  - Obtain necessary fall risk management equipment:   - Apply yellow socks and bracelet for high fall risk patients  - Consider moving patient to room near nurses station  Outcome: Progressing  Goal: Maintain or return to baseline ADL function  Description: INTERVENTIONS:  -  Assess patient's ability to carry out ADLs; assess patient's baseline for ADL function and identify physical deficits which impact ability to perform ADLs (bathing, care of mouth/teeth, toileting, grooming, dressing, etc.)  - Assess/evaluate cause of self-care deficits   - Assess range of motion  - Assess patient's mobility; develop plan if impaired  - Assess patient's need for assistive devices and provide as appropriate  - Encourage maximum independence but intervene and supervise when necessary  - Involve family in performance of ADLs  - Assess for home care needs following discharge   - Consider OT consult to assist with ADL evaluation and planning for discharge  - Provide patient education as appropriate  Outcome: Progressing  Goal: Maintains/Returns to pre admission functional level  Description:  INTERVENTIONS:  - Perform AM-PAC 6 Click Basic Mobility/ Daily Activity assessment daily.  - Set and communicate daily mobility goal to care team and patient/family/caregiver.   - Collaborate with rehabilitation services on mobility goals if consulted  - Perform Range of Motion  times a day.  - Reposition patient every hours.  - Dangle patient  times a day  - Stand patient  times a day  - Ambulate patient  times a day  - Out of bed to chair  times a day   - Out of bed for mazin times a day  - Out of bed for toileting  - Record patient progress and toleration of activity level   Outcome: Progressing     Problem: DISCHARGE PLANNING  Goal: Discharge to home or other facility with appropriate resources  Description: INTERVENTIONS:  - Identify barriers to discharge w/patient and caregiver  - Arrange for needed discharge resources and transportation as appropriate  - Identify discharge learning needs (meds, wound care, etc.)  - Arrange for interpretive services to assist at discharge as needed  - Refer to Case Management Department for coordinating discharge planning if the patient needs post-hospital services based on physician/advanced practitioner order or complex needs related to functional status, cognitive ability, or social support system  Outcome: Progressing     Problem: Knowledge Deficit  Goal: Patient/family/caregiver demonstrates understanding of disease process, treatment plan, medications, and discharge instructions  Description: Complete learning assessment and assess knowledge base.  Interventions:  - Provide teaching at level of understanding  - Provide teaching via preferred learning methods  Outcome: Progressing     Problem: RESPIRATORY - ADULT  Goal: Achieves optimal ventilation and oxygenation  Description: INTERVENTIONS:  - Assess for changes in respiratory status  - Assess for changes in mentation and behavior  - Position to facilitate oxygenation and minimize respiratory effort  - Oxygen administered  by appropriate delivery if ordered  - Initiate smoking cessation education as indicated  - Encourage broncho-pulmonary hygiene including cough, deep breathe, Incentive Spirometry  - Assess the need for suctioning and aspirate as needed  - Assess and instruct to report SOB or any respiratory difficulty  - Respiratory Therapy support as indicated  Outcome: Progressing     Problem: GASTROINTESTINAL - ADULT  Goal: Maintains or returns to baseline bowel function  Description: INTERVENTIONS:  - Assess bowel function  - Encourage oral fluids to ensure adequate hydration  - Administer IV fluids if ordered to ensure adequate hydration  - Administer ordered medications as needed  - Encourage mobilization and activity  - Consider nutritional services referral to assist patient with adequate nutrition and appropriate food choices  Outcome: Progressing     Problem: GENITOURINARY - ADULT  Goal: Absence of urinary retention  Description: INTERVENTIONS:  - Assess patient’s ability to void and empty bladder  - Monitor I/O  - Bladder scan as needed  - Discuss with physician/AP medications to alleviate retention as needed  - Discuss catheterization for long term situations as appropriate  Outcome: Progressing

## 2024-02-07 NOTE — ASSESSMENT & PLAN NOTE
Patient needs home oxygen on discharge; she has had it in the past, but her concentrator has been misplaced during a move.  Case management is working on safe discharge as she has open flame for heat in her home without electricity   Remains in the hospital until safe discharge plan in place.  Repeat ambulatory pulse ox completed on 1/31, requiring 2 L of oxygen with exertion.  Care team meeting completed on 2/1, again on 2/6 to explain options plan initially to return electricity however this is now not an option as cannot be obtained for the property given other permits that are open.  Further she is not allowed to live in a camper on the property per [facility] restriction.  Continue multidisciplinary approach to discharge planning

## 2024-02-07 NOTE — ASSESSMENT & PLAN NOTE
Recent Labs     02/05/24  0517 02/06/24  0528   HGB 10.4* 9.6*      Present on admission with a hemoglobin of 11.3  Baseline appears to be 11-13  Iron panel collected  Iron 28, Iron Sat 10, Ferritin 65, TIBC 268  Folate 7, Vitamin B12 171  Continue with Iron supplementation, Folic Acid and B12.  Underwent EGD 2/3 to rule out H. Pylori and celiac; follow up results  Stable

## 2024-02-07 NOTE — ASSESSMENT & PLAN NOTE
Complains of bilateral leg swelling, which is non-pitting at this time; resolved  No history of CHF, last known EF from Echo in January '23 shows an EF of 65% with normal diastolic function.  Echo (1/25/24): The left ventricular ejection fraction is 65%. Systolic function is normal. Wall motion is normal. Diastolic function is mildly abnormal, consistent with grade I (abnormal) relaxation.   Continue 40 mg PO Lasix daily  Ace wraps for compression to BLE  Chest x-ray with atelectasis, trace pleural effusions continue to encourage I-S

## 2024-02-07 NOTE — ASSESSMENT & PLAN NOTE
Patient has a longstanding history of chronic back pain secondary to L1 fracture  Patient endorses she was once on a pain contract however her provider due to [place] regulations would no longer do pain contracts for long-term pain medication and the patient was weaned off she continues to have longstanding back pain issues  Received an increase of oxycodone to 5 mg for moderate pain and 10 mg for severe pain with the understanding it is at the discretion of the discharging provider on what prescription she may or may not be given upon discharge.    On multi modal pain regiment.  NO indication for IV narcotics at this time  Encourage ambulation, Aqua K, Voltaren gel

## 2024-02-07 NOTE — DISCHARGE SUMMARY
[facility]  Discharge- Nanci Navarro 1965, 58 y.o. female MRN: [id number]  Unit/Bed#: -[facility] Encounter: [id number]  Primary Care Provider: Renan Montero DO   Date and time admitted to hospital: 1/18/2024  8:01 PM    * Discharge planning issues  Assessment & Plan  Patient needs home oxygen on discharge; she has had it in the past, but her concentrator has been misplaced during a move.  Case management is working on safe discharge as she has open flame for heat in her home without electricity   Remains in the hospital until safe discharge plan in place.  Repeat ambulatory pulse ox completed on 1/31, requiring 2 L of oxygen with exertion.  Care team meeting completed on 2/1, again on 2/6 to explain options plan initially to return electricity however this is now not an option as cannot be obtained for the property given other permits that are open.  Further she is not allowed to live in a camper on the property per [facility] restriction.  Continue multidisciplinary approach to discharge planning    Chronic back pain  Assessment & Plan  Patient has a longstanding history of chronic back pain secondary to L1 fracture  Patient endorses she was once on a pain contract however her provider due to [place] regulations would no longer do pain contracts for long-term pain medication and the patient was weaned off she continues to have longstanding back pain issues  Received an increase of oxycodone to 5 mg for moderate pain and 10 mg for severe pain with the understanding it is at the discretion of the discharging provider on what prescription she may or may not be given upon discharge.    On multi modal pain regiment.  NO indication for IV narcotics at this time  Encourage ambulation, Aqua K, Voltaren gel    Class 3 severe obesity due to excess calories with serious comorbidity and body mass index (BMI) of 45.0 to 49.9 in adult (HCC)  Assessment & Plan  Body mass index is 44.89  kg/m².    Recommend incorporating a more whole foods plant-predominant diet along with decreasing consumption of red meats and processed foods  Per AHA guidelines, recommend moderate-vigorous intensity exercise for 30 minutes a day for 5 days a week or a total of 150 min/week      Anemia  Assessment & Plan  Recent Labs     02/05/24  0517 02/06/24  0528   HGB 10.4* 9.6*      Present on admission with a hemoglobin of 11.3  Baseline appears to be 11-13  Iron panel collected  Iron 28, Iron Sat 10, Ferritin 65, TIBC 268  Folate 7, Vitamin B12 171  Continue with Iron supplementation, Folic Acid and B12.  Underwent EGD 2/3 to rule out H. Pylori and celiac; follow up results  Stable    Chronic respiratory failure with hypoxia (HCC)  Assessment & Plan  Previously Non-compliant with chronic oxygen requirements  Home oxygen evaluation completed on 1/22, repeated on 1/31 - still requiring 2 L of supplemental oxygen at discharge  See full plan as noted above    Volume overload  Assessment & Plan  Complains of bilateral leg swelling, which is non-pitting at this time; resolved  No history of CHF, last known EF from Echo in January '23 shows an EF of 65% with normal diastolic function.  Echo (1/25/24): The left ventricular ejection fraction is 65%. Systolic function is normal. Wall motion is normal. Diastolic function is mildly abnormal, consistent with grade I (abnormal) relaxation.   Continue 40 mg PO Lasix daily  Ace wraps for compression to BLE  Chest x-ray with atelectasis, trace pleural effusions continue to encourage I-S    Smoker  Assessment & Plan  Is willing to stop smoking now she is required a lengthy hospital stay   Tobacco Use: High Risk (1/18/2024)    Patient History     Smoking Tobacco Use: Every Day     Smokeless Tobacco Use: Never     Passive Exposure: Not on file     Tobacco cessation counseling provided for > 3 min  NRT ordered with patch and PRN nicorette gum    Chronic obstructive pulmonary disease with acute  exacerbation (HCC)  Assessment & Plan  Patient presented to the ED with complaints of chest & flank pain with shortness of breath.  Recently hospitalized and treated for a UTI with IV Ceftriaxone.  Chest x-ray (1/18/24): Bibasilar atelectasis with further imaging remaining consistent with atelectasis on 1/24, 1/30  No sign of active exacerbation throughout hospitalization, even on admission.  Had been prescribed 1-2 L of supplemental oxygen as needed, however, has not been using it as there is an open fire for heat in her camper.  Repeat imaging completed on 1/24 with ongoing signs of atelectasis; extensive discussion regarding incentive spirometer use.  Continue mucinex, incentive spirometry, flutter valve, humidification to O2   Formal consult from pulmonology completed on 2/1  Unknown severity of COPD  Recommending OP follow-up for pulmonary function tests [none to review in records]  Continue with Nebulizer treatments ATC, Long acting inhaler [Breo]  Recommended to discharge home with Albuterol nebulizers 4x/daily, Breo and Home Oxygen.       Discharging Physician / Practitioner: NII Cheng  PCP: Renan Montero DO  Admission Date:   Admission Orders (From admission, onward)       Ordered        01/19/24 0010  INPATIENT ADMISSION  Once                          Discharge Date: 02/07/24    Medical Problems       Resolved Problems  Date Reviewed: 2/5/2024            Resolved    Chest pain, unspecified 1/25/2024     Resolved by  NII Vázquez    Hypokalemia 1/25/2024     Resolved by  NII Vázquez    Flank pain 1/28/2024     Resolved by  NII Vázquez          Consultations During Hospital Stay:  IP CONSULT TO PULMONOLOGY  IP CONSULT TO GASTROENTEROLOGY    Procedures Performed:   EGD    Result Date: 2/3/2024  Narrative: Table formatting from the original result was not included.  [facility] [address] [facility] [address]  [phone] DATE OF SERVICE: 2/03/24 PHYSICIAN(S): Attending: Wiliam Lamb DO Fellow: No Staff Documented INDICATION: Epigastric pain, Esophagitis, Anemia, unspecified type POST-OP DIAGNOSIS: See the impression below. PREPROCEDURE: Informed consent was obtained for the procedure, including sedation.  Risks of perforation, hemorrhage, adverse drug reaction and aspiration were discussed. The patient was placed in the left lateral decubitus position. Patient was explained about the risks and benefits of the procedure. Risks including but not limited to bleeding, infection, and perforation were explained in detail. Also explained about less than 100% sensitivity with the exam and other alternatives. PROCEDURE: EGD DETAILS OF PROCEDURE: Patient was taken to the procedure room where a time out was performed to confirm correct patient and correct procedure. The patient underwent monitored anesthesia care, which was administered by an anesthesia professional. The patient's blood pressure, heart rate, level of consciousness, respirations and oxygen were monitored throughout the procedure. The scope was introduced through the mouth and advanced to the second part of the duodenum. Retroflexion was performed in the fundus. Prior to the procedure, the patient's H. Pylori status was unknown. The patient's estimated blood loss was minimal (<5 mL). The procedure was not difficult. The patient tolerated the procedure well. There were no apparent adverse events. ANESTHESIA INFORMATION: ASA: III Anesthesia Type: IV Sedation with Anesthesia MEDICATIONS: No administrations occurring from 1142 to 1158 on 02/03/24 FINDINGS: The cricopharynx, upper third of the esophagus, middle third of the esophagus, lower third of the esophagus, GE junction and Z-line appeared normal. Z-line is 40 cm from the incisors. The cardia, fundus of the stomach, body of the stomach, greater curve of the stomach, lesser curve of the stomach, incisura, antrum,  prepyloric region and pylorus appeared normal. Performed 6 random biopsies using biopsy forceps to rule out H. pylori. The duodenal bulb and 2nd part of the duodenum appeared normal. Performed 6 random biopsies using biopsy forceps to rule out celiac disease. SPECIMENS: ID Type Source Tests Collected by Time Destination 1 :  Tissue Duodenum TISSUE EXAM Wiliam Lamb  2/3/2024 11:57 AM  2 :  Tissue Stomach TISSUE EXAM Wiliam LambDO 2/3/2024 11:57 AM      Impression: The cricopharynx, upper third of the esophagus, middle third of the esophagus, lower third of the esophagus, GE junction and Z-line appeared normal. The cardia, fundus of the stomach, body of the stomach, greater curve of the stomach, lesser curve of the stomach, incisura, antrum, prepyloric region and pylorus appeared normal. Performed 6 random biopsies to rule out H. pylori. The duodenal bulb and 2nd part of the duodenum appeared normal. Performed 6 random biopsies to rule out celiac disease. RECOMMENDATION: 1.  Resume previous diet 2.  Will call the patient in 1 to 2 weeks with results of the biopsies 3.  No additional GI procedures are planned at this time 4.  We can see this patient as an outpatient in the next 2 to 3 weeks 5.  We will be signing off at this time   Wiliam Lamb DO FACG, FACP     XR chest portable    Result Date: 1/31/2024  Narrative: CHEST INDICATION:  Chest pain. COMPARISON: 1/24/2024, CT chest, abdomen pelvis 1/18/2024 EXAM PERFORMED/VIEWS:  XR CHEST PORTABLE FINDINGS: Cardiomediastinal silhouette appears unremarkable. Stable bibasilar atelectasis. No new infiltrate. No pneumothorax. Questionable trace left pleural effusion. Osseous structures appear within normal limits for patient age.     Impression: Persistent bibasilar atelectasis and questionable trace left pleural effusion. Workstation performed: [id number]     US kidney and bladder    Result Date: 1/28/2024  Narrative: RENAL ULTRASOUND INDICATION: left flank  pain. COMPARISON: CT chest abdomen pelvis 1/18/2024 TECHNIQUE: Ultrasound of the retroperitoneum was performed with a curvilinear transducer utilizing volumetric sweeps and still imaging techniques. FINDINGS: KIDNEYS: Symmetric and normal size. Right kidney: 12.6 x 4.1 x 4.2 cm. Volume 113.6 mL Left kidney: 10.7 x 4.4 x 3.3 cm. Volume 80.8 mL Right kidney Normal echogenicity and contour. Echogenic focus in the lateral right kidney measuring 0.7 x 0.6 x 0.7 cm may be due to reverberation artifact from a tiny cyst or small complex cyst. No hydronephrosis. No shadowing calculi. No perinephric fluid collections. Left kidney Normal echogenicity and contour. No mass is identified. No hydronephrosis. No shadowing calculi. No perinephric fluid collections. URETERS: Nonvisualized. BLADDER: Not visualized. OTHER: Incidentally noted echogenic liver likely due to hepatic steatosis. Enlarged spleen measuring 14.2 cm.     Impression: 1. No hydronephrosis. 2. Echogenic focus in the lateral right kidney measuring 0.7 x 0.6 x 0.7 cm may be due to reverberation artifact from a tiny cyst or small complex cyst. Recommend follow-up ultrasound in 6 months to assess stability. 3. Incidentally noted hepatic steatosis and splenomegaly. The study was marked in EPIC for immediate notification. Workstation performed: [id number]     Echo follow up/limited w/ contrast if indicated    Result Date: 1/25/2024  Narrative:   Very technically difficult study.   Left Ventricle: Left ventricular cavity size is normal. Wall thickness is normal. The left ventricular ejection fraction is 65%. Systolic function is normal. Wall motion is normal. Diastolic function is mildly abnormal, consistent with grade I (abnormal) relaxation.   No significant valvular disease seen on this technically difficult study.   No significant change since prior echo 1/15/2023.     XR chest portable    Result Date: 1/24/2024  Narrative: CHEST INDICATION:   shortness of breath,  hypoxia. COMPARISON: 1/18/2024 EXAM PERFORMED/VIEWS:  XR CHEST PORTABLE FINDINGS: Patient is rotated to the right Cardiomediastinal silhouette appears unremarkable. Persistent linear densities in the lung bases similar to prior compatible with platelike atelectasis. No pneumothorax. Possible trace pleural effusions. Osseous structures appear within normal limits for patient age.     Impression: Bibasilar platelike atelectasis and trace pleural effusions Workstation performed: [id number]     XR chest 1 view portable    Result Date: 1/19/2024  Narrative: CHEST INDICATION:   chest pain. COMPARISON:  None EXAM PERFORMED/VIEWS:  XR CHEST PORTABLE FINDINGS: Cardiomediastinal silhouette appears unremarkable. Linear opacities at the lung bases consistent with atelectasis.  No pneumothorax or pleural effusion. Osseous structures appear within normal limits for patient age.     Impression: Bibasilar atelectasis. Workstation performed: [id number]     CT chest abdomen pelvis w contrast    Result Date: 1/18/2024  Narrative: CT CHEST, ABDOMEN AND PELVIS WITH IV CONTRAST INDICATION: chest pain, shortness of breath, abdominal pain. COMPARISON: CT of the chest abdomen pelvis on January 14, 2024. TECHNIQUE: CT examination of the chest, abdomen and pelvis was performed. Multiplanar 2D reformatted images were created from the source data. This examination, like all CT scans performed in the [facility], was performed utilizing techniques to minimize radiation dose exposure, including the use of iterative reconstruction and automated exposure control. Radiation dose length product (DLP) for this visit: 1771 mGy-cm IV Contrast: 100 mL of iohexol (OMNIPAQUE) Enteric Contrast: FINDINGS: CHEST LUNGS: Linear opacities in the lung bases similar to prior examination compatible with atelectasis/scarring. No focal consolidation. The central airways are patent. PLEURA: Unremarkable. HEART/GREAT VESSELS: Heart is unremarkable for  patient's age. No thoracic aortic aneurysm. Mild atherosclerotic calcifications of the thoracic aorta. MEDIASTINUM AND MORGAN: Unremarkable. CHEST WALL AND LOWER NECK: Unremarkable. ABDOMEN Motion limited examination. LIVER/BILIARY TREE: Unremarkable. GALLBLADDER: Post cholecystectomy. SPLEEN: Unremarkable. PANCREAS: Unremarkable. ADRENAL GLANDS: Stable 1.1 cm left adrenal myelolipoma. KIDNEYS/URETERS: No hydronephrosis or urinary tract calculi. Subcentimeter renal hypoattenuating lesion(s) too small to characterize, considered Bosniak II findings which do not warrant further work-up (Radiology June 2019). STOMACH AND BOWEL: Mild diffuse thickening of the esophagus may be due to esophagitis, stable. No bowel obstruction. Colonic diverticulosis without evidence of diverticulitis. APPENDIX: No findings to suggest appendicitis. ABDOMINOPELVIC CAVITY: No ascites. No pneumoperitoneum. No lymphadenopathy. Right lower quadrant clips are seen. VESSELS: Mild atherosclerotic calcifications. PELVIS REPRODUCTIVE ORGANS: Post hysterectomy. URINARY BLADDER: Unremarkable. ABDOMINAL WALL/INGUINAL REGIONS: Unremarkable. BONES: No acute fracture or suspicious osseous lesion. Spinal degenerative changes. Increased kyphosis of the thoracic spine. Stable compression deformity of L1 with moderate vertebral body height loss and trace 2 mm retropulsion.     Impression: 1.  Bibasilar atelectasis. 2.  Mild diffuse thickening of the esophagus compatible with esophagitis similar to prior exam. 3.  Diverticulosis without evidence of diverticulitis. Workstation performed: [id number]     XR chest portable    Result Date: 1/15/2024  Narrative: CHEST INDICATION:   Cough, congestion, and shortness of breath. COMPARISON: Same day CT chest, abdomen, pelvis; chest radiograph 12/9/2023 EXAM PERFORMED/VIEWS:  XR CHEST PORTABLE Images:  1 FINDINGS: Patient is rotated to the right. Monitoring leads and clips project over the chest. Cardiomediastinal  silhouette appears unremarkable. Bibasilar opacities (right greater than left) which could represent atelectasis versus pneumonia in the appropriate clinical setting. No pneumothorax. No pleural effusion. Osseous structures appear within normal limits for patient age.     Impression: Bibasilar opacities (right greater than left) which could represent atelectasis versus pneumonia in the appropriate clinical setting. Resident: BERHANE STANTON I, the attending radiologist, have reviewed the images and agree with the final report above. Workstation performed: [id number]     CT chest abdomen pelvis wo contrast    Result Date: 1/14/2024  Narrative: CT CHEST, ABDOMEN AND PELVIS WITHOUT IV CONTRAST INDICATION: Left flank pain rule out kidney stone. Shortness of breath and fevers. Smoker COMPARISON: CT scan of the chest/abdomen/pelvis from 11/25/2023. TECHNIQUE: CT examination of the chest, abdomen and pelvis was performed without intravenous contrast. Multiplanar 2D reformatted images were created from the source data. This examination, like all CT scans performed in the [facility], was performed utilizing techniques to minimize radiation dose exposure, including the use of iterative reconstruction and automated exposure control. Radiation dose length product (DLP) for this visit: 1451 mGy-cm Enteric contrast was not administered. FINDINGS: CHEST LUNGS: Bibasilar subsegmental atelectasis. No nodule. No endotracheal or endobronchial lesion identified. PLEURA: Unremarkable. HEART/GREAT VESSELS: Heart is unremarkable for patient's age. No thoracic aortic aneurysm. MEDIASTINUM AND MORGAN: No adenopathy. Diffuse esophageal wall thickening particularly in the distal third. CHEST WALL AND LOWER NECK: Unremarkable. ABDOMEN LIVER/BILIARY TREE: Liver is enlarged.  No focal hepatic lesions are noted.  Hepatic contours are within normal limits.  No biliary dilatation. GALLBLADDER: Removed. SPLEEN: Unremarkable.  "PANCREAS: Unremarkable. ADRENAL GLANDS: Benign left adrenal gland myolipoma measuring 1.4 cm unchanged. KIDNEYS/URETERS: Unremarkable. No hydronephrosis. STOMACH AND BOWEL: There is colonic diverticulosis without evidence of acute diverticulitis. APPENDIX: No findings to suggest appendicitis. ABDOMINOPELVIC CAVITY: No ascites. No pneumoperitoneum. No lymphadenopathy. VESSELS: Unremarkable for patient's age. PELVIS REPRODUCTIVE ORGANS: Surgical changes of prior hysterectomy. URINARY BLADDER: Unremarkable. ABDOMINAL WALL/INGUINAL REGIONS: Unremarkable. OSSEOUS STRUCTURES: No acute fracture or destructive osseous lesion. Chronic L1 compression deformity.     Impression: No acute disease in the lungs. Stable thickening of the esophageal wall consistent with esophagitis. No acute inflammatory process identified in the abdomen or pelvis. Stable hepatomegaly. Workstation performed: [id number]         Significant Findings / Test Results:   See above    Incidental Findings:   none     Test Results Pending at Discharge (will require follow up):   none     Outpatient Tests Requested:  none    Complications:  none    Past Medical History:   Diagnosis Date    Achalasia     Back pain     Cancer (HCC)     Ovarian    Fibromyalgia     Lupus (HCC)        Reason for Admission: Shortness of Breath (Patient reports being admitted for a kidney infection at this hospital and discharged yesterday, reports checking their oxygen at home overnight and seeing it was \"82% and my heart rate was 140\". Patient reports sensation of bloating, mid sternal chest pain and heart \"fluttering\". )       Hospital Course:     Nanci Navarro is a 58 y.o. female patient with past medical history of above who originally presented to the hospital on 1/18/2024 due to Shortness of Breath (Patient reports being admitted for a kidney infection at this hospital and discharged yesterday, reports checking their oxygen at home overnight and seeing it was \"82% and my " "heart rate was 140\". Patient reports sensation of bloating, mid sternal chest pain and heart \"fluttering\". )  Patient denies shortness of breath and was found to have COPD exacerbation patient improved with steroids required oxygen on discharge which has been difficult as she currently lives in a camper with an open flame initially was going to try and get electricity to the camper but there is open permits on the property so cannot patient does not live in a camper on the current property so going to get patient to a motel until better arrangements can be made for the patient    Please see above list of diagnoses and related plan for additional information.     Condition at Discharge: stable     Discharge Day Visit / Exam:     Subjective:  offers no complaints agreeable to plan and motel, and oxygen  Vitals: Blood Pressure: 95/51 (02/07/24 0700)  Pulse: 65 (02/06/24 0712)  Temperature: 98 °F (36.7 °C) (02/07/24 0700)  Temp Source: Oral (02/07/24 0700)  Respirations: 18 (02/07/24 0700)  Height: 4' 9\" (144.8 cm) (01/25/24 0958)  Weight - Scale: 94.1 kg (207 lb 7.3 oz) (02/06/24 0600)  SpO2: 97 % (02/06/24 0712)  Exam:   Physical Exam  Vitals reviewed.   Constitutional:       General: She is not in acute distress.     Appearance: She is normal weight. She is ill-appearing.   Cardiovascular:      Rate and Rhythm: Normal rate.   Pulmonary:      Effort: Pulmonary effort is normal. No respiratory distress.   Skin:     General: Skin is warm.      Coloration: Skin is pale.   Neurological:      Mental Status: She is alert. Mental status is at baseline.   Psychiatric:         Mood and Affect: Mood normal.       Discussion with Family: multidisciplinary discussion    Discharge instructions/Information to patient and family:   See after visit summary for information provided to patient and family.      Provisions for Follow-Up Care:  See after visit summary for information related to follow-up care and any pertinent home health " orders.      Disposition:     Home    Planned Readmission: no     Discharge Statement:  I spent 45 minutes discharging the patient. This time was spent on the day of discharge. I had direct contact with the patient on the day of discharge. Greater than 50% of the total time was spent examining patient, answering all patient questions, arranging and discussing plan of care with patient as well as directly providing post-discharge instructions.  Additional time then spent on discharge activities.    Discharge Medications:  See after visit summary for reconciled discharge medications provided to patient and family.      ** Please Note: This note has been constructed using a voice recognition system **

## 2024-02-07 NOTE — PLAN OF CARE
Problem: Potential for Falls  Goal: Patient will remain free of falls  Description: INTERVENTIONS:  - Educate patient/family on patient safety including physical limitations  - Instruct patient to call for assistance with activity   - Consult OT/PT to assist with strengthening/mobility   - Keep Call bell within reach  - Keep bed low and locked with side rails adjusted as appropriate  - Keep care items and personal belongings within reach  - Initiate and maintain comfort rounds  - Make Fall Risk Sign visible to staff  - Offer Toileting every 2 Hours, in advance of need  - Initiate/Maintain bed alarm  - Obtain necessary fall risk management equipment:   - Apply yellow socks and bracelet for high fall risk patients  - Consider moving patient to room near nurses station  Outcome: Progressing     Problem: PAIN - ADULT  Goal: Verbalizes/displays adequate comfort level or baseline comfort level  Description: Interventions:  - Encourage patient to monitor pain and request assistance  - Assess pain using appropriate pain scale  - Administer analgesics based on type and severity of pain and evaluate response  - Implement non-pharmacological measures as appropriate and evaluate response  - Consider cultural and social influences on pain and pain management  - Notify physician/advanced practitioner if interventions unsuccessful or patient reports new pain  Outcome: Progressing     Problem: INFECTION - ADULT  Goal: Absence or prevention of progression during hospitalization  Description: INTERVENTIONS:  - Assess and monitor for signs and symptoms of infection  - Monitor lab/diagnostic results  - Monitor all insertion sites, i.e. indwelling lines, tubes, and drains  - Monitor endotracheal if appropriate and nasal secretions for changes in amount and color  - Perry appropriate cooling/warming therapies per order  - Administer medications as ordered  - Instruct and encourage patient and family to use good hand hygiene  technique  - Identify and instruct in appropriate isolation precautions for identified infection/condition  Outcome: Progressing  Goal: Absence of fever/infection during neutropenic period  Description: INTERVENTIONS:  - Monitor WBC    Outcome: Progressing     Problem: SAFETY ADULT  Goal: Patient will remain free of falls  Description: INTERVENTIONS:  - Educate patient/family on patient safety including physical limitations  - Instruct patient to call for assistance with activity   - Consult OT/PT to assist with strengthening/mobility   - Keep Call bell within reach  - Keep bed low and locked with side rails adjusted as appropriate  - Keep care items and personal belongings within reach  - Initiate and maintain comfort rounds  - Make Fall Risk Sign visible to staff  - Offer Toileting every 2 Hours, in advance of need  - Initiate/Maintain bed alarm  - Obtain necessary fall risk management equipment:   - Apply yellow socks and bracelet for high fall risk patients  - Consider moving patient to room near nurses station  Outcome: Progressing  Goal: Maintain or return to baseline ADL function  Description: INTERVENTIONS:  -  Assess patient's ability to carry out ADLs; assess patient's baseline for ADL function and identify physical deficits which impact ability to perform ADLs (bathing, care of mouth/teeth, toileting, grooming, dressing, etc.)  - Assess/evaluate cause of self-care deficits   - Assess range of motion  - Assess patient's mobility; develop plan if impaired  - Assess patient's need for assistive devices and provide as appropriate  - Encourage maximum independence but intervene and supervise when necessary  - Involve family in performance of ADLs  - Assess for home care needs following discharge   - Consider OT consult to assist with ADL evaluation and planning for discharge  - Provide patient education as appropriate  Outcome: Progressing  Goal: Maintains/Returns to pre admission functional  level  Description: INTERVENTIONS:  - Perform AM-PAC 6 Click Basic Mobility/ Daily Activity assessment daily.  - Set and communicate daily mobility goal to care team and patient/family/caregiver.   - Collaborate with rehabilitation services on mobility goals if consulted  - Perform Range of Motion 3 times a day.  - Reposition patient every 2 hours.  - Dangle patient 3 times a day  - Stand patient 3 times a day  - Ambulate patient 3 times a day  - Out of bed to chair 3 times a day   - Out of bed for meals 3 times a day  - Out of bed for toileting  - Record patient progress and toleration of activity level   Outcome: Progressing     Problem: DISCHARGE PLANNING  Goal: Discharge to home or other facility with appropriate resources  Description: INTERVENTIONS:  - Identify barriers to discharge w/patient and caregiver  - Arrange for needed discharge resources and transportation as appropriate  - Identify discharge learning needs (meds, wound care, etc.)  - Arrange for interpretive services to assist at discharge as needed  - Refer to Case Management Department for coordinating discharge planning if the patient needs post-hospital services based on physician/advanced practitioner order or complex needs related to functional status, cognitive ability, or social support system  Outcome: Progressing     Problem: Knowledge Deficit  Goal: Patient/family/caregiver demonstrates understanding of disease process, treatment plan, medications, and discharge instructions  Description: Complete learning assessment and assess knowledge base.  Interventions:  - Provide teaching at level of understanding  - Provide teaching via preferred learning methods  Outcome: Progressing     Problem: RESPIRATORY - ADULT  Goal: Achieves optimal ventilation and oxygenation  Description: INTERVENTIONS:  - Assess for changes in respiratory status  - Assess for changes in mentation and behavior  - Position to facilitate oxygenation and minimize  respiratory effort  - Oxygen administered by appropriate delivery if ordered  - Initiate smoking cessation education as indicated  - Encourage broncho-pulmonary hygiene including cough, deep breathe, Incentive Spirometry  - Assess the need for suctioning and aspirate as needed  - Assess and instruct to report SOB or any respiratory difficulty  - Respiratory Therapy support as indicated  Outcome: Progressing     Problem: GASTROINTESTINAL - ADULT  Goal: Maintains or returns to baseline bowel function  Description: INTERVENTIONS:  - Assess bowel function  - Encourage oral fluids to ensure adequate hydration  - Administer IV fluids if ordered to ensure adequate hydration  - Administer ordered medications as needed  - Encourage mobilization and activity  - Consider nutritional services referral to assist patient with adequate nutrition and appropriate food choices  Outcome: Progressing     Problem: GENITOURINARY - ADULT  Goal: Absence of urinary retention  Description: INTERVENTIONS:  - Assess patient’s ability to void and empty bladder  - Monitor I/O  - Bladder scan as needed  - Discuss with physician/AP medications to alleviate retention as needed  - Discuss catheterization for long term situations as appropriate  Outcome: Progressing

## 2024-02-07 NOTE — CASE MANAGEMENT
Case Management Discharge Planning Note    Patient name Nanci Navarro  Location /-01 MRN [id number]  : 1965 Date 2024       Current Admission Date: 2024  Current Admission Diagnosis:Discharge planning issues   Patient Active Problem List    Diagnosis Date Noted    Chronic back pain 2024    Class 3 severe obesity due to excess calories with serious comorbidity and body mass index (BMI) of 45.0 to 49.9 in adult (HCC) 2024    Non-compliant behavior 2024    Discharge planning issues 2024    Anemia 2024    Diabetes ([facility]) 2024    Chronic respiratory failure with hypoxia ([facility]) 2024    Electrolyte abnormality 2024    Osteoporosis 2023    Gastroesophageal reflux disease without esophagitis 2023    Chest pain 01/15/2023    Acute respiratory failure ([facility]) 01/15/2023    Volume overload 01/15/2023    Ambulatory dysfunction 2023    Compression fracture of L1 vertebra ([facility]) 2023    COPD exacerbation ([facility]) 2023    Sepsis ([facility]) 2023    Fall 2023    Systemic lupus erythematosus with lung involvement ([facility]) 12/10/2022    Respiratory failure with hypoxia ([facility]) 10/06/2022    Chronic obstructive pulmonary disease with acute exacerbation ([facility]) 2022    Smoker 2022      LOS (days): 19  Geometric Mean LOS (GMLOS) (days):   Days to GMLOS:     OBJECTIVE:  Risk of Unplanned Readmission Score: 25.62         Current admission status: Inpatient   Preferred Pharmacy:   [facility] [place] - [address]  [address]  [address]  [facility] [address]  Phone: [phone] Fax: [phone]    [id number] [place] - [address]  [address]  [place] [address]  Phone: [phone] Fax: [phone]    [facility]  [place] - [address] [facility] [address] [facility],  [address],  First [address] [place] [address]  Phone:  [phone] Fax: [phone]    [id number] - [place] - [address]  [address]  [place] [address]  Phone: [phone] Fax: [phone]    [facility] [place] - [address]  [address]  [place] [address]  Phone: [phone] Fax: [phone]    Primary Care Provider: Renan Montero,     Primary Insurance: DOROTHY ANN PENDING  Secondary Insurance:     DISCHARGE DETAILS:    DME Referral Provided  Referral made for DME?: Yes  DME referral completed for the following items:: Portable Oxygen concentrator  DME Supplier Name:: [employer]    Other Referral/Resources/Interventions Provided:  Interventions: DME  Referral Comments: CM submitted a referral to [employer] requesting a POC that would be covered by the hospital. Administration has previously approved. Response pending in [place]. DANK then sent a TT to [employer] asking for assistance with booking a room at the [facility].

## 2024-02-07 NOTE — ASSESSMENT & PLAN NOTE
Previously Non-compliant with chronic oxygen requirements  Home oxygen evaluation completed on 1/22, repeated on 1/31 - still requiring 2 L of supplemental oxygen at discharge  See full plan as noted above

## 2024-02-07 NOTE — CASE MANAGEMENT
Case Management Progress Note    Patient name Nanci Navarro  Location /-01 MRN [id number]  : 1965 Date 2024       LOS (days): 19  Geometric Mean LOS (GMLOS) (days):   Days to GMLOS:        OBJECTIVE:    Current admission status: Inpatient  Preferred Pharmacy:   [facility] [place] - [address]  [address]  [address]  [facility] [address]  Phone: [phone] Fax: [phone]    [facility] [place] - [address]  [address]  [facility] [address]  Phone: [phone] Fax: [phone]    [facility] [place] - [address] [facility],  [address] [facility],  First Floor [facility] [address]  Phone: [phone] Fax: [phone]    [id number] [place] - [address]  [address]  [facility] [address]  Phone: [phone] Fax: [phone]    [facility]  [place] - [address]  [address]  [place] [place] [address]  Phone: [phone] Fax: [phone]    Primary Care Provider: Renan Montero DO  Primary Insurance: DOROTHY ANN PENDING  Secondary Insurance:     PROGRESS NOTE:  CM met with patient at bedside x3 today to review DCP.  Assistance offered with 211 call for emergency housing and completion of MA application at bedside.  Patient declining assistance.      CM returned with CM CC to review DCP.  Patient agreeable to d/c to hot.  CM coordinating with [employer] for POC.  Meds from pharmacy will be delivered to bedside prior to d/c as well.  Admin aware of status.  Will continue to follow for d/c planning.

## 2024-02-09 ENCOUNTER — PATIENT OUTREACH (OUTPATIENT)
Dept: CASE MANAGEMENT | Facility: OTHER | Age: 59
End: 2024-02-09

## 2024-02-09 NOTE — PROGRESS NOTES
".OP RT CM called and spoke with patient regarding her breathing, medications and O2.  Nanci was recently discharged after a lengthy hospitalization. She state she has a POC and O2 tanks and was instructed only to use as needed on 2lpm nasal cannula.  She doesn't have a home O2 concentrator. She is using Albuterol rescue inhaler QID and Breo Qday and rinsing out mouth. Nanci states she has a nebulizer but doesn't use.      She is raising pale yellow sputum and states that she feels nauseous and sweaty; she is unsure if she has a fever.  Nanci stated her  has he same symptoms and may be a virus.     Nanci stated she is smoke free and has not had a cigarette since pre admission.  I congratulated her on her success.  She denies using any NRT products.     Nanci states her pulmonary MD is Dr. Mcdaniel with [facility] and I encouraged her to make a f/u appt. 'Why?\" She asked and I explained for her follow up care since she has been hospitalized. I encouraged her PCP as well and she has her MD phone number, he is  private practice.   Nanci denied getting COPD booklet, zone tools etc.  I will place in mail for her, confirmed address. She agreed to future outreach.   I will outreach next week and Nanci has my contact information.     "

## 2024-02-16 ENCOUNTER — PATIENT OUTREACH (OUTPATIENT)
Dept: CASE MANAGEMENT | Facility: OTHER | Age: 59
End: 2024-02-16

## 2024-02-16 NOTE — PROGRESS NOTES
.OP RT CM called and spoke with patient regarding her breathing, medications and O2.  She requested to call me back.  She stated she has my contact information. I will outreach next week unless I hear back from patient prior.

## 2024-02-21 PROBLEM — A41.9 SEPSIS (HCC): Status: RESOLVED | Noted: 2023-01-02 | Resolved: 2024-02-21

## 2024-02-23 ENCOUNTER — PATIENT OUTREACH (OUTPATIENT)
Dept: CASE MANAGEMENT | Facility: OTHER | Age: 59
End: 2024-02-23

## 2024-02-23 NOTE — PROGRESS NOTES
.OP RT CM called and left a VM requesting a return phone call. I will outreach in two weeks unless I hear back from patient prior. IN basket message received.   _________________________________  OP RT CM had missed incoming call from Nanci. I returned call and left another VM. I will outreach in two weeks unless I hear from patient prior.

## 2024-03-11 ENCOUNTER — PATIENT OUTREACH (OUTPATIENT)
Dept: CASE MANAGEMENT | Facility: OTHER | Age: 59
End: 2024-03-11

## 2024-03-11 NOTE — PROGRESS NOTES
.OP RT CM called and left a VM requesting a return phone call. I will outreach in two weeks unless I hear back from patient prior.   Unable to reach letter sent via standard mail.

## 2024-07-30 ENCOUNTER — TELEPHONE (OUTPATIENT)
Dept: PAIN MEDICINE | Facility: CLINIC | Age: 59
End: 2024-07-30

## 2024-07-30 NOTE — TELEPHONE ENCOUNTER
Left a voicemail message for patient to give us a call back if she would like to schedule an appointment.

## 2024-08-14 ENCOUNTER — TELEPHONE (OUTPATIENT)
Age: 59
End: 2024-08-14

## 2024-08-14 NOTE — TELEPHONE ENCOUNTER
Left message for pt to discuss insurance information for billing for upcoming scheduled pcp appt or so go over self pay fees

## 2024-08-15 ENCOUNTER — TELEPHONE (OUTPATIENT)
Age: 59
End: 2024-08-15

## 2024-08-29 ENCOUNTER — OFFICE VISIT (OUTPATIENT)
Age: 59
End: 2024-08-29
Payer: COMMERCIAL

## 2024-08-29 VITALS
OXYGEN SATURATION: 98 % | HEART RATE: 85 BPM | SYSTOLIC BLOOD PRESSURE: 112 MMHG | HEIGHT: 57 IN | WEIGHT: 219 LBS | DIASTOLIC BLOOD PRESSURE: 70 MMHG | BODY MASS INDEX: 47.25 KG/M2 | TEMPERATURE: 98.1 F

## 2024-08-29 DIAGNOSIS — Z11.59 NEED FOR HEPATITIS C SCREENING TEST: ICD-10-CM

## 2024-08-29 DIAGNOSIS — R79.89 ELEVATED TSH: ICD-10-CM

## 2024-08-29 DIAGNOSIS — E66.01 CLASS 3 SEVERE OBESITY DUE TO EXCESS CALORIES WITH SERIOUS COMORBIDITY AND BODY MASS INDEX (BMI) OF 45.0 TO 49.9 IN ADULT (HCC): ICD-10-CM

## 2024-08-29 DIAGNOSIS — M54.9 OTHER ACUTE BACK PAIN: ICD-10-CM

## 2024-08-29 DIAGNOSIS — Z00.00 ANNUAL PHYSICAL EXAM: Primary | ICD-10-CM

## 2024-08-29 DIAGNOSIS — M32.13 SYSTEMIC LUPUS ERYTHEMATOSUS WITH LUNG INVOLVEMENT, UNSPECIFIED SLE TYPE (HCC): ICD-10-CM

## 2024-08-29 DIAGNOSIS — J41.0 SIMPLE CHRONIC BRONCHITIS (HCC): ICD-10-CM

## 2024-08-29 DIAGNOSIS — Z11.4 SCREENING FOR HIV (HUMAN IMMUNODEFICIENCY VIRUS): ICD-10-CM

## 2024-08-29 DIAGNOSIS — M85.88 OSTEOPENIA OF LUMBAR SPINE: ICD-10-CM

## 2024-08-29 DIAGNOSIS — E11.9 TYPE 2 DIABETES MELLITUS WITHOUT COMPLICATION, WITHOUT LONG-TERM CURRENT USE OF INSULIN (HCC): ICD-10-CM

## 2024-08-29 DIAGNOSIS — F17.200 SMOKER: ICD-10-CM

## 2024-08-29 DIAGNOSIS — D64.9 ANEMIA, UNSPECIFIED TYPE: ICD-10-CM

## 2024-08-29 DIAGNOSIS — K21.9 GASTROESOPHAGEAL REFLUX DISEASE WITHOUT ESOPHAGITIS: ICD-10-CM

## 2024-08-29 DIAGNOSIS — J96.11 CHRONIC RESPIRATORY FAILURE WITH HYPOXIA (HCC): ICD-10-CM
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
## 2024-08-29 PROBLEM — J96.00 ACUTE RESPIRATORY FAILURE (HCC): Status: RESOLVED | Noted: 2023-01-15 | Resolved: 2024-08-29

## 2024-08-29 PROBLEM — E87.8 ELECTROLYTE ABNORMALITY: Status: RESOLVED | Noted: 2024-01-14 | Resolved: 2024-08-29

## 2024-08-29 PROBLEM — J96.01 ACUTE RESPIRATORY FAILURE WITH HYPOXIA (HCC): Status: RESOLVED | Noted: 2024-02-07 | Resolved: 2024-08-29

## 2024-08-29 LAB — SL AMB POCT HEMOGLOBIN AIC: 7.2 (ref ?–6.5)

## 2024-08-29 PROCEDURE — 99386 PREV VISIT NEW AGE 40-64: CPT

## 2024-08-29 PROCEDURE — 83036 HEMOGLOBIN GLYCOSYLATED A1C: CPT

## 2024-08-29 PROCEDURE — 99214 OFFICE O/P EST MOD 30 MIN: CPT

## 2024-08-29 RX ORDER — BUDESONIDE AND FORMOTEROL FUMARATE DIHYDRATE 80; 4.5 UG/1; UG/1
2 AEROSOL RESPIRATORY (INHALATION) 2 TIMES DAILY
Qty: 10.2 G | Refills: 5 | Status: SHIPPED | OUTPATIENT
Start: 2024-08-29

## 2024-08-29 RX ORDER — NICOTINE 21 MG/24HR
1 PATCH, TRANSDERMAL 24 HOURS TRANSDERMAL EVERY 24 HOURS
Qty: 28 PATCH | Refills: 0 | Status: SHIPPED | OUTPATIENT
Start: 2024-08-29

## 2024-08-29 RX ORDER — NICOTINE 21 MG/24HR
1 PATCH, TRANSDERMAL 24 HOURS TRANSDERMAL DAILY
Qty: 28 PATCH | Refills: 0 | Status: CANCELLED | OUTPATIENT
Start: 2024-08-29

## 2024-08-29 RX ORDER — IPRATROPIUM BROMIDE AND ALBUTEROL SULFATE 2.5; .5 MG/3ML; MG/3ML
SOLUTION RESPIRATORY (INHALATION)
COMMUNITY

## 2024-08-29 RX ORDER — MAGNESIUM CHLORIDE 70 MG
TABLET, DELAYED RELEASE (ENTERIC COATED) ORAL DAILY
COMMUNITY

## 2024-08-29 RX ORDER — SERTRALINE HYDROCHLORIDE 25 MG/1
25 TABLET, FILM COATED ORAL DAILY
COMMUNITY

## 2024-08-29 RX ORDER — METHOCARBAMOL 500 MG/1
500 TABLET, FILM COATED ORAL 4 TIMES DAILY PRN
Qty: 12 TABLET | Refills: 0 | Status: SHIPPED | OUTPATIENT
Start: 2024-08-29

## 2024-08-29 RX ORDER — RIVAROXABAN 2.5 MG/1
TABLET, FILM COATED ORAL
COMMUNITY
Start: 2024-08-19

## 2024-08-29 NOTE — ASSESSMENT & PLAN NOTE
Recommended diet low in carbohydrates, sugars, saturated and trans fats.  Recommend regular physical activity.

## 2024-08-29 NOTE — ASSESSMENT & PLAN NOTE
Continue home oxygen of 2 L.  Patient has upcoming follow-up with pulmonology scheduled.  Recommend she use her prescribed daily maintenance inhaler for COPD to reduce the use of the rescue inhaler, but she states that the Breo and Trelegy were too expensive for her to get.  I have placed an order for Symbicort to see if this is covered by her insurance instead.  Educated the patient that we want her on a maintenance inhaler to reduce the use of rescue inhaler to only as needed for acute SOB or wheezing.

## 2024-08-29 NOTE — ASSESSMENT & PLAN NOTE
Lab Results   Component Value Date    HGBA1C 7.2 (A) 08/29/2024   Hemoglobin A1c increased from previous.  Recommend starting metformin 500 mg daily.  Recommend a diet low in carbohydrates and sugars.

## 2024-08-29 NOTE — ASSESSMENT & PLAN NOTE
Discussed the importance of smoking cessation.  Patient is ready to quit.  Nicotine patches have been prescribed. Educated the patient that she must quit cigarette use once she starts using the patches and apply a new patch daily. Follow up in one month for re-evaluation. Recommend replacing the oral habit with something like chewing gum or beef jerky.  May reduce dose of nicotine patch to 14 mg at follow-up in 1 month.

## 2024-08-29 NOTE — ASSESSMENT & PLAN NOTE
Questionable diagnosis... Uncertain when or where this was diagnosed.  If symptoms worsen, I recommend she follow-up with rheumatologist.

## 2024-08-29 NOTE — ASSESSMENT & PLAN NOTE
Recommend vitamin D supplement of at least 2000 IU daily and calcium 500 mg daily.  We will continue routine monitoring.

## 2024-08-29 NOTE — PROGRESS NOTES
Adult Annual Physical  Name: Nanci Navarro      : 1965      MRN: [id number]  Encounter Provider: Natividad Robertson PA-C  Encounter Date: 2024   Encounter department: [facility]    Assessment & Plan   1. Annual physical exam  2. Simple chronic bronchitis (HCC)  Assessment & Plan:  Continue home oxygen of 2 L.  Patient has upcoming follow-up with pulmonology scheduled.  Recommend she use her prescribed daily maintenance inhaler for COPD to reduce the use of the rescue inhaler, but she states that the Breo and Trelegy were too expensive for her to get.  I have placed an order for Symbicort to see if this is covered by her insurance instead.  Educated the patient that we want her on a maintenance inhaler to reduce the use of rescue inhaler to only as needed for acute SOB or wheezing.   Orders:  -     budesonide-formoterol (SYMBICORT) 80-4.5 MCG/ACT inhaler; Inhale 2 puffs 2 (two) times a day Rinse mouth after use.  3. Systemic lupus erythematosus with lung involvement, unspecified SLE type (HCC)  Assessment & Plan:  Questionable diagnosis... Uncertain when or where this was diagnosed.  If symptoms worsen, I recommend she follow-up with rheumatologist.  4. Chronic respiratory failure with hypoxia (HCC)  Assessment & Plan:  Continue home oxygen of 2 L.  Patient has upcoming follow-up with pulmonology scheduled.  Recommend she use her prescribed daily maintenance inhaler for COPD to reduce the use of the rescue inhaler, but she states that the Breo and Trelegy were too expensive for her to get.  I have placed an order for Symbicort to see if this is covered by her insurance instead.  Educated the patient that we want her on a maintenance inhaler to reduce the use of rescue inhaler to only as needed for acute SOB or wheezing.   5. Gastroesophageal reflux disease without esophagitis  Assessment & Plan:  Stable.  Continue pantoprazole 40 mg daily.  Continue regular follow-up with GI.  6.  Type 2 diabetes mellitus without complication, without long-term current use of insulin ([facility])  Assessment & Plan:    Lab Results   Component Value Date    HGBA1C 7.2 (A) 08/29/2024   Hemoglobin A1c increased from previous.  Recommend starting metformin 500 mg daily.  Recommend a diet low in carbohydrates and sugars.  Orders:  -     IRIS Diabetic eye exam  -     POCT hemoglobin A1c  -     Albumin / creatinine urine ratio; Future  -     metFORMIN (GLUCOPHAGE) 500 mg tablet; Take 1 tablet (500 mg total) by mouth daily with breakfast  7. Osteopenia of lumbar spine  Assessment & Plan:  Recommend vitamin D supplement of at least 2000 IU daily and calcium 500 mg daily.  We will continue routine monitoring.  8. Smoker  Assessment & Plan:  Discussed the importance of smoking cessation.  Patient is ready to quit.  Nicotine patches have been prescribed. Educated the patient that she must quit cigarette use once she starts using the patches and apply a new patch daily. Follow up in one month for re-evaluation. Recommend replacing the oral habit with something like chewing gum or beef jerky.  May reduce dose of nicotine patch to 14 mg at follow-up in 1 month.  Orders:  -     nicotine (NICODERM CQ) 21 mg/24 hr TD 24 hr patch; Place 1 patch on the skin over 24 hours every 24 hours  9. Anemia, unspecified type  Assessment & Plan:  Patient not anemic on most recent blood work  10. Class 3 severe obesity due to excess calories with serious comorbidity and body mass index (BMI) of 45.0 to 49.9 in adult ([facility])  Assessment & Plan:  Recommended diet low in carbohydrates, sugars, saturated and trans fats.  Recommend regular physical activity.  11. Screening for HIV (human immunodeficiency virus)  -     HIV 1/2 AG/AB w Reflex [facility] for 2 yr old and above; Future  12. Other acute back pain  -     methocarbamol (ROBAXIN) 500 mg tablet; Take 1 tablet (500 mg total) by mouth 4 (four) times a day as needed for muscle spasms for up to 12  doses  13. Elevated TSH  -     TSH + Free T4; Future  Most likely autoimmune hypothyroidism, but not listed in patient chart. TSH was significantly elevated during recent hospitalization, but free T4 was not checked.  Patient states she was on a high dose of levothyroxine in the past, but is not on anything currently. Plan to check TSH and free T4 to assist in determining etiology and recommended management.   14. Need for hepatitis C screening test  -     Hepatitis C antibody; Future      Tobacco Cessation Counseling: Tobacco cessation counseling and education was provided. The patient is sincerely urged to quit consumption of tobacco. She is ready to quit tobacco. The numerous health risks of tobacco consumption were discussed. Prescribed the following medications: nicotine patch.. I spent 5 minutes on Tobacco Cessation counseling during today's visit.      Immunizations and preventive care screenings were discussed with patient today. Appropriate education was printed on patient's after visit summary.    Counseling:  Alcohol/drug use: discussed moderation in alcohol intake, the recommendations for healthy alcohol use, and avoidance of illicit drug use.  Dental Health: discussed importance of regular tooth brushing, flossing, and dental visits.  Exercise: the importance of regular exercise/physical activity was discussed. Recommend exercise 3-5 times per week for at least 30 minutes.       Depression Screening and Follow-up Plan: Patient was screened for depression during today's encounter. They screened negative with a PHQ-2 score of 0.    Tobacco Cessation Counseling: Tobacco cessation counseling was provided.         History of Present Illness   {Disappearing Hyperlinks I Encounters * My Last Note * Since Last Visit * History :75664}  Adult Annual Physical:  Patient presents for annual physical. Nanci is a 59-year-old female presenting to establish care.  She had 2 blood clots in her left arm this year, the  first in January and the second in July.  After the first blood clot she was placed on Eliquis, but then told that she could stop this, so she was not on a blood thinner when she got the second blood clot in July.  After the second blood clot she was started on Xarelto by her cardiologist.  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  She follows with Dr. Lamb and FRANKY for achalasia  She has fibromyalgia and possible diagnosis of lupus in the past  She broke her L1 vertebrae in December 2022, she had a DEXA scan done at that time and was found to have osteopenia.  Ortho told her that the fracture was healing on its own and did not recommend surgical intervention.  She was started on oxygen in January of this year after a hospitalization.  She has not used oxygen continuously, but uses it at night for sleep and when she is out of the home.  She usually uses 2 L of oxygen, sometimes 3.  She has a appointment with pulmonology upcoming.  Her cardiologist placed referral to endocrinology and wants her to follow-up with them.  I am suspecting this is secondary to her abnormal thyroid hormone.  She has chronic pain, especially of her low back, and has an upcoming appointment with pain management.  She has seen in the past and has not had much success with the therapies.  She takes magnesium and calcium and collagen supplement.  She only just recently got insurance and so was unable to do much in the way of screenings and testing prior to this.  .     Diet and Physical Activity:    - Exercise: no formal exercise.    Depression Screening:  - PHQ-2 Score: 0    General Health:    - Hearing: normal hearing bilateral ears.  - Vision: no vision problems.  - Dental: brushes teeth twice daily.    /GYN Health:  - Follows with GYN: no.     Review of Systems   Constitutional:  Negative for chills and fever.   HENT:  Negative for congestion, ear pain and sore throat.   "  Eyes:  Negative for pain and visual disturbance.   Respiratory:  Negative for cough and shortness of breath.    Cardiovascular:  Negative for chest pain and palpitations.   Gastrointestinal:  Negative for abdominal pain, constipation, diarrhea and vomiting.   Genitourinary:  Negative for dysuria and hematuria.   Musculoskeletal:  Positive for back pain. Negative for arthralgias and myalgias.   Skin:  Negative for color change and rash.   Neurological:  Negative for dizziness, seizures, syncope and headaches.   All other systems reviewed and are negative.        Objective   {Disappearing Hyperlinks   Review Vitals * Enter New Vitals * Results Review * Labs * Imaging * Cardiology * Procedures * Lung Cancer Screening :73280}  /70   Pulse 85   Temp 98.1 °F (36.7 °C) (Tympanic)   Ht 4' 9.25\" (1.454 m)   Wt 99.3 kg (219 lb)   SpO2 98%   BMI 46.98 kg/m²     Physical Exam  Vitals and nursing note reviewed.   Constitutional:       General: She is not in acute distress.     Appearance: She is well-developed.   HENT:      Head: Normocephalic and atraumatic.      Right Ear: Tympanic membrane normal.      Left Ear: Tympanic membrane normal.      Nose: Nose normal.      Mouth/Throat:      Mouth: Mucous membranes are moist.      Pharynx: Oropharynx is clear. No oropharyngeal exudate.   Eyes:      Extraocular Movements: Extraocular movements intact.      Conjunctiva/sclera: Conjunctivae normal.   Cardiovascular:      Rate and Rhythm: Normal rate and regular rhythm.      Pulses: no weak pulses.           Dorsalis pedis pulses are 2+ on the right side and 2+ on the left side.        Posterior tibial pulses are 2+ on the right side and 2+ on the left side.      Heart sounds: Normal heart sounds. No murmur heard.     No friction rub. No gallop.   Pulmonary:      Effort: Pulmonary effort is normal. No respiratory distress.      Breath sounds: Normal breath sounds. No wheezing, rhonchi or rales.   Abdominal:      " Palpations: Abdomen is soft.      Tenderness: There is no abdominal tenderness.   Musculoskeletal:         General: No swelling.      Cervical back: Neck supple.   Feet:      Right foot:      Skin integrity: No ulcer, skin breakdown, erythema, warmth, callus or dry skin.      Left foot:      Skin integrity: No ulcer, skin breakdown, erythema, warmth, callus or dry skin.   Skin:     General: Skin is warm and dry.      Capillary Refill: Capillary refill takes less than 2 seconds.   Neurological:      General: No focal deficit present.      Mental Status: She is alert.   Psychiatric:         Mood and Affect: Mood normal.         Diabetic Foot Exam    Patient's shoes and socks removed.    Right Foot/Ankle   Right Foot Inspection  Skin Exam: skin normal and skin intact. No dry skin, no warmth, no callus, no erythema, no maceration, no abnormal color, no pre-ulcer, no ulcer and no callus.     Toe Exam: ROM and strength within normal limits.     Sensory   Proprioception: intact  Monofilament testing: diminished    Vascular  Capillary refills: < 3 seconds  The right DP pulse is 2+. The right PT pulse is 2+.     Left Foot/Ankle  Left Foot Inspection  Skin Exam: skin normal and skin intact. No dry skin, no warmth, no erythema, no maceration, normal color, no pre-ulcer, no ulcer and no callus.     Toe Exam: ROM and strength within normal limits.     Sensory   Proprioception: intact  Monofilament testing: intact    Vascular  Capillary refills: < 3 seconds  The left DP pulse is 2+. The left PT pulse is 2+.     Assign Risk Category  No deformity present  No loss of protective sensation  No weak pulses  Risk: 0

## 2024-09-03 ENCOUNTER — TELEPHONE (OUTPATIENT)
Age: 59
End: 2024-09-03

## 2024-09-03 DIAGNOSIS — R93.429 ABNORMAL RENAL ULTRASOUND: Primary | ICD-10-CM

## 2024-09-03 NOTE — TELEPHONE ENCOUNTER
----- Message from Natividad Robertson PA-C sent at 9/3/2024  4:55 PM EDT -----  I received a message from our  of the department of radiology that this patient was recommended to have follow-up ultrasound for a small cystlike structure of the right kidney.  Does not appear that this follow-up ultrasound has been completed, so I have placed the order and recommend the patient have this study scheduled so that we can monitor to see if this cyst has resolved or changed.  ----- Message -----  From: Alan Alejandro  Sent: 8/29/2024  11:37 AM EDT  To: Natividad Robertson PA-C        Dear Provider,    Our records indicate that your patient was recommended to have a follow up exam based on a prior imaging study.    This letter is to notify you that your patient has not returned to a [facility] site for the recommended study. Enclosed, please find a copy of the patient report for your review. The Radiologist's recommendation can be found in the impression section at the bottom of the report.     If you have further questions, please feel free to contact us at [phone]. If you need assistance in making a scheduled appointment for your patient, please contact Central Scheduling at [phone].    Thank You,      Nhan Schultz M.D.  , Department of Radiology

## 2024-09-09 ENCOUNTER — TELEPHONE (OUTPATIENT)
Age: 59
End: 2024-09-09

## 2024-09-09 NOTE — TELEPHONE ENCOUNTER
PA for (SYMBICORT) 80-4.5 MCG/ACT inhaler SUBMITTED     via    []CMM-KEY:   [x]Surescripts  []Faxed to plan   []Other website   []Phone call Case ID #     Office notes sent, clinical questions answered. Awaiting determination    Turnaround time for your insurance to make a decision on your Prior Authorization can take 7-21 business days.

## 2024-09-11 ENCOUNTER — NURSE TRIAGE (OUTPATIENT)
Age: 59
End: 2024-09-11

## 2024-09-11 ENCOUNTER — OFFICE VISIT (OUTPATIENT)
Dept: GASTROENTEROLOGY | Facility: CLINIC | Age: 59
End: 2024-09-11
Payer: COMMERCIAL

## 2024-09-11 ENCOUNTER — TELEPHONE (OUTPATIENT)
Age: 59
End: 2024-09-11

## 2024-09-11 VITALS
HEIGHT: 57 IN | DIASTOLIC BLOOD PRESSURE: 80 MMHG | HEART RATE: 96 BPM | TEMPERATURE: 97.6 F | BODY MASS INDEX: 47.9 KG/M2 | OXYGEN SATURATION: 95 % | WEIGHT: 222 LBS | SYSTOLIC BLOOD PRESSURE: 126 MMHG

## 2024-09-11 DIAGNOSIS — R19.7 DIARRHEA, UNSPECIFIED TYPE: ICD-10-CM

## 2024-09-11 DIAGNOSIS — R10.13 EPIGASTRIC PAIN: ICD-10-CM

## 2024-09-11 DIAGNOSIS — R14.0 BLOATING: ICD-10-CM

## 2024-09-11 DIAGNOSIS — R13.19 ESOPHAGEAL DYSPHAGIA: ICD-10-CM

## 2024-09-11 DIAGNOSIS — R11.2 NAUSEA AND VOMITING, UNSPECIFIED VOMITING TYPE: ICD-10-CM

## 2024-09-11 DIAGNOSIS — K21.00 GASTROESOPHAGEAL REFLUX DISEASE WITH ESOPHAGITIS WITHOUT HEMORRHAGE: Primary | ICD-10-CM

## 2024-09-11 PROCEDURE — 99214 OFFICE O/P EST MOD 30 MIN: CPT | Performed by: INTERNAL MEDICINE

## 2024-09-11 RX ORDER — METRONIDAZOLE 500 MG/1
TABLET ORAL
COMMUNITY
Start: 2024-09-06

## 2024-09-11 RX ORDER — FLUTICASONE FUROATE, UMECLIDINIUM BROMIDE AND VILANTEROL TRIFENATATE 100; 62.5; 25 UG/1; UG/1; UG/1
1 POWDER RESPIRATORY (INHALATION) DAILY
COMMUNITY
Start: 2024-09-09

## 2024-09-11 RX ORDER — MELOXICAM 15 MG/1
TABLET ORAL
COMMUNITY
Start: 2024-09-07

## 2024-09-11 RX ORDER — LIDOCAINE 50 MG/G
PATCH TOPICAL
COMMUNITY
Start: 2024-09-03

## 2024-09-11 RX ORDER — PANTOPRAZOLE SODIUM 40 MG/1
40 TABLET, DELAYED RELEASE ORAL
Qty: 62 TABLET | Refills: 6 | Status: SHIPPED | OUTPATIENT
Start: 2024-09-11

## 2024-09-11 RX ORDER — POTASSIUM CHLORIDE 1500 MG/1
20 TABLET, EXTENDED RELEASE ORAL DAILY
COMMUNITY
Start: 2024-09-06

## 2024-09-11 NOTE — TELEPHONE ENCOUNTER
Patient called in stating she was just at an o/v with Dr. Lamb and forgot to ask the doctor for recommendations to relieve stomach pain. Patient was transferred over to RASHARD Canchola for further assistance.

## 2024-09-11 NOTE — TELEPHONE ENCOUNTER
"Patient states that she is having severe upper abdominal pain. Patient states it does not go away and forgot to ask if there is anything that can help with pain at visit today.  Patient states stomach feels heavy and bloated.  Patient was at pharmacy and told to use anti-diarrheal.  Explained not to do that, she needs to do stool samples and that would alter results.  Patient stated understanding regarding this.  Patient just wants something to help with abdominal pain.  Please advise.    Answer Assessment - Initial Assessment Questions  1. LOCATION: \"Where does it hurt?\"       Upper abdomen  2. RADIATION: \"Does the pain shoot anywhere else?\" (e.g., chest, back)      chest    5. PATTERN \"Does the pain come and go, or is it constant?\"     - If constant: \"Is it getting better, staying the same, or worsening?\"       (Note: Constant means the pain never goes away completely; most serious pain is constant and it progresses)      - If intermittent: \"How long does it last?\" \"Do you have pain now?\"      (Note: Intermittent means the pain goes away completely between bouts)      constant  6. SEVERITY: \"How bad is the pain?\"  (e.g., Scale 1-10; mild, moderate, or severe)     - MILD (1-3): doesn't interfere with normal activities, abdomen soft and not tender to touch      - MODERATE (4-7): interferes with normal activities or awakens from sleep, tender to touch      - SEVERE (8-10): excruciating pain, doubled over, unable to do any normal activities        moderate    8. AGGRAVATING FACTORS: \"Does anything seem to cause this pain?\" (e.g., foods, stress, alcohol)      denies  9. CARDIAC SYMPTOMS: \"Do you have any of the following symptoms: chest pain, difficulty breathing, sweating, nausea?\"      Denies - states saw cardiology    Protocols used: Abdominal Pain - Upper-ADULT-[place]    "

## 2024-09-11 NOTE — PATIENT INSTRUCTIONS
Scheduled date of EGD/colonoscopy (as of today):9/19/24  Physician performing EGD/colonoscopy:Zainab  Location of EGD/colonoscopy:[facility]  Desired bowel prep reviewed with patient:Shabbir/Miralax  Instructions reviewed with patient by:Rober alejo  Clearances:   none

## 2024-09-12 ENCOUNTER — TELEPHONE (OUTPATIENT)
Dept: GASTROENTEROLOGY | Facility: CLINIC | Age: 59
End: 2024-09-12

## 2024-09-12 DIAGNOSIS — R10.10 PAIN OF UPPER ABDOMEN: Primary | ICD-10-CM

## 2024-09-12 RX ORDER — DICYCLOMINE HCL 20 MG
20 TABLET ORAL EVERY 6 HOURS
Qty: 30 TABLET | Refills: 1 | Status: SHIPPED | OUTPATIENT
Start: 2024-09-12

## 2024-09-12 NOTE — PROGRESS NOTES
[facility] - Outpatient Follow-up Note  Nanci Navarro 59 y.o. female MRN: [id number]  Encounter: [id number]          ASSESSMENT AND PLAN:      1. Gastroesophageal reflux disease with esophagitis without hemorrhage  - pantoprazole (PROTONIX) 40 mg tablet; Take 1 tablet (40 mg total) by mouth 2 (two) times a day before meals  Dispense: 62 tablet; Refill: 6  - EGD; Future    2. Diarrhea, unspecified type  - Stool Enteric Bacterial Panel by PCR; Future  - Clostridium difficile toxin by PCR with EIA; Future  - Fecal leukocytes; Future  - Colonoscopy; Future    3. Bloating    4. Esophageal dysphagia    5. Epigastric pain  - EGD; Future    6. Nausea and vomiting, unspecified vomiting type  - EGD; Future    ______________________________________________________________________    SUBJECTIVE: Nanci is a 59-year-old female who comes the office today for routine follow-up.  Her principal complaint is epigastric abdominal pain which has been ongoing.  She has been having diarrhea since September 1, up to 4-5 episodes daily without rectal bleeding.  She has a history of being admitted to the hospital and February 2024.  At that time she was complaining of chest pain and shortness of breath.  Her hemoglobin level had dropped from a baseline of 13 down to 8.8 without overt bleeding.  GI was consulted and iron deficiency anemia was documented.  A CTA of the chest, abdomen and pelvis performed on 1/18/2024 showed bibasilar atelectasis and mild diffuse thickening of the esophagus were consistent with esophagitis.  The patient underwent EGD the next day which was found to be unremarkable.  The plan was for an outpatient colonoscopy.    Now the patient is complaining of nonbloody diarrhea which is occurring up to 4-5 episodes per day in addition to epigastric abdominal pain.  She takes pantoprazole 40 mg 1 p.o. daily.  She denies dysphagia or odynophagia.  She has a history of a hiatal hernia.  CTA of  the abdomen and pelvis with and without contrast performed on 9/5/2024 does not show any acute inflammatory process or obstructive uropathy.  There was a hiatal hernia as well as gastro esophageal reflux showing thickening of the distal esophageal wall which was unchanged from the previous study.    She is on oxygen daily.  She states that her breathing is fine as long as she is on oxygen.  She has a history of receiving antibiotics in September at [facility].  She currently denies any rectal bleeding, melena, family history for colon cancer.      REVIEW OF SYSTEMS IS OTHERWISE NEGATIVE.      Historical Information   Past Medical History:   Diagnosis Date    Achalasia     Acute respiratory failure (HCC) 01/15/2023    Acute respiratory failure with hypoxia (HCC) 02/07/2024    Back pain     Cancer ([facility])     Ovarian    Electrolyte abnormality 01/14/2024    Fall 01/02/2023    Had a fall a week ago slipped in the mud onto her back.  X-rays with no fracture.  She has chronic L1 fracture.  She is on pain medication at home for chronic back pain.      Fibromyalgia     Lupus ([facility])      Past Surgical History:   Procedure Laterality Date    HYSTERECTOMY      NECK SURGERY       Social History   Social History     Substance and Sexual Activity   Alcohol Use Never     Social History     Substance and Sexual Activity   Drug Use Never     Social History     Tobacco Use   Smoking Status Every Day    Current packs/day: 0.50    Types: Cigarettes   Smokeless Tobacco Never     History reviewed. No pertinent family history.    Meds/Allergies       Current Outpatient Medications:     acetaminophen (TYLENOL) 325 mg tablet    albuterol (PROVENTIL HFA,VENTOLIN HFA) 90 mcg/act inhaler    Diclofenac Sodium (VOLTAREN) 1 %    furosemide (LASIX) 40 mg tablet    ipratropium-albuterol (DUO-NEB) 0.5-2.5 mg/3 mL nebulizer solution    lidocaine (LIDODERM) 5 %    magnesium cl-calcium carbonate (MAG-DELAY)  MG tablet    meloxicam  "(MOBIC) 15 mg tablet    metFORMIN (GLUCOPHAGE) 500 mg tablet    methocarbamol (ROBAXIN) 500 mg tablet    metoprolol succinate (TOPROL-XL) 25 mg 24 hr tablet    metroNIDAZOLE (FLAGYL) 500 mg tablet    nicotine (NICODERM CQ) 21 mg/24 hr TD 24 hr patch    pantoprazole (PROTONIX) 40 mg tablet    potassium chloride (Klor-Con M20) 20 mEq tablet    sertraline (ZOLOFT) 25 mg tablet    Trelegy Ellipta 100-62.5-25 MCG/ACT inhaler    Xarelto 2.5 MG tablet    bisacodyl (DULCOLAX) 5 mg EC tablet    cyanocobalamin (VITAMIN B-12) 500 MCG tablet    docusate sodium (COLACE) 100 mg capsule    folic acid (FOLVITE) 400 mcg tablet    guaiFENesin (MUCINEX) 600 mg 12 hr tablet    nicotine polacrilex (NICORETTE) 2 mg gum    Allergies   Allergen Reactions    Valium [Diazepam] Anaphylaxis           Objective     Blood pressure 126/80, pulse 96, temperature 97.6 °F (36.4 °C), temperature source Tympanic, height 4' 9.25\" (1.454 m), weight 101 kg (222 lb), SpO2 95%. Body mass index is 47.62 kg/m².      PHYSICAL EXAM:      General Appearance:   Alert, cooperative, no distress   HEENT:   Normocephalic, atraumatic, anicteric.     Neck:  Supple, symmetrical, trachea midline   Lungs:   Clear to auscultation bilaterally; no rales, rhonchi or wheezing; respirations unlabored    Heart::   Regular rate and rhythm; no murmur, rub, or gallop.   Abdomen:   Soft, non-tender, non-distended; normal bowel sounds; no masses, no organomegaly    Genitalia:   Deferred    Rectal:   Deferred    Extremities:  No cyanosis, clubbing or edema    Pulses:  2+ and symmetric    Skin:  No jaundice, rashes, or lesions    Lymph nodes:  No palpable cervical lymphadenopathy        Lab Results:   No visits with results within 1 Day(s) from this visit.   Latest known visit with results is:   Office Visit on 08/29/2024   Component Date Value    Hemoglobin A1C 08/29/2024 7.2 (A)          Radiology Results:   XR chest portable    Result Date: 9/6/2024  Narrative: Chest x-ray HISTORY: " cough. TECHNIQUE: Single frontal view.  Prior study 09/02/2024. FINDINGS: Bibasilar atelectatic changes and/or scarring are noted. There is peribronchial thickening. No lobar or segmental consolidation. No evidence of acute pulmonary edema. There is no visible pleural effusion.  The cardiopericardial silhouette is normal for size.       Impression: IMPRESSION: 1.  Peribronchial thickening. 2.  Bibasilar atelectatic changes and/or scarring. Workstation:[id number]    CTA abdomen pelvis w wo contrast    Result Date: 9/6/2024  Narrative: PROCEDURE INFORMATION: Exam: CT Abdomen And Pelvis With Contrast Exam date and time: 9/5/2024 11:00 PM Age: 59 years old Clinical indication: Abdominal pain; Generalized; Additional info: Upper abd pain, diarrhea, fever TECHNIQUE: Imaging protocol: Computed tomography of the abdomen and pelvis with contrast. Radiation optimization: All CT scans at this facility use at least one of these dose optimization techniques: automated exposure control; mA and/or kV adjustment per patient size (includes targeted exams where dose is matched to clinical indication); or iterative reconstruction. Contrast material: OMNI 350; Contrast volume: 80 ml; Contrast route: INTRAVENOUS (IV);   COMPARISON: CT ANGIOGRAM (DISSECTION) CHEST WWO ABDOMEN AND PELVIS WITH 9/3/2024 12:38 AM FINDINGS: Diaphragm: Hiatal hernia with gastroesophageal reflux and thickened distal esophageal wall, unchanged from prior study. Liver: Diffuse hepatic steatosis. Gallbladder and biliary ducts: Gallbladder is surgically removed. Pancreas: Normal. No ductal dilation. Spleen: Normal. No splenomegaly. Adrenal glands: Normal. No mass. Kidneys and ureters: No evidence of obstructive uropathy. Stomach and bowel: Generalized colonic diverticulosis with constipation. Stomach and small bowel shows no significant abnormality. Appendix: Appendix is not well seen. Intraperitoneal space: Unremarkable. No free air. No significant fluid collection.  Vasculature: Mild atherosclerotic changes within the abdominal aorta. 1 cm partially calcified splenic artery aneurysm. Lymph nodes: Unremarkable. No enlarged lymph nodes. Urinary bladder: Unremarkable as visualized. Reproductive: Unremarkable as visualized. Bones/joints: Degenerative changes with old fracture deformity of L1. Soft tissues: Unremarkable.     Impression: IMPRESSION: No acute inflammatory process or obstructive uropathy.     XR chest pa and lateral    Result Date: 9/3/2024  Narrative: Exam: Chest 2 views. Indication:Chest pain Comparison:8/22/2024. 6/10/2024. Exam result: The heart is normal in size. There is a small amount of persistent plate atelectasis/scar in both lung bases. No pneumothorax.    Impression: IMPRESSION: Persistent plate atelectasis/scar both lung bases. Workstation:[id number]    CTA dissection protocol chest/abdomen/pelvis    Result Date: 9/3/2024  Narrative: PROCEDURE INFORMATION: Exam: CTA Chest Without And With Contrast CTA Abdomen and Pelvis Without And With Contrast Exam date and time: 9/3/2024 12:38 AM Age: 59 years old Clinical indication: Sternal or substernal pain; Abdominal pain; Generalized; Additional info: Rule out dissection TECHNIQUE: Imaging protocol: Computed tomographic angiography of the chest without and with contrast. Exam focused on the arteries. Computed tomographic angiography of the abdomen and pelvis without and with contrast. Exam focused on the arteries. 3D rendering (Not supervised by radiologist): MIP and/or 3D reconstructed images were created by the technologist. Radiation optimization: All CT scans at this facility use at least one of these dose optimization techniques: automated exposure control; mA and/or kV adjustment per patient size (includes targeted exams where dose is matched to clinical indication); or iterative reconstruction. Contrast material: OMNI 350; Contrast volume: 99 ml; Contrast route: INTRAVENOUS (IV);   COMPARISON: CT CHEST  ABDOMEN AND PELVIS W CONTRAST 3/12/2024 9:48 PM FINDINGS: VASCULATURE: Pulmonary arteries: Normal. No pulmonary emboli. Aorta: Atherosclerotic disease of the thoracoabdominal aorta. Celiac trunk and mesenteric arteries: No occlusion or significant stenosis. Renal arteries:  Accessory left renal artery.  No occlusion or significant stenosis. Right iliac arteries: No occlusion or significant stenosis. Left iliac arteries: No occlusion or significant stenosis. CHEST: Lungs: Atelectasis or scarring in the lingula, right middle lobe, and right lower lobe. Mild bronchiectasis in the lingula. Pleural spaces: Unremarkable. No pneumothorax. No pleural effusion.   Heart: Unremarkable. No cardiomegaly. No pericardial effusion. Esophagus: Generalized mucosal thickening involving the entire esophagus. ABDOMEN AND PELVIS: Liver: Hepatomegaly and steatosis. Gallbladder and biliary ducts: Status post cholecystectomy. Pancreas: Unremarkable. No mass. No ductal dilation. Spleen: Unremarkable. No splenomegaly. Adrenal glands: 1.4 cm left adrenal myelolipoma. Kidneys and ureters: Unremarkable. No solid mass. No hydronephrosis. Stomach and bowel: Diverticulosis of the colon. No evidence of acute diverticulitis. Appendix: Status post appendectomy. Intraperitoneal space: Unremarkable. No free air. No significant fluid collection. Urinary bladder: Unremarkable. No mass. Reproductive: Status post hysterectomy. Lymph nodes: Unremarkable. No enlarged lymph nodes. Bones/joints: Osteopenia. Multilevel degenerative disc disease and facet arthropathy the thoracolumbar spine. Stable anterior wedge compression fracture of L1. Mild retropulsion of the fracture fragments results in stenosis of the spinal canal. Mild scoliosis. Osteitis pubis. Soft tissues: Unremarkable.     Impression: IMPRESSION: Generalized mucosal thickening involving the entire esophagus. Correlate clinically for evidence of esophagitis. No evidence of thoracoabdominal aortic  aneurysm or dissection.    CTA chest wo w contrast    Result Date: 8/22/2024  Narrative: History: Shortness of breath.   Exam: CT of the chest with IV contrast (PE protocol)   Technique: Axial CT of the chest performed with 70 cc of Omnipaque 350 intravenous contrast with particular attention to the pulmonary arteries. Coronal reformations were performed as well. 3-D rendering: MIP and/or volumetric reformations.   Comparison: Chest CT 07/17/2024.   Findings: Pulmonary Arteries: No central or segmental pulmonary arterial embolus is identified.   Lungs/Pleura: Stable bilateral lateral basilar atelectasis and/or scarring. No acute infiltrate. No acute pulmonary edema. No pleural effusion. Mediastinum/Lymph nodes/Heart: No thoracic aortic aneurysm is identified. 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 There is circumferential wall thickening of the esophagus, stable to slightly progressed compared to 07/17/2024. Esophagitis is not excluded. Chest Wall: No acute findings.   Upper abdomen/Other: There is mild splenomegaly. There is hepatic steatosis. There is incomplete visualization of the liver with likely hepatomegaly. Patient is status post cholecystectomy. Partially calcified 1.5 cm splenic artery aneurysm. Bones: No acute osseous abnormality.      Impression: Impression: 1.  There is circumferential wall thickening of the esophagus, stable to slightly progressed compared to 07/17/2024. Esophagitis is not excluded. 2.  No central or segmental pulmonary arterial embolus is identified. 3.  Hepatic steatosis. 4.  Mild splenomegaly. 5.  Partially calcified splenic artery aneurysm. Workstation:[id number]    Sarta chest portable    Result Date: 8/22/2024  Narrative: History: Chest pain. Findings: Portable AP view of the chest is reviewed. Study suboptimal due to large body habitus. No confluent pneumonia or overt pulmonary edema. No large pleural effusions.  Cardiomediastinal  contours within normal limits for portable technique. Bony thorax is intact.    Impression: Impression: No acute disease. Workstation:[id number]    VAS VENOUS DUPLEX UPPER EXTREMITY LEFT    Result Date: 8/17/2024  Narrative: PROCEDURE INFORMATION: Exam: US Duplex Left Upper Extremity Veins, Limited Exam date and time: 8/17/2024 4:18 PM Age: 59 years old Clinical indication: Left arm pain Recent left cardiac cath puncture at wrist adj to distal radial vessels; Assess for DVT TECHNIQUE: Imaging protocol: Real-time duplex ultrasound of the left extremity with 2-D gray scale, color Doppler flow and spectral waveform analysis including responses to compression and other maneuvers (when performed) with image   documentation. Limited exam focused on the left upper extremity veins. COMPARISON: VAS VENOUS DUPLEX UPPER EXTREMITY LEFT 5/26/2024 8:01 PM FINDINGS: Left deep veins: Unremarkable. Axillary and brachial veins are patent throughout without thrombus. Normal Doppler waveforms. Normal compressibility and/or augmentation response. Visualized internal jugular and subclavian veins are patent. Superficial veins: Unremarkable. Visualized cephalic and basilic veins are patent without thrombus.   Soft tissues: Unremarkable. Other findings: Possible small amount of thrombus within the left radial artery at the level of the left wrist.     Impression: IMPRESSION: 1.   No deep venous thrombosis of the left upper extremity. 2.   Possible small amount of thrombus within the left radial artery at the level of the left wrist.

## 2024-09-12 NOTE — TELEPHONE ENCOUNTER
Pt checking on status of recommendations. Continues with pain across upper abdomen. No BM today. Will submit stools studies when she has BM. EGD and Colonoscopy scheduled 9/19/24.

## 2024-09-13 NOTE — TELEPHONE ENCOUNTER
Called pt and LMOM with advised as per Dr. Lamb and to call us back if any questions.  Office # provided.

## 2024-09-17 ENCOUNTER — NURSE TRIAGE (OUTPATIENT)
Age: 59
End: 2024-09-17

## 2024-09-17 ENCOUNTER — TELEPHONE (OUTPATIENT)
Age: 59
End: 2024-09-17

## 2024-09-17 ENCOUNTER — APPOINTMENT (OUTPATIENT)
Dept: LAB | Facility: HOSPITAL | Age: 59
End: 2024-09-17
Payer: COMMERCIAL

## 2024-09-17 ENCOUNTER — HOSPITAL ENCOUNTER (EMERGENCY)
Facility: HOSPITAL | Age: 59
Discharge: HOME | End: 2024-09-17
Attending: EMERGENCY MEDICINE
Payer: COMMERCIAL

## 2024-09-17 VITALS
TEMPERATURE: 98.8 F | SYSTOLIC BLOOD PRESSURE: 110 MMHG | RESPIRATION RATE: 20 BRPM | DIASTOLIC BLOOD PRESSURE: 58 MMHG | OXYGEN SATURATION: 94 % | HEART RATE: 79 BPM

## 2024-09-17 DIAGNOSIS — R79.89 ELEVATED TSH: ICD-10-CM

## 2024-09-17 DIAGNOSIS — Z11.4 SCREENING FOR HIV (HUMAN IMMUNODEFICIENCY VIRUS): ICD-10-CM

## 2024-09-17 DIAGNOSIS — M81.0 OSTEOPOROSIS: ICD-10-CM

## 2024-09-17 DIAGNOSIS — E11.9 TYPE 2 DIABETES MELLITUS WITHOUT COMPLICATION, WITHOUT LONG-TERM CURRENT USE OF INSULIN (HCC): ICD-10-CM

## 2024-09-17 DIAGNOSIS — R10.13 EPIGASTRIC PAIN: Primary | ICD-10-CM

## 2024-09-17 DIAGNOSIS — Z11.59 NEED FOR HEPATITIS C SCREENING TEST: ICD-10-CM

## 2024-09-17 LAB
ALBUMIN SERPL BCG-MCNC: 3.3 G/DL (ref 3.5–5)
ALP SERPL-CCNC: 78 U/L (ref 34–104)
ALT SERPL W P-5'-P-CCNC: 7 U/L (ref 7–52)
ANION GAP SERPL CALCULATED.3IONS-SCNC: 7 MMOL/L (ref 4–13)
AST SERPL W P-5'-P-CCNC: 9 U/L (ref 13–39)
BASOPHILS # BLD AUTO: 0.04 THOUSANDS/ΜL (ref 0–0.1)
BASOPHILS NFR BLD AUTO: 0 % (ref 0–1)
BILIRUB SERPL-MCNC: 0.29 MG/DL (ref 0.2–1)
BILIRUB UR QL STRIP: NEGATIVE
BUN SERPL-MCNC: 8 MG/DL (ref 5–25)
CALCIUM ALBUM COR SERPL-MCNC: 8.4 MG/DL (ref 8.3–10.1)
CALCIUM SERPL-MCNC: 7.8 MG/DL (ref 8.4–10.2)
CHLORIDE SERPL-SCNC: 105 MMOL/L (ref 96–108)
CLARITY UR: CLEAR
CO2 SERPL-SCNC: 31 MMOL/L (ref 21–32)
COLOR UR: YELLOW
CREAT SERPL-MCNC: 0.61 MG/DL (ref 0.6–1.3)
CREAT UR-MCNC: 103.3 MG/DL
EOSINOPHIL # BLD AUTO: 0.12 THOUSAND/ΜL (ref 0–0.61)
EOSINOPHIL NFR BLD AUTO: 1 % (ref 0–6)
ERYTHROCYTE [DISTWIDTH] IN BLOOD BY AUTOMATED COUNT: 15.6 % (ref 11.6–15.1)
GFR SERPL CREATININE-BSD FRML MDRD: 99 ML/MIN/1.73SQ M
GLUCOSE SERPL-MCNC: 127 MG/DL (ref 65–140)
GLUCOSE UR STRIP-MCNC: NEGATIVE MG/DL
HCT VFR BLD AUTO: 40.3 % (ref 34.8–46.1)
HCV AB SER QL: NORMAL
HGB BLD-MCNC: 12.8 G/DL (ref 11.5–15.4)
HGB UR QL STRIP.AUTO: NEGATIVE
HIV 1+2 AB+HIV1 P24 AG SERPL QL IA: NORMAL
HIV 2 AB SERPL QL IA: NORMAL
HIV1 AB SERPL QL IA: NORMAL
HIV1 P24 AG SERPL QL IA: NORMAL
IMM GRANULOCYTES # BLD AUTO: 0.07 THOUSAND/UL (ref 0–0.2)
IMM GRANULOCYTES NFR BLD AUTO: 1 % (ref 0–2)
KETONES UR STRIP-MCNC: NEGATIVE MG/DL
LEUKOCYTE ESTERASE UR QL STRIP: NEGATIVE
LIPASE SERPL-CCNC: 18 U/L (ref 11–82)
LYMPHOCYTES # BLD AUTO: 1.4 THOUSANDS/ΜL (ref 0.6–4.47)
LYMPHOCYTES NFR BLD AUTO: 12 % (ref 14–44)
MCH RBC QN AUTO: 29.8 PG (ref 26.8–34.3)
MCHC RBC AUTO-ENTMCNC: 31.8 G/DL (ref 31.4–37.4)
MCV RBC AUTO: 94 FL (ref 82–98)
MICROALBUMIN UR-MCNC: 10.8 MG/L
MICROALBUMIN/CREAT 24H UR: 10 MG/G CREATININE (ref 0–30)
MONOCYTES # BLD AUTO: 0.8 THOUSAND/ΜL (ref 0.17–1.22)
MONOCYTES NFR BLD AUTO: 7 % (ref 4–12)
NEUTROPHILS # BLD AUTO: 9.57 THOUSANDS/ΜL (ref 1.85–7.62)
NEUTS SEG NFR BLD AUTO: 79 % (ref 43–75)
NITRITE UR QL STRIP: NEGATIVE
NRBC BLD AUTO-RTO: 0 /100 WBCS
PH UR STRIP.AUTO: 6 [PH]
PLATELET # BLD AUTO: 359 THOUSANDS/UL (ref 149–390)
PMV BLD AUTO: 9.3 FL (ref 8.9–12.7)
POTASSIUM SERPL-SCNC: 3 MMOL/L (ref 3.5–5.3)
PROT SERPL-MCNC: 5.4 G/DL (ref 6.4–8.4)
PROT UR STRIP-MCNC: NEGATIVE MG/DL
RBC # BLD AUTO: 4.29 MILLION/UL (ref 3.81–5.12)
SODIUM SERPL-SCNC: 143 MMOL/L (ref 135–147)
SP GR UR STRIP.AUTO: 1.02 (ref 1–1.03)
T4 FREE SERPL-MCNC: 0.76 NG/DL (ref 0.61–1.12)
TSH SERPL DL<=0.05 MIU/L-ACNC: 2.64 UIU/ML (ref 0.45–4.5)
UROBILINOGEN UR STRIP-ACNC: <2 MG/DL
WBC # BLD AUTO: 12 THOUSAND/UL (ref 4.31–10.16)

## 2024-09-17 PROCEDURE — 86803 HEPATITIS C AB TEST: CPT

## 2024-09-17 PROCEDURE — 82570 ASSAY OF URINE CREATININE: CPT

## 2024-09-17 PROCEDURE — 81003 URINALYSIS AUTO W/O SCOPE: CPT | Performed by: EMERGENCY MEDICINE

## 2024-09-17 PROCEDURE — 84443 ASSAY THYROID STIM HORMONE: CPT

## 2024-09-17 PROCEDURE — 36415 COLL VENOUS BLD VENIPUNCTURE: CPT

## 2024-09-17 PROCEDURE — 83690 ASSAY OF LIPASE: CPT | Performed by: EMERGENCY MEDICINE

## 2024-09-17 PROCEDURE — 84439 ASSAY OF FREE THYROXINE: CPT

## 2024-09-17 PROCEDURE — 80053 COMPREHEN METABOLIC PANEL: CPT | Performed by: EMERGENCY MEDICINE

## 2024-09-17 PROCEDURE — 99284 EMERGENCY DEPT VISIT MOD MDM: CPT | Performed by: EMERGENCY MEDICINE

## 2024-09-17 PROCEDURE — 96361 HYDRATE IV INFUSION ADD-ON: CPT

## 2024-09-17 PROCEDURE — 85025 COMPLETE CBC W/AUTO DIFF WBC: CPT | Performed by: EMERGENCY MEDICINE

## 2024-09-17 PROCEDURE — 99285 EMERGENCY DEPT VISIT HI MDM: CPT

## 2024-09-17 PROCEDURE — 82043 UR ALBUMIN QUANTITATIVE: CPT

## 2024-09-17 PROCEDURE — 96374 THER/PROPH/DIAG INJ IV PUSH: CPT

## 2024-09-17 PROCEDURE — 96375 TX/PRO/DX INJ NEW DRUG ADDON: CPT

## 2024-09-17 PROCEDURE — 87389 HIV-1 AG W/HIV-1&-2 AB AG IA: CPT

## 2024-09-17 PROCEDURE — 93005 ELECTROCARDIOGRAM TRACING: CPT

## 2024-09-17 RX ORDER — FAMOTIDINE 10 MG/ML
20 INJECTION, SOLUTION INTRAVENOUS ONCE
Status: COMPLETED | OUTPATIENT
Start: 2024-09-17 | End: 2024-09-17

## 2024-09-17 RX ORDER — DROPERIDOL 2.5 MG/ML
1.25 INJECTION, SOLUTION INTRAMUSCULAR; INTRAVENOUS ONCE
Status: COMPLETED | OUTPATIENT
Start: 2024-09-17 | End: 2024-09-17

## 2024-09-17 RX ORDER — SUCRALFATE ORAL 1 G/10ML
1 SUSPENSION ORAL ONCE
Status: DISCONTINUED | OUTPATIENT
Start: 2024-09-17 | End: 2024-09-17 | Stop reason: RX

## 2024-09-17 RX ORDER — ALUMINA, MAGNESIA, AND SIMETHICONE 2400; 2400; 240 MG/30ML; MG/30ML; MG/30ML
5 SUSPENSION ORAL EVERY 6 HOURS PRN
Qty: 355 ML | Refills: 0 | Status: SHIPPED | OUTPATIENT
Start: 2024-09-17

## 2024-09-17 RX ORDER — MAGNESIUM HYDROXIDE/ALUMINUM HYDROXICE/SIMETHICONE 120; 1200; 1200 MG/30ML; MG/30ML; MG/30ML
30 SUSPENSION ORAL ONCE
Status: COMPLETED | OUTPATIENT
Start: 2024-09-17 | End: 2024-09-17

## 2024-09-17 RX ORDER — ONDANSETRON 4 MG/1
4 TABLET, FILM COATED ORAL EVERY 8 HOURS PRN
Qty: 12 TABLET | Refills: 0 | Status: SHIPPED | OUTPATIENT
Start: 2024-09-17

## 2024-09-17 RX ORDER — POTASSIUM CHLORIDE 1500 MG/1
40 TABLET, EXTENDED RELEASE ORAL ONCE
Status: COMPLETED | OUTPATIENT
Start: 2024-09-17 | End: 2024-09-17

## 2024-09-17 RX ORDER — SUCRALFATE 1 G/1
1 TABLET ORAL 3 TIMES DAILY
Qty: 90 TABLET | Refills: 0 | Status: SHIPPED | OUTPATIENT
Start: 2024-09-17 | End: 2024-10-17

## 2024-09-17 RX ORDER — SUCRALFATE 1 G/1
1 TABLET ORAL ONCE
Status: COMPLETED | OUTPATIENT
Start: 2024-09-17 | End: 2024-09-17

## 2024-09-17 RX ADMIN — FAMOTIDINE 20 MG: 10 INJECTION, SOLUTION INTRAVENOUS at 17:47

## 2024-09-17 RX ADMIN — ALUMINUM HYDROXIDE, MAGNESIUM HYDROXIDE, DIMETHICONE 30 ML: 400; 400; 40 SUSPENSION ORAL at 21:08

## 2024-09-17 RX ADMIN — SUCRALFATE 1 G: 1 TABLET ORAL at 17:45

## 2024-09-17 RX ADMIN — DROPERIDOL 1.25 MG: 2.5 INJECTION, SOLUTION INTRAMUSCULAR; INTRAVENOUS at 17:47

## 2024-09-17 RX ADMIN — POTASSIUM CHLORIDE 40 MEQ: 1500 TABLET, EXTENDED RELEASE ORAL at 22:23

## 2024-09-17 RX ADMIN — SODIUM CHLORIDE 500 ML: 0.9 INJECTION, SOLUTION INTRAVENOUS at 17:45

## 2024-09-17 NOTE — TELEPHONE ENCOUNTER
"Patient was at lab getting blood work and they had a hard time obtaining blood from what they said was dehydration.  Patient was told she should go to ED and to contact us.  Patient states she is having dizzy spells, muscle cramping in legs and hands, fatigued, and dry mouth.  Patient states she is having terrible pain in stomach which prevents her from drinking.  States whenever takes a sip of water, severe pain.  Patient concerned about not being able to have EGD/Colonoscopy on 9/19.  Advised patient to go to ED to be checked out.  Patient states understanding.        Reason for Disposition   Nursing judgment    Answer Assessment - Initial Assessment Questions  1. REASON FOR CALL: \"What is your main concern right now?\"      Dehydration, pain, muscle cramps, dry mouth, unable to obtain blood.   2. ONSET: \"When did the above start?\"      Several days    5. OTHER SYMPTOMS: \"Do you have any other new symptoms?\"      denies  6. INTERVENTIONS AND RESPONSE: \"What have you done so far to try to make this better? What medications have you used?\"      Unable to drink d/t pain.    Protocols used: No Protocol Available-ADULT-[place]    "

## 2024-09-17 NOTE — ED PROVIDER NOTES
1. Epigastric pain      ED Disposition       ED Disposition   Discharge    Condition   Stable    Date/Time   Tue Sep 17, 2024  9:25 PM    Comment   Nanci Navarro discharge to home/self care.                   Assessment & Plan       Medical Decision Making  Patient is a 59-year-old female who presents to the emergency department with persistent abdominal pain.  Pain is located in the epigastrium and radiates to her back.  She describes the pain as constant and worsening.  It is exacerbated with p.o. intake.  She has a previous history of endoscopy in February with known gastritis, results included below.  Her Protonix was recently increased to twice daily, however she describes worsening pain with no relief.  She states that she was recently seen in the [facility] with no improvement after changes to her medication.  Resents today with worsening abdominal pain, nausea, decreased p.o. tolerance.  Strongly suspect worsening underlying gastritis with concern for possible PUD.  Endoscopy pending in two days. Plan to treat symptomatically in the emergency department while evaluating repeat laboratory studies.  She was referred here due to concern for dehydration.  Abdomen is nonacute, not suggesting need for immediate surgical intervention, however pain control and laboratory studies may necessitate admission pending results.    Amount and/or Complexity of Data Reviewed  External Data Reviewed:      Details: See ED course    Patient was last seen by gastroenterology on the 11th.  Protonix increased at that time.  Stool sample sent at that time.  Plan for EGD  Labs: ordered. Decision-making details documented in ED Course.    Risk  OTC drugs.  Prescription drug management.  Decision regarding hospitalization.                  ED Course as of 09/17/24 2128   Tue Sep 17, 2024   1731 Endoscopy Feb 2024  Final Diagnosis  A. Duodenum,  biopsy:    -Benign small intestinal mucosa with retained villous architecture  and no evidence of increased intraepithelial lymphocytes.          -No evidence of dysplasia or malignancy.      B. Stomach, biopsy:    Benign gastric-oxyntoantral type mucosa with mild chronic inflammation and reactive surface foveolar epithelial changes consistent with reactive/ chemical gastritis.  -No evidence of ulceration.  -No evidence of dysplasia or malignancy.  -No H.Pylori organisms identified on  H&E stained slide       2059 Lipase   2059 CMP(!)   2103 Patient resting comfortably, sleeping on my entry to room.       Medications   sodium chloride 0.9 % bolus 500 mL (0 mL Intravenous Stopped 9/17/24 1851)   droperidol (INAPSINE) injection 1.25 mg (1.25 mg Intravenous Given 9/17/24 1747)   Famotidine (PF) (PEPCID) injection 20 mg (20 mg Intravenous Given 9/17/24 1747)   sucralfate (CARAFATE) tablet 1 g (1 g Oral Given 9/17/24 1745)   aluminum-magnesium hydroxide-simethicone (MAALOX) oral suspension 30 mL (30 mL Oral Given 9/17/24 2108)       History of Present Illness         History provided by:  Patient and medical records  Abdominal Pain  Pain location:  Epigastric  Pain quality: cramping    Pain radiates to:  Back  Pain severity:  Severe  Onset quality:  Gradual  Timing:  Constant  Progression:  Worsening  Worsened by:  Palpation and eating  Associated symptoms: anorexia and nausea    Risk factors comment:  Known history of gastritis          Review of Systems   Gastrointestinal:  Positive for abdominal pain, anorexia and nausea.   All other systems reviewed and are negative.          Objective     ED Triage Vitals   Temperature Pulse Blood Pressure Respirations SpO2 Patient Position - Orthostatic VS   09/17/24 1643 09/17/24 1643 09/17/24 1643 09/17/24 1643 09/17/24 1643 09/17/24 1745   98.8 °F (37.1 °C) 95 138/67 (!) 24 98 % Sitting      Temp src Heart Rate Source BP Location FiO2 (%) Pain Score    -- 09/17/24 1643 09/17/24 1745 -- --     Monitor Left arm          Physical Exam  Vitals and nursing note  reviewed.   Constitutional:       General: She is not in acute distress.     Appearance: Normal appearance.   HENT:      Head: Normocephalic and atraumatic.      Right Ear: External ear normal.      Left Ear: External ear normal.      Nose: Nose normal.   Cardiovascular:      Rate and Rhythm: Normal rate and regular rhythm.   Pulmonary:      Effort: Pulmonary effort is normal.      Breath sounds: Normal breath sounds.   Abdominal:      General: There is no distension.      Palpations: Abdomen is soft.      Tenderness: There is abdominal tenderness.   Musculoskeletal:      Right lower leg: No edema.      Left lower leg: No edema.   Skin:     General: Skin is warm and dry.   Neurological:      General: No focal deficit present.      Mental Status: She is alert and oriented to person, place, and time. Mental status is at baseline.   Psychiatric:         Behavior: Behavior normal.         Labs Reviewed   CBC AND DIFFERENTIAL - Abnormal       Result Value    WBC 12.00 (*)     RBC 4.29      Hemoglobin 12.8      Hematocrit 40.3      MCV 94      MCH 29.8      MCHC 31.8      RDW 15.6 (*)     MPV 9.3      Platelets 359      nRBC 0      Segmented % 79 (*)     Immature Grans % 1      Lymphocytes % 12 (*)     Monocytes % 7      Eosinophils Relative 1      Basophils Relative 0      Absolute Neutrophils 9.57 (*)     Absolute Immature Grans 0.07      Absolute Lymphocytes 1.40      Absolute Monocytes 0.80      Eosinophils Absolute 0.12      Basophils Absolute 0.04     COMPREHENSIVE METABOLIC PANEL - Abnormal    Sodium 143      Potassium 3.0 (*)     Chloride 105      CO2 31      ANION GAP 7      BUN 8      Creatinine 0.61      Glucose 127      Calcium 7.8 (*)     Corrected Calcium 8.4      AST 9 (*)     ALT 7      Alkaline Phosphatase 78      Total Protein 5.4 (*)     Albumin 3.3 (*)     Total Bilirubin 0.29      eGFR 99      Narrative:     National Kidney Disease Foundation guidelines for Chronic Kidney Disease (CKD):     Stage 1  with normal or high GFR (GFR > 90 mL/min/1.73 square meters)    Stage 2 Mild CKD (GFR = 60-89 mL/min/1.73 square meters)    Stage 3A Moderate CKD (GFR = 45-59 mL/min/1.73 square meters)    Stage 3B Moderate CKD (GFR = 30-44 mL/min/1.73 square meters)    Stage 4 Severe CKD (GFR = 15-29 mL/min/1.73 square meters)    Stage 5 End Stage CKD (GFR <15 mL/min/1.73 square meters)  Note: GFR calculation is accurate only with a steady state creatinine   LIPASE - Normal    Lipase 18     URINALYSIS WITH REFLEX TO SCOPE    Color, UA Yellow      Clarity, UA Clear      Specific Gravity, UA 1.016      pH, UA 6.0      Leukocytes, UA Negative      Nitrite, UA Negative      Protein, UA Negative      Glucose, UA Negative      Ketones, UA Negative      Urobilinogen, UA <2.0      Bilirubin, UA Negative      Occult Blood, UA Negative       No orders to display       Procedures       Manda Alejandro MD  09/17/24 [id number]

## 2024-09-17 NOTE — TELEPHONE ENCOUNTER
A user error has taken place: encounter opened in error, closed for administrative reasons.  Please see other encounter

## 2024-09-18 ENCOUNTER — TELEPHONE (OUTPATIENT)
Age: 59
End: 2024-09-18

## 2024-09-18 ENCOUNTER — APPOINTMENT (OUTPATIENT)
Dept: LAB | Facility: HOSPITAL | Age: 59
End: 2024-09-18
Payer: COMMERCIAL

## 2024-09-18 DIAGNOSIS — R19.7 DIARRHEA, UNSPECIFIED TYPE: ICD-10-CM

## 2024-09-18 PROCEDURE — 89055 LEUKOCYTE ASSESSMENT FECAL: CPT

## 2024-09-18 PROCEDURE — 87505 NFCT AGENT DETECTION GI: CPT

## 2024-09-18 NOTE — ED NOTES
Patient's family member at bedside and states she is able to take patient back home. Transport cancelled via [facility].      Carey Saleh RN  09/17/24 0427

## 2024-09-18 NOTE — TELEPHONE ENCOUNTER
Pt reports she saw her lab results, and would like to further discuss and find out what PCPs input would be.

## 2024-09-18 NOTE — DISCHARGE INSTRUCTIONS
You were seen and evaluated today for abdominal pain.  Your test results demonstrated stable laboratory studies  Please take all medications as instructed. Follow up with your PCP as discussed.   RETURN TO THE EMERGENCY DEPARTMENT if you develop new or worsening symptoms and are unable to see your PCP.

## 2024-09-18 NOTE — ED NOTES
Transport initiated via BLS vehicle to transport pt back to residence.      Carey Saleh RN  09/17/24 [id number]

## 2024-09-19 ENCOUNTER — ANESTHESIA EVENT (OUTPATIENT)
Dept: GASTROENTEROLOGY | Facility: HOSPITAL | Age: 59
End: 2024-09-19
Payer: COMMERCIAL

## 2024-09-19 ENCOUNTER — ANESTHESIA (OUTPATIENT)
Dept: GASTROENTEROLOGY | Facility: HOSPITAL | Age: 59
End: 2024-09-19
Payer: COMMERCIAL

## 2024-09-19 ENCOUNTER — HOSPITAL ENCOUNTER (OUTPATIENT)
Dept: GASTROENTEROLOGY | Facility: HOSPITAL | Age: 59
Setting detail: OUTPATIENT SURGERY
End: 2024-09-19
Attending: INTERNAL MEDICINE
Payer: COMMERCIAL

## 2024-09-19 ENCOUNTER — TELEPHONE (OUTPATIENT)
Age: 59
End: 2024-09-19

## 2024-09-19 VITALS
HEIGHT: 57 IN | OXYGEN SATURATION: 96 % | WEIGHT: 216.71 LBS | DIASTOLIC BLOOD PRESSURE: 65 MMHG | SYSTOLIC BLOOD PRESSURE: 152 MMHG | TEMPERATURE: 97.9 F | RESPIRATION RATE: 24 BRPM | HEART RATE: 77 BPM | BODY MASS INDEX: 46.75 KG/M2

## 2024-09-19 DIAGNOSIS — R10.13 EPIGASTRIC PAIN: ICD-10-CM

## 2024-09-19 DIAGNOSIS — K21.00 GASTROESOPHAGEAL REFLUX DISEASE WITH ESOPHAGITIS WITHOUT HEMORRHAGE: ICD-10-CM

## 2024-09-19 DIAGNOSIS — R19.7 DIARRHEA, UNSPECIFIED TYPE: ICD-10-CM

## 2024-09-19 DIAGNOSIS — R11.2 NAUSEA AND VOMITING, UNSPECIFIED VOMITING TYPE: ICD-10-CM

## 2024-09-19 LAB
C COLI+JEJUNI TUF STL QL NAA+PROBE: NEGATIVE
C DIFF TOX B TCDB STL QL NAA+PROBE: NEGATIVE
C DIFF TOX GENS STL QL NAA+PROBE: NEGATIVE
EC STX1+STX2 GENES STL QL NAA+PROBE: NEGATIVE
SALMONELLA SP SPAO STL QL NAA+PROBE: NEGATIVE
SHIGELLA SP+EIEC IPAH STL QL NAA+PROBE: NEGATIVE
WBC SPEC QL GRAM STN: NORMAL

## 2024-09-19 PROCEDURE — 45380 COLONOSCOPY AND BIOPSY: CPT | Performed by: INTERNAL MEDICINE

## 2024-09-19 PROCEDURE — 45385 COLONOSCOPY W/LESION REMOVAL: CPT | Performed by: INTERNAL MEDICINE

## 2024-09-19 PROCEDURE — 43239 EGD BIOPSY SINGLE/MULTIPLE: CPT | Performed by: INTERNAL MEDICINE

## 2024-09-19 PROCEDURE — 88305 TISSUE EXAM BY PATHOLOGIST: CPT | Performed by: PATHOLOGY

## 2024-09-19 RX ORDER — LIDOCAINE HYDROCHLORIDE 10 MG/ML
INJECTION, SOLUTION EPIDURAL; INFILTRATION; INTRACAUDAL; PERINEURAL AS NEEDED
Status: DISCONTINUED | OUTPATIENT
Start: 2024-09-19 | End: 2024-09-19

## 2024-09-19 RX ORDER — PROPOFOL 10 MG/ML
INJECTION, EMULSION INTRAVENOUS AS NEEDED
Status: DISCONTINUED | OUTPATIENT
Start: 2024-09-19 | End: 2024-09-19

## 2024-09-19 RX ORDER — SODIUM CHLORIDE, SODIUM LACTATE, POTASSIUM CHLORIDE, CALCIUM CHLORIDE 600; 310; 30; 20 MG/100ML; MG/100ML; MG/100ML; MG/100ML
INJECTION, SOLUTION INTRAVENOUS CONTINUOUS PRN
Status: DISCONTINUED | OUTPATIENT
Start: 2024-09-19 | End: 2024-09-19

## 2024-09-19 RX ADMIN — SODIUM CHLORIDE, SODIUM LACTATE, POTASSIUM CHLORIDE, AND CALCIUM CHLORIDE: .6; .31; .03; .02 INJECTION, SOLUTION INTRAVENOUS at 12:18

## 2024-09-19 RX ADMIN — PROPOFOL 50 MG: 10 INJECTION, EMULSION INTRAVENOUS at 12:58

## 2024-09-19 RX ADMIN — LIDOCAINE HYDROCHLORIDE 50 MG: 10 INJECTION, SOLUTION EPIDURAL; INFILTRATION; INTRACAUDAL; PERINEURAL at 12:32

## 2024-09-19 RX ADMIN — PROPOFOL 100 MG: 10 INJECTION, EMULSION INTRAVENOUS at 12:48

## 2024-09-19 RX ADMIN — PROPOFOL 50 MG: 10 INJECTION, EMULSION INTRAVENOUS at 12:53

## 2024-09-19 RX ADMIN — PROPOFOL 150 MG: 10 INJECTION, EMULSION INTRAVENOUS at 12:32

## 2024-09-19 RX ADMIN — PROPOFOL 50 MG: 10 INJECTION, EMULSION INTRAVENOUS at 12:40

## 2024-09-19 NOTE — TELEPHONE ENCOUNTER
Spoke to patient and scheduled an appointment for her for tomorrow     September 19, 2024  Natividad Robertson PA-C   to [facility]       9/19/24  9:48 AM  Many of her recent lab tests were ordered by ER staff during her recent ER visit, but they would have told her if there is anything emergent.  We will discuss in more detail at the patient's upcoming appointment with me.  If she has any questions or concerns in the meantime, recommend she schedule an appointment sooner or return to the ER.  Angi Camejo MA   to Natividad Robertson PA-C       9/19/24  9:21 AM  Keep in mind she has appointment with you on 09/30.. we do not want her to no show . So if nothing drastic she should wait for appointment       ST    9/19/24  8:27 AM  Juliana Cordero routed this conversation to [facility]  September 18, 2024  Nanci Navarro   to [facility] (supporting Nativdiad Robertson PA-C)         9/18/24  5:04 PM  Doctor Marcelo I was wondering if you could check my test result before you leave for a week I would like to know abt a few of the that ar high and low

## 2024-09-19 NOTE — ANESTHESIA PREPROCEDURE EVALUATION
Procedure:  COLONOSCOPY  EGD    Relevant Problems   ANESTHESIA (within normal limits)      CARDIO   (+) Chest pain      ENDO (within normal limits)      GI/HEPATIC  NPO confirmed  BMI 46.9   (+) Gastroesophageal reflux disease without esophagitis      /RENAL (within normal limits)      HEMATOLOGY   (+) Anemia      MUSCULOSKELETAL   (+) Chronic back pain      NEURO/PSYCH   (+) Chronic back pain   (-) CVA (cerebral vascular accident) (HCC)      PULMONARY   (+) Chronic respiratory failure with hypoxia (HCC) (On 2L O2 at home PRN, wears at night + during exertion but tries to avoid using per patient)   (+) SOB (shortness of breath)   (+) Simple chronic bronchitis (HCC)   (+) Smoker   (-) URI (upper respiratory infection)      Endocrine   (+) Diabetes mellitus (HCC)      Care Coordination   (+) Ambulatory dysfunction      Rheumatology   (+) Systemic lupus erythematosus with lung involvement ([facility])   *difficult IV access  -metoprolol yest  -current smoker     Allergies   Allergen Reactions    Valium [Diazepam] Anaphylaxis     Social History     Tobacco Use    Smoking status: Every Day     Current packs/day: 0.50     Types: Cigarettes    Smokeless tobacco: Never   Vaping Use    Vaping status: Never Used   Substance Use Topics    Alcohol use: Never    Drug use: Never     Current Outpatient Medications   Medication Instructions    acetaminophen (TYLENOL) 650 mg, Oral, Every 6 hours    albuterol (PROVENTIL HFA,VENTOLIN HFA) 90 mcg/act inhaler 2 puffs, Inhalation, Every 4 hours PRN    aluminum-magnesium hydroxide-simethicone (MAALOX MAX) 400-400-40 MG/5ML suspension 5 mL, Oral, Every 6 hours PRN    bisacodyl (DULCOLAX) 5 mg, Oral, Daily PRN    cyanocobalamin (VITAMIN B-12) 500 mcg, Oral, Daily    Diclofenac Sodium (VOLTAREN) 2 g, Topical, 4 times daily    dicyclomine (BENTYL) 20 mg, Oral, Every 6 hours    docusate sodium (COLACE) 100 mg, Oral, 2 times daily    folic acid (FOLVITE) 400 mcg, Oral, Daily    furosemide (LASIX) 40  mg, Oral, Daily    guaiFENesin (MUCINEX) 600 mg, Oral, Every 12 hours scheduled    ipratropium-albuterol (DUO-NEB) 0.5-2.5 mg/3 mL nebulizer solution     lidocaine (LIDODERM) 5 % PLACE 1 PATCH ON THE SKIN EVERY 12 (TWELVE) HOURS FOR 10 DAYS. APPLY 1 PATCH TO THE SKIN FOR 12 HOURS THEN REMOVE FOR 12 HOURS    magnesium cl-calcium carbonate (MAG-DELAY)  MG tablet Oral, Daily    meloxicam (MOBIC) 15 mg tablet TAKE 1 TABLETBY MOUTH EVERY DAY FOR 30 DAYS.    metFORMIN (GLUCOPHAGE) 500 mg, Oral, Daily with breakfast    methocarbamol (ROBAXIN) 500 mg, Oral, 4 times daily PRN    metoprolol succinate (TOPROL-XL) 25 mg, Oral, Daily    metroNIDAZOLE (FLAGYL) 500 mg tablet TAKE 1 TABLET (500 MG TOTAL) BY MOUTH 3 (THREE) TIMES A DAY FOR 7 DAYS.    nicotine (NICODERM CQ) 21 mg/24 hr TD 24 hr patch 1 patch, Transdermal, Every 24 hours    nicotine polacrilex (NICORETTE) 2 mg, Mouth/Throat, Every 2 hour PRN    ondansetron (ZOFRAN) 4 mg, Oral, Every 8 hours PRN    pantoprazole (PROTONIX) 40 mg, Oral, 2 times daily before meals    potassium chloride (Klor-Con M20) 20 mEq tablet 20 mEq, Oral, Daily, For 3 days/ PRN    sertraline (ZOLOFT) 25 mg, Oral, Daily    sucralfate (CARAFATE) 1 g, Oral, 3 times daily    Trelegy Ellipta 100-62.5-25 MCG/ACT inhaler 1 puff, Inhalation, Daily    Xarelto 2.5 MG tablet TAKE 1 TABLET (2.5 MG TOTAL) BY MOUTH 2 (TWO) TIMES A DAY WITH MEALS.     Lab Results   Component Value Date    WBC 12.00 (H) 09/17/2024    HGB 12.8 09/17/2024    HCT 40.3 09/17/2024     09/17/2024    SODIUM 143 09/17/2024    K 3.0 (L) 09/17/2024     09/17/2024    CO2 31 09/17/2024    BUN 8 09/17/2024    CREATININE 0.61 09/17/2024    GLUC 127 09/17/2024    HGBA1C 7.2 (A) 08/29/2024    AST 9 (L) 09/17/2024    ALT 7 09/17/2024    ALKPHOS 78 09/17/2024    TBILI 0.29 09/17/2024    ALB 3.3 (L) 09/17/2024    PROTIME 13.7 01/18/2024    PTT 33 01/18/2024    INR 1.0 08/09/2024     Vitals:    09/19/24 1107   BP: 112/61   Pulse: 90    Resp: (!) 24   Temp: (!) 97.3 °F (36.3 °C)   SpO2: 98%     TTE (1/25/24):     Very technically difficult study.    Left Ventricle: Left ventricular cavity size is normal. Wall thickness is normal. The left ventricular ejection fraction is 65%. Systolic function is normal. Wall motion is normal. Diastolic function is mildly abnormal, consistent with grade I (abnormal) relaxation.    No significant valvular disease seen on this technically difficult study.    No significant change since prior echo 1/15/2023.    Physical Exam    Airway    Mallampati score: III  TM Distance: >3 FB  Neck ROM: full     Dental        Cardiovascular  Rhythm: regular, Rate: normal    Pulmonary   Breath sounds clear to auscultation, No wheezes    Other Findings  post-pubertal.      Anesthesia Plan  ASA Score- 3     Anesthesia Type- IV sedation with anesthesia with ASA Monitors.         Additional Monitors:     Airway Plan:     Comment: O2 mask, natural airway, EtCO2 monitor. Risks discussed including awareness, aspiration, drug reactions and conversion to GA..       Plan Factors-Exercise tolerance (METS): <4 METS.    Chart reviewed. EKG reviewed.  Existing labs reviewed. Patient summary reviewed.    Patient is not a current smoker (Quit a few weeks ago).      Obstructive sleep apnea risk education given perioperatively.        Induction- intravenous.    Postoperative Plan-         Informed Consent- Anesthetic plan and risks discussed with patient.  I personally reviewed this patient with the CRNA. Discussed and agreed on the Anesthesia Plan with the CRNA..

## 2024-09-19 NOTE — ANESTHESIA POSTPROCEDURE EVALUATION
Post-Op Assessment Note    Multiple U/S IV attempts prior to procedure by 2 anesthesiologist. Unsuccessful attempt at L AC, successful at R AC but infiltrated soon after placement. Additional successful long 20g catheter placement in R brachial V -> excellent blood return following placement but non-functional in procedure room. Additional attempt in room 22g in hand/knuckle. Would recommend midline placement if additional procedures required given difficulty of access, significant patient anxiety and loss of access with catheter length + sig subQ tissue/ movement.     /65 (09/19/24 1331)    Temp      Pulse 77 (09/19/24 1331)   Resp (!) 24 (09/19/24 1331)    SpO2 96 % (09/19/24 1331)

## 2024-09-19 NOTE — ANESTHESIA POSTPROCEDURE EVALUATION
Post-Op Assessment Note    CV Status:  Stable    Pain management: adequate       Mental Status:  Alert and awake   Hydration Status:  Euvolemic   PONV Controlled:  Controlled   Airway Patency:  Patent     Post Op Vitals Reviewed: Yes    No anethesia notable event occurred.                BP (!) 179/86 (09/19/24 1311)    Temp 97.9 °F (36.6 °C) (09/19/24 1311)    Pulse 87 (09/19/24 1311)   Resp 21 (09/19/24 1311)    SpO2 95 % (09/19/24 1311)

## 2024-09-19 NOTE — H&P
"History and Physical -  [facility]  Nanci Navarro 59 y.o. female MRN: [id number]      HPI: Nanci Navarro is a 59 y.o. year old female who presents for evaluation of nausea, vomiting, bloating, epigastric abdominal pain, diarrhea, Gastrosoft reflux disease, dysphagia, screening colonoscopy      REVIEW OF SYSTEMS: Per the HPI, and otherwise unremarkable.    Historical Information   Past Medical History:   Diagnosis Date    Achalasia     Acute respiratory failure (HCC) 01/15/2023    Acute respiratory failure with hypoxia (HCC) 02/07/2024    Back pain     Cancer (HCC)     Ovarian    Diabetes mellitus (HCC)     DVT (deep venous thrombosis) (HCC)     Electrolyte abnormality 01/14/2024    Fall 01/02/2023    Had a fall a week ago slipped in the mud onto her back.  X-rays with no fracture.  She has chronic L1 fracture.  She is on pain medication at home for chronic back pain.      Fibromyalgia     Hypertension     Lupus (HCC)      Past Surgical History:   Procedure Laterality Date    HYSTERECTOMY      NECK SURGERY       Social History   Social History     Substance and Sexual Activity   Alcohol Use Never     Social History     Substance and Sexual Activity   Drug Use Never     Social History     Tobacco Use   Smoking Status Every Day    Current packs/day: 0.50    Types: Cigarettes   Smokeless Tobacco Never     History reviewed. No pertinent family history.    Meds/Allergies     Not in a hospital admission.    Allergies   Allergen Reactions    Valium [Diazepam] Anaphylaxis       Objective     Blood pressure 112/61, pulse 90, temperature (!) 97.3 °F (36.3 °C), temperature source Temporal, resp. rate (!) 24, height 4' 9\" (1.448 m), weight 98.3 kg (216 lb 11.4 oz), SpO2 98%.      PHYSICAL EXAM    Gen: NAD  CV: RRR  CHEST: Clear  ABD: soft, NT/ND  EXT: no edema      ASSESSMENT/PLAN:  This is a 59 y.o. year old female here for EGD with biopsies and possible dilation, colonoscopy with biopsies, and she is " stable and optimized for her procedure.

## 2024-09-20 ENCOUNTER — APPOINTMENT (EMERGENCY)
Dept: CT IMAGING | Facility: HOSPITAL | Age: 59
End: 2024-09-20
Payer: COMMERCIAL

## 2024-09-20 ENCOUNTER — APPOINTMENT (EMERGENCY)
Dept: RADIOLOGY | Facility: HOSPITAL | Age: 59
End: 2024-09-20
Payer: COMMERCIAL

## 2024-09-20 ENCOUNTER — HOSPITAL ENCOUNTER (EMERGENCY)
Facility: HOSPITAL | Age: 59
Discharge: HOME | End: 2024-09-20
Attending: EMERGENCY MEDICINE
Payer: COMMERCIAL

## 2024-09-20 ENCOUNTER — OFFICE VISIT (OUTPATIENT)
Age: 59
End: 2024-09-20
Payer: COMMERCIAL

## 2024-09-20 ENCOUNTER — NURSE TRIAGE (OUTPATIENT)
Age: 59
End: 2024-09-20

## 2024-09-20 VITALS
HEIGHT: 57 IN | SYSTOLIC BLOOD PRESSURE: 119 MMHG | RESPIRATION RATE: 18 BRPM | BODY MASS INDEX: 47.03 KG/M2 | OXYGEN SATURATION: 97 % | TEMPERATURE: 97.9 F | HEART RATE: 78 BPM | DIASTOLIC BLOOD PRESSURE: 55 MMHG | WEIGHT: 218 LBS

## 2024-09-20 VITALS
OXYGEN SATURATION: 98 % | RESPIRATION RATE: 14 BRPM | HEART RATE: 96 BPM | HEIGHT: 57 IN | BODY MASS INDEX: 47.16 KG/M2 | TEMPERATURE: 98.9 F | WEIGHT: 218.6 LBS

## 2024-09-20 DIAGNOSIS — R11.2 NAUSEA AND VOMITING, UNSPECIFIED VOMITING TYPE: ICD-10-CM

## 2024-09-20 DIAGNOSIS — R10.13 EPIGASTRIC PAIN: ICD-10-CM

## 2024-09-20 DIAGNOSIS — D72.829 LEUKOCYTOSIS, UNSPECIFIED TYPE: ICD-10-CM

## 2024-09-20 DIAGNOSIS — K21.9 GERD (GASTROESOPHAGEAL REFLUX DISEASE): ICD-10-CM

## 2024-09-20 DIAGNOSIS — K29.70 GASTRITIS, PRESENCE OF BLEEDING UNSPECIFIED, UNSPECIFIED CHRONICITY, UNSPECIFIED GASTRITIS TYPE: ICD-10-CM

## 2024-09-20 DIAGNOSIS — M54.2 NECK PAIN: ICD-10-CM

## 2024-09-20 DIAGNOSIS — E87.6 HYPOKALEMIA: ICD-10-CM

## 2024-09-20 DIAGNOSIS — R50.9 FEVER, UNSPECIFIED FEVER CAUSE: ICD-10-CM

## 2024-09-20 DIAGNOSIS — R68.84 JAW PAIN: Primary | ICD-10-CM

## 2024-09-20 DIAGNOSIS — E83.51 HYPOCALCEMIA: ICD-10-CM

## 2024-09-20 DIAGNOSIS — R06.02 SHORTNESS OF BREATH: ICD-10-CM

## 2024-09-20 LAB
ALBUMIN SERPL BCG-MCNC: 3.8 G/DL (ref 3.5–5)
ALP SERPL-CCNC: 97 U/L (ref 34–104)
ALT SERPL W P-5'-P-CCNC: 8 U/L (ref 7–52)
ANION GAP SERPL CALCULATED.3IONS-SCNC: 9 MMOL/L (ref 4–13)
AST SERPL W P-5'-P-CCNC: 9 U/L (ref 13–39)
BASOPHILS # BLD AUTO: 0.03 THOUSANDS/ΜL (ref 0–0.1)
BASOPHILS NFR BLD AUTO: 0 % (ref 0–1)
BILIRUB SERPL-MCNC: 0.3 MG/DL (ref 0.2–1)
BUN SERPL-MCNC: 11 MG/DL (ref 5–25)
CALCIUM SERPL-MCNC: 8.6 MG/DL (ref 8.4–10.2)
CARDIAC TROPONIN I PNL SERPL HS: 3 NG/L
CHLORIDE SERPL-SCNC: 103 MMOL/L (ref 96–108)
CO2 SERPL-SCNC: 33 MMOL/L (ref 21–32)
CREAT SERPL-MCNC: 0.95 MG/DL (ref 0.6–1.3)
EOSINOPHIL # BLD AUTO: 0.12 THOUSAND/ΜL (ref 0–0.61)
EOSINOPHIL NFR BLD AUTO: 1 % (ref 0–6)
ERYTHROCYTE [DISTWIDTH] IN BLOOD BY AUTOMATED COUNT: 15.4 % (ref 11.6–15.1)
FLUAV AG UPPER RESP QL IA.RAPID: NEGATIVE
FLUBV AG UPPER RESP QL IA.RAPID: NEGATIVE
GFR SERPL CREATININE-BSD FRML MDRD: 65 ML/MIN/1.73SQ M
GLUCOSE SERPL-MCNC: 103 MG/DL (ref 65–140)
HCT VFR BLD AUTO: 39 % (ref 34.8–46.1)
HGB BLD-MCNC: 12.3 G/DL (ref 11.5–15.4)
IMM GRANULOCYTES # BLD AUTO: 0.04 THOUSAND/UL (ref 0–0.2)
IMM GRANULOCYTES NFR BLD AUTO: 0 % (ref 0–2)
LIPASE SERPL-CCNC: 28 U/L (ref 11–82)
LYMPHOCYTES # BLD AUTO: 1.47 THOUSANDS/ΜL (ref 0.6–4.47)
LYMPHOCYTES NFR BLD AUTO: 15 % (ref 14–44)
MCH RBC QN AUTO: 29.7 PG (ref 26.8–34.3)
MCHC RBC AUTO-ENTMCNC: 31.5 G/DL (ref 31.4–37.4)
MCV RBC AUTO: 94 FL (ref 82–98)
MONOCYTES # BLD AUTO: 1.01 THOUSAND/ΜL (ref 0.17–1.22)
MONOCYTES NFR BLD AUTO: 10 % (ref 4–12)
NEUTROPHILS # BLD AUTO: 7 THOUSANDS/ΜL (ref 1.85–7.62)
NEUTS SEG NFR BLD AUTO: 74 % (ref 43–75)
NRBC BLD AUTO-RTO: 0 /100 WBCS
PLATELET # BLD AUTO: 363 THOUSANDS/UL (ref 149–390)
PMV BLD AUTO: 8.9 FL (ref 8.9–12.7)
POTASSIUM SERPL-SCNC: 3.4 MMOL/L (ref 3.5–5.3)
PROT SERPL-MCNC: 6.6 G/DL (ref 6.4–8.4)
RBC # BLD AUTO: 4.14 MILLION/UL (ref 3.81–5.12)
SARS-COV+SARS-COV-2 AG RESP QL IA.RAPID: NEGATIVE
SODIUM SERPL-SCNC: 145 MMOL/L (ref 135–147)
WBC # BLD AUTO: 9.67 THOUSAND/UL (ref 4.31–10.16)

## 2024-09-20 PROCEDURE — 83690 ASSAY OF LIPASE: CPT | Performed by: EMERGENCY MEDICINE

## 2024-09-20 PROCEDURE — 80053 COMPREHEN METABOLIC PANEL: CPT | Performed by: EMERGENCY MEDICINE

## 2024-09-20 PROCEDURE — 93005 ELECTROCARDIOGRAM TRACING: CPT

## 2024-09-20 PROCEDURE — 72040 X-RAY EXAM NECK SPINE 2-3 VW: CPT

## 2024-09-20 PROCEDURE — 71260 CT THORAX DX C+: CPT

## 2024-09-20 PROCEDURE — 87804 INFLUENZA ASSAY W/OPTIC: CPT | Performed by: EMERGENCY MEDICINE

## 2024-09-20 PROCEDURE — 84484 ASSAY OF TROPONIN QUANT: CPT | Performed by: EMERGENCY MEDICINE

## 2024-09-20 PROCEDURE — 99214 OFFICE O/P EST MOD 30 MIN: CPT

## 2024-09-20 PROCEDURE — 96374 THER/PROPH/DIAG INJ IV PUSH: CPT

## 2024-09-20 PROCEDURE — 96376 TX/PRO/DX INJ SAME DRUG ADON: CPT

## 2024-09-20 PROCEDURE — 96375 TX/PRO/DX INJ NEW DRUG ADDON: CPT

## 2024-09-20 PROCEDURE — 85025 COMPLETE CBC W/AUTO DIFF WBC: CPT | Performed by: EMERGENCY MEDICINE

## 2024-09-20 PROCEDURE — 99284 EMERGENCY DEPT VISIT MOD MDM: CPT | Performed by: EMERGENCY MEDICINE

## 2024-09-20 PROCEDURE — 87811 SARS-COV-2 COVID19 W/OPTIC: CPT | Performed by: EMERGENCY MEDICINE

## 2024-09-20 PROCEDURE — 99285 EMERGENCY DEPT VISIT HI MDM: CPT

## 2024-09-20 PROCEDURE — 36415 COLL VENOUS BLD VENIPUNCTURE: CPT | Performed by: EMERGENCY MEDICINE

## 2024-09-20 PROCEDURE — 74177 CT ABD & PELVIS W/CONTRAST: CPT

## 2024-09-20 RX ORDER — FAMOTIDINE 20 MG/1
20 TABLET, FILM COATED ORAL ONCE
Status: COMPLETED | OUTPATIENT
Start: 2024-09-20 | End: 2024-09-20

## 2024-09-20 RX ORDER — SUCRALFATE 1 G/1
1 TABLET ORAL 3 TIMES DAILY
Qty: 21 TABLET | Refills: 0 | Status: SHIPPED | OUTPATIENT
Start: 2024-09-20 | End: 2024-09-27

## 2024-09-20 RX ORDER — KETOROLAC TROMETHAMINE 30 MG/ML
15 INJECTION, SOLUTION INTRAMUSCULAR; INTRAVENOUS ONCE
Status: COMPLETED | OUTPATIENT
Start: 2024-09-20 | End: 2024-09-20

## 2024-09-20 RX ORDER — FENTANYL CITRATE 50 UG/ML
75 INJECTION, SOLUTION INTRAMUSCULAR; INTRAVENOUS ONCE
Status: COMPLETED | OUTPATIENT
Start: 2024-09-20 | End: 2024-09-20

## 2024-09-20 RX ORDER — FAMOTIDINE 20 MG/1
20 TABLET, FILM COATED ORAL 2 TIMES DAILY
Qty: 30 TABLET | Refills: 0 | Status: SHIPPED | OUTPATIENT
Start: 2024-09-20

## 2024-09-20 RX ADMIN — FENTANYL CITRATE 75 MCG: 50 INJECTION INTRAMUSCULAR; INTRAVENOUS at 18:40

## 2024-09-20 RX ADMIN — IOHEXOL 100 ML: 350 INJECTION, SOLUTION INTRAVENOUS at 18:54

## 2024-09-20 RX ADMIN — FAMOTIDINE 20 MG: 20 TABLET, FILM COATED ORAL at 20:19

## 2024-09-20 RX ADMIN — FENTANYL CITRATE 75 MCG: 50 INJECTION INTRAMUSCULAR; INTRAVENOUS at 20:20

## 2024-09-20 RX ADMIN — KETOROLAC TROMETHAMINE 15 MG: 30 INJECTION, SOLUTION INTRAMUSCULAR at 17:37

## 2024-09-20 NOTE — TELEPHONE ENCOUNTER
"LOV 9/11/24 Dr. Lamb (GERD, diarrhea, bloating, esoph dysphagia), Procedure EGD/Colon yesterday.    Patient states she awoke at 3 am to use bathroom and experiencing jaw pain, neck pain rating it at level 9. She does not have a thermometer but feels like she has a fever. Patient is hydrating but not able to eat due to the pain. She states she took Ibuprofen 800 mg without relief.  I advised ED evaluation but she does not have transport, she did state she is scheduled for a routine visit at primary care this afternoon which she does have transport for. Please review/advise.      Reason for Disposition   Patient sounds very sick or weak to the triager    Answer Assessment - Initial Assessment Questions  1. SYMPTOM: \"What's the main symptom you're concerned about?\" (e.g., pain, fever, vomiting)      Jaw pain, neck pain, ? Fever (she does not have thermometer)  2. ONSET: \"When did symptoms  start?\"          Woke with symptoms 3 am this morning  3. SURGERY: \"What surgery was performed?\"      EGD, Colonoscopy  4. DATE of SURGERY: \"When was surgery performed?\"       9/20/24  5. ANESTHESIA: \" What type of anesthesia did you have?\" (e.g., general, spinal, epidural, local)      propofol  6. PAIN: \"Is there any pain?\" If Yes, ask: \"How bad is it?\"  (Scale 1-10; or mild, moderate, severe)      Rates pain level 9   7. FEVER: \"Do you have a fever?\" If Yes, ask: \"What is your temperature, how was it measured, and when did it start?\"      unknown  8. VOMITING: \"Is there any vomiting?\" If yes, ask: \"How many times?\"      Denies vomiting, but nauseated since not eating due to jaw pain  9. BLEEDING: \"Is there any bleeding?\" If Yes, ask: \"How much?\" and \"Where?\"      denies    Protocols used: Post-Op Symptoms and Questions-ADULT-[place]    "

## 2024-09-20 NOTE — TELEPHONE ENCOUNTER
Regarding: POST PROCEDURE. JAW PAIN,NECK PAIN,FEVERISH  ----- Message from Ciera LUGO sent at 9/20/2024 12:23 PM EDT -----  Patients GI provider:  Dr. LEAVITT    Number to return call: [phone]    Reason for call: Pt  had procedure 9/19. She is calling with complaints of jaw pain, neck pain, and feels like she is running a fever. She is requesting a call back to further discuss.     Scheduled procedure/appointment date if applicable: Apt/procedure

## 2024-09-20 NOTE — PROGRESS NOTES
Ambulatory Visit  Name: Nanci Navarro      : 1965      MRN: [id number]  Encounter Provider: Natividad Robertson PA-C  Encounter Date: 2024   Encounter department: [facility] [place]    Assessment & Plan  Jaw pain  Recommend the patient return to the ED for reevaluation given that she had a fever of 102 this morning and is having severe jaw pain, neck pain, increased shortness of breath.  Orders:    Transfer to other facility    Neck pain    Orders:    Transfer to other facility    Shortness of breath  Transfer to ED.  Patient notes she is normally on 2 L of oxygen, but bumped this up to 4 L today because she was feeling shortness of breath.  Orders:    Transfer to other facility    Fever, unspecified fever cause  Transfer to ED.  Patient states she had a fever of 102 this morning.  She has since taken ibuprofen 800 mg.  Patient had elevated white blood cell count of 12 from last ED visit.  Orders:    Transfer to other facility    Hypokalemia  Discussed that her hypokalemia is likely secondary to her Lasix.  She was given potassium supplementation during recent ER visit, but has not been taking potassium supplementation regularly at home.  She was told that her potassium was high in the past so she stopped taking it.       Hypocalcemia  From most recent ED lab workup       Leukocytosis, unspecified type  From most recent ED lab workup       Gastritis, presence of bleeding unspecified, unspecified chronicity, unspecified gastritis type  Had EGD and colonoscopy done yesterday.  Pending results.       Epigastric pain         Nausea and vomiting, unspecified vomiting type            History of Present Illness     Nanci presents the office for follow-up from recent ER visit and to discuss lab results, but states that after her EGD and colonoscopy yesterday she has been having severe jaw pain, neck pain, and woke up with a fever of 102 this morning.  She took ibuprofen which helps bring her  "temperature down.  She is having increased shortness of breath and bumped her oxygen up to 4 L today when her baseline is 2 L.  She complains of some abdominal pain as well.  She states she called into the GI office earlier with her symptoms and it was recommended that she go to the ED.           Review of Systems   Constitutional:  Positive for chills and fever.   HENT:  Negative for congestion, ear pain and sore throat.    Eyes:  Negative for pain and visual disturbance.   Respiratory:  Negative for cough and shortness of breath.    Cardiovascular:  Negative for chest pain and palpitations.   Gastrointestinal:  Positive for abdominal pain. Negative for constipation, diarrhea and vomiting.   Genitourinary:  Negative for dysuria and hematuria.   Musculoskeletal:  Positive for neck pain and neck stiffness. Negative for arthralgias, back pain and myalgias.   Skin:  Negative for color change and rash.   Neurological:  Positive for dizziness. Negative for seizures, syncope and headaches.   All other systems reviewed and are negative.          Objective     Pulse 96   Temp 98.9 °F (37.2 °C) (Tympanic)   Resp 14   Ht 4' 9\" (1.448 m)   Wt 99.2 kg (218 lb 9.6 oz)   SpO2 98% Comment: 4L/M o2 via n/c  BMI 47.30 kg/m²     Physical Exam  Vitals and nursing note reviewed.   Constitutional:       General: She is not in acute distress.     Appearance: She is well-developed. She is ill-appearing (Appears in pain/uncomfortable).      Comments: Patient felt dizzy when she stood up to leave our office, so we obtained a wheelchair for her to assist her out of the office safely.   HENT:      Head: Normocephalic and atraumatic.      Right Ear: Tympanic membrane normal. There is no impacted cerumen.      Left Ear: Tympanic membrane normal. There is no impacted cerumen.      Mouth/Throat:      Tongue: Tongue does not deviate from midline.      Pharynx: Uvula midline. No oropharyngeal exudate, posterior oropharyngeal erythema or uvula " swelling.      Comments: Uvula midline and no oropharyngeal edema noted, however patient had difficulty fully opening her mouth secondary to pain.  Eyes:      Conjunctiva/sclera: Conjunctivae normal.   Cardiovascular:      Rate and Rhythm: Normal rate and regular rhythm.      Heart sounds: No murmur heard.  Pulmonary:      Effort: Pulmonary effort is normal. No respiratory distress.      Breath sounds: Normal breath sounds. No wheezing, rhonchi or rales.   Musculoskeletal:         General: No swelling.      Cervical back: Neck supple.   Skin:     General: Skin is warm and dry.      Capillary Refill: Capillary refill takes less than 2 seconds.   Neurological:      Mental Status: She is alert.   Psychiatric:         Mood and Affect: Mood normal.

## 2024-09-21 NOTE — ED PROVIDER NOTES
1. Jaw pain    2. GERD (gastroesophageal reflux disease)      ED Disposition       ED Disposition   Discharge    Condition   Stable    Date/Time   Fri Sep 20, 2024  8:08 PM    Comment   Nanci Navarro discharge to home/self care.                   Assessment & Plan       Medical Decision Making  59-year-old female with multiple complaints after recent EGD colonoscopy, will check CT chest abdomen pelvis, x-ray C-spine, if pain meds, reassess.    Amount and/or Complexity of Data Reviewed  Labs: ordered.  Radiology: ordered.    Risk  Prescription drug management.                     Medications   ketorolac (TORADOL) injection 15 mg (15 mg Intravenous Given 9/20/24 1737)   fentaNYL injection 75 mcg (75 mcg Intravenous Given 9/20/24 1840)   iohexol (OMNIPAQUE) 350 MG/ML injection (MULTI-DOSE) 100 mL (100 mL Intravenous Given 9/20/24 1854)   famotidine (PEPCID) tablet 20 mg (20 mg Oral Given 9/20/24 2019)   fentaNYL injection 75 mcg (75 mcg Intravenous Given 9/20/24 2020)       History of Present Illness       59-year-old female presenting to the emergency department for evaluation of multiple complaints.  Patient has some chest and abdominal pain after recent EGD colonoscopy also has some bilateral jaw and neck pain after leaving the hospital yesterday.  No trouble breathing.  Did have fever earlier.  No urinary symptoms.        Review of Systems   Constitutional:  Negative for appetite change, chills, fatigue and fever.   HENT:  Negative for sneezing and sore throat.    Eyes:  Negative for visual disturbance.   Respiratory:  Negative for cough, choking, chest tightness, shortness of breath and wheezing.    Cardiovascular:  Positive for chest pain. Negative for palpitations.   Gastrointestinal:  Positive for abdominal pain. Negative for constipation, diarrhea, nausea and vomiting.   Genitourinary:  Negative for difficulty urinating and dysuria.   Musculoskeletal:  Positive for neck pain.   Neurological:  Negative for  dizziness, weakness, light-headedness, numbness and headaches.   All other systems reviewed and are negative.          Objective     ED Triage Vitals   Temperature Pulse Blood Pressure Respirations SpO2 Patient Position - Orthostatic VS   09/20/24 1530 09/20/24 1529 09/20/24 1530 09/20/24 1529 09/20/24 1529 09/20/24 1956   97.9 °F (36.6 °C) 91 122/62 16 98 % Sitting      Temp Source Heart Rate Source BP Location FiO2 (%) Pain Score    09/20/24 1530 09/20/24 1529 09/20/24 1956 -- 09/20/24 1840    Oral Monitor Right arm  9        Physical Exam  Vitals and nursing note reviewed.   Constitutional:       General: She is not in acute distress.     Appearance: She is well-developed. She is not diaphoretic.   HENT:      Head: Normocephalic and atraumatic.   Eyes:      Pupils: Pupils are equal, round, and reactive to light.   Neck:      Vascular: No JVD.      Trachea: No tracheal deviation.   Cardiovascular:      Rate and Rhythm: Normal rate and regular rhythm.      Heart sounds: Normal heart sounds. No murmur heard.     No friction rub. No gallop.   Pulmonary:      Effort: Pulmonary effort is normal. No respiratory distress.      Breath sounds: Normal breath sounds. No wheezing or rales.   Abdominal:      General: Bowel sounds are normal. There is no distension.      Palpations: Abdomen is soft.      Tenderness: There is no abdominal tenderness. There is no guarding or rebound.   Skin:     General: Skin is warm and dry.      Coloration: Skin is not pale.   Neurological:      Mental Status: She is alert and oriented to person, place, and time.      Cranial Nerves: No cranial nerve deficit.      Motor: No abnormal muscle tone.   Psychiatric:         Behavior: Behavior normal.         Labs Reviewed   CBC AND DIFFERENTIAL - Abnormal       Result Value    WBC 9.67      RBC 4.14      Hemoglobin 12.3      Hematocrit 39.0      MCV 94      MCH 29.7      MCHC 31.5      RDW 15.4 (*)     MPV 8.9      Platelets 363      nRBC 0       Segmented % 74      Immature Grans % 0      Lymphocytes % 15      Monocytes % 10      Eosinophils Relative 1      Basophils Relative 0      Absolute Neutrophils 7.00      Absolute Immature Grans 0.04      Absolute Lymphocytes 1.47      Absolute Monocytes 1.01      Eosinophils Absolute 0.12      Basophils Absolute 0.03     COMPREHENSIVE METABOLIC PANEL - Abnormal    Sodium 145      Potassium 3.4 (*)     Chloride 103      CO2 33 (*)     ANION GAP 9      BUN 11      Creatinine 0.95      Glucose 103      Calcium 8.6      AST 9 (*)     ALT 8      Alkaline Phosphatase 97      Total Protein 6.6      Albumin 3.8      Total Bilirubin 0.30      eGFR 65      Narrative:     National Kid[employer] guidelines for Chronic Kidney Disease (CKD):     Stage 1 with normal or high GFR (GFR > 90 mL/min/1.73 square meters)    Stage 2 Mild CKD (GFR = 60-89 mL/min/1.73 square meters)    Stage 3A Moderate CKD (GFR = 45-59 mL/min/1.73 square meters)    Stage 3B Moderate CKD (GFR = 30-44 mL/min/1.73 square meters)    Stage 4 Severe CKD (GFR = 15-29 mL/min/1.73 square meters)    Stage 5 End Stage CKD (GFR <15 mL/min/1.73 square meters)  Note: GFR calculation is accurate only with a steady state creatinine   COVID-19/INFLUENZA A/B RAPID ANTIGEN (30 MIN.TAT) - Normal    SARS COV Rapid Antigen Negative      Influenza A Rapid Antigen Negative      Influenza B Rapid Antigen Negative      Narrative:     This test has been performed using the [employer] [facility] 2 FLU+SARS Antigen test under the Emergency Use Authorization (EUA). This test has been validated by the  and verified by the performing laboratory. The [place] uses lateral flow immunofluorescent sandwich assay to detect SARS-COV, Influenza A and Influenza B Antigen.     The [employer] [place] 2 SARS Antigen test does not differentiate between SARS-CoV and SARS-CoV-2.     Negative results are presumptive and may be confirmed with a molecular assay, if necessary, for patient  management. Negative results do not rule out SARS-CoV-2 or influenza infection and should not be used as the sole basis for treatment or patient management decisions. A negative test result may occur if the level of antigen in a sample is below the limit of detection of this test.     Positive results are indicative of the presence of viral antigens, but do not rule out bacterial infection or co-infection with other viruses.     All test results should be used as an adjunct to clinical observations and other information available to the provider.    FOR PEDIATRIC PATIENTS - copy/paste COVID Guidelines URL to browser: [email or web address]   HS TROPONIN I 0HR - Normal    hs TnI 0hr 3     LIPASE - Normal    Lipase 28       CT chest abdomen pelvis w contrast   Final Interpretation by David Sibley MD (09/20 1950)      No acute abnormality identified in the chest, abdomen or pelvis.      Mild circumferential mid to distal esophageal wall thickening without focal abnormality.   No pneumomediastinum.      Diverticulosis without acute colonic abnormality.               Workstation performed: [id number]         XR spine cervical 2 or 3 vw injury   Final Interpretation by Miki Wan MD (09/20 2054)      No acute osseous abnormality.         Workstation performed: [id number]             Procedures    ED Medication and Procedure Management   Prior to Admission Medications   Prescriptions Last Dose Informant Patient Reported? Taking?   Diclofenac Sodium (VOLTAREN) 1 %  Self No No   Sig: Apply 2 g topically 4 (four) times a day   Patient not taking: Reported on 9/20/2024   Trelegy Ellipta 100-62.5-25 MCG/ACT inhaler  Self Yes No   Sig: Inhale 1 puff daily   Xarelto 2.5 MG tablet  Self Yes No   Sig: TAKE 1 TABLET (2.5 MG TOTAL) BY MOUTH 2 (TWO) TIMES A DAY WITH MEALS.   acetaminophen (TYLENOL) 325 mg tablet  Self No No   Sig: Take 2 tablets (650 mg  total) by mouth every 6 (six) hours   albuterol (PROVENTIL HFA,VENTOLIN HFA) 90 mcg/act inhaler  Self No No   Sig: Inhale 2 puffs every 4 (four) hours as needed for wheezing   aluminum-magnesium hydroxide-simethicone (MAALOX MAX) 400-400-40 MG/5ML suspension   No No   Sig: Take 5 mL by mouth every 6 (six) hours as needed for indigestion or heartburn   bisacodyl (DULCOLAX) 5 mg EC tablet  Self No No   Sig: Take 1 tablet (5 mg total) by mouth daily as needed for constipation   Patient not taking: Reported on 8/29/2024   cyanocobalamin (VITAMIN B-12) 500 MCG tablet  Self No No   Sig: Take 1 tablet (500 mcg total) by mouth daily   Patient not taking: Reported on 8/29/2024   dicyclomine (BENTYL) 20 mg tablet   No No   Sig: Take 1 tablet (20 mg total) by mouth every 6 (six) hours   Patient not taking: Reported on 9/20/2024   docusate sodium (COLACE) 100 mg capsule  Self No No   Sig: Take 1 capsule (100 mg total) by mouth 2 (two) times a day   Patient not taking: Reported on 8/29/2024   folic acid (FOLVITE) 400 mcg tablet  Self No No   Sig: Take 1 tablet (400 mcg total) by mouth daily   Patient not taking: Reported on 8/29/2024   furosemide (LASIX) 40 mg tablet  Self No No   Sig: Take 1 tablet (40 mg total) by mouth daily   guaiFENesin (MUCINEX) 600 mg 12 hr tablet  Self No No   Sig: Take 1 tablet (600 mg total) by mouth every 12 (twelve) hours   Patient not taking: Reported on 8/29/2024   ipratropium-albuterol (DUO-NEB) 0.5-2.5 mg/3 mL nebulizer solution  Self Yes No   lidocaine (LIDODERM) 5 %  Self Yes No   Sig: PLACE 1 PATCH ON THE SKIN EVERY 12 (TWELVE) HOURS FOR 10 DAYS. APPLY 1 PATCH TO THE SKIN FOR 12 HOURS THEN REMOVE FOR 12 HOURS   Patient not taking: Reported on 9/20/2024   magnesium cl-calcium carbonate (MAG-DELAY)  MG tablet  Self Yes No   Sig: Take by mouth daily   Patient not taking: Reported on 9/20/2024   meloxicam (MOBIC) 15 mg tablet  Self Yes No   Sig: TAKE 1 TABLETBY MOUTH EVERY DAY FOR 30 DAYS.    Patient not taking: Reported on 9/20/2024   metFORMIN (GLUCOPHAGE) 500 mg tablet  Self No No   Sig: Take 1 tablet (500 mg total) by mouth daily with breakfast   methocarbamol (ROBAXIN) 500 mg tablet  Self No No   Sig: Take 1 tablet (500 mg total) by mouth 4 (four) times a day as needed for muscle spasms for up to 12 doses   Patient not taking: Reported on 9/20/2024   metoprolol succinate (TOPROL-XL) 25 mg 24 hr tablet  Self Yes No   Sig: Take 25 mg by mouth daily   metroNIDAZOLE (FLAGYL) 500 mg tablet  Self Yes No   Sig: TAKE 1 TABLET (500 MG TOTAL) BY MOUTH 3 (THREE) TIMES A DAY FOR 7 DAYS.   Patient not taking: Reported on 9/20/2024   nicotine (NICODERM CQ) 21 mg/24 hr TD 24 hr patch  Self No No   Sig: Place 1 patch on the skin over 24 hours every 24 hours   Patient not taking: Reported on 9/20/2024   nicotine polacrilex (NICORETTE) 2 mg gum  Self No No   Sig: Chew 1 each (2 mg total) every 2 (two) hours as needed for smoking cessation   Patient not taking: Reported on 8/29/2024   ondansetron (ZOFRAN) 4 mg tablet   No No   Sig: Take 1 tablet (4 mg total) by mouth every 8 (eight) hours as needed for nausea or vomiting   Patient not taking: Reported on 9/20/2024   pantoprazole (PROTONIX) 40 mg tablet   No No   Sig: Take 1 tablet (40 mg total) by mouth 2 (two) times a day before meals   potassium chloride (Klor-Con M20) 20 mEq tablet  Self Yes No   Sig: Take 20 mEq by mouth in the morning For 3 days/ PRN   Patient not taking: Reported on 9/20/2024   sertraline (ZOLOFT) 25 mg tablet  Self Yes No   Sig: Take 25 mg by mouth daily   sucralfate (CARAFATE) 1 g tablet   No No   Sig: Take 1 tablet (1 g total) by mouth 3 (three) times a day      Facility-Administered Medications: None     Discharge Medication List as of 9/20/2024  8:09 PM        START taking these medications    Details   famotidine (PEPCID) 20 mg tablet Take 1 tablet (20 mg total) by mouth 2 (two) times a day, Starting Fri 9/20/2024, Normal      !!  sucralfate (CARAFATE) 1 g tablet Take 1 tablet (1 g total) by mouth 3 (three) times a day for 7 days, Starting Fri 9/20/2024, Until Fri 9/27/2024, Normal       !! - Potential duplicate medications found. Please discuss with provider.        CONTINUE these medications which have NOT CHANGED    Details   acetaminophen (TYLENOL) 325 mg tablet Take 2 tablets (650 mg total) by mouth every 6 (six) hours, Starting Wed 1/17/2024, No Print      albuterol (PROVENTIL HFA,VENTOLIN HFA) 90 mcg/act inhaler Inhale 2 puffs every 4 (four) hours as needed for wheezing, Starting Sat 10/8/2022, Normal      aluminum-magnesium hydroxide-simethicone (MAALOX MAX) 400-400-40 MG/5ML suspension Take 5 mL by mouth every 6 (six) hours as needed for indigestion or heartburn, Starting Tue 9/17/2024, Normal      bisacodyl (DULCOLAX) 5 mg EC tablet Take 1 tablet (5 mg total) by mouth daily as needed for constipation, Starting Wed 11/29/2023, Normal      cyanocobalamin (VITAMIN B-12) 500 MCG tablet Take 1 tablet (500 mcg total) by mouth daily, Starting Thu 2/8/2024, Normal      Diclofenac Sodium (VOLTAREN) 1 % Apply 2 g topically 4 (four) times a day, Starting Wed 11/29/2023, Normal      dicyclomine (BENTYL) 20 mg tablet Take 1 tablet (20 mg total) by mouth every 6 (six) hours, Starting Thu 9/12/2024, Normal      docusate sodium (COLACE) 100 mg capsule Take 1 capsule (100 mg total) by mouth 2 (two) times a day, Starting Wed 2/7/2024, Normal      folic acid (FOLVITE) 400 mcg tablet Take 1 tablet (400 mcg total) by mouth daily, Starting Thu 2/8/2024, Normal      furosemide (LASIX) 40 mg tablet Take 1 tablet (40 mg total) by mouth daily, Starting Thu 2/8/2024, Until Thu 9/19/2024, Normal      guaiFENesin (MUCINEX) 600 mg 12 hr tablet Take 1 tablet (600 mg total) by mouth every 12 (twelve) hours, Starting Wed 2/7/2024, Normal      ipratropium-albuterol (DUO-NEB) 0.5-2.5 mg/3 mL nebulizer solution Historical Med      lidocaine (LIDODERM) 5 % PLACE 1  PATCH ON THE SKIN EVERY 12 (TWELVE) HOURS FOR 10 DAYS. APPLY 1 PATCH TO THE SKIN FOR 12 HOURS THEN REMOVE FOR 12 HOURS, Historical Med      magnesium cl-calcium carbonate (MAG-DELAY)  MG tablet Take by mouth daily, Historical Med      meloxicam (MOBIC) 15 mg tablet TAKE 1 TABLETBY MOUTH EVERY DAY FOR 30 DAYS., Historical Med      metFORMIN (GLUCOPHAGE) 500 mg tablet Take 1 tablet (500 mg total) by mouth daily with breakfast, Starting Thu 8/29/2024, Normal      methocarbamol (ROBAXIN) 500 mg tablet Take 1 tablet (500 mg total) by mouth 4 (four) times a day as needed for muscle spasms for up to 12 doses, Starting Thu 8/29/2024, Normal      metoprolol succinate (TOPROL-XL) 25 mg 24 hr tablet Take 25 mg by mouth daily, Starting Wed 12/20/2023, Historical Med      metroNIDAZOLE (FLAGYL) 500 mg tablet TAKE 1 TABLET (500 MG TOTAL) BY MOUTH 3 (THREE) TIMES A DAY FOR 7 DAYS., Historical Med      nicotine (NICODERM CQ) 21 mg/24 hr TD 24 hr patch Place 1 patch on the skin over 24 hours every 24 hours, Starting Thu 8/29/2024, Normal      nicotine polacrilex (NICORETTE) 2 mg gum Chew 1 each (2 mg total) every 2 (two) hours as needed for smoking cessation, Starting Wed 2/7/2024, Normal      ondansetron (ZOFRAN) 4 mg tablet Take 1 tablet (4 mg total) by mouth every 8 (eight) hours as needed for nausea or vomiting, Starting Tue 9/17/2024, Normal      pantoprazole (PROTONIX) 40 mg tablet Take 1 tablet (40 mg total) by mouth 2 (two) times a day before meals, Starting Wed 9/11/2024, Normal      potassium chloride (Klor-Con M20) 20 mEq tablet Take 20 mEq by mouth in the morning For 3 days/ PRN, Starting Fri 9/6/2024, Historical Med      sertraline (ZOLOFT) 25 mg tablet Take 25 mg by mouth daily, Historical Med      !! sucralfate (CARAFATE) 1 g tablet Take 1 tablet (1 g total) by mouth 3 (three) times a day, Starting Tue 9/17/2024, Until Thu 10/17/2024, Normal      Trelegy Ellipta 100-62.5-25 MCG/ACT inhaler Inhale 1 puff daily,  Starting Mon 9/9/2024, Historical Med      Xarelto 2.5 MG tablet TAKE 1 TABLET (2.5 MG TOTAL) BY MOUTH 2 (TWO) TIMES A DAY WITH MEALS., Historical Med       !! - Potential duplicate medications found. Please discuss with provider.        No discharge procedures on file.     Wiliam Brandt MD  09/21/24 2576

## 2024-09-22 ENCOUNTER — HOSPITAL ENCOUNTER (OUTPATIENT)
Dept: ULTRASOUND IMAGING | Facility: HOSPITAL | Age: 59
Discharge: HOME | End: 2024-09-22
Payer: COMMERCIAL

## 2024-09-22 DIAGNOSIS — R93.429 ABNORMAL RENAL ULTRASOUND: ICD-10-CM

## 2024-09-22 PROCEDURE — 76775 US EXAM ABDO BACK WALL LIM: CPT

## 2024-09-23 PROCEDURE — 88305 TISSUE EXAM BY PATHOLOGIST: CPT | Performed by: PATHOLOGY

## 2024-09-25 ENCOUNTER — TELEPHONE (OUTPATIENT)
Age: 59
End: 2024-09-25

## 2024-09-25 NOTE — TELEPHONE ENCOUNTER
Called spoke with patient advised per Dr.Jwalant Yadav has reviewed US of kidney and bladder and shows tiny calcium renal cyst.  advices patient call Urology to follow-up. Patient verbalized understanding and is okay with it. Patient has been provided phone # [phone] to schedule urology consult.

## 2024-09-25 NOTE — TELEPHONE ENCOUNTER
"Phone call from patient stating \"she would like consult with Nephrology due to recent abnormal ultrasound\". Attempted to schedule patient, however denied by decision tree with patient possibly having to see Urology. Attempted to Call [facility] to clarify if correct (not avail). Please review ultrasound to schedule patient accordingly (no referral in chart).    Patient concern insurance will  in the next few days, and wanted to get an appt this week. Please contact patient with advise, awaiting call. Thank you .   "

## 2024-09-26 ENCOUNTER — TELEPHONE (OUTPATIENT)
Age: 59
End: 2024-09-26

## 2024-09-26 LAB
ATRIAL RATE: 90 BPM
ATRIAL RATE: 94 BPM
P AXIS: 69 DEGREES
P AXIS: 76 DEGREES
PR INTERVAL: 148 MS
PR INTERVAL: 148 MS
QRS AXIS: 18 DEGREES
QRS AXIS: 40 DEGREES
QRSD INTERVAL: 88 MS
QRSD INTERVAL: 88 MS
QT INTERVAL: 366 MS
QT INTERVAL: 376 MS
QTC INTERVAL: 447 MS
QTC INTERVAL: 470 MS
T WAVE AXIS: 54 DEGREES
T WAVE AXIS: 9 DEGREES
VENTRICULAR RATE: 90 BPM
VENTRICULAR RATE: 94 BPM

## 2024-09-26 PROCEDURE — 93010 ELECTROCARDIOGRAM REPORT: CPT | Performed by: STUDENT IN AN ORGANIZED HEALTH CARE EDUCATION/TRAINING PROGRAM

## 2024-09-26 PROCEDURE — 93010 ELECTROCARDIOGRAM REPORT: CPT | Performed by: INTERNAL MEDICINE

## 2024-09-26 NOTE — TELEPHONE ENCOUNTER
----- Message from Suresh Dos Santos PA-C sent at 9/25/2024  5:34 PM EDT -----  [facility] patient: Ultrasound normal.  No further testing required right now- showed a small renal cyst which is common, can continue to monitor

## 2024-10-19 DIAGNOSIS — R10.10 PAIN OF UPPER ABDOMEN: ICD-10-CM

## 2024-10-21 RX ORDER — DICYCLOMINE HCL 20 MG
20 TABLET ORAL EVERY 6 HOURS
Qty: 30 TABLET | Refills: 1 | Status: SHIPPED | OUTPATIENT
Start: 2024-10-21

## 2024-11-01 DIAGNOSIS — E11.9 TYPE 2 DIABETES MELLITUS WITHOUT COMPLICATION, WITHOUT LONG-TERM CURRENT USE OF INSULIN (HCC): ICD-10-CM

## 2025-01-28 NOTE — PLAN OF CARE
Problem: Potential for Falls  Goal: Patient will remain free of falls  Description: INTERVENTIONS:  - Educate patient/family on patient safety including physical limitations  - Instruct patient to call for assistance with activity   - Consult OT/PT to assist with strengthening/mobility   - Keep Call bell within reach  - Keep bed low and locked with side rails adjusted as appropriate  - Keep care items and personal belongings within reach  - Initiate and maintain comfort rounds  - Make Fall Risk Sign visible to staff  - Offer Toileting every 2 Hours, in advance of need  - Initiate/Maintain bed and chair alarm  - Obtain necessary fall risk management equipment: non skid socks   - Apply yellow socks and bracelet for high fall risk patients  - Consider moving patient to room near nurses station  Outcome: Progressing     Problem: PAIN - ADULT  Goal: Verbalizes/displays adequate comfort level or baseline comfort level  Description: Interventions:  - Encourage patient to monitor pain and request assistance  - Assess pain using appropriate pain scale  - Administer analgesics based on type and severity of pain and evaluate response  - Implement non-pharmacological measures as appropriate and evaluate response  - Consider cultural and social influences on pain and pain management  - Notify physician/advanced practitioner if interventions unsuccessful or patient reports new pain  Outcome: Progressing     Problem: INFECTION - ADULT  Goal: Absence or prevention of progression during hospitalization  Description: INTERVENTIONS:  - Assess and monitor for signs and symptoms of infection  - Monitor lab/diagnostic results  - Monitor all insertion sites, i.e. indwelling lines, tubes, and drains  - Monitor endotracheal if appropriate and nasal secretions for changes in amount and color  - [place] appropriate cooling/warming therapies per order  - Administer medications as ordered  - Instruct and encourage patient and family to use  good hand hygiene technique  - Identify and instruct in appropriate isolation precautions for identified infection/condition  Outcome: Progressing  Goal: Absence of fever/infection during neutropenic period  Description: INTERVENTIONS:  - Monitor WBC    Outcome: Progressing     Problem: SAFETY ADULT  Goal: Patient will remain free of falls  Description: INTERVENTIONS:  - Educate patient/family on patient safety including physical limitations  - Instruct patient to call for assistance with activity   - Consult OT/PT to assist with strengthening/mobility   - Keep Call bell within reach  - Keep bed low and locked with side rails adjusted as appropriate  - Keep care items and personal belongings within reach  - Initiate and maintain comfort rounds  - Make Fall Risk Sign visible to staff  - Offer Toileting every 2 Hours, in advance of need  - Initiate/Maintain bed and chair alarm  - Obtain necessary fall risk management equipment: non skid socks  - Apply yellow socks and bracelet for high fall risk patients  - Consider moving patient to room near nurses station  Outcome: Progressing  Goal: Maintain or return to baseline ADL function  Description: INTERVENTIONS:  -  Assess patient's ability to carry out ADLs; assess patient's baseline for ADL function and identify physical deficits which impact ability to perform ADLs (bathing, care of mouth/teeth, toileting, grooming, dressing, etc.)  - Assess/evaluate cause of self-care deficits   - Assess range of motion  - Assess patient's mobility; develop plan if impaired  - Assess patient's need for assistive devices and provide as appropriate  - Encourage maximum independence but intervene and supervise when necessary  - Involve family in performance of ADLs  - Assess for home care needs following discharge   - Consider OT consult to assist with ADL evaluation and planning for discharge  - Provide patient education as appropriate  Outcome: Progressing  Goal: Maintains/Returns to  pre admission functional level  Description: INTERVENTIONS:  - Perform AM-PAC 6 Click Basic Mobility/ Daily Activity assessment daily.  - Set and communicate daily mobility goal to care team and patient/family/caregiver.   - Collaborate with rehabilitation services on mobility goals if consulted  - Perform Range of Motion 4 times a day.  - Reposition patient every 4 hours.  - Dangle patient 4 times a day  - Stand patient 4 times a day  - Ambulate patient 4 times a day  - Out of bed to chair 4 times a day   - Out of bed for meals 3 times a day  - Out of bed for toileting  - Record patient progress and toleration of activity level   Outcome: Progressing     Problem: DISCHARGE PLANNING  Goal: Discharge to home or other facility with appropriate resources  Description: INTERVENTIONS:  - Identify barriers to discharge w/patient and caregiver  - Arrange for needed discharge resources and transportation as appropriate  - Identify discharge learning needs (meds, wound care, etc.)  - Arrange for interpretive services to assist at discharge as needed  - Refer to Case Management Department for coordinating discharge planning if the patient needs post-hospital services based on physician/advanced practitioner order or complex needs related to functional status, cognitive ability, or social support system  Outcome: Progressing     Problem: Knowledge Deficit  Goal: Patient/family/caregiver demonstrates understanding of disease process, treatment plan, medications, and discharge instructions  Description: Complete learning assessment and assess knowledge base.  Interventions:  - Provide teaching at level of understanding  - Provide teaching via preferred learning methods  Outcome: Progressing     Problem: RESPIRATORY - ADULT  Goal: Achieves optimal ventilation and oxygenation  Description: INTERVENTIONS:  - Assess for changes in respiratory status  - Assess for changes in mentation and behavior  - Position to facilitate  oxygenation and minimize respiratory effort  - Oxygen administered by appropriate delivery if ordered  - Initiate smoking cessation education as indicated  - Encourage broncho-pulmonary hygiene including cough, deep breathe, Incentive Spirometry  - Assess the need for suctioning and aspirate as needed  - Assess and instruct to report SOB or any respiratory difficulty  - Respiratory Therapy support as indicated  Outcome: Progressing     Problem: GASTROINTESTINAL - ADULT  Goal: Maintains or returns to baseline bowel function  Description: INTERVENTIONS:  - Assess bowel function  - Encourage oral fluids to ensure adequate hydration  - Administer IV fluids if ordered to ensure adequate hydration  - Administer ordered medications as needed  - Encourage mobilization and activity  - Consider nutritional services referral to assist patient with adequate nutrition and appropriate food choices  Outcome: Progressing     Problem: GENITOURINARY - ADULT  Goal: Absence of urinary retention  Description: INTERVENTIONS:  - Assess patient’s ability to void and empty bladder  - Monitor I/O  - Bladder scan as needed  - Discuss with physician/AP medications to alleviate retention as needed  - Discuss catheterization for long term situations as appropriate  Outcome: Progressing      declined

## 2025-01-29 ENCOUNTER — HOSPITAL ENCOUNTER (EMERGENCY)
Facility: HOSPITAL | Age: 60
Discharge: HOME | End: 2025-01-29
Attending: EMERGENCY MEDICINE
Payer: COMMERCIAL

## 2025-01-29 ENCOUNTER — APPOINTMENT (EMERGENCY)
Dept: CT IMAGING | Facility: HOSPITAL | Age: 60
End: 2025-01-29
Payer: COMMERCIAL

## 2025-01-29 VITALS
RESPIRATION RATE: 18 BRPM | HEART RATE: 78 BPM | WEIGHT: 218.03 LBS | TEMPERATURE: 98.1 F | HEIGHT: 57 IN | SYSTOLIC BLOOD PRESSURE: 142 MMHG | BODY MASS INDEX: 47.04 KG/M2 | OXYGEN SATURATION: 95 % | DIASTOLIC BLOOD PRESSURE: 71 MMHG

## 2025-01-29 DIAGNOSIS — R19.7 NAUSEA, VOMITING, AND DIARRHEA: ICD-10-CM

## 2025-01-29 DIAGNOSIS — R11.2 NAUSEA, VOMITING, AND DIARRHEA: ICD-10-CM

## 2025-01-29 DIAGNOSIS — R10.9 BILATERAL FLANK PAIN: Primary | ICD-10-CM

## 2025-01-29 DIAGNOSIS — J44.1 COPD WITH ACUTE EXACERBATION (HCC): ICD-10-CM

## 2025-01-29 LAB
ALBUMIN SERPL BCG-MCNC: 4.4 G/DL (ref 3.5–5)
ALP SERPL-CCNC: 135 U/L (ref 34–104)
ALT SERPL W P-5'-P-CCNC: 18 U/L (ref 7–52)
ANION GAP SERPL CALCULATED.3IONS-SCNC: 8 MMOL/L (ref 4–13)
APTT PPP: 22 SECONDS (ref 23–34)
AST SERPL W P-5'-P-CCNC: 13 U/L (ref 13–39)
BACTERIA UR QL AUTO: ABNORMAL /HPF
BASOPHILS # BLD AUTO: 0.04 THOUSANDS/ΜL (ref 0–0.1)
BASOPHILS NFR BLD AUTO: 0 % (ref 0–1)
BILIRUB DIRECT SERPL-MCNC: 0.02 MG/DL (ref 0–0.2)
BILIRUB SERPL-MCNC: 0.31 MG/DL (ref 0.2–1)
BILIRUB UR QL STRIP: NEGATIVE
BUN SERPL-MCNC: 16 MG/DL (ref 5–25)
CALCIUM SERPL-MCNC: 9.4 MG/DL (ref 8.4–10.2)
CARDIAC TROPONIN I PNL SERPL HS: 4 NG/L (ref ?–50)
CHLORIDE SERPL-SCNC: 97 MMOL/L (ref 96–108)
CLARITY UR: CLEAR
CO2 SERPL-SCNC: 35 MMOL/L (ref 21–32)
COLOR UR: YELLOW
CREAT SERPL-MCNC: 0.9 MG/DL (ref 0.6–1.3)
EOSINOPHIL # BLD AUTO: 0.01 THOUSAND/ΜL (ref 0–0.61)
EOSINOPHIL NFR BLD AUTO: 0 % (ref 0–6)
ERYTHROCYTE [DISTWIDTH] IN BLOOD BY AUTOMATED COUNT: 13.7 % (ref 11.6–15.1)
GFR SERPL CREATININE-BSD FRML MDRD: 70 ML/MIN/1.73SQ M
GLUCOSE SERPL-MCNC: 196 MG/DL (ref 65–140)
GLUCOSE UR STRIP-MCNC: NEGATIVE MG/DL
HCT VFR BLD AUTO: 36.8 % (ref 34.8–46.1)
HGB BLD-MCNC: 12.3 G/DL (ref 11.5–15.4)
HGB UR QL STRIP.AUTO: NEGATIVE
HYALINE CASTS #/AREA URNS LPF: ABNORMAL /LPF
IMM GRANULOCYTES # BLD AUTO: 0.23 THOUSAND/UL (ref 0–0.2)
IMM GRANULOCYTES NFR BLD AUTO: 2 % (ref 0–2)
INR PPP: 0.88 (ref 0.85–1.19)
KETONES UR STRIP-MCNC: NEGATIVE MG/DL
LACTATE SERPL-SCNC: 1.9 MMOL/L (ref 0.5–2)
LACTATE SERPL-SCNC: 2.2 MMOL/L (ref 0.5–2)
LEUKOCYTE ESTERASE UR QL STRIP: NEGATIVE
LIPASE SERPL-CCNC: 13 U/L (ref 11–82)
LYMPHOCYTES # BLD AUTO: 1.35 THOUSANDS/ΜL (ref 0.6–4.47)
LYMPHOCYTES NFR BLD AUTO: 12 % (ref 14–44)
MAGNESIUM SERPL-MCNC: 2.1 MG/DL (ref 1.9–2.7)
MCH RBC QN AUTO: 29.7 PG (ref 26.8–34.3)
MCHC RBC AUTO-ENTMCNC: 33.4 G/DL (ref 31.4–37.4)
MCV RBC AUTO: 89 FL (ref 82–98)
MONOCYTES # BLD AUTO: 0.95 THOUSAND/ΜL (ref 0.17–1.22)
MONOCYTES NFR BLD AUTO: 9 % (ref 4–12)
MUCOUS THREADS UR QL AUTO: ABNORMAL
NEUTROPHILS # BLD AUTO: 8.52 THOUSANDS/ΜL (ref 1.85–7.62)
NEUTS SEG NFR BLD AUTO: 77 % (ref 43–75)
NITRITE UR QL STRIP: NEGATIVE
NON-SQ EPI CELLS URNS QL MICRO: ABNORMAL /HPF
NRBC BLD AUTO-RTO: 0 /100 WBCS
PH UR STRIP.AUTO: 7 [PH]
PLATELET # BLD AUTO: 348 THOUSANDS/UL (ref 149–390)
PMV BLD AUTO: 9.4 FL (ref 8.9–12.7)
POTASSIUM SERPL-SCNC: 3.4 MMOL/L (ref 3.5–5.3)
PROT SERPL-MCNC: 7.4 G/DL (ref 6.4–8.4)
PROT UR STRIP-MCNC: ABNORMAL MG/DL
PROTHROMBIN TIME: 12.6 SECONDS (ref 12.3–15)
RBC # BLD AUTO: 4.14 MILLION/UL (ref 3.81–5.12)
RBC #/AREA URNS AUTO: ABNORMAL /HPF
SODIUM SERPL-SCNC: 140 MMOL/L (ref 135–147)
SP GR UR STRIP.AUTO: >=1.05 (ref 1–1.03)
UROBILINOGEN UR STRIP-ACNC: 2 MG/DL
WBC # BLD AUTO: 11.1 THOUSAND/UL (ref 4.31–10.16)
WBC #/AREA URNS AUTO: ABNORMAL /HPF

## 2025-01-29 PROCEDURE — 74177 CT ABD & PELVIS W/CONTRAST: CPT

## 2025-01-29 PROCEDURE — 96361 HYDRATE IV INFUSION ADD-ON: CPT

## 2025-01-29 PROCEDURE — 71275 CT ANGIOGRAPHY CHEST: CPT

## 2025-01-29 PROCEDURE — 94640 AIRWAY INHALATION TREATMENT: CPT

## 2025-01-29 PROCEDURE — 93005 ELECTROCARDIOGRAM TRACING: CPT

## 2025-01-29 PROCEDURE — 83605 ASSAY OF LACTIC ACID: CPT | Performed by: EMERGENCY MEDICINE

## 2025-01-29 PROCEDURE — 87077 CULTURE AEROBIC IDENTIFY: CPT | Performed by: EMERGENCY MEDICINE

## 2025-01-29 PROCEDURE — 81001 URINALYSIS AUTO W/SCOPE: CPT | Performed by: EMERGENCY MEDICINE

## 2025-01-29 PROCEDURE — 85610 PROTHROMBIN TIME: CPT | Performed by: EMERGENCY MEDICINE

## 2025-01-29 PROCEDURE — 84484 ASSAY OF TROPONIN QUANT: CPT | Performed by: EMERGENCY MEDICINE

## 2025-01-29 PROCEDURE — 80048 BASIC METABOLIC PNL TOTAL CA: CPT | Performed by: EMERGENCY MEDICINE

## 2025-01-29 PROCEDURE — 99285 EMERGENCY DEPT VISIT HI MDM: CPT

## 2025-01-29 PROCEDURE — 85025 COMPLETE CBC W/AUTO DIFF WBC: CPT | Performed by: EMERGENCY MEDICINE

## 2025-01-29 PROCEDURE — 83690 ASSAY OF LIPASE: CPT | Performed by: EMERGENCY MEDICINE

## 2025-01-29 PROCEDURE — 96375 TX/PRO/DX INJ NEW DRUG ADDON: CPT

## 2025-01-29 PROCEDURE — 96374 THER/PROPH/DIAG INJ IV PUSH: CPT

## 2025-01-29 PROCEDURE — 80076 HEPATIC FUNCTION PANEL: CPT | Performed by: EMERGENCY MEDICINE

## 2025-01-29 PROCEDURE — 85730 THROMBOPLASTIN TIME PARTIAL: CPT | Performed by: EMERGENCY MEDICINE

## 2025-01-29 PROCEDURE — 87186 SC STD MICRODIL/AGAR DIL: CPT | Performed by: EMERGENCY MEDICINE

## 2025-01-29 PROCEDURE — 87086 URINE CULTURE/COLONY COUNT: CPT | Performed by: EMERGENCY MEDICINE

## 2025-01-29 PROCEDURE — 99285 EMERGENCY DEPT VISIT HI MDM: CPT | Performed by: EMERGENCY MEDICINE

## 2025-01-29 PROCEDURE — 83735 ASSAY OF MAGNESIUM: CPT | Performed by: EMERGENCY MEDICINE

## 2025-01-29 PROCEDURE — 36415 COLL VENOUS BLD VENIPUNCTURE: CPT | Performed by: EMERGENCY MEDICINE

## 2025-01-29 RX ORDER — MORPHINE SULFATE 10 MG/ML
6 INJECTION, SOLUTION INTRAMUSCULAR; INTRAVENOUS ONCE
Status: COMPLETED | OUTPATIENT
Start: 2025-01-29 | End: 2025-01-29

## 2025-01-29 RX ORDER — POTASSIUM CHLORIDE 1500 MG/1
40 TABLET, EXTENDED RELEASE ORAL ONCE
Status: COMPLETED | OUTPATIENT
Start: 2025-01-29 | End: 2025-01-29

## 2025-01-29 RX ORDER — LIDOCAINE 50 MG/G
1 PATCH TOPICAL ONCE
Status: DISCONTINUED | OUTPATIENT
Start: 2025-01-29 | End: 2025-01-30 | Stop reason: HOSPADM

## 2025-01-29 RX ORDER — ACETAMINOPHEN 325 MG/1
975 TABLET ORAL ONCE
Status: COMPLETED | OUTPATIENT
Start: 2025-01-29 | End: 2025-01-29

## 2025-01-29 RX ORDER — ONDANSETRON 4 MG/1
4 TABLET, ORALLY DISINTEGRATING ORAL EVERY 8 HOURS PRN
Qty: 12 TABLET | Refills: 0 | Status: SHIPPED | OUTPATIENT
Start: 2025-01-29

## 2025-01-29 RX ORDER — PREDNISONE 20 MG/1
TABLET ORAL
Qty: 18 TABLET | Refills: 0 | Status: SHIPPED | OUTPATIENT
Start: 2025-01-30 | End: 2025-02-08

## 2025-01-29 RX ORDER — KETOROLAC TROMETHAMINE 30 MG/ML
15 INJECTION, SOLUTION INTRAMUSCULAR; INTRAVENOUS ONCE
Status: COMPLETED | OUTPATIENT
Start: 2025-01-29 | End: 2025-01-29

## 2025-01-29 RX ORDER — METHYLPREDNISOLONE SODIUM SUCCINATE 125 MG/2ML
80 INJECTION, POWDER, LYOPHILIZED, FOR SOLUTION INTRAMUSCULAR; INTRAVENOUS ONCE
Status: COMPLETED | OUTPATIENT
Start: 2025-01-29 | End: 2025-01-29

## 2025-01-29 RX ORDER — DICYCLOMINE HCL 20 MG
20 TABLET ORAL EVERY 8 HOURS PRN
Qty: 12 TABLET | Refills: 0 | Status: SHIPPED | OUTPATIENT
Start: 2025-01-29 | End: 2025-02-04 | Stop reason: ALTCHOICE

## 2025-01-29 RX ORDER — OXYCODONE HYDROCHLORIDE 5 MG/1
5 TABLET ORAL ONCE
Refills: 0 | Status: COMPLETED | OUTPATIENT
Start: 2025-01-29 | End: 2025-01-29

## 2025-01-29 RX ORDER — ALBUTEROL SULFATE 5 MG/ML
5 SOLUTION RESPIRATORY (INHALATION) ONCE
Status: COMPLETED | OUTPATIENT
Start: 2025-01-29 | End: 2025-01-29

## 2025-01-29 RX ADMIN — LIDOCAINE 5% 1 PATCH: 700 PATCH TOPICAL at 19:27

## 2025-01-29 RX ADMIN — ALBUTEROL SULFATE 5 MG: 2.5 SOLUTION RESPIRATORY (INHALATION) at 19:29

## 2025-01-29 RX ADMIN — SODIUM CHLORIDE 1000 ML: 0.9 INJECTION, SOLUTION INTRAVENOUS at 19:27

## 2025-01-29 RX ADMIN — IPRATROPIUM BROMIDE 0.5 MG: 0.5 SOLUTION RESPIRATORY (INHALATION) at 19:29

## 2025-01-29 RX ADMIN — METHYLPREDNISOLONE SODIUM SUCCINATE 80 MG: 125 INJECTION, POWDER, FOR SOLUTION INTRAMUSCULAR; INTRAVENOUS at 19:28

## 2025-01-29 RX ADMIN — KETOROLAC TROMETHAMINE 15 MG: 30 INJECTION, SOLUTION INTRAMUSCULAR; INTRAVENOUS at 19:28

## 2025-01-29 RX ADMIN — ACETAMINOPHEN 975 MG: 325 TABLET, FILM COATED ORAL at 19:27

## 2025-01-29 RX ADMIN — OXYCODONE HYDROCHLORIDE 5 MG: 5 TABLET ORAL at 22:53

## 2025-01-29 RX ADMIN — POTASSIUM CHLORIDE 40 MEQ: 1500 TABLET, EXTENDED RELEASE ORAL at 20:18

## 2025-01-29 RX ADMIN — IOHEXOL 100 ML: 350 INJECTION, SOLUTION INTRAVENOUS at 19:41

## 2025-01-29 RX ADMIN — MORPHINE SULFATE 6 MG: 10 INJECTION INTRAVENOUS at 19:28

## 2025-01-30 LAB
ATRIAL RATE: 72 BPM
P AXIS: -14 DEGREES
PR INTERVAL: 144 MS
QRS AXIS: 8 DEGREES
QRSD INTERVAL: 88 MS
QT INTERVAL: 388 MS
QTC INTERVAL: 425 MS
T WAVE AXIS: 34 DEGREES
VENTRICULAR RATE: 72 BPM

## 2025-01-30 PROCEDURE — 93010 ELECTROCARDIOGRAM REPORT: CPT | Performed by: INTERNAL MEDICINE

## 2025-01-30 NOTE — ED PROVIDER NOTES
Time reflects when diagnosis was documented in both MDM as applicable and the Disposition within this note       Time User Action Codes Description Comment    1/29/2025 10:53 PM Katerin Almanzar [R10.9] Bilateral flank pain     1/29/2025 10:53 PM Katerin Almanzar Add [J44.1] COPD with acute exacerbation (HCC)     1/29/2025 10:53 PM Katerin Almanzar Add [R11.2,  R19.7] Nausea, vomiting, and diarrhea           ED Disposition       ED Disposition   Discharge    Condition   Stable    Date/Time   Wed Jan 29, 2025 10:53 PM    Comment   Nanci Navarro discharge to home/self care.                   Assessment & Plan       Medical Decision Making  59-year-old female with oxygen dependent COPD, fibromyalgia, chronic back pain, presents to the emergency department for worsening bilateral flank pain, initially starting on the left side but now radiates to the right side for the past 1 week.  Patient does report pain worse with urination.  She also reports subjective fevers and chills, nausea, vomiting, diarrhea for the past 1 week as well.  Patient had extensive workup including normal cardiac workup, normal D-dimer and unremarkable CT scan of the abdomen and pelvis on 1/26 at [facility].  She was treated clinically for possible UTI with Keflex but patient does not use any relief.  It appears she was also sent home with a short course of prednisone 40 mg daily for 4 days however she is still having significant wheezing so we will give albuterol/Atrovent treatment as well as Solu-Medrol in the ED now.  She was also given 4 tablets of Vicodin.    Differential diagnosis includes UTI or pyelonephritis, diverticulitis, nonspecific enteritis or colitis, pancreatitis, peptic ulcer disease, NSAID induced gastritis, lower lobe pneumonia, acute PE.  Patient did have normal D-dimer however at this point, given return visit, will obtain formal CTA chest as well as repeat CT abdomen and pelvis.  Will check cardiac and abdominal  labs, repeat UA and culture.  Will provide IV fluids, Tylenol, Toradol, morphine and lidocaine patch for pain relief.    Amount and/or Complexity of Data Reviewed  External Data Reviewed: labs, radiology, ECG and notes.     Details: Reviewed labs, relevant imaging studies and ED provider note from [facility] ED visit on 1/26/2025.  Labs: ordered. Decision-making details documented in ED Course.  Radiology: ordered and independent interpretation performed. Decision-making details documented in ED Course.  ECG/medicine tests: ordered and independent interpretation performed. Decision-making details documented in ED Course.    Risk  OTC drugs.  Prescription drug management.        ED Course as of 01/29/25 2301   Wed Jan 29, 2025 2008 hs TnI 0hr: 4   2009 LACTIC ACID(!): 2.2  Likely elevated from mild dehydration given the vomiting and diarrhea.   2010 GFR, Calculated: 70   2010 Creatinine: 0.90   2010 Potassium(!): 3.4  Will give PO potassium for replacement.    2228 LACTIC ACID: 1.9   2243 Leukocytes, UA: Negative   2243 Nitrite, UA: Negative   2243 Blood, UA: Negative   2250 Updated patient about unremarkable workup including essentially normal urinalysis, CT scan showing no acute PE or any significant findings in the abdomen and pelvis.  I did discuss incidental hepatomegaly and hepatic steatosis which patient states she has had for years.  Patient does have a nebulizer machine at home as well as albuterol solution.  She did finish the 4 days of prednisone previously prescribed and given that she was still wheezing on arrival here, will send home with additional course of prednisone.  Patient still having back pain.  Agreed to give her a one-time dose of oxycodone in the ED prior to discharge but advised her to take Motrin or Tylenol at home.  She has an appointment with her doctor tomorrow morning and I encouraged her to keep this appointment.  Discussed ED return parameters.       Medications    lidocaine (LIDODERM) 5 % patch 1 patch (1 patch Topical Medication Applied 1/29/25 1927)   sodium chloride 0.9 % bolus 1,000 mL (0 mL Intravenous Stopped 1/29/25 2241)   ketorolac (TORADOL) injection 15 mg (15 mg Intravenous Given 1/29/25 1928)   acetaminophen (TYLENOL) tablet 975 mg (975 mg Oral Given 1/29/25 1927)   morphine injection 6 mg (6 mg Intravenous Given 1/29/25 1928)   methylPREDNISolone sodium succinate (Solu-MEDROL) injection 80 mg (80 mg Intravenous Given 1/29/25 1928)   ipratropium (ATROVENT) 0.02 % inhalation solution 0.5 mg (0.5 mg Nebulization Given 1/29/25 1929)   albuterol inhalation solution 5 mg (5 mg Nebulization Given 1/29/25 1929)   iohexol (OMNIPAQUE) 350 MG/ML injection (MULTI-DOSE) 100 mL (100 mL Intravenous Given 1/29/25 1941)   potassium chloride (Klor-Con M20) CR tablet 40 mEq (40 mEq Oral Given 1/29/25 2018)   oxyCODONE (ROXICODONE) IR tablet 5 mg (5 mg Oral Given 1/29/25 2253)       ED Risk Strat Scores   HEART Risk Score      Flowsheet Row Most Recent Value   Heart Score Risk Calculator    History 0 Filed at: 01/29/2025 2024   ECG 0 Filed at: 01/29/2025 2024   Age 1 Filed at: 01/29/2025 2024   Risk Factors 2 Filed at: 01/29/2025 2024   Troponin 0 Filed at: 01/29/2025 2024   HEART Score 3 Filed at: 01/29/2025 2024          HEART Risk Score      Flowsheet Row Most Recent Value   Heart Score Risk Calculator    History 0 Filed at: 01/29/2025 2024   ECG 0 Filed at: 01/29/2025 2024   Age 1 Filed at: 01/29/2025 2024   Risk Factors 2 Filed at: 01/29/2025 2024   Troponin 0 Filed at: 01/29/2025 2024   HEART Score 3 Filed at: 01/29/2025 2024                            SBIRT 20yo+      Flowsheet Row Most Recent Value   Initial Alcohol Screen: US AUDIT-C     1. How often do you have a drink containing alcohol? 0 Filed at: 01/29/2025 2111   2. How many drinks containing alcohol do you have on a typical day you are drinking?  0 Filed at: 01/29/2025 2111   3b. FEMALE Any Age, or MALE 65+: How  often do you have 4 or more drinks on one occassion? 0 Filed at: 01/29/2025 2111   Audit-C Score 0 Filed at: 01/29/2025 2111   EULALIO: How many times in the past year have you...    Used an illegal drug or used a prescription medication for non-medical reasons? Never Filed at: 01/29/2025 2111                            History of Present Illness       Chief Complaint   Patient presents with    Flank Pain     Pt arrives c/o L flank pain which radiates to right flank. Pt reports fever and chills. Pt report being seen for same and prescribed antibiotics w/ no relief        Past Medical History:   Diagnosis Date    Achalasia     Acute respiratory failure (HCC) 01/15/2023    Acute respiratory failure with hypoxia (HCC) 02/07/2024    Back pain     Cancer (HCC)     Ovarian    Diabetes mellitus (HCC)     DVT (deep venous thrombosis) ([facility])     Electrolyte abnormality 01/14/2024    Fall 01/02/2023    Had a fall a week ago slipped in the mud onto her back.  X-rays with no fracture.  She has chronic L1 fracture.  She is on pain medication at home for chronic back pain.      Fibromyalgia     Hypertension     Lupus       Past Surgical History:   Procedure Laterality Date    HYSTERECTOMY      NECK SURGERY        History reviewed. No pertinent family history.   Social History     Tobacco Use    Smoking status: Every Day     Current packs/day: 0.50     Types: Cigarettes    Smokeless tobacco: Never   Vaping Use    Vaping status: Never Used   Substance Use Topics    Alcohol use: Never    Drug use: Never      E-Cigarette/Vaping    E-Cigarette Use Never User       E-Cigarette/Vaping Substances      I have reviewed and agree with the history as documented.     Patient is a 59-year-old female with past medical history of COPD on 3 L nasal cannula oxygen chronically, hypertension, fibromyalgia, diabetes, chronic back pain, achalasia, ovarian cancer status post total hysterectomy, diabetes, lupus, appendectomy, cholecystectomy, presents to  "the emergency department complaining of worsening flank pain.  Patient states that for the past 1 week she has had pain in her left flank that now radiates to the right flank.  She states that she is also been having subjective fevers and chills for the past 1 week as well as nausea, vomiting and diarrhea.  She states that anytime she tries to eat or drink, she will either vomit or have diarrhea.  She states the pain is now starting to also radiate into her bilateral upper abdomen.  She was evaluated at [facility] ED on Sunday, 1/26 for her symptoms and had extensive workup including troponin x 2 which was normal, normal D-dimer, normal lipase, normal BNP, urinalysis and culture as well as CT scan of the abdomen and pelvis.  According to records and care everywhere, urine culture showed minimal growth (<10,000 CFU/ml). CT abdomen and pelvis with IV contrast on 1/26 showed the following: \"1.   No evidence of small bowel obstruction.   2.   Mild colonic diverticulosis involving sigmoid colon. No radiographic signs   of associated inflammation.   3.   Fatty infiltration and enlargement/elongation of the liver.   4.   Splenomegaly.   5.   Left adrenal myelolipoma.\"    Patient states that this feels different than her normal chronic back pain which she states is all over her back.  She states she normally takes ibuprofen and she takes multiple tablets daily.  Denies any known history of peptic ulcer disease.  Patient does report that her flank pain seems to worsen when she urinates but denies any burning sensation with urination.  She does report increased urinary frequency but no hematuria.  She denies any blood per rectum or melena.  She states that today she did develop chest pain and is also been feeling more short of breath however she has had the shortness of breath within the past 1 week.  Patient states that she was sent home [facility] ED with prescription for Keflex for assumed UTI and she started taking " this on Sunday but has not had any relief of her symptoms.        History provided by:  Patient   used: No    Flank Pain  Associated symptoms: chest pain, chills, diarrhea, fatigue, fever, nausea, shortness of breath and vomiting    Associated symptoms: no constipation, no cough, no dysuria, no hematuria and no sore throat        Review of Systems   Constitutional:  Positive for chills, fatigue and fever.   HENT:  Negative for congestion, ear pain, rhinorrhea and sore throat.    Respiratory:  Positive for shortness of breath. Negative for cough, chest tightness and wheezing.    Cardiovascular:  Positive for chest pain. Negative for palpitations and leg swelling.   Gastrointestinal:  Positive for abdominal pain, diarrhea, nausea and vomiting. Negative for abdominal distention, blood in stool and constipation.   Genitourinary:  Positive for flank pain and frequency. Negative for dysuria, hematuria and pelvic pain.   Musculoskeletal:  Positive for back pain. Negative for neck pain and neck stiffness.   Skin:  Negative for color change, pallor, rash and wound.   Allergic/Immunologic: Negative for immunocompromised state.   Neurological:  Negative for dizziness, syncope, weakness, light-headedness, numbness and headaches.   Hematological:  Negative for adenopathy.   Psychiatric/Behavioral:  Negative for confusion and decreased concentration.    All other systems reviewed and are negative.          Objective       ED Triage Vitals   Temperature Pulse Blood Pressure Respirations SpO2 Patient Position - Orthostatic VS   01/29/25 1749 01/29/25 1749 01/29/25 1749 01/29/25 1749 01/29/25 1749 01/29/25 1749   98.2 °F (36.8 °C) 87 (!) 188/93 18 97 % Sitting      Temp Source Heart Rate Source BP Location FiO2 (%) Pain Score    01/29/25 1749 01/29/25 1749 01/29/25 1749 -- 01/29/25 1928    Oral Monitor Left arm  10 - Worst Possible Pain      Vitals      Date and Time Temp Pulse SpO2 Resp BP Pain Score FACES Pain  "Rating User   01/29/25 2253 -- -- -- -- -- 9 --    01/29/25 2231 98.1 °F (36.7 °C) 78 95 % 18 142/71 -- -- AM   01/29/25 2000 -- -- -- -- -- 6 -- AM   01/29/25 1928 -- -- -- -- -- 10 - Worst Possible Pain --    01/29/25 1749 98.2 °F (36.8 °C) 87 97 % 18 188/93 -- -- LA          Vitals:    01/29/25 1749 01/29/25 2231   BP: (!) 188/93 142/71   BP Location: Left arm Left arm   Pulse: 87 78   Resp: 18 18   Temp: 98.2 °F (36.8 °C) 98.1 °F (36.7 °C)   TempSrc: Oral    SpO2: 97% 95%   Weight: 98.9 kg (218 lb 0.6 oz)    Height: 4' 9\" (1.448 m)         Physical Exam  Vitals and nursing note reviewed.   Constitutional:       General: She is not in acute distress.     Appearance: Normal appearance. She is well-developed. She is obese. She is not ill-appearing, toxic-appearing or diaphoretic.   HENT:      Head: Normocephalic and atraumatic.      Right Ear: External ear normal.      Left Ear: External ear normal.      Nose: Nose normal.      Mouth/Throat:      Mouth: Mucous membranes are moist.      Pharynx: Oropharynx is clear.   Eyes:      Extraocular Movements: Extraocular movements intact.      Conjunctiva/sclera: Conjunctivae normal.   Neck:      Vascular: No JVD.   Cardiovascular:      Rate and Rhythm: Normal rate and regular rhythm.      Pulses: Normal pulses.      Heart sounds: Normal heart sounds. No murmur heard.     No friction rub. No gallop.   Pulmonary:      Effort: Pulmonary effort is normal. No respiratory distress.      Breath sounds: Wheezing present. No rhonchi or rales.      Comments: Patient has decreased air movement throughout with diffuse inspiratory and expiratory wheezing throughout all lung fields bilaterally.  No significant increased work of breathing or accessory muscle use.  Patient is on her normal 3 L nasal cannula oxygen and satting in the 90s.  Abdominal:      General: There is no distension.      Palpations: Abdomen is soft.      Tenderness: There is abdominal tenderness. There is no right " CVA tenderness, left CVA tenderness, guarding or rebound.      Comments: There is significant tenderness in the left lower quadrant.  Mild tenderness in the epigastrium and left upper quadrant.   Musculoskeletal:         General: No swelling or tenderness. Normal range of motion.      Cervical back: Normal range of motion and neck supple. No rigidity.   Skin:     General: Skin is warm and dry.      Coloration: Skin is not pale.      Findings: No erythema or rash.   Neurological:      General: No focal deficit present.      Mental Status: She is alert and oriented to person, place, and time.      Sensory: No sensory deficit.      Motor: No weakness.   Psychiatric:         Mood and Affect: Mood normal.         Behavior: Behavior normal.         Results Reviewed       Procedure Component Value Units Date/Time    Urine Microscopic [[id number]]  (Abnormal) Collected: 01/29/25 2204    Lab Status: Final result Specimen: Urine, Clean Catch Updated: 01/29/25 2244     RBC, UA 4-10 /hpf      WBC, UA None Seen /hpf      Epithelial Cells Occasional /hpf      Bacteria, UA Occasional /hpf      MUCUS THREADS Moderate     Hyaline Casts, UA 0-3 /lpf     UA (URINE) with reflex to Scope [[id number]]  (Abnormal) Collected: 01/29/25 2204    Lab Status: Final result Specimen: Urine, Clean Catch Updated: 01/29/25 2242     Color, UA Yellow     Clarity, UA Clear     Specific Gravity, UA >=1.050     pH, UA 7.0     Leukocytes, UA Negative     Nitrite, UA Negative     Protein, UA Trace mg/dl      Glucose, UA Negative mg/dl      Ketones, UA Negative mg/dl      Urobilinogen, UA 2.0 mg/dl      Bilirubin, UA Negative     Occult Blood, UA Negative    Lactic acid 2 Hours [[id number]]  (Normal) Collected: 01/29/25 2203    Lab Status: Final result Specimen: Blood from Arm, Right Updated: 01/29/25 2221     LACTIC ACID 1.9 mmol/L     Narrative:      Result may be elevated if tourniquet was used during collection.    Urine culture [[id number]] Collected:  01/29/25 2204    Lab Status: In process Specimen: Urine, Clean Catch Updated: 01/29/25 2219    Protime-INR [[id number]]  (Normal) Collected: 01/29/25 1926    Lab Status: Final result Specimen: Blood from Arm, Left Updated: 01/29/25 1959     Protime 12.6 seconds      INR 0.88    Narrative:      INR Therapeutic Range    Indication                                             INR Range      Atrial Fibrillation                                               2.0-3.0  Hypercoagulable State                                    2.0.2.3  Left Ventricular Asist Device                            2.0-3.0  Mechanical Heart Valve                                  -    Aortic(with afib, MI, embolism, HF, LA enlargement,    and/or coagulopathy)                                     2.0-3.0 (2.5-3.5)     Mitral                                                             2.5-3.5  Prosthetic/Bioprosthetic Heart Valve               2.0-3.0  Venous thromboembolism (VTE: VT, PE        2.0-3.0    APTT [[id number]]  (Abnormal) Collected: 01/29/25 1926    Lab Status: Final result Specimen: Blood from Arm, Left Updated: 01/29/25 1959     PTT 22 seconds     HS Troponin 0hr (reflex protocol) [[id number]]  (Normal) Collected: 01/29/25 1926    Lab Status: Final result Specimen: Blood from Arm, Left Updated: 01/29/25 1959     hs TnI 0hr 4 ng/L     Basic metabolic panel [[id number]]  (Abnormal) Collected: 01/29/25 1926    Lab Status: Final result Specimen: Blood from Arm, Left Updated: 01/29/25 1957     Sodium 140 mmol/L      Potassium 3.4 mmol/L      Chloride 97 mmol/L      CO2 35 mmol/L      ANION GAP 8 mmol/L      BUN 16 mg/dL      Creatinine 0.90 mg/dL      Glucose 196 mg/dL      Calcium 9.4 mg/dL      eGFR 70 ml/min/1.73sq m     Narrative:      National [employer] guidelines for Chronic Kidney Disease (CKD):     Stage 1 with normal or high GFR (GFR > 90 mL/min/1.73 square meters)    Stage 2 Mild CKD (GFR = 60-89 mL/min/1.73 square  meters)    Stage 3A Moderate CKD (GFR = 45-59 mL/min/1.73 square meters)    Stage 3B Moderate CKD (GFR = 30-44 mL/min/1.73 square meters)    Stage 4 Severe CKD (GFR = 15-29 mL/min/1.73 square meters)    Stage 5 End Stage CKD (GFR <15 mL/min/1.73 square meters)  Note: GFR calculation is accurate only with a steady state creatinine    Hepatic function panel [[id number]]  (Abnormal) Collected: 01/29/25 1926    Lab Status: Final result Specimen: Blood from Arm, Left Updated: 01/29/25 1957     Total Bilirubin 0.31 mg/dL      Bilirubin, Direct 0.02 mg/dL      Alkaline Phosphatase 135 U/L      AST 13 U/L      ALT 18 U/L      Total Protein 7.4 g/dL      Albumin 4.4 g/dL     Lipase [[id number]]  (Normal) Collected: 01/29/25 1926    Lab Status: Final result Specimen: Blood from Arm, Left Updated: 01/29/25 1957     Lipase 13 u/L     Magnesium [[id number]]  (Normal) Collected: 01/29/25 1926    Lab Status: Final result Specimen: Blood from Arm, Left Updated: 01/29/25 1957     Magnesium 2.1 mg/dL     Lactic acid, plasma (w/reflex if result > 2.0) [[id number]]  (Abnormal) Collected: 01/29/25 1926    Lab Status: Final result Specimen: Blood from Arm, Left Updated: 01/29/25 1954     LACTIC ACID 2.2 mmol/L     Narrative:      Result may be elevated if tourniquet was used during collection.    CBC and differential [[id number]]  (Abnormal) Collected: 01/29/25 1926    Lab Status: Final result Specimen: Blood from Arm, Left Updated: 01/29/25 1936     WBC 11.10 Thousand/uL      RBC 4.14 Million/uL      Hemoglobin 12.3 g/dL      Hematocrit 36.8 %      MCV 89 fL      MCH 29.7 pg      MCHC 33.4 g/dL      RDW 13.7 %      MPV 9.4 fL      Platelets 348 Thousands/uL      nRBC 0 /100 WBCs      Segmented % 77 %      Immature Grans % 2 %      Lymphocytes % 12 %      Monocytes % 9 %      Eosinophils Relative 0 %      Basophils Relative 0 %      Absolute Neutrophils 8.52 Thousands/µL      Absolute Immature Grans 0.23 Thousand/uL      Absolute Lymphocytes  1.35 Thousands/µL      Absolute Monocytes 0.95 Thousand/µL      Eosinophils Absolute 0.01 Thousand/µL      Basophils Absolute 0.04 Thousands/µL             CT pe study w abdomen pelvis w contrast   Final Interpretation by Clovis Bullard MD (01/29 2056)      No pulmonary embolism.      Scattered atelectasis in the lungs.      Hepatomegaly with steatosis.      No acute inflammatory process in the abdomen or pelvis.                     Workstation performed: [id number]             ECG 12 Lead Documentation Only    Date/Time: 1/29/2025 7:33 PM    Performed by: Katerin Almanzar DO  Authorized by: Katerin Almanzar DO    ECG reviewed by me, the ED Provider: yes    Patient location:  ED  Previous ECG:     Previous ECG:  Unavailable  Rate:     ECG rate:  72    ECG rate assessment: normal    Rhythm:     Rhythm: sinus rhythm    Ectopy:     Ectopy: none    QRS:     QRS axis:  Normal    QRS intervals:  Normal  Conduction:     Conduction: normal    ST segments:     ST segments:  Normal  T waves:     T waves: normal    Q waves:     Q waves:  III  Other findings:     Other findings: LVH        ED Medication and Procedure Management   Prior to Admission Medications   Prescriptions Last Dose Informant Patient Reported? Taking?   Diclofenac Sodium (VOLTAREN) 1 %  Self No No   Sig: Apply 2 g topically 4 (four) times a day   Patient not taking: Reported on 9/20/2024   Trelegy Ellipta 100-62.5-25 MCG/ACT inhaler  Self Yes No   Sig: Inhale 1 puff daily   Xarelto 2.5 MG tablet  Self Yes No   Sig: TAKE 1 TABLET (2.5 MG TOTAL) BY MOUTH 2 (TWO) TIMES A DAY WITH MEALS.   acetaminophen (TYLENOL) 325 mg tablet  Self No No   Sig: Take 2 tablets (650 mg total) by mouth every 6 (six) hours   albuterol (PROVENTIL HFA,VENTOLIN HFA) 90 mcg/act inhaler  Self No No   Sig: Inhale 2 puffs every 4 (four) hours as needed for wheezing   aluminum-magnesium hydroxide-simethicone (MAALOX MAX) 400-400-40 MG/5ML suspension   No No   Sig: Take 5 mL  by mouth every 6 (six) hours as needed for indigestion or heartburn   bisacodyl (DULCOLAX) 5 mg EC tablet  Self No No   Sig: Take 1 tablet (5 mg total) by mouth daily as needed for constipation   Patient not taking: Reported on 8/29/2024   cyanocobalamin (VITAMIN B-12) 500 MCG tablet  Self No No   Sig: Take 1 tablet (500 mcg total) by mouth daily   Patient not taking: Reported on 8/29/2024   dicyclomine (BENTYL) 20 mg tablet   No No   Sig: TAKE 1 TABLET (20 MG TOTAL) BY MOUTH EVERY 6 (SIX) HOURS   docusate sodium (COLACE) 100 mg capsule  Self No No   Sig: Take 1 capsule (100 mg total) by mouth 2 (two) times a day   Patient not taking: Reported on 8/29/2024   famotidine (PEPCID) 20 mg tablet   No No   Sig: Take 1 tablet (20 mg total) by mouth 2 (two) times a day   folic acid (FOLVITE) 400 mcg tablet  Self No No   Sig: Take 1 tablet (400 mcg total) by mouth daily   Patient not taking: Reported on 8/29/2024   furosemide (LASIX) 40 mg tablet  Self No No   Sig: Take 1 tablet (40 mg total) by mouth daily   guaiFENesin (MUCINEX) 600 mg 12 hr tablet  Self No No   Sig: Take 1 tablet (600 mg total) by mouth every 12 (twelve) hours   Patient not taking: Reported on 8/29/2024   ipratropium-albuterol (DUO-NEB) 0.5-2.5 mg/3 mL nebulizer solution  Self Yes No   lidocaine (LIDODERM) 5 %  Self Yes No   Sig: PLACE 1 PATCH ON THE SKIN EVERY 12 (TWELVE) HOURS FOR 10 DAYS. APPLY 1 PATCH TO THE SKIN FOR 12 HOURS THEN REMOVE FOR 12 HOURS   Patient not taking: Reported on 9/20/2024   magnesium cl-calcium carbonate (MAG-DELAY)  MG tablet  Self Yes No   Sig: Take by mouth daily   Patient not taking: Reported on 9/20/2024   meloxicam (MOBIC) 15 mg tablet  Self Yes No   Sig: TAKE 1 TABLETBY MOUTH EVERY DAY FOR 30 DAYS.   Patient not taking: Reported on 9/20/2024   metFORMIN (GLUCOPHAGE) 500 mg tablet   No No   Sig: TAKE 1 TABLET (500 MG TOTAL) BY MOUTH DAILY WITH BREAKFAST   methocarbamol (ROBAXIN) 500 mg tablet  Self No No   Sig: Take 1  tablet (500 mg total) by mouth 4 (four) times a day as needed for muscle spasms for up to 12 doses   Patient not taking: Reported on 9/20/2024   metoprolol succinate (TOPROL-XL) 25 mg 24 hr tablet  Self Yes No   Sig: Take 25 mg by mouth daily   metroNIDAZOLE (FLAGYL) 500 mg tablet  Self Yes No   Sig: TAKE 1 TABLET (500 MG TOTAL) BY MOUTH 3 (THREE) TIMES A DAY FOR 7 DAYS.   Patient not taking: Reported on 9/20/2024   nicotine (NICODERM CQ) 21 mg/24 hr TD 24 hr patch  Self No No   Sig: Place 1 patch on the skin over 24 hours every 24 hours   Patient not taking: Reported on 9/20/2024   nicotine polacrilex (NICORETTE) 2 mg gum  Self No No   Sig: Chew 1 each (2 mg total) every 2 (two) hours as needed for smoking cessation   Patient not taking: Reported on 8/29/2024   ondansetron (ZOFRAN) 4 mg tablet   No No   Sig: Take 1 tablet (4 mg total) by mouth every 8 (eight) hours as needed for nausea or vomiting   Patient not taking: Reported on 9/20/2024   pantoprazole (PROTONIX) 40 mg tablet   No No   Sig: Take 1 tablet (40 mg total) by mouth 2 (two) times a day before meals   potassium chloride (Klor-Con M20) 20 mEq tablet  Self Yes No   Sig: Take 20 mEq by mouth in the morning For 3 days/ PRN   Patient not taking: Reported on 9/20/2024   sertraline (ZOLOFT) 25 mg tablet  Self Yes No   Sig: Take 25 mg by mouth daily   sucralfate (CARAFATE) 1 g tablet   No No   Sig: Take 1 tablet (1 g total) by mouth 3 (three) times a day   sucralfate (CARAFATE) 1 g tablet   No No   Sig: Take 1 tablet (1 g total) by mouth 3 (three) times a day for 7 days      Facility-Administered Medications: None     Patient's Medications   Discharge Prescriptions    DICYCLOMINE (BENTYL) 20 MG TABLET    Take 1 tablet (20 mg total) by mouth every 8 (eight) hours as needed (abdominal cramping or diarrhea)       Start Date: 1/29/2025 End Date: --       Order Dose: 20 mg       Quantity: 12 tablet    Refills: 0    ONDANSETRON (ZOFRAN-ODT) 4 MG DISINTEGRATING  TABLET    Take 1 tablet (4 mg total) by mouth every 8 (eight) hours as needed for nausea or vomiting       Start Date: 1/29/2025 End Date: --       Order Dose: 4 mg       Quantity: 12 tablet    Refills: 0    PREDNISONE 20 MG TABLET    Take 3 tablets (60 mg total) by mouth daily for 3 days, THEN 2 tablets (40 mg total) daily for 3 days, THEN 1 tablet (20 mg total) daily for 3 days. Do not start before January 30, 2025.       Start Date: 1/30/2025 End Date: 2/8/2025       Order Dose: --       Quantity: 18 tablet    Refills: 0     No discharge procedures on file.  ED SEPSIS DOCUMENTATION   Time reflects when diagnosis was documented in both MDM as applicable and the Disposition within this note       Time User Action Codes Description Comment    1/29/2025 10:53 PM Katerin Almanzar [R10.9] Bilateral flank pain     1/29/2025 10:53 PM Katerin Almanzar [J44.1] COPD with acute exacerbation (HCC)     1/29/2025 10:53 PM Katerin Almanzar [R11.2,  R19.7] Nausea, vomiting, and diarrhea                  Katerin Almanzar,   01/29/25 8102

## 2025-01-31 ENCOUNTER — APPOINTMENT (EMERGENCY)
Dept: CT IMAGING | Facility: HOSPITAL | Age: 60
End: 2025-01-31
Payer: COMMERCIAL

## 2025-01-31 ENCOUNTER — NURSE TRIAGE (OUTPATIENT)
Age: 60
End: 2025-01-31

## 2025-01-31 ENCOUNTER — HOSPITAL ENCOUNTER (EMERGENCY)
Facility: HOSPITAL | Age: 60
Discharge: HOME | End: 2025-02-01
Attending: EMERGENCY MEDICINE | Admitting: EMERGENCY MEDICINE
Payer: COMMERCIAL

## 2025-01-31 DIAGNOSIS — M54.9 BACK PAIN: Primary | ICD-10-CM

## 2025-01-31 DIAGNOSIS — K29.70 GASTRITIS: ICD-10-CM

## 2025-01-31 LAB
ALBUMIN SERPL BCG-MCNC: 4 G/DL (ref 3.5–5)
ALP SERPL-CCNC: 137 U/L (ref 34–104)
ALT SERPL W P-5'-P-CCNC: 17 U/L (ref 7–52)
ANION GAP SERPL CALCULATED.3IONS-SCNC: 13 MMOL/L (ref 4–13)
AST SERPL W P-5'-P-CCNC: 10 U/L (ref 13–39)
BILIRUB SERPL-MCNC: 0.38 MG/DL (ref 0.2–1)
BUN SERPL-MCNC: 21 MG/DL (ref 5–25)
CALCIUM SERPL-MCNC: 8.9 MG/DL (ref 8.4–10.2)
CHLORIDE SERPL-SCNC: 95 MMOL/L (ref 96–108)
CO2 SERPL-SCNC: 29 MMOL/L (ref 21–32)
CREAT SERPL-MCNC: 0.94 MG/DL (ref 0.6–1.3)
ERYTHROCYTE [DISTWIDTH] IN BLOOD BY AUTOMATED COUNT: 14.1 % (ref 11.6–15.1)
GFR SERPL CREATININE-BSD FRML MDRD: 66 ML/MIN/1.73SQ M
GLUCOSE SERPL-MCNC: 419 MG/DL (ref 65–140)
HCT VFR BLD AUTO: 38.8 % (ref 34.8–46.1)
HGB BLD-MCNC: 12.7 G/DL (ref 11.5–15.4)
LIPASE SERPL-CCNC: 16 U/L (ref 11–82)
LYMPHOCYTES # BLD AUTO: 4 % (ref 14–44)
MCH RBC QN AUTO: 29.2 PG (ref 26.8–34.3)
MCHC RBC AUTO-ENTMCNC: 32.7 G/DL (ref 31.4–37.4)
MCV RBC AUTO: 89 FL (ref 82–98)
MONOCYTES NFR BLD: 1 % (ref 4–12)
NEUTS BAND NFR BLD MANUAL: 2 % (ref 0–8)
NEUTS SEG NFR BLD AUTO: 93 % (ref 43–75)
PLATELET # BLD AUTO: 359 THOUSANDS/UL (ref 149–390)
PLATELET BLD QL SMEAR: ADEQUATE
PMV BLD AUTO: 9 FL (ref 8.9–12.7)
POTASSIUM SERPL-SCNC: 3.3 MMOL/L (ref 3.5–5.3)
PROT SERPL-MCNC: 6.4 G/DL (ref 6.4–8.4)
RBC # BLD AUTO: 4.35 MILLION/UL (ref 3.81–5.12)
SODIUM SERPL-SCNC: 137 MMOL/L (ref 135–147)
WBC # BLD AUTO: 13.21 THOUSAND/UL (ref 4.31–10.16)

## 2025-01-31 PROCEDURE — 85027 COMPLETE CBC AUTOMATED: CPT | Performed by: EMERGENCY MEDICINE

## 2025-01-31 PROCEDURE — 81001 URINALYSIS AUTO W/SCOPE: CPT | Performed by: EMERGENCY MEDICINE

## 2025-01-31 PROCEDURE — 85007 BL SMEAR W/DIFF WBC COUNT: CPT | Performed by: EMERGENCY MEDICINE

## 2025-01-31 PROCEDURE — 80053 COMPREHEN METABOLIC PANEL: CPT | Performed by: EMERGENCY MEDICINE

## 2025-01-31 PROCEDURE — 99284 EMERGENCY DEPT VISIT MOD MDM: CPT

## 2025-01-31 PROCEDURE — 96375 TX/PRO/DX INJ NEW DRUG ADDON: CPT

## 2025-01-31 PROCEDURE — 36415 COLL VENOUS BLD VENIPUNCTURE: CPT | Performed by: EMERGENCY MEDICINE

## 2025-01-31 PROCEDURE — 93005 ELECTROCARDIOGRAM TRACING: CPT

## 2025-01-31 PROCEDURE — 74177 CT ABD & PELVIS W/CONTRAST: CPT

## 2025-01-31 PROCEDURE — 96374 THER/PROPH/DIAG INJ IV PUSH: CPT

## 2025-01-31 PROCEDURE — 83690 ASSAY OF LIPASE: CPT | Performed by: EMERGENCY MEDICINE

## 2025-01-31 RX ORDER — ONDANSETRON 2 MG/ML
4 INJECTION INTRAMUSCULAR; INTRAVENOUS ONCE
Status: COMPLETED | OUTPATIENT
Start: 2025-01-31 | End: 2025-01-31

## 2025-01-31 RX ORDER — ONDANSETRON HYDROCHLORIDE 4 MG/5ML
4 SOLUTION ORAL ONCE
Status: DISCONTINUED | OUTPATIENT
Start: 2025-01-31 | End: 2025-01-31

## 2025-01-31 RX ORDER — MORPHINE SULFATE 4 MG/ML
4 INJECTION, SOLUTION INTRAMUSCULAR; INTRAVENOUS ONCE
Status: COMPLETED | OUTPATIENT
Start: 2025-01-31 | End: 2025-01-31

## 2025-01-31 RX ADMIN — MORPHINE SULFATE 4 MG: 4 INJECTION INTRAVENOUS at 22:49

## 2025-01-31 RX ADMIN — IOHEXOL 100 ML: 350 INJECTION, SOLUTION INTRAVENOUS at 22:54

## 2025-01-31 RX ADMIN — ONDANSETRON 4 MG: 2 INJECTION INTRAMUSCULAR; INTRAVENOUS at 23:31

## 2025-01-31 NOTE — TELEPHONE ENCOUNTER
"Patient calling about ongoing severe flank pain, vomiting, diarrhea, and headache. States that \"the antibiotic ain't working\". I'm in \"a ball\". Patient states she does not want to go back to the ED. Patient vacillating between crying and frustrated not crying. Patient states that the pain is bilateral lower back. 10/10 and going on for 2 weeks. Patient says no one is helping her. Patient does not want to go back to ED. Offered OV a few times and she declined stating that she has no ride.     After discussion patient willing to go to ED via ambulance.Will send this note high priority to the office so provider is aware.    Reason for Disposition   SEVERE pain (e.g., excruciating, scale 8-10) and present > 1 hour    Answer Assessment - Initial Assessment Questions  1. LOCATION: \"Where does it hurt?\" (e.g., left, right)      Bilateral flank area  2. ONSET: \"When did the pain start?\"      2 weeks ago  3. SEVERITY: \"How bad is the pain?\" (e.g., Scale 1-10; mild, moderate, or severe)      10/10  4. PATTERN: \"Does the pain come and go, or is it constant?\"       Constant  5. CAUSE: \"What do you think is causing the pain?\"      Unsure  6. OTHER SYMPTOMS:  \"Do you have any other symptoms?\" (e.g., fever, abdomen pain, vomiting, leg weakness, burning with urination, blood in urine)      Vomiting, diarrhea, headaches, and back pain    Protocols used: Flank Pain-Adult-[place]    "

## 2025-02-01 VITALS
WEIGHT: 218 LBS | HEART RATE: 78 BPM | TEMPERATURE: 98.1 F | OXYGEN SATURATION: 96 % | RESPIRATION RATE: 16 BRPM | SYSTOLIC BLOOD PRESSURE: 146 MMHG | BODY MASS INDEX: 47.03 KG/M2 | HEIGHT: 57 IN | DIASTOLIC BLOOD PRESSURE: 74 MMHG

## 2025-02-01 LAB
ATRIAL RATE: 95 BPM
BACTERIA UR CULT: ABNORMAL
BACTERIA UR CULT: ABNORMAL
BACTERIA UR QL AUTO: ABNORMAL /HPF
BILIRUB UR QL STRIP: NEGATIVE
CLARITY UR: CLEAR
COLOR UR: ABNORMAL
GLUCOSE UR STRIP-MCNC: ABNORMAL MG/DL
HGB UR QL STRIP.AUTO: NEGATIVE
KETONES UR STRIP-MCNC: NEGATIVE MG/DL
LEUKOCYTE ESTERASE UR QL STRIP: NEGATIVE
NITRITE UR QL STRIP: NEGATIVE
NON-SQ EPI CELLS URNS QL MICRO: ABNORMAL /HPF
P AXIS: 83 DEGREES
PH UR STRIP.AUTO: 6.5 [PH]
PR INTERVAL: 138 MS
PROT UR STRIP-MCNC: ABNORMAL MG/DL
QRS AXIS: 38 DEGREES
QRSD INTERVAL: 82 MS
QT INTERVAL: 364 MS
QTC INTERVAL: 457 MS
RBC #/AREA URNS AUTO: ABNORMAL /HPF
SP GR UR STRIP.AUTO: >=1.05 (ref 1–1.03)
T WAVE AXIS: 70 DEGREES
UROBILINOGEN UR STRIP-ACNC: 2 MG/DL
VENTRICULAR RATE: 95 BPM
WBC #/AREA URNS AUTO: ABNORMAL /HPF

## 2025-02-01 PROCEDURE — 93010 ELECTROCARDIOGRAM REPORT: CPT | Performed by: INTERNAL MEDICINE

## 2025-02-01 PROCEDURE — 96376 TX/PRO/DX INJ SAME DRUG ADON: CPT

## 2025-02-01 PROCEDURE — 99284 EMERGENCY DEPT VISIT MOD MDM: CPT | Performed by: EMERGENCY MEDICINE

## 2025-02-01 RX ORDER — MORPHINE SULFATE 4 MG/ML
4 INJECTION, SOLUTION INTRAMUSCULAR; INTRAVENOUS ONCE
Status: COMPLETED | OUTPATIENT
Start: 2025-02-01 | End: 2025-02-01

## 2025-02-01 RX ORDER — FAMOTIDINE 20 MG/1
20 TABLET, FILM COATED ORAL ONCE
Status: COMPLETED | OUTPATIENT
Start: 2025-02-01 | End: 2025-02-01

## 2025-02-01 RX ORDER — FAMOTIDINE 20 MG/1
20 TABLET, FILM COATED ORAL 2 TIMES DAILY
Qty: 30 TABLET | Refills: 0 | Status: SHIPPED | OUTPATIENT
Start: 2025-02-01

## 2025-02-01 RX ADMIN — MORPHINE SULFATE 4 MG: 4 INJECTION INTRAVENOUS at 00:08

## 2025-02-01 RX ADMIN — FAMOTIDINE 20 MG: 20 TABLET, FILM COATED ORAL at 01:58

## 2025-02-03 NOTE — ED PROVIDER NOTES
Time reflects when diagnosis was documented in both MDM as applicable and the Disposition within this note       Time User Action Codes Description Comment    2/1/2025  1:43 AM Wiliam Brandt [M54.9] Back pain     2/1/2025  1:44 AM Wiliam Brandt [K29.70] Gastritis           ED Disposition       ED Disposition   Discharge    Condition   Stable    Date/Time   Sat Feb 1, 2025  1:43 AM    Comment   Nanci CHAYO Ramon discharge to home/self care.                   Assessment & Plan       Medical Decision Making  58 yo f with back/flank pain, will check labs, ct, reassess/     Amount and/or Complexity of Data Reviewed  Labs: ordered.  Radiology: ordered.    Risk  Prescription drug management.             Medications   iohexol (OMNIPAQUE) 350 MG/ML injection (MULTI-DOSE) 100 mL (100 mL Intravenous Given 1/31/25 2254)   morphine injection 4 mg (4 mg Intravenous Given 1/31/25 2249)   ondansetron (ZOFRAN) injection 4 mg (4 mg Intravenous Given 1/31/25 2331)   morphine injection 4 mg (4 mg Intravenous Given 2/1/25 0008)   famotidine (PEPCID) tablet 20 mg (20 mg Oral Given 2/1/25 0158)       ED Risk Strat Scores                          SBIRT 22yo+      Flowsheet Row Most Recent Value   Initial Alcohol Screen: US AUDIT-C     1. How often do you have a drink containing alcohol? 0 Filed at: 02/01/2025 0205   2. How many drinks containing alcohol do you have on a typical day you are drinking?  0 Filed at: 02/01/2025 0205   Audit-C Score 0 Filed at: 02/01/2025 0205   EULALIO: How many times in the past year have you...    Used an illegal drug or used a prescription medication for non-medical reasons? Never Filed at: 02/01/2025 0205                            History of Present Illness       Chief Complaint   Patient presents with    Flank Pain     Pt c/o of left flank pain that radiates to the right x 2 weeks . Pt +n/v/d x 1 week.       Past Medical History:   Diagnosis Date    Achalasia     Acute respiratory failure (HCC)  01/15/2023    Acute respiratory failure with hypoxia (HCC) 02/07/2024    Back pain     Cancer (HCC)     Ovarian    Diabetes mellitus (HCC)     DVT (deep venous thrombosis) ([facility])     Electrolyte abnormality 01/14/2024    Fall 01/02/2023    Had a fall a week ago slipped in the mud onto her back.  X-rays with no fracture.  She has chronic L1 fracture.  She is on pain medication at home for chronic back pain.      Fibromyalgia     Hypertension     Lupus       Past Surgical History:   Procedure Laterality Date    HYSTERECTOMY      NECK SURGERY        History reviewed. No pertinent family history.   Social History     Tobacco Use    Smoking status: Every Day     Current packs/day: 0.50     Types: Cigarettes    Smokeless tobacco: Never   Vaping Use    Vaping status: Never Used   Substance Use Topics    Alcohol use: Never    Drug use: Never      E-Cigarette/Vaping    E-Cigarette Use Never User       E-Cigarette/Vaping Substances      I have reviewed and agree with the history as documented.     58 yo f presenting to the emergency department for eval of back pain. Pt has chronic compression fx  but states this feel more like flank pain.  No urinary sx, no fevers, no numbness or weakness.        Review of Systems   Constitutional:  Negative for appetite change, chills, fatigue and fever.   HENT:  Negative for sneezing and sore throat.    Eyes:  Negative for visual disturbance.   Respiratory:  Negative for cough, choking, chest tightness, shortness of breath and wheezing.    Cardiovascular:  Negative for chest pain and palpitations.   Gastrointestinal:  Negative for abdominal pain, constipation, diarrhea, nausea and vomiting.   Genitourinary:  Positive for flank pain. Negative for difficulty urinating and dysuria.   Musculoskeletal:  Positive for back pain.   Neurological:  Negative for dizziness, weakness, light-headedness, numbness and headaches.   All other systems reviewed and are negative.          Objective       ED  Triage Vitals   Temperature Pulse Blood Pressure Respirations SpO2 Patient Position - Orthostatic VS   01/31/25 2019 01/31/25 2019 01/31/25 2019 01/31/25 2019 01/31/25 2019 01/31/25 2019   98.2 °F (36.8 °C) 98 (!) 192/74 18 95 % Sitting      Temp Source Heart Rate Source BP Location FiO2 (%) Pain Score    01/31/25 2016 01/31/25 2016 01/31/25 2016 -- 02/01/25 0008    Oral Monitor Left arm  8      Vitals      Date and Time Temp Pulse SpO2 Resp BP Pain Score FACES Pain Rating User   02/01/25 0030 -- 78 96 % 16 146/74 -- -- CZ   02/01/25 0008 -- -- -- -- -- 8 -- CZ   01/31/25 2231 98.1 °F (36.7 °C) 84 98 % 18 160/72 -- -- ML   01/31/25 2019 98.2 °F (36.8 °C) 98 95 % 18 192/74 -- -- LA            Physical Exam  Vitals and nursing note reviewed.   Constitutional:       General: She is not in acute distress.     Appearance: She is well-developed. She is not diaphoretic.   HENT:      Head: Normocephalic and atraumatic.   Eyes:      Pupils: Pupils are equal, round, and reactive to light.   Neck:      Vascular: No JVD.      Trachea: No tracheal deviation.   Cardiovascular:      Rate and Rhythm: Normal rate and regular rhythm.      Heart sounds: Normal heart sounds. No murmur heard.     No friction rub. No gallop.   Pulmonary:      Effort: Pulmonary effort is normal. No respiratory distress.      Breath sounds: Normal breath sounds. No wheezing or rales.   Abdominal:      General: Bowel sounds are normal. There is no distension.      Palpations: Abdomen is soft.      Tenderness: There is no abdominal tenderness. There is no guarding or rebound.   Skin:     General: Skin is warm and dry.      Coloration: Skin is not pale.   Neurological:      Mental Status: She is alert and oriented to person, place, and time.      Cranial Nerves: No cranial nerve deficit.      Motor: No abnormal muscle tone.   Psychiatric:         Behavior: Behavior normal.         Results Reviewed       Procedure Component Value Units Date/Time    Urine  Microscopic [[id number]]  (Abnormal) Collected: 01/31/25 2341    Lab Status: Final result Specimen: Urine, Clean Catch Updated: 02/01/25 0006     RBC, UA 4-10 /hpf      WBC, UA 1-2 /hpf      Epithelial Cells Occasional /hpf      Bacteria, UA Occasional /hpf     UA w Reflex to Microscopic w Reflex to Culture [[id number]]  (Abnormal) Collected: 01/31/25 2341    Lab Status: Final result Specimen: Urine, Clean Catch Updated: 02/01/25 0006     Color, UA Light Yellow     Clarity, UA Clear     Specific Gravity, UA >=1.050     pH, UA 6.5     Leukocytes, UA Negative     Nitrite, UA Negative     Protein, UA Trace mg/dl      Glucose,  (1/2%) mg/dl      Ketones, UA Negative mg/dl      Urobilinogen, UA 2.0 mg/dl      Bilirubin, UA Negative     Occult Blood, UA Negative    Manual Differential(PHLEBS Do Not Order) [[id number]]  (Abnormal) Collected: 01/31/25 2114    Lab Status: Final result Specimen: Blood from Arm, Left Updated: 01/31/25 2218     Segmented % 93 %      Bands % 2 %      Lymphocytes % 4 %      Monocytes % 1 %      Total Counted --     Platelet Estimate Adequate    Comprehensive metabolic panel [[id number]]  (Abnormal) Collected: 01/31/25 2114    Lab Status: Final result Specimen: Blood from Arm, Left Updated: 01/31/25 2148     Sodium 137 mmol/L      Potassium 3.3 mmol/L      Chloride 95 mmol/L      CO2 29 mmol/L      ANION GAP 13 mmol/L      BUN 21 mg/dL      Creatinine 0.94 mg/dL      Glucose 419 mg/dL      Calcium 8.9 mg/dL      AST 10 U/L      ALT 17 U/L      Alkaline Phosphatase 137 U/L      Total Protein 6.4 g/dL      Albumin 4.0 g/dL      Total Bilirubin 0.38 mg/dL      eGFR 66 ml/min/1.73sq m     Narrative:      [employer] guidelines for Chronic Kidney Disease (CKD):     Stage 1 with normal or high GFR (GFR > 90 mL/min/1.73 square meters)    Stage 2 Mild CKD (GFR = 60-89 mL/min/1.73 square meters)    Stage 3A Moderate CKD (GFR = 45-59 mL/min/1.73 square meters)    Stage 3B Moderate  CKD (GFR = 30-44 mL/min/1.73 square meters)    Stage 4 Severe CKD (GFR = 15-29 mL/min/1.73 square meters)    Stage 5 End Stage CKD (GFR <15 mL/min/1.73 square meters)  Note: GFR calculation is accurate only with a steady state creatinine    CBC and differential [[id number]]  (Abnormal) Collected: 01/31/25 2114    Lab Status: Final result Specimen: Blood from Arm, Left Updated: 01/31/25 2146     WBC 13.21 Thousand/uL      RBC 4.35 Million/uL      Hemoglobin 12.7 g/dL      Hematocrit 38.8 %      MCV 89 fL      MCH 29.2 pg      MCHC 32.7 g/dL      RDW 14.1 %      MPV 9.0 fL      Platelets 359 Thousands/uL     Narrative:      This is an appended report.  These results have been appended to a previously verified report.    Lipase [[id number]]  (Normal) Collected: 01/31/25 2114    Lab Status: Final result Specimen: Blood from Arm, Left Updated: 01/31/25 2136     Lipase 16 u/L             CT abdomen pelvis with contrast   Final Interpretation by Mariajose Crum MD (02/01 0737)      The wall of the visualized distal esophagus and GE junction appears thickened. Clinical correlation and follow-up recommended.      Hepatomegaly. Hepatic steatosis.      Borderline splenomegaly.      Colonic diverticulosis without evidence of acute diverticulitis. No bowel obstruction.      Other nonemergent and chronic findings as above.      The major findings are in agreement with the preliminary report provided by [facility] which was provided shortly after completion of the exam.            Workstation performed: [id number]             Procedures    ED Medication and Procedure Management   Prior to Admission Medications   Prescriptions Last Dose Informant Patient Reported? Taking?   Diclofenac Sodium (VOLTAREN) 1 %  Self No No   Sig: Apply 2 g topically 4 (four) times a day   Patient not taking: Reported on 9/20/2024   Trelegy Ellipta 100-62.5-25 MCG/ACT inhaler  Self Yes No   Sig: Inhale 1 puff daily   Xarelto 2.5 MG tablet  Self  Yes No   Sig: TAKE 1 TABLET (2.5 MG TOTAL) BY MOUTH 2 (TWO) TIMES A DAY WITH MEALS.   acetaminophen (TYLENOL) 325 mg tablet  Self No No   Sig: Take 2 tablets (650 mg total) by mouth every 6 (six) hours   albuterol (PROVENTIL HFA,VENTOLIN HFA) 90 mcg/act inhaler  Self No No   Sig: Inhale 2 puffs every 4 (four) hours as needed for wheezing   aluminum-magnesium hydroxide-simethicone (MAALOX MAX) 400-400-40 MG/5ML suspension   No No   Sig: Take 5 mL by mouth every 6 (six) hours as needed for indigestion or heartburn   bisacodyl (DULCOLAX) 5 mg EC tablet  Self No No   Sig: Take 1 tablet (5 mg total) by mouth daily as needed for constipation   Patient not taking: Reported on 8/29/2024   cyanocobalamin (VITAMIN B-12) 500 MCG tablet  Self No No   Sig: Take 1 tablet (500 mcg total) by mouth daily   Patient not taking: Reported on 8/29/2024   dicyclomine (BENTYL) 20 mg tablet   No No   Sig: TAKE 1 TABLET (20 MG TOTAL) BY MOUTH EVERY 6 (SIX) HOURS   dicyclomine (BENTYL) 20 mg tablet   No No   Sig: Take 1 tablet (20 mg total) by mouth every 8 (eight) hours as needed (abdominal cramping or diarrhea)   docusate sodium (COLACE) 100 mg capsule  Self No No   Sig: Take 1 capsule (100 mg total) by mouth 2 (two) times a day   Patient not taking: Reported on 8/29/2024   famotidine (PEPCID) 20 mg tablet   No No   Sig: Take 1 tablet (20 mg total) by mouth 2 (two) times a day   folic acid (FOLVITE) 400 mcg tablet  Self No No   Sig: Take 1 tablet (400 mcg total) by mouth daily   Patient not taking: Reported on 8/29/2024   furosemide (LASIX) 40 mg tablet  Self No No   Sig: Take 1 tablet (40 mg total) by mouth daily   guaiFENesin (MUCINEX) 600 mg 12 hr tablet  Self No No   Sig: Take 1 tablet (600 mg total) by mouth every 12 (twelve) hours   Patient not taking: Reported on 8/29/2024   ipratropium-albuterol (DUO-NEB) 0.5-2.5 mg/3 mL nebulizer solution  Self Yes No   lidocaine (LIDODERM) 5 %  Self Yes No   Sig: PLACE 1 PATCH ON THE SKIN EVERY 12  (TWELVE) HOURS FOR 10 DAYS. APPLY 1 PATCH TO THE SKIN FOR 12 HOURS THEN REMOVE FOR 12 HOURS   Patient not taking: Reported on 9/20/2024   magnesium cl-calcium carbonate (MAG-DELAY)  MG tablet  Self Yes No   Sig: Take by mouth daily   Patient not taking: Reported on 9/20/2024   meloxicam (MOBIC) 15 mg tablet  Self Yes No   Sig: TAKE 1 TABLETBY MOUTH EVERY DAY FOR 30 DAYS.   Patient not taking: Reported on 9/20/2024   metFORMIN (GLUCOPHAGE) 500 mg tablet   No No   Sig: TAKE 1 TABLET (500 MG TOTAL) BY MOUTH DAILY WITH BREAKFAST   methocarbamol (ROBAXIN) 500 mg tablet  Self No No   Sig: Take 1 tablet (500 mg total) by mouth 4 (four) times a day as needed for muscle spasms for up to 12 doses   Patient not taking: Reported on 9/20/2024   metoprolol succinate (TOPROL-XL) 25 mg 24 hr tablet  Self Yes No   Sig: Take 25 mg by mouth daily   metroNIDAZOLE (FLAGYL) 500 mg tablet  Self Yes No   Sig: TAKE 1 TABLET (500 MG TOTAL) BY MOUTH 3 (THREE) TIMES A DAY FOR 7 DAYS.   Patient not taking: Reported on 9/20/2024   nicotine (NICODERM CQ) 21 mg/24 hr TD 24 hr patch  Self No No   Sig: Place 1 patch on the skin over 24 hours every 24 hours   Patient not taking: Reported on 9/20/2024   nicotine polacrilex (NICORETTE) 2 mg gum  Self No No   Sig: Chew 1 each (2 mg total) every 2 (two) hours as needed for smoking cessation   Patient not taking: Reported on 8/29/2024   ondansetron (ZOFRAN) 4 mg tablet   No No   Sig: Take 1 tablet (4 mg total) by mouth every 8 (eight) hours as needed for nausea or vomiting   Patient not taking: Reported on 9/20/2024   ondansetron (ZOFRAN-ODT) 4 mg disintegrating tablet   No No   Sig: Take 1 tablet (4 mg total) by mouth every 8 (eight) hours as needed for nausea or vomiting   pantoprazole (PROTONIX) 40 mg tablet   No No   Sig: Take 1 tablet (40 mg total) by mouth 2 (two) times a day before meals   potassium chloride (Klor-Con M20) 20 mEq tablet  Self Yes No   Sig: Take 20 mEq by mouth in the morning  For 3 days/ PRN   Patient not taking: Reported on 9/20/2024   predniSONE 20 mg tablet   No No   Sig: Take 3 tablets (60 mg total) by mouth daily for 3 days, THEN 2 tablets (40 mg total) daily for 3 days, THEN 1 tablet (20 mg total) daily for 3 days. Do not start before January 30, 2025.   sertraline (ZOLOFT) 25 mg tablet  Self Yes No   Sig: Take 25 mg by mouth daily   sucralfate (CARAFATE) 1 g tablet   No No   Sig: Take 1 tablet (1 g total) by mouth 3 (three) times a day   sucralfate (CARAFATE) 1 g tablet   No No   Sig: Take 1 tablet (1 g total) by mouth 3 (three) times a day for 7 days      Facility-Administered Medications: None     Discharge Medication List as of 2/1/2025  1:48 AM        START taking these medications    Details   !! famotidine (PEPCID) 20 mg tablet Take 1 tablet (20 mg total) by mouth 2 (two) times a day, Starting Sat 2/1/2025, Normal       !! - Potential duplicate medications found. Please discuss with provider.        CONTINUE these medications which have NOT CHANGED    Details   acetaminophen (TYLENOL) 325 mg tablet Take 2 tablets (650 mg total) by mouth every 6 (six) hours, Starting Wed 1/17/2024, No Print      albuterol (PROVENTIL HFA,VENTOLIN HFA) 90 mcg/act inhaler Inhale 2 puffs every 4 (four) hours as needed for wheezing, Starting Sat 10/8/2022, Normal      aluminum-magnesium hydroxide-simethicone (MAALOX MAX) 400-400-40 MG/5ML suspension Take 5 mL by mouth every 6 (six) hours as needed for indigestion or heartburn, Starting Tue 9/17/2024, Normal      bisacodyl (DULCOLAX) 5 mg EC tablet Take 1 tablet (5 mg total) by mouth daily as needed for constipation, Starting Wed 11/29/2023, Normal      cyanocobalamin (VITAMIN B-12) 500 MCG tablet Take 1 tablet (500 mcg total) by mouth daily, Starting Thu 2/8/2024, Normal      Diclofenac Sodium (VOLTAREN) 1 % Apply 2 g topically 4 (four) times a day, Starting Wed 11/29/2023, Normal      !! dicyclomine (BENTYL) 20 mg tablet TAKE 1 TABLET (20 MG  TOTAL) BY MOUTH EVERY 6 (SIX) HOURS, Starting Mon 10/21/2024, Normal      !! dicyclomine (BENTYL) 20 mg tablet Take 1 tablet (20 mg total) by mouth every 8 (eight) hours as needed (abdominal cramping or diarrhea), Starting Wed 1/29/2025, Normal      docusate sodium (COLACE) 100 mg capsule Take 1 capsule (100 mg total) by mouth 2 (two) times a day, Starting Wed 2/7/2024, Normal      !! famotidine (PEPCID) 20 mg tablet Take 1 tablet (20 mg total) by mouth 2 (two) times a day, Starting Fri 9/20/2024, Normal      folic acid (FOLVITE) 400 mcg tablet Take 1 tablet (400 mcg total) by mouth daily, Starting Thu 2/8/2024, Normal      furosemide (LASIX) 40 mg tablet Take 1 tablet (40 mg total) by mouth daily, Starting Thu 2/8/2024, Until Thu 9/19/2024, Normal      guaiFENesin (MUCINEX) 600 mg 12 hr tablet Take 1 tablet (600 mg total) by mouth every 12 (twelve) hours, Starting Wed 2/7/2024, Normal      ipratropium-albuterol (DUO-NEB) 0.5-2.5 mg/3 mL nebulizer solution Historical Med      lidocaine (LIDODERM) 5 % PLACE 1 PATCH ON THE SKIN EVERY 12 (TWELVE) HOURS FOR 10 DAYS. APPLY 1 PATCH TO THE SKIN FOR 12 HOURS THEN REMOVE FOR 12 HOURS, Historical Med      magnesium cl-calcium carbonate (MAG-DELAY)  MG tablet Take by mouth daily, Historical Med      meloxicam (MOBIC) 15 mg tablet TAKE 1 TABLETBY MOUTH EVERY DAY FOR 30 DAYS., Historical Med      metFORMIN (GLUCOPHAGE) 500 mg tablet TAKE 1 TABLET (500 MG TOTAL) BY MOUTH DAILY WITH BREAKFAST, Starting Fri 11/1/2024, Normal      methocarbamol (ROBAXIN) 500 mg tablet Take 1 tablet (500 mg total) by mouth 4 (four) times a day as needed for muscle spasms for up to 12 doses, Starting Thu 8/29/2024, Normal      metoprolol succinate (TOPROL-XL) 25 mg 24 hr tablet Take 25 mg by mouth daily, Starting Wed 12/20/2023, Historical Med      metroNIDAZOLE (FLAGYL) 500 mg tablet TAKE 1 TABLET (500 MG TOTAL) BY MOUTH 3 (THREE) TIMES A DAY FOR 7 DAYS., Historical Med      nicotine (NICODERM  CQ) 21 mg/24 hr TD 24 hr patch Place 1 patch on the skin over 24 hours every 24 hours, Starting Thu 8/29/2024, Normal      nicotine polacrilex (NICORETTE) 2 mg gum Chew 1 each (2 mg total) every 2 (two) hours as needed for smoking cessation, Starting Wed 2/7/2024, Normal      ondansetron (ZOFRAN) 4 mg tablet Take 1 tablet (4 mg total) by mouth every 8 (eight) hours as needed for nausea or vomiting, Starting Tue 9/17/2024, Normal      ondansetron (ZOFRAN-ODT) 4 mg disintegrating tablet Take 1 tablet (4 mg total) by mouth every 8 (eight) hours as needed for nausea or vomiting, Starting Wed 1/29/2025, Normal      pantoprazole (PROTONIX) 40 mg tablet Take 1 tablet (40 mg total) by mouth 2 (two) times a day before meals, Starting Wed 9/11/2024, Normal      potassium chloride (Klor-Con M20) 20 mEq tablet Take 20 mEq by mouth in the morning For 3 days/ PRN, Starting Fri 9/6/2024, Historical Med      predniSONE 20 mg tablet Multiple Dosages:Starting Thu 1/30/2025, Until Sat 2/1/2025 at 2359, THEN Starting Sun 2/2/2025, Until Tue 2/4/2025 at 2359, THEN Starting Wed 2/5/2025, Until Fri 2/7/2025 at 2359Take 3 tablets (60 mg total) by mouth daily for 3 days, THEN 2 tablets  (40 mg total) daily for 3 days, THEN 1 tablet (20 mg total) daily for 3 days. Do not start before January 30, 2025., Normal      sertraline (ZOLOFT) 25 mg tablet Take 25 mg by mouth daily, Historical Med      sucralfate (CARAFATE) 1 g tablet Take 1 tablet (1 g total) by mouth 3 (three) times a day, Starting Tue 9/17/2024, Until Thu 10/17/2024, Normal      sucralfate (CARAFATE) 1 g tablet Take 1 tablet (1 g total) by mouth 3 (three) times a day for 7 days, Starting Fri 9/20/2024, Until Fri 9/27/2024, Normal      Trelegy Ellipta 100-62.5-25 MCG/ACT inhaler Inhale 1 puff daily, Starting Mon 9/9/2024, Historical Med      Xarelto 2.5 MG tablet TAKE 1 TABLET (2.5 MG TOTAL) BY MOUTH 2 (TWO) TIMES A DAY WITH MEALS., Historical Med       !! - Potential duplicate  medications found. Please discuss with provider.        No discharge procedures on file.  ED SEPSIS DOCUMENTATION   Time reflects when diagnosis was documented in both MDM as applicable and the Disposition within this note       Time User Action Codes Description Comment    2/1/2025  1:43 AM Wiliam Brandt [M54.9] Back pain     2/1/2025  1:44 AM Wiliam Brandt [K29.70] Gastritis                  Wiliam Brandt MD  02/03/25 0007

## 2025-02-04 ENCOUNTER — TELEPHONE (OUTPATIENT)
Age: 60
End: 2025-02-04

## 2025-02-04 ENCOUNTER — OFFICE VISIT (OUTPATIENT)
Age: 60
End: 2025-02-04
Payer: COMMERCIAL

## 2025-02-04 VITALS
HEIGHT: 57 IN | TEMPERATURE: 96.6 F | OXYGEN SATURATION: 90 % | BODY MASS INDEX: 49.02 KG/M2 | SYSTOLIC BLOOD PRESSURE: 126 MMHG | HEART RATE: 72 BPM | WEIGHT: 227.2 LBS | DIASTOLIC BLOOD PRESSURE: 74 MMHG | RESPIRATION RATE: 19 BRPM

## 2025-02-04 DIAGNOSIS — R10.12 LEFT UPPER QUADRANT ABDOMINAL PAIN: Primary | ICD-10-CM

## 2025-02-04 DIAGNOSIS — F17.200 SMOKER: ICD-10-CM

## 2025-02-04 DIAGNOSIS — E11.9 TYPE 2 DIABETES MELLITUS WITHOUT COMPLICATION, WITHOUT LONG-TERM CURRENT USE OF INSULIN (HCC): ICD-10-CM

## 2025-02-04 DIAGNOSIS — K76.0 HEPATIC STEATOSIS: ICD-10-CM

## 2025-02-04 DIAGNOSIS — I74.2 THROMBUS OF LEFT RADIAL ARTERY (HCC): ICD-10-CM

## 2025-02-04 DIAGNOSIS — J41.0 SIMPLE CHRONIC BRONCHITIS (HCC): ICD-10-CM

## 2025-02-04 DIAGNOSIS — J96.11 CHRONIC RESPIRATORY FAILURE WITH HYPOXIA (HCC): ICD-10-CM

## 2025-02-04 DIAGNOSIS — R16.1 SPLENOMEGALY: ICD-10-CM

## 2025-02-04 PROCEDURE — 99213 OFFICE O/P EST LOW 20 MIN: CPT

## 2025-02-04 RX ORDER — LEVOTHYROXINE SODIUM 50 UG/1
1 TABLET ORAL DAILY
COMMUNITY
Start: 2025-01-02 | End: 2025-02-05 | Stop reason: SDUPTHER

## 2025-02-04 NOTE — TELEPHONE ENCOUNTER
I checked this pt out today the pt has [employer] insurance which we do not take.    Pt states she came here before and had no issues.  After pt left looked into her ins info.  Pt has [employer] last year.  2025 she started with [facility] with Dr. Horvath name on it which is [facility].     Left a message explaining all this to call her insurance or call us back if questions.   Gave her billing ph # also.

## 2025-02-04 NOTE — ASSESSMENT & PLAN NOTE
Patient on chronic oxygen.  Recently at ER yesterday for COPD exacerbation.  Follow-up with pulmonology as scheduled for March.

## 2025-02-04 NOTE — ASSESSMENT & PLAN NOTE
Lab Results   Component Value Date    HGBA1C 6.2 (H) 12/31/2024   A1c decreased from 7.2-6.2 from August to December.  Continue metformin 500 mg daily.  Recommended diet low in carbohydrates and sugars.  We will continue to monitor A1c regularly.  Orders:  •  Hemoglobin A1C; Future

## 2025-02-04 NOTE — PROGRESS NOTES
Name: Nanci Navarro      : 1965      MRN: [id number]  Encounter Provider: Natividad Robertson PA-C  Encounter Date: 2025   Encounter department: [facility]  :  Assessment & Plan  Left upper quadrant abdominal pain  Recommend further evaluation by gastroenterology.  ER workup largely unremarkable.  Also recommend follow-up with pain management due to chronic pain, but patient states that her insurance will not cover it. Instructed patient to return to the ER if symptoms worsen.   Orders:  •  Ambulatory Referral to Gastroenterology; Future    Simple chronic bronchitis (HCC)  Patient on chronic oxygen.  Recently at ER yesterday for COPD exacerbation.  Follow-up with pulmonology as scheduled for March.       Chronic respiratory failure with hypoxia (HCC)  Continue home oxygen and follow-up with pulmonology as scheduled       Smoker  It appears patient was prescribed nicotine gum during hospitalization, but is not using them.  Will discuss further at follow-up.       Hepatic steatosis  Recommend follow-up with GI for monitoring.  Recommend diet low in fats.       Splenomegaly  Noted on recent CT scans during ER visits.  Patient has follow-up with hematology as scheduled.  Recommend follow-up with GI and vascular.        Type 2 diabetes mellitus without complication, without long-term current use of insulin (HCC)    Lab Results   Component Value Date    HGBA1C 6.2 (H) 2024   A1c decreased from 7.2-6.2 from August to December.  Continue metformin 500 mg daily.  Recommended diet low in carbohydrates and sugars.  We will continue to monitor A1c regularly.  Orders:  •  Hemoglobin A1C; Future    Thrombus of left radial artery (HCC)  Patient had DVT of left forearm after insertion of IV in 2024. Was put on Eliquis. Then she got another blood clot in 2024 in her right radial artery shortly after cardiac catheterization. Patient states she was switched to Xarelto. Uncertain if  she was still on Eliquis when she obtained the second blood clot or if this was stopped prior. It seems that both blood clots were provoked. She has an upcoming appt with hematology. Recommend discussion with them regarding whether blood thinner needs to be restarted / if she needs blood thinner long term. She states she is not currently on Xarelto.   ER notes from march 2024 and August 2024 reviewed.               History of Present Illness   Nanci is a 59-year-old female presenting to the office for follow-up from recent ER visits for left flank pain.  She had workup at [facility] and was prescribed antibiotic.  They were uncertain what was causing her abdominal pain.  She was also found to have tachycardia and had consultation with cardiologist Dr. Salazar. She is still having left flank pain. Sometimes pain in right side as well. She takes ibuprofen and motrin for the pain. Pain occurs randomly, no known triggers. No known association with eating meals. She frequently feels nauseous. She has a BM once a day and has been diagnosed with IBS in the past. States bowel movements tend to be soft / diarrhea.  She was told her pain may be related to her chronic back pain, but she states that this feels different.  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 Drake, [facility].   Her med list currently has Xarelto on it, but she states she is not currently taking this.  She had a blood clot in her left arm in March 2024 after an IV was put in place at that time she was put on Eliquis.  In September 2024 she got another blood clot shortly after having a cardiac catheterization done.  Then she was started on Xarelto.  This was short-term and she was told she could discontinue the blood thinner?      Review of Systems   Constitutional:  Negative for chills and fever.   HENT:  Negative for ear pain and sore throat.    Eyes:  Negative for pain and visual disturbance.  "  Respiratory:  Negative for cough and shortness of breath.    Cardiovascular:  Negative for chest pain and palpitations.   Gastrointestinal:  Positive for abdominal pain, diarrhea and nausea. Negative for vomiting.   Genitourinary:  Negative for dysuria and hematuria.   Musculoskeletal:  Negative for arthralgias and back pain.   Skin:  Negative for color change and rash.   Neurological:  Negative for seizures and syncope.   All other systems reviewed and are negative.      Objective   /74 (BP Location: Right arm, Patient Position: Sitting, Cuff Size: Large)   Pulse 72   Temp (!) 96.6 °F (35.9 °C) (Tympanic)   Resp 19   Ht 4' 9\" (1.448 m)   Wt 103 kg (227 lb 3.2 oz)   SpO2 90% Comment: patient on 3 ltrs of O2  BMI 49.17 kg/m²      Physical Exam  Vitals and nursing note reviewed.   Constitutional:       General: She is not in acute distress.     Appearance: She is well-developed.   HENT:      Head: Normocephalic and atraumatic.      Right Ear: Tympanic membrane normal.      Left Ear: Tympanic membrane normal.      Nose: Nose normal.      Mouth/Throat:      Mouth: Mucous membranes are moist.      Pharynx: Oropharynx is clear. No oropharyngeal exudate.   Eyes:      Extraocular Movements: Extraocular movements intact.      Conjunctiva/sclera: Conjunctivae normal.   Cardiovascular:      Rate and Rhythm: Normal rate and regular rhythm.      Heart sounds: Normal heart sounds. No murmur heard.     No friction rub. No gallop.   Pulmonary:      Effort: Pulmonary effort is normal. No respiratory distress.      Breath sounds: Normal breath sounds. No wheezing, rhonchi or rales.   Abdominal:      Palpations: Abdomen is soft.      Tenderness: There is abdominal tenderness in the epigastric area and left upper quadrant. There is no right CVA tenderness, left CVA tenderness or guarding.   Musculoskeletal:         General: No swelling.      Cervical back: Neck supple.   Skin:     General: Skin is warm and dry.      " Capillary Refill: Capillary refill takes less than 2 seconds.   Neurological:      General: No focal deficit present.      Mental Status: She is alert.   Psychiatric:         Mood and Affect: Mood normal.

## 2025-02-04 NOTE — ASSESSMENT & PLAN NOTE
It appears patient was prescribed nicotine gum during hospitalization, but is not using them.  Will discuss further at follow-up.

## 2025-02-05 DIAGNOSIS — E03.9 HYPOTHYROIDISM, UNSPECIFIED TYPE: Primary | ICD-10-CM

## 2025-02-05 DIAGNOSIS — R79.89 ELEVATED TSH: Primary | ICD-10-CM

## 2025-02-05 RX ORDER — LEVOTHYROXINE SODIUM 50 UG/1
50 TABLET ORAL DAILY
Qty: 90 TABLET | Refills: 1 | Status: SHIPPED | OUTPATIENT
Start: 2025-02-05

## 2025-02-05 RX ORDER — LEVOTHYROXINE SODIUM 50 UG/1
TABLET ORAL
Qty: 30 TABLET | OUTPATIENT
Start: 2025-02-05

## 2025-02-05 NOTE — TELEPHONE ENCOUNTER
Called  patient. And was notified and stated she was put on this dose when she was in [facility] and she is taking it., she is also asking about her diabetes as she has been on and off steroids and her sugars have been running high on steroids 300-500, off steroids 200 and asking if maybe she could have an injectable med  as she heard metformin is not good for the kidneys, and stated she will be going Friday to have labs and A1c done

## 2025-02-06 NOTE — ASSESSMENT & PLAN NOTE
Patient had DVT of left forearm after insertion of IV in march 2024. Was put on Eliquis. Then she got another blood clot in August 2024 in her right radial artery shortly after cardiac catheterization. Patient states she was switched to Xarelto. Uncertain if she was still on Eliquis when she obtained the second blood clot or if this was stopped prior. It seems that both blood clots were provoked. She has an upcoming appt with hematology. Recommend discussion with them regarding whether blood thinner needs to be restarted / if she needs blood thinner long term. She states she is not currently on Xarelto.   ER notes from march 2024 and August 2024 reviewed.

## 2025-02-08 ENCOUNTER — NURSE TRIAGE (OUTPATIENT)
Dept: OTHER | Facility: OTHER | Age: 60
End: 2025-02-08

## 2025-02-08 NOTE — TELEPHONE ENCOUNTER
"Reason for Disposition  • Blood glucose > 500 mg/dL (27.8 mmol/L)    Answer Assessment - Initial Assessment Questions  1. BLOOD GLUCOSE: \"What is your blood glucose level?\"       \"Over 600\"    2. ONSET: \"When did you check the blood glucose?\"      15 min    3. USUAL RANGE: \"What is your glucose level usually?\" (e.g., usual fasting morning value, usual evening value)       160-220    5. TYPE 1 or 2:  \"Do you know what type of diabetes you have?\"  (e.g., Type 1, Type 2, Gestational; doesn't know)       Type 2    6. INSULIN: \"Do you take insulin?\" \"What type of insulin(s) do you use? What is the mode of delivery? (syringe, pen; injection or pump)?\"       No    7. DIABETES PILLS: \"Do you take any pills for your diabetes?\" If Yes, ask: \"Have you missed taking any pills recently?\"      takes metformin in the morning, no insulin.    8. OTHER SYMPTOMS: \"Do you have any symptoms?\" (e.g., fever, frequent urination, difficulty breathing, dizziness, weakness, vomiting)     Feels lightheaded    Pt is on steroids. Pt has COPD exacerbation right now.    Pt just rechecked and its 563.    Protocols used: Diabetes - High Blood Sugar-Adult-[place]    "

## 2025-02-08 NOTE — TELEPHONE ENCOUNTER
Regarding: SUGAR 600+  ----- Message from Krissy ESPINAL sent at 2/8/2025 12:33 PM EST -----  Patient states glucometer reading HIGH OVER 600. Unsure what to do, cannot get to hospital. Patient requests call back to advise.

## 2025-02-17 ENCOUNTER — NURSE TRIAGE (OUTPATIENT)
Age: 60
End: 2025-02-17

## 2025-02-17 NOTE — TELEPHONE ENCOUNTER
"Patient states she was discharged today from [facility] for chest pains. She is having chest pains again and would like to know what to do. Documentation from Care Everywhere from  states patient has follow up with cardiology.     She describes her discomfort as the same that brought her to the hospital and the same she had while there in the facility. She feels she was discharged too soon but at the time, was eager to go home.    No current chest pain while on phone with me.    She called the hospital that discharged her and was told to call PCP for guidance.     ED recommended to patient who was unsure if she would go. She will contact her cardiologist for further evaluation.    Reason for Disposition   Chest pain lasting longer than 5 minutes and occurred in last 3 days (72 hours) (Exception: Feels exactly the same as previously diagnosed heartburn and has accompanying sour taste in mouth.)    Answer Assessment - Initial Assessment Questions  1. LOCATION: \"Where does it hurt?\"        Middle of chest    2. RADIATION: \"Does the pain go anywhere else?\" (e.g., into neck, jaw, arms, back)      Neck and jar    3. ONSET: \"When did the chest pain begin?\" (Minutes, hours or days)       On and off for a month - Not present during phone call today.    4. PATTERN: \"Does the pain come and go, or has it been constant since it started?\"  \"Does it get worse with exertion?\"       Come and go    5. DURATION: \"How long does it last\" (e.g., seconds, minutes, hours)      4-6 hours    6. SEVERITY: \"How bad is the pain?\"  (e.g., Scale 1-10; mild, moderate, or severe)      8/10    7. CARDIAC RISK FACTORS: \"Do you have any history of heart problems or risk factors for heart disease?\" (e.g., angina, prior heart attack; diabetes, high blood pressure, high cholesterol, smoker, or strong family history of heart disease)      Yes    8. PULMONARY RISK FACTORS: \"Do you have any history of lung disease?\"  (e.g., blood clots in lung, asthma, " "emphysema, birth control pills)      COPD    9. CAUSE: \"What do you think is causing the chest pain?\"      Unknown    10. OTHER SYMPTOMS: \"Do you have any other symptoms?\" (e.g., dizziness, nausea, vomiting, sweating, fever, difficulty breathing, cough)        Cramps in legs    Protocols used: Chest Pain-Adult-[place]    "

## 2025-03-11 ENCOUNTER — TELEPHONE (OUTPATIENT)
Age: 60
End: 2025-03-11

## 2025-03-11 NOTE — TELEPHONE ENCOUNTER
Patient states that she is not going to [facility] nor can she come here because her insurance [employer] we are not par with. I explained she should call her insurance carrier and have them find her a doc in her plan that takes the card

## 2025-03-11 NOTE — TELEPHONE ENCOUNTER
Ginger from [facility] wanted to let the provider know that patient fell yesterday and has been complaining of pain in her hip and leg. Ginger has recommended patient going to the ED to be evaluated, however patient has refused stating she does not like the way she is treated in the ED. Ginger states that patient is currently in bed in a lot of pain.

## 2025-03-16 ENCOUNTER — HOSPITAL ENCOUNTER (EMERGENCY)
Facility: HOSPITAL | Age: 60
Discharge: HOME | End: 2025-03-17
Attending: EMERGENCY MEDICINE | Admitting: EMERGENCY MEDICINE
Payer: COMMERCIAL

## 2025-03-16 ENCOUNTER — APPOINTMENT (EMERGENCY)
Dept: RADIOLOGY | Facility: HOSPITAL | Age: 60
End: 2025-03-16
Payer: COMMERCIAL

## 2025-03-16 ENCOUNTER — APPOINTMENT (EMERGENCY)
Dept: CT IMAGING | Facility: HOSPITAL | Age: 60
End: 2025-03-16
Payer: COMMERCIAL

## 2025-03-16 DIAGNOSIS — S22.39XA RIB FRACTURE: ICD-10-CM

## 2025-03-16 DIAGNOSIS — R07.9 CHEST PAIN: Primary | ICD-10-CM

## 2025-03-16 LAB
ALBUMIN SERPL BCG-MCNC: 3.9 G/DL (ref 3.5–5)
ALP SERPL-CCNC: 90 U/L (ref 34–104)
ALT SERPL W P-5'-P-CCNC: 30 U/L (ref 7–52)
ANION GAP SERPL CALCULATED.3IONS-SCNC: 9 MMOL/L (ref 4–13)
AST SERPL W P-5'-P-CCNC: 25 U/L (ref 13–39)
BASOPHILS # BLD AUTO: 0.03 THOUSANDS/ÂΜL (ref 0–0.1)
BASOPHILS NFR BLD AUTO: 0 % (ref 0–1)
BILIRUB SERPL-MCNC: 0.52 MG/DL (ref 0.2–1)
BUN SERPL-MCNC: 16 MG/DL (ref 5–25)
CALCIUM SERPL-MCNC: 8.5 MG/DL (ref 8.4–10.2)
CARDIAC TROPONIN I PNL SERPL HS: 9 NG/L (ref ?–50)
CHLORIDE SERPL-SCNC: 99 MMOL/L (ref 96–108)
CO2 SERPL-SCNC: 31 MMOL/L (ref 21–32)
CREAT SERPL-MCNC: 0.68 MG/DL (ref 0.6–1.3)
D DIMER PPP FEU-MCNC: 0.36 UG/ML FEU
EOSINOPHIL # BLD AUTO: 0.08 THOUSAND/ÂΜL (ref 0–0.61)
EOSINOPHIL NFR BLD AUTO: 1 % (ref 0–6)
ERYTHROCYTE [DISTWIDTH] IN BLOOD BY AUTOMATED COUNT: 16.2 % (ref 11.6–15.1)
GFR SERPL CREATININE-BSD FRML MDRD: 95 ML/MIN/1.73SQ M
GLUCOSE SERPL-MCNC: 194 MG/DL (ref 65–140)
HCT VFR BLD AUTO: 34.8 % (ref 34.8–46.1)
HGB BLD-MCNC: 11.5 G/DL (ref 11.5–15.4)
IMM GRANULOCYTES # BLD AUTO: 0.07 THOUSAND/UL (ref 0–0.2)
IMM GRANULOCYTES NFR BLD AUTO: 1 % (ref 0–2)
LYMPHOCYTES # BLD AUTO: 1.17 THOUSANDS/ÂΜL (ref 0.6–4.47)
LYMPHOCYTES NFR BLD AUTO: 11 % (ref 14–44)
MCH RBC QN AUTO: 30.1 PG (ref 26.8–34.3)
MCHC RBC AUTO-ENTMCNC: 33 G/DL (ref 31.4–37.4)
MCV RBC AUTO: 91 FL (ref 82–98)
MONOCYTES # BLD AUTO: 0.85 THOUSAND/ÂΜL (ref 0.17–1.22)
MONOCYTES NFR BLD AUTO: 8 % (ref 4–12)
NEUTROPHILS # BLD AUTO: 8.9 THOUSANDS/ÂΜL (ref 1.85–7.62)
NEUTS SEG NFR BLD AUTO: 79 % (ref 43–75)
NRBC BLD AUTO-RTO: 0 /100 WBCS
PLATELET # BLD AUTO: 224 THOUSANDS/UL (ref 149–390)
PMV BLD AUTO: 9.2 FL (ref 8.9–12.7)
POTASSIUM SERPL-SCNC: 4 MMOL/L (ref 3.5–5.3)
PROT SERPL-MCNC: 6.4 G/DL (ref 6.4–8.4)
RBC # BLD AUTO: 3.82 MILLION/UL (ref 3.81–5.12)
SODIUM SERPL-SCNC: 139 MMOL/L (ref 135–147)
WBC # BLD AUTO: 11.1 THOUSAND/UL (ref 4.31–10.16)

## 2025-03-16 PROCEDURE — 71045 X-RAY EXAM CHEST 1 VIEW: CPT

## 2025-03-16 PROCEDURE — 96372 THER/PROPH/DIAG INJ SC/IM: CPT

## 2025-03-16 PROCEDURE — 85379 FIBRIN DEGRADATION QUANT: CPT | Performed by: EMERGENCY MEDICINE

## 2025-03-16 PROCEDURE — 85025 COMPLETE CBC W/AUTO DIFF WBC: CPT | Performed by: EMERGENCY MEDICINE

## 2025-03-16 PROCEDURE — 99285 EMERGENCY DEPT VISIT HI MDM: CPT

## 2025-03-16 PROCEDURE — 36415 COLL VENOUS BLD VENIPUNCTURE: CPT | Performed by: EMERGENCY MEDICINE

## 2025-03-16 PROCEDURE — 84484 ASSAY OF TROPONIN QUANT: CPT | Performed by: EMERGENCY MEDICINE

## 2025-03-16 PROCEDURE — 71260 CT THORAX DX C+: CPT

## 2025-03-16 PROCEDURE — 74177 CT ABD & PELVIS W/CONTRAST: CPT

## 2025-03-16 PROCEDURE — 80053 COMPREHEN METABOLIC PANEL: CPT | Performed by: EMERGENCY MEDICINE

## 2025-03-16 PROCEDURE — 96374 THER/PROPH/DIAG INJ IV PUSH: CPT

## 2025-03-16 PROCEDURE — 93005 ELECTROCARDIOGRAM TRACING: CPT

## 2025-03-16 RX ORDER — KETOROLAC TROMETHAMINE 30 MG/ML
15 INJECTION, SOLUTION INTRAMUSCULAR; INTRAVENOUS ONCE
Status: COMPLETED | OUTPATIENT
Start: 2025-03-16 | End: 2025-03-16

## 2025-03-16 RX ORDER — SODIUM CHLORIDE 9 MG/ML
3 INJECTION INTRAVENOUS
Status: DISCONTINUED | OUTPATIENT
Start: 2025-03-16 | End: 2025-03-17 | Stop reason: HOSPADM

## 2025-03-16 RX ORDER — LIDOCAINE 50 MG/G
1 PATCH TOPICAL ONCE
Status: DISCONTINUED | OUTPATIENT
Start: 2025-03-16 | End: 2025-03-17 | Stop reason: HOSPADM

## 2025-03-16 RX ADMIN — LIDOCAINE 5% 1 PATCH: 700 PATCH TOPICAL at 22:13

## 2025-03-16 RX ADMIN — KETOROLAC TROMETHAMINE 15 MG: 30 INJECTION, SOLUTION INTRAMUSCULAR; INTRAVENOUS at 22:13

## 2025-03-16 RX ADMIN — IOHEXOL 100 ML: 350 INJECTION, SOLUTION INTRAVENOUS at 22:53

## 2025-03-16 RX ADMIN — TRIMETHOBENZAMIDE HYDROCHLORIDE 200 MG: 100 INJECTION INTRAMUSCULAR at 22:13

## 2025-03-17 VITALS
DIASTOLIC BLOOD PRESSURE: 57 MMHG | HEART RATE: 109 BPM | RESPIRATION RATE: 20 BRPM | OXYGEN SATURATION: 95 % | TEMPERATURE: 97.8 F | SYSTOLIC BLOOD PRESSURE: 127 MMHG

## 2025-03-17 LAB
2HR DELTA HS TROPONIN: 1 NG/L
ATRIAL RATE: 105 BPM
ATRIAL RATE: 111 BPM
ATRIAL RATE: 117 BPM
CARDIAC TROPONIN I PNL SERPL HS: 10 NG/L (ref ?–50)
P AXIS: 73 DEGREES
P AXIS: 74 DEGREES
P AXIS: 78 DEGREES
PR INTERVAL: 144 MS
PR INTERVAL: 146 MS
PR INTERVAL: 146 MS
QRS AXIS: 16 DEGREES
QRS AXIS: 18 DEGREES
QRS AXIS: 22 DEGREES
QRSD INTERVAL: 82 MS
QRSD INTERVAL: 84 MS
QRSD INTERVAL: 86 MS
QT INTERVAL: 318 MS
QT INTERVAL: 342 MS
QT INTERVAL: 352 MS
QTC INTERVAL: 432 MS
QTC INTERVAL: 452 MS
QTC INTERVAL: 491 MS
T WAVE AXIS: 37 DEGREES
T WAVE AXIS: 78 DEGREES
T WAVE AXIS: 81 DEGREES
VENTRICULAR RATE: 105 BPM
VENTRICULAR RATE: 111 BPM
VENTRICULAR RATE: 117 BPM

## 2025-03-17 PROCEDURE — 96375 TX/PRO/DX INJ NEW DRUG ADDON: CPT

## 2025-03-17 PROCEDURE — 84484 ASSAY OF TROPONIN QUANT: CPT | Performed by: EMERGENCY MEDICINE

## 2025-03-17 PROCEDURE — 93005 ELECTROCARDIOGRAM TRACING: CPT

## 2025-03-17 PROCEDURE — 93010 ELECTROCARDIOGRAM REPORT: CPT | Performed by: INTERNAL MEDICINE

## 2025-03-17 PROCEDURE — 36415 COLL VENOUS BLD VENIPUNCTURE: CPT | Performed by: EMERGENCY MEDICINE

## 2025-03-17 RX ORDER — METHOCARBAMOL 500 MG/1
500 TABLET, FILM COATED ORAL ONCE
Status: COMPLETED | OUTPATIENT
Start: 2025-03-17 | End: 2025-03-17

## 2025-03-17 RX ORDER — FENTANYL CITRATE 50 UG/ML
25 INJECTION, SOLUTION INTRAMUSCULAR; INTRAVENOUS ONCE
Refills: 0 | Status: COMPLETED | OUTPATIENT
Start: 2025-03-17 | End: 2025-03-17

## 2025-03-17 RX ORDER — MORPHINE SULFATE 15 MG/1
7.5 TABLET ORAL EVERY 6 HOURS PRN
Qty: 5 TABLET | Refills: 0 | Status: SHIPPED | OUTPATIENT
Start: 2025-03-17 | End: 2025-03-24

## 2025-03-17 RX ORDER — LIDOCAINE 50 MG/G
1 PATCH TOPICAL DAILY
Qty: 14 PATCH | Refills: 0 | Status: SHIPPED | OUTPATIENT
Start: 2025-03-17

## 2025-03-17 RX ORDER — METHOCARBAMOL 500 MG/1
500 TABLET, FILM COATED ORAL 2 TIMES DAILY PRN
Qty: 20 TABLET | Refills: 0 | Status: SHIPPED | OUTPATIENT
Start: 2025-03-17

## 2025-03-17 RX ADMIN — METHOCARBAMOL 500 MG: 500 TABLET ORAL at 01:22

## 2025-03-17 RX ADMIN — FENTANYL CITRATE 25 MCG: 50 INJECTION INTRAMUSCULAR; INTRAVENOUS at 01:22

## 2025-03-17 NOTE — DISCHARGE INSTRUCTIONS
Do NOT take multiple prescriptions of narcotic pain medication.  You can take either the prescribed morphine or the Percocet that was previously prescribed but do not take both and avoid mixing these agents.  The methocarbamol can also make you sleepy so take the alternative agents first before any narcotic pain medication.  Return to the emergency room with any worsening symptoms.    Use the incentive spirometer as directed.

## 2025-03-17 NOTE — ED NOTES
"FAmily here to pick patient up, pt wheeled to car. Family forgot o2 tank. Pt offerred to stay and wait until they get it, pt declined said she would be fine. Family back into waiting room 3 mins later saying pt is short of breath. Family requesting to take home oxygen tank and then return it. Pt offerred again to sit in waiting room in wheelchair with o2 so family can go home and get her oxygen tank and bring it back. This rn explaining to patient that if she is already short of breath now, she is going to be short of breath in the next 5 mins. Pt sts,\"I want to go home forget it I'm leaving I want to go home.\" Pt informed that if she feels short of breath she can come back in and be seen.     Naima Najera RN  03/17/25 [id number]    "

## 2025-03-17 NOTE — ED PROVIDER NOTES
"Time reflects when diagnosis was documented in both MDM as applicable and the Disposition within this note       Time User Action Codes Description Comment    3/17/2025  1:39 AM Frank Guerrier Add [R07.89] Atypical chest pain     3/17/2025  1:39 AM Frank Guerrier Remove [R07.89] Atypical chest pain     3/17/2025  1:39 AM Frank Guerrier Add [R07.9] Chest pain     3/17/2025  1:40 AM Frank Guerrier [S22.39XA] Rib fracture           ED Disposition       ED Disposition   Discharge    Condition   Stable    Date/Time   Mon Mar 17, 2025  1:39 AM    Comment   Nanci CHAYO Navarro discharge to home/self care.                   Assessment & Plan       Medical Decision Making  60 y.o. female presents with a chief complaint of I have this bad pain here\" pointing to the area just inferior to the left breast.  Patient states \"when I lay down, it made a bad pop.\"  Patient has been to [facility] twice since the onset of the symptoms on 3/10 when she fell.  During the initial evaluation she had x-ray imaging that was negative and subsequently had CT imaging that was negative and did not identify the source of her symptoms.  Patient has visiting nurses that come and who have urged her to continue to return to the emergency room for reassessment.    Patient affirms 6 days of left-sided chest pain without radiation. Patient describes the pain that came on after the fall and has been persistent though waxing and waning in intensity. Patient states movement and breathing worsens the pain and nothing improves the pain.   Patient affirms pleuritic component to chest pain. Patient denies exertional component to the chest pain though she states movement worsens the pain.    Patient states she has taken \"everything\" for the pain without relief.    Patient denies fever/chills.  Patient denies diaphoresis.  Patient affirms \"very little\" cough.  Patient has a history of oxygen dependent COPD for which she is on her baseline 3 L nasal " "cannula.  Patient denies hemoptysis.  Patient affirms nausea but denies vomiting.  Patient also notes diarrhea.  Patient denies leg pain or swelling.  Patient notes chronic episodes of dizziness that she describes as \"lightheadedness\" though she denies any at present.  Patient states she has had this evaluated by her cardiologist with Holter monitoring and further testing without etiology identified other than tachycardia.  Patient states that these episodes have been ongoing for months.  Patient denies any vertiginous symptoms or lightheadedness.    The patient denies a history of atherosclerotic disease (CAD/TIA/CVA/PAD).    Patient affirms any history of hypertension.  Patient denies hyperlipidemia.   Patient affirms diabetes.  Patient affirms obesity.  Patient affirms any early family history of CAD (male less than 56yo or female less than 64 yo), patient notes brother in 40s.    Patient affirms any use of tobacco in the past 90 days.    Patient denies any use of illicit drugs, including cocaine.      Patient denies any immobilization of at least 3 days or surgery in the past 4 weeks.    Patient affirms a possible history of DVT in the left arm for which she is on aspirin as she states she could not tolerate anticoagulation.    Patient denies any history or family history of thrombophilia.  Patient denies any malignancy with treatment within the past 6 months.   Patient denies any use of exogenous estrogen containing compounds.    Focused Objective.  CV:  Normal inspection with no rash, signs of infection, or trauma.  Regular rate and rhythm. No murmur. Peripheral pulses intact and equal.  Tender to palpitation over area of patient's reported pain.  Respiratory:  Lungs clear to auscultation bilaterally without adventitious sounds.  Skin:  Warm and dry.  No rash or signs of herpes zoster over area of patient's reported pain.  Extremities:  Non-tender lower extremities without asymmetry; no clinical signs of DVT. " No lower extremity edema.      Medical Decision Making    Patient presenting with chest pain with a broad differential, including multiple emergent etiologies.  Patient's pain is in the inferior left mid chest though this encompasses the area over the spleen.    Patient has a history of multiple cardiac risk factors and prior visits which increases the evaluation of the complexity of their presenting complaint.    External review of the medical record including multiple prior notes including primary care note from  which demonstrates a history of left upper quadrant pain in the region of patient's current pain.  Unclear if this is exacerbation of patient's ongoing chronic pain though she states today's pain only occurred after she fell on 3/10.    EKG obtained and reviewed independently by myself, which was interpreted by myself as sinus tachycardia at a rate of 117 without acute ST segment changes, the initial was with poor baseline and this was repeated, there are nonspecific findings.  Patient placed on cardiac monitoring.    Regarding the possibility for ACS, patient's risk stratification.      HEART score:    History 1=Moderate suspicious  ECG 1=Nonspecific repolarization disturbance  Age 1= > 45 - <65 years  Risk Factors 2= > 3 risk factors, or history of atherosclerotic disease  Troponin 0= Less than or equal to 12 ng/L  Total 5       Score 0-3: 1.7% had a MACE risk    0.4% (1 patient)     36.4% of patients were in this low risk group    Score 4-6: 16.6% had a MACE risk    Score 7-10: 50.1% had a MACE risk       The patient's HEART score is 5.  CXR will be obtained to evaluate for alternative pathologies, including pneumothorax or pneumonia.  Laboratory analysis including troponin to evaluate for ACS, CBC to evaluate for anemia or leukocytosis, and BMP to evaluate renal function and electrolytes considering possibility of cardiac involvement.     Patient has symptoms and history concerning for possible  pulmonary embolism.  Regarding this etiology, patient's risk stratification by the Wells' Criteria for Pulmonary Embolism (Two Tier Model):  no - clinical signs or symptoms of DVT (3) -patient states she has a history of prior DVT in the arm but she points to the distal arm which would be highly atypical and she is only on aspirin though she states she had been on anticoagulation previously, this was not tolerated; per the notes from vascular patient had a right arterial occlusion following catheterization, no clear history of DVT  no - PE most likely diagnosis (3)  yes - Heart rate greater than 100 (1.5)  no - Immobilization at least 3 days OR surgery in the previous 4 weeks (1.5)  no - Previous, objectively diagnosed PE or DVT (1.5)  no - Hemoptysis (1)  no - Malignancy with treatment within 6 months or palliative (1)    Patient's risk further evaluated by the PERC Criteria for Pulmonary Embolism:  yes - age is greater than or equal to 50  yes - Heart rate greater than 100  yes - O2 sat on room air < 95% - though this is chronic  no - Prior history of PE or DVT - see prior commentary  no - Recent trauma or surgery  no - Hemoptysis  no - Use of exogenous estrogen  no - Unilateral leg swelling    Patient has a low risk Wells' criteria but is unable to be cleared via the PERC criteria.  As such, a D-Dimer will be ordered for the patient to evaluate for the potential for pulmonary embolism.  If positive, CT imaging of the patient's chest will be obtained to evaluate for potential pulmonary embolism.    Patient had recent [facility] so we will reassess after this is completed regarding further imaging following initial x-ray imaging.    The patient's HEART score indicates a lower-moderate risk regarding the possibility of ACS.   In accordance with our established guidance, patient will be risk stratified based on their initial troponin to determine whether a second troponin will be  obtained 2 hours after the initial troponin to evaluate whether the patient is appropriate for discharge from the emergency department for continued outpatient follow up.  Patient on continuous cardiac monitoring and has been instructed to inform nursing with any change in the character or severity of their chest pain so repeat EKG can be obtained.      Amount and/or Complexity of Data Reviewed  Labs: ordered.  Radiology: ordered.    Risk  Prescription drug management.        ED Course as of 03/18/25 0355   Sun Mar 16, 2025   2238 Patient D-dimer negative making pulmonary embolism less likely and she had negative CTA imaging recently that was negative and without signs of dissection or other etiology.  Considering reported fall, will obtain repeat CT imaging, not CTA, to evaluate for potential occult rib fracture or splenic injury that was not identified.   Mon Mar 17, 2025   0009 Patient's D-dimer negative.  Will obtain CT imaging of patient's chest, abdomen, pelvis to evaluate for possibility of occult fracture following fall.  I do not appreciate any signs of pneumothorax on plain film chest imaging that was reviewed by myself.  Unclear if this could be related to that left upper quadrant pain that patient had seen primary care for in February and perhaps exacerbated by her fall.  Will continue to monitor and reassess.   0010 Repeat EKG demonstrates persistent sinus tachycardia at a rate of 105.  No acute ST segment changes.  Nonspecific findings.   0136 Patient CT demonstrating acute nondisplaced rib fracture which is likely the source of her symptoms.  No abdominal etiology.      Discussed this finding with the patient.  Discussed continued symptomatic management.  Considering history including O2 dependence COPD, I discussed risks in detail of narcotic pain medication.  Discussed risk of addiction.  Discussed use of incentive spirometer to the patient who has used this previously.    Discussed and emphasized  need for continued follow-up and return to the emergency room with any progression or worsening of symptoms.   0224 Patient reassessed.  Patient symptoms improved following treatment.  Again discussed risks in detail and the need for follow-up.  Emphasized not to take multiple doses of narcotic pain medication and to take either the prescribed morphine or the prescribed oxycodone.  Patient notes no relief with the previously prescribed oxycodone so we will prescribe short course of oral morphine but I emphasized use of alternative agents first and discussed the risks of this in particular considering history of COPD.  Discussed use of incentive spirometer which she is tolerating on my assessment without difficulty.       Medications   trimethobenzamide (TIGAN) IM injection 200 mg (200 mg Intramuscular Given 3/16/25 2213)   ketorolac (TORADOL) injection 15 mg (15 mg Intravenous Given 3/16/25 2213)   iohexol (OMNIPAQUE) 350 MG/ML injection (MULTI-DOSE) 100 mL (100 mL Intravenous Given 3/16/25 2253)   fentaNYL injection 25 mcg (25 mcg Intravenous Given 3/17/25 0122)   methocarbamol (ROBAXIN) tablet 500 mg (500 mg Oral Given 3/17/25 0122)       ED Risk Strat Scores                            SBIRT 20yo+      Flowsheet Row Most Recent Value   Initial Alcohol Screen: US AUDIT-C     1. How often do you have a drink containing alcohol? 0 Filed at: 03/16/2025 2127   2. How many drinks containing alcohol do you have on a typical day you are drinking?  0 Filed at: 03/16/2025 2127   3b. FEMALE Any Age, or MALE 65+: How often do you have 4 or more drinks on one occassion? 0 Filed at: 03/16/2025 2127   Audit-C Score 0 Filed at: 03/16/2025 2127   EULALIO: How many times in the past year have you...    Used an illegal drug or used a prescription medication for non-medical reasons? Never Filed at: 03/16/2025 2127                            History of Present Illness       Chief Complaint   Patient presents with    Chest Pain      Patient arrived to ER c/o chest pain that started 3 days ago. Patient also complains of SOB, Dizziness and nausea. Patient wears 3L at baseline       Past Medical History:   Diagnosis Date    Achalasia     Acute respiratory failure (HCC) 01/15/2023    Acute respiratory failure with hypoxia (HCC) 02/07/2024    Back pain     Cancer (HCC)     Ovarian    Diabetes mellitus (HCC)     DVT (deep venous thrombosis) (HCC)     Electrolyte abnormality 01/14/2024    Fall 01/02/2023    Had a fall a week ago slipped in the mud onto her back.  X-rays with no fracture.  She has chronic L1 fracture.  She is on pain medication at home for chronic back pain.      Fibromyalgia     Hypertension     Lupus       Past Surgical History:   Procedure Laterality Date    HYSTERECTOMY      NECK SURGERY        History reviewed. No pertinent family history.   Social History     Tobacco Use    Smoking status: Every Day     Current packs/day: 0.50     Types: Cigarettes    Smokeless tobacco: Never   Vaping Use    Vaping status: Never Used   Substance Use Topics    Alcohol use: Never    Drug use: Never      E-Cigarette/Vaping    E-Cigarette Use Never User       E-Cigarette/Vaping Substances    Nicotine No     THC No     CBD No     Flavoring No     Other No     Unknown No       I have reviewed and agree with the history as documented.     HPI    Review of Systems        Objective       ED Triage Vitals   Temperature Pulse Blood Pressure Respirations SpO2 Patient Position - Orthostatic VS   03/16/25 2126 03/16/25 2126 03/16/25 2126 03/16/25 2126 03/16/25 2126 03/17/25 0004   97.8 °F (36.6 °C) (!) 108 145/67 18 97 % Sitting      Temp src Heart Rate Source BP Location FiO2 (%) Pain Score    -- 03/17/25 0004 03/17/25 0004 -- 03/17/25 0122     Monitor Right arm  9      Vitals      Date and Time Temp Pulse SpO2 Resp BP Pain Score FACES Pain Rating User   03/17/25 0122 -- -- -- -- -- 9 -- KW   03/17/25 0004 -- 109 95 % 20 127/57 -- -- BS   03/16/25 2126 97.8  °F (36.6 °C) 108 97 % 18 145/67 -- -- DO            Physical Exam  Abdominal:      Palpations: Abdomen is soft.      Tenderness: There is no abdominal tenderness. There is no guarding or rebound.         Results Reviewed       Procedure Component Value Units Date/Time    HS Troponin I 2hr [[id number]]  (Normal) Collected: 03/17/25 0010    Lab Status: Final result Specimen: Blood from Arm, Left Updated: 03/17/25 0038     hs TnI 2hr 10 ng/L      Delta 2hr hsTnI 1 ng/L     HS Troponin 0hr (reflex protocol) [[id number]]  (Normal) Collected: 03/16/25 2209    Lab Status: Final result Specimen: Blood from Arm, Right Updated: 03/16/25 2241     hs TnI 0hr 9 ng/L     Comprehensive metabolic panel [[id number]]  (Abnormal) Collected: 03/16/25 2209    Lab Status: Final result Specimen: Blood from Arm, Right Updated: 03/16/25 2239     Sodium 139 mmol/L      Potassium 4.0 mmol/L      Chloride 99 mmol/L      CO2 31 mmol/L      ANION GAP 9 mmol/L      BUN 16 mg/dL      Creatinine 0.68 mg/dL      Glucose 194 mg/dL      Calcium 8.5 mg/dL      AST 25 U/L      ALT 30 U/L      Alkaline Phosphatase 90 U/L      Total Protein 6.4 g/dL      Albumin 3.9 g/dL      Total Bilirubin 0.52 mg/dL      eGFR 95 ml/min/1.73sq m     Narrative:      National Kidney Disease Foundation guidelines for Chronic Kidney Disease (CKD):     Stage 1 with normal or high GFR (GFR > 90 mL/min/1.73 square meters)    Stage 2 Mild CKD (GFR = 60-89 mL/min/1.73 square meters)    Stage 3A Moderate CKD (GFR = 45-59 mL/min/1.73 square meters)    Stage 3B Moderate CKD (GFR = 30-44 mL/min/1.73 square meters)    Stage 4 Severe CKD (GFR = 15-29 mL/min/1.73 square meters)    Stage 5 End Stage CKD (GFR <15 mL/min/1.73 square meters)  Note: GFR calculation is accurate only with a steady state creatinine    D-dimer, quantitative [[id number]]  (Normal) Collected: 03/16/25 2209    Lab Status: Final result Specimen: Blood from Arm, Right Updated: 03/16/25 2230     D-Dimer, Quant 0.36 ug/ml  FEU     Narrative:      In the evaluation for possible pulmonary embolism, in the appropriate (Well's Score of 4 or less) patient, the age adjusted d-dimer cutoff for this patient can be calculated as:    Age x 0.01 (in ug/mL) for Age-adjusted D-dimer exclusion threshold for a patient over 50 years.    CBC and differential [[id number]]  (Abnormal) Collected: 03/16/25 2209    Lab Status: Final result Specimen: Blood from Arm, Right Updated: 03/16/25 2215     WBC 11.10 Thousand/uL      RBC 3.82 Million/uL      Hemoglobin 11.5 g/dL      Hematocrit 34.8 %      MCV 91 fL      MCH 30.1 pg      MCHC 33.0 g/dL      RDW 16.2 %      MPV 9.2 fL      Platelets 224 Thousands/uL      nRBC 0 /100 WBCs      Segmented % 79 %      Immature Grans % 1 %      Lymphocytes % 11 %      Monocytes % 8 %      Eosinophils Relative 1 %      Basophils Relative 0 %      Absolute Neutrophils 8.90 Thousands/µL      Absolute Immature Grans 0.07 Thousand/uL      Absolute Lymphocytes 1.17 Thousands/µL      Absolute Monocytes 0.85 Thousand/µL      Eosinophils Absolute 0.08 Thousand/µL      Basophils Absolute 0.03 Thousands/µL             CT chest abdomen pelvis w contrast   Final Interpretation by Jorden Varela DO (03/17 0034)      Acute nondisplaced fracture left anterior sixth rib            The study was marked in EPIC for immediate notification.      Workstation performed: [id number]         X-ray chest 1 view portable   Final Interpretation by Abilio Woo MD (03/17 1040)   Limited study   No acute cardiopulmonary disease.            Workstation performed: [id number]             Procedures    ED Medication and Procedure Management   Prior to Admission Medications   Prescriptions Last Dose Informant Patient Reported? Taking?   Trelegy Ellipta 100-62.5-25 MCG/ACT inhaler  Self Yes No   Sig: Inhale 1 puff daily   Xarelto 2.5 MG tablet  Self Yes No   Sig: TAKE 1 TABLET (2.5 MG TOTAL) BY MOUTH 2 (TWO) TIMES A DAY WITH MEALS.   Patient not  taking: No sig reported   acetaminophen (TYLENOL) 325 mg tablet  Self No No   Sig: Take 2 tablets (650 mg total) by mouth every 6 (six) hours   albuterol (PROVENTIL HFA,VENTOLIN HFA) 90 mcg/act inhaler  Self No No   Sig: Inhale 2 puffs every 4 (four) hours as needed for wheezing   aluminum-magnesium hydroxide-simethicone (MAALOX MAX) 400-400-40 MG/5ML suspension  Self No No   Sig: Take 5 mL by mouth every 6 (six) hours as needed for indigestion or heartburn   famotidine (PEPCID) 20 mg tablet  Self No No   Sig: Take 1 tablet (20 mg total) by mouth 2 (two) times a day   Patient taking differently: Take 20 mg by mouth 2 (two) times a day Has not yet started medication 02/04/25   furosemide (LASIX) 40 mg tablet  Self No No   Sig: Take 1 tablet (40 mg total) by mouth daily   ipratropium-albuterol (DUO-NEB) 0.5-2.5 mg/3 mL nebulizer solution  Self Yes No   levothyroxine 50 mcg tablet   No No   Sig: Take 1 tablet (50 mcg total) by mouth in the morning   metFORMIN (GLUCOPHAGE) 500 mg tablet  Self No No   Sig: TAKE 1 TABLET (500 MG TOTAL) BY MOUTH DAILY WITH BREAKFAST   metoprolol succinate (TOPROL-XL) 25 mg 24 hr tablet  Self Yes No   Sig: Take 25 mg by mouth daily   ondansetron (ZOFRAN-ODT) 4 mg disintegrating tablet  Self No No   Sig: Take 1 tablet (4 mg total) by mouth every 8 (eight) hours as needed for nausea or vomiting   pantoprazole (PROTONIX) 40 mg tablet  Self No No   Sig: Take 1 tablet (40 mg total) by mouth 2 (two) times a day before meals   Patient not taking: Reported on 2/4/2025   potassium chloride (Klor-Con M20) 20 mEq tablet  Self Yes No   Sig: Take 20 mEq by mouth in the morning For 3 days/ PRN   Patient not taking: Reported on 9/20/2024   sertraline (ZOLOFT) 25 mg tablet  Self Yes No   Sig: Take 25 mg by mouth daily      Facility-Administered Medications: None     Discharge Medication List as of 3/17/2025  2:24 AM        START taking these medications    Details   lidocaine (Lidoderm) 5 % Apply 1 patch  topically over 12 hours daily Remove & Discard patch within 12 hours or as directed by MD, Starting Mon 3/17/2025, Normal      methocarbamol (ROBAXIN) 500 mg tablet Take 1 tablet (500 mg total) by mouth 2 (two) times a day as needed for muscle spasms, Starting Mon 3/17/2025, Normal      morphine (MSIR) 15 mg tablet Take 0.5 tablets (7.5 mg total) by mouth every 6 (six) hours as needed for severe pain for up to 7 days Max Daily Amount: 30 mg, Starting Mon 3/17/2025, Until Mon 3/24/2025 at 2359, Normal           CONTINUE these medications which have NOT CHANGED    Details   acetaminophen (TYLENOL) 325 mg tablet Take 2 tablets (650 mg total) by mouth every 6 (six) hours, Starting Wed 1/17/2024, No Print      albuterol (PROVENTIL HFA,VENTOLIN HFA) 90 mcg/act inhaler Inhale 2 puffs every 4 (four) hours as needed for wheezing, Starting Sat 10/8/2022, Normal      aluminum-magnesium hydroxide-simethicone (MAALOX MAX) 400-400-40 MG/5ML suspension Take 5 mL by mouth every 6 (six) hours as needed for indigestion or heartburn, Starting Tue 9/17/2024, Normal      famotidine (PEPCID) 20 mg tablet Take 1 tablet (20 mg total) by mouth 2 (two) times a day, Starting Sat 2/1/2025, Normal      furosemide (LASIX) 40 mg tablet Take 1 tablet (40 mg total) by mouth daily, Starting Thu 2/8/2024, Until Thu 9/19/2024, Normal      ipratropium-albuterol (DUO-NEB) 0.5-2.5 mg/3 mL nebulizer solution Historical Med      levothyroxine 50 mcg tablet Take 1 tablet (50 mcg total) by mouth in the morning, Starting Wed 2/5/2025, Normal      metFORMIN (GLUCOPHAGE) 500 mg tablet TAKE 1 TABLET (500 MG TOTAL) BY MOUTH DAILY WITH BREAKFAST, Starting Fri 11/1/2024, Normal      metoprolol succinate (TOPROL-XL) 25 mg 24 hr tablet Take 25 mg by mouth daily, Starting Wed 12/20/2023, Historical Med      ondansetron (ZOFRAN-ODT) 4 mg disintegrating tablet Take 1 tablet (4 mg total) by mouth every 8 (eight) hours as needed for nausea or vomiting, Starting Wed  1/29/2025, Normal      pantoprazole (PROTONIX) 40 mg tablet Take 1 tablet (40 mg total) by mouth 2 (two) times a day before meals, Starting Wed 9/11/2024, Normal      potassium chloride (Klor-Con M20) 20 mEq tablet Take 20 mEq by mouth in the morning For 3 days/ PRN, Starting Fri 9/6/2024, Historical Med      sertraline (ZOLOFT) 25 mg tablet Take 25 mg by mouth daily, Historical Med      Trelegy Ellipta 100-62.5-25 MCG/ACT inhaler Inhale 1 puff daily, Starting Mon 9/9/2024, Historical Med      Xarelto 2.5 MG tablet TAKE 1 TABLET (2.5 MG TOTAL) BY MOUTH 2 (TWO) TIMES A DAY WITH MEALS., Historical Med           No discharge procedures on file.  ED SEPSIS DOCUMENTATION   Time reflects when diagnosis was documented in both MDM as applicable and the Disposition within this note       Time User Action Codes Description Comment    3/17/2025  1:39 AM Frank Guerrier [R07.89] Atypical chest pain     3/17/2025  1:39 AM Frank Guerrier Remove [R07.89] Atypical chest pain     3/17/2025  1:39 AM Frank Guerrier [R07.9] Chest pain     3/17/2025  1:40 AM Frank Guerrier [S22.39XA] Rib fracture                  Frank Guerrier MD  03/18/25 0355

## 2025-03-21 DIAGNOSIS — S22.49XA RIB FRACTURES: Primary | ICD-10-CM

## 2025-03-25 DIAGNOSIS — E11.9 TYPE 2 DIABETES MELLITUS WITHOUT COMPLICATION, WITHOUT LONG-TERM CURRENT USE OF INSULIN (HCC): ICD-10-CM

## 2025-04-14 ENCOUNTER — TELEPHONE (OUTPATIENT)
Dept: GASTROENTEROLOGY | Facility: CLINIC | Age: 60
End: 2025-04-14

## 2025-04-14 NOTE — TELEPHONE ENCOUNTER
Spoke to patient regarding insurance coverage for appointment on 4/15 with [facility] that we are non-par with [facility], we would set up as a self pay with a $30 copay, she said she will have to call back to reschedule, she has no money to make payment

## 2025-04-15 ENCOUNTER — HOSPITAL ENCOUNTER (OUTPATIENT)
Facility: HOSPITAL | Age: 60
Setting detail: OBSERVATION
Discharge: HOME | End: 2025-04-18
Attending: STUDENT IN AN ORGANIZED HEALTH CARE EDUCATION/TRAINING PROGRAM | Admitting: INTERNAL MEDICINE
Payer: COMMERCIAL

## 2025-04-15 DIAGNOSIS — R10.9 ABDOMINAL PAIN: Primary | ICD-10-CM

## 2025-04-15 DIAGNOSIS — R06.02 SOB (SHORTNESS OF BREATH): ICD-10-CM

## 2025-04-15 DIAGNOSIS — R79.89 ELEVATED LACTIC ACID LEVEL: ICD-10-CM

## 2025-04-15 DIAGNOSIS — R19.7 DIARRHEA: ICD-10-CM

## 2025-04-15 DIAGNOSIS — J44.1 COPD EXACERBATION (HCC): ICD-10-CM

## 2025-04-15 DIAGNOSIS — J96.11 CHRONIC RESPIRATORY FAILURE WITH HYPOXIA (HCC): ICD-10-CM

## 2025-04-15 PROBLEM — E87.20 LACTIC ACIDOSIS: Status: ACTIVE | Noted: 2025-04-15

## 2025-04-15 LAB
2HR DELTA HS TROPONIN: -3 NG/L
4HR DELTA HS TROPONIN: 1 NG/L
ALBUMIN SERPL BCG-MCNC: 4.1 G/DL (ref 3.5–5)
ALP SERPL-CCNC: 76 U/L (ref 34–104)
ALT SERPL W P-5'-P-CCNC: 21 U/L (ref 7–52)
ANION GAP SERPL CALCULATED.3IONS-SCNC: 11 MMOL/L (ref 4–13)
ANISOCYTOSIS BLD QL SMEAR: PRESENT
AST SERPL W P-5'-P-CCNC: 12 U/L (ref 13–39)
BASOPHILS # BLD MANUAL: 0 THOUSAND/UL (ref 0–0.1)
BASOPHILS NFR MAR MANUAL: 0 % (ref 0–1)
BILIRUB SERPL-MCNC: 0.33 MG/DL (ref 0.2–1)
BILIRUB UR QL STRIP: NEGATIVE
BUN SERPL-MCNC: 14 MG/DL (ref 5–25)
CALCIUM SERPL-MCNC: 9.3 MG/DL (ref 8.4–10.2)
CARDIAC TROPONIN I PNL SERPL HS: 4 NG/L (ref ?–50)
CARDIAC TROPONIN I PNL SERPL HS: 7 NG/L (ref ?–50)
CARDIAC TROPONIN I PNL SERPL HS: 8 NG/L (ref ?–50)
CHLORIDE SERPL-SCNC: 101 MMOL/L (ref 96–108)
CLARITY UR: CLEAR
CO2 SERPL-SCNC: 30 MMOL/L (ref 21–32)
COLOR UR: ABNORMAL
CREAT SERPL-MCNC: 0.79 MG/DL (ref 0.6–1.3)
EOSINOPHIL # BLD MANUAL: 0 THOUSAND/UL (ref 0–0.4)
EOSINOPHIL NFR BLD MANUAL: 0 % (ref 0–6)
ERYTHROCYTE [DISTWIDTH] IN BLOOD BY AUTOMATED COUNT: 14.8 % (ref 11.6–15.1)
EST. AVERAGE GLUCOSE BLD GHB EST-MCNC: 171 MG/DL
GFR SERPL CREATININE-BSD FRML MDRD: 81 ML/MIN/1.73SQ M
GLUCOSE SERPL-MCNC: 128 MG/DL (ref 65–140)
GLUCOSE SERPL-MCNC: 153 MG/DL (ref 65–140)
GLUCOSE SERPL-MCNC: 170 MG/DL (ref 65–140)
GLUCOSE SERPL-MCNC: 293 MG/DL (ref 65–140)
GLUCOSE UR STRIP-MCNC: ABNORMAL MG/DL
HBA1C MFR BLD: 7.6 %
HCT VFR BLD AUTO: 37 % (ref 34.8–46.1)
HGB BLD-MCNC: 11.6 G/DL (ref 11.5–15.4)
HGB UR QL STRIP.AUTO: NEGATIVE
KETONES UR STRIP-MCNC: NEGATIVE MG/DL
LACTATE SERPL-SCNC: 1.3 MMOL/L (ref 0.5–2)
LACTATE SERPL-SCNC: 3.9 MMOL/L (ref 0.5–2)
LEUKOCYTE ESTERASE UR QL STRIP: NEGATIVE
LIPASE SERPL-CCNC: 17 U/L (ref 11–82)
LYMPHOCYTES # BLD AUTO: 19 % (ref 14–44)
LYMPHOCYTES # BLD AUTO: 2.84 THOUSAND/UL (ref 0.6–4.47)
MCH RBC QN AUTO: 30 PG (ref 26.8–34.3)
MCHC RBC AUTO-ENTMCNC: 31.4 G/DL (ref 31.4–37.4)
MCV RBC AUTO: 96 FL (ref 82–98)
MONOCYTES # BLD AUTO: 0.85 THOUSAND/UL (ref 0–1.22)
MONOCYTES NFR BLD: 6 % (ref 4–12)
NEUTROPHILS # BLD MANUAL: 10.51 THOUSAND/UL (ref 1.85–7.62)
NEUTS SEG NFR BLD AUTO: 74 % (ref 43–75)
NITRITE UR QL STRIP: NEGATIVE
PH UR STRIP.AUTO: 5.5 [PH]
PLATELET # BLD AUTO: 289 THOUSANDS/UL (ref 149–390)
PLATELET BLD QL SMEAR: ADEQUATE
PMV BLD AUTO: 10.2 FL (ref 8.9–12.7)
POLYCHROMASIA BLD QL SMEAR: PRESENT
POTASSIUM SERPL-SCNC: 3.2 MMOL/L (ref 3.5–5.3)
PROCALCITONIN SERPL-MCNC: 0.07 NG/ML
PROT SERPL-MCNC: 6.9 G/DL (ref 6.4–8.4)
PROT UR STRIP-MCNC: NEGATIVE MG/DL
RBC # BLD AUTO: 3.87 MILLION/UL (ref 3.81–5.12)
RBC MORPH BLD: PRESENT
SODIUM SERPL-SCNC: 142 MMOL/L (ref 135–147)
SP GR UR STRIP.AUTO: 1.02 (ref 1–1.03)
UROBILINOGEN UR STRIP-ACNC: <2 MG/DL
VARIANT LYMPHS # BLD AUTO: 1 %
WBC # BLD AUTO: 14.2 THOUSAND/UL (ref 4.31–10.16)

## 2025-04-15 PROCEDURE — 83605 ASSAY OF LACTIC ACID: CPT | Performed by: STUDENT IN AN ORGANIZED HEALTH CARE EDUCATION/TRAINING PROGRAM

## 2025-04-15 PROCEDURE — 80053 COMPREHEN METABOLIC PANEL: CPT | Performed by: STUDENT IN AN ORGANIZED HEALTH CARE EDUCATION/TRAINING PROGRAM

## 2025-04-15 PROCEDURE — 94760 N-INVAS EAR/PLS OXIMETRY 1: CPT

## 2025-04-15 PROCEDURE — 99285 EMERGENCY DEPT VISIT HI MDM: CPT | Performed by: STUDENT IN AN ORGANIZED HEALTH CARE EDUCATION/TRAINING PROGRAM

## 2025-04-15 PROCEDURE — 85007 BL SMEAR W/DIFF WBC COUNT: CPT | Performed by: STUDENT IN AN ORGANIZED HEALTH CARE EDUCATION/TRAINING PROGRAM

## 2025-04-15 PROCEDURE — 83036 HEMOGLOBIN GLYCOSYLATED A1C: CPT | Performed by: INTERNAL MEDICINE

## 2025-04-15 PROCEDURE — 36415 COLL VENOUS BLD VENIPUNCTURE: CPT | Performed by: STUDENT IN AN ORGANIZED HEALTH CARE EDUCATION/TRAINING PROGRAM

## 2025-04-15 PROCEDURE — 96361 HYDRATE IV INFUSION ADD-ON: CPT

## 2025-04-15 PROCEDURE — 99223 1ST HOSP IP/OBS HIGH 75: CPT | Performed by: INTERNAL MEDICINE

## 2025-04-15 PROCEDURE — 84484 ASSAY OF TROPONIN QUANT: CPT | Performed by: STUDENT IN AN ORGANIZED HEALTH CARE EDUCATION/TRAINING PROGRAM

## 2025-04-15 PROCEDURE — 99285 EMERGENCY DEPT VISIT HI MDM: CPT

## 2025-04-15 PROCEDURE — 94640 AIRWAY INHALATION TREATMENT: CPT

## 2025-04-15 PROCEDURE — 93005 ELECTROCARDIOGRAM TRACING: CPT

## 2025-04-15 PROCEDURE — 84145 PROCALCITONIN (PCT): CPT | Performed by: STUDENT IN AN ORGANIZED HEALTH CARE EDUCATION/TRAINING PROGRAM

## 2025-04-15 PROCEDURE — 85027 COMPLETE CBC AUTOMATED: CPT | Performed by: STUDENT IN AN ORGANIZED HEALTH CARE EDUCATION/TRAINING PROGRAM

## 2025-04-15 PROCEDURE — 94664 DEMO&/EVAL PT USE INHALER: CPT

## 2025-04-15 PROCEDURE — 96374 THER/PROPH/DIAG INJ IV PUSH: CPT

## 2025-04-15 PROCEDURE — 84484 ASSAY OF TROPONIN QUANT: CPT | Performed by: INTERNAL MEDICINE

## 2025-04-15 PROCEDURE — 83690 ASSAY OF LIPASE: CPT | Performed by: STUDENT IN AN ORGANIZED HEALTH CARE EDUCATION/TRAINING PROGRAM

## 2025-04-15 PROCEDURE — 82948 REAGENT STRIP/BLOOD GLUCOSE: CPT

## 2025-04-15 RX ORDER — SODIUM CHLORIDE FOR INHALATION 0.9 %
3 VIAL, NEBULIZER (ML) INHALATION
Status: DISCONTINUED | OUTPATIENT
Start: 2025-04-15 | End: 2025-04-15

## 2025-04-15 RX ORDER — FUROSEMIDE 40 MG/1
40 TABLET ORAL DAILY
Status: DISCONTINUED | OUTPATIENT
Start: 2025-04-16 | End: 2025-04-18 | Stop reason: HOSPADM

## 2025-04-15 RX ORDER — METHYLPREDNISOLONE SODIUM SUCCINATE 40 MG/ML
40 INJECTION, POWDER, LYOPHILIZED, FOR SOLUTION INTRAMUSCULAR; INTRAVENOUS DAILY
Status: DISCONTINUED | OUTPATIENT
Start: 2025-04-15 | End: 2025-04-17

## 2025-04-15 RX ORDER — MORPHINE SULFATE 4 MG/ML
4 INJECTION, SOLUTION INTRAMUSCULAR; INTRAVENOUS ONCE
Status: COMPLETED | OUTPATIENT
Start: 2025-04-15 | End: 2025-04-15

## 2025-04-15 RX ORDER — FAMOTIDINE 20 MG/1
20 TABLET, FILM COATED ORAL 2 TIMES DAILY
Status: DISCONTINUED | OUTPATIENT
Start: 2025-04-15 | End: 2025-04-18 | Stop reason: HOSPADM

## 2025-04-15 RX ORDER — ENOXAPARIN SODIUM 100 MG/ML
40 INJECTION SUBCUTANEOUS 2 TIMES DAILY
Status: DISCONTINUED | OUTPATIENT
Start: 2025-04-15 | End: 2025-04-18 | Stop reason: HOSPADM

## 2025-04-15 RX ORDER — BENZONATATE 100 MG/1
100 CAPSULE ORAL 3 TIMES DAILY
Status: DISCONTINUED | OUTPATIENT
Start: 2025-04-15 | End: 2025-04-18 | Stop reason: HOSPADM

## 2025-04-15 RX ORDER — INSULIN LISPRO 100 [IU]/ML
1-5 INJECTION, SOLUTION INTRAVENOUS; SUBCUTANEOUS
Status: DISCONTINUED | OUTPATIENT
Start: 2025-04-15 | End: 2025-04-18 | Stop reason: HOSPADM

## 2025-04-15 RX ORDER — BISACODYL 10 MG
10 SUPPOSITORY, RECTAL RECTAL DAILY PRN
Status: DISCONTINUED | OUTPATIENT
Start: 2025-04-15 | End: 2025-04-18 | Stop reason: HOSPADM

## 2025-04-15 RX ORDER — ONDANSETRON 2 MG/ML
4 INJECTION INTRAMUSCULAR; INTRAVENOUS ONCE
Status: COMPLETED | OUTPATIENT
Start: 2025-04-15 | End: 2025-04-15

## 2025-04-15 RX ORDER — IPRATROPIUM BROMIDE AND ALBUTEROL SULFATE 2.5; .5 MG/3ML; MG/3ML
3 SOLUTION RESPIRATORY (INHALATION)
Status: DISCONTINUED | OUTPATIENT
Start: 2025-04-15 | End: 2025-04-17

## 2025-04-15 RX ORDER — METOPROLOL SUCCINATE 25 MG/1
25 TABLET, EXTENDED RELEASE ORAL DAILY
Status: DISCONTINUED | OUTPATIENT
Start: 2025-04-16 | End: 2025-04-18 | Stop reason: HOSPADM

## 2025-04-15 RX ORDER — POTASSIUM CHLORIDE 14.9 MG/ML
20 INJECTION INTRAVENOUS ONCE
Status: DISCONTINUED | OUTPATIENT
Start: 2025-04-15 | End: 2025-04-15

## 2025-04-15 RX ORDER — ACETAMINOPHEN 325 MG/1
650 TABLET ORAL EVERY 6 HOURS
Status: DISCONTINUED | OUTPATIENT
Start: 2025-04-15 | End: 2025-04-17

## 2025-04-15 RX ORDER — GUAIFENESIN 100 MG/5ML
200 SOLUTION ORAL EVERY 4 HOURS PRN
Status: DISCONTINUED | OUTPATIENT
Start: 2025-04-15 | End: 2025-04-18 | Stop reason: HOSPADM

## 2025-04-15 RX ORDER — AZITHROMYCIN 500 MG/1
500 TABLET, FILM COATED ORAL EVERY 24 HOURS
Status: COMPLETED | OUTPATIENT
Start: 2025-04-15 | End: 2025-04-17

## 2025-04-15 RX ORDER — PANTOPRAZOLE SODIUM 40 MG/10ML
40 INJECTION, POWDER, LYOPHILIZED, FOR SOLUTION INTRAVENOUS
Status: DISCONTINUED | OUTPATIENT
Start: 2025-04-15 | End: 2025-04-16

## 2025-04-15 RX ORDER — IPRATROPIUM BROMIDE AND ALBUTEROL SULFATE 2.5; .5 MG/3ML; MG/3ML
3 SOLUTION RESPIRATORY (INHALATION) 4 TIMES DAILY
Status: DISCONTINUED | OUTPATIENT
Start: 2025-04-15 | End: 2025-04-15

## 2025-04-15 RX ORDER — LEVALBUTEROL INHALATION SOLUTION 1.25 MG/3ML
1.25 SOLUTION RESPIRATORY (INHALATION)
Status: DISCONTINUED | OUTPATIENT
Start: 2025-04-15 | End: 2025-04-15

## 2025-04-15 RX ORDER — ONDANSETRON 2 MG/ML
4 INJECTION INTRAMUSCULAR; INTRAVENOUS EVERY 6 HOURS PRN
Status: DISCONTINUED | OUTPATIENT
Start: 2025-04-15 | End: 2025-04-18 | Stop reason: HOSPADM

## 2025-04-15 RX ORDER — ALBUTEROL SULFATE 90 UG/1
2 INHALANT RESPIRATORY (INHALATION) EVERY 4 HOURS PRN
Status: DISCONTINUED | OUTPATIENT
Start: 2025-04-15 | End: 2025-04-15

## 2025-04-15 RX ORDER — ALBUTEROL SULFATE 0.83 MG/ML
2.5 SOLUTION RESPIRATORY (INHALATION) EVERY 4 HOURS PRN
Status: DISCONTINUED | OUTPATIENT
Start: 2025-04-15 | End: 2025-04-18 | Stop reason: HOSPADM

## 2025-04-15 RX ORDER — LEVOTHYROXINE SODIUM 50 UG/1
50 TABLET ORAL DAILY
Status: DISCONTINUED | OUTPATIENT
Start: 2025-04-15 | End: 2025-04-18 | Stop reason: HOSPADM

## 2025-04-15 RX ORDER — SODIUM CHLORIDE, SODIUM LACTATE, POTASSIUM CHLORIDE, CALCIUM CHLORIDE 600; 310; 30; 20 MG/100ML; MG/100ML; MG/100ML; MG/100ML
75 INJECTION, SOLUTION INTRAVENOUS CONTINUOUS
Status: DISCONTINUED | OUTPATIENT
Start: 2025-04-15 | End: 2025-04-15

## 2025-04-15 RX ORDER — LIDOCAINE 50 MG/G
1 PATCH TOPICAL DAILY
Status: DISCONTINUED | OUTPATIENT
Start: 2025-04-16 | End: 2025-04-18 | Stop reason: HOSPADM

## 2025-04-15 RX ORDER — MAGNESIUM HYDROXIDE/ALUMINUM HYDROXICE/SIMETHICONE 120; 1200; 1200 MG/30ML; MG/30ML; MG/30ML
15 SUSPENSION ORAL EVERY 4 HOURS PRN
Status: DISCONTINUED | OUTPATIENT
Start: 2025-04-15 | End: 2025-04-18 | Stop reason: HOSPADM

## 2025-04-15 RX ORDER — FLUTICASONE FUROATE AND VILANTEROL 200; 25 UG/1; UG/1
1 POWDER RESPIRATORY (INHALATION) DAILY
Status: DISCONTINUED | OUTPATIENT
Start: 2025-04-16 | End: 2025-04-18 | Stop reason: HOSPADM

## 2025-04-15 RX ORDER — SERTRALINE HYDROCHLORIDE 25 MG/1
25 TABLET, FILM COATED ORAL DAILY
Status: DISCONTINUED | OUTPATIENT
Start: 2025-04-16 | End: 2025-04-17

## 2025-04-15 RX ORDER — MORPHINE SULFATE 10 MG/ML
6 INJECTION, SOLUTION INTRAMUSCULAR; INTRAVENOUS ONCE
Status: COMPLETED | OUTPATIENT
Start: 2025-04-15 | End: 2025-04-15

## 2025-04-15 RX ORDER — SODIUM CHLORIDE, SODIUM LACTATE, POTASSIUM CHLORIDE, CALCIUM CHLORIDE 600; 310; 30; 20 MG/100ML; MG/100ML; MG/100ML; MG/100ML
50 INJECTION, SOLUTION INTRAVENOUS CONTINUOUS
Status: DISPENSED | OUTPATIENT
Start: 2025-04-15 | End: 2025-04-16

## 2025-04-15 RX ORDER — METHOCARBAMOL 500 MG/1
500 TABLET, FILM COATED ORAL 2 TIMES DAILY PRN
Status: DISCONTINUED | OUTPATIENT
Start: 2025-04-15 | End: 2025-04-18 | Stop reason: HOSPADM

## 2025-04-15 RX ADMIN — METHOCARBAMOL 500 MG: 500 TABLET ORAL at 23:41

## 2025-04-15 RX ADMIN — ENOXAPARIN SODIUM 40 MG: 40 INJECTION SUBCUTANEOUS at 22:29

## 2025-04-15 RX ADMIN — MORPHINE SULFATE 6 MG: 10 INJECTION INTRAVENOUS at 16:59

## 2025-04-15 RX ADMIN — ACETAMINOPHEN 650 MG: 325 TABLET, FILM COATED ORAL at 23:41

## 2025-04-15 RX ADMIN — PANTOPRAZOLE SODIUM 40 MG: 40 INJECTION, POWDER, FOR SOLUTION INTRAVENOUS at 18:32

## 2025-04-15 RX ADMIN — SODIUM CHLORIDE 500 ML: 0.9 INJECTION, SOLUTION INTRAVENOUS at 14:26

## 2025-04-15 RX ADMIN — INSULIN LISPRO 2 UNITS: 100 INJECTION, SOLUTION INTRAVENOUS; SUBCUTANEOUS at 22:29

## 2025-04-15 RX ADMIN — FAMOTIDINE 20 MG: 20 TABLET, FILM COATED ORAL at 18:31

## 2025-04-15 RX ADMIN — MORPHINE SULFATE 4 MG: 4 INJECTION INTRAVENOUS at 14:50

## 2025-04-15 RX ADMIN — ONDANSETRON 4 MG: 2 INJECTION INTRAMUSCULAR; INTRAVENOUS at 17:03

## 2025-04-15 RX ADMIN — AZITHROMYCIN 500 MG: 500 TABLET, FILM COATED ORAL at 18:32

## 2025-04-15 RX ADMIN — ONDANSETRON 4 MG: 2 INJECTION INTRAMUSCULAR; INTRAVENOUS at 23:41

## 2025-04-15 RX ADMIN — BENZONATATE 100 MG: 100 CAPSULE ORAL at 22:29

## 2025-04-15 RX ADMIN — ACETAMINOPHEN 650 MG: 325 TABLET, FILM COATED ORAL at 18:35

## 2025-04-15 RX ADMIN — METHYLPREDNISOLONE SODIUM SUCCINATE 40 MG: 40 INJECTION, POWDER, FOR SOLUTION INTRAMUSCULAR; INTRAVENOUS at 18:31

## 2025-04-15 RX ADMIN — IPRATROPIUM BROMIDE AND ALBUTEROL SULFATE 3 ML: 2.5; .5 SOLUTION RESPIRATORY (INHALATION) at 20:52

## 2025-04-15 RX ADMIN — SODIUM CHLORIDE, SODIUM LACTATE, POTASSIUM CHLORIDE, AND CALCIUM CHLORIDE 50 ML/HR: .6; .31; .03; .02 INJECTION, SOLUTION INTRAVENOUS at 18:33

## 2025-04-15 RX ADMIN — MORPHINE SULFATE 2 MG: 2 INJECTION, SOLUTION INTRAMUSCULAR; INTRAVENOUS at 20:26

## 2025-04-15 NOTE — ASSESSMENT & PLAN NOTE
Patient has been having abdominal pain and diarrhea for the last 3 weeks.  Has got investigated multiple times for the same and she said that no one has found why she is having the diarrhea.  Patient was in [facility] emergency room less than 24 hours ago.  Patient comes to [facility] ER because of the fact that she states that her abdominal pain and nausea and vomiting have worsened and she is not able to keep anything down.  She thinks that she may benefit from GI consult and workup.  C. difficile PCR has been ordered although likelihood of C. difficile is less.  Continue Protonix and Zofran as needed  GI has been consulted per patient request

## 2025-04-15 NOTE — ASSESSMENT & PLAN NOTE
Likely secondary to COPD, however patient states that she also has history of chronic diastolic heart failure which was diagnosed by her cardiologist office.  Will consult cardiology.  Continue oral Lasix.  Patient does not look fluid overloaded on my exam.

## 2025-04-15 NOTE — PLAN OF CARE
Problem: PAIN - ADULT  Goal: Verbalizes/displays adequate comfort level or baseline comfort level  Description: Interventions:- Encourage patient to monitor pain and request assistance- Assess pain using appropriate pain scale- Administer analgesics based on type and severity of pain and evaluate response- Implement non-pharmacological measures as appropriate and evaluate response- Consider cultural and social influences on pain and pain management- Notify physician/advanced practitioner if interventions unsuccessful or patient reports new pain  Outcome: Progressing     Problem: INFECTION - ADULT  Goal: Absence or prevention of progression during hospitalization  Description: INTERVENTIONS:- Assess and monitor for signs and symptoms of infection- Monitor lab/diagnostic results- Monitor all insertion sites, i.e. indwelling lines, tubes, and drains- Monitor endotracheal if appropriate and nasal secretions for changes in amount and color- Starlight appropriate cooling/warming therapies per order- Administer medications as ordered- Instruct and encourage patient and family to use good hand hygiene technique- Identify and instruct in appropriate isolation precautions for identified infection/condition  Outcome: Progressing  Goal: Absence of fever/infection during neutropenic period  Description: INTERVENTIONS:- Monitor WBC  Outcome: Progressing     Problem: SAFETY ADULT  Goal: Patient will remain free of falls  Description: INTERVENTIONS:- Educate patient/family on patient safety including physical limitations- Instruct patient to call for assistance with activity - Consult OT/PT to assist with strengthening/mobility - Keep Call bell within reach- Keep bed low and locked with side rails adjusted as appropriate- Keep care items and personal belongings within reach- Initiate and maintain comfort rounds- Make Fall Risk Sign visible to staff- Offer Toileting every 2 Hours, in advance of need- Initiate/Maintain alarm-  Obtain necessary fall risk management equipment: - Apply yellow socks and bracelet for high fall risk patients- Consider moving patient to room near nurses station  Outcome: Progressing  Goal: Maintain or return to baseline ADL function  Description: INTERVENTIONS:-  Assess patient's ability to carry out ADLs; assess patient's baseline for ADL function and identify physical deficits which impact ability to perform ADLs (bathing, care of mouth/teeth, toileting, grooming, dressing, etc.)- Assess/evaluate cause of self-care deficits - Assess range of motion- Assess patient's mobility; develop plan if impaired- Assess patient's need for assistive devices and provide as appropriate- Encourage maximum independence but intervene and supervise when necessary- Involve family in performance of ADLs- Assess for home care needs following discharge - Consider OT consult to assist with ADL evaluation and planning for discharge- Provide patient education as appropriate  Outcome: Progressing  Goal: Maintains/Returns to pre admission functional level  Description: INTERVENTIONS:- Perform AM-PAC 6 Click Basic Mobility/ Daily Activity assessment daily.- Set and communicate daily mobility goal to care team and patient/family/caregiver. - Collaborate with rehabilitation services on mobility goals if consulted- Perform Range of Motion 2 times a day.- Reposition patient every 2 hours.- Dangle patient 2 times a day- Stand patient 2 times a day- Ambulate patient 2 times a day- Out of bed to chair 2 times a day - Out of bed for meals 2 times a day- Out of bed for toileting- Record patient progress and toleration of activity level   Outcome: Progressing     Problem: DISCHARGE PLANNING  Goal: Discharge to home or other facility with appropriate resources  Description: INTERVENTIONS:- Identify barriers to discharge w/patient and caregiver- Arrange for needed discharge resources and transportation as appropriate- Identify discharge learning  needs (meds, wound care, etc.)- Arrange for interpretive services to assist at discharge as needed- Refer to Case Management Department for coordinating discharge planning if the patient needs post-hospital services based on physician/advanced practitioner order or complex needs related to functional status, cognitive ability, or social support system  Outcome: Progressing     Problem: Knowledge Deficit  Goal: Patient/family/caregiver demonstrates understanding of disease process, treatment plan, medications, and discharge instructions  Description: Complete learning assessment and assess knowledge base.Interventions:- Provide teaching at level of understanding- Provide teaching via preferred learning methods  Outcome: Progressing

## 2025-04-15 NOTE — Clinical Note
Case was discussed with hospitalist and the patient's admission status was agreed to be Admission Status: observation status to the service of Dr. Arizmendi

## 2025-04-15 NOTE — ASSESSMENT & PLAN NOTE
Patient does have significant shortness of breath and wheezing.  Will give intravenous steroids, nebulization and closely follow-up the patient.  Of note, patient states that she did go to a doctor Marie's office and that he said that she would need further cardiac evaluation including possible cardiac catheterization.  Cardiology consulted  Continue oxygen as needed  Patient does have leukocytosis and we will give the patient azithromycin as monotherapy at this point of time.

## 2025-04-15 NOTE — RESPIRATORY THERAPY NOTE
RT Protocol Note  Nanci Navarro 60 y.o. female MRN: [id number]  Unit/Bed#: -01 Encounter: [id number]    Assessment    Principal Problem:    Abdominal pain  Active Problems:    Smoker    Compression fracture of L1 vertebra ([facility])    Ambulatory dysfunction    Gastroesophageal reflux disease without esophagitis    Diabetes mellitus ([facility])    Anemia    Class 3 severe obesity due to excess calories with serious comorbidity and body mass index (BMI) of 45.0 to 49.9 in adult ([facility])    SOB (shortness of breath)    Hypothyroidism    COPD exacerbation ([facility])    Lactic acidosis      Home Pulmonary Medications:  duoneb       Past Medical History:   Diagnosis Date    Achalasia     Acute respiratory failure ([facility]) 01/15/2023    Acute respiratory failure with hypoxia ([facility]) 02/07/2024    Back pain     Cancer ([facility])     Ovarian    Diabetes mellitus ([facility])     DVT (deep venous thrombosis) ([facility])     Electrolyte abnormality 01/14/2024    Fall 01/02/2023    Had a fall a week ago slipped in the mud onto her back.  X-rays with no fracture.  She has chronic L1 fracture.  She is on pain medication at home for chronic back pain.      Fibromyalgia     Hypertension     Lupus      Social History     Socioeconomic History    Marital status: /Civil Union     Spouse name: Not on file    Number of children: Not on file    Years of education: Not on file    Highest education level: Not on file   Occupational History    Not on file   Tobacco Use    Smoking status: Every Day     Current packs/day: 0.50     Types: Cigarettes    Smokeless tobacco: Never   Vaping Use    Vaping status: Never Used   Substance and Sexual Activity    Alcohol use: Never    Drug use: Never    Sexual activity: Yes   Other Topics Concern    Not on file   Social History Narrative    Not on file     Social Drivers of Health     Financial Resource Strain: At Risk (3/22/2025)    Received from [facility]    Financial Insecurity     In the last 12 months did  you skip medications to save money?: Yes     In the last 12 months was there a time when you needed to see a doctor but could not because of cost?: Yes   Food Insecurity: No Food Insecurity (4/15/2025)    Nursing - Inadequate Food Risk Classification     Worried About Running Out of Food in the Last Year: Not on file     Ran Out of Food in the Last Year: Not on file     Ran Out of Food in the Last Year: Never true   Recent Concern: Food Insecurity - Food Insecurity Present (3/22/2025)    Received from [facility] [address]    Food Insecurity     In the last 12 months did you ever eat less than you felt you should because there wasn't enough money for food?: Yes   Transportation Needs: No Transportation Needs (4/15/2025)    Nursing - Transportation Risk Classification     Lack of Transportation: Not on file     Lack of Transportation: No   Recent Concern: Transportation Needs - Unmet Transportation Needs (3/28/2025)    Received from [facility] [address]    OASIS : Transportation     Lack of Transportation (Medical): Yes     Lack of Transportation (Non-Medical): Yes     Patient Unable or Declines to Respond: No   Physical Activity: Not on file   Stress: Not on file   Social Connections: Socially Isolated (3/22/2025)    Received from [facility] [address]    Social Connection     Do you often feel lonely?: Yes   Intimate Partner Violence: Unknown (4/15/2025)    Nursing IPS     Feels Physically and Emotionally Safe: Not on file     Physically Hurt by Someone: Not on file     Humiliated or Emotionally Abused by Someone: Not on file     Physically Hurt by Someone: No     Hurt or Threatened by Someone: No   Housing Stability: Unknown (4/15/2025)    Nursing: Inadequate Housing Risk Classification     Has Housing: Not on file     Worried About Losing Housing: Not on file     Unable to Get Utilities: Not on file     Unable to Pay for Housing in the Last Year: No     Has Housin   Recent Concern:  Housing Stability - High Risk (2/13/2025)    Received from [facility]    Housing Stability Vital Sign     Unable to Pay for Housing in the Last Year: No     Number of Times Moved in the Last Year: 2     Homeless in the Last Year: No       Subjective         Objective    Physical Exam:   Assessment Type: Assess only  General Appearance: Awake, Alert  Respiratory Pattern: Labored, Dyspnea at rest, Dyspnea with exertion  Chest Assessment: Chest expansion symmetrical  Bilateral Breath Sounds: Diminished, Expiratory wheezes    Vitals:  Blood pressure 110/54, pulse 79, temperature 98.6 °F (37 °C), temperature source Temporal, resp. rate 19, SpO2 100%.          Imaging and other studies:           Plan    Respiratory Plan: Mild Distress pathway, Home Bronchodilator Patient pathway        Resp Comments: pt with hx of copd, bilateral wheezing, admitted with exacerbation, uses duo neb at home qid  will order duo Q6

## 2025-04-15 NOTE — ASSESSMENT & PLAN NOTE
Likely chronic.  Monitor hemoglobin periodically while hospitalized and transfuse if hemoglobin less than 7 or patient hemodynamically unstable

## 2025-04-15 NOTE — H&P
H&P - Hospitalist   Name: Nanci Navarro 60 y.o. female I MRN: [id number]  Unit/Bed#: ED 12 I Date of Admission: 4/15/2025   Date of Service: 4/15/2025 I Hospital Day: 0     Assessment & Plan  Abdominal pain  Patient has been having abdominal pain and diarrhea for the last 3 weeks.  Has got investigated multiple times for the same and she said that no one has found why she is having the diarrhea.  Patient was in [facility] emergency room less than 24 hours ago.  Patient comes to [facility] ER because of the fact that she states that her abdominal pain and nausea and vomiting have worsened and she is not able to keep anything down.  She thinks that she may benefit from GI consult and workup.  C. difficile PCR has been ordered although likelihood of C. difficile is less.  Continue Protonix and Zofran as needed  GI has been consulted per patient request  COPD exacerbation (HCC)  Patient does have significant shortness of breath and wheezing.  Will give intravenous steroids, nebulization and closely follow-up the patient.  Of note, patient states that she did go to a doctor Salazar's office and that he said that she would need further cardiac evaluation including possible cardiac catheterization.  Cardiology consulted  Continue oxygen as needed  Patient does have leukocytosis and we will give the patient azithromycin as monotherapy at this point of time.  Smoker  Patient refusing nicotine patch.  Counseled to quit smoking  Compression fracture of L1 vertebra (HCC)  Will get physical and occupational continue pain medications as needed for the patient.  Limit opiates given COPD history.  Ambulatory dysfunction  Physical and Occupational Therapy ordered  Gastroesophageal reflux disease without esophagitis  Continue Maalox as needed, Pepcid and Protonix IV  Diabetes mellitus (HCC)  Lab Results   Component Value Date    HGBA1C 7.8 (H) 04/14/2025       Recent Labs     04/15/25  1706   POCGLU 128        Blood Sugar Average: Last 72 hrs:  (P) 128  Hold metformin and Jardiance.  Insulin sliding scale while hospitalized.  Given need for IV steroids for COPD exacerbation I expect the blood sugar to be on the higher side and we will need to adjust the regimen  Anemia  Likely chronic.  Monitor hemoglobin periodically while hospitalized and transfuse if hemoglobin less than 7 or patient hemodynamically unstable  Class 3 severe obesity due to excess calories with serious comorbidity and body mass index (BMI) of 45.0 to 49.9 in adult (HCC)  Would benefit from weight loss and lifestyle modifications  SOB (shortness of breath)  Likely secondary to COPD, however patient states that she also has history of chronic diastolic heart failure which was diagnosed by her cardiologist office.  Will consult cardiology.  Continue oral Lasix.  Patient does not look fluid overloaded on my exam.  Hypothyroidism  Continue levothyroxine  Lactic acidosis  Patient has elevated lactic acid on admission, but no evidence of sepsis.  Will give guarded IV fluids and monitor lactic acid levels again in a.m.      VTE Pharmacologic Prophylaxis: VTE Score: 3 Lovenox 40 mg twice a day  Code Status: Level 1 - Full Code discussed with patient  Discussion with family: Patient declined call to .     Anticipated Length of Stay: Patient will be admitted on an observation basis with an anticipated length of stay of less than 2 midnights secondary to shortness of breath, COPD exacerbation and abdominal pain.    History of Present Illness   Chief Complaint: Shortness of breath and abdominal pain    Nanci Navarro is a 60 y.o. female with a PMH of smoking, obesity, COPD with bronchitis predominant type and hypothyroidism who presents with shortness of breath and abdominal pain.  Patient went to [facility] emergency room and had imaging and was sent out from the ER back home.  At home the patient was not doing well and said that she  continued to have abdominal pain nausea and vomiting.  She also reports worsening shortness of breath.  She went to her cardiologist office and was referred to the emergency room.  In the emergency room the patient was found to have mild lactic acidosis along with wheezing.  Patient does not complain of any chest pain.  She does not complain of any leg swelling, palpitations or diaphoresis.  The shortness of breath is present at rest worsens with exertion.  It is worse on lying down flat and better on sitting up.  Mild cough associated no expectoration.  Does not have any associated high fever or chills at this point of time.  No hemoptysis.  Patient does also have abdominal pain the pain is mainly in the epigastric area.  Patient states there is some mild heartburn but it is actually also no more than a heartburn in her own words.  She is not able to keep anything down.  Anything she eats she is throwing up.  However the patient is requesting a soup for dinner and she wants to try the same.    Review of Systems   Constitutional:  Positive for activity change, appetite change and fatigue. Negative for fever.   HENT:  Negative for dental problem, hearing loss, rhinorrhea and tinnitus.    Eyes:  Negative for discharge and redness.   Respiratory:  Positive for cough, shortness of breath and wheezing.    Cardiovascular:  Negative for chest pain, palpitations and leg swelling.   Gastrointestinal:  Positive for abdominal pain, diarrhea, nausea and vomiting.   Endocrine: Negative for heat intolerance and polyphagia.   Genitourinary:  Negative for dysuria and vaginal bleeding.   Musculoskeletal:  Positive for back pain. Negative for arthralgias.   Allergic/Immunologic: Negative for food allergies and immunocompromised state.   Neurological:  Positive for weakness. Negative for dizziness, seizures and numbness.   Hematological:  Negative for adenopathy. Does not bruise/bleed easily.   Psychiatric/Behavioral:  Negative for  behavioral problems, confusion and sleep disturbance.        Historical Information   Past Medical History:   Diagnosis Date    Achalasia     Acute respiratory failure ([facility]) 01/15/2023    Acute respiratory failure with hypoxia ([facility]) 02/07/2024    Back pain     Cancer ([facility])     Ovarian    Diabetes mellitus ([facility])     DVT (deep venous thrombosis) ([facility])     Electrolyte abnormality 01/14/2024    Fall 01/02/2023    Had a fall a week ago slipped in the mud onto her back.  X-rays with no fracture.  She has chronic L1 fracture.  She is on pain medication at home for chronic back pain.      Fibromyalgia     Hypertension     Lupus      Past Surgical History:   Procedure Laterality Date    HYSTERECTOMY      NECK SURGERY       Social History     Tobacco Use    Smoking status: Every Day     Current packs/day: 0.50     Types: Cigarettes    Smokeless tobacco: Never   Vaping Use    Vaping status: Never Used   Substance and Sexual Activity    Alcohol use: Never    Drug use: Never    Sexual activity: Yes     E-Cigarette/Vaping    E-Cigarette Use Never User      E-Cigarette/Vaping Substances    Nicotine No     THC No     CBD No     Flavoring No     Other No     Unknown No      Family history non-contributory asked and reviewed by me in detail  Social History:    Patient Pre-hospital Living Situation: Home  Patient Pre-hospital Level of Mobility: walks  Patient Pre-hospital Diet Restrictions: Heart healthy diet    Meds/Allergies   I have reviewed home medications with patient personally.  Prior to Admission medications    Medication Sig Start Date End Date Taking? Authorizing Provider   acetaminophen (TYLENOL) 325 mg tablet Take 2 tablets (650 mg total) by mouth every 6 (six) hours 1/17/24   Grant Alonso MD   albuterol (PROVENTIL HFA,VENTOLIN HFA) 90 mcg/act inhaler Inhale 2 puffs every 4 (four) hours as needed for wheezing 10/8/22   Brad Jaffe MD   aluminum-magnesium hydroxide-simethicone (MAALOX MAX) 400-400-40 MG/5ML  suspension Take 5 mL by mouth every 6 (six) hours as needed for indigestion or heartburn 9/17/24   Manda Alejandro MD   famotidine (PEPCID) 20 mg tablet Take 1 tablet (20 mg total) by mouth 2 (two) times a day  Patient taking differently: Take 20 mg by mouth 2 (two) times a day Has not yet started medication 02/04/25 2/1/25   Wiliam Brandt MD   furosemide (LASIX) 40 mg tablet Take 1 tablet (40 mg total) by mouth daily 2/8/24 9/19/24  NII Cheng   ipratropium-albuterol (DUO-NEB) 0.5-2.5 mg/3 mL nebulizer solution     Historical Provider, MD   levothyroxine 50 mcg tablet Take 1 tablet (50 mcg total) by mouth in the morning 2/5/25   Natividad Robertson PA-C   lidocaine (Lidoderm) 5 % Apply 1 patch topically over 12 hours daily Remove & Discard patch within 12 hours or as directed by MD 3/17/25   Frank Guerrier MD   metFORMIN (GLUCOPHAGE) 500 mg tablet TAKE 1 TABLET (500 MG TOTAL) BY MOUTH DAILY WITH BREAKFAST 3/26/25   Natividad Robertson PA-C   methocarbamol (ROBAXIN) 500 mg tablet Take 1 tablet (500 mg total) by mouth 2 (two) times a day as needed for muscle spasms 3/17/25   Frank Guerrier MD   metoprolol succinate (TOPROL-XL) 25 mg 24 hr tablet Take 25 mg by mouth daily 12/20/23   Historical Provider, MD   ondansetron (ZOFRAN-ODT) 4 mg disintegrating tablet Take 1 tablet (4 mg total) by mouth every 8 (eight) hours as needed for nausea or vomiting 1/29/25   Katerin Almanzar DO   pantoprazole (PROTONIX) 40 mg tablet Take 1 tablet (40 mg total) by mouth 2 (two) times a day before meals  Patient not taking: Reported on 2/4/2025 9/11/24   Wiliam Lamb DO   potassium chloride (Klor-Con M20) 20 mEq tablet Take 20 mEq by mouth in the morning For 3 days/ PRN  Patient not taking: Reported on 9/20/2024 9/6/24   Historical Provider, MD   sertraline (ZOLOFT) 25 mg tablet Take 25 mg by mouth daily    Historical Provider, MD Wally Loaiza 100-62.5-25 MCG/ACT inhaler Inhale 1 puff daily 9/9/24   Historical  Provider, MD   Xarelto 2.5 MG tablet TAKE 1 TABLET (2.5 MG TOTAL) BY MOUTH 2 (TWO) TIMES A DAY WITH MEALS.  Patient not taking: No sig reported 8/19/24   Historical Provider, MD     Allergies   Allergen Reactions    Valium [Diazepam] Anaphylaxis       Objective :  Temp:  [98.6 °F (37 °C)] 98.6 °F (37 °C)  HR:  [] 79  BP: (110-148)/(52-64) 110/54  Resp:  [19-22] 19  SpO2:  [99 %-100 %] 100 %  O2 Device: Nasal cannula  Nasal Cannula O2 Flow Rate (L/min):  [3 L/min] 3 L/min    Physical Exam     General exam- looks a little weak  HEENT - atraumatic and normocephalic  Neck- supple  Skin - no fresh rash  Extremities no fresh focal deformities  Cardiovascular- S1-S2 heard  Respiratory- bilateral air entry present, no crackles but does have bilateral diffuse rhonchi  Skin - no fresh rash  Abdomen - normal bowel sounds present, no rebound tenderness.  Does have diffuse tenderness all over the abdomen.  CNS- No fresh focal deficits  Psych- no acute psychosis  Patient does not have significant pitting pedal edema    Lines/Drains:            Lab Results: I have reviewed the following results:  Results from last 7 days   Lab Units 04/15/25  1353   WBC Thousand/uL 14.20*   HEMOGLOBIN g/dL 11.6   HEMATOCRIT % 37.0   PLATELETS Thousands/uL 289   LYMPHO PCT % 19   MONO PCT % 6   EOS PCT % 0     Results from last 7 days   Lab Units 04/15/25  1426   SODIUM mmol/L 142   POTASSIUM mmol/L 3.2*   CHLORIDE mmol/L 101   CO2 mmol/L 30   BUN mg/dL 14   CREATININE mg/dL 0.79   ANION GAP mmol/L 11   CALCIUM mg/dL 9.3   ALBUMIN g/dL 4.1   TOTAL BILIRUBIN mg/dL 0.33   ALK PHOS U/L 76   ALT U/L 21   AST U/L 12*   GLUCOSE RANDOM mg/dL 153*         Results from last 7 days   Lab Units 04/15/25  1706   POC GLUCOSE mg/dl 128     Lab Results   Component Value Date    HGBA1C 7.8 (H) 04/14/2025    HGBA1C 6.2 (H) 12/31/2024    HGBA1C 7.2 (A) 08/29/2024     Results from last 7 days   Lab Units 04/15/25  1628 04/15/25  1353   LACTIC ACID mmol/L 1.3  3.9*   PROCALCITONIN ng/ml  --  0.07       Imaging Results Review: I reviewed radiology reports from this admission including: CT abdomen/pelvis.  Other Study Results Review: No additional pertinent studies reviewed.    Administrative Statements   I have spent a total time of 75 minutes in caring for this patient on the day of the visit/encounter including Diagnostic results, Prognosis, Risks and benefits of tx options, Instructions for management, Patient and family education, Importance of tx compliance, Counseling / Coordination of care, Documenting in the medical record, Reviewing/placing orders in the medical record (including tests, medications, and/or procedures), Obtaining or reviewing history  , and Communicating with other healthcare professionals .    ** Please Note: This note has been constructed using a voice recognition system. **

## 2025-04-15 NOTE — ED PROVIDER NOTES
Time reflects when diagnosis was documented in both MDM as applicable and the Disposition within this note       Time User Action Codes Description Comment    4/15/2025  4:39 PM Claudine Odom Add [R10.9] Abdominal pain     4/15/2025  4:39 PM Claudine Odom Add [R19.7] Diarrhea     4/15/2025  4:54 PM Claudine Odom Add [R79.89] Elevated lactic acid level     4/15/2025  5:32 PM Ariel Arias [J96.11] Chronic respiratory failure with hypoxia (HCC)     4/15/2025  5:38 PM Ariel Arias Add [R06.02] SOB (shortness of breath)     4/18/2025  9:16 AM Carey Stephens [J44.1] COPD exacerbation (HCC)           ED Disposition       ED Disposition   Admit    Condition   Stable    Date/Time   Tue Apr 15, 2025  4:54 PM    Comment   Case was discussed with hospitalist and the patient's admission status was agreed to be Admission Status: observation status to the service of Dr. Arias .               Assessment & Plan       Medical Decision Making  Differential diverticulitis, gastroenteritis intractable nausea and vomiting.    Patient is a 60-year-old female present emerged from no acute respiratory distress and vital signs overall unremarkable.  Labs and imaging obtained.  Patient admitted for IV hydration and further management.  Dispo [facility].    Amount and/or Complexity of Data Reviewed  External Data Reviewed: labs, radiology and notes.  Labs: ordered.  ECG/medicine tests: ordered and independent interpretation performed.    Risk  Prescription drug management.  Decision regarding hospitalization.         EKG rate 90, normal sinus rhythm without signs of acute ischemic change.  Compared to prior.    Medications   lactated ringers infusion (has no administration in time range)   sodium chloride 0.9 % bolus 500 mL (0 mL Intravenous Stopped 4/15/25 1626)   morphine injection 4 mg (4 mg Intravenous Given 4/15/25 1450)   morphine injection 6 mg (6 mg Intravenous Given 4/15/25 1659)   ondansetron  (ZOFRAN) injection 4 mg (4 mg Intravenous Given 4/15/25 1703)   azithromycin (ZITHROMAX) tablet 500 mg (has no administration in time range)       ED Risk Strat Scores                    No data recorded        SBIRT 20yo+      Flowsheet Row Most Recent Value   Initial Alcohol Screen: US AUDIT-C     1. How often do you have a drink containing alcohol? 0 Filed at: 04/15/2025 1321   2. How many drinks containing alcohol do you have on a typical day you are drinking?  0 Filed at: 04/15/2025 1321   3b. FEMALE Any Age, or MALE 65+: How often do you have 4 or more drinks on one occassion? 0 Filed at: 04/15/2025 1321   Audit-C Score 0 Filed at: 04/15/2025 1321   EULALIO: How many times in the past year have you...    Used an illegal drug or used a prescription medication for non-medical reasons? Never Filed at: 04/15/2025 1321                            History of Present Illness       Chief Complaint   Patient presents with    Abdominal Pain     Pt c/o N/V/D started a couple days states her  Send her here became her test is showing some form of bacteria. Also c/o back pain and SOB chronically 3L       Past Medical History:   Diagnosis Date    Achalasia     Acute respiratory failure (HCC) 01/15/2023    Acute respiratory failure with hypoxia (HCC) 02/07/2024    Back pain     Cancer (HCC)     Ovarian    Diabetes mellitus (HCC)     DVT (deep venous thrombosis) (HCC)     Electrolyte abnormality 01/14/2024    Fall 01/02/2023    Had a fall a week ago slipped in the mud onto her back.  X-rays with no fracture.  She has chronic L1 fracture.  She is on pain medication at home for chronic back pain.      Fibromyalgia     Hypertension     Lactic acidosis 04/15/2025    Lupus       Past Surgical History:   Procedure Laterality Date    HYSTERECTOMY      NECK SURGERY        No family history on file.   Social History     Tobacco Use    Smoking status: Every Day     Current packs/day: 0.50     Types: Cigarettes    Smokeless tobacco: Never    Vaping Use    Vaping status: Never Used   Substance Use Topics    Alcohol use: Never    Drug use: Never      E-Cigarette/Vaping    E-Cigarette Use Never User       E-Cigarette/Vaping Substances    Nicotine No     THC No     CBD No     Flavoring No     Other No     Unknown No       I have reviewed and agree with the history as documented.     HPI    Patient is a 60-year-old female present emerged department for diffuse abdominal discomfort.  Patient has been having intermittent epigastric pain and reports at times intermittent left-sided chest discomfort.  Patient has also been having loose stools for the last week or 2.  She has had decreased oral intake without any notable fevers or chills.  Pain is persistent.  No noted fevers or chills.  Was evaluated yesterday at another institution yesterday and sent in by her physician today.  History includes diabetes, hypertension, lupus and fibromyalgia.    Review of Systems   Constitutional:  Negative for chills and fever.   HENT:  Negative for ear pain and sore throat.    Eyes:  Negative for pain and visual disturbance.   Respiratory:  Negative for cough and shortness of breath.    Cardiovascular:  Negative for chest pain and palpitations.   Gastrointestinal:  Positive for abdominal pain, diarrhea and nausea. Negative for vomiting.   Genitourinary:  Negative for dysuria and hematuria.   Musculoskeletal:  Negative for arthralgias and back pain.   Skin:  Negative for color change and rash.   Neurological:  Negative for seizures and syncope.   All other systems reviewed and are negative.          Objective       ED Triage Vitals   Temperature Pulse Blood Pressure Respirations SpO2 Patient Position - Orthostatic VS   04/15/25 1318 04/15/25 1318 04/15/25 1318 04/15/25 1318 04/15/25 1318 04/15/25 1318   98.6 °F (37 °C) 101 148/64 20 99 % Sitting      Temp Source Heart Rate Source BP Location FiO2 (%) Pain Score    04/15/25 1318 04/15/25 1318 04/15/25 1318 -- 04/15/25 1804     Temporal Monitor Left arm  8      Vitals      Date and Time Temp Pulse SpO2 Resp BP Pain Score FACES Pain Rating User   04/18/25 1355 -- -- -- -- -- 10 - Worst Possible Pain --    04/18/25 1100 -- -- -- -- -- 8 --    04/18/25 0946 -- -- -- -- -- 8 --    04/18/25 0812 -- -- -- -- -- 8 --    04/18/25 0706 -- -- 96 % -- -- -- --    04/18/25 0631 98 °F (36.7 °C) 68 98 % -- 131/72 -- -- DII   04/18/25 0507 -- -- -- -- -- 8 -- [facility]   04/18/25 0104 -- -- -- -- -- 8 -- [facility]   04/18/25 0002 -- -- -- -- -- 9 -- [facility]   04/18/25 0002 -- -- 96 % -- -- -- -- SP   04/17/25 2356 98 °F (36.7 °C) -- -- 19 156/84 -- -- [facility]   04/17/25 1856 -- -- -- -- -- 8 --    04/17/25 1641 -- -- -- -- -- 8 -- GA   04/17/25 1520 98.5 °F (36.9 °C) 75 95 % -- 121/64 -- -- DII   04/17/25 1500 98.5 °F (36.9 °C) 75 -- 17 121/64 -- -- KO   04/17/25 1443 -- -- -- -- -- 8 --    04/17/25 1404 -- -- 96 % -- -- -- -- CW   04/17/25 1053 97.9 °F (36.6 °C) 73 94 % 18 100/54 -- -- DII   04/17/25 1034 -- -- -- -- -- 8 --    04/17/25 0918 -- -- -- -- -- 9 --    04/17/25 0750 98 °F (36.7 °C) 67 98 % 17 111/67 -- -- DII   04/17/25 0719 -- -- 99 % -- -- -- -- CW   04/17/25 0700 98 °F (36.7 °C) -- -- 17 111/67 -- -- AP   04/17/25 0535 -- -- -- -- -- 9 -- PD   04/17/25 0306 -- -- -- -- -- 8 -- PD   04/17/25 0300 -- 66 95 % -- 108/52 -- -- PA   04/16/25 2327 97.8 °F (36.6 °C) 68 94 % -- 116/54 -- -- DII   04/16/25 2216 98.1 °F (36.7 °C) -- -- -- -- -- -- BR   04/16/25 2216 -- 72 97 % 18 110/55 9 -- PD   04/16/25 2206 -- 76 97 % -- -- -- -- BR   04/16/25 2206 -- -- -- -- 107/55 -- -- DII   04/16/25 2202 -- -- -- -- -- 8 -- PD   04/16/25 1955 -- -- -- -- -- 8 -- PD   04/16/25 1929 -- -- 90 % -- -- -- -- SL   04/16/25 1738 -- -- -- -- -- 9 -- ML   04/16/25 1708 -- -- -- -- -- 9 -- ML   04/16/25 1457 97.3 °F (36.3 °C) 71 96 % 18 -- -- -- VR   04/16/25 1457 -- -- -- -- 109/57 -- -- DII   04/16/25 1400 -- 80 -- -- 103/60 -- -- AK   04/16/25 1339 -- -- 97 % -- --  -- -- CW   04/16/25 1334 -- -- -- -- -- 9 -- ML   04/16/25 1231 -- -- -- -- -- 8 -- ML   04/16/25 0953 -- -- -- -- -- 8 -- HS   04/16/25 0926 -- -- -- -- 103/60 -- -- DII   04/16/25 0924 -- -- -- -- -- 7 -- ML   04/16/25 0752 98.2 °F (36.8 °C) -- 98 % 17 -- -- -- VR   04/16/25 0752 -- 79 -- -- -- 7 -- ML   04/16/25 0752 -- -- -- -- 101/61 -- -- DII   04/16/25 0725 -- -- 99 % -- -- -- -- CW   04/16/25 0516 -- -- -- -- -- 8 -- PD   04/16/25 0513 -- -- -- -- -- 8 -- PD   04/16/25 0219 -- -- 94 % -- -- -- -- JK   04/16/25 0050 -- -- -- -- -- 8 -- PD   04/15/25 2341 -- -- -- -- -- 8 -- PD   04/15/25 2323 98.2 °F (36.8 °C) 78 97 % -- -- -- -- BR   04/15/25 2323 -- -- -- -- 106/50 -- -- DII   04/15/25 2054 -- -- 96 % -- -- -- -- JK   04/15/25 2026 -- -- -- -- -- 8 -- PD   04/15/25 1914 -- -- -- 18 112/59 -- -- DII   04/15/25 1831 -- -- -- 18 99/60 -- -- DII   04/15/25 1828 -- -- -- 18 93/65 -- -- DII   04/15/25 1804 -- -- -- -- -- 8 -- KO   04/15/25 1630 -- 79 100 % 19 110/54 -- --    04/15/25 1600 -- 82 100 % 20 115/52 -- --    04/15/25 1530 -- 81 100 % 21 114/55 -- --    04/15/25 1500 -- 82 99 % 22 120/56 -- --    04/15/25 1318 98.6 °F (37 °C) 101 99 % 20 148/64 -- -- FB            Physical Exam  Vitals and nursing note reviewed.   Constitutional:       General: She is not in acute distress.     Appearance: She is well-developed.   HENT:      Head: Normocephalic and atraumatic.   Eyes:      Conjunctiva/sclera: Conjunctivae normal.   Cardiovascular:      Rate and Rhythm: Normal rate and regular rhythm.      Heart sounds: No murmur heard.  Pulmonary:      Effort: Pulmonary effort is normal. No respiratory distress.      Breath sounds: Normal breath sounds.   Abdominal:      Palpations: Abdomen is soft.      Tenderness: There is generalized abdominal tenderness and tenderness in the epigastric area.   Musculoskeletal:         General: No swelling.      Cervical back: Neck supple.   Skin:     General: Skin is warm  and dry.      Capillary Refill: Capillary refill takes less than 2 seconds.   Neurological:      General: No focal deficit present.      Mental Status: She is alert.   Psychiatric:         Mood and Affect: Mood normal.         Results Reviewed       Procedure Component Value Units Date/Time    Procalcitonin, Next Day AM Collection [[id number]]  (Normal) Collected: 04/16/25 0505    Lab Status: Final result Specimen: Blood from Hand, Left Updated: 04/16/25 0548     Procalcitonin <0.05 ng/ml     Comprehensive metabolic panel [[id number]]  (Abnormal) Collected: 04/16/25 0505    Lab Status: Final result Specimen: Blood from Hand, Left Updated: 04/16/25 0542     Sodium 140 mmol/L      Potassium 3.7 mmol/L      Chloride 101 mmol/L      CO2 31 mmol/L      ANION GAP 8 mmol/L      BUN 14 mg/dL      Creatinine 0.77 mg/dL      Glucose 198 mg/dL      Calcium 8.8 mg/dL      AST 11 U/L      ALT 21 U/L      Alkaline Phosphatase 71 U/L      Total Protein 6.6 g/dL      Albumin 4.0 g/dL      Total Bilirubin 0.29 mg/dL      eGFR 84 ml/min/1.73sq m     Narrative:      National Kid[employer] Foundation guidelines for Chronic Kidney Disease (CKD):     Stage 1 with normal or high GFR (GFR > 90 mL/min/1.73 square meters)    Stage 2 Mild CKD (GFR = 60-89 mL/min/1.73 square meters)    Stage 3A Moderate CKD (GFR = 45-59 mL/min/1.73 square meters)    Stage 3B Moderate CKD (GFR = 30-44 mL/min/1.73 square meters)    Stage 4 Severe CKD (GFR = 15-29 mL/min/1.73 square meters)    Stage 5 End Stage CKD (GFR <15 mL/min/1.73 square meters)  Note: GFR calculation is accurate only with a steady state creatinine    CBC and differential [[id number]]  (Abnormal) Collected: 04/16/25 0505    Lab Status: Final result Specimen: Blood from Hand, Left Updated: 04/16/25 0530     WBC 9.29 Thousand/uL      RBC 3.41 Million/uL      Hemoglobin 9.9 g/dL      Hematocrit 31.7 %      MCV 93 fL      MCH 29.0 pg      MCHC 31.2 g/dL      RDW 14.7 %      MPV 9.5 fL       Platelets 264 Thousands/uL      nRBC 0 /100 WBCs      Segmented % 87 %      Immature Grans % 1 %      Lymphocytes % 9 %      Monocytes % 3 %      Eosinophils Relative 0 %      Basophils Relative 0 %      Absolute Neutrophils 8.06 Thousands/µL      Absolute Immature Grans 0.07 Thousand/uL      Absolute Lymphocytes 0.86 Thousands/µL      Absolute Monocytes 0.27 Thousand/µL      Eosinophils Absolute 0.01 Thousand/µL      Basophils Absolute 0.02 Thousands/µL     Hemoglobin A1c w/EAG Estimation (Prechecked if no A1C within 90 days) [[id number]]  (Abnormal) Collected: 04/15/25 1905    Lab Status: Final result Specimen: Blood from Arm, Right Updated: 04/15/25 2238     Hemoglobin A1C 7.6 %       mg/dl     UA w Reflex to Microscopic w Reflex to Culture [[id number]]  (Abnormal) Collected: 04/15/25 1925    Lab Status: Final result Specimen: Urine, Clean Catch Updated: 04/15/25 1940     Color, UA Light Yellow     Clarity, UA Clear     Specific Gravity, UA 1.021     pH, UA 5.5     Leukocytes, UA Negative     Nitrite, UA Negative     Protein, UA Negative mg/dl      Glucose, UA 1000 (1%) mg/dl      Ketones, UA Negative mg/dl      Urobilinogen, UA <2.0 mg/dl      Bilirubin, UA Negative     Occult Blood, UA Negative    HS Troponin I 4hr [[id number]]  (Normal) Collected: 04/15/25 1905    Lab Status: Final result Specimen: Blood from Arm, Right Updated: 04/15/25 1936     hs TnI 4hr 8 ng/L      Delta 4hr hsTnI 1 ng/L     Fingerstick Glucose (POCT) [[id number]]  (Normal) Collected: 04/15/25 1706    Lab Status: Final result Specimen: Blood Updated: 04/15/25 1708     POC Glucose 128 mg/dl     HS Troponin I 2hr [[id number]]  (Normal) Collected: 04/15/25 1628    Lab Status: Final result Specimen: Blood from Arm, Right Updated: 04/15/25 1700     hs TnI 2hr 4 ng/L      Delta 2hr hsTnI -3 ng/L     Lactic acid 2 Hours [[id number]]  (Normal) Collected: 04/15/25 1628    Lab Status: Final result Specimen: Blood from Arm, Right Updated: 04/15/25  1653     LACTIC ACID 1.3 mmol/L     Narrative:      Result may be elevated if tourniquet was used during collection.    HS Troponin 0hr (reflex protocol) [[id number]]  (Normal) Collected: 04/15/25 1426    Lab Status: Final result Specimen: Blood from Arm, Right Updated: 04/15/25 1513     hs TnI 0hr 7 ng/L     Comprehensive metabolic panel [[id number]]  (Abnormal) Collected: 04/15/25 1426    Lab Status: Final result Specimen: Blood from Arm, Left Updated: 04/15/25 1501     Sodium 142 mmol/L      Potassium 3.2 mmol/L      Chloride 101 mmol/L      CO2 30 mmol/L      ANION GAP 11 mmol/L      BUN 14 mg/dL      Creatinine 0.79 mg/dL      Glucose 153 mg/dL      Calcium 9.3 mg/dL      AST 12 U/L      ALT 21 U/L      Alkaline Phosphatase 76 U/L      Total Protein 6.9 g/dL      Albumin 4.1 g/dL      Total Bilirubin 0.33 mg/dL      eGFR 81 ml/min/1.73sq m     Narrative:      National Kidney Disease Foundation guidelines for Chronic Kidney Disease (CKD):     Stage 1 with normal or high GFR (GFR > 90 mL/min/1.73 square meters)    Stage 2 Mild CKD (GFR = 60-89 mL/min/1.73 square meters)    Stage 3A Moderate CKD (GFR = 45-59 mL/min/1.73 square meters)    Stage 3B Moderate CKD (GFR = 30-44 mL/min/1.73 square meters)    Stage 4 Severe CKD (GFR = 15-29 mL/min/1.73 square meters)    Stage 5 End Stage CKD (GFR <15 mL/min/1.73 square meters)  Note: GFR calculation is accurate only with a steady state creatinine    Lipase [[id number]]  (Normal) Collected: 04/15/25 1426    Lab Status: Final result Specimen: Blood from Arm, Left Updated: 04/15/25 1501     Lipase 17 u/L     Procalcitonin [[id number]]  (Normal) Collected: 04/15/25 1353    Lab Status: Final result Specimen: Blood from Arm, Left Updated: 04/15/25 1428     Procalcitonin 0.07 ng/ml     Manual Differential(PHLEBS Do Not Order) [[id number]]  (Abnormal) Collected: 04/15/25 1353    Lab Status: Final result Specimen: Blood from Arm, Left Updated: 04/15/25 1425     Segmented % 74 %       Lymphocytes % 19 %      Monocytes % 6 %      Eosinophils % 0 %      Basophils % 0 %      Atypical Lymphocytes % 1 %      Absolute Neutrophils 10.51 Thousand/uL      Absolute Lymphocytes 2.84 Thousand/uL      Absolute Monocytes 0.85 Thousand/uL      Absolute Eosinophils 0.00 Thousand/uL      Absolute Basophils 0.00 Thousand/uL      Total Counted --     RBC Morphology Present     Platelet Estimate Adequate     Anisocytosis Present     Polychromasia Present    Lactic acid, plasma (w/reflex if result > 2.0) [[id number]]  (Abnormal) Collected: 04/15/25 1353    Lab Status: Final result Specimen: Blood from Arm, Left Updated: 04/15/25 1421     LACTIC ACID 3.9 mmol/L     Narrative:      Result may be elevated if tourniquet was used during collection.    CBC and differential [[id number]]  (Abnormal) Collected: 04/15/25 1353    Lab Status: Final result Specimen: Blood from Arm, Left Updated: 04/15/25 1405     WBC 14.20 Thousand/uL      RBC 3.87 Million/uL      Hemoglobin 11.6 g/dL      Hematocrit 37.0 %      MCV 96 fL      MCH 30.0 pg      MCHC 31.4 g/dL      RDW 14.8 %      MPV 10.2 fL      Platelets 289 Thousands/uL     Narrative:      This is an appended report.  These results have been appended to a previously verified report.            MRI thoracic spine wo contrast   Final Interpretation by Clovis Becerril MD (04/18 0815)      1.  There is no focal disk herniation, central canal or neural foraminal narrowing. No cord compression or cord signal abnormality.   2.  Chronic L1 compression deformity.            Workstation performed: [id number]             Procedures    ED Medication and Procedure Management   Prior to Admission Medications   Prescriptions Last Dose Informant Patient Reported? Taking?   Aspirin Low Dose 81 MG chewable tablet   Yes Yes   Sig: Chew 1 tablet in the morning   Empagliflozin (JARDIANCE) 10 MG TABS tablet   Yes Yes   Sig: Take 10 mg by mouth daily   Trelegy Ellipta 100-62.5-25 MCG/ACT inhaler   Self Yes No   Sig: Inhale 1 puff daily   Xarelto 2.5 MG tablet  Self Yes No   Sig: TAKE 1 TABLET (2.5 MG TOTAL) BY MOUTH 2 (TWO) TIMES A DAY WITH MEALS.   Patient not taking: No sig reported   acetaminophen (TYLENOL) 325 mg tablet  Self No No   Sig: Take 2 tablets (650 mg total) by mouth every 6 (six) hours   albuterol (PROVENTIL HFA,VENTOLIN HFA) 90 mcg/act inhaler  Self No No   Sig: Inhale 2 puffs every 4 (four) hours as needed for wheezing   aluminum-magnesium hydroxide-simethicone (MAALOX MAX) 400-400-40 MG/5ML suspension  Self No No   Sig: Take 5 mL by mouth every 6 (six) hours as needed for indigestion or heartburn   atorvastatin (LIPITOR) 40 mg tablet   Yes Yes   Sig: Take 40 mg by mouth daily   benzonatate (TESSALON) 200 MG capsule   Yes No   Sig: take 1 capsule (200 mg total) by mouth 3 (three) times a day as needed for cough.   cefpodoxime (VANTIN) 200 mg tablet   Yes No   Sig: Take 1 tablet by mouth 2 (two) times a day   cyclobenzaprine (FLEXERIL) 10 mg tablet   Yes No   Sig: take 1 tablet (10 mg total) by mouth 3 (three) times a day for 10 days.   famotidine (PEPCID) 20 mg tablet  Self No Yes   Sig: Take 1 tablet (20 mg total) by mouth 2 (two) times a day   furosemide (LASIX) 40 mg tablet  Self No No   Sig: Take 1 tablet (40 mg total) by mouth daily   ipratropium-albuterol (DUO-NEB) 0.5-2.5 mg/3 mL nebulizer solution  Self Yes No   levothyroxine 50 mcg tablet   No No   Sig: TAKE 1 TABLET (50 MCG TOTAL) BY MOUTH IN THE MORNING   lidocaine (Lidoderm) 5 %   No No   Sig: Apply 1 patch topically over 12 hours daily Remove & Discard patch within 12 hours or as directed by MD   loperamide (IMODIUM) 2 mg capsule   Yes No   Sig: Take 2 mg by mouth 4 (four) times a day as needed   metFORMIN (GLUCOPHAGE) 1000 MG tablet   Yes No   Sig: TAKE 1 TABLET (1,000 MG TOTAL) BY MOUTH 2 (TWO) TIMES A DAY WITH MEALS FOR 14 DAYS.   metFORMIN (GLUCOPHAGE) 500 mg tablet   No No   Sig: TAKE 1 TABLET (500 MG TOTAL) BY MOUTH DAILY  WITH BREAKFAST   methocarbamol (ROBAXIN) 500 mg tablet   No No   Sig: Take 1 tablet (500 mg total) by mouth 2 (two) times a day as needed for muscle spasms   methocarbamol (ROBAXIN) 750 mg tablet   Yes Yes   Sig: Take 750 mg by mouth every 6 (six) hours as needed for muscle spasms   methylPREDNISolone 4 MG tablet therapy pack   Yes No   Sig: TAKE AS DIRECTED PER MEDROL DOSEPACK PACKAGE INSTRUCTIONS   metoprolol succinate (TOPROL-XL) 25 mg 24 hr tablet  Self Yes No   Sig: Take 25 mg by mouth daily   metoprolol succinate (TOPROL-XL) 50 mg 24 hr tablet   Yes No   Sig: Take 1 tablet by mouth in the morning   ondansetron (ZOFRAN-ODT) 4 mg disintegrating tablet  Self No No   Sig: Take 1 tablet (4 mg total) by mouth every 8 (eight) hours as needed for nausea or vomiting   oxyCODONE-acetaminophen (PERCOCET) 5-325 mg per tablet   Yes No   Sig: Take 1 tablet by mouth in the morning   pantoprazole (PROTONIX) 40 mg tablet  Self No Yes   Sig: Take 1 tablet (40 mg total) by mouth 2 (two) times a day before meals   potassium chloride (Klor-Con M20) 20 mEq tablet  Self Yes No   Sig: Take 20 mEq by mouth in the morning For 3 days/ PRN   Patient not taking: Reported on 9/20/2024   predniSONE 10 mg tablet   Yes No   Sig: TAKE 1 TABLET (10 MG TOTAL) BY MOUTH DAILY FOR 2 DAYS.   predniSONE 20 mg tablet   Yes No   sertraline (ZOLOFT) 25 mg tablet  Self Yes No   Sig: Take 25 mg by mouth daily   torsemide (DEMADEX) 20 mg tablet   Yes No   Sig: Take 2 tablets by mouth in the morning      Facility-Administered Medications: None     Discharge Medication List as of 4/18/2025  1:48 PM        START taking these medications    Details   bisacodyl (DULCOLAX) 5 mg EC tablet Take 1 tablet (5 mg total) by mouth daily as needed for constipation, Starting u 4/17/2025, Normal      dicyclomine (BENTYL) 10 mg capsule Take 1 capsule (10 mg total) by mouth 3 (three) times a day before meals, Starting u 4/17/2025, Normal      metoclopramide (Reglan) 10  mg tablet Take 1 tablet (10 mg total) by mouth 4 (four) times a day, Starting Thu 4/17/2025, Normal           CONTINUE these medications which have CHANGED    Details   !! predniSONE 10 mg tablet Multiple Dosages:Starting Thu 4/17/2025, Until Sat 4/19/2025 at 2359, THEN Starting Sun 4/20/2025, Until Tue 4/22/2025 at 2359, THEN Starting Wed 4/23/2025, Until Fri 4/25/2025 at 2359, THEN Starting Sat 4/26/2025, Until Mon 4/28/2025 at 2359Take 4  tablets (40 mg total) by mouth daily for 3 days, THEN 3 tablets (30 mg total) daily for 3 days, THEN 2 tablets (20 mg total) daily for 3 days, THEN 1 tablet (10 mg total) daily for 3 days., Normal      !! predniSONE 10 mg tablet Multiple Dosages:Starting Fri 4/18/2025, Until Sun 4/20/2025 at 2359, THEN Starting Mon 4/21/2025, Until Wed 4/23/2025 at 2359, THEN Starting Thu 4/24/2025, Until Sat 4/26/2025 at 2359, THEN Starting Sun 4/27/2025, Until Tue 4/29/2025 at 2359Take 4  tablets (40 mg total) by mouth daily for 3 days, THEN 3 tablets (30 mg total) daily for 3 days, THEN 2 tablets (20 mg total) daily for 3 days, THEN 1 tablet (10 mg total) daily for 3 days., Normal       !! - Potential duplicate medications found. Please discuss with provider.        CONTINUE these medications which have NOT CHANGED    Details   Aspirin Low Dose 81 MG chewable tablet Chew 1 tablet in the morning, Starting Wed 2/19/2025, Historical Med      atorvastatin (LIPITOR) 40 mg tablet Take 40 mg by mouth daily, Starting Mon 4/7/2025, Until Tue 4/7/2026, Historical Med      Empagliflozin (JARDIANCE) 10 MG TABS tablet Take 10 mg by mouth daily, Starting Thu 3/27/2025, Historical Med      famotidine (PEPCID) 20 mg tablet Take 1 tablet (20 mg total) by mouth 2 (two) times a day, Starting Sat 2/1/2025, Normal      !! methocarbamol (ROBAXIN) 750 mg tablet Take 750 mg by mouth every 6 (six) hours as needed for muscle spasms, Starting Sat 3/15/2025, Historical Med      pantoprazole (PROTONIX) 40 mg tablet Take  1 tablet (40 mg total) by mouth 2 (two) times a day before meals, Starting Wed 9/11/2024, Normal      acetaminophen (TYLENOL) 325 mg tablet Take 2 tablets (650 mg total) by mouth every 6 (six) hours, Starting Wed 1/17/2024, No Print      albuterol (PROVENTIL HFA,VENTOLIN HFA) 90 mcg/act inhaler Inhale 2 puffs every 4 (four) hours as needed for wheezing, Starting Sat 10/8/2022, Normal      aluminum-magnesium hydroxide-simethicone (MAALOX MAX) 400-400-40 MG/5ML suspension Take 5 mL by mouth every 6 (six) hours as needed for indigestion or heartburn, Starting Tue 9/17/2024, Normal      benzonatate (TESSALON) 200 MG capsule take 1 capsule (200 mg total) by mouth 3 (three) times a day as needed for cough., Historical Med      cefpodoxime (VANTIN) 200 mg tablet Take 1 tablet by mouth 2 (two) times a day, Starting Mon 3/31/2025, Historical Med      cyclobenzaprine (FLEXERIL) 10 mg tablet take 1 tablet (10 mg total) by mouth 3 (three) times a day for 10 days., Historical Med      furosemide (LASIX) 40 mg tablet Take 1 tablet (40 mg total) by mouth daily, Starting Thu 2/8/2024, Until Thu 9/19/2024, Normal      ipratropium-albuterol (DUO-NEB) 0.5-2.5 mg/3 mL nebulizer solution Historical Med      levothyroxine 50 mcg tablet TAKE 1 TABLET (50 MCG TOTAL) BY MOUTH IN THE MORNING, Starting Fri 4/18/2025, Normal      lidocaine (Lidoderm) 5 % Apply 1 patch topically over 12 hours daily Remove & Discard patch within 12 hours or as directed by MD, Starting Mon 3/17/2025, Normal      loperamide (IMODIUM) 2 mg capsule Take 2 mg by mouth 4 (four) times a day as needed, Starting Tue 4/8/2025, Until Fri 4/18/2025 at 2359, Historical Med      !! metFORMIN (GLUCOPHAGE) 1000 MG tablet TAKE 1 TABLET (1,000 MG TOTAL) BY MOUTH 2 (TWO) TIMES A DAY WITH MEALS FOR 14 DAYS., Historical Med      !! metFORMIN (GLUCOPHAGE) 500 mg tablet TAKE 1 TABLET (500 MG TOTAL) BY MOUTH DAILY WITH BREAKFAST, Starting Wed 3/26/2025, Normal      !! methocarbamol  (ROBAXIN) 500 mg tablet Take 1 tablet (500 mg total) by mouth 2 (two) times a day as needed for muscle spasms, Starting Mon 3/17/2025, Normal      metoprolol succinate (TOPROL-XL) 25 mg 24 hr tablet Take 25 mg by mouth daily, Starting Wed 12/20/2023, Historical Med      ondansetron (ZOFRAN-ODT) 4 mg disintegrating tablet Take 1 tablet (4 mg total) by mouth every 8 (eight) hours as needed for nausea or vomiting, Starting Wed 1/29/2025, Normal      oxyCODONE-acetaminophen (PERCOCET) 5-325 mg per tablet Take 1 tablet by mouth in the morning, Starting Mon 3/10/2025, Historical Med      !! predniSONE 20 mg tablet Historical Med      sertraline (ZOLOFT) 25 mg tablet Take 25 mg by mouth daily, Historical Med      torsemide (DEMADEX) 20 mg tablet Take 2 tablets by mouth in the morning, Starting Thu 3/27/2025, Historical Med      Trelegy Ellipta 100-62.5-25 MCG/ACT inhaler Inhale 1 puff daily, Starting Mon 9/9/2024, Historical Med       !! - Potential duplicate medications found. Please discuss with provider.        STOP taking these medications       methylPREDNISolone 4 MG tablet therapy pack Comments:   Reason for Stopping:         potassium chloride (Klor-Con M20) 20 mEq tablet Comments:   Reason for Stopping:         Xarelto 2.5 MG tablet Comments:   Reason for Stopping:               ED SEPSIS DOCUMENTATION   Time reflects when diagnosis was documented in both MDM as applicable and the Disposition within this note       Time User Action Codes Description Comment    4/15/2025  4:39 PM Claudine Odom Add [R10.9] Abdominal pain     4/15/2025  4:39 PM Claudine Odom Add [R19.7] Diarrhea     4/15/2025  4:54 PM Claudine Odom Add [R79.89] Elevated lactic acid level     4/15/2025  5:32 PM Ariel Arias Add [J96.11] Chronic respiratory failure with hypoxia (HCC)     4/15/2025  5:38 PM Ariel Arias Add [R06.02] SOB (shortness of breath)     4/18/2025  9:16 AM Carey Stephens Add [J44.1] COPD exacerbation  ([facility])                  Claudine Odom DO  04/19/25 1603

## 2025-04-15 NOTE — ASSESSMENT & PLAN NOTE
Will get physical and occupational continue pain medications as needed for the patient.  Limit opiates given COPD history.

## 2025-04-15 NOTE — ASSESSMENT & PLAN NOTE
Patient has elevated lactic acid on admission, but no evidence of sepsis.  Will give guarded IV fluids and monitor lactic acid levels again in a.m.

## 2025-04-15 NOTE — ASSESSMENT & PLAN NOTE
Lab Results   Component Value Date    HGBA1C 7.8 (H) 04/14/2025       Recent Labs     04/15/25  1706   POCGLU 128       Blood Sugar Average: Last 72 hrs:  (P) 128  Hold metformin and Jardiance.  Insulin sliding scale while hospitalized.  Given need for IV steroids for COPD exacerbation I expect the blood sugar to be on the higher side and we will need to adjust the regimen

## 2025-04-16 ENCOUNTER — APPOINTMENT (OUTPATIENT)
Dept: NON INVASIVE DIAGNOSTICS | Facility: HOSPITAL | Age: 60
End: 2025-04-16
Payer: COMMERCIAL

## 2025-04-16 LAB
ALBUMIN SERPL BCG-MCNC: 4 G/DL (ref 3.5–5)
ALP SERPL-CCNC: 71 U/L (ref 34–104)
ALT SERPL W P-5'-P-CCNC: 21 U/L (ref 7–52)
ANION GAP SERPL CALCULATED.3IONS-SCNC: 8 MMOL/L (ref 4–13)
AORTIC ROOT: 2.3 CM
AST SERPL W P-5'-P-CCNC: 11 U/L (ref 13–39)
ATRIAL RATE: 90 BPM
BASOPHILS # BLD AUTO: 0.02 THOUSANDS/ÂΜL (ref 0–0.1)
BASOPHILS NFR BLD AUTO: 0 % (ref 0–1)
BILIRUB SERPL-MCNC: 0.29 MG/DL (ref 0.2–1)
BSA FOR ECHO PROCEDURE: 1.89 M2
BUN SERPL-MCNC: 14 MG/DL (ref 5–25)
CALCIUM SERPL-MCNC: 8.8 MG/DL (ref 8.4–10.2)
CARDIAC TROPONIN I PNL SERPL HS: 4 NG/L (ref 8–18)
CHLORIDE SERPL-SCNC: 101 MMOL/L (ref 96–108)
CO2 SERPL-SCNC: 31 MMOL/L (ref 21–32)
CREAT SERPL-MCNC: 0.77 MG/DL (ref 0.6–1.3)
E WAVE DECELERATION TIME: 217 MS
E/A RATIO: 0.82
EOSINOPHIL # BLD AUTO: 0.01 THOUSAND/ÂΜL (ref 0–0.61)
EOSINOPHIL NFR BLD AUTO: 0 % (ref 0–6)
ERYTHROCYTE [DISTWIDTH] IN BLOOD BY AUTOMATED COUNT: 14.7 % (ref 11.6–15.1)
FERRITIN SERPL-MCNC: 14 NG/ML (ref 30–307)
FOLATE SERPL-MCNC: 12.7 NG/ML
FRACTIONAL SHORTENING: 38 (ref 28–44)
GFR SERPL CREATININE-BSD FRML MDRD: 84 ML/MIN/1.73SQ M
GLUCOSE SERPL-MCNC: 173 MG/DL (ref 65–140)
GLUCOSE SERPL-MCNC: 196 MG/DL (ref 65–140)
GLUCOSE SERPL-MCNC: 198 MG/DL (ref 65–140)
GLUCOSE SERPL-MCNC: 225 MG/DL (ref 65–140)
GLUCOSE SERPL-MCNC: 248 MG/DL (ref 65–140)
HCT VFR BLD AUTO: 31.7 % (ref 34.8–46.1)
HGB BLD-MCNC: 9.9 G/DL (ref 11.5–15.4)
IMM GRANULOCYTES # BLD AUTO: 0.07 THOUSAND/UL (ref 0–0.2)
IMM GRANULOCYTES NFR BLD AUTO: 1 % (ref 0–2)
INTERVENTRICULAR SEPTUM IN DIASTOLE (PARASTERNAL SHORT AXIS VIEW): 1.1 CM
INTERVENTRICULAR SEPTUM: 1.1 CM (ref 0.6–1.1)
IRON SATN MFR SERPL: 9 % (ref 15–50)
IRON SERPL-MCNC: 31 UG/DL (ref 50–212)
LAAS-AP2: 12.9 CM2
LAAS-AP4: 18.9 CM2
LEFT ATRIUM SIZE: 4 CM
LEFT ATRIUM VOLUME (MOD BIPLANE): 42 ML
LEFT ATRIUM VOLUME INDEX (MOD BIPLANE): 22.2 ML/M2
LEFT INTERNAL DIMENSION IN SYSTOLE: 2.8 CM (ref 2.1–4)
LEFT VENTRICULAR INTERNAL DIMENSION IN DIASTOLE: 4.5 CM (ref 3.5–6)
LEFT VENTRICULAR POSTERIOR WALL IN END DIASTOLE: 1 CM
LEFT VENTRICULAR STROKE VOLUME: 64 ML
LV EF US.2D.A4C+ESTIMATED: 61 %
LVSV (TEICH): 64 ML
LYMPHOCYTES # BLD AUTO: 0.86 THOUSANDS/ÂΜL (ref 0.6–4.47)
LYMPHOCYTES NFR BLD AUTO: 9 % (ref 14–44)
MCH RBC QN AUTO: 29 PG (ref 26.8–34.3)
MCHC RBC AUTO-ENTMCNC: 31.2 G/DL (ref 31.4–37.4)
MCV RBC AUTO: 93 FL (ref 82–98)
MONOCYTES # BLD AUTO: 0.27 THOUSAND/ÂΜL (ref 0.17–1.22)
MONOCYTES NFR BLD AUTO: 3 % (ref 4–12)
MV PEAK A VEL: 1.12 M/S
MV PEAK E VEL: 92 CM/S
NEUTROPHILS # BLD AUTO: 8.06 THOUSANDS/ÂΜL (ref 1.85–7.62)
NEUTS SEG NFR BLD AUTO: 87 % (ref 43–75)
NRBC BLD AUTO-RTO: 0 /100 WBCS
P AXIS: 75 DEGREES
PLATELET # BLD AUTO: 264 THOUSANDS/UL (ref 149–390)
PMV BLD AUTO: 9.5 FL (ref 8.9–12.7)
POTASSIUM SERPL-SCNC: 3.7 MMOL/L (ref 3.5–5.3)
PR INTERVAL: 148 MS
PROCALCITONIN SERPL-MCNC: <0.05 NG/ML
PROT SERPL-MCNC: 6.6 G/DL (ref 6.4–8.4)
QRS AXIS: 37 DEGREES
QRSD INTERVAL: 84 MS
QT INTERVAL: 342 MS
QTC INTERVAL: 418 MS
RBC # BLD AUTO: 3.41 MILLION/UL (ref 3.81–5.12)
RIGHT ATRIUM AREA SYSTOLE A4C: 11.5 CM2
RIGHT VENTRICLE ID DIMENSION: 3.1 CM
SL CV LEFT ATRIUM LENGTH A2C: 4.6 CM
SL CV LV EF: 65
SL CV PED ECHO LEFT VENTRICLE DIASTOLIC VOLUME (MOD BIPLANE) 2D: 94 ML
SL CV PED ECHO LEFT VENTRICLE SYSTOLIC VOLUME (MOD BIPLANE) 2D: 30 ML
SODIUM SERPL-SCNC: 140 MMOL/L (ref 135–147)
T WAVE AXIS: 21 DEGREES
TIBC SERPL-MCNC: 341.6 UG/DL (ref 250–450)
TRANSFERRIN SERPL-MCNC: 244 MG/DL (ref 203–362)
TRICUSPID ANNULAR PLANE SYSTOLIC EXCURSION: 2.3 CM
UIBC SERPL-MCNC: 311 UG/DL (ref 155–355)
VENTRICULAR RATE: 90 BPM
VIT B12 SERPL-MCNC: 150 PG/ML (ref 180–914)
WBC # BLD AUTO: 9.29 THOUSAND/UL (ref 4.31–10.16)

## 2025-04-16 PROCEDURE — 82607 VITAMIN B-12: CPT | Performed by: PHYSICIAN ASSISTANT

## 2025-04-16 PROCEDURE — 82746 ASSAY OF FOLIC ACID SERUM: CPT | Performed by: PHYSICIAN ASSISTANT

## 2025-04-16 PROCEDURE — 93321 DOPPLER ECHO F-UP/LMTD STD: CPT

## 2025-04-16 PROCEDURE — 84484 ASSAY OF TROPONIN QUANT: CPT | Performed by: PHYSICIAN ASSISTANT

## 2025-04-16 PROCEDURE — 83540 ASSAY OF IRON: CPT | Performed by: PHYSICIAN ASSISTANT

## 2025-04-16 PROCEDURE — 94640 AIRWAY INHALATION TREATMENT: CPT

## 2025-04-16 PROCEDURE — 99254 IP/OBS CNSLTJ NEW/EST MOD 60: CPT | Performed by: INTERNAL MEDICINE

## 2025-04-16 PROCEDURE — 83550 IRON BINDING TEST: CPT | Performed by: PHYSICIAN ASSISTANT

## 2025-04-16 PROCEDURE — 94760 N-INVAS EAR/PLS OXIMETRY 1: CPT

## 2025-04-16 PROCEDURE — 82728 ASSAY OF FERRITIN: CPT | Performed by: PHYSICIAN ASSISTANT

## 2025-04-16 PROCEDURE — 80053 COMPREHEN METABOLIC PANEL: CPT | Performed by: INTERNAL MEDICINE

## 2025-04-16 PROCEDURE — 97163 PT EVAL HIGH COMPLEX 45 MIN: CPT

## 2025-04-16 PROCEDURE — 99223 1ST HOSP IP/OBS HIGH 75: CPT | Performed by: INTERNAL MEDICINE

## 2025-04-16 PROCEDURE — 93308 TTE F-UP OR LMTD: CPT

## 2025-04-16 PROCEDURE — 93325 DOPPLER ECHO COLOR FLOW MAPG: CPT

## 2025-04-16 PROCEDURE — 94664 DEMO&/EVAL PT USE INHALER: CPT

## 2025-04-16 PROCEDURE — 84145 PROCALCITONIN (PCT): CPT | Performed by: INTERNAL MEDICINE

## 2025-04-16 PROCEDURE — 85025 COMPLETE CBC W/AUTO DIFF WBC: CPT | Performed by: INTERNAL MEDICINE

## 2025-04-16 PROCEDURE — 82948 REAGENT STRIP/BLOOD GLUCOSE: CPT

## 2025-04-16 PROCEDURE — 99233 SBSQ HOSP IP/OBS HIGH 50: CPT | Performed by: STUDENT IN AN ORGANIZED HEALTH CARE EDUCATION/TRAINING PROGRAM

## 2025-04-16 PROCEDURE — 93010 ELECTROCARDIOGRAM REPORT: CPT | Performed by: INTERNAL MEDICINE

## 2025-04-16 RX ORDER — DICYCLOMINE HYDROCHLORIDE 10 MG/1
10 CAPSULE ORAL
Status: DISCONTINUED | OUTPATIENT
Start: 2025-04-16 | End: 2025-04-18 | Stop reason: HOSPADM

## 2025-04-16 RX ORDER — ASPIRIN 81 MG
1 TABLET,CHEWABLE ORAL DAILY
COMMUNITY
Start: 2025-02-19

## 2025-04-16 RX ORDER — METHOCARBAMOL 750 MG/1
750 TABLET, FILM COATED ORAL EVERY 6 HOURS PRN
COMMUNITY
Start: 2025-03-15

## 2025-04-16 RX ORDER — METOCLOPRAMIDE HYDROCHLORIDE 5 MG/ML
5 INJECTION INTRAMUSCULAR; INTRAVENOUS EVERY 6 HOURS
Status: DISCONTINUED | OUTPATIENT
Start: 2025-04-16 | End: 2025-04-18 | Stop reason: HOSPADM

## 2025-04-16 RX ORDER — ATORVASTATIN CALCIUM 40 MG/1
40 TABLET, FILM COATED ORAL DAILY
COMMUNITY
Start: 2025-04-07 | End: 2026-04-07

## 2025-04-16 RX ORDER — PANTOPRAZOLE SODIUM 40 MG/10ML
40 INJECTION, POWDER, LYOPHILIZED, FOR SOLUTION INTRAVENOUS EVERY 12 HOURS SCHEDULED
Status: DISCONTINUED | OUTPATIENT
Start: 2025-04-16 | End: 2025-04-18 | Stop reason: HOSPADM

## 2025-04-16 RX ADMIN — METOCLOPRAMIDE 5 MG: 5 INJECTION, SOLUTION INTRAMUSCULAR; INTRAVENOUS at 22:02

## 2025-04-16 RX ADMIN — ACETAMINOPHEN 650 MG: 325 TABLET, FILM COATED ORAL at 17:08

## 2025-04-16 RX ADMIN — IPRATROPIUM BROMIDE AND ALBUTEROL SULFATE 3 ML: 2.5; .5 SOLUTION RESPIRATORY (INHALATION) at 19:29

## 2025-04-16 RX ADMIN — SERTRALINE HYDROCHLORIDE 25 MG: 25 TABLET ORAL at 09:24

## 2025-04-16 RX ADMIN — INSULIN LISPRO 1 UNITS: 100 INJECTION, SOLUTION INTRAVENOUS; SUBCUTANEOUS at 12:24

## 2025-04-16 RX ADMIN — IPRATROPIUM BROMIDE AND ALBUTEROL SULFATE 3 ML: 2.5; .5 SOLUTION RESPIRATORY (INHALATION) at 02:19

## 2025-04-16 RX ADMIN — PANTOPRAZOLE SODIUM 40 MG: 40 INJECTION, POWDER, FOR SOLUTION INTRAVENOUS at 22:02

## 2025-04-16 RX ADMIN — BENZONATATE 100 MG: 100 CAPSULE ORAL at 09:24

## 2025-04-16 RX ADMIN — ENOXAPARIN SODIUM 40 MG: 40 INJECTION SUBCUTANEOUS at 09:24

## 2025-04-16 RX ADMIN — MORPHINE SULFATE 2 MG: 2 INJECTION, SOLUTION INTRAMUSCULAR; INTRAVENOUS at 13:34

## 2025-04-16 RX ADMIN — INSULIN LISPRO 2 UNITS: 100 INJECTION, SOLUTION INTRAVENOUS; SUBCUTANEOUS at 17:12

## 2025-04-16 RX ADMIN — LEVOTHYROXINE SODIUM 50 MCG: 0.05 TABLET ORAL at 09:24

## 2025-04-16 RX ADMIN — METHYLPREDNISOLONE SODIUM SUCCINATE 40 MG: 40 INJECTION, POWDER, FOR SOLUTION INTRAMUSCULAR; INTRAVENOUS at 09:22

## 2025-04-16 RX ADMIN — IPRATROPIUM BROMIDE AND ALBUTEROL SULFATE 3 ML: 2.5; .5 SOLUTION RESPIRATORY (INHALATION) at 07:24

## 2025-04-16 RX ADMIN — INSULIN LISPRO 1 UNITS: 100 INJECTION, SOLUTION INTRAVENOUS; SUBCUTANEOUS at 08:15

## 2025-04-16 RX ADMIN — DICYCLOMINE HYDROCHLORIDE 10 MG: 10 CAPSULE ORAL at 17:09

## 2025-04-16 RX ADMIN — ONDANSETRON 4 MG: 2 INJECTION INTRAMUSCULAR; INTRAVENOUS at 09:44

## 2025-04-16 RX ADMIN — PANTOPRAZOLE SODIUM 40 MG: 40 INJECTION, POWDER, FOR SOLUTION INTRAVENOUS at 09:23

## 2025-04-16 RX ADMIN — LIDOCAINE 5% 1 PATCH: 700 PATCH TOPICAL at 09:28

## 2025-04-16 RX ADMIN — ENOXAPARIN SODIUM 40 MG: 40 INJECTION SUBCUTANEOUS at 22:02

## 2025-04-16 RX ADMIN — FAMOTIDINE 20 MG: 20 TABLET, FILM COATED ORAL at 17:09

## 2025-04-16 RX ADMIN — ACETAMINOPHEN 650 MG: 325 TABLET, FILM COATED ORAL at 05:13

## 2025-04-16 RX ADMIN — FAMOTIDINE 20 MG: 20 TABLET, FILM COATED ORAL at 09:24

## 2025-04-16 RX ADMIN — MORPHINE SULFATE 2 MG: 2 INJECTION, SOLUTION INTRAMUSCULAR; INTRAVENOUS at 00:50

## 2025-04-16 RX ADMIN — ACETAMINOPHEN 650 MG: 325 TABLET, FILM COATED ORAL at 12:31

## 2025-04-16 RX ADMIN — MORPHINE SULFATE 2 MG: 2 INJECTION, SOLUTION INTRAMUSCULAR; INTRAVENOUS at 22:02

## 2025-04-16 RX ADMIN — BENZONATATE 100 MG: 100 CAPSULE ORAL at 22:02

## 2025-04-16 RX ADMIN — AZITHROMYCIN 500 MG: 500 TABLET, FILM COATED ORAL at 17:08

## 2025-04-16 RX ADMIN — IPRATROPIUM BROMIDE AND ALBUTEROL SULFATE 3 ML: 2.5; .5 SOLUTION RESPIRATORY (INHALATION) at 13:38

## 2025-04-16 RX ADMIN — BENZONATATE 100 MG: 100 CAPSULE ORAL at 17:09

## 2025-04-16 RX ADMIN — METOCLOPRAMIDE 5 MG: 5 INJECTION, SOLUTION INTRAMUSCULAR; INTRAVENOUS at 17:09

## 2025-04-16 RX ADMIN — MORPHINE SULFATE 2 MG: 2 INJECTION, SOLUTION INTRAMUSCULAR; INTRAVENOUS at 09:25

## 2025-04-16 RX ADMIN — MORPHINE SULFATE 2 MG: 2 INJECTION, SOLUTION INTRAMUSCULAR; INTRAVENOUS at 05:16

## 2025-04-16 RX ADMIN — MORPHINE SULFATE 2 MG: 2 INJECTION, SOLUTION INTRAMUSCULAR; INTRAVENOUS at 17:38

## 2025-04-16 RX ADMIN — FLUTICASONE FUROATE AND VILANTEROL TRIFENATATE 1 PUFF: 200; 25 POWDER RESPIRATORY (INHALATION) at 09:36

## 2025-04-16 RX ADMIN — INSULIN LISPRO 2 UNITS: 100 INJECTION, SOLUTION INTRAVENOUS; SUBCUTANEOUS at 22:03

## 2025-04-16 NOTE — PLAN OF CARE
Problem: PAIN - ADULT  Goal: Verbalizes/displays adequate comfort level or baseline comfort level  Description: Interventions:- Encourage patient to monitor pain and request assistance- Assess pain using appropriate pain scale- Administer analgesics based on type and severity of pain and evaluate response- Implement non-pharmacological measures as appropriate and evaluate response- Consider cultural and social influences on pain and pain management- Notify physician/advanced practitioner if interventions unsuccessful or patient reports new pain  Outcome: Progressing     Problem: SAFETY ADULT  Goal: Patient will remain free of falls  Description: INTERVENTIONS:- Educate patient/family on patient safety including physical limitations- Instruct patient to call for assistance with activity - Consult OT/PT to assist with strengthening/mobility - Keep Call bell within reach- Keep bed low and locked with side rails adjusted as appropriate- Keep care items and personal belongings within reach- Initiate and maintain comfort rounds- Make Fall Risk Sign visible to staff- Offer Toileting every 4 Hours, in advance of need- Initiate/Maintain bed alarm- Obtain necessary fall risk management equipment: - Apply yellow socks and bracelet for high fall risk patients- Consider moving patient to room near nurses station  Outcome: Progressing  Goal: Maintain or return to baseline ADL function  Description: INTERVENTIONS:-  Assess patient's ability to carry out ADLs; assess patient's baseline for ADL function and identify physical deficits which impact ability to perform ADLs (bathing, care of mouth/teeth, toileting, grooming, dressing, etc.)- Assess/evaluate cause of self-care deficits - Assess range of motion- Assess patient's mobility; develop plan if impaired- Assess patient's need for assistive devices and provide as appropriate- Encourage maximum independence but intervene and supervise when necessary- Involve family in  performance of ADLs- Assess for home care needs following discharge - Consider OT consult to assist with ADL evaluation and planning for discharge- Provide patient education as appropriate  Outcome: Progressing  Outcome: Progressing

## 2025-04-16 NOTE — ASSESSMENT & PLAN NOTE
Lab Results   Component Value Date    HGBA1C 7.6 (H) 04/15/2025       Recent Labs     04/15/25  1900 04/15/25  2111 04/16/25  0748 04/16/25  1144   POCGLU 170* 293* 173* 196*       Blood Sugar Average: Last 72 hrs:  (P) 192  Hold metformin and Jardiance.  Insulin sliding scale while hospitalized.  Given need for IV steroids for COPD exacerbation I expect the blood sugar to be on the higher side and we will need to adjust the regimen

## 2025-04-16 NOTE — ASSESSMENT & PLAN NOTE
Likely secondary to COPD, however patient states that she also has history of chronic diastolic heart failure which was diagnosed by her cardiologist office.  Will consult cardiology.  Continue oral Lasix.  Patient does not look fluid overloaded on my exam.  Cardiology ordered echo and stress test

## 2025-04-16 NOTE — CASE MANAGEMENT
Case Management Assessment & Discharge Planning Note    Patient name Nanci Navarro  Location /-01 MRN [id number]  : 1965 Date 2025       Current Admission Date: 4/15/2025  Current Admission Diagnosis:Abdominal pain   Patient Active Problem List    Diagnosis Date Noted Date Diagnosed    COPD exacerbation (HCC) 04/15/2025     Lactic acidosis 04/15/2025     Hypothyroidism 2025     Thrombus of left radial artery (HCC) 2024     SOB (shortness of breath) 2024     Chronic back pain 2024     Class 3 severe obesity due to excess calories with serious comorbidity and body mass index (BMI) of 45.0 to 49.9 in adult (HCC) 2024     Non-compliant behavior 2024     Discharge planning issues 2024     Anemia 2024     Abdominal pain 2024     Diabetes mellitus (HCC) 2024     Osteopenia of lumbar spine 2023     Gastroesophageal reflux disease without esophagitis 2023     Chest pain 01/15/2023     Volume overload 01/15/2023     Ambulatory dysfunction 2023     Compression fracture of L1 vertebra (HCC) 2023     Systemic lupus erythematosus with lung involvement ([facility]) 12/10/2022     Chronic respiratory failure with hypoxia ([facility]) 10/06/2022     Simple chronic bronchitis ([facility]) 2022     Smoker 2022       LOS (days): 0  Geometric Mean LOS (GMLOS) (days):   Days to GMLOS:     OBJECTIVE:              Current admission status: Observation       Preferred Pharmacy:   [facility] - [place], [place] - [facility]  [address]  [facility] [address]  Phone: [phone] Fax: [phone]    Primary Care Provider: Natividad Robertson PA-C    Primary Insurance: STEPH  Secondary Insurance:     ASSESSMENT:  Active Health Care Proxies       Jeana Navarro Health Care Representative - Daughter   Primary Phone: [phone] (Mobile)                 Advance Directives  Does patient have a  Health Care POA?: No  Was patient offered paperwork?: Yes (not interested)  Does patient currently have a Health Care decision maker?: Yes, please see Health Care Proxy section  Does patient have Advance Directives?: No  Was patient offered paperwork?: Yes         Readmission Root Cause  30 Day Readmission: No    Patient Information  Admitted from:: Home  Mental Status: Alert  During Assessment patient was accompanied by: Not accompanied during assessment  Assessment information provided by:: Patient  Primary Caregiver: Self  Support Systems: Daughter  County of Residence: [place]  What city do you live in?: [place]  Home entry access options. Select all that apply.: Stairs  Number of steps to enter home.: 2  Do the steps have railings?: Yes  Type of Current Residence: Apartment  Floor Level: 1  Upon entering residence, is there a bedroom on the main floor (no further steps)?: Yes  Upon entering residence, is there a bathroom on the main floor (no further steps)?: Yes  Living Arrangements: Lives Alone  Is patient a ?: No    Activities of Daily Living Prior to Admission  Functional Status: Independent  Completes ADLs independently?: Yes  Ambulates independently?: Yes  Does patient use assisted devices?: Yes  Assisted Devices (DME) used: Walker, Straight Cane  Does patient currently own DME?: Yes  What DME does the patient currently own?: Walker, Straight Cane  Does patient have a history of Outpatient Therapy (PT/OT)?: No  Does the patient have a history of Short-Term Rehab?: No  Does patient have a history of HHC?: Yes ([facility])  Does patient currently have HHC?: Yes ([facility])    Current Home Health Care  Type of Current Home Care Services: Home PT, Home OT, Nurse visit  Home Health Agency Name:: [facility]  Current Home Health Follow-Up Provider:: PCP    Patient Information Continued  Income Source: SSI/SSD  Does patient have prescription coverage?: Yes  Can the patient afford  their medications and any related supplies (such as glucometers or test strips)?: Yes  Does patient receive dialysis treatments?: No  Does patient have a history of substance abuse?: No  Does patient have a history of Mental Health Diagnosis?: Yes (depression)  Is patient receiving treatment for mental health?: Yes  Has patient received inpatient treatment related to mental health in the last 2 years?: No         Means of Transportation  Means of Transport to Appts:: Drives Self          DISCHARGE DETAILS:    Discharge planning discussed with:: Patient  Freedom of Choice: Yes  Comments - Freedom of Choice: No anticipated DC needs  CM contacted family/caregiver?: No- see comments                  Requested Home Health Care         Home Health Agency Name:: [facility]    DME Referral Provided  Referral made for DME?: No    Other Referral/Resources/Interventions Provided:  Referral Comments: [facility]         Treatment Team Recommendation: Home with Home Health Care  Discharge Destination Plan:: Home with Home Health Care  Transport at Discharge : Family

## 2025-04-16 NOTE — CONSULTS
[facility]  [address] [place] [address]  [phone]     Fax: [phone]     Consultation Report     Name:Nanci Navarro  Room: /-01  Attending Provider:Didier Gilbert MD  Admission Date: 4/15/2025  1:37 PM  DOS: 4/16/2025     IMPRESSION:  N/V/D  2. Edema    PLAN:  MRI of cervical and thoracic spine  Echo    Subjective:      Nanci Navarro is a 60 y.o. female who is evaluated in consultation for cardiac hx.   Patient has been having abdominal pain and diarrhea for the last 3 weeks. Has got investigated multiple times for the same and she said that no one has found why she is having the diarrhea. Patient was in [facility] emergency room less than 24 hours ago. Patient comes to [facility] ER because of the fact that she states that her abdominal pain and nausea and vomiting have worsened and she is not able to keep anything down. She thinks that she may benefit from GI consult and workup.   She denies problems of chest pain, shortness of breath, orthopnea, PND, palpitations, syncope, or bleeding problems.     Review of Systems  Constitutional: Negative for fever, chills, diaphoresis, appetite change, fatigue and unexpected weight change.  HENT: Negative for postnasal drip and trouble swallowing.  Eyes: Negative for visual disturbance.  Respiratory: Negative for shortness of breath. Negative for cough.  Cardiovascular: Negative for chest pain, palpitations and leg swelling.  Gastrointestinal: Negative for abdominal pain, blood in stool and abdominal distention.  Endocrine: Negative for cold intolerance, heat intolerance and polyuria.  Genitourinary: Negative for decreased urine volume and difficulty urinating.  Musculoskeletal: Negative for myalgias and arthralgias. Negative for back pain.  Skin: Negative for rash.  Neurological: Negative for dizziness, syncope, light-headedness and headaches.  Hematological: Does not bruise/bleed  easily.  Psychiatric/Behavioral: Negative for sleep disturbance. Negative for anxiety.     Patient Active Problem List    Diagnosis Date Noted    COPD exacerbation ([facility]) 04/15/2025    Lactic acidosis 04/15/2025    Hypothyroidism 02/05/2025    Thrombus of left radial artery ([facility]) 08/17/2024    SOB (shortness of breath) 02/07/2024    Chronic back pain 02/05/2024    Class 3 severe obesity due to excess calories with serious comorbidity and body mass index (BMI) of 45.0 to 49.9 in adult ([facility]) 02/04/2024    Non-compliant behavior 02/01/2024    Discharge planning issues 01/27/2024    Anemia 01/21/2024    Abdominal pain 01/19/2024    Diabetes mellitus ([facility]) 01/14/2024    Osteopenia of lumbar spine 11/27/2023    Gastroesophageal reflux disease without esophagitis 11/25/2023    Chest pain 01/15/2023    Volume overload 01/15/2023    Ambulatory dysfunction 01/08/2023    Compression fracture of L1 vertebra ([facility]) 01/02/2023    Systemic lupus erythematosus with lung involvement ([facility]) 12/10/2022    Chronic respiratory failure with hypoxia ([facility]) 10/06/2022    Simple chronic bronchitis ([facility]) 09/11/2022    Smoker 09/11/2022        Past Medical History:   Diagnosis Date    Achalasia     Acute respiratory failure ([facility]) 01/15/2023    Acute respiratory failure with hypoxia ([facility]) 02/07/2024    Back pain     Cancer ([facility])     Ovarian    Diabetes mellitus ([facility])     DVT (deep venous thrombosis) ([facility])     Electrolyte abnormality 01/14/2024    Fall 01/02/2023    Had a fall a week ago slipped in the mud onto her back.  X-rays with no fracture.  She has chronic L1 fracture.  She is on pain medication at home for chronic back pain.      Fibromyalgia     Hypertension     Lupus         Past Surgical History:   Procedure Laterality Date    HYSTERECTOMY      NECK SURGERY          Social History     Socioeconomic History    Marital status: /Civil Union     Spouse name: Not on file    Number of children: Not on file    Years of education: Not on  file    Highest education level: Not on file   Occupational History    Not on file   Tobacco Use    Smoking status: Every Day     Current packs/day: 0.50     Types: Cigarettes    Smokeless tobacco: Never   Vaping Use    Vaping status: Never Used   Substance and Sexual Activity    Alcohol use: Never    Drug use: Never    Sexual activity: Yes   Other Topics Concern    Not on file   Social History Narrative    Not on file     Social Drivers of Health     Financial Resource Strain: At Risk (3/22/2025)    Received from [facility]    Financial Insecurity     In the last 12 months did you skip medications to save money?: Yes     In the last 12 months was there a time when you needed to see a doctor but could not because of cost?: Yes   Food Insecurity: No Food Insecurity (4/15/2025)    Nursing - Inadequate Food Risk Classification     Worried About Running Out of Food in the Last Year: Not on file     Ran Out of Food in the Last Year: Not on file     Ran Out of Food in the Last Year: Never true   Recent Concern: Food Insecurity - Food Insecurity Present (3/22/2025)    Received from [facility]    Food Insecurity     In the last 12 months did you ever eat less than you felt you should because there wasn't enough money for food?: Yes   Transportation Needs: No Transportation Needs (4/15/2025)    Nursing - Transportation Risk Classification     Lack of Transportation: Not on file     Lack of Transportation: No   Recent Concern: Transportation Needs - Unmet Transportation Needs (3/28/2025)    Received from [facility]    OASIS : Transportation     Lack of Transportation (Medical): Yes     Lack of Transportation (Non-Medical): Yes     Patient Unable or Declines to Respond: No   Physical Activity: Not on file   Stress: Not on file   Social Connections: Socially Isolated (3/22/2025)    Received from [facility]    Social Connection     Do you often feel  lonely?: Yes   Intimate Partner Violence: Unknown (4/15/2025)    Nursing IPS     Feels Physically and Emotionally Safe: Not on file     Physically Hurt by Someone: Not on file     Humiliated or Emotionally Abused by Someone: Not on file     Physically Hurt by Someone: No     Hurt or Threatened by Someone: No   Housing Stability: Unknown (4/15/2025)    Nursing: Inadequate Housing Risk Classification     Has Housing: Not on file     Worried About Losing Housing: Not on file     Unable to Get Utilities: Not on file     Unable to Pay for Housing in the Last Year: No     Has Housin   Recent Concern: Housing Stability - High Risk (2025)    Received from [facility]    Housing Stability Vital Sign     Unable to Pay for Housing in the Last Year: No     Number of Times Moved in the Last Year: 2     Homeless in the Last Year: No        No family history on file.  Family history reviewed and noncontributory     Allergies   Allergen Reactions    Valium [Diazepam] Anaphylaxis        Scheduled Medicines  Current Facility-Administered Medications   Medication Dose Route Frequency Provider Last Rate    acetaminophen  650 mg Oral Q6H Ariel Arias MD      albuterol  2.5 mg Nebulization Q4H PRN Ariel Arias MD      aluminum-magnesium hydroxide-simethicone  15 mL Oral Q4H PRN Ariel Arias MD      azithromycin  500 mg Oral Q24H Ariel Arias MD      benzonatate  100 mg Oral TID Ariel Arias MD      bisacodyl  10 mg Rectal Daily PRN Ariel Arias MD      enoxaparin  40 mg Subcutaneous BID Ariel Arias MD      famotidine  20 mg Oral BID Ariel Arias MD      fluticasone-vilanterol  1 puff Inhalation Daily Ariel Arias MD      furosemide  40 mg Oral Daily Ariel Arias MD      guaiFENesin  200 mg Oral Q4H PRN Ariel Arias MD      insulin lispro  1-5 Units Subcutaneous TID AC Ariel Arias MD      insulin lispro   "1-5 Units Subcutaneous HS Ariel Arias MD      ipratropium-albuterol  3 mL Nebulization Q6H Ariel Arias MD      levothyroxine  50 mcg Oral Daily Ariel Arias MD      lidocaine  1 patch Topical Daily Ariel Arias MD      methocarbamol  500 mg Oral BID PRN Ariel Arias MD      methylPREDNISolone sodium succinate  40 mg Intravenous Daily Ariel Arias MD      metoprolol succinate  25 mg Oral Daily Ariel Arias MD      morphine injection  2 mg Intravenous Q4H PRN Ariel Arias MD      ondansetron  4 mg Intravenous Q6H PRN Ariel Arias MD      pantoprazole  40 mg Intravenous Q24H TANVIR Ariel Arias MD      sertraline  25 mg Oral Daily Ariel Arias MD          Infusions        PRN Medicines    albuterol    aluminum-magnesium hydroxide-simethicone    bisacodyl    guaiFENesin    methocarbamol    morphine injection    ondansetron     Diet Shahid/CHO Controlled; Diabetic [place] Liquids        The following portions of the patient's history were reviewed and updated as appropriate: current medications, past family history, past medical history, past social history, past surgical history, allergies and problem list.              Objective:      /61 (BP Location: Left arm)   Pulse 79   Temp 98.2 °F (36.8 °C) (Tympanic)   Resp 17   Ht 4' 9\" (1.448 m)   Wt 102 kg (225 lb 8.5 oz)   SpO2 98%   BMI 48.80 kg/m²      BP Readings from Last 3 Encounters:   04/16/25 101/61   03/17/25 127/57   02/04/25 126/74          Intake/Output Summary (Last 24 hours) at 4/16/2025 0905  Last data filed at 4/16/2025 0449  Gross per 24 hour   Intake --   Output 450 ml   Net -450 ml        Physical Exam:  General Appearance: Alert, cooperative, in no acute distress   Eyes: Sclerae anicteric.   HEET: Normocephalic, atraumatic.   Neck: Supple, no JVD   Cardiovascular: Normal rate, regular rhythm. No murmurs   No edema, normal pulses   Respiratory: Breathing " "unlabored. Lungs clear to auscultation   Gastrointestinal: Normoactive bowel sounds. Abdomen soft, nontender to palpation.   Musculoskeletal: No back or joint deformity.   Dermatologic: Skin is warm and dry.   Neurologic: Alert and oriented and neurologic exam is grossly normal.   Psychiatric: Normal affect and mood.            Lab Results:  Lab Results   Component Value Date    WBC 9.29 04/16/2025    HGB 9.9 (L) 04/16/2025    HCT 31.7 (L) 04/16/2025     04/16/2025     No results found for: \"CHOL\", \"TRIG\", \"HDL\", \"LDL\", \"LDLDIRECT\"  Lab Results   Component Value Date    K 3.7 04/16/2025    K 3.7 04/14/2025     04/16/2025     04/14/2025    CO2 31 04/16/2025    CO2 32 (H) 04/14/2025    BUN 14 04/16/2025    BUN 14 04/14/2025    CREATININE 0.77 04/16/2025    CREATININE 0.81 04/14/2025    ALKPHOS 71 04/16/2025    ALKPHOS 84 04/14/2025    ALT 21 04/16/2025    ALT 22 04/14/2025    AST 11 (L) 04/16/2025    AST 14 04/14/2025    BILIDIR 0.02 01/29/2025     Lab Results   Component Value Date    INR 0.9 03/21/2025    TSH 1.43 04/14/2025     No results for input(s): \"TROPONIN\" in the last 72 hours.     Diagnostic Results:  ECG :     ECHO:  Chest Xray:     Assessment & Plan:      [unfilled]        Francisco J Salazar MD  "

## 2025-04-16 NOTE — ASSESSMENT & PLAN NOTE
Continue to monitor CBC and transfuse as necessary.  Continue to monitor for GI blood loss.  B12, folate, iron panel      Patient will be seen and examined by Dr. Whitaker.  All huitron medical decision made by Dr. Whitaker.

## 2025-04-16 NOTE — RESPIRATORY THERAPY NOTE
RT Protocol Note  Nanci Navarro 60 y.o. female MRN: [id number]  Unit/Bed#: -01 Encounter: [id number]    Assessment    Principal Problem:    Abdominal pain  Active Problems:    Smoker    Compression fracture of L1 vertebra ([facility])    Ambulatory dysfunction    Gastroesophageal reflux disease without esophagitis    Diabetes mellitus ([facility])    Anemia    Class 3 severe obesity due to excess calories with serious comorbidity and body mass index (BMI) of 45.0 to 49.9 in adult ([facility])    SOB (shortness of breath)    Hypothyroidism    COPD exacerbation ([facility])    Lactic acidosis      Home Pulmonary Medications:    Home Devices/Therapy: (P) Home O2    Past Medical History:   Diagnosis Date    Achalasia     Acute respiratory failure ([facility]) 01/15/2023    Acute respiratory failure with hypoxia ([facility]) 02/07/2024    Back pain     Cancer ([facility])     Ovarian    Diabetes mellitus ([facility])     DVT (deep venous thrombosis) ([facility])     Electrolyte abnormality 01/14/2024    Fall 01/02/2023    Had a fall a week ago slipped in the mud onto her back.  X-rays with no fracture.  She has chronic L1 fracture.  She is on pain medication at home for chronic back pain.      Fibromyalgia     Hypertension     Lupus      Social History     Socioeconomic History    Marital status: /Civil Union     Spouse name: Not on file    Number of children: Not on file    Years of education: Not on file    Highest education level: Not on file   Occupational History    Not on file   Tobacco Use    Smoking status: Every Day     Current packs/day: 0.50     Types: Cigarettes    Smokeless tobacco: Never   Vaping Use    Vaping status: Never Used   Substance and Sexual Activity    Alcohol use: Never    Drug use: Never    Sexual activity: Yes   Other Topics Concern    Not on file   Social History Narrative    Not on file     Social Drivers of Health     Financial Resource Strain: At Risk (3/22/2025)    Received from [facility]    Financial Insecurity      In the last 12 months did you skip medications to save money?: Yes     In the last 12 months was there a time when you needed to see a doctor but could not because of cost?: Yes   Food Insecurity: No Food Insecurity (4/15/2025)    Nursing - Inadequate Food Risk Classification     Worried About Running Out of Food in the Last Year: Not on file     Ran Out of Food in the Last Year: Not on file     Ran Out of Food in the Last Year: Never true   Recent Concern: Food Insecurity - Food Insecurity Present (3/22/2025)    Received from [facility]    Food Insecurity     In the last 12 months did you ever eat less than you felt you should because there wasn't enough money for food?: Yes   Transportation Needs: No Transportation Needs (4/15/2025)    Nursing - Transportation Risk Classification     Lack of Transportation: Not on file     Lack of Transportation: No   Recent Concern: Transportation Needs - Unmet Transportation Needs (3/28/2025)    Received from [facility]    OASIS : Transportation     Lack of Transportation (Medical): Yes     Lack of Transportation (Non-Medical): Yes     Patient Unable or Declines to Respond: No   Physical Activity: Not on file   Stress: Not on file   Social Connections: Socially Isolated (3/22/2025)    Received from [facility]    Social Connection     Do you often feel lonely?: Yes   Intimate Partner Violence: Unknown (4/15/2025)    Nursing IPS     Feels Physically and Emotionally Safe: Not on file     Physically Hurt by Someone: Not on file     Humiliated or Emotionally Abused by Someone: Not on file     Physically Hurt by Someone: No     Hurt or Threatened by Someone: No   Housing Stability: Unknown (4/15/2025)    Nursing: Inadequate Housing Risk Classification     Has Housing: Not on file     Worried About Losing Housing: Not on file     Unable to Get Utilities: Not on file     Unable to Pay for Housing in the Last Year: No     Has  Housin   Recent Concern: Housing Stability - High Risk (2025)    Received from [facility]    Housing Stability Vital Sign     Unable to Pay for Housing in the Last Year: No     Number of Times Moved in the Last Year: 2     Homeless in the Last Year: No       Subjective         Objective    Physical Exam:   Assessment Type: (P) During-treatment  General Appearance: (P) Drowsy, Eyes open/responds to voice  Respiratory Pattern: (P) Normal  Chest Assessment: (P) Chest expansion symmetrical  Bilateral Breath Sounds: (P) Diminished, Expiratory wheezes  Cough: (P) None  O2 Device: (P) nc    Vitals:  Blood pressure 106/50, pulse 78, temperature 98.2 °F (36.8 °C), temperature source Oral, resp. rate 18, weight 102 kg (225 lb 8.5 oz), SpO2 99%.          Imaging and other studies: Results Review Statement: No pertinent imaging studies reviewed.    O2 Device: (P) nc     Plan    Respiratory Plan: Mild Distress pathway        Resp Comments: (P) cont w/ current therapy

## 2025-04-16 NOTE — CONSULTS
Consultation - Pulmonology   Name: Nanci Navarro 60 y.o. female I MRN: [id number]  Unit/Bed#: -01 I Date of Admission: 4/15/2025   Date of Service: 4/16/2025 I Hospital Day: 0   Inpatient consult to Pulmonology  Consult performed by: Paul Shukla PA-C  Consult ordered by: Ariel Arias MD        Physician Requesting Evaluation: Didier Gilbert MD   Reason for Evaluation / Principal Problem: COPD exacerbation    Assessment & Plan  COPD exacerbation (HCC)  Severe COPD with acute exacerbation  CXR from 4/14 is clear  She is on her baseline 2 L nasal cannula  Recent taken off Trelegy and placed on DuoNeb alone  Possibly contributing to her current symptoms  Continue Breo, DuoNeb  Continue solu-medrol daily    Possible cardiac component to her symptoms as well, Dr. Salazar has ordered echo  Abdominal pain  Ongoing workup, recently hospitalized for the same at [facility]  Smoker    Class 3 severe obesity due to excess calories with serious comorbidity and body mass index (BMI) of 45.0 to 49.9 in adult ([facility])    Lactic acidosis    Pulmonology service will follow.  Please contact the SecureChat role for the Pulmonology service with any questions/concerns.    History of Present Illness   Nanci Navarro is a 60 y.o. female current smoker with past medical history of COPD, respiratory failure, diabetes, history of DVT, hypertension, lupus who presents with complaint of ongoing abdominal pain for which she was just hospitalized at [facility].  She also notes increasing shortness of breath/dyspnea on exertion for the past week or so she reports having seen her pulmonologist at [facility] earlier this month and her Trelegy was stopped and she was placed on DuoNeb 4 times per day instead.    Review of Systems   Constitutional: Negative.    HENT: Negative.     Respiratory:  Positive for cough and shortness of breath.    Cardiovascular: Negative.    Gastrointestinal:  Positive for abdominal pain, diarrhea and  nausea.   Genitourinary: Negative.    Musculoskeletal: Negative.    Skin: Negative.    Allergic/Immunologic: Negative.    Neurological: Negative.    Psychiatric/Behavioral: Negative.         Historical Information   Medical History Review: I have reviewed the patient's PMH, PSH, Social History, Family History, Meds, and Allergies     Current Facility-Administered Medications:     acetaminophen (TYLENOL) tablet 650 mg, Q6H    albuterol inhalation solution 2.5 mg, Q4H PRN    aluminum-magnesium hydroxide-simethicone (MAALOX) oral suspension 15 mL, Q4H PRN    azithromycin (ZITHROMAX) tablet 500 mg, Q24H    benzonatate (TESSALON PERLES) capsule 100 mg, TID    bisacodyl (DULCOLAX) rectal suppository 10 mg, Daily PRN    enoxaparin (LOVENOX) subcutaneous injection 40 mg, BID    famotidine (PEPCID) tablet 20 mg, BID    fluticasone-vilanterol 200-25 mcg/actuation 1 puff, Daily **AND** [DISCONTINUED] umeclidinium 62.5 mcg/actuation inhaler AEPB 1 puff, Daily    furosemide (LASIX) tablet 40 mg, Daily    guaiFENesin (ROBITUSSIN) oral liquid 200 mg, Q4H PRN    insulin lispro (HumALOG/ADMELOG) 100 units/mL subcutaneous injection 1-5 Units, TID AC **AND** Fingerstick Glucose (POCT), TID AC    insulin lispro (HumALOG/ADMELOG) 100 units/mL subcutaneous injection 1-5 Units, HS    ipratropium-albuterol (DUO-NEB) 0.5-2.5 mg/3 mL inhalation solution 3 mL, Q6H    levothyroxine tablet 50 mcg, Daily    lidocaine (LIDODERM) 5 % patch 1 patch, Daily    methocarbamol (ROBAXIN) tablet 500 mg, BID PRN    methylPREDNISolone sodium succinate (Solu-MEDROL) injection 40 mg, Daily    metoprolol succinate (TOPROL-XL) 24 hr tablet 25 mg, Daily    morphine injection 2 mg, Q4H PRN    ondansetron (ZOFRAN) injection 4 mg, Q6H PRN    pantoprazole (PROTONIX) injection 40 mg, Q24H TANVIR    sertraline (ZOLOFT) tablet 25 mg, Daily  Prior to Admission Medications   Prescriptions Last Dose Informant Patient Reported? Taking?   Trelegy Ellipta 100-62.5-25 MCG/ACT  inhaler  Self Yes No   Sig: Inhale 1 puff daily   Xarelto 2.5 MG tablet  Self Yes No   Sig: TAKE 1 TABLET (2.5 MG TOTAL) BY MOUTH 2 (TWO) TIMES A DAY WITH MEALS.   Patient not taking: No sig reported   acetaminophen (TYLENOL) 325 mg tablet  Self No No   Sig: Take 2 tablets (650 mg total) by mouth every 6 (six) hours   albuterol (PROVENTIL HFA,VENTOLIN HFA) 90 mcg/act inhaler  Self No No   Sig: Inhale 2 puffs every 4 (four) hours as needed for wheezing   aluminum-magnesium hydroxide-simethicone (MAALOX MAX) 400-400-40 MG/5ML suspension  Self No No   Sig: Take 5 mL by mouth every 6 (six) hours as needed for indigestion or heartburn   famotidine (PEPCID) 20 mg tablet  Self No No   Sig: Take 1 tablet (20 mg total) by mouth 2 (two) times a day   Patient taking differently: Take 20 mg by mouth 2 (two) times a day Has not yet started medication 02/04/25   furosemide (LASIX) 40 mg tablet  Self No No   Sig: Take 1 tablet (40 mg total) by mouth daily   ipratropium-albuterol (DUO-NEB) 0.5-2.5 mg/3 mL nebulizer solution  Self Yes No   levothyroxine 50 mcg tablet   No No   Sig: Take 1 tablet (50 mcg total) by mouth in the morning   lidocaine (Lidoderm) 5 %   No No   Sig: Apply 1 patch topically over 12 hours daily Remove & Discard patch within 12 hours or as directed by MD   metFORMIN (GLUCOPHAGE) 500 mg tablet   No No   Sig: TAKE 1 TABLET (500 MG TOTAL) BY MOUTH DAILY WITH BREAKFAST   methocarbamol (ROBAXIN) 500 mg tablet   No No   Sig: Take 1 tablet (500 mg total) by mouth 2 (two) times a day as needed for muscle spasms   metoprolol succinate (TOPROL-XL) 25 mg 24 hr tablet  Self Yes No   Sig: Take 25 mg by mouth daily   ondansetron (ZOFRAN-ODT) 4 mg disintegrating tablet  Self No No   Sig: Take 1 tablet (4 mg total) by mouth every 8 (eight) hours as needed for nausea or vomiting   pantoprazole (PROTONIX) 40 mg tablet  Self No No   Sig: Take 1 tablet (40 mg total) by mouth 2 (two) times a day before meals   Patient not taking:  Reported on 2/4/2025   potassium chloride (Klor-Con M20) 20 mEq tablet  Self Yes No   Sig: Take 20 mEq by mouth in the morning For 3 days/ PRN   Patient not taking: Reported on 9/20/2024   sertraline (ZOLOFT) 25 mg tablet  Self Yes No   Sig: Take 25 mg by mouth daily      Facility-Administered Medications: None     Tobacco History: Current smoker  Occupational History:   Family History:No family history on file.    Objective :  Temp:  [98.2 °F (36.8 °C)-98.6 °F (37 °C)] 98.2 °F (36.8 °C)  HR:  [] 78  BP: ()/(50-65) 106/50  Resp:  [18-22] 18  SpO2:  [94 %-100 %] 94 %  O2 Device: Nasal cannula  Nasal Cannula O2 Flow Rate (L/min):  [3 L/min] 3 L/min    Physical Exam  Vitals reviewed.   Constitutional:       General: She is not in acute distress.     Appearance: Normal appearance. She is well-developed. She is not ill-appearing.   HENT:      Head: Normocephalic and atraumatic.      Mouth/Throat:      Pharynx: Oropharynx is clear.   Eyes:      Pupils: Pupils are equal, round, and reactive to light.   Cardiovascular:      Rate and Rhythm: Normal rate and regular rhythm.   Pulmonary:      Effort: Pulmonary effort is normal. No respiratory distress.      Breath sounds: Decreased breath sounds and wheezing present. No rhonchi or rales.   Abdominal:      General: Abdomen is flat. There is no distension.   Musculoskeletal:         General: Normal range of motion.      Cervical back: Normal range of motion.      Right lower leg: Edema present.      Left lower leg: Edema present.   Skin:     General: Skin is warm and dry.      Findings: No rash.   Neurological:      Mental Status: She is alert and oriented to person, place, and time.   Psychiatric:         Mood and Affect: Mood normal.         Behavior: Behavior normal.           Lab Results: I have reviewed the following results:  .     04/15/25  1426 04/15/25  1628 04/16/25  0505   WBC  --   --  9.29   HGB  --   --  9.9*   HCT  --   --  31.7*   PLT  --   --  264    SODIUM 142  --  140   K 3.2*  --  3.7     --  101   CO2 30  --  31   BUN 14  --  14   CREATININE 0.79  --  0.77   GLUC 153*  --  198*   AST 12*  --  11*   ALT 21  --  21   ALB 4.1  --  4.0   TBILI 0.33  --  0.29   ALKPHOS 76  --  71   HSTNI0 7  --   --    HSTNI2  --  4  --    LACTICACID  --  1.3  --      ABG: No new results in last 24 hours.    Imaging Results Review: Reviewed imaging from [facility] as well as CT chest done here in March.   Other Study Results Review: No additional pertinent studies reviewed.  PFT Results Reviewed: none available    VTE Prophylaxis: VTE covered by:  enoxaparin, Subcutaneous, 40 mg at 04/15/25 6037

## 2025-04-16 NOTE — PHYSICAL THERAPY NOTE
Physical Therapy Evaluation    Patient's Name: Nanci Navarro    Admitting Diagnosis  Diarrhea [R19.7]  SOB (shortness of breath) [R06.02]  Abdominal pain [R10.9]  Chronic respiratory failure with hypoxia (HCC) [J96.11]  Elevated lactic acid level [R79.89]    Problem List  Patient Active Problem List   Diagnosis    Simple chronic bronchitis (HCC)    Smoker    Compression fracture of L1 vertebra (HCC)    Systemic lupus erythematosus with lung involvement (HCC)    Ambulatory dysfunction    Chest pain    Volume overload    Gastroesophageal reflux disease without esophagitis    Osteopenia of lumbar spine    Diabetes mellitus (HCC)    Chronic respiratory failure with hypoxia (HCC)    Abdominal pain    Anemia    Discharge planning issues    Non-compliant behavior    Class 3 severe obesity due to excess calories with serious comorbidity and body mass index (BMI) of 45.0 to 49.9 in adult (HCC)    Chronic back pain    SOB (shortness of breath)    Thrombus of left radial artery (HCC)    Hypothyroidism    COPD exacerbation (HCC)    Lactic acidosis       Past Medical History  Past Medical History:   Diagnosis Date    Achalasia     Acute respiratory failure ([facility]) 01/15/2023    Acute respiratory failure with hypoxia ([facility]) 02/07/2024    Back pain     Cancer (HCC)     Ovarian    Diabetes mellitus (HCC)     DVT (deep venous thrombosis) ([facility])     Electrolyte abnormality 01/14/2024    Fall 01/02/2023    Had a fall a week ago slipped in the mud onto her back.  X-rays with no fracture.  She has chronic L1 fracture.  She is on pain medication at home for chronic back pain.      Fibromyalgia     Hypertension     Lupus        Past Surgical History  Past Surgical History:   Procedure Laterality Date    HYSTERECTOMY      NECK SURGERY         Recent Imaging  No orders to display       Recent Vital Signs  Vitals:    04/16/25 0449 04/16/25 0725 04/16/25 0752 04/16/25 0926   BP:   101/61 103/60   BP Location:   Left arm    Pulse:   79   "  Resp:   17    Temp:   98.2 °F (36.8 °C)    TempSrc:   Tympanic    SpO2:  99% 98%    Weight: 102 kg (225 lb 8.5 oz)  102 kg (225 lb 8.5 oz)    Height:   4' 9\" (1.448 m)       04/16/25 0953   PT Last Visit   PT Visit Date 04/16/25   Note Type   Note type Evaluation   Pain Assessment   Pain Assessment Tool 0-10   Pain Score 8   Pain Location/Orientation Location: Abdomen;Orientation: Lower;Orientation: Right;Location: Back   Pain Onset/Description Onset: Ongoing   Patient's Stated Pain Goal No pain   Hospital Pain Intervention(s) Repositioned;Ambulation/increased activity   Restrictions/Precautions   Weight Bearing Precautions Per Order No   Other Precautions Bed Alarm;Chair Alarm;O2;Fall Risk;Pain  (3L via NC at home, 2L currently)   Home Living   Type of Home Apartment   Home Layout One level;Stairs to enter without rails;Able to live on main level with bedroom/bathroom;Performs ADLs on one level  (2 LISA)   Bathroom Shower/Tub Tub/shower unit   Bathroom Toilet Standard   Bathroom Equipment Grab bars in shower;Grab bars around toilet   Bathroom Accessibility Accessible   Home Equipment Walker;Cane   Additional Comments no AD at baseline   Prior Function   Level of [place] Independent with functional mobility;Independent with IADLS;Independent with ADLs  (neice in the home for showering)   Lives With Alone   Receives Help From Family  (HHRN 1x/wk, OT/PT 1x/wk)   IADLs Independent with driving;Independent with meal prep;Independent with medication management   Falls in the last 6 months 1 to 4  (1 falls)   Vocational On disability   General   Family/Caregiver Present No   Cognition   Overall Cognitive Status   (questionable)   Arousal/Participation Alert   Orientation Level Oriented X4   Memory Within functional limits   Following Commands Follows multistep commands with increased time or repetition   Comments Pt agreeable to PT session   RLE Assessment   RLE Assessment WFL  (4-/5)   LLE Assessment   LLE " Assessment WFL  (4-/5)   Vision-Basic Assessment   Current Vision   (2 pairs one for distance one for reading)   Coordination   Sensation X  (tingling in b/l hands occasionally)   Light Touch   RLE Light Touch Grossly intact   LLE Light Touch Grossly intact   Bed Mobility   Supine to Sit 5  Supervision   Additional items Assist x 1;Increased time required;Verbal cues;Bedrails   Transfers   Sit to Stand   (CGA)   Additional items Assist x 1;Increased time required;Verbal cues   Stand to Sit   (CGA)   Additional items Assist x 1;Increased time required;Verbal cues   Toilet transfer 5  Supervision   Additional items Increased time required;Verbal cues;Standard toilet  (grab bars)   Ambulation/Elevation   Gait pattern Short stride;Excessively slow   Gait Assistance 4  Minimal assist  (CGA)   Additional items Assist x 1;Verbal cues   Assistive Device None   Distance 10', 10'   Balance   Static Sitting Good   Dynamic Sitting Fair +   Static Standing Fair   Dynamic Standing Fair -   Ambulatory Fair -   Activity Tolerance   Activity Tolerance Patient tolerated treatment well   Nurse Made Aware Spoek to RN   Assessment   Prognosis Good   Problem List Decreased strength;Decreased endurance;Decreased mobility;Impaired balance;Pain   Assessment Nanci Navarro is a 60 y.o. female admitted to [facility] on 4/15/2025 for Abdominal pain. Pt  has a past medical history of Achalasia, Acute respiratory failure ([facility]) (01/15/2023), Acute respiratory failure with hypoxia ([facility]) (02/07/2024), Back pain, Cancer ([facility]), Diabetes mellitus ([facility]), DVT (deep venous thrombosis) ([facility]), Electrolyte abnormality (01/14/2024), Fall (01/02/2023), Fibromyalgia, Hypertension, and Lupus.. Order placed for PT eval and tx. PT was consulted and pt was seen on 4/16/2025 for mobility assessment and d/c planning. Chart review and two person identifiers were completed.   Currently pt presents with decreased strength , decreased static sitting balance , decreased  dynamic sitting balance , decreased static standing balance, decreased dynamic standing balance , decreased gait speed, decreased step length , and decreased muscular endurance . Due to these impairments, they will require assistance to perform bed mobility, sit to stand , ambulation, stair negotiation, and transfers. Pt is currently functioning at a supervision assistance x1 level for bed mobility, contact guard assistance x1 level for transfers, contact guard assistance x1 level for ambulation with no assistive device. These activity limitations significantly impact their ability to participate in previous home and community roles and responsibilities  and ambulation in home. The patient's AM-[facility] Basic Mobility Inpatient Short Form Raw Score is 17. PT recommends level II moderate resource intensity. They will benefit from skilled therapy to to reduce the risk of falls and to maximize functional potential.   Barriers to Discharge Other (Comment);Inaccessible home environment;Decreased caregiver support  (decline in functional mobility)   Goals   STG Expiration Date 04/26/25   Short Term Goal #1 Within 10 days patient will complete: 1) Bed mobility skills with modified independent assistance to facilitate safe return to previous living environment 2) Functional transfers with modified independent assistance to facilitate safe return to previous living environment  3) Ambulation with least restrictive AD modified independent assistance without LOB and stable vitals for safe ambulation home/ community distances. 4) Stair training up/down flight 2 step/s with appropriate rail/s and modified independent assistance for safe access to previous living environment. 5) Improve balance grades to fair + to reduce risk of falls. 6)Improve LE strength grades by 1 to increase independence w/ transfers and gait.  7) PT for ongoing pt and family education; DME needs and D/C planning to promote highest level of function in least  "restrictive environment.   Plan   Treatment/Interventions LE strengthening/ROM;Functional transfer training;Elevations;Therapeutic exercise;Endurance training;Patient/family training;Equipment eval/education;Bed mobility;Gait training;Continued evaluation;Spoke to nursing;OT   PT Frequency 3-5x/wk   Discharge Recommendation   Rehab Resource Intensity Level, PT II (Moderate Resource Intensity)   Equipment Recommended Walker   Walker Package Recommended Wheeled walker   Change/add to Walker Package? Yes, Change Size   Walker Size Shayan (Ht <5'1\")   AM-PAC Basic Mobility Inpatient   Turning in Flat Bed Without Bedrails 3   Lying on Back to Sitting on Edge of Flat Bed Without Bedrails 3   Moving Bed to Chair 3   Standing Up From Chair Using Arms 3   Walk in Room 3   Climb 3-5 Stairs With Railing 2   Basic Mobility Inpatient Raw Score 17   Basic Mobility Standardized Score 39.67   [facility] Highest Level Of Mobility   -[facility] Goal 5: Stand one or more mins   -HL Achieved 6: Walk 10 steps or more   End of Consult   Patient Position at End of Consult Supine;All needs within reach       Recommendations                                                                                                              Pt will benefit from continued skilled IP PT to address the above mentioned impairments in order to maximize recovery and increase functional independence when completing mobility and ADLs. See flow sheet for goals and POC.     PT Evaluation Time: [id number]    Scotty Kc, PT, DPT    "

## 2025-04-16 NOTE — CASE MANAGEMENT
Case Management Discharge Planning Note    Patient name Nanci Navarro  Location /-01 MRN [id number]  : 1965 Date 2025       Current Admission Date: 4/15/2025  Current Admission Diagnosis:Abdominal pain   Patient Active Problem List    Diagnosis Date Noted Date Diagnosed    COPD exacerbation (HCC) 04/15/2025     Lactic acidosis 04/15/2025     Hypothyroidism 2025     Thrombus of left radial artery (HCC) 2024     SOB (shortness of breath) 2024     Chronic back pain 2024     Class 3 severe obesity due to excess calories with serious comorbidity and body mass index (BMI) of 45.0 to 49.9 in adult (HCC) 2024     Non-compliant behavior 2024     Discharge planning issues 2024     Anemia 2024     Abdominal pain 2024     Diabetes mellitus (HCC) 2024     Osteopenia of lumbar spine 2023     Gastroesophageal reflux disease without esophagitis 2023     Chest pain 01/15/2023     Volume overload 01/15/2023     Ambulatory dysfunction 2023     Compression fracture of L1 vertebra (HCC) 2023     Systemic lupus erythematosus with lung involvement ([facility]) 12/10/2022     Chronic respiratory failure with hypoxia ([facility]) 10/06/2022     Simple chronic bronchitis ([facility]) 2022     Smoker 2022       LOS (days): 0  Geometric Mean LOS (GMLOS) (days):   Days to GMLOS:     OBJECTIVE:            Current admission status: Observation   Preferred Pharmacy:   [facility] [address] [facility]  [address] [facility] [address]  [facility] [address]  Phone: [phone] Fax: [phone]    Primary Care Provider: Natividad Robertson PA-C    Primary Insurance: STEPH  Secondary Insurance:     DISCHARGE DETAILS:    Discharge planning discussed with:: Patient  Freedom of Choice: Yes  Comments - Freedom of Choice: Home with [facility]  CM contacted family/caregiver?: No- see comments                   Requested Home Health Care         Home Health Agency Name:: [facility]    DME Referral Provided  Referral made for DME?: No    Other Referral/Resources/Interventions Provided:  Referral Comments: [facility]         Treatment Team Recommendation: Home with Home Health Care  Discharge Destination Plan:: Home with Home Health Care  Transport at Discharge : Family

## 2025-04-16 NOTE — ASSESSMENT & PLAN NOTE
60-year-old female with GERD, nausea, vomiting, abdominal pain, diarrhea.  Patient has had an EGD and a colonoscopy within the last 6 months.  Patient did have a recent CT scan performed at [facility].  At this time we will continue PPI twice daily.  Will begin dicyclomine 4 times a day as needed.  Diet as tolerated.  If patient's diarrhea returns we will begin Questran.  Pain control and antiemetics.  Serial abdominal exams  Supportive care and IV fluids  No plans for repeat EGD or colonoscopy at this time.  Appreciate cardiology input.  Patient should have close GI follow-up in the outpatient setting.

## 2025-04-16 NOTE — ASSESSMENT & PLAN NOTE
Patient does have significant shortness of breath and wheezing.  Will give intravenous steroids, nebulization and closely follow-up the patient.  Of note, patient states that she did go to a doctor Marie's office and that he said that she would need further cardiac evaluation including possible cardiac catheterization.  Cardiology consulted  Continue oxygen as needed  Patient does have leukocytosis and we will give the patient azithromycin as monotherapy at this point of time.  Today's updated plan:  Patient still complaining of shortness of breath on mild exertion, she has bilateral expiratory wheeze  Continue with IV methylprednisone 40 mg every 24 hourly

## 2025-04-16 NOTE — PLAN OF CARE
Problem: PAIN - ADULT  Goal: Verbalizes/displays adequate comfort level or baseline comfort level  Description: Interventions:- Encourage patient to monitor pain and request assistance- Assess pain using appropriate pain scale- Administer analgesics based on type and severity of pain and evaluate response- Implement non-pharmacological measures as appropriate and evaluate response- Consider cultural and social influences on pain and pain management- Notify physician/advanced practitioner if interventions unsuccessful or patient reports new pain  Outcome: Progressing     Problem: INFECTION - ADULT  Goal: Absence or prevention of progression during hospitalization  Description: INTERVENTIONS:- Assess and monitor for signs and symptoms of infection- Monitor lab/diagnostic results- Monitor all insertion sites, i.e. indwelling lines, tubes, and drains- Monitor endotracheal if appropriate and nasal secretions for changes in amount and color- [place] appropriate cooling/warming therapies per order- Administer medications as ordered- Instruct and encourage patient and family to use good hand hygiene technique- Identify and instruct in appropriate isolation precautions for identified infection/condition  Outcome: Progressing  Goal: Absence of fever/infection during neutropenic period  Description: INTERVENTIONS:- Monitor WBC  Outcome: Progressing     Problem: SAFETY ADULT  Goal: Patient will remain free of falls  Description: INTERVENTIONS:- Educate patient/family on patient safety including physical limitations- Instruct patient to call for assistance with activity - Consult OT/PT to assist with strengthening/mobility - Keep Call bell within reach- Keep bed low and locked with side rails adjusted as appropriate- Keep care items and personal belongings within reach- Initiate and maintain comfort rounds- Make Fall Risk Sign visible to staff- Offer Toileting every 2 Hours, in advance of need- Initiate/Maintain bed alarm-  Obtain necessary fall risk management equipment- Apply yellow socks and bracelet for high fall risk patients- Consider moving patient to room near nurses station  Outcome: Progressing  Goal: Maintain or return to baseline ADL function  Description: INTERVENTIONS:-  Assess patient's ability to carry out ADLs; assess patient's baseline for ADL function and identify physical deficits which impact ability to perform ADLs (bathing, care of mouth/teeth, toileting, grooming, dressing, etc.)- Assess/evaluate cause of self-care deficits - Assess range of motion- Assess patient's mobility; develop plan if impaired- Assess patient's need for assistive devices and provide as appropriate- Encourage maximum independence but intervene and supervise when necessary- Involve family in performance of ADLs- Assess for home care needs following discharge - Consider OT consult to assist with ADL evaluation and planning for discharge- Provide patient education as appropriate  Outcome: Progressing  Goal: Maintains/Returns to pre admission functional level  Description: INTERVENTIONS:- Perform AM-PAC 6 Click Basic Mobility/ Daily Activity assessment daily.- Set and communicate daily mobility goal to care team and patient/family/caregiver. - Collaborate with rehabilitation services on mobility goals if consulted- Perform Range of Motion 3 times a day.- Reposition patient every 3 hours.- Dangle patient 3 times a day- Stand patient 3 times a day- Ambulate patient 3 times a day- Out of bed to chair 3 times a day - Out of bed for meals 3 times a day- Out of bed for toileting- Record patient progress and toleration of activity level   Outcome: Progressing     Problem: DISCHARGE PLANNING  Goal: Discharge to home or other facility with appropriate resources  Description: INTERVENTIONS:- Identify barriers to discharge w/patient and caregiver- Arrange for needed discharge resources and transportation as appropriate- Identify discharge learning needs  (meds, wound care, etc.)- Arrange for interpretive services to assist at discharge as needed- Refer to Case Management Department for coordinating discharge planning if the patient needs post-hospital services based on physician/advanced practitioner order or complex needs related to functional status, cognitive ability, or social support system  Outcome: Progressing     Problem: Knowledge Deficit  Goal: Patient/family/caregiver demonstrates understanding of disease process, treatment plan, medications, and discharge instructions  Description: Complete learning assessment and assess knowledge base.Interventions:- Provide teaching at level of understanding- Provide teaching via preferred learning methods  Outcome: Progressing

## 2025-04-16 NOTE — CONSULTS
Consultation - Gastroenterology   Name: Nanci Navarro 60 y.o. female I MRN: [id number]  Unit/Bed#: -01 I Date of Admission: 4/15/2025   Date of Service: 4/16/2025 I Hospital Day: 0   Inpatient consult to gastroenterology  Consult performed by: Ciera Francisco PA-C  Consult ordered by: Ariel Arias MD        Physician Requesting Evaluation: Didier Gilbert MD   Reason for Evaluation / Principal Problem: Epigastric pain    Assessment & Plan  Abdominal pain  60-year-old female with GERD, nausea, vomiting, abdominal pain, diarrhea.  Patient has had an EGD and a colonoscopy within the last 6 months.  Patient did have a recent CT scan performed at [facility].  At this time we will continue PPI twice daily.  Will begin dicyclomine 4 times a day as needed.  Diet as tolerated.  If patient's diarrhea returns we will begin Questran.  Pain control and antiemetics.  Serial abdominal exams  Supportive care and IV fluids  No plans for repeat EGD or colonoscopy at this time.  Appreciate cardiology input.  Patient should have close GI follow-up in the outpatient setting.  Gastroesophageal reflux disease without esophagitis  See above  Anemia  Continue to monitor CBC and transfuse as necessary.  Continue to monitor for GI blood loss.  B12, folate, iron panel      Patient will be seen and examined by Dr. Whitaker.  All huitron medical decision made by Dr. Whitaker.    I have discussed the above management plan in detail with the primary service.   Gastroenterology service signing off.    History of Present Illness   HPI:  Nanci Navarro is a 60 y.o. female who presents with an extensive past medical history significant for COPD, tobacco abuse, L1 vertebral fracture, ambulatory dysfunction, GERD, diabetes mellitus, obesity, hypothyroidism.    GI consulted on patient today due to diarrhea and abdominal pain.  Patient reports for the past several weeks she is not doing well.  She reports persistent episodes  of abdominal pain with nausea and vomiting.  Patient is also reporting diarrhea although she has not had a bowel movement since admission.  Patient reports that she did follow-up with her cardiologist secondary to worsening shortness of breath.   is her cardiologist who referred her to the emergency room for evaluation.  GI was consulted due to the above GI symptoms.    CTAP from [facility] on 4/14/2025 did show evidence of diverticular disease.  Colonoscopy from 9/19/2024 showed multiple polyps that were removed.  EGD from 9/19/2024 showed mild erythematous mucosa in the antrum.      Patient's recent laboratories do show a downtrending hemoglobin.  Hemoglobin today is 9.9 MCV is 93.    Review of Systems  Medical History Review: I have reviewed the patient's PMH, PSH, Social History, Family History, Meds, and Allergies     Objective :  Temp:  [98.2 °F (36.8 °C)-98.6 °F (37 °C)] 98.2 °F (36.8 °C)  HR:  [] 79  BP: ()/(50-65) 103/60  Resp:  [17-22] 17  SpO2:  [94 %-100 %] 98 %  O2 Device: Nasal cannula  Nasal Cannula O2 Flow Rate (L/min):  [2 L/min-3 L/min] 2 L/min    Physical Exam  Vitals and nursing note reviewed.   Constitutional:       General: She is not in acute distress.     Appearance: She is well-developed. She is obese. She is ill-appearing.   HENT:      Head: Normocephalic and atraumatic.   Eyes:      Conjunctiva/sclera: Conjunctivae normal.   Cardiovascular:      Rate and Rhythm: Regular rhythm. Tachycardia present.      Heart sounds: No murmur heard.  Pulmonary:      Effort: No respiratory distress.      Breath sounds: Normal breath sounds.   Abdominal:      Palpations: Abdomen is soft.      Tenderness: There is abdominal tenderness.   Musculoskeletal:         General: No swelling.      Cervical back: Neck supple.   Skin:     General: Skin is warm and dry.      Capillary Refill: Capillary refill takes less than 2 seconds.   Neurological:      Mental Status: She is  alert.   Psychiatric:         Mood and Affect: Mood normal.           Lab Results: I have reviewed the following results:

## 2025-04-16 NOTE — RESPIRATORY THERAPY NOTE
04/15/25 2054   Respiratory Protocol   Protocol Initiated? No   Protocol Selection Respiratory   Language Barrier? No   Medical & Social History Reviewed? Yes   Diagnostic Studies Reviewed? Yes   Physical Assessment Performed? Yes   Respiratory Plan Mild Distress pathway   Respiratory Assessment   Assessment Type During-treatment   General Appearance Alert;Awake   Respiratory Pattern Labored;Dyspnea at rest;Dyspnea with exertion   Chest Assessment Chest expansion symmetrical   Bilateral Breath Sounds Diminished;Expiratory wheezes   Cough Dry;Non-productive   Resp Comments 60 y.o. female recieved on 3L NC, which is home use per pt. Hx COPD, admitted with exacerbation. SpO2 98%. Aerosol tx provided as scheduled. Home aerosol tx to be continued.   O2 Device NC   Additional Assessments   SpO2 96 %

## 2025-04-16 NOTE — ASSESSMENT & PLAN NOTE
Patient has been having abdominal pain and diarrhea for the last 3 weeks.  Has got investigated multiple times for the same and she said that no one has found why she is having the diarrhea.  Patient was in [facility] emergency room less than 24 hours ago.  Patient comes to [facility] ER because of the fact that she states that her abdominal pain and nausea and vomiting have worsened and she is not able to keep anything down.  She thinks that she may benefit from GI consult and workup.  Continue Protonix and Zofran as needed  Today's update and plan;  Patient is complaining of upper abdominal pain radiating towards back.  Did not have any bowel movement since she came to the hospital.  Discussed imaging studies and prior records with the patient.  GI on board  Started Bentyl, Protonix

## 2025-04-16 NOTE — PLAN OF CARE
Problem: PHYSICAL THERAPY ADULT  Goal: Performs mobility at highest level of function for planned discharge setting.  See evaluation for individualized goals.  Description: Treatment/Interventions: LE strengthening/ROM, Functional transfer training, Elevations, Therapeutic exercise, Endurance training, Patient/family training, Equipment eval/education, Bed mobility, Gait training, Continued evaluation, Spoke to nursing, OT  Equipment Recommended: Walker       See flowsheet documentation for full assessment, interventions and recommendations.  Note: Prognosis: Good  Problem List: Decreased strength, Decreased endurance, Decreased mobility, Impaired balance, Pain  Assessment: Nanci Navarro is a 60 y.o. female admitted to [facility] on 4/15/2025 for Abdominal pain. Pt  has a past medical history of Achalasia, Acute respiratory failure ([facility]) (01/15/2023), Acute respiratory failure with hypoxia ([facility]) (02/07/2024), Back pain, Cancer ([facility]), Diabetes mellitus ([facility]), DVT (deep venous thrombosis) ([facility]), Electrolyte abnormality (01/14/2024), Fall (01/02/2023), Fibromyalgia, Hypertension, and Lupus.. Order placed for PT eval and tx. PT was consulted and pt was seen on 4/16/2025 for mobility assessment and d/c planning. Chart review and two person identifiers were completed.   Currently pt presents with decreased strength , decreased static sitting balance , decreased dynamic sitting balance , decreased static standing balance, decreased dynamic standing balance , decreased gait speed, decreased step length , and decreased muscular endurance . Due to these impairments, they will require assistance to perform bed mobility, sit to stand , ambulation, stair negotiation, and transfers. Pt is currently functioning at a supervision assistance x1 level for bed mobility, contact guard assistance x1 level for transfers, contact guard assistance x1 level for ambulation with no assistive device. These activity limitations significantly  impact their ability to participate in previous home and community roles and responsibilities  and ambulation in home. The patient's AM-PAC Basic Mobility Inpatient Short Form Raw Score is 17. PT recommends level II moderate resource intensity. They will benefit from skilled therapy to to reduce the risk of falls and to maximize functional potential.  Barriers to Discharge: Other (Comment), Inaccessible home environment, Decreased caregiver support (decline in functional mobility)     Rehab Resource Intensity Level, PT: II (Moderate Resource Intensity)    See flowsheet documentation for full assessment.

## 2025-04-16 NOTE — PROGRESS NOTES
Progress Note - Hospitalist   Name: Nanci Navarro 60 y.o. female I MRN: [id number]  Unit/Bed#: MS [facility]-01 I Date of Admission: 4/15/2025   Date of Service: 4/16/2025 I Hospital Day: 0    Assessment & Plan  Abdominal pain  Patient has been having abdominal pain and diarrhea for the last 3 weeks.  Has got investigated multiple times for the same and she said that no one has found why she is having the diarrhea.  Patient was in [facility] emergency room less than 24 hours ago.  Patient comes to [facility] ER because of the fact that she states that her abdominal pain and nausea and vomiting have worsened and she is not able to keep anything down.  She thinks that she may benefit from GI consult and workup.  Continue Protonix and Zofran as needed  Today's update and plan;  Patient is complaining of upper abdominal pain radiating towards back.  Did not have any bowel movement since she came to the hospital.  Discussed imaging studies and prior records with the patient.  GI on board  Started Bentyl, Protonix  COPD exacerbation (HCC)  Patient does have significant shortness of breath and wheezing.  Will give intravenous steroids, nebulization and closely follow-up the patient.  Of note, patient states that she did go to a doctor Marie's office and that he said that she would need further cardiac evaluation including possible cardiac catheterization.  Cardiology consulted  Continue oxygen as needed  Patient does have leukocytosis and we will give the patient azithromycin as monotherapy at this point of time.  Today's updated plan:  Patient still complaining of shortness of breath on mild exertion, she has bilateral expiratory wheeze  Continue with IV methylprednisone 40 mg every 24 hourly  Smoker  Patient refusing nicotine patch.  Counseled to quit smoking  Compression fracture of L1 vertebra (HCC)  PT OT limit opiates given COPD history.  Ambulatory dysfunction  PT OT  Gastroesophageal reflux disease  without esophagitis  Continue Maalox as needed, Pepcid and Protonix IV  Diabetes mellitus (HCC)  Lab Results   Component Value Date    HGBA1C 7.6 (H) 04/15/2025       Recent Labs     04/15/25  1900 04/15/25  2111 04/16/25  0748 04/16/25  1144   POCGLU 170* 293* 173* 196*       Blood Sugar Average: Last 72 hrs:  (P) 192  Hold metformin and Jardiance.  Insulin sliding scale while hospitalized.  Given need for IV steroids for COPD exacerbation I expect the blood sugar to be on the higher side and we will need to adjust the regimen  Anemia  Likely chronic, will do anemia workup  Class 3 severe obesity due to excess calories with serious comorbidity and body mass index (BMI) of 45.0 to 49.9 in adult (HCC)  Would benefit from weight loss and lifestyle modifications  SOB (shortness of breath)  Likely secondary to COPD, however patient states that she also has history of chronic diastolic heart failure which was diagnosed by her cardiologist office.  Will consult cardiology.  Continue oral Lasix.  Patient does not look fluid overloaded on my exam.  Cardiology ordered echo and stress test  Hypothyroidism  Continue levothyroxine  Lactic acidosis  Resolved    VTE Pharmacologic Prophylaxis: VTE Score: 3 Moderate Risk (Score 3-4) - Pharmacological DVT Prophylaxis Ordered: enoxaparin (Lovenox).    Mobility:   Basic Mobility Inpatient Raw Score: 18  JH-HLM Goal: 6: Walk 10 steps or more  JH-HLM Achieved: 6: Walk 10 steps or more  JH-HLM Goal achieved. Continue to encourage appropriate mobility.    Patient Centered Rounds: I performed bedside rounds with nursing staff today.   Discussions with Specialists or Other Care Team Provider:     Education and Discussions with Family / Patient:  Updated patient.     Current Length of Stay: 0 day(s)  Current Patient Status: Observation   Certification Statement: The patient will continue to require additional inpatient hospital stay due to assessment plan  Discharge Plan:  Anticipate discharge in 24-48 hrs to home.    Code Status: Level 1 - Full Code    Subjective   Patient seen and examined at bedside.  She reported still have epigastric pain  No nausea vomiting or diarrhea    Objective :  Temp:  [98.2 °F (36.8 °C)] 98.2 °F (36.8 °C)  HR:  [78-82] 79  BP: ()/(50-65) 103/60  Resp:  [17-22] 17  SpO2:  [94 %-100 %] 97 %  O2 Device: Nasal cannula  Nasal Cannula O2 Flow Rate (L/min):  [2 L/min-3 L/min] 2 L/min    Body mass index is 48.8 kg/m².     Input and Output Summary (last 24 hours):     Intake/Output Summary (Last 24 hours) at 4/16/2025 1409  Last data filed at 4/16/2025 0449  Gross per 24 hour   Intake --   Output 450 ml   Net -450 ml       Physical Exam  Constitutional: Morbid obesity  HEENT: Pallor or icterus  CVS: S1 plus S2  Respiratory: Bilateral expiratory wheeze  Gastroenterology: Soft nontender without any palpable mass  Skin: No bruises or ecchymosis  Neurology: No focal logical deficit      Lines/Drains:              Lab Results: I have reviewed the following results:   Results from last 7 days   Lab Units 04/16/25  0505   WBC Thousand/uL 9.29   HEMOGLOBIN g/dL 9.9*   HEMATOCRIT % 31.7*   PLATELETS Thousands/uL 264   SEGS PCT % 87*   LYMPHO PCT % 9*   MONO PCT % 3*   EOS PCT % 0     Results from last 7 days   Lab Units 04/16/25  0505   SODIUM mmol/L 140   POTASSIUM mmol/L 3.7   CHLORIDE mmol/L 101   CO2 mmol/L 31   BUN mg/dL 14   CREATININE mg/dL 0.77   ANION GAP mmol/L 8   CALCIUM mg/dL 8.8   ALBUMIN g/dL 4.0   TOTAL BILIRUBIN mg/dL 0.29   ALK PHOS U/L 71   ALT U/L 21   AST U/L 11*   GLUCOSE RANDOM mg/dL 198*         Results from last 7 days   Lab Units 04/16/25  1144 04/16/25  0748 04/15/25  2111 04/15/25  1900 04/15/25  1706   POC GLUCOSE mg/dl 196* 173* 293* 170* 128     Results from last 7 days   Lab Units 04/15/25  1905 04/14/25  1000   HEMOGLOBIN A1C % 7.6* 7.8*     Results from last 7 days   Lab Units 04/16/25  0505 04/15/25  1628 04/15/25  1353   LACTIC  ACID mmol/L  --  1.3 3.9*   PROCALCITONIN ng/ml <0.05  --  0.07       Recent Cultures (last 7 days):         Imaging Results Review: I reviewed radiology reports from this admission including: CT chest.  Other Study Results Review: Other studies reviewed include: CBC and BMP    Last 24 Hours Medication List:     Current Facility-Administered Medications:     acetaminophen (TYLENOL) tablet 650 mg, Q6H    albuterol inhalation solution 2.5 mg, Q4H PRN    aluminum-magnesium hydroxide-simethicone (MAALOX) oral suspension 15 mL, Q4H PRN    azithromycin (ZITHROMAX) tablet 500 mg, Q24H    benzonatate (TESSALON PERLES) capsule 100 mg, TID    bisacodyl (DULCOLAX) rectal suppository 10 mg, Daily PRN    enoxaparin (LOVENOX) subcutaneous injection 40 mg, BID    famotidine (PEPCID) tablet 20 mg, BID    fluticasone-vilanterol 200-25 mcg/actuation 1 puff, Daily **AND** [DISCONTINUED] umeclidinium 62.5 mcg/actuation inhaler AEPB 1 puff, Daily    furosemide (LASIX) tablet 40 mg, Daily    guaiFENesin (ROBITUSSIN) oral liquid 200 mg, Q4H PRN    insulin lispro (HumALOG/ADMELOG) 100 units/mL subcutaneous injection 1-5 Units, TID AC **AND** Fingerstick Glucose (POCT), TID AC    insulin lispro (HumALOG/ADMELOG) 100 units/mL subcutaneous injection 1-5 Units, HS    ipratropium-albuterol (DUO-NEB) 0.5-2.5 mg/3 mL inhalation solution 3 mL, Q6H    levothyroxine tablet 50 mcg, Daily    lidocaine (LIDODERM) 5 % patch 1 patch, Daily    methocarbamol (ROBAXIN) tablet 500 mg, BID PRN    methylPREDNISolone sodium succinate (Solu-MEDROL) injection 40 mg, Daily    metoprolol succinate (TOPROL-XL) 24 hr tablet 25 mg, Daily    morphine injection 2 mg, Q4H PRN    ondansetron (ZOFRAN) injection 4 mg, Q6H PRN    pantoprazole (PROTONIX) injection 40 mg, Q24H TANVIR    sertraline (ZOLOFT) tablet 25 mg, Daily    Administrative Statements   Today, Patient Was Seen By: Didier Gilbert MD      **Please Note: This note may have been constructed using a voice  recognition system.**

## 2025-04-16 NOTE — ASSESSMENT & PLAN NOTE
Severe COPD with acute exacerbation  CXR from 4/14 is clear  She is on her baseline 2 L nasal cannula  Recent taken off Trelegy and placed on DuoNeb alone  Possibly contributing to her current symptoms  Continue Breo, DuoNeb  Continue solu-medrol daily    Possible cardiac component to her symptoms as well, Dr. Salazar has ordered echo

## 2025-04-16 NOTE — PROGRESS NOTES
"0005: Pt declines yellow falls risk bracelet and bed alarm. She states \"I've fallen at home but never in the hospital. I am sometimes dizzy when my sugar is high\". Pt is educated on importance on calling for help when ambulating to the restroom. She verbalizes understanding. Pt is on O2 with use of extender. She is A/Ox4. SLIM notified. Pt signed refusal form per SLIM. Pt is wearing yellow socks. Call light in reach.   "

## 2025-04-17 ENCOUNTER — APPOINTMENT (OUTPATIENT)
Dept: MRI IMAGING | Facility: HOSPITAL | Age: 60
End: 2025-04-17
Payer: COMMERCIAL

## 2025-04-17 DIAGNOSIS — E03.9 HYPOTHYROIDISM, UNSPECIFIED TYPE: ICD-10-CM

## 2025-04-17 PROBLEM — E87.20 LACTIC ACIDOSIS: Status: RESOLVED | Noted: 2025-04-15 | Resolved: 2025-04-17

## 2025-04-17 LAB
GLUCOSE SERPL-MCNC: 140 MG/DL (ref 65–140)
GLUCOSE SERPL-MCNC: 176 MG/DL (ref 65–140)
GLUCOSE SERPL-MCNC: 283 MG/DL (ref 65–140)
GLUCOSE SERPL-MCNC: 320 MG/DL (ref 65–140)

## 2025-04-17 PROCEDURE — 94760 N-INVAS EAR/PLS OXIMETRY 1: CPT

## 2025-04-17 PROCEDURE — 94640 AIRWAY INHALATION TREATMENT: CPT

## 2025-04-17 PROCEDURE — 99232 SBSQ HOSP IP/OBS MODERATE 35: CPT | Performed by: INTERNAL MEDICINE

## 2025-04-17 PROCEDURE — 94664 DEMO&/EVAL PT USE INHALER: CPT

## 2025-04-17 PROCEDURE — 82948 REAGENT STRIP/BLOOD GLUCOSE: CPT

## 2025-04-17 PROCEDURE — 72146 MRI CHEST SPINE W/O DYE: CPT

## 2025-04-17 PROCEDURE — 99232 SBSQ HOSP IP/OBS MODERATE 35: CPT | Performed by: NURSE PRACTITIONER

## 2025-04-17 RX ORDER — BISACODYL 5 MG/1
5 TABLET, DELAYED RELEASE ORAL DAILY PRN
Qty: 30 TABLET | Refills: 0 | Status: SHIPPED | OUTPATIENT
Start: 2025-04-17

## 2025-04-17 RX ORDER — OXYCODONE HYDROCHLORIDE 5 MG/1
5 TABLET ORAL EVERY 6 HOURS PRN
Refills: 0 | Status: DISCONTINUED | OUTPATIENT
Start: 2025-04-17 | End: 2025-04-18 | Stop reason: HOSPADM

## 2025-04-17 RX ORDER — IPRATROPIUM BROMIDE AND ALBUTEROL SULFATE 2.5; .5 MG/3ML; MG/3ML
3 SOLUTION RESPIRATORY (INHALATION)
Status: DISCONTINUED | OUTPATIENT
Start: 2025-04-17 | End: 2025-04-18 | Stop reason: HOSPADM

## 2025-04-17 RX ORDER — PREDNISONE 20 MG/1
40 TABLET ORAL DAILY
Status: DISCONTINUED | OUTPATIENT
Start: 2025-04-18 | End: 2025-04-18 | Stop reason: HOSPADM

## 2025-04-17 RX ORDER — BISACODYL 5 MG/1
5 TABLET, DELAYED RELEASE ORAL ONCE
Status: DISCONTINUED | OUTPATIENT
Start: 2025-04-17 | End: 2025-04-18 | Stop reason: HOSPADM

## 2025-04-17 RX ORDER — ACETAMINOPHEN 325 MG/1
650 TABLET ORAL EVERY 6 HOURS PRN
Status: DISCONTINUED | OUTPATIENT
Start: 2025-04-17 | End: 2025-04-18 | Stop reason: HOSPADM

## 2025-04-17 RX ORDER — POLYETHYLENE GLYCOL 3350 17 G/17G
17 POWDER, FOR SOLUTION ORAL DAILY
Status: DISCONTINUED | OUTPATIENT
Start: 2025-04-17 | End: 2025-04-18 | Stop reason: HOSPADM

## 2025-04-17 RX ORDER — DICYCLOMINE HYDROCHLORIDE 10 MG/1
10 CAPSULE ORAL
Qty: 30 CAPSULE | Refills: 0 | Status: SHIPPED | OUTPATIENT
Start: 2025-04-17

## 2025-04-17 RX ORDER — SERTRALINE HYDROCHLORIDE 25 MG/1
25 TABLET, FILM COATED ORAL
Status: DISCONTINUED | OUTPATIENT
Start: 2025-04-17 | End: 2025-04-18 | Stop reason: HOSPADM

## 2025-04-17 RX ORDER — AMOXICILLIN 250 MG
1 CAPSULE ORAL 2 TIMES DAILY
Status: DISCONTINUED | OUTPATIENT
Start: 2025-04-17 | End: 2025-04-18 | Stop reason: HOSPADM

## 2025-04-17 RX ORDER — IPRATROPIUM BROMIDE AND ALBUTEROL SULFATE 2.5; .5 MG/3ML; MG/3ML
3 SOLUTION RESPIRATORY (INHALATION) 3 TIMES DAILY
Status: DISCONTINUED | OUTPATIENT
Start: 2025-04-17 | End: 2025-04-17

## 2025-04-17 RX ORDER — PREDNISONE 10 MG/1
TABLET ORAL
Qty: 30 TABLET | Refills: 0 | Status: SHIPPED | OUTPATIENT
Start: 2025-04-17 | End: 2025-04-28

## 2025-04-17 RX ORDER — METOCLOPRAMIDE 10 MG/1
10 TABLET ORAL 4 TIMES DAILY
Qty: 30 TABLET | Refills: 0 | Status: SHIPPED | OUTPATIENT
Start: 2025-04-17

## 2025-04-17 RX ADMIN — MORPHINE SULFATE 2 MG: 2 INJECTION, SOLUTION INTRAMUSCULAR; INTRAVENOUS at 03:06

## 2025-04-17 RX ADMIN — DICYCLOMINE HYDROCHLORIDE 10 MG: 10 CAPSULE ORAL at 16:39

## 2025-04-17 RX ADMIN — FAMOTIDINE 20 MG: 20 TABLET, FILM COATED ORAL at 09:19

## 2025-04-17 RX ADMIN — ENOXAPARIN SODIUM 40 MG: 40 INJECTION SUBCUTANEOUS at 21:29

## 2025-04-17 RX ADMIN — METOCLOPRAMIDE 5 MG: 5 INJECTION, SOLUTION INTRAMUSCULAR; INTRAVENOUS at 21:28

## 2025-04-17 RX ADMIN — DICYCLOMINE HYDROCHLORIDE 10 MG: 10 CAPSULE ORAL at 12:15

## 2025-04-17 RX ADMIN — IPRATROPIUM BROMIDE AND ALBUTEROL SULFATE 3 ML: 2.5; .5 SOLUTION RESPIRATORY (INHALATION) at 07:19

## 2025-04-17 RX ADMIN — FLUTICASONE FUROATE AND VILANTEROL TRIFENATATE 1 PUFF: 200; 25 POWDER RESPIRATORY (INHALATION) at 09:44

## 2025-04-17 RX ADMIN — DICYCLOMINE HYDROCHLORIDE 10 MG: 10 CAPSULE ORAL at 08:00

## 2025-04-17 RX ADMIN — FUROSEMIDE 40 MG: 40 TABLET ORAL at 09:19

## 2025-04-17 RX ADMIN — INSULIN LISPRO 3 UNITS: 100 INJECTION, SOLUTION INTRAVENOUS; SUBCUTANEOUS at 21:30

## 2025-04-17 RX ADMIN — OXYCODONE HYDROCHLORIDE 5 MG: 5 TABLET ORAL at 09:18

## 2025-04-17 RX ADMIN — IPRATROPIUM BROMIDE AND ALBUTEROL SULFATE 3 ML: 2.5; .5 SOLUTION RESPIRATORY (INHALATION) at 02:22

## 2025-04-17 RX ADMIN — PANTOPRAZOLE SODIUM 40 MG: 40 INJECTION, POWDER, FOR SOLUTION INTRAVENOUS at 21:28

## 2025-04-17 RX ADMIN — SENNOSIDES AND DOCUSATE SODIUM 1 TABLET: 50; 8.6 TABLET ORAL at 09:44

## 2025-04-17 RX ADMIN — OXYCODONE HYDROCHLORIDE 5 MG: 5 TABLET ORAL at 16:41

## 2025-04-17 RX ADMIN — PANTOPRAZOLE SODIUM 40 MG: 40 INJECTION, POWDER, FOR SOLUTION INTRAVENOUS at 09:19

## 2025-04-17 RX ADMIN — METOPROLOL SUCCINATE 25 MG: 25 TABLET, EXTENDED RELEASE ORAL at 09:18

## 2025-04-17 RX ADMIN — LIDOCAINE 5% 1 PATCH: 700 PATCH TOPICAL at 09:20

## 2025-04-17 RX ADMIN — ALUMINUM HYDROXIDE, MAGNESIUM HYDROXIDE, AND DIMETHICONE 15 ML: 200; 20; 200 SUSPENSION ORAL at 09:31

## 2025-04-17 RX ADMIN — BENZONATATE 100 MG: 100 CAPSULE ORAL at 21:29

## 2025-04-17 RX ADMIN — BENZONATATE 100 MG: 100 CAPSULE ORAL at 16:39

## 2025-04-17 RX ADMIN — METOCLOPRAMIDE 5 MG: 5 INJECTION, SOLUTION INTRAMUSCULAR; INTRAVENOUS at 04:30

## 2025-04-17 RX ADMIN — INSULIN LISPRO 2 UNITS: 100 INJECTION, SOLUTION INTRAVENOUS; SUBCUTANEOUS at 17:00

## 2025-04-17 RX ADMIN — MORPHINE SULFATE 2 MG: 2 INJECTION, SOLUTION INTRAMUSCULAR; INTRAVENOUS at 10:34

## 2025-04-17 RX ADMIN — METHOCARBAMOL 500 MG: 500 TABLET ORAL at 09:31

## 2025-04-17 RX ADMIN — MORPHINE SULFATE 2 MG: 2 INJECTION, SOLUTION INTRAMUSCULAR; INTRAVENOUS at 14:43

## 2025-04-17 RX ADMIN — SERTRALINE HYDROCHLORIDE 25 MG: 25 TABLET ORAL at 09:18

## 2025-04-17 RX ADMIN — AZITHROMYCIN 500 MG: 500 TABLET, FILM COATED ORAL at 17:42

## 2025-04-17 RX ADMIN — IPRATROPIUM BROMIDE AND ALBUTEROL SULFATE 3 ML: 2.5; .5 SOLUTION RESPIRATORY (INHALATION) at 14:04

## 2025-04-17 RX ADMIN — METHYLPREDNISOLONE SODIUM SUCCINATE 40 MG: 40 INJECTION, POWDER, FOR SOLUTION INTRAMUSCULAR; INTRAVENOUS at 09:20

## 2025-04-17 RX ADMIN — METOCLOPRAMIDE 5 MG: 5 INJECTION, SOLUTION INTRAMUSCULAR; INTRAVENOUS at 09:49

## 2025-04-17 RX ADMIN — BENZONATATE 100 MG: 100 CAPSULE ORAL at 09:18

## 2025-04-17 RX ADMIN — IPRATROPIUM BROMIDE AND ALBUTEROL SULFATE 3 ML: 2.5; .5 SOLUTION RESPIRATORY (INHALATION) at 19:12

## 2025-04-17 RX ADMIN — SENNOSIDES AND DOCUSATE SODIUM 1 TABLET: 50; 8.6 TABLET ORAL at 17:42

## 2025-04-17 RX ADMIN — INSULIN LISPRO 1 UNITS: 100 INJECTION, SOLUTION INTRAVENOUS; SUBCUTANEOUS at 12:00

## 2025-04-17 RX ADMIN — MORPHINE SULFATE 2 MG: 2 INJECTION, SOLUTION INTRAMUSCULAR; INTRAVENOUS at 18:56

## 2025-04-17 RX ADMIN — LEVOTHYROXINE SODIUM 50 MCG: 0.05 TABLET ORAL at 09:19

## 2025-04-17 RX ADMIN — ENOXAPARIN SODIUM 40 MG: 40 INJECTION SUBCUTANEOUS at 09:19

## 2025-04-17 RX ADMIN — ACETAMINOPHEN 650 MG: 325 TABLET, FILM COATED ORAL at 05:35

## 2025-04-17 RX ADMIN — FAMOTIDINE 20 MG: 20 TABLET, FILM COATED ORAL at 17:45

## 2025-04-17 RX ADMIN — METOCLOPRAMIDE 5 MG: 5 INJECTION, SOLUTION INTRAMUSCULAR; INTRAVENOUS at 16:39

## 2025-04-17 NOTE — PROGRESS NOTES
"Progress Note - Pulmonology   Name: Nanci Navarro 60 y.o. female I MRN: [id number]  Unit/Bed#: 2 E 282-01 I Date of Admission: 4/15/2025   Date of Service: 4/17/2025 I Hospital Day: 0     Assessment & Plan  COPD exacerbation (HCC)  -9/2024-mixed obstruction/striction  - Still having some wheezing but noted aeration  - Inpatient: Will transition to prednisone 40 mg decrease by 10 mg every 3 days  -DuoNeb 3 times daily  - Would recommend discharge on: Pulmicort flex inhaler 180 mg 2 puffs twice daily, DuoNeb 4 times daily  - Patient will benefit from pulmonary rehab    Smoker  - 3 to 4 cigarettes a week  - Encouraged and educated on tobacco cessation  - NRT per primary team    Chronic respiratory failure with hypoxia ([facility])  - Patient currently on home O2 requirements of 3 L 94%  - Continue sats greater than 88%  - Pulmonary toileting: Deep breathing cough out of bed as tolerated incentive spirometry      Class 3 severe obesity due to excess calories with serious comorbidity and body mass index (BMI) of 45.0 to 49.9 in adult ([facility])  - Patient appears to have a component of chronic hypercapnia  - Will obtain VBG  - Likely has a component of NESS/OHS        -pulmonary will sign off  -outpatient follow up per DC instructions    24 Hour Events : na  Subjective : \"My back hurts\"    Objective :  Temp:  [97.3 °F (36.3 °C)-98.1 °F (36.7 °C)] 98 °F (36.7 °C)  HR:  [66-80] 67  BP: (103-116)/(52-67) 111/67  Resp:  [17-18] 17  SpO2:  [90 %-99 %] 98 %  O2 Device: Nasal cannula  Nasal Cannula O2 Flow Rate (L/min):  [2 L/min] 2 L/min    Physical Exam  Constitutional:       General: She is not in acute distress.     Appearance: Normal appearance. She is normal weight. She is not ill-appearing.   HENT:      Head: Normocephalic and atraumatic.      Nose: Nose normal. No congestion or rhinorrhea.      Mouth/Throat:      Mouth: Mucous membranes are dry.      Pharynx: Oropharynx is clear. No oropharyngeal exudate or posterior oropharyngeal " erythema.   Cardiovascular:      Rate and Rhythm: Normal rate and regular rhythm.      Pulses: Normal pulses.      Heart sounds: No murmur heard.     No friction rub. No gallop.   Pulmonary:      Effort: Pulmonary effort is normal. No respiratory distress.      Breath sounds: No stridor. Wheezing present. No rhonchi or rales.      Comments: Some scattered wheezing  Chest:      Chest wall: No tenderness.   Abdominal:      General: Abdomen is flat. Bowel sounds are normal.      Palpations: Abdomen is soft.   Musculoskeletal:         General: No swelling or tenderness. Normal range of motion.      Cervical back: Normal range of motion. No rigidity or tenderness.   Skin:     General: Skin is warm and dry.      Coloration: Skin is not jaundiced or pale.   Neurological:      General: No focal deficit present.      Mental Status: She is alert and oriented to person, place, and time. Mental status is at baseline.   Psychiatric:         Mood and Affect: Mood normal.         Behavior: Behavior normal.             Lab Results: I have reviewed the following results:   No new results in last 24 hours.  ABG: No new results in last 24 hours.    Imaging Results Review: I personally reviewed the following image studies/reports in PACS and discussed pertinent findings with Radiology: chest xray and CT chest. My interpretation of the radiology images/reports is:  .  Other Study Results Review: EKG was reviewed.   PFT Results Reviewed: reviewed

## 2025-04-17 NOTE — CONSULTS
Consultation - Vascular Surgery   Name: Nanci Navarro 60 y.o. female I MRN: [id number]  Unit/Bed#: 2 E 282-01 I Date of Admission: 4/15/2025   Date of Service: 4/17/2025 I Hospital Day: 0   Inpatient consult to Vascular Surgery  Consult performed by: NII Segovia  Consult ordered by: NII Cheng        Physician Requesting Evaluation: Justin Muniz MD   Reason for Evaluation / Principal Problem: abdominal pain    Assessment & Plan  Abdominal pain  60-year-old female smoker with obesity, DM, chronic low back pain, L1 vertebra compression fracture, ambulatory dysfunction, hypothyroid, and IBS, presents with abdominal pain, diarrhea and shortness of breath/ COPD exacerbation.    Per review of chart, patient with abdominal complaints and diarrhea for the last 3 weeks with hospitalization at [facility].  During this admission, patient seen by GI and signed off arranging outpatient follow-up for chronic GI problems.    D/w medicine team, patient's abdominal pain is intermittent.  Patient tolerating diet, eating full meals, no recent weight loss.  No bowel movement x 2 days    Vascular consult placed for possible mesenteric ischemia.  No imaging obtained during this hospitalization to identify this.    -In review of chart, patient had CT C/A/P w/ contrast 3/16/2025 after fall.    Imaging reviewed by Dr. Rick: SMA widely patent, mild celiac stenosis.  These findings not cause patient's symptoms.    No vascular intervention indicated.  D/w Dr Rick  Communicated with ZARIA , Cardiology.    Vascular surgery will sign off.  Smoker    Gastroesophageal reflux disease without esophagitis    Diabetes mellitus (HCC)  Lab Results   Component Value Date    HGBA1C 7.6 (H) 04/15/2025       Recent Labs     04/16/25  1537 04/16/25  2119 04/17/25  0751 04/17/25  1059   POCGLU 248* 225* 140 176*       Blood Sugar Average: Last 72 hrs:  (P) [id number]    Chronic respiratory failure with hypoxia (HCC)    Class 3  severe obesity due to excess calories with serious comorbidity and body mass index (BMI) of 45.0 to 49.9 in adult    SOB (shortness of breath)    COPD exacerbation (HCC)    I have discussed the above management plan in detail with the primary service.   I have discussed with Dr Rick.  Vascular Surgery service signing off.    Patient was not personally seen or examined by myself.  Total of 25 minutes spent reviewing the chart, clinical images, diagnostic imaging studies and discussion with team.      Objective :  Temp:  [97.8 °F (36.6 °C)-98.1 °F (36.7 °C)] 97.9 °F (36.6 °C)  HR:  [66-76] 73  BP: (100-116)/(52-67) 100/54  Resp:  [17-18] 18  SpO2:  [90 %-99 %] 96 %  O2 Device: Nasal cannula  Nasal Cannula O2 Flow Rate (L/min):  [2 L/min-3 L/min] 3 L/min    I/O         04/15 0701 04/16 0700 04/16 0701  04/17 0700 04/17 0701  04/18 0700    P.O.  960 813    Total Intake(mL/kg)  960 (9.4) 813 (8)    Urine (mL/kg/hr) 450 250 (0.1) 1000 (1.2)    Total Output     Net -450 +710 -187           Unmeasured Urine Occurrence 3 x                    Lab Results: I have reviewed the following results:  Recent Labs     04/15/25  1426 04/15/25  1628 04/16/25  0505   WBC  --   --  9.29   HGB  --   --  9.9*   HCT  --   --  31.7*   PLT  --   --  264   SODIUM 142  --  140   K 3.2*  --  3.7     --  101   CO2 30  --  31   BUN 14  --  14   CREATININE 0.79  --  0.77   GLUC 153*  --  198*   AST 12*  --  11*   ALT 21  --  21   ALB 4.1  --  4.0   TBILI 0.33  --  0.29   ALKPHOS 76  --  71   HSTNI0 7  --   --    HSTNI2  --  4  --    LACTICACID  --  1.3  --

## 2025-04-17 NOTE — PROGRESS NOTES
Progress Note - Hospitalist   Name: Nanci Navarro 60 y.o. female I MRN: [id number]  Unit/Bed#: 2 E 282-01 I Date of Admission: 4/15/2025   Date of Service: 4/17/2025 I Hospital Day: 0    Assessment & Plan  Abdominal pain  Patient has been having abdominal pain and diarrhea for the last 3 weeks.  Has got investigated multiple times for the same and she said that no one has found why she is having the diarrhea.  Patient was in [facility] emergency room less than 24 hours ago.  Patient comes to [facility] ER because of the fact that she states that her abdominal pain and nausea and vomiting have worsened and she is not able to keep anything down.  She thinks that she may benefit from GI consult and workup.  Continue Protonix and Zofran as needed    Patient is complaining of upper abdominal pain radiating towards back.  Did not have any bowel movement since she came to the hospital.  Discussed imaging studies and prior records with the patient.  GI consulted; signed off  Started Bentyl, Protonix, and Reglan  No further inpatient workup continue outpatient follow-up  COPD exacerbation (HCC)  Patient presents with significant shortness of breath and wheezing.  Will give intravenous steroids, nebulization and closely follow-up the patient.  Of note, patient states that she did go to a doctor Marie's office and that he said that she would need further cardiac evaluation including possible cardiac catheterization.  Cardiology consulted  Continue oxygen as needed  Patient does have leukocytosis and we will give the patient azithromycin as monotherapy at this point of time.  Continues to have significant shortness of breath with mild exertion and wheezing  Continue with IV methylprednisone 40 mg every 24 hourly  Smoker  Patient refusing nicotine patch.  Counseled to quit smoking  Compression fracture of L1 vertebra (HCC)  PT OT limit opiates given COPD history.  Ambulatory dysfunction  PT  OT  Gastroesophageal reflux disease without esophagitis  Continue Maalox as needed, Pepcid and Protonix IV  Diabetes mellitus ([facility])  Lab Results   Component Value Date    HGBA1C 7.6 (H) 04/15/2025       Recent Labs     04/16/25  1144 04/16/25  1537 04/16/25  2119 04/17/25  0751   POCGLU 196* 248* 225* 140   Blood Sugar Average: Last 72 hrs:  (P) 196.625  Hold metformin and Jardiance.  Insulin sliding scale while hospitalized.  Given need for IV steroids for COPD exacerbation I expect the blood sugar to be on the higher side and we will need to adjust the regimen  Anemia  Likely chronic, will do anemia workup  Class 3 severe obesity due to excess calories with serious comorbidity and body mass index (BMI) of 45.0 to 49.9 in adult ([facility])  Would benefit from weight loss and lifestyle modifications  SOB (shortness of breath)  Likely secondary to COPD, however patient states that she also has history of chronic diastolic heart failure which was diagnosed by her cardiologist office.  Will consult cardiology.  Continue oral Lasix.  Patient does not look fluid overloaded on my exam.  Cardiology ordered echo and stress test  Echocardiogram reveals an EF of 65%  Stress test pending    VTE Pharmacologic Prophylaxis: VTE Score: 3 Moderate Risk (Score 3-4) - Pharmacological DVT Prophylaxis Ordered: enoxaparin (Lovenox).    Mobility:   Basic Mobility Inpatient Raw Score: 18  JH-HLM Goal: 6: Walk 10 steps or more  JH-HLM Achieved: 7: Walk 25 feet or more  JH-HLM Goal achieved. Continue to encourage appropriate mobility.    Patient Centered Rounds: I performed bedside rounds with nursing staff today.   Discussions with Specialists or Other Care Team Provider: Devious providers notes reviewed GI notes    Education and Discussions with Family / Patient: Patient declined call to .     Current Length of Stay: 0 day(s)  Current Patient Status: Observation   Certification Statement: The patient will continue to require  additional inpatient hospital stay due to pain control  Discharge Plan: Anticipate discharge tomorrow to home.    Code Status: Level 1 - Full Code    Subjective   Patient reports that her breathing is back to baseline she lives on 3 L she is currently on 3 L she is complaining of her chronic back pain and continues to complain of abdominal pain however she was able to successfully eat her entire breakfast.  She does report that she still has not had a bowel movement, reports that her bowel movement was last 2 days ago states that she feels very bloated    Objective :  Temp:  [97.3 °F (36.3 °C)-98.1 °F (36.7 °C)] 98 °F (36.7 °C)  HR:  [66-80] 67  BP: (103-116)/(52-67) 111/67  Resp:  [17-18] 17  SpO2:  [90 %-99 %] 98 %  O2 Device: Nasal cannula  Nasal Cannula O2 Flow Rate (L/min):  [2 L/min] 2 L/min    Body mass index is 48.69 kg/m².     Input and Output Summary (last 24 hours):     Intake/Output Summary (Last 24 hours) at 4/17/2025 0908  Last data filed at 4/16/2025 1501  Gross per 24 hour   Intake 720 ml   Output 250 ml   Net 470 ml       Physical Exam  Constitutional:       General: She is not in acute distress.     Appearance: She is obese. She is ill-appearing.   Pulmonary:      Effort: Pulmonary effort is normal. No respiratory distress.      Breath sounds: No wheezing.   Abdominal:      General: There is distension.      Palpations: Abdomen is soft.   Skin:     General: Skin is warm and dry.      Coloration: Skin is pale.   Neurological:      Mental Status: She is alert. Mental status is at baseline.       Lines/Drains:        Telemetry:  Telemetry Orders (From admission, onward)               24 Hour Telemetry Monitoring  Continuous x 24 Hours (Telem)        Expiring   Question:  Reason for 24 Hour Telemetry  Answer:  PCI/EP study (including pacer and ICD implementation), Cardiac surgery, MI, abnormal cardiac cath, and chest pain- rule out MI                     Telemetry Reviewed: Normal Sinus  Rhythm  Indication for Continued Telemetry Use: No indication for continued use. Will discontinue.                Lab Results: I have reviewed the following results:   Results from last 7 days   Lab Units 04/16/25  0505   WBC Thousand/uL 9.29   HEMOGLOBIN g/dL 9.9*   HEMATOCRIT % 31.7*   PLATELETS Thousands/uL 264   SEGS PCT % 87*   LYMPHO PCT % 9*   MONO PCT % 3*   EOS PCT % 0     Results from last 7 days   Lab Units 04/16/25  0505   SODIUM mmol/L 140   POTASSIUM mmol/L 3.7   CHLORIDE mmol/L 101   CO2 mmol/L 31   BUN mg/dL 14   CREATININE mg/dL 0.77   ANION GAP mmol/L 8   CALCIUM mg/dL 8.8   ALBUMIN g/dL 4.0   TOTAL BILIRUBIN mg/dL 0.29   ALK PHOS U/L 71   ALT U/L 21   AST U/L 11*   GLUCOSE RANDOM mg/dL 198*         Results from last 7 days   Lab Units 04/17/25  0751 04/16/25  2119 04/16/25  1537 04/16/25  1144 04/16/25  0748 04/15/25  2111 04/15/25  1900 04/15/25  1706   POC GLUCOSE mg/dl 140 225* 248* 196* 173* 293* 170* 128     Results from last 7 days   Lab Units 04/15/25  1905 04/14/25  1000   HEMOGLOBIN A1C % 7.6* 7.8*     Results from last 7 days   Lab Units 04/16/25  0505 04/15/25  1628 04/15/25  1353   LACTIC ACID mmol/L  --  1.3 3.9*   PROCALCITONIN ng/ml <0.05  --  0.07       Recent Cultures (last 7 days):         Imaging Results Review: No pertinent imaging studies reviewed.  Other Study Results Review: No additional pertinent studies reviewed.    Last 24 Hours Medication List:     Current Facility-Administered Medications:     acetaminophen (TYLENOL) tablet 650 mg, Q6H PRN    albuterol inhalation solution 2.5 mg, Q4H PRN    aluminum-magnesium hydroxide-simethicone (MAALOX) oral suspension 15 mL, Q4H PRN    azithromycin (ZITHROMAX) tablet 500 mg, Q24H    benzonatate (TESSALON PERLES) capsule 100 mg, TID    bisacodyl (DULCOLAX) rectal suppository 10 mg, Daily PRN    dicyclomine (BENTYL) capsule 10 mg, TID AC    enoxaparin (LOVENOX) subcutaneous injection 40 mg, BID    famotidine (PEPCID) tablet 20 mg,  BID    fluticasone-vilanterol 200-25 mcg/actuation 1 puff, Daily **AND** [DISCONTINUED] umeclidinium 62.5 mcg/actuation inhaler AEPB 1 puff, Daily    furosemide (LASIX) tablet 40 mg, Daily    guaiFENesin (ROBITUSSIN) oral liquid 200 mg, Q4H PRN    insulin lispro (HumALOG/ADMELOG) 100 units/mL subcutaneous injection 1-5 Units, TID AC **AND** Fingerstick Glucose (POCT), TID AC    insulin lispro (HumALOG/ADMELOG) 100 units/mL subcutaneous injection 1-5 Units, HS    ipratropium-albuterol (DUO-NEB) 0.5-2.5 mg/3 mL inhalation solution 3 mL, Q6H    levothyroxine tablet 50 mcg, Daily    lidocaine (LIDODERM) 5 % patch 1 patch, Daily    methocarbamol (ROBAXIN) tablet 500 mg, BID PRN    methylPREDNISolone sodium succinate (Solu-MEDROL) injection 40 mg, Daily    metoclopramide (REGLAN) injection 5 mg, Q6H    metoprolol succinate (TOPROL-XL) 24 hr tablet 25 mg, Daily    morphine injection 2 mg, Q4H PRN    ondansetron (ZOFRAN) injection 4 mg, Q6H PRN    oxyCODONE (ROXICODONE) split tablet 2.5 mg, Q6H PRN **OR** oxyCODONE (ROXICODONE) IR tablet 5 mg, Q6H PRN    pantoprazole (PROTONIX) injection 40 mg, Q12H TANVIR    sertraline (ZOLOFT) tablet 25 mg, Daily    Administrative Statements   Today, Patient Was Seen By: NII Cheng  I have spent a total time of 45 minutes in caring for this patient on the day of the visit/encounter including Diagnostic results, Prognosis, Instructions for management, Risk factor reductions, Counseling / Coordination of care, Documenting in the medical record, and Obtaining or reviewing history  .    **Please Note: This note may have been constructed using a voice recognition system.**

## 2025-04-17 NOTE — PHYSICAL THERAPY NOTE
Physical Therapy Cancellation     Patient's Name: Nanci Navarro    Admitting Diagnosis  Diarrhea [R19.7]  SOB (shortness of breath) [R06.02]  Abdominal pain [R10.9]  Chronic respiratory failure with hypoxia ([facility]) [J96.11]  Elevated lactic acid level [R79.89]    Problem List  Patient Active Problem List   Diagnosis    Simple chronic bronchitis (HCC)    Smoker    Compression fracture of L1 vertebra (HCC)    Systemic lupus erythematosus with lung involvement (HCC)    Ambulatory dysfunction    Chest pain    Volume overload    Gastroesophageal reflux disease without esophagitis    Osteopenia of lumbar spine    Diabetes mellitus (HCC)    Chronic respiratory failure with hypoxia (HCC)    Abdominal pain    Anemia    Discharge planning issues    Non-compliant behavior    Class 3 severe obesity due to excess calories with serious comorbidity and body mass index (BMI) of 45.0 to 49.9 in adult (HCC)    Chronic back pain    SOB (shortness of breath)    Thrombus of left radial artery (HCC)    Hypothyroidism    COPD exacerbation ([facility])    Lactic acidosis       Past Medical History  Past Medical History:   Diagnosis Date    Achalasia     Acute respiratory failure ([facility]) 01/15/2023    Acute respiratory failure with hypoxia ([facility]) 02/07/2024    Back pain     Cancer ([facility])     Ovarian    Diabetes mellitus (HCC)     DVT (deep venous thrombosis) ([facility])     Electrolyte abnormality 01/14/2024    Fall 01/02/2023    Had a fall a week ago slipped in the mud onto her back.  X-rays with no fracture.  She has chronic L1 fracture.  She is on pain medication at home for chronic back pain.      Fibromyalgia     Hypertension     Lupus        Past Surgical History  Past Surgical History:   Procedure Laterality Date    HYSTERECTOMY      NECK SURGERY            04/17/25 0733   PT Last Visit   PT Visit Date 04/17/25   Note Type   Note Type Cancelled Session   Cancel Reasons Medical status  (Chart review performed. At this time, PT treatment session  cancelled as pt pending MRI of c/spine and t/spine. PT will follow and provide PT interventions as appropriate.)           Randa Enriquez, PT

## 2025-04-17 NOTE — PLAN OF CARE
Problem: PAIN - ADULT  Goal: Verbalizes/displays adequate comfort level or baseline comfort level  Description: Interventions:- Encourage patient to monitor pain and request assistance- Assess pain using appropriate pain scale- Administer analgesics based on type and severity of pain and evaluate response- Implement non-pharmacological measures as appropriate and evaluate response- Consider cultural and social influences on pain and pain management- Notify physician/advanced practitioner if interventions unsuccessful or patient reports new pain  Outcome: Progressing     Problem: INFECTION - ADULT  Goal: Absence or prevention of progression during hospitalization  Description: INTERVENTIONS:- Assess and monitor for signs and symptoms of infection- Monitor lab/diagnostic results- Monitor all insertion sites, i.e. indwelling lines, tubes, and drains- Monitor endotracheal if appropriate and nasal secretions for changes in amount and color- [place] appropriate cooling/warming therapies per order- Administer medications as ordered- Instruct and encourage patient and family to use good hand hygiene technique- Identify and instruct in appropriate isolation precautions for identified infection/condition  Outcome: Progressing  Goal: Absence of fever/infection during neutropenic period  Description: INTERVENTIONS:- Monitor WBC  Outcome: Progressing     Problem: SAFETY ADULT  Goal: Patient will remain free of falls  Description: INTERVENTIONS:- Educate patient/family on patient safety including physical limitations- Instruct patient to call for assistance with activity - Consult OT/PT to assist with strengthening/mobility - Keep Call bell within reach- Keep bed low and locked with side rails adjusted as appropriate- Keep care items and personal belongings within reach- Initiate and maintain comfort rounds- Make Fall Risk Sign visible to staff- Offer Toileting every 4 Hours, in advance of need- Initiate/Maintain bed alarm-  Obtain necessary fall risk management equipment: - Apply yellow socks for high fall risk patients- Consider moving patient to room near nurses station  Outcome: Progressing  Goal: Maintain or return to baseline ADL function  Description: INTERVENTIONS:-  Assess patient's ability to carry out ADLs; assess patient's baseline for ADL function and identify physical deficits which impact ability to perform ADLs (bathing, care of mouth/teeth, toileting, grooming, dressing, etc.)- Assess/evaluate cause of self-care deficits - Assess range of motion- Assess patient's mobility; develop plan if impaired- Assess patient's need for assistive devices and provide as appropriate- Encourage maximum independence but intervene and supervise when necessary- Involve family in performance of ADLs- Assess for home care needs following discharge - Consider OT consult to assist with ADL evaluation and planning for discharge- Provide patient education as appropriate  Outcome: Progressing

## 2025-04-17 NOTE — ASSESSMENT & PLAN NOTE
-9/2024-mixed obstruction/striction  - Still having some wheezing but noted aeration  - Inpatient: Will transition to prednisone 40 mg decrease by 10 mg every 3 days  -DuoNeb 3 times daily  - Would recommend discharge on: Pulmicort flex inhaler 180 mg 2 puffs twice daily, DuoNeb 4 times daily  - Patient will benefit from pulmonary rehab

## 2025-04-17 NOTE — ASSESSMENT & PLAN NOTE
Likely secondary to COPD, however patient states that she also has history of chronic diastolic heart failure which was diagnosed by her cardiologist office.  Will consult cardiology.  Continue oral Lasix.  Patient does not look fluid overloaded on my exam.  Cardiology ordered echo and stress test  Echocardiogram reveals an EF of 65%  Stress test pending

## 2025-04-17 NOTE — ASSESSMENT & PLAN NOTE
- Patient currently on home O2 requirements of 3 L 94%  - Continue sats greater than 88%  - Pulmonary toileting: Deep breathing cough out of bed as tolerated incentive spirometry

## 2025-04-17 NOTE — QUICK NOTE
Notified by RN pt c/o 9/10 paulo. VSS.     EKG revealing NSR    R/o GERD vs cardiac origin    Echo from today revealing 60% EF, no abnormality    Will check troponin and place on telemetry tonight.

## 2025-04-17 NOTE — ASSESSMENT & PLAN NOTE
Lab Results   Component Value Date    HGBA1C 7.6 (H) 04/15/2025       Recent Labs     04/16/25  1144 04/16/25  1537 04/16/25  2119 04/17/25  0751   POCGLU 196* 248* 225* 140   Blood Sugar Average: Last 72 hrs:  (P) 196.625  Hold metformin and Jardiance.  Insulin sliding scale while hospitalized.  Given need for IV steroids for COPD exacerbation I expect the blood sugar to be on the higher side and we will need to adjust the regimen

## 2025-04-17 NOTE — CASE MANAGEMENT
Case Management Progress Note    Patient name Nanci Navarro  Location 2 [facility] 282/2 E 282-01 MRN [id number]  : 1965 Date 2025       LOS (days): 0  Geometric Mean LOS (GMLOS) (days):   Days to GMLOS:        OBJECTIVE:        Current admission status: Observation  Preferred Pharmacy:   [facility] - [place] [address]  [address]  [facility] [address]  Phone: [phone] Fax: [phone]    Primary Care Provider: Natividad Robertson PA-C    [facility]  Secondary Insurance:     PROGRESS NOTE:    CM reserved [facility] in [facility] and updated patient's AVS to reflect the same. ZARIA will need to put in a new ambulatory home health referral and CM to upload to [facility] once available. Anticipated to be discharged home in 24 hrs.

## 2025-04-17 NOTE — ASSESSMENT & PLAN NOTE
Patient has been having abdominal pain and diarrhea for the last 3 weeks.  Has got investigated multiple times for the same and she said that no one has found why she is having the diarrhea.  Patient was in [facility] emergency room less than 24 hours ago.  Patient comes to [facility] ER because of the fact that she states that her abdominal pain and nausea and vomiting have worsened and she is not able to keep anything down.  She thinks that she may benefit from GI consult and workup.  Continue Protonix and Zofran as needed    Patient is complaining of upper abdominal pain radiating towards back.  Did not have any bowel movement since she came to the hospital.  Discussed imaging studies and prior records with the patient.  GI consulted; signed off  Started Bentyl, Protonix, and Reglan  No further inpatient workup continue outpatient follow-up  Vascular surgery consulted  CT scan reveals severe abdominal aortic plaque

## 2025-04-17 NOTE — PLAN OF CARE
Problem: PAIN - ADULT  Goal: Verbalizes/displays adequate comfort level or baseline comfort level  Description: Interventions:- Encourage patient to monitor pain and request assistance- Assess pain using appropriate pain scale- Administer analgesics based on type and severity of pain and evaluate response- Implement non-pharmacological measures as appropriate and evaluate response- Consider cultural and social influences on pain and pain management- Notify physician/advanced practitioner if interventions unsuccessful or patient reports new pain  Outcome: Progressing     Problem: INFECTION - ADULT  Goal: Absence or prevention of progression during hospitalization  Description: INTERVENTIONS:- Assess and monitor for signs and symptoms of infection- Monitor lab/diagnostic results- Monitor all insertion sites, i.e. indwelling lines, tubes, and drains- Monitor endotracheal if appropriate and nasal secretions for changes in amount and color- [place] appropriate cooling/warming therapies per order- Administer medications as ordered- Instruct and encourage patient and family to use good hand hygiene technique- Identify and instruct in appropriate isolation precautions for identified infection/condition  Outcome: Progressing  Goal: Absence of fever/infection during neutropenic period  Description: INTERVENTIONS:- Monitor WBC  Outcome: Progressing     Problem: SAFETY ADULT  Goal: Patient will remain free of falls  Description: INTERVENTIONS:- Educate patient/family on patient safety including physical limitations- Instruct patient to call for assistance with activity - Consult OT/PT to assist with strengthening/mobility - Keep Call bell within reach- Keep bed low and locked with side rails adjusted as appropriate- Keep care items and personal belongings within reach- Initiate and maintain comfort rounds- Make Fall Risk Sign visible to staff- Offer Toileting every    Hours, in advance of need- Initiate/Maintain   alarm-  Obtain necessary fall risk management equipment:   - Apply yellow socks and bracelet for high fall risk patients- Consider moving patient to room near nurses station  Outcome: Progressing  Goal: Maintain or return to baseline ADL function  Description: INTERVENTIONS:-  Assess patient's ability to carry out ADLs; assess patient's baseline for ADL function and identify physical deficits which impact ability to perform ADLs (bathing, care of mouth/teeth, toileting, grooming, dressing, etc.)- Assess/evaluate cause of self-care deficits - Assess range of motion- Assess patient's mobility; develop plan if impaired- Assess patient's need for assistive devices and provide as appropriate- Encourage maximum independence but intervene and supervise when necessary- Involve family in performance of ADLs- Assess for home care needs following discharge - Consider OT consult to assist with ADL evaluation and planning for discharge- Provide patient education as appropriate  Outcome: Progressing  Goal: Maintains/Returns to pre admission functional level  Description: INTERVENTIONS:- Perform AM-PAC 6 Click Basic Mobility/ Daily Activity assessment daily.- Set and communicate daily mobility goal to care team and patient/family/caregiver. - Collaborate with rehabilitation services on mobility goals if consulted- Perform Range of Motion    times a day.- Reposition patient every    hours.- Dangle patient      times a day- Stand patient    times a day- Ambulate patient    times a day- Out of bed to chair    times a day - Out of bed for meals    times a day- Out of bed for toileting- Record patient progress and toleration of activity level   Outcome: Progressing     Problem: DISCHARGE PLANNING  Goal: Discharge to home or other facility with appropriate resources  Description: INTERVENTIONS:- Identify barriers to discharge w/patient and caregiver- Arrange for needed discharge resources and transportation as appropriate- Identify discharge  learning needs (meds, wound care, etc.)- Arrange for interpretive services to assist at discharge as needed- Refer to Case Management Department for coordinating discharge planning if the patient needs post-hospital services based on physician/advanced practitioner order or complex needs related to functional status, cognitive ability, or social support system  Outcome: Progressing     Problem: Knowledge Deficit  Goal: Patient/family/caregiver demonstrates understanding of disease process, treatment plan, medications, and discharge instructions  Description: Complete learning assessment and assess knowledge base.Interventions:- Provide teaching at level of understanding- Provide teaching via preferred learning methods  Outcome: Progressing

## 2025-04-17 NOTE — PROGRESS NOTES
[facility].  [address] [place] [address]  [phone]   Fax: [phone]    Progress Note     Name:Nanci Navarro   Room: 2 [facility] 282/2 E 282-01     IMPRESSION:  N/V/D  2. Edema     PLAN:  MRI of cervical and thoracic spine  Pt requests vascular surgery evaluation  ?  SUBJECTIVE:  She denies problems of chest pain, shortness of breath, orthopnea, PND, palpitations, syncope, or bleeding problems.   ?  OBJECTIVE:  Scheduled Medicines  Current Facility-Administered Medications   Medication Dose Route Frequency Provider Last Rate    acetaminophen  650 mg Oral Q6H PRN NII Cheng      albuterol  2.5 mg Nebulization Q4H PRN Ariel Arias MD      aluminum-magnesium hydroxide-simethicone  15 mL Oral Q4H PRN Ariel Arias MD      azithromycin  500 mg Oral Q24H Ariel Arias MD      benzonatate  100 mg Oral TID Ariel Arias MD      bisacodyl  5 mg Oral Once NII Cheng      bisacodyl  10 mg Rectal Daily PRN Ariel Arias MD      dicyclomine  10 mg Oral TID AC Nhan Whitaker III, MD      enoxaparin  40 mg Subcutaneous BID Ariel Arias MD      famotidine  20 mg Oral BID Ariel Arias MD      fluticasone-vilanterol  1 puff Inhalation Daily Ariel Arias MD      furosemide  40 mg Oral Daily Ariel Arias MD      guaiFENesin  200 mg Oral Q4H PRN Ariel Arias MD      insulin lispro  1-5 Units Subcutaneous TID AC Ariel Arias MD      insulin lispro  1-5 Units Subcutaneous HS Ariel Arias MD      ipratropium-albuterol  3 mL Nebulization TID NII Woodward      levothyroxine  50 mcg Oral Daily Ariel Arias MD      lidocaine  1 patch Topical Daily Ariel Arias MD      methocarbamol  500 mg Oral BID PRN Ariel Arias MD      metoclopramide  5 mg Intravenous Q6H Nhan Whitaker III, MD      metoprolol succinate  25 mg Oral Daily Ariel  "MD Juana      morphine injection  2 mg Intravenous Q4H PRN NII Cheng      ondansetron  4 mg Intravenous Q6H PRN Ariel Arias MD      oxyCODONE  2.5 mg Oral Q6H PRN NII Cheng      Or    oxyCODONE  5 mg Oral Q6H PRN NII Cheng      pantoprazole  40 mg Intravenous Q12H [facility] Nhan Whitaker III, MD      polyethylene glycol  17 g Oral Daily NII Cheng      [START ON 4/18/2025] predniSONE  40 mg Oral Daily NII Woodward      senna-docusate sodium  1 tablet Oral BID NII Cheng      sertraline  25 mg Oral HS NII Cheng        Infusions      PRN Medicines    acetaminophen    albuterol    aluminum-magnesium hydroxide-simethicone    bisacodyl    guaiFENesin    methocarbamol    morphine injection    ondansetron    oxyCODONE **OR** oxyCODONE   Allergies   Allergen Reactions    Valium [Diazepam] Anaphylaxis      Diet Shahid/CHO Controlled; Consistent Carbohydrate Diet Level 3 (6 carb servings/90 grams CHO/meal)    Intake/Output Summary (Last 24 hours) at 4/17/2025 1051  Last data filed at 4/17/2025 0918  Gross per 24 hour   Intake 836 ml   Output 200 ml   Net 636 ml      /67   Pulse 67   Temp 98 °F (36.7 °C)   Resp 17   Ht 4' 9\" (1.448 m)   Wt 102 kg (225 lb)   SpO2 98%   BMI 48.69 kg/m²    BP Readings from Last 3 Encounters:   04/17/25 111/67   03/17/25 127/57   02/04/25 126/74     ?  Physical Exam:  General Appearance:  Alert, cooperative, in no acute distress   Eyes:  Sclerae anicteric.    HEET:  Normocephalic, atraumatic.    Neck: Supple, no JVD   Cardiovascular:  Normal rate, regular rhythm. No murmurs   No edema, normal pulses   Respiratory:  Breathing unlabored. Lungs clear to auscultation   Gastrointestinal:  Normoactive bowel sounds. Abdomen soft, nontender to palpation.   Musculoskeletal: No back or joint deformity.   Dermatologic:  Skin is warm and dry.   Neurologic: Alert and oriented and neurologic exam is " "grossly normal.    Psychiatric: Normal affect and mood.    LABS:  Lab Results   Component Value Date    BUN 14 04/16/2025    CREATININE 0.77 04/16/2025    CALCIUM 8.8 04/16/2025    K 3.7 04/16/2025    CO2 31 04/16/2025     04/16/2025    ALKPHOS 71 04/16/2025    AST 11 (L) 04/16/2025    ALT 21 04/16/2025     Lab Results   Component Value Date    WBC 9.29 04/16/2025    HGB 9.9 (L) 04/16/2025    HCT 31.7 (L) 04/16/2025    MCV 93 04/16/2025     04/16/2025     No results found for: \"CHOL\", \"HDL\", \"LDLCALC\", \"TRIG\"  ?    ?  ?  ?  Francisco J Salazar MD  "

## 2025-04-17 NOTE — ASSESSMENT & PLAN NOTE
Lab Results   Component Value Date    HGBA1C 7.6 (H) 04/15/2025       Recent Labs     04/16/25  1537 04/16/25  2119 04/17/25  0751 04/17/25  1059   POCGLU 248* 225* 140 176*       Blood Sugar Average: Last 72 hrs:  (P) [id number]

## 2025-04-17 NOTE — PROGRESS NOTES
Patient:    MRN:  [id number]    Ravinder Request ID:  [id number]    Level of care reserved:  Home Health Agency    Partner Reserved:  [facility], [address] [place] [id number] [phone]    Clinical needs requested:    Geography searched:  [phone]    Start of Service:    Request sent:  2:29pm EDT on 4/17/2025 by Ally Cintron    Partner reserved:  2:55pm EDT on 4/17/2025 by Ally Cintron    Choice list shared:  2:55pm EDT on 4/17/2025 by Ally Cintron

## 2025-04-17 NOTE — ASSESSMENT & PLAN NOTE
Patient has been having abdominal pain and diarrhea for the last 3 weeks.  Has got investigated multiple times for the same and she said that no one has found why she is having the diarrhea.  Patient was in [facility] emergency room less than 24 hours ago.  Patient comes to [facility] ER because of the fact that she states that her abdominal pain and nausea and vomiting have worsened and she is not able to keep anything down.  She thinks that she may benefit from GI consult and workup.  Continue Protonix and Zofran as needed    Patient is complaining of upper abdominal pain radiating towards back.  Did not have any bowel movement since she came to the hospital.  Discussed imaging studies and prior records with the patient.  GI consulted; signed off  Started Bentyl, Protonix, and Reglan  No further inpatient workup continue outpatient follow-up

## 2025-04-17 NOTE — ASSESSMENT & PLAN NOTE
- Patient appears to have a component of chronic hypercapnia  - Will obtain VBG  - Likely has a component of NESS/OHS        -pulmonary will sign off  -outpatient follow up per DC instructions

## 2025-04-17 NOTE — RESPIRATORY THERAPY NOTE
RT Protocol Note  Nanci Navarro 60 y.o. female MRN: [id number]  Unit/Bed#: 2 E 282-01 Encounter: [id number]    Assessment    Principal Problem:    Abdominal pain  Active Problems:    Smoker    Compression fracture of L1 vertebra ([facility])    Ambulatory dysfunction    Gastroesophageal reflux disease without esophagitis    Diabetes mellitus ([facility])    Anemia    Class 3 severe obesity due to excess calories with serious comorbidity and body mass index (BMI) of 45.0 to 49.9 in adult ([facility])    SOB (shortness of breath)    Hypothyroidism    COPD exacerbation ([facility])    Lactic acidosis      Home Pulmonary Medications:    Home Devices/Therapy: Home O2    Past Medical History:   Diagnosis Date    Achalasia     Acute respiratory failure ([facility]) 01/15/2023    Acute respiratory failure with hypoxia ([facility]) 02/07/2024    Back pain     Cancer ([facility])     Ovarian    Diabetes mellitus ([facility])     DVT (deep venous thrombosis) ([facility])     Electrolyte abnormality 01/14/2024    Fall 01/02/2023    Had a fall a week ago slipped in the mud onto her back.  X-rays with no fracture.  She has chronic L1 fracture.  She is on pain medication at home for chronic back pain.      Fibromyalgia     Hypertension     Lupus      Social History     Socioeconomic History    Marital status: /Civil Union     Spouse name: Not on file    Number of children: Not on file    Years of education: Not on file    Highest education level: Not on file   Occupational History    Not on file   Tobacco Use    Smoking status: Every Day     Current packs/day: 0.50     Types: Cigarettes    Smokeless tobacco: Never   Vaping Use    Vaping status: Never Used   Substance and Sexual Activity    Alcohol use: Never    Drug use: Never    Sexual activity: Yes   Other Topics Concern    Not on file   Social History Narrative    Not on file     Social Drivers of Health     Financial Resource Strain: At Risk (3/22/2025)    Received from [facility]    Financial Insecurity     In  the last 12 months did you skip medications to save money?: Yes     In the last 12 months was there a time when you needed to see a doctor but could not because of cost?: Yes   Food Insecurity: No Food Insecurity (4/15/2025)    Nursing - Inadequate Food Risk Classification     Worried About Running Out of Food in the Last Year: Not on file     Ran Out of Food in the Last Year: Not on file     Ran Out of Food in the Last Year: Never true   Recent Concern: Food Insecurity - Food Insecurity Present (3/22/2025)    Received from [facility]    Food Insecurity     In the last 12 months did you ever eat less than you felt you should because there wasn't enough money for food?: Yes   Transportation Needs: No Transportation Needs (4/15/2025)    Nursing - Transportation Risk Classification     Lack of Transportation: Not on file     Lack of Transportation: No   Recent Concern: Transportation Needs - Unmet Transportation Needs (3/28/2025)    Received from [facility]    OASIS : Transportation     Lack of Transportation (Medical): Yes     Lack of Transportation (Non-Medical): Yes     Patient Unable or Declines to Respond: No   Physical Activity: Not on file   Stress: Not on file   Social Connections: Socially Isolated (3/22/2025)    Received from [facility]    Social Connection     Do you often feel lonely?: Yes   Intimate Partner Violence: Unknown (4/15/2025)    Nursing IPS     Feels Physically and Emotionally Safe: Not on file     Physically Hurt by Someone: Not on file     Humiliated or Emotionally Abused by Someone: Not on file     Physically Hurt by Someone: No     Hurt or Threatened by Someone: No   Housing Stability: Unknown (4/15/2025)    Nursing: Inadequate Housing Risk Classification     Has Housing: Not on file     Worried About Losing Housing: Not on file     Unable to Get Utilities: Not on file     Unable to Pay for Housing in the Last Year: No     Has  "Housin   Recent Concern: Housing Stability - High Risk (2025)    Received from [facility]    Housing Stability Vital Sign     Unable to Pay for Housing in the Last Year: No     Number of Times Moved in the Last Year: 2     Homeless in the Last Year: No       Subjective         Objective    Physical Exam:   Assessment Type: (P) During-treatment  General Appearance: (P) Drowsy, Eyes open/responds to voice  Respiratory Pattern: (P) Normal  Chest Assessment: (P) Chest expansion symmetrical  Bilateral Breath Sounds: (P) Diminished, Scattered, Expiratory wheezes  Cough: (P) None  O2 Device: (P) nc    Vitals:  Blood pressure 108/52, pulse 66, temperature 97.8 °F (36.6 °C), temperature source Oral, resp. rate 18, height 4' 9\" (1.448 m), weight 102 kg (225 lb), SpO2 99%.          Imaging and other studies: Results Review Statement: No pertinent imaging studies reviewed.    O2 Device: (P) nc     Plan    Respiratory Plan: Mild Distress pathway        Resp Comments: (P) cont w/ current therapy   "

## 2025-04-17 NOTE — ASSESSMENT & PLAN NOTE
60-year-old female smoker with obesity, DM, chronic low back pain, L1 vertebra compression fracture, ambulatory dysfunction, hypothyroid, and IBS, presents with abdominal pain, diarrhea and shortness of breath/ COPD exacerbation.    Per review of chart, patient with abdominal complaints and diarrhea for the last 3 weeks with hospitalization at [facility].  During this admission, patient seen by GI and signed off arranging outpatient follow-up for chronic GI problems.    D/w medicine team, patient's abdominal pain is intermittent.  Patient tolerating diet, eating full meals, no recent weight loss.  No bowel movement x 2 days    Vascular consult placed for possible mesenteric ischemia.  No imaging obtained during this hospitalization to identify this.    -In review of chart, patient had CT C/A/P w/ contrast 3/16/2025 after fall.    Imaging reviewed by Dr. Rick: SMA widely patent, mild celiac stenosis.  These findings not cause patient's symptoms.    No vascular intervention indicated.  D/w Dr Rick  Communicated with ZARIA , Cardiology.    Vascular surgery will sign off.

## 2025-04-17 NOTE — ASSESSMENT & PLAN NOTE
- 3 to 4 cigarettes a week  - Encouraged and educated on tobacco cessation  - NRT per primary team

## 2025-04-17 NOTE — ASSESSMENT & PLAN NOTE
Patient presents with significant shortness of breath and wheezing.  Will give intravenous steroids, nebulization and closely follow-up the patient.  Of note, patient states that she did go to a doctor Marie's office and that he said that she would need further cardiac evaluation including possible cardiac catheterization.  Cardiology consulted  Continue oxygen as needed  Patient does have leukocytosis and we will give the patient azithromycin as monotherapy at this point of time.  Continues to have significant shortness of breath with mild exertion and wheezing  Continue with IV methylprednisone 40 mg every 24 hourly

## 2025-04-18 VITALS
BODY MASS INDEX: 48.54 KG/M2 | SYSTOLIC BLOOD PRESSURE: 131 MMHG | HEIGHT: 57 IN | OXYGEN SATURATION: 96 % | TEMPERATURE: 98 F | RESPIRATION RATE: 19 BRPM | WEIGHT: 225 LBS | DIASTOLIC BLOOD PRESSURE: 72 MMHG | HEART RATE: 68 BPM

## 2025-04-18 LAB
ANION GAP SERPL CALCULATED.3IONS-SCNC: 7 MMOL/L (ref 4–13)
BASE EX.OXY STD BLDV CALC-SCNC: 96.1 % (ref 60–80)
BASE EXCESS BLDV CALC-SCNC: 7 MMOL/L
BUN SERPL-MCNC: 18 MG/DL (ref 5–25)
CALCIUM SERPL-MCNC: 8.3 MG/DL (ref 8.4–10.2)
CHLORIDE SERPL-SCNC: 102 MMOL/L (ref 96–108)
CO2 SERPL-SCNC: 31 MMOL/L (ref 21–32)
CREAT SERPL-MCNC: 0.65 MG/DL (ref 0.6–1.3)
ERYTHROCYTE [DISTWIDTH] IN BLOOD BY AUTOMATED COUNT: 14.8 % (ref 11.6–15.1)
GFR SERPL CREATININE-BSD FRML MDRD: 96 ML/MIN/1.73SQ M
GLUCOSE P FAST SERPL-MCNC: 180 MG/DL (ref 65–99)
GLUCOSE SERPL-MCNC: 124 MG/DL (ref 65–140)
GLUCOSE SERPL-MCNC: 180 MG/DL (ref 65–140)
GLUCOSE SERPL-MCNC: 259 MG/DL (ref 65–140)
HCO3 BLDV-SCNC: 32.1 MMOL/L (ref 24–30)
HCT VFR BLD AUTO: 30.2 % (ref 34.8–46.1)
HGB BLD-MCNC: 9.3 G/DL (ref 11.5–15.4)
MAGNESIUM SERPL-MCNC: 2.1 MG/DL (ref 1.9–2.7)
MCH RBC QN AUTO: 29.3 PG (ref 26.8–34.3)
MCHC RBC AUTO-ENTMCNC: 30.8 G/DL (ref 31.4–37.4)
MCV RBC AUTO: 95 FL (ref 82–98)
O2 CT BLDV-SCNC: 14.3 ML/DL
PCO2 BLDV: 48.4 MM HG (ref 42–50)
PH BLDV: 7.44 [PH] (ref 7.3–7.4)
PLATELET # BLD AUTO: 239 THOUSANDS/UL (ref 149–390)
PMV BLD AUTO: 10.5 FL (ref 8.9–12.7)
PO2 BLDV: 140.6 MM HG (ref 35–45)
POTASSIUM SERPL-SCNC: 3.1 MMOL/L (ref 3.5–5.3)
RBC # BLD AUTO: 3.17 MILLION/UL (ref 3.81–5.12)
SODIUM SERPL-SCNC: 140 MMOL/L (ref 135–147)
WBC # BLD AUTO: 8.68 THOUSAND/UL (ref 4.31–10.16)

## 2025-04-18 PROCEDURE — 94640 AIRWAY INHALATION TREATMENT: CPT

## 2025-04-18 PROCEDURE — 82948 REAGENT STRIP/BLOOD GLUCOSE: CPT

## 2025-04-18 PROCEDURE — 99239 HOSP IP/OBS DSCHRG MGMT >30: CPT | Performed by: NURSE PRACTITIONER

## 2025-04-18 PROCEDURE — 80048 BASIC METABOLIC PNL TOTAL CA: CPT | Performed by: NURSE PRACTITIONER

## 2025-04-18 PROCEDURE — 94760 N-INVAS EAR/PLS OXIMETRY 1: CPT

## 2025-04-18 PROCEDURE — 82805 BLOOD GASES W/O2 SATURATION: CPT | Performed by: NURSE PRACTITIONER

## 2025-04-18 PROCEDURE — 85027 COMPLETE CBC AUTOMATED: CPT | Performed by: NURSE PRACTITIONER

## 2025-04-18 PROCEDURE — 83735 ASSAY OF MAGNESIUM: CPT | Performed by: NURSE PRACTITIONER

## 2025-04-18 RX ORDER — OXYCODONE HYDROCHLORIDE 5 MG/1
5 TABLET ORAL EVERY 6 HOURS PRN
Qty: 4 TABLET | Refills: 0 | Status: SHIPPED | OUTPATIENT
Start: 2025-04-18 | End: 2025-04-18

## 2025-04-18 RX ORDER — POTASSIUM CHLORIDE 1500 MG/1
40 TABLET, EXTENDED RELEASE ORAL
Status: DISCONTINUED | OUTPATIENT
Start: 2025-04-18 | End: 2025-04-18 | Stop reason: HOSPADM

## 2025-04-18 RX ORDER — POTASSIUM CHLORIDE 14.9 MG/ML
20 INJECTION INTRAVENOUS
Status: DISPENSED | OUTPATIENT
Start: 2025-04-18 | End: 2025-04-18

## 2025-04-18 RX ORDER — PREDNISONE 10 MG/1
TABLET ORAL
Qty: 30 TABLET | Refills: 0 | Status: SHIPPED | OUTPATIENT
Start: 2025-04-18 | End: 2025-04-29

## 2025-04-18 RX ORDER — LEVOTHYROXINE SODIUM 50 UG/1
50 TABLET ORAL DAILY
Qty: 90 TABLET | Refills: 1 | Status: SHIPPED | OUTPATIENT
Start: 2025-04-18

## 2025-04-18 RX ORDER — OXYCODONE HYDROCHLORIDE 5 MG/1
5 TABLET ORAL EVERY 6 HOURS PRN
Qty: 2 TABLET | Refills: 0 | Status: SHIPPED | OUTPATIENT
Start: 2025-04-18 | End: 2025-04-28

## 2025-04-18 RX ADMIN — MORPHINE SULFATE 2 MG: 2 INJECTION, SOLUTION INTRAMUSCULAR; INTRAVENOUS at 05:07

## 2025-04-18 RX ADMIN — DICYCLOMINE HYDROCHLORIDE 10 MG: 10 CAPSULE ORAL at 08:11

## 2025-04-18 RX ADMIN — FLUTICASONE FUROATE AND VILANTEROL TRIFENATATE 1 PUFF: 200; 25 POWDER RESPIRATORY (INHALATION) at 08:13

## 2025-04-18 RX ADMIN — BENZONATATE 100 MG: 100 CAPSULE ORAL at 08:11

## 2025-04-18 RX ADMIN — FAMOTIDINE 20 MG: 20 TABLET, FILM COATED ORAL at 08:12

## 2025-04-18 RX ADMIN — PANTOPRAZOLE SODIUM 40 MG: 40 INJECTION, POWDER, FOR SOLUTION INTRAVENOUS at 08:10

## 2025-04-18 RX ADMIN — OXYCODONE HYDROCHLORIDE 5 MG: 5 TABLET ORAL at 00:02

## 2025-04-18 RX ADMIN — POTASSIUM CHLORIDE 20 MEQ: 14.9 INJECTION, SOLUTION INTRAVENOUS at 09:48

## 2025-04-18 RX ADMIN — FUROSEMIDE 40 MG: 40 TABLET ORAL at 08:12

## 2025-04-18 RX ADMIN — LIDOCAINE 5% 1 PATCH: 700 PATCH TOPICAL at 08:11

## 2025-04-18 RX ADMIN — PREDNISONE 40 MG: 20 TABLET ORAL at 08:12

## 2025-04-18 RX ADMIN — ENOXAPARIN SODIUM 40 MG: 40 INJECTION SUBCUTANEOUS at 08:11

## 2025-04-18 RX ADMIN — MORPHINE SULFATE 2 MG: 2 INJECTION, SOLUTION INTRAMUSCULAR; INTRAVENOUS at 01:04

## 2025-04-18 RX ADMIN — SENNOSIDES AND DOCUSATE SODIUM 1 TABLET: 50; 8.6 TABLET ORAL at 08:12

## 2025-04-18 RX ADMIN — MORPHINE SULFATE 2 MG: 2 INJECTION, SOLUTION INTRAMUSCULAR; INTRAVENOUS at 09:46

## 2025-04-18 RX ADMIN — LEVOTHYROXINE SODIUM 50 MCG: 0.05 TABLET ORAL at 08:12

## 2025-04-18 RX ADMIN — METOPROLOL SUCCINATE 25 MG: 25 TABLET, EXTENDED RELEASE ORAL at 08:12

## 2025-04-18 RX ADMIN — OXYCODONE HYDROCHLORIDE 5 MG: 5 TABLET ORAL at 13:55

## 2025-04-18 RX ADMIN — POTASSIUM CHLORIDE 40 MEQ: 1500 TABLET, EXTENDED RELEASE ORAL at 12:00

## 2025-04-18 RX ADMIN — POTASSIUM CHLORIDE 40 MEQ: 1500 TABLET, EXTENDED RELEASE ORAL at 09:48

## 2025-04-18 RX ADMIN — OXYCODONE HYDROCHLORIDE 5 MG: 5 TABLET ORAL at 08:12

## 2025-04-18 RX ADMIN — METOCLOPRAMIDE 5 MG: 5 INJECTION, SOLUTION INTRAMUSCULAR; INTRAVENOUS at 04:43

## 2025-04-18 RX ADMIN — METOCLOPRAMIDE 5 MG: 5 INJECTION, SOLUTION INTRAMUSCULAR; INTRAVENOUS at 09:47

## 2025-04-18 RX ADMIN — INSULIN LISPRO 2 UNITS: 100 INJECTION, SOLUTION INTRAVENOUS; SUBCUTANEOUS at 12:01

## 2025-04-18 RX ADMIN — IPRATROPIUM BROMIDE AND ALBUTEROL SULFATE 3 ML: 2.5; .5 SOLUTION RESPIRATORY (INHALATION) at 07:05

## 2025-04-18 NOTE — ASSESSMENT & PLAN NOTE
Lab Results   Component Value Date    HGBA1C 7.6 (H) 04/15/2025       Recent Labs     04/17/25  1059 04/17/25  1654 04/17/25  2106 04/18/25  0740   POCGLU 176* 283* 320* 124   Blood Sugar Average: Last 72 hrs:  (P) [id number]  Hold metformin and Jardiance.  Insulin sliding scale while hospitalized.  Given need for IV steroids for COPD exacerbation I expect the blood sugar to be on the higher side and we will need to adjust the regimen

## 2025-04-18 NOTE — DISCHARGE SUMMARY
Discharge Summary - Hospitalist   Name: Nanci Navarro 60 y.o. female I MRN: [id number]  Unit/Bed#: 2 E 282-01 I Date of Admission: 4/15/2025   Date of Service: 4/18/2025 I Hospital Day: 0     Assessment & Plan  Abdominal pain  Patient has been having abdominal pain and diarrhea for the last 3 weeks.  Has got investigated multiple times for the same and she said that no one has found why she is having the diarrhea.  Patient was in [facility] emergency room less than 24 hours ago.  Patient comes to [facility] ER because of the fact that she states that her abdominal pain and nausea and vomiting have worsened and she is not able to keep anything down.  She thinks that she may benefit from GI consult and workup.  Continue Protonix and Zofran as needed    Patient is complaining of upper abdominal pain radiating towards back.  Did not have any bowel movement since she came to the hospital.  Discussed imaging studies and prior records with the patient.  GI consulted; signed off  Started Bentyl, Protonix, and Reglan  No further inpatient workup continue outpatient follow-up  Vascular surgery consulted  CT scan reveals severe abdominal aortic plaque  COPD exacerbation (HCC)  Patient presents with significant shortness of breath and wheezing.  Will give intravenous steroids, nebulization and closely follow-up the patient.  Of note, patient states that she did go to a doctor Marie's office and that he said that she would need further cardiac evaluation including possible cardiac catheterization.  Cardiology consulted  Continue oxygen as needed  Patient does have leukocytosis and we will give the patient azithromycin as monotherapy at this point of time.  Continues to have significant shortness of breath with mild exertion and wheezing  Continue with IV methylprednisone 40 mg every 24 hourly  Smoker  Patient refusing nicotine patch.  Counseled to quit smoking  Compression fracture of L1 vertebra (HCC)  PT  OT   limit opiates given COPD history.  Ambulatory dysfunction  PT OT  Awaiting recommendations  Gastroesophageal reflux disease without esophagitis  Continue Maalox as needed, Pepcid and Protonix IV  Diabetes mellitus (HCC)  Lab Results   Component Value Date    HGBA1C 7.6 (H) 04/15/2025       Recent Labs     04/17/25  1059 04/17/25  1654 04/17/25  2106 04/18/25  0740   POCGLU 176* 283* 320* 124   Blood Sugar Average: Last 72 hrs:  (P) [id number]  Hold metformin and Jardiance.  Insulin sliding scale while hospitalized.  Given need for IV steroids for COPD exacerbation I expect the blood sugar to be on the higher side and we will need to adjust the regimen  Class 3 severe obesity due to excess calories with serious comorbidity and body mass index (BMI) of 45.0 to 49.9 in adult  Would benefit from weight loss and lifestyle modifications  SOB (shortness of breath)  Likely secondary to COPD, however patient states that she also has history of chronic diastolic heart failure which was diagnosed by her cardiologist office.  Will consult cardiology.  Continue oral Lasix.  Patient does not look fluid overloaded on my exam.  Cardiology ordered echo and stress test  Echocardiogram reveals an EF of 65%  Stress test pending  Chronic respiratory failure with hypoxia (HCC)  Stable on home O2    Discharging Physician / Practitioner: NII Cheng  PCP: Natividad Robertson PA-C  Admission Date:   Admission Orders (From admission, onward)       Ordered        04/15/25 1654  Place in Observation  Once                          Discharge Date: 04/18/25    Medical Problems       Resolved Problems  Date Reviewed: 4/15/2025          Resolved    Lactic acidosis 4/17/2025     Resolved by  NII Cheng          Consultations During Hospital Stay:  IP CONSULT TO PULMONOLOGY  IP CONSULT TO GASTROENTEROLOGY  IP CONSULT TO CARDIOLOGY  IP CONSULT TO VASCULAR SURGERY    Procedures Performed:   MRI thoracic spine wo  contrast  Result Date: 4/18/2025  1.  There is no focal disk herniation, central canal or neural foraminal narrowing. No cord compression or cord signal abnormality. 2.  Chronic L1 compression deformity. Workstation performed: [id number]     CTA abdomen pelvis w wo contrast  Result Date: 4/14/2025  IMPRESSION:  L1 compression fracture. Fatty liver. Diverticulosis. No evidence of mass, obstruction, or acute inflammatory process. Workstation:[id number]    XR chest portable  Result Date: 4/14/2025  IMPRESSION: No active disease. Workstation:[id number]        Significant Findings / Test Results:   above    Incidental Findings:   no     Test Results Pending at Discharge (will require follow up):   no     Outpatient Tests Requested:  no    Complications:  no    Past Medical History:   Diagnosis Date    Achalasia     Acute respiratory failure (HCC) 01/15/2023    Acute respiratory failure with hypoxia (HCC) 02/07/2024    Back pain     Cancer (HCC)     Ovarian    Diabetes mellitus (HCC)     DVT (deep venous thrombosis) (HCC)     Electrolyte abnormality 01/14/2024    Fall 01/02/2023    Had a fall a week ago slipped in the mud onto her back.  X-rays with no fracture.  She has chronic L1 fracture.  She is on pain medication at home for chronic back pain.      Fibromyalgia     Hypertension     Lactic acidosis 04/15/2025    Lupus        Reason for Admission: Abdominal Pain (Pt c/o N/V/D started a couple days states her DrUgo Send her here became her test is showing some form of bacteria. Also c/o back pain and SOB chronically 3L)       Hospital Course:     Nanci Navarro is a 60 y.o. female patient with past medical history of  has a past medical history of Achalasia, Acute respiratory failure (HCC), Acute respiratory failure with hypoxia (HCC), Back pain, Cancer (HCC), Diabetes mellitus (HCC), DVT (deep venous thrombosis) (HCC), Electrolyte abnormality, Fall, Fibromyalgia, Hypertension, Lactic acidosis, and Lupus. who originally  "presented to the hospital on 4/15/2025 due to Abdominal Pain (Pt c/o N/V/D started a couple days states her  Send her here became her test is showing some form of bacteria. Also c/o back pain and SOB chronically 3L) patient presented to the hospital for increase in her chronic abdominal pain with nausea vomiting and diarrhea for several days and she was also found to have a mild COPD exacerbation her breathing improved to baseline with IV steroids her abdominal pain improved with some pantoprazole Bentyl Reglan evaluated by GI no indication for further intervention inpatient she is tolerating her diet without complications she is evaluated by cardiology stable for discharge continue outpatient follow-up    Please see above list of diagnoses and related plan for additional information.     Condition at Discharge: stable     Discharge Day Visit / Exam:     Subjective: Patient is still complaining of her abdominal pain however she is tolerating the entirety of her meals and does not appear to be in any acute distress  Vitals: Blood Pressure: 131/72 (04/18/25 0631)  Pulse: 68 (04/18/25 0631)  Temperature: 98 °F (36.7 °C) (04/18/25 0631)  Temp Source: Oral (04/17/25 1500)  Respirations: 19 (04/17/25 2356)  Height: 4' 9\" (144.8 cm) (04/16/25 1400)  Weight - Scale: 102 kg (225 lb) (04/16/25 1400)  SpO2: 96 % (04/18/25 0706)  Exam:   Physical Exam  Vitals reviewed.   Constitutional:       General: She is not in acute distress.     Appearance: She is obese. She is ill-appearing.   Cardiovascular:      Rate and Rhythm: Normal rate.      Heart sounds: No murmur heard.  Pulmonary:      Effort: No respiratory distress.   Abdominal:      Palpations: Abdomen is soft.   Musculoskeletal:         General: Normal range of motion.   Skin:     General: Skin is warm.      Coloration: Skin is pale.   Neurological:      Mental Status: She is alert. Mental status is at baseline.   Psychiatric:         Mood and Affect: Mood normal.        "  Thought Content: Thought content normal.       Discussion with Family: Not available    Discharge instructions/Information to patient and family:   See after visit summary for information provided to patient and family.      Provisions for Follow-Up Care:  See after visit summary for information related to follow-up care and any pertinent home health orders.      Disposition:     Home    Planned Readmission: no     Discharge Statement:  I spent 45 minutes discharging the patient. This time was spent on the day of discharge. I had direct contact with the patient on the day of discharge. Greater than 50% of the total time was spent examining patient, answering all patient questions, arranging and discussing plan of care with patient as well as directly providing post-discharge instructions.  Additional time then spent on discharge activities.    Discharge Medications:  See after visit summary for reconciled discharge medications provided to patient and family.      ** Please Note: This note has been constructed using a voice recognition system **

## 2025-04-18 NOTE — PLAN OF CARE
Problem: PAIN - ADULT  Goal: Verbalizes/displays adequate comfort level or baseline comfort level  Description: Interventions:- Encourage patient to monitor pain and request assistance- Assess pain using appropriate pain scale- Administer analgesics based on type and severity of pain and evaluate response- Implement non-pharmacological measures as appropriate and evaluate response- Consider cultural and social influences on pain and pain management- Notify physician/advanced practitioner if interventions unsuccessful or patient reports new pain  Outcome: Progressing     Problem: INFECTION - ADULT  Goal: Absence or prevention of progression during hospitalization  Description: INTERVENTIONS:- Assess and monitor for signs and symptoms of infection- Monitor lab/diagnostic results- Monitor all insertion sites, i.e. indwelling lines, tubes, and drains- Monitor endotracheal if appropriate and nasal secretions for changes in amount and color- [place] appropriate cooling/warming therapies per order- Administer medications as ordered- Instruct and encourage patient and family to use good hand hygiene technique- Identify and instruct in appropriate isolation precautions for identified infection/condition  Outcome: Progressing  Goal: Absence of fever/infection during neutropenic period  Description: INTERVENTIONS:- Monitor WBC  Outcome: Progressing     Problem: SAFETY ADULT  Goal: Patient will remain free of falls  Description: INTERVENTIONS:- Educate patient/family on patient safety including physical limitations- Instruct patient to call for assistance with activity - Consult OT/PT to assist with strengthening/mobility - Keep Call bell within reach- Keep bed low and locked with side rails adjusted as appropriate- Keep care items and personal belongings within reach- Initiate and maintain comfort rounds- Make Fall Risk Sign visible to staff- Offer Toileting every  Hours, in advance of need- Initiate/Maintain alarm- Obtain  necessary fall risk management equipment: - Apply yellow socks and bracelet for high fall risk patients- Consider moving patient to room near nurses station  Outcome: Progressing  Goal: Maintain or return to baseline ADL function  Description: INTERVENTIONS:-  Assess patient's ability to carry out ADLs; assess patient's baseline for ADL function and identify physical deficits which impact ability to perform ADLs (bathing, care of mouth/teeth, toileting, grooming, dressing, etc.)- Assess/evaluate cause of self-care deficits - Assess range of motion- Assess patient's mobility; develop plan if impaired- Assess patient's need for assistive devices and provide as appropriate- Encourage maximum independence but intervene and supervise when necessary- Involve family in performance of ADLs- Assess for home care needs following discharge - Consider OT consult to assist with ADL evaluation and planning for discharge- Provide patient education as appropriate  Outcome: Progressing  Goal: Maintains/Returns to pre admission functional level  Description: INTERVENTIONS:- Perform AM-PAC 6 Click Basic Mobility/ Daily Activity assessment daily.- Set and communicate daily mobility goal to care team and patient/family/caregiver. - Collaborate with rehabilitation services on mobility goals if consulted- Perform Range of Motion  times a day.- Reposition patient every  hours.- Dangle patient  times a day- Stand patient  times a day- Ambulate patient  times a day- Out of bed to chair  times a day - Out of bed for meals  times a day- Out of bed for toileting- Record patient progress and toleration of activity level   Outcome: Progressing     Problem: DISCHARGE PLANNING  Goal: Discharge to home or other facility with appropriate resources  Description: INTERVENTIONS:- Identify barriers to discharge w/patient and caregiver- Arrange for needed discharge resources and transportation as appropriate- Identify discharge learning needs (meds, wound  care, etc.)- Arrange for interpretive services to assist at discharge as needed- Refer to Case Management Department for coordinating discharge planning if the patient needs post-hospital services based on physician/advanced practitioner order or complex needs related to functional status, cognitive ability, or social support system  Outcome: Progressing     Problem: Knowledge Deficit  Goal: Patient/family/caregiver demonstrates understanding of disease process, treatment plan, medications, and discharge instructions  Description: Complete learning assessment and assess knowledge base.Interventions:- Provide teaching at level of understanding- Provide teaching via preferred learning methods  Outcome: Progressing

## 2025-04-18 NOTE — PLAN OF CARE
Problem: PAIN - ADULT  Goal: Verbalizes/displays adequate comfort level or baseline comfort level  Description: Interventions:- Encourage patient to monitor pain and request assistance- Assess pain using appropriate pain scale- Administer analgesics based on type and severity of pain and evaluate response- Implement non-pharmacological measures as appropriate and evaluate response- Consider cultural and social influences on pain and pain management- Notify physician/advanced practitioner if interventions unsuccessful or patient reports new pain  Outcome: Progressing     Problem: INFECTION - ADULT  Goal: Absence or prevention of progression during hospitalization  Description: INTERVENTIONS:- Assess and monitor for signs and symptoms of infection- Monitor lab/diagnostic results- Monitor all insertion sites, i.e. indwelling lines, tubes, and drains- Monitor endotracheal if appropriate and nasal secretions for changes in amount and color- [place] appropriate cooling/warming therapies per order- Administer medications as ordered- Instruct and encourage patient and family to use good hand hygiene technique- Identify and instruct in appropriate isolation precautions for identified infection/condition  Outcome: Progressing     Problem: DISCHARGE PLANNING  Goal: Discharge to home or other facility with appropriate resources  Description: INTERVENTIONS:- Identify barriers to discharge w/patient and caregiver- Arrange for needed discharge resources and transportation as appropriate- Identify discharge learning needs (meds, wound care, etc.)- Arrange for interpretive services to assist at discharge as needed- Refer to Case Management Department for coordinating discharge planning if the patient needs post-hospital services based on physician/advanced practitioner order or complex needs related to functional status, cognitive ability, or social support system  Outcome: Progressing

## 2025-04-21 ENCOUNTER — TRANSITIONAL CARE MANAGEMENT (OUTPATIENT)
Age: 60
End: 2025-04-21

## 2025-04-22 DIAGNOSIS — K21.00 GASTROESOPHAGEAL REFLUX DISEASE WITH ESOPHAGITIS WITHOUT HEMORRHAGE: ICD-10-CM

## 2025-04-22 RX ORDER — PANTOPRAZOLE SODIUM 40 MG/1
40 TABLET, DELAYED RELEASE ORAL
Qty: 62 TABLET | Refills: 5 | Status: SHIPPED | OUTPATIENT
Start: 2025-04-22

## 2025-04-29 ENCOUNTER — DOCUMENTATION (OUTPATIENT)
Dept: ADMINISTRATIVE | Facility: OTHER | Age: 60
End: 2025-04-29

## 2025-04-29 NOTE — PROGRESS NOTES
04/29/25 2:28 PM    The patient was called for Osteoporosis Management Post Fracture and patient transferred care out of network .  [facility]    Thank you.  Ana Lagos MA  [facility]

## 2025-05-15 ENCOUNTER — NURSE TRIAGE (OUTPATIENT)
Age: 60
End: 2025-05-15

## 2025-05-15 ENCOUNTER — HOSPITAL ENCOUNTER (INPATIENT)
Facility: HOSPITAL | Age: 60
LOS: 3 days | Discharge: HOME | End: 2025-05-18
Attending: EMERGENCY MEDICINE | Admitting: FAMILY MEDICINE
Payer: COMMERCIAL

## 2025-05-15 ENCOUNTER — APPOINTMENT (EMERGENCY)
Dept: CT IMAGING | Facility: HOSPITAL | Age: 60
End: 2025-05-15
Payer: COMMERCIAL

## 2025-05-15 DIAGNOSIS — E86.0 DEHYDRATION: Primary | ICD-10-CM

## 2025-05-15 DIAGNOSIS — R10.30 INTRACTABLE LOWER ABDOMINAL PAIN: ICD-10-CM

## 2025-05-15 DIAGNOSIS — R10.9 ABDOMINAL PAIN: ICD-10-CM

## 2025-05-15 DIAGNOSIS — R79.89 ELEVATED LACTIC ACID LEVEL: ICD-10-CM

## 2025-05-15 DIAGNOSIS — E87.6 HYPOKALEMIA: ICD-10-CM

## 2025-05-15 PROBLEM — J44.9 COPD (CHRONIC OBSTRUCTIVE PULMONARY DISEASE) (HCC): Chronic | Status: ACTIVE | Noted: 2025-05-15

## 2025-05-15 PROBLEM — R65.10 SIRS (SYSTEMIC INFLAMMATORY RESPONSE SYNDROME) (HCC): Status: ACTIVE | Noted: 2025-05-15

## 2025-05-15 LAB
2HR DELTA HS TROPONIN: 2 NG/L
4HR DELTA HS TROPONIN: 2 NG/L
ALBUMIN SERPL BCG-MCNC: 4.4 G/DL (ref 3.5–5)
ALP SERPL-CCNC: 65 U/L (ref 34–104)
ALT SERPL W P-5'-P-CCNC: 20 U/L (ref 7–52)
ANION GAP SERPL CALCULATED.3IONS-SCNC: 12 MMOL/L (ref 4–13)
AST SERPL W P-5'-P-CCNC: 12 U/L (ref 13–39)
BACTERIA UR QL AUTO: ABNORMAL /HPF
BASOPHILS # BLD AUTO: 0.02 THOUSANDS/ÂΜL (ref 0–0.1)
BASOPHILS NFR BLD AUTO: 0 % (ref 0–1)
BILIRUB SERPL-MCNC: 0.49 MG/DL (ref 0.2–1)
BILIRUB UR QL STRIP: NEGATIVE
BUN SERPL-MCNC: 11 MG/DL (ref 5–25)
CALCIUM SERPL-MCNC: 9.4 MG/DL (ref 8.4–10.2)
CARDIAC TROPONIN I PNL SERPL HS: 11 NG/L (ref ?–50)
CARDIAC TROPONIN I PNL SERPL HS: 13 NG/L (ref ?–50)
CARDIAC TROPONIN I PNL SERPL HS: 13 NG/L (ref ?–50)
CHLORIDE SERPL-SCNC: 102 MMOL/L (ref 96–108)
CLARITY UR: CLEAR
CO2 SERPL-SCNC: 27 MMOL/L (ref 21–32)
COLOR UR: ABNORMAL
CREAT SERPL-MCNC: 1.11 MG/DL (ref 0.6–1.3)
EOSINOPHIL # BLD AUTO: 0.16 THOUSAND/ÂΜL (ref 0–0.61)
EOSINOPHIL NFR BLD AUTO: 1 % (ref 0–6)
ERYTHROCYTE [DISTWIDTH] IN BLOOD BY AUTOMATED COUNT: 16 % (ref 11.6–15.1)
GFR SERPL CREATININE-BSD FRML MDRD: 54 ML/MIN/1.73SQ M
GLUCOSE SERPL-MCNC: 147 MG/DL (ref 65–140)
GLUCOSE SERPL-MCNC: 167 MG/DL (ref 65–140)
GLUCOSE UR STRIP-MCNC: ABNORMAL MG/DL
HCT VFR BLD AUTO: 42.7 % (ref 34.8–46.1)
HGB BLD-MCNC: 14 G/DL (ref 11.5–15.4)
HGB UR QL STRIP.AUTO: NEGATIVE
IMM GRANULOCYTES # BLD AUTO: 0.04 THOUSAND/UL (ref 0–0.2)
IMM GRANULOCYTES NFR BLD AUTO: 0 % (ref 0–2)
KETONES UR STRIP-MCNC: NEGATIVE MG/DL
LACTATE SERPL-SCNC: 2.6 MMOL/L (ref 0.5–2)
LACTATE SERPL-SCNC: 4 MMOL/L (ref 0.5–2)
LEUKOCYTE ESTERASE UR QL STRIP: ABNORMAL
LIPASE SERPL-CCNC: 29 U/L (ref 11–82)
LYMPHOCYTES # BLD AUTO: 1.34 THOUSANDS/ÂΜL (ref 0.6–4.47)
LYMPHOCYTES NFR BLD AUTO: 11 % (ref 14–44)
MAGNESIUM SERPL-MCNC: 1.5 MG/DL (ref 1.9–2.7)
MCH RBC QN AUTO: 30.2 PG (ref 26.8–34.3)
MCHC RBC AUTO-ENTMCNC: 32.8 G/DL (ref 31.4–37.4)
MCV RBC AUTO: 92 FL (ref 82–98)
MONOCYTES # BLD AUTO: 0.85 THOUSAND/ÂΜL (ref 0.17–1.22)
MONOCYTES NFR BLD AUTO: 7 % (ref 4–12)
NEUTROPHILS # BLD AUTO: 9.55 THOUSANDS/ÂΜL (ref 1.85–7.62)
NEUTS SEG NFR BLD AUTO: 81 % (ref 43–75)
NITRITE UR QL STRIP: NEGATIVE
NON-SQ EPI CELLS URNS QL MICRO: ABNORMAL /HPF
NRBC BLD AUTO-RTO: 0 /100 WBCS
PH UR STRIP.AUTO: 5.5 [PH]
PLATELET # BLD AUTO: 227 THOUSANDS/UL (ref 149–390)
PMV BLD AUTO: 9.8 FL (ref 8.9–12.7)
POTASSIUM SERPL-SCNC: 2.7 MMOL/L (ref 3.5–5.3)
PROT SERPL-MCNC: 6.5 G/DL (ref 6.4–8.4)
PROT UR STRIP-MCNC: NEGATIVE MG/DL
RBC # BLD AUTO: 4.63 MILLION/UL (ref 3.81–5.12)
RBC #/AREA URNS AUTO: ABNORMAL /HPF
SODIUM SERPL-SCNC: 141 MMOL/L (ref 135–147)
SP GR UR STRIP.AUTO: >=1.05 (ref 1–1.03)
UROBILINOGEN UR STRIP-ACNC: <2 MG/DL
WBC # BLD AUTO: 11.96 THOUSAND/UL (ref 4.31–10.16)
WBC #/AREA URNS AUTO: ABNORMAL /HPF

## 2025-05-15 PROCEDURE — 83605 ASSAY OF LACTIC ACID: CPT | Performed by: EMERGENCY MEDICINE

## 2025-05-15 PROCEDURE — 99285 EMERGENCY DEPT VISIT HI MDM: CPT | Performed by: EMERGENCY MEDICINE

## 2025-05-15 PROCEDURE — 83690 ASSAY OF LIPASE: CPT | Performed by: EMERGENCY MEDICINE

## 2025-05-15 PROCEDURE — 83735 ASSAY OF MAGNESIUM: CPT | Performed by: EMERGENCY MEDICINE

## 2025-05-15 PROCEDURE — 96375 TX/PRO/DX INJ NEW DRUG ADDON: CPT

## 2025-05-15 PROCEDURE — 96376 TX/PRO/DX INJ SAME DRUG ADON: CPT

## 2025-05-15 PROCEDURE — 81001 URINALYSIS AUTO W/SCOPE: CPT | Performed by: EMERGENCY MEDICINE

## 2025-05-15 PROCEDURE — 71275 CT ANGIOGRAPHY CHEST: CPT

## 2025-05-15 PROCEDURE — 96365 THER/PROPH/DIAG IV INF INIT: CPT

## 2025-05-15 PROCEDURE — 99285 EMERGENCY DEPT VISIT HI MDM: CPT

## 2025-05-15 PROCEDURE — 96361 HYDRATE IV INFUSION ADD-ON: CPT

## 2025-05-15 PROCEDURE — 99223 1ST HOSP IP/OBS HIGH 75: CPT | Performed by: PHYSICIAN ASSISTANT

## 2025-05-15 PROCEDURE — 36415 COLL VENOUS BLD VENIPUNCTURE: CPT | Performed by: EMERGENCY MEDICINE

## 2025-05-15 PROCEDURE — 82948 REAGENT STRIP/BLOOD GLUCOSE: CPT

## 2025-05-15 PROCEDURE — 93005 ELECTROCARDIOGRAM TRACING: CPT

## 2025-05-15 PROCEDURE — 80053 COMPREHEN METABOLIC PANEL: CPT | Performed by: EMERGENCY MEDICINE

## 2025-05-15 PROCEDURE — 84484 ASSAY OF TROPONIN QUANT: CPT | Performed by: EMERGENCY MEDICINE

## 2025-05-15 PROCEDURE — 87040 BLOOD CULTURE FOR BACTERIA: CPT | Performed by: PHYSICIAN ASSISTANT

## 2025-05-15 PROCEDURE — 74174 CTA ABD&PLVS W/CONTRAST: CPT

## 2025-05-15 PROCEDURE — 85025 COMPLETE CBC W/AUTO DIFF WBC: CPT | Performed by: EMERGENCY MEDICINE

## 2025-05-15 RX ORDER — ENOXAPARIN SODIUM 100 MG/ML
40 INJECTION SUBCUTANEOUS EVERY 12 HOURS
Status: DISCONTINUED | OUTPATIENT
Start: 2025-05-16 | End: 2025-05-18 | Stop reason: HOSPADM

## 2025-05-15 RX ORDER — METOPROLOL SUCCINATE 25 MG/1
25 TABLET, EXTENDED RELEASE ORAL DAILY
Status: DISCONTINUED | OUTPATIENT
Start: 2025-05-16 | End: 2025-05-17

## 2025-05-15 RX ORDER — DICYCLOMINE HYDROCHLORIDE 10 MG/1
10 CAPSULE ORAL EVERY 6 HOURS PRN
Status: DISCONTINUED | OUTPATIENT
Start: 2025-05-15 | End: 2025-05-18 | Stop reason: HOSPADM

## 2025-05-15 RX ORDER — FLUTICASONE FUROATE AND VILANTEROL 100; 25 UG/1; UG/1
1 POWDER RESPIRATORY (INHALATION) DAILY
Status: DISCONTINUED | OUTPATIENT
Start: 2025-05-16 | End: 2025-05-18 | Stop reason: HOSPADM

## 2025-05-15 RX ORDER — POTASSIUM CHLORIDE 14.9 MG/ML
20 INJECTION INTRAVENOUS ONCE
Status: COMPLETED | OUTPATIENT
Start: 2025-05-15 | End: 2025-05-15

## 2025-05-15 RX ORDER — LEVOTHYROXINE SODIUM 50 UG/1
50 TABLET ORAL
Status: DISCONTINUED | OUTPATIENT
Start: 2025-05-16 | End: 2025-05-18 | Stop reason: HOSPADM

## 2025-05-15 RX ORDER — MORPHINE SULFATE 4 MG/ML
4 INJECTION, SOLUTION INTRAMUSCULAR; INTRAVENOUS ONCE
Status: COMPLETED | OUTPATIENT
Start: 2025-05-15 | End: 2025-05-15

## 2025-05-15 RX ORDER — ALBUTEROL SULFATE 90 UG/1
2 INHALANT RESPIRATORY (INHALATION) EVERY 4 HOURS PRN
Status: DISCONTINUED | OUTPATIENT
Start: 2025-05-15 | End: 2025-05-18 | Stop reason: HOSPADM

## 2025-05-15 RX ORDER — NICOTINE 21 MG/24HR
1 PATCH, TRANSDERMAL 24 HOURS TRANSDERMAL DAILY
Status: DISCONTINUED | OUTPATIENT
Start: 2025-05-16 | End: 2025-05-18 | Stop reason: HOSPADM

## 2025-05-15 RX ORDER — PANTOPRAZOLE SODIUM 40 MG/10ML
40 INJECTION, POWDER, LYOPHILIZED, FOR SOLUTION INTRAVENOUS
Status: DISCONTINUED | OUTPATIENT
Start: 2025-05-15 | End: 2025-05-18 | Stop reason: HOSPADM

## 2025-05-15 RX ORDER — SERTRALINE HYDROCHLORIDE 25 MG/1
25 TABLET, FILM COATED ORAL DAILY
Status: DISCONTINUED | OUTPATIENT
Start: 2025-05-16 | End: 2025-05-17

## 2025-05-15 RX ORDER — TORSEMIDE 20 MG/1
40 TABLET ORAL DAILY
Status: DISCONTINUED | OUTPATIENT
Start: 2025-05-16 | End: 2025-05-17

## 2025-05-15 RX ORDER — ATORVASTATIN CALCIUM 40 MG/1
40 TABLET, FILM COATED ORAL
Status: DISCONTINUED | OUTPATIENT
Start: 2025-05-16 | End: 2025-05-18 | Stop reason: HOSPADM

## 2025-05-15 RX ORDER — IPRATROPIUM BROMIDE AND ALBUTEROL SULFATE 2.5; .5 MG/3ML; MG/3ML
3 SOLUTION RESPIRATORY (INHALATION) EVERY 6 HOURS PRN
Status: DISCONTINUED | OUTPATIENT
Start: 2025-05-15 | End: 2025-05-18 | Stop reason: HOSPADM

## 2025-05-15 RX ORDER — INSULIN LISPRO 100 [IU]/ML
1-5 INJECTION, SOLUTION INTRAVENOUS; SUBCUTANEOUS
Status: DISCONTINUED | OUTPATIENT
Start: 2025-05-15 | End: 2025-05-18 | Stop reason: HOSPADM

## 2025-05-15 RX ORDER — SODIUM CHLORIDE, SODIUM LACTATE, POTASSIUM CHLORIDE, CALCIUM CHLORIDE 600; 310; 30; 20 MG/100ML; MG/100ML; MG/100ML; MG/100ML
75 INJECTION, SOLUTION INTRAVENOUS CONTINUOUS
Status: DISPENSED | OUTPATIENT
Start: 2025-05-15 | End: 2025-05-16

## 2025-05-15 RX ORDER — MAGNESIUM HYDROXIDE/ALUMINUM HYDROXICE/SIMETHICONE 120; 1200; 1200 MG/30ML; MG/30ML; MG/30ML
30 SUSPENSION ORAL EVERY 6 HOURS PRN
Status: DISCONTINUED | OUTPATIENT
Start: 2025-05-15 | End: 2025-05-18 | Stop reason: HOSPADM

## 2025-05-15 RX ORDER — BENZONATATE 100 MG/1
200 CAPSULE ORAL 3 TIMES DAILY PRN
Status: DISCONTINUED | OUTPATIENT
Start: 2025-05-15 | End: 2025-05-18 | Stop reason: HOSPADM

## 2025-05-15 RX ORDER — POTASSIUM CHLORIDE 1500 MG/1
40 TABLET, EXTENDED RELEASE ORAL ONCE
Status: DISCONTINUED | OUTPATIENT
Start: 2025-05-15 | End: 2025-05-15

## 2025-05-15 RX ORDER — ACETAMINOPHEN 325 MG/1
650 TABLET ORAL EVERY 6 HOURS PRN
Status: DISCONTINUED | OUTPATIENT
Start: 2025-05-15 | End: 2025-05-18 | Stop reason: HOSPADM

## 2025-05-15 RX ORDER — OXYCODONE AND ACETAMINOPHEN 5; 325 MG/1; MG/1
1 TABLET ORAL EVERY 8 HOURS PRN
Status: DISCONTINUED | OUTPATIENT
Start: 2025-05-16 | End: 2025-05-18 | Stop reason: HOSPADM

## 2025-05-15 RX ORDER — POTASSIUM CHLORIDE 1500 MG/1
40 TABLET, EXTENDED RELEASE ORAL DAILY
Status: DISCONTINUED | OUTPATIENT
Start: 2025-05-15 | End: 2025-05-17

## 2025-05-15 RX ORDER — ONDANSETRON 2 MG/ML
4 INJECTION INTRAMUSCULAR; INTRAVENOUS ONCE
Status: COMPLETED | OUTPATIENT
Start: 2025-05-15 | End: 2025-05-15

## 2025-05-15 RX ORDER — INSULIN LISPRO 100 [IU]/ML
1-6 INJECTION, SOLUTION INTRAVENOUS; SUBCUTANEOUS
Status: DISCONTINUED | OUTPATIENT
Start: 2025-05-16 | End: 2025-05-18 | Stop reason: HOSPADM

## 2025-05-15 RX ORDER — FAMOTIDINE 20 MG/1
20 TABLET, FILM COATED ORAL DAILY
Status: DISCONTINUED | OUTPATIENT
Start: 2025-05-16 | End: 2025-05-18 | Stop reason: HOSPADM

## 2025-05-15 RX ORDER — LIDOCAINE 50 MG/G
1 PATCH TOPICAL DAILY PRN
Status: DISCONTINUED | OUTPATIENT
Start: 2025-05-15 | End: 2025-05-18 | Stop reason: HOSPADM

## 2025-05-15 RX ORDER — ASPIRIN 81 MG/1
81 TABLET, CHEWABLE ORAL DAILY
Status: DISCONTINUED | OUTPATIENT
Start: 2025-05-16 | End: 2025-05-18 | Stop reason: HOSPADM

## 2025-05-15 RX ORDER — POTASSIUM CHLORIDE 14.9 MG/ML
20 INJECTION INTRAVENOUS ONCE
Status: DISCONTINUED | OUTPATIENT
Start: 2025-05-15 | End: 2025-05-15

## 2025-05-15 RX ORDER — POTASSIUM CHLORIDE 1500 MG/1
40 TABLET, EXTENDED RELEASE ORAL ONCE
Status: COMPLETED | OUTPATIENT
Start: 2025-05-15 | End: 2025-05-15

## 2025-05-15 RX ADMIN — IOHEXOL 100 ML: 350 INJECTION, SOLUTION INTRAVENOUS at 19:13

## 2025-05-15 RX ADMIN — PANTOPRAZOLE SODIUM 40 MG: 40 INJECTION, POWDER, FOR SOLUTION INTRAVENOUS at 23:19

## 2025-05-15 RX ADMIN — SODIUM CHLORIDE, SODIUM LACTATE, POTASSIUM CHLORIDE, AND CALCIUM CHLORIDE 75 ML/HR: .6; .31; .03; .02 INJECTION, SOLUTION INTRAVENOUS at 23:22

## 2025-05-15 RX ADMIN — MORPHINE SULFATE 4 MG: 4 INJECTION INTRAVENOUS at 19:59

## 2025-05-15 RX ADMIN — ONDANSETRON 4 MG: 2 INJECTION INTRAMUSCULAR; INTRAVENOUS at 18:34

## 2025-05-15 RX ADMIN — POTASSIUM CHLORIDE 40 MEQ: 1500 TABLET, EXTENDED RELEASE ORAL at 21:20

## 2025-05-15 RX ADMIN — POTASSIUM CHLORIDE 20 MEQ: 14.9 INJECTION, SOLUTION INTRAVENOUS at 19:56

## 2025-05-15 RX ADMIN — SODIUM CHLORIDE 1000 ML: 0.9 INJECTION, SOLUTION INTRAVENOUS at 18:36

## 2025-05-15 RX ADMIN — MORPHINE SULFATE 4 MG: 4 INJECTION INTRAVENOUS at 22:30

## 2025-05-15 RX ADMIN — POTASSIUM CHLORIDE 40 MEQ: 1500 TABLET, EXTENDED RELEASE ORAL at 23:24

## 2025-05-15 RX ADMIN — MORPHINE SULFATE 4 MG: 4 INJECTION INTRAVENOUS at 18:34

## 2025-05-15 NOTE — TELEPHONE ENCOUNTER
"FOLLOW UP: n/a    REASON FOR CONVERSATION: Diarrhea    SYMPTOMS: Pt having ongoing diarrhea, vomiting, only able to drink small sips of gingerale. Visiting nurse as well as PCP have advised ER due to dehydration    OTHER: Pt agreeable to go to ED [facility]    DISPOSITION: Go to ED Now      Reason for Disposition   Severe diarrhea, higher risk of dehydration (lightheaded, weak, tachycardia/palpitations, AMS (altered mental status), drinking very little)    Answer Assessment - Initial Assessment  1. When did this start and how frequent are your bowel movements? (when/number/day, hour)   Approx 5-6, March  2. What is the consistency of your bowel movements?   diarrhea  3. Is there any blood/pus in your stools? (Yes - how much (drops, clots, profuse)   denies  4. What is normal for you?   Not normal  5. Is there an urgency with meals?   yes  6. ABDOMINAL PAIN: \"Are you having any abdominal pain?\" If yes, ask: \"What does it feel like?\" (e.g., crampy, dull, intermittent, constant)   yes  ABDOMINAL PAIN SEVERITY: If present, ask: \"How bad is the pain?\"  (e.g., Scale 1-10; mild, moderate, or severe)  Severe at times    - MILD (1-3): does not interfere with normal activities, abdomen soft and not tender to touch    - MODERATE (4-7): interferes with normal activities or awakens from sleep, tender to touch   - SEVERE (8-10): excruciating pain, doubled over, unable to do any normal activities    Do you have a fever? (Yes - what was it? Did it resolve with treatment (NSAIDs/acetaminophen)?   denies  7. VOMITING: \"Are you also vomiting?\" If yes, ask: \"How many times in the past 24 hours?\"   Yes sporadically   8. Are you experiencing any associated symptoms including nausea, bloating, flatulence, tenesmus, or weight loss?   N/v  9. Are you able to tolerate anything by mouth and maintain your hydration? (Assess for dehydration by inquiring about dry/cracked lips, decreased urine output, lightheadedness, heart racing, etc.) "   Dehydrated visiting nurse agrees  10. Are you currently taking anything for current symptoms? (Loperamide, oral fluids)   Gingerale small sips  11. Do you have any history of C. diff infection (Clostridioides difficile), recent antibiotics, hospitalization, sick contacts, suspicious oral intake, recent travel, GI surgery, PPI (proton pump inhibitor) use, chemotherapy, enteral feeding   N/a    Protocols used: GI-Acute Diarrheal Infections-ADULT-[place]

## 2025-05-15 NOTE — ED PROVIDER NOTES
Time reflects when diagnosis was documented in both MDM as applicable and the Disposition within this note       Time User Action Codes Description Comment    5/15/2025  9:16 PM Marcia Fernandez Add [E86.0] Dehydration     5/15/2025  9:16 PM Marcia Fernandez Add [R79.89] Elevated lactic acid level     5/15/2025  9:17 PM Marcia Fernandez Add [E87.6] Hypokalemia           ED Disposition       ED Disposition   Admit    Condition   Stable    Date/Time   Thu May 15, 2025  9:16 PM    Comment   Case was discussed with ZARIA and the patient's admission status was agreed to be Admission Status: inpatient status to the service of Dr. Yadav .               Assessment & Plan       Medical Decision Making  61 y/o female with abd pain, chest pain, and n/v/d x 2 months- will get labs, trop/EKG, lactic, ct scan, and give fluids/ zofran/ pain meds and reassess.     Amount and/or Complexity of Data Reviewed  Labs: ordered. Decision-making details documented in ED Course.  Radiology: ordered.    Risk  Prescription drug management.  Decision regarding hospitalization.        ED Course as of 05/15/25 2206   Thu May 15, 2025   1931 LACTIC ACID(!): 4.0   1931 Potassium(!): 2.7       Medications   morphine injection 4 mg (has no administration in time range)   sodium chloride 0.9 % bolus 1,000 mL (0 mL Intravenous Stopped 5/15/25 2036)   morphine injection 4 mg (4 mg Intravenous Given 5/15/25 1834)   ondansetron (ZOFRAN) injection 4 mg (4 mg Intravenous Given 5/15/25 1834)   iohexol (OMNIPAQUE) 350 MG/ML injection (MULTI-DOSE) 100 mL (100 mL Intravenous Given 5/15/25 1913)   potassium chloride 20 mEq IVPB (premix) (0 mEq Intravenous Stopped 5/15/25 2143)   morphine injection 4 mg (4 mg Intravenous Given 5/15/25 1959)   potassium chloride (Klor-Con M20) CR tablet 40 mEq (40 mEq Oral Given 5/15/25 2120)       ED Risk Strat Scores                    No data recorded        SBIRT 22yo+      Flowsheet Row Most Recent Value   Initial  Alcohol Screen: US AUDIT-C     1. How often do you have a drink containing alcohol? 0 Filed at: 05/15/2025 1728   2. How many drinks containing alcohol do you have on a typical day you are drinking?  0 Filed at: 05/15/2025 1728   3a. Male UNDER 65: How often do you have five or more drinks on one occasion? 0 Filed at: 05/15/2025 1728   3b. FEMALE Any Age, or MALE 65+: How often do you have 4 or more drinks on one occassion? 0 Filed at: 05/15/2025 1728   Audit-C Score 0 Filed at: 05/15/2025 1728   EULALIO: How many times in the past year have you...    Used an illegal drug or used a prescription medication for non-medical reasons? Never Filed at: 05/15/2025 1728                            History of Present Illness       Chief Complaint   Patient presents with    Abdominal Pain     Pt reports 2 months of vomiting, diarrhea, abd pain, chest pain. Triage nurse told her to come in to r/o dehydration        Past Medical History:   Diagnosis Date    Achalasia     Acute respiratory failure (HCC) 01/15/2023    Acute respiratory failure with hypoxia (HCC) 02/07/2024    Back pain     Cancer (HCC)     Ovarian    Diabetes mellitus (HCC)     DVT (deep venous thrombosis) (HCC)     Electrolyte abnormality 01/14/2024    Fall 01/02/2023    Had a fall a week ago slipped in the mud onto her back.  X-rays with no fracture.  She has chronic L1 fracture.  She is on pain medication at home for chronic back pain.      Fibromyalgia     Hypertension     Lactic acidosis 04/15/2025    Lupus       Past Surgical History:   Procedure Laterality Date    HYSTERECTOMY      NECK SURGERY        History reviewed. No pertinent family history.   Social History[1]   E-Cigarette/Vaping    E-Cigarette Use Never User       E-Cigarette/Vaping Substances    Nicotine No     THC No     CBD No     Flavoring No     Other No     Unknown No       I have reviewed and agree with the history as documented.     61 y/o female, hx of DM, HTN, and copd on 3L NC chronically,  "presents to the ED for abd pain, chest pain, nausea, and diarrhea. States that the symptoms have been ongoing for 2 months. Has seen multiple doctors for it and was admitted here a month ago. States that she is unsure what is causing the symptoms. She reports that symptoms have continued since and she is \"sick of them\" so she came in. She states that symptoms have continued and she now feels generally weak. States that she contacted her GI doctor who advised her to come in to r/o dehydration. She also states that today she developed chest pain. Denies any sob, or fever. No other complaints.       History provided by:  Patient  Abdominal Pain  Pain location:  Generalized  Pain radiates to:  Chest  Pain severity:  Moderate  Onset quality:  Sudden  Timing:  Constant  Progression:  Worsening  Chronicity:  New  Relieved by:  None tried  Worsened by:  Nothing  Ineffective treatments:  None tried  Associated symptoms: chest pain, diarrhea, nausea and vomiting    Associated symptoms: no chills, no cough, no dysuria, no fever, no hematuria, no shortness of breath and no sore throat        Review of Systems   Constitutional:  Negative for chills and fever.   HENT:  Negative for congestion, ear pain and sore throat.    Eyes:  Negative for pain and visual disturbance.   Respiratory:  Negative for cough, shortness of breath and wheezing.    Cardiovascular:  Positive for chest pain. Negative for leg swelling.   Gastrointestinal:  Positive for abdominal pain, diarrhea, nausea and vomiting.   Genitourinary:  Negative for dysuria, frequency, hematuria and urgency.   Musculoskeletal:  Negative for neck pain and neck stiffness.   Skin:  Negative for rash and wound.   Neurological:  Negative for weakness, numbness and headaches.   Psychiatric/Behavioral:  Negative for agitation and confusion.    All other systems reviewed and are negative.          Objective       ED Triage Vitals   Temperature Pulse Blood Pressure Respirations SpO2 " Patient Position - Orthostatic VS   05/15/25 1731 05/15/25 1728 05/15/25 1728 05/15/25 1728 05/15/25 1728 05/15/25 1728   98.6 °F (37 °C) (!) 121 131/75 20 99 % Sitting      Temp Source Heart Rate Source BP Location FiO2 (%) Pain Score    05/15/25 1731 05/15/25 1728 05/15/25 1728 -- 05/15/25 1830    Oral Monitor Left arm  10 - Worst Possible Pain      Vitals      Date and Time Temp Pulse SpO2 Resp BP Pain Score FACES Pain Rating User   05/15/25 2100 -- 99 99 % 22 124/57 -- -- NN   05/15/25 2000 -- 100 100 % 25 126/56 -- -- NN   05/15/25 1959 -- -- -- -- -- 8 -- NN   05/15/25 1930 -- 104 99 % 30 120/60 -- -- NN   05/15/25 1830 -- 110 97 % 18 129/70 10 - Worst Possible Pain -- MB   05/15/25 1731 98.6 °F (37 °C) -- -- -- -- -- -- JK   05/15/25 1728 -- 121 99 % 20 131/75 -- -- JK            Physical Exam  Vitals and nursing note reviewed.   Constitutional:       Appearance: She is well-developed.   HENT:      Head: Normocephalic and atraumatic.     Eyes:      Pupils: Pupils are equal, round, and reactive to light.       Cardiovascular:      Rate and Rhythm: Normal rate and regular rhythm.   Pulmonary:      Effort: Pulmonary effort is normal.      Breath sounds: Normal breath sounds.   Abdominal:      General: Bowel sounds are normal.      Palpations: Abdomen is soft.      Tenderness: There is generalized abdominal tenderness.     Musculoskeletal:         General: Normal range of motion.      Cervical back: Normal range of motion and neck supple.     Skin:     General: Skin is warm and dry.     Neurological:      General: No focal deficit present.      Mental Status: She is alert and oriented to person, place, and time.      Comments: No focal deficits       Results Reviewed       Procedure Component Value Units Date/Time    Blood culture [[id number]] Collected: 05/15/25 2151    Lab Status: In process Specimen: Blood from Hand, Right Updated: 05/15/25 2155    Blood culture [[id number]] Collected: 05/15/25 2151    Lab  Status: In process Specimen: Blood from Hand, Left Updated: 05/15/25 2155    Magnesium [[id number]]  (Abnormal) Collected: 05/15/25 1832    Lab Status: Final result Specimen: Blood from Hand, Right Updated: 05/15/25 2150     Magnesium 1.5 mg/dL     HS Troponin I 2hr [[id number]]  (Normal) Collected: 05/15/25 2040    Lab Status: Final result Specimen: Blood from Hand, Right Updated: 05/15/25 2128     hs TnI 2hr 13 ng/L      Delta 2hr hsTnI 2 ng/L     Lactic acid 2 Hours [[id number]]  (Abnormal) Collected: 05/15/25 2040    Lab Status: Final result Specimen: Blood from Hand, Right Updated: 05/15/25 2126     LACTIC ACID 2.6 mmol/L     Narrative:      Result may be elevated if tourniquet was used during collection.    Clostridium difficile toxin by PCR with EIA [[id number]]     Lab Status: No result Specimen: Stool     Stool Enteric Bacterial Panel by PCR [[id number]]     Lab Status: No result Specimen: Stool     Ova and parasite examination [[id number]]     Lab Status: No result Specimen: Stool from Rectum     HS Troponin I 4hr [[id number]]     Lab Status: No result Specimen: Blood     HS Troponin 0hr (reflex protocol) [[id number]]  (Normal) Collected: 05/15/25 1832    Lab Status: Final result Specimen: Blood from Hand, Right Updated: 05/15/25 1903     hs TnI 0hr 11 ng/L     CMP [[id number]]  (Abnormal) Collected: 05/15/25 1832    Lab Status: Final result Specimen: Blood from Hand, Right Updated: 05/15/25 1900     Sodium 141 mmol/L      Potassium 2.7 mmol/L      Chloride 102 mmol/L      CO2 27 mmol/L      ANION GAP 12 mmol/L      BUN 11 mg/dL      Creatinine 1.11 mg/dL      Glucose 167 mg/dL      Calcium 9.4 mg/dL      AST 12 U/L      ALT 20 U/L      Alkaline Phosphatase 65 U/L      Total Protein 6.5 g/dL      Albumin 4.4 g/dL      Total Bilirubin 0.49 mg/dL      eGFR 54 ml/min/1.73sq m     Narrative:      National Kid[employer] guidelines for Chronic Kidney Disease (CKD):     Stage 1 with normal or high GFR (GFR >  90 mL/min/1.73 square meters)    Stage 2 Mild CKD (GFR = 60-89 mL/min/1.73 square meters)    Stage 3A Moderate CKD (GFR = 45-59 mL/min/1.73 square meters)    Stage 3B Moderate CKD (GFR = 30-44 mL/min/1.73 square meters)    Stage 4 Severe CKD (GFR = 15-29 mL/min/1.73 square meters)    Stage 5 End Stage CKD (GFR <15 mL/min/1.73 square meters)  Note: GFR calculation is accurate only with a steady state creatinine    Lipase [[id number]]  (Normal) Collected: 05/15/25 1832    Lab Status: Final result Specimen: Blood from Hand, Right Updated: 05/15/25 1900     Lipase 29 u/L     Lactic acid, plasma (w/reflex if result > 2.0) [[id number]]  (Abnormal) Collected: 05/15/25 1832    Lab Status: Final result Specimen: Blood from Hand, Right Updated: 05/15/25 1859     LACTIC ACID 4.0 mmol/L     Narrative:      Result may be elevated if tourniquet was used during collection.    CBC and differential [[id number]]  (Abnormal) Collected: 05/15/25 1832    Lab Status: Final result Specimen: Blood from Hand, Right Updated: 05/15/25 1840     WBC 11.96 Thousand/uL      RBC 4.63 Million/uL      Hemoglobin 14.0 g/dL      Hematocrit 42.7 %      MCV 92 fL      MCH 30.2 pg      MCHC 32.8 g/dL      RDW 16.0 %      MPV 9.8 fL      Platelets 227 Thousands/uL      nRBC 0 /100 WBCs      Segmented % 81 %      Immature Grans % 0 %      Lymphocytes % 11 %      Monocytes % 7 %      Eosinophils Relative 1 %      Basophils Relative 0 %      Absolute Neutrophils 9.55 Thousands/µL      Absolute Immature Grans 0.04 Thousand/uL      Absolute Lymphocytes 1.34 Thousands/µL      Absolute Monocytes 0.85 Thousand/µL      Eosinophils Absolute 0.16 Thousand/µL      Basophils Absolute 0.02 Thousands/µL     UA w Reflex to Microscopic w Reflex to Culture [[id number]]     Lab Status: No result Specimen: Urine             CTA dissection protocol chest/abdomen/pelvis   Final Interpretation by Patel Waters MD (05/15 1945)      1.  No acute aortic, thoracic or abdominopelvic  pathology.   2.  Hepatic steatosis and hepatomegaly.               Workstation performed: [id number]             ECG 12 Lead Documentation Only    Date/Time: 5/15/2025 6:03 PM    Performed by: Marcia Fernandez DO  Authorized by: Marcia Fernandez DO    Indications / Diagnosis:  Chest pain  Patient location:  ED  Rate:     ECG rate:  121    ECG rate assessment: tachycardic    Rhythm:     Rhythm: sinus tachycardia    Ectopy:     Ectopy: none    QRS:     QRS axis:  Normal    QRS intervals:  Normal  ST segments:     ST segments:  Normal  T waves:     T waves: normal        ED Medication and Procedure Management   Prior to Admission Medications   Prescriptions Last Dose Informant Patient Reported? Taking?   Aspirin Low Dose 81 MG chewable tablet   Yes No   Sig: Chew 1 tablet in the morning   Empagliflozin (JARDIANCE) 10 MG TABS tablet   Yes No   Sig: Take 10 mg by mouth daily   Trelegy Ellipta 100-62.5-25 MCG/ACT inhaler  Self Yes No   Sig: Inhale 1 puff daily   acetaminophen (TYLENOL) 325 mg tablet  Self No No   Sig: Take 2 tablets (650 mg total) by mouth every 6 (six) hours   albuterol (PROVENTIL HFA,VENTOLIN HFA) 90 mcg/act inhaler  Self No No   Sig: Inhale 2 puffs every 4 (four) hours as needed for wheezing   aluminum-magnesium hydroxide-simethicone (MAALOX MAX) 400-400-40 MG/5ML suspension  Self No No   Sig: Take 5 mL by mouth every 6 (six) hours as needed for indigestion or heartburn   atorvastatin (LIPITOR) 40 mg tablet   Yes No   Sig: Take 40 mg by mouth daily   benzonatate (TESSALON) 200 MG capsule   Yes No   Sig: take 1 capsule (200 mg total) by mouth 3 (three) times a day as needed for cough.   bisacodyl (DULCOLAX) 5 mg EC tablet   No No   Sig: Take 1 tablet (5 mg total) by mouth daily as needed for constipation   cefpodoxime (VANTIN) 200 mg tablet   Yes No   Sig: Take 1 tablet by mouth 2 (two) times a day   cyclobenzaprine (FLEXERIL) 10 mg tablet   Yes No   Sig: take 1 tablet (10 mg total) by mouth 3 (three)  times a day for 10 days.   dicyclomine (BENTYL) 10 mg capsule   No No   Sig: Take 1 capsule (10 mg total) by mouth 3 (three) times a day before meals   famotidine (PEPCID) 20 mg tablet  Self No No   Sig: Take 1 tablet (20 mg total) by mouth 2 (two) times a day   furosemide (LASIX) 40 mg tablet  Self No No   Sig: Take 1 tablet (40 mg total) by mouth daily   ipratropium-albuterol (DUO-NEB) 0.5-2.5 mg/3 mL nebulizer solution  Self Yes No   levothyroxine 50 mcg tablet   No No   Sig: TAKE 1 TABLET (50 MCG TOTAL) BY MOUTH IN THE MORNING   lidocaine (Lidoderm) 5 %   No No   Sig: Apply 1 patch topically over 12 hours daily Remove & Discard patch within 12 hours or as directed by MD   metFORMIN (GLUCOPHAGE) 1000 MG tablet   Yes No   Sig: TAKE 1 TABLET (1,000 MG TOTAL) BY MOUTH 2 (TWO) TIMES A DAY WITH MEALS FOR 14 DAYS.   metFORMIN (GLUCOPHAGE) 500 mg tablet   No No   Sig: TAKE 1 TABLET (500 MG TOTAL) BY MOUTH DAILY WITH BREAKFAST   methocarbamol (ROBAXIN) 500 mg tablet   No No   Sig: Take 1 tablet (500 mg total) by mouth 2 (two) times a day as needed for muscle spasms   methocarbamol (ROBAXIN) 750 mg tablet   Yes No   Sig: Take 750 mg by mouth every 6 (six) hours as needed for muscle spasms   metoclopramide (Reglan) 10 mg tablet   No No   Sig: Take 1 tablet (10 mg total) by mouth 4 (four) times a day   metoprolol succinate (TOPROL-XL) 25 mg 24 hr tablet  Self Yes No   Sig: Take 25 mg by mouth daily   ondansetron (ZOFRAN-ODT) 4 mg disintegrating tablet  Self No No   Sig: Take 1 tablet (4 mg total) by mouth every 8 (eight) hours as needed for nausea or vomiting   oxyCODONE-acetaminophen (PERCOCET) 5-325 mg per tablet   Yes No   Sig: Take 1 tablet by mouth in the morning   pantoprazole (PROTONIX) 40 mg tablet   No No   Sig: TAKE 1 TABLET (40 MG TOTAL) BY MOUTH 2 (TWO) TIMES A DAY BEFORE MEALS   predniSONE 20 mg tablet   Yes No   sertraline (ZOLOFT) 25 mg tablet  Self Yes No   Sig: Take 25 mg by mouth daily   torsemide  (DEMADEX) 20 mg tablet   Yes No   Sig: Take 2 tablets by mouth in the morning      Facility-Administered Medications: None     Patient's Medications   Discharge Prescriptions    No medications on file     No discharge procedures on file.  ED SEPSIS DOCUMENTATION   Time reflects when diagnosis was documented in both MDM as applicable and the Disposition within this note       Time User Action Codes Description Comment    5/15/2025  9:16 PM Marcia Fernandez Add [E86.0] Dehydration     5/15/2025  9:16 PM Marcia Fernandez Add [R79.89] Elevated lactic acid level     5/15/2025  9:17 PM Marcia Fernandez Add [E87.6] Hypokalemia                      [1]   Social History  Tobacco Use    Smoking status: Every Day     Current packs/day: 0.50     Types: Cigarettes    Smokeless tobacco: Never   Vaping Use    Vaping status: Never Used   Substance Use Topics    Alcohol use: Never    Drug use: Never        Marcia Fernandez DO  05/15/25 5909

## 2025-05-15 NOTE — TELEPHONE ENCOUNTER
Regarding: Chronic Diarrhea  ----- Message from Kae ESPINAL sent at 5/15/2025  4:06 PM EDT -----  Patient has been having chronic Diarrhea for about 2 mths has been in and out of the Hosp they continue to put her on Antibiotics and they are not working as she continues with the Diarrhea she would like a Nurse and or Provider to contact her with some type of better treatment course

## 2025-05-16 PROBLEM — R11.2 NAUSEA AND VOMITING: Status: ACTIVE | Noted: 2025-05-16

## 2025-05-16 PROBLEM — R19.7 DIARRHEA: Status: ACTIVE | Noted: 2025-05-16

## 2025-05-16 LAB
ANION GAP SERPL CALCULATED.3IONS-SCNC: 6 MMOL/L (ref 4–13)
ATRIAL RATE: 121 BPM
ATRIAL RATE: 123 BPM
BUN SERPL-MCNC: 9 MG/DL (ref 5–25)
CALCIUM SERPL-MCNC: 8.1 MG/DL (ref 8.4–10.2)
CHLORIDE SERPL-SCNC: 108 MMOL/L (ref 96–108)
CO2 SERPL-SCNC: 24 MMOL/L (ref 21–32)
CREAT SERPL-MCNC: 0.9 MG/DL (ref 0.6–1.3)
GFR SERPL CREATININE-BSD FRML MDRD: 69 ML/MIN/1.73SQ M
GLUCOSE SERPL-MCNC: 110 MG/DL (ref 65–140)
GLUCOSE SERPL-MCNC: 121 MG/DL (ref 65–140)
GLUCOSE SERPL-MCNC: 139 MG/DL (ref 65–140)
GLUCOSE SERPL-MCNC: 184 MG/DL (ref 65–140)
P AXIS: 71 DEGREES
P AXIS: 75 DEGREES
POTASSIUM SERPL-SCNC: 4.4 MMOL/L (ref 3.5–5.3)
PR INTERVAL: 138 MS
PR INTERVAL: 142 MS
QRS AXIS: 24 DEGREES
QRS AXIS: 29 DEGREES
QRSD INTERVAL: 84 MS
QRSD INTERVAL: 86 MS
QT INTERVAL: 316 MS
QT INTERVAL: 322 MS
QTC INTERVAL: 452 MS
QTC INTERVAL: 457 MS
SODIUM SERPL-SCNC: 138 MMOL/L (ref 135–147)
T WAVE AXIS: 52 DEGREES
T WAVE AXIS: 65 DEGREES
VENTRICULAR RATE: 121 BPM
VENTRICULAR RATE: 123 BPM

## 2025-05-16 PROCEDURE — 80048 BASIC METABOLIC PNL TOTAL CA: CPT | Performed by: PHYSICIAN ASSISTANT

## 2025-05-16 PROCEDURE — 36415 COLL VENOUS BLD VENIPUNCTURE: CPT | Performed by: PHYSICIAN ASSISTANT

## 2025-05-16 PROCEDURE — 93010 ELECTROCARDIOGRAM REPORT: CPT | Performed by: INTERNAL MEDICINE

## 2025-05-16 PROCEDURE — 99233 SBSQ HOSP IP/OBS HIGH 50: CPT | Performed by: STUDENT IN AN ORGANIZED HEALTH CARE EDUCATION/TRAINING PROGRAM

## 2025-05-16 PROCEDURE — 99254 IP/OBS CNSLTJ NEW/EST MOD 60: CPT | Performed by: INTERNAL MEDICINE

## 2025-05-16 PROCEDURE — 82948 REAGENT STRIP/BLOOD GLUCOSE: CPT

## 2025-05-16 RX ORDER — HYDROMORPHONE HCL IN WATER/PF 6 MG/30 ML
0.2 PATIENT CONTROLLED ANALGESIA SYRINGE INTRAVENOUS ONCE
Status: COMPLETED | OUTPATIENT
Start: 2025-05-16 | End: 2025-05-16

## 2025-05-16 RX ORDER — ONDANSETRON 2 MG/ML
4 INJECTION INTRAMUSCULAR; INTRAVENOUS EVERY 6 HOURS PRN
Status: DISCONTINUED | OUTPATIENT
Start: 2025-05-16 | End: 2025-05-18 | Stop reason: HOSPADM

## 2025-05-16 RX ORDER — DICYCLOMINE HYDROCHLORIDE 10 MG/1
10 CAPSULE ORAL
Status: DISCONTINUED | OUTPATIENT
Start: 2025-05-16 | End: 2025-05-18 | Stop reason: HOSPADM

## 2025-05-16 RX ORDER — HYDROMORPHONE HCL IN WATER/PF 6 MG/30 ML
0.2 PATIENT CONTROLLED ANALGESIA SYRINGE INTRAVENOUS ONCE AS NEEDED
Status: COMPLETED | OUTPATIENT
Start: 2025-05-16 | End: 2025-05-16

## 2025-05-16 RX ADMIN — TORSEMIDE 40 MG: 20 TABLET ORAL at 12:48

## 2025-05-16 RX ADMIN — EMPAGLIFLOZIN 10 MG: 10 TABLET, FILM COATED ORAL at 12:49

## 2025-05-16 RX ADMIN — LEVOTHYROXINE SODIUM 50 MCG: 0.05 TABLET ORAL at 05:18

## 2025-05-16 RX ADMIN — OXYCODONE HYDROCHLORIDE AND ACETAMINOPHEN 1 TABLET: 5; 325 TABLET ORAL at 12:49

## 2025-05-16 RX ADMIN — DICYCLOMINE HYDROCHLORIDE 10 MG: 10 CAPSULE ORAL at 12:48

## 2025-05-16 RX ADMIN — ENOXAPARIN SODIUM 40 MG: 40 INJECTION SUBCUTANEOUS at 20:15

## 2025-05-16 RX ADMIN — OXYCODONE HYDROCHLORIDE AND ACETAMINOPHEN 1 TABLET: 5; 325 TABLET ORAL at 21:16

## 2025-05-16 RX ADMIN — HYDROMORPHONE HYDROCHLORIDE 0.2 MG: 0.2 INJECTION, SOLUTION INTRAMUSCULAR; INTRAVENOUS; SUBCUTANEOUS at 16:12

## 2025-05-16 RX ADMIN — ONDANSETRON 4 MG: 2 INJECTION INTRAMUSCULAR; INTRAVENOUS at 03:27

## 2025-05-16 RX ADMIN — UMECLIDINIUM 1 PUFF: 62.5 AEROSOL, POWDER ORAL at 12:51

## 2025-05-16 RX ADMIN — METOPROLOL SUCCINATE 25 MG: 25 TABLET, EXTENDED RELEASE ORAL at 09:27

## 2025-05-16 RX ADMIN — PANTOPRAZOLE SODIUM 40 MG: 40 INJECTION, POWDER, FOR SOLUTION INTRAVENOUS at 09:27

## 2025-05-16 RX ADMIN — POTASSIUM CHLORIDE 40 MEQ: 1500 TABLET, EXTENDED RELEASE ORAL at 09:27

## 2025-05-16 RX ADMIN — INSULIN LISPRO 1 UNITS: 100 INJECTION, SOLUTION INTRAVENOUS; SUBCUTANEOUS at 17:00

## 2025-05-16 RX ADMIN — OXYCODONE HYDROCHLORIDE AND ACETAMINOPHEN 1 TABLET: 5; 325 TABLET ORAL at 03:05

## 2025-05-16 RX ADMIN — FLUTICASONE FUROATE AND VILANTEROL TRIFENATATE 1 PUFF: 100; 25 POWDER RESPIRATORY (INHALATION) at 12:50

## 2025-05-16 RX ADMIN — FAMOTIDINE 20 MG: 20 TABLET, FILM COATED ORAL at 09:27

## 2025-05-16 RX ADMIN — ASPIRIN 81 MG: 81 TABLET, CHEWABLE ORAL at 09:29

## 2025-05-16 RX ADMIN — DICYCLOMINE HYDROCHLORIDE 10 MG: 10 CAPSULE ORAL at 16:11

## 2025-05-16 RX ADMIN — HYDROMORPHONE HYDROCHLORIDE 0.2 MG: 0.2 INJECTION, SOLUTION INTRAMUSCULAR; INTRAVENOUS; SUBCUTANEOUS at 05:13

## 2025-05-16 RX ADMIN — NICOTINE 1 PATCH: 14 PATCH, EXTENDED RELEASE TRANSDERMAL at 12:54

## 2025-05-16 RX ADMIN — ATORVASTATIN CALCIUM 40 MG: 40 TABLET, FILM COATED ORAL at 16:16

## 2025-05-16 RX ADMIN — ENOXAPARIN SODIUM 40 MG: 40 INJECTION SUBCUTANEOUS at 09:27

## 2025-05-16 NOTE — UTILIZATION REVIEW
NOTIFICATION OF INPATIENT ADMISSION   AUTHORIZATION REQUEST   SERVICING FACILITY:   [place] [address]  Tax ID: [id number]  NPI: [id number] ATTENDING PROVIDER:  Attending Name and NPI#: Calvin Casarez Md [[id number]]  Address: [address] [place] [address]  Phone: [phone]     ADMISSION INFORMATION:  Place of Service: Inpatient [facility]  Place of Service Code: 21  Inpatient Admission Date/Time: 5/15/25  9:15 PM  Discharge Date/Time: No discharge date for patient encounter.  Admitting Diagnosis Code/Description:  Dehydration [E86.0]  Hypokalemia [E87.6]  Abdominal pain [R10.9]  Elevated lactic acid level [R79.89]  Intractable lower abdominal pain [R10.30]     UTILIZATION REVIEW CONTACT:  Barbara Huizar Utilization   Network Utilization Review Department  Phone: [phone]  Fax [phone]  Email: [email or web address]  Contact for approvals/pending authorizations, clinical reviews, and discharge.     PHYSICIAN ADVISORY SERVICES:  Medical Necessity Denial & Xfhv-ef-Zuya Review  Phone: [phone]  Fax: [phone]  Email: [email or web address]     DISCHARGE SUPPORT TEAM:  For Patients Discharge Needs & Updates  Phone: [phone] opt. 2 Fax: [phone]  Email: [email or web address]

## 2025-05-16 NOTE — ASSESSMENT & PLAN NOTE
Most likely associated with abdominal pain and/or GERD versus less likely cardiac origin  Initial troponin negative, will continue to trend 2 more times  EKG revealing sinus tachycardia x 2  Monitor on telemetry

## 2025-05-16 NOTE — UTILIZATION REVIEW
Initial Clinical Review    Admission: Date/Time/Statement:   Admission Orders (From admission, onward)       Ordered        05/15/25 2115  Inpatient Admission  Once                          Orders Placed This Encounter   Procedures    Inpatient Admission     Standing Status:   Standing     Number of Occurrences:   1     Level of Care:   Med Surg [16]     Estimated length of stay:   More than 2 Midnights     Certification:   I certify that inpatient services are medically necessary for this patient for a duration of greater than two midnights. See H&P and MD Progress Notes for additional information about the patient's course of treatment.     ED Arrival Information       Expected   -    Arrival   5/15/2025 17:17    Acuity   Emergent              Means of arrival   Wheelchair    Escorted by   Family Member    Service   Hospitalist    Admission type   Emergency              Arrival complaint   Chest Pain,Diarrhea             Chief Complaint   Patient presents with    Abdominal Pain     Pt reports 2 months of vomiting, diarrhea, abd pain, chest pain. Triage nurse told her to come in to r/o dehydration        Initial Presentation: 60 y.o. female to ED from home w/  PMH of COPD, diabetes mellitus type 2, CHF, GERD, SLE who presents with complaints of gradual worsening of lower abdominal pain, vomiting, diarrhea that has been ongoing for the last 2 months.  Patient reports symptoms were intermittent but then the last 2 days the vomiting and diarrhea got worse so she came to the ED for evaluation.  She reports she has had workup within the last 2 months for this and was told nothing was wrong, but she reports she still has persistent symptoms which is frustrating.  She reports her lower abdominal pain does radiate to periumbilical region and has been pretty severe. admits to chest pain without worsening shortness of breath from her baseline. Ct abd neg . Admitted IP status w/ intractable lower abd pain plan IVF , hold  metformin , f/u infectious origin of diarrhea , hold metformin , consider GI consult . K 2.7 tele , kcl replete and recheck . CP serial trop , tele . DM SSI and monitor . SIRS cont IVF , BC pending , hold off on abx . COPD 90s on 3l , inhalers .     PE: + abd tenderness RLQ , suprapubic      Anticipated Length of Stay/Certification Statement: Patient will be admitted on an inpatient basis with an anticipated length of stay of greater than 2 midnights secondary to see above.     Date: 5/16   Day 2: Reported 1 loose BM this morning but none since. Still having abdominal pain but reported she always has pain. GI consulted and rec conservative mngt .stool studies and bentyl .   Anticipate discharge in 24-48 hrs to home with home services.     5/16 GI Consult   Plan for repeat EGD or colonoscopy . Restart bentyl prn , cont PPI , serial exams , supportive care , IVF , diet as jennifer , f/u stool cx , trend BMP     ED Treatment-Medication Administration from 05/15/2025 1717 to 05/16/2025 1138         Date/Time Order Dose Route Action     05/15/2025 1836 sodium chloride 0.9 % bolus 1,000 mL 1,000 mL Intravenous New Bag     05/15/2025 1834 morphine injection 4 mg 4 mg Intravenous Given     05/15/2025 1834 ondansetron (ZOFRAN) injection 4 mg 4 mg Intravenous Given     05/15/2025 1913 iohexol (OMNIPAQUE) 350 MG/ML injection (MULTI-DOSE) 100 mL 100 mL Intravenous Given     05/15/2025 1956 potassium chloride 20 mEq IVPB (premix) 20 mEq Intravenous New Bag     05/15/2025 1959 morphine injection 4 mg 4 mg Intravenous Given     05/15/2025 2120 potassium chloride (Klor-Con M20) CR tablet 40 mEq 40 mEq Oral Given     05/15/2025 2230 morphine injection 4 mg 4 mg Intravenous Given     05/16/2025 0929 aspirin chewable tablet 81 mg 81 mg Oral Given     05/16/2025 0927 famotidine (PEPCID) tablet 20 mg 20 mg Oral Given     05/16/2025 0518 levothyroxine tablet 50 mcg 50 mcg Oral Given     05/16/2025 0927 metoprolol succinate (TOPROL-XL) 24 hr  tablet 25 mg 25 mg Oral Given     05/16/2025 0305 oxyCODONE-acetaminophen (PERCOCET) 5-325 mg per tablet 1 tablet 1 tablet Oral Given     05/15/2025 2319 pantoprazole (PROTONIX) injection 40 mg 40 mg Intravenous Given     05/16/2025 0927 pantoprazole (PROTONIX) injection 40 mg 40 mg Intravenous Given     05/15/2025 2322 lactated ringers infusion 75 mL/hr Intravenous New Bag     05/16/2025 0927 enoxaparin (LOVENOX) subcutaneous injection 40 mg 40 mg Subcutaneous Given     05/15/2025 2324 potassium chloride (Klor-Con M20) CR tablet 40 mEq 40 mEq Oral Given     05/16/2025 0927 potassium chloride (Klor-Con M20) CR tablet 40 mEq 40 mEq Oral Given     05/16/2025 0327 ondansetron (ZOFRAN) injection 4 mg 4 mg Intravenous Given     05/16/2025 0513 HYDROmorphone HCl (DILAUDID) injection 0.2 mg 0.2 mg Intravenous Given            Scheduled Medications:  aspirin, 81 mg, Oral, Daily  atorvastatin, 40 mg, Oral, Daily With Dinner  dicyclomine, 10 mg, Oral, TID AC  Empagliflozin, 10 mg, Oral, Daily  enoxaparin, 40 mg, Subcutaneous, Q12H  famotidine, 20 mg, Oral, Daily  Fluticasone Furoate-Vilanterol, 1 puff, Inhalation, Daily   And  umeclidinium, 1 puff, Inhalation, Daily  insulin lispro, 1-5 Units, Subcutaneous, HS  insulin lispro, 1-6 Units, Subcutaneous, TID AC  levothyroxine, 50 mcg, Oral, Early Morning  metoprolol succinate, 25 mg, Oral, Daily  nicotine, 1 patch, Transdermal, Daily  pantoprazole, 40 mg, Intravenous, Q24H TANVIR  potassium chloride, 40 mEq, Oral, Daily  sertraline, 25 mg, Oral, Daily  torsemide, 40 mg, Oral, Daily      Continuous IV Infusions:     PRN Meds:  acetaminophen, 650 mg, Oral, Q6H PRN  albuterol, 2 puff, Inhalation, Q4H PRN  aluminum-magnesium hydroxide-simethicone, 30 mL, Oral, Q6H PRN  benzonatate, 200 mg, Oral, TID PRN  dicyclomine, 10 mg, Oral, Q6H PRN  HYDROmorphone, 0.2 mg, Intravenous, Once PRN  ipratropium-albuterol, 3 mL, Nebulization, Q6H PRN  lidocaine, 1 patch, Topical, Daily  PRN  ondansetron, 4 mg, Intravenous, Q6H PRN  oxyCODONE-acetaminophen, 1 tablet, Oral, Q8H PRN      ED Triage Vitals   Temperature Pulse Respirations Blood Pressure SpO2 Pain Score   05/15/25 1731 05/15/25 1728 05/15/25 1728 05/15/25 1728 05/15/25 1728 05/15/25 1830   98.6 °F (37 °C) (!) 121 20 131/75 99 % 10 - Worst Possible Pain     Weight (last 2 days)       Date/Time Weight    05/16/25 11:53:13 103 (226.63)    05/15/25 1728 95.3 (210)            Vital Signs (last 3 days)       Date/Time Temp Pulse Resp BP MAP (mmHg) SpO2 Calculated FIO2 (%) - Nasal Cannula Nasal Cannula O2 Flow Rate (L/min) O2 Device Patient Position - Orthostatic VS Mayra Coma Scale Score Pain    05/16/25 11:53:13 98.6 °F (37 °C) 83 -- 112/64 80 98 % -- -- -- -- -- --    05/16/25 1140 -- -- -- -- -- -- -- -- -- -- 15 --    05/16/25 0845 -- 84 22 120/57 82 99 % 32 3 L/min Nasal cannula Sitting -- --    05/16/25 0636 -- -- -- -- -- -- -- -- -- -- 15 --    05/16/25 0528 98.2 °F (36.8 °C) 87 20 130/62 84 98 % 32 3 L/min Nasal cannula Sitting -- --    05/16/25 0522 -- -- -- -- -- -- -- -- -- -- 15 --    05/16/25 0513 -- -- -- -- -- -- -- -- -- -- -- 8 05/16/25 0305 -- -- -- -- -- -- -- -- -- -- -- 8 05/16/25 0300 -- 95 20 102/61 75 97 % 32 3 L/min Nasal cannula Sitting -- --    05/16/25 0230 -- 93 20 92/50 67 98 % 32 3 L/min Nasal cannula -- -- --    05/15/25 2304 -- 97 20 108/67 79 98 % -- -- Nasal cannula Sitting -- --    05/15/25 2230 -- -- -- -- -- -- -- -- -- -- -- 8    05/15/25 2200 -- 94 19 106/53 76 98 % 32 3 L/min Nasal cannula Sitting -- --    05/15/25 2100 -- 99 22 124/57 82 99 % 32 3 L/min Nasal cannula Sitting -- --    05/15/25 2000 -- 100 25 126/56 81 100 % 32 3 L/min Nasal cannula Sitting -- --    05/15/25 1959 -- -- -- -- -- -- -- -- -- -- -- 8    05/15/25 1930 -- 104 30 120/60 86 99 % 32 3 L/min Nasal cannula Sitting -- --    05/15/25 1841 -- -- -- -- -- -- -- -- None (Room air) -- -- --    05/15/25 1830 -- 110 18 129/70 95  97 % -- -- None (Room air) Sitting -- 10 - Worst Possible Pain    05/15/25 1731 98.6 °F (37 °C) -- -- -- -- -- -- -- -- -- -- --    05/15/25 1728 -- 121 20 131/75 -- 99 % -- -- None (Room air) Sitting -- --              Pertinent Labs/Diagnostic Test Results:   Radiology:  CTA dissection protocol chest/abdomen/pelvis   Final Interpretation by Patel Waters MD (05/15 1945)      1.  No acute aortic, thoracic or abdominopelvic pathology.   2.  Hepatic steatosis and hepatomegaly.               Workstation performed: [id number]           Cardiology:  ECG 12 lead   Final Result by Etta Meza MD (05/16 0817)   Sinus tachycardia   Right atrial enlargement   Nonspecific ST and T wave abnormality   Abnormal ECG   Confirmed by Etta Meza ([id number]) on 5/16/2025 8:17:07 AM      ECG 12 lead   Final Result by Etta Meza MD (05/16 0816)   Sinus tachycardia   Right atrial enlargement   Borderline ECG   When compared with ECG of 15-Apr-2025 14:51,   Nonspecific T wave abnormality, improved in Lateral leads   Confirmed by Etta Meza ([id number]) on 5/16/2025 8:16:21 AM        GI:  No orders to display           Results from last 7 days   Lab Units 05/15/25  1832   WBC Thousand/uL 11.96*   HEMOGLOBIN g/dL 14.0   HEMATOCRIT % 42.7   PLATELETS Thousands/uL 227   TOTAL NEUT ABS Thousands/µL 9.55*         Results from last 7 days   Lab Units 05/16/25  0606 05/15/25  1832   SODIUM mmol/L 138 141   POTASSIUM mmol/L 4.4 2.7*   CHLORIDE mmol/L 108 102   CO2 mmol/L 24 27   ANION GAP mmol/L 6 12   BUN mg/dL 9 11   CREATININE mg/dL 0.90 1.11   EGFR ml/min/1.73sq m 69 54   CALCIUM mg/dL 8.1* 9.4   MAGNESIUM mg/dL  --  1.5*     Results from last 7 days   Lab Units 05/15/25  1832   AST U/L 12*   ALT U/L 20   ALK PHOS U/L 65   TOTAL PROTEIN g/dL 6.5   ALBUMIN g/dL 4.4   TOTAL BILIRUBIN mg/dL 0.49     Results from last 7 days   Lab Units 05/16/25  0710 05/15/25  2253   POC GLUCOSE mg/dl 110 147*     Results from last 7 days   Lab Units  "05/16/25  0606 05/15/25  1832   GLUCOSE RANDOM mg/dL 121 167*             No results found for: \"BETA-HYDROXYBUTYRATE\"                   Results from last 7 days   Lab Units 05/15/25  2239 05/15/25  2040 05/15/25  1832   HS TNI 0HR ng/L  --   --  11   HS TNI 2HR ng/L  --  13  --    HSTNI D2 ng/L  --  2  --    HS TNI 4HR ng/L 13  --   --    HSTNI D4 ng/L 2  --   --                      Results from last 7 days   Lab Units 05/15/25  2040 05/15/25  1832   LACTIC ACID mmol/L 2.6* 4.0*                                 Results from last 7 days   Lab Units 05/15/25  1832   LIPASE u/L 29                 Results from last 7 days   Lab Units 05/15/25  2213   CLARITY UA  Clear   COLOR UA  Light Yellow   SPEC GRAV UA  >=1.050*   PH UA  5.5   GLUCOSE UA mg/dl >=1000 (1%)*   KETONES UA mg/dl Negative   BLOOD UA  Negative   PROTEIN UA mg/dl Negative   NITRITE UA  Negative   BILIRUBIN UA  Negative   UROBILINOGEN UA (BE) mg/dl <2.0   LEUKOCYTES UA  Trace*   WBC UA /hpf 1-2   RBC UA /hpf 4-10*   BACTERIA UA /hpf None Seen   EPITHELIAL CELLS WET PREP /hpf Occasional                                 Results from last 7 days   Lab Units 05/15/25  2151   BLOOD CULTURE  Received in Microbiology Lab. Culture in Progress.  Received in Microbiology Lab. Culture in Progress.                   Past Medical History:   Diagnosis Date    Achalasia     Acute respiratory failure ([facility]) 01/15/2023    Acute respiratory failure with hypoxia ([facility]) 02/07/2024    Back pain     Cancer ([facility])     Ovarian    Diabetes mellitus ([facility])     DVT (deep venous thrombosis) ([facility])     Electrolyte abnormality 01/14/2024    Fall 01/02/2023    Had a fall a week ago slipped in the mud onto her back.  X-rays with no fracture.  She has chronic L1 fracture.  She is on pain medication at home for chronic back pain.      Fibromyalgia     Hypertension     Lactic acidosis 04/15/2025    Lupus      Present on Admission:   Intractable lower abdominal pain   Hypokalemia   Diabetes " mellitus (HCC)   Chest pain      Admitting Diagnosis: Dehydration [E86.0]  Hypokalemia [E87.6]  Abdominal pain [R10.9]  Elevated lactic acid level [R79.89]  Intractable lower abdominal pain [R10.30]  Age/Sex: 60 y.o. female    Network Utilization Review Department  ATTENTION: Please call with any questions or concerns to [phone] and carefully listen to the prompts so that you are directed to the right person. All voicemails are confidential.   For Discharge needs, contact Care Management DC Support Team at [phone] opt. [phone]  Send all requests for admission clinical reviews, approved or denied determinations and any other requests to dedicated fax number below belonging to the campus where the patient is receiving treatment. List of dedicated fax numbers for the Facilities:  FACILITY NAME UR FAX NUMBER   ADMISSION DENIALS (Administrative/Medical Necessity) [phone]   DISCHARGE SUPPORT TEAM (NETWORK) [phone]   PARENT CHILD HEALTH (Maternity/NICU/Pediatrics) [phone]   [facility] [phone]   [facility] [phone]   [facility] [phone]   [facility] [phone]   [facility] [phone]   [facility] [phone]   [facility] [phone]   [facility] [phone]   [facility] [phone]   [facility] [phone]   [facility] [phone]   [facility] [phone]

## 2025-05-16 NOTE — ASSESSMENT & PLAN NOTE
Lab Results   Component Value Date    HGBA1C 7.6 (H) 04/15/2025       Recent Labs     05/15/25  2253 05/16/25  0710   POCGLU 147* 110       Blood Sugar Average: Last 72 hrs:  (P) 128.5Blood glucose checks 4 times daily, hypoglycemia protocol  Hold home metformin, continue Jardiance  ssi

## 2025-05-16 NOTE — ASSESSMENT & PLAN NOTE
60-year-old female very well-known to the GI team who presents with lower abdominal pain, diarrhea, nausea, and vomiting.  Patient did undergo an EGD and colonoscopy in September 2024.  These results were reviewed.  Patient has had multiple CT scans of the abdomen and pelvis within the last several months.  At present there is no plan for repeat EGD or colonoscopy.  Will continue cholestyramine 1 to 2 packets 3 times a day.  Will restart Bentyl 10 mg 3 times a day as needed while inpatient.  Continue PPI twice daily.  Serial abdominal exams  Supportive care and IV fluids  Strict blood sugar control.  Diet as tolerated.  Patient should have close outpatient GI follow-up.  Follow-up stool cultures.  Continue to trend BMP.  Continue to monitor stool output and color.

## 2025-05-16 NOTE — ASSESSMENT & PLAN NOTE
Intermittent lower to periumbilical abdominal pain, nausea, vomiting, diarrhea x 2 months, diarrhea and vomiting have been worse over the last 2 days  Has had recent hospital admissions, so we will check C. difficile panel and stool panel to rule out infectious origin of diarrhea  CTA abdomen pelvis negative for acute change  Rule infectious diarrhea versus viral gastroenteritis versus medication induced as patient is on metformin and multiple medications?  IV fluid hydration  For now hold antibiotics pending stool panel  After review of  continue home as needed Percocet every 8 hours, as needed Bentyl, start IV Protonix for pain  Hold metformin  GI consulted- recommended conservative management, stool studies, ordered bentyl as well for pain

## 2025-05-16 NOTE — ASSESSMENT & PLAN NOTE
Most likely associated with abdominal pain and/or GERD versus less likely cardiac origin  Trop neg x 3  EKG revealing sinus tachycardia x 2  Monitor on telemetry

## 2025-05-16 NOTE — ASSESSMENT & PLAN NOTE
Currently does not appear to be in exacerbation  Saturating in the 90s on chronic 3 L nasal cannula  Continue home inhalers

## 2025-05-16 NOTE — ASSESSMENT & PLAN NOTE
"Lab Results   Component Value Date    HGBA1C 7.6 (H) 04/15/2025       No results for input(s): \"POCGLU\" in the last 72 hours.    Blood Sugar Average: Last 72 hrs:  Blood glucose checks 4 times daily, hypoglycemia protocol  Hold home metformin, continue Jardiance  ssi    "

## 2025-05-16 NOTE — ASSESSMENT & PLAN NOTE
Meeting criteria due to tachycardia and tachypnea, most likely secondary to abdominal pain and COPD history  We will hold off on antibiotics as no infectious origin noted at this time  Continue IV fluid hydration, was given IV fluid bolus in the ED, will hold on 30 mg/kg IV fluid bolus due to risk of fluid overload  Blood culture x 2 pending, lactic acid initially elevated 4.0, will trend until WNL

## 2025-05-16 NOTE — ASSESSMENT & PLAN NOTE
Meeting criteria due to tachycardia and tachypnea, most likely secondary to abdominal pain and COPD history  We will hold off on antibiotics as no infectious origin noted at this time  Continue IV fluid hydration, was given IV fluid bolus in the ED, will hold on 30 mg/kg IV fluid bolus due to risk of fluid overload  Noted lactate to be elevated to 4.0

## 2025-05-16 NOTE — H&P
"H&P - Hospitalist   Name: Nanci Navarro 60 y.o. female I MRN: [id number]  Unit/Bed#: ED 24 I Date of Admission: 5/15/2025   Date of Service: 5/15/2025 I Hospital Day: 0     Assessment & Plan  Intractable lower abdominal pain  Intermittent lower to periumbilical abdominal pain, nausea, vomiting, diarrhea x 2 months, diarrhea and vomiting have been worse over the last 2 days  Has had recent hospital admissions, so we will check C. difficile panel and stool panel to rule out infectious origin of diarrhea  CTA abdomen pelvis negative for acute change  Rule infectious diarrhea versus viral gastroenteritis versus medication induced as patient is on metformin and multiple medications?  IV fluid hydration  For now hold antibiotics pending stool panel  After review of  continue home as needed Percocet every 8 hours, as needed Bentyl, start IV Protonix for pain  Hold metformin  After initiating treatment if patient still with persistent diarrhea, vomiting or abdominal pain consider GI consult  Hypokalemia  Potassium decreased at 2.7 in the setting of vomiting and diarrhea  Telemetry  Given p.o. potassium chloride 40 mEq x 1 and IV potassium chloride 20 mEq x 1 in the ED, will give 1 more IV potassium chloride 20 mill equivalent dose  Recheck BMP in a.m.  Chest pain  Most likely associated with abdominal pain and/or GERD versus less likely cardiac origin  Initial troponin negative, will continue to trend 2 more times  EKG revealing sinus tachycardia x 2  Monitor on telemetry  Diabetes mellitus (HCC)  Lab Results   Component Value Date    HGBA1C 7.6 (H) 04/15/2025       No results for input(s): \"POCGLU\" in the last 72 hours.    Blood Sugar Average: Last 72 hrs:  Blood glucose checks 4 times daily, hypoglycemia protocol  Hold home metformin, continue Jardiance  ssi    SIRS (systemic inflammatory response syndrome) (HCC)  Meeting criteria due to tachycardia and tachypnea, most likely secondary to abdominal pain and COPD " history  We will hold off on antibiotics as no infectious origin noted at this time  Continue IV fluid hydration, was given IV fluid bolus in the ED, will hold on 30 mg/kg IV fluid bolus due to risk of fluid overload  Blood culture x 2 pending, lactic acid initially elevated 4.0, will trend until WNL  COPD (chronic obstructive pulmonary disease) (HCC)  Currently does not appear to be in exacerbation  Saturating in the 90s on chronic 3 L nasal cannula  Continue home inhalers      VTE Pharmacologic Prophylaxis: VTE Score: 4 Moderate Risk (Score 3-4) - Pharmacological DVT Prophylaxis Ordered: enoxaparin (Lovenox).  Code Status: Level 1 - Full Code per pt  Discussion with family: pt.     Anticipated Length of Stay: Patient will be admitted on an inpatient basis with an anticipated length of stay of greater than 2 midnights secondary to see above.    History of Present Illness   Chief Complaint:    Chief Complaint   Patient presents with    Abdominal Pain     Pt reports 2 months of vomiting, diarrhea, abd pain, chest pain. Triage nurse told her to come in to r/o dehydration         Nanci Navarro is a 60 y.o. female with a PMH of COPD, diabetes mellitus type 2, CHF, GERD, SLE who presents with complaints of gradual worsening of lower abdominal pain, vomiting, diarrhea that has been ongoing for the last 2 months.  Patient reports symptoms were intermittent but then the last 2 days the vomiting and diarrhea got worse so she came to the ED for evaluation.  She reports she has had workup within the last 2 months for this and was told nothing was wrong, but she reports she still has persistent symptoms which is frustrating.  She reports her lower abdominal pain does radiate to periumbilical region and has been pretty severe.  She also admits to chest pain without worsening shortness of breath from her baseline.  sHe denies fevers/chills.    Review of Systems   Constitutional:  Positive for appetite change. Negative for  activity change, chills and fever.   Respiratory:  Negative for shortness of breath.    Cardiovascular:  Positive for chest pain.   Gastrointestinal:  Positive for abdominal pain, diarrhea, nausea and vomiting.   Neurological:  Positive for weakness.       Historical Information   Past Medical History:   Diagnosis Date    Achalasia     Acute respiratory failure (HCC) 01/15/2023    Acute respiratory failure with hypoxia (HCC) 02/07/2024    Back pain     Cancer (HCC)     Ovarian    Diabetes mellitus (HCC)     DVT (deep venous thrombosis) ([facility])     Electrolyte abnormality 01/14/2024    Fall 01/02/2023    Had a fall a week ago slipped in the mud onto her back.  X-rays with no fracture.  She has chronic L1 fracture.  She is on pain medication at home for chronic back pain.      Fibromyalgia     Hypertension     Lactic acidosis 04/15/2025    Lupus      Past Surgical History:   Procedure Laterality Date    HYSTERECTOMY      NECK SURGERY       Social History     Tobacco Use    Smoking status: Every Day     Current packs/day: 0.50     Types: Cigarettes    Smokeless tobacco: Never   Vaping Use    Vaping status: Never Used   Substance and Sexual Activity    Alcohol use: Never    Drug use: Never    Sexual activity: Yes     E-Cigarette/Vaping    E-Cigarette Use Never User      E-Cigarette/Vaping Substances    Nicotine No     THC No     CBD No     Flavoring No     Other No     Unknown No      Family history non-contributory  Social History:  Marital Status: /Civil Union     Patient Pre-hospital Living Situation: Home  Patient Pre-hospital Level of Mobility: unable to be assessed at time of evaluation  Patient Pre-hospital Diet Restrictions: cardiac, diabetic    Meds/Allergies   I have reviewed her medications per review of chart and PDMP.   Prior to Admission medications    Medication Sig Start Date End Date Taking? Authorizing Provider   acetaminophen (TYLENOL) 325 mg tablet Take 2 tablets (650 mg total) by mouth every 6  (six) hours 1/17/24   Grant Alonso MD   albuterol (PROVENTIL HFA,VENTOLIN HFA) 90 mcg/act inhaler Inhale 2 puffs every 4 (four) hours as needed for wheezing 10/8/22   Brad Jaffe MD   aluminum-magnesium hydroxide-simethicone (MAALOX MAX) 400-400-40 MG/5ML suspension Take 5 mL by mouth every 6 (six) hours as needed for indigestion or heartburn 9/17/24   Manda Alejandro MD   Aspirin Low Dose 81 MG chewable tablet Chew 1 tablet in the morning 2/19/25   Historical Provider, MD   atorvastatin (LIPITOR) 40 mg tablet Take 40 mg by mouth daily 4/7/25 4/7/26  Historical Provider, MD   benzonatate (TESSALON) 200 MG capsule take 1 capsule (200 mg total) by mouth 3 (three) times a day as needed for cough. 2/19/25   Historical Provider, MD   bisacodyl (DULCOLAX) 5 mg EC tablet Take 1 tablet (5 mg total) by mouth daily as needed for constipation 4/17/25   NII Cheng   cefpodoxime (VANTIN) 200 mg tablet Take 1 tablet by mouth 2 (two) times a day 3/31/25   Historical Provider, MD   cyclobenzaprine (FLEXERIL) 10 mg tablet take 1 tablet (10 mg total) by mouth 3 (three) times a day for 10 days. 2/19/25   Historical Provider, MD   dicyclomine (BENTYL) 10 mg capsule Take 1 capsule (10 mg total) by mouth 3 (three) times a day before meals 4/17/25   NII Cheng   Empagliflozin (JARDIANCE) 10 MG TABS tablet Take 10 mg by mouth daily 3/27/25   Historical Provider, MD   famotidine (PEPCID) 20 mg tablet Take 1 tablet (20 mg total) by mouth 2 (two) times a day 2/1/25   Wiliam Brandt MD   furosemide (LASIX) 40 mg tablet Take 1 tablet (40 mg total) by mouth daily 2/8/24 9/19/24  NII Cheng   ipratropium-albuterol (DUO-NEB) 0.5-2.5 mg/3 mL nebulizer solution     Historical Provider, MD   levothyroxine 50 mcg tablet TAKE 1 TABLET (50 MCG TOTAL) BY MOUTH IN THE MORNING 4/18/25   Natividad Robertson PA-C   lidocaine (Lidoderm) 5 % Apply 1 patch topically over 12 hours daily Remove & Discard patch within 12  hours or as directed by MD 3/17/25   Frank Guerrier MD   metFORMIN (GLUCOPHAGE) 1000 MG tablet TAKE 1 TABLET (1,000 MG TOTAL) BY MOUTH 2 (TWO) TIMES A DAY WITH MEALS FOR 14 DAYS. 3/10/25   Historical Provider, MD   metFORMIN (GLUCOPHAGE) 500 mg tablet TAKE 1 TABLET (500 MG TOTAL) BY MOUTH DAILY WITH BREAKFAST 3/26/25   Natividad Robertson PA-C   methocarbamol (ROBAXIN) 500 mg tablet Take 1 tablet (500 mg total) by mouth 2 (two) times a day as needed for muscle spasms 3/17/25   Frank Guerrier MD   metoclopramide (Reglan) 10 mg tablet Take 1 tablet (10 mg total) by mouth 4 (four) times a day 4/17/25   NII Cheng   metoprolol succinate (TOPROL-XL) 25 mg 24 hr tablet Take 25 mg by mouth daily 12/20/23   Historical Provider, MD   ondansetron (ZOFRAN-ODT) 4 mg disintegrating tablet Take 1 tablet (4 mg total) by mouth every 8 (eight) hours as needed for nausea or vomiting 1/29/25   Katerin Almanzar,    oxyCODONE-acetaminophen (PERCOCET) 5-325 mg per tablet Take 1 tablet by mouth in the morning 3/10/25   Historical Provider, MD   pantoprazole (PROTONIX) 40 mg tablet TAKE 1 TABLET (40 MG TOTAL) BY MOUTH 2 (TWO) TIMES A DAY BEFORE MEALS 4/22/25   Wiliam Lamb DO   predniSONE 20 mg tablet  4/15/25   Historical Provider, MD   sertraline (ZOLOFT) 25 mg tablet Take 25 mg by mouth daily    Historical Provider, MD   torsemide (DEMADEX) 20 mg tablet Take 2 tablets by mouth in the morning 3/27/25   Historical Provider, MD Lo Ellipta 100-62.5-25 MCG/ACT inhaler Inhale 1 puff daily 9/9/24   Historical Provider, MD   methocarbamol (ROBAXIN) 750 mg tablet Take 750 mg by mouth every 6 (six) hours as needed for muscle spasms 3/15/25 5/15/25  Historical Provider, MD     Allergies   Allergen Reactions    Valium [Diazepam] Anaphylaxis       Objective :  Temp:  [98.6 °F (37 °C)] 98.6 °F (37 °C)  HR:  [] 94  BP: (106-131)/(53-75) 106/53  Resp:  [18-30] 19  SpO2:  [97 %-100 %] 98 %  O2 Device: None (Room  air)  Nasal Cannula O2 Flow Rate (L/min):  [3 L/min] 3 L/min    Physical Exam  Vitals and nursing note reviewed.   Constitutional:       General: She is not in acute distress.     Appearance: She is obese. She is not toxic-appearing.   HENT:      Head: Normocephalic.     Cardiovascular:      Rate and Rhythm: Regular rhythm. Tachycardia present.   Pulmonary:      Effort: Pulmonary effort is normal. No respiratory distress.      Breath sounds: Normal breath sounds. No stridor. No wheezing, rhonchi or rales.   Abdominal:      General: There is no distension.      Palpations: Abdomen is soft.      Tenderness: There is abdominal tenderness in the right lower quadrant and suprapubic area. There is no guarding or rebound.     Musculoskeletal:      Right lower leg: No edema.      Left lower leg: No edema.     Skin:     General: Skin is warm and dry.     Neurological:      General: No focal deficit present.      Mental Status: She is alert and oriented to person, place, and time.     Psychiatric:         Mood and Affect: Mood normal.         Behavior: Behavior normal.         Thought Content: Thought content normal.          Lines/Drains:            Lab Results: I have reviewed the following results:  Results from last 7 days   Lab Units 05/15/25  1832   WBC Thousand/uL 11.96*   HEMOGLOBIN g/dL 14.0   HEMATOCRIT % 42.7   PLATELETS Thousands/uL 227   SEGS PCT % 81*   LYMPHO PCT % 11*   MONO PCT % 7   EOS PCT % 1     Results from last 7 days   Lab Units 05/15/25  1832   SODIUM mmol/L 141   POTASSIUM mmol/L 2.7*   CHLORIDE mmol/L 102   CO2 mmol/L 27   BUN mg/dL 11   CREATININE mg/dL 1.11   ANION GAP mmol/L 12   CALCIUM mg/dL 9.4   ALBUMIN g/dL 4.4   TOTAL BILIRUBIN mg/dL 0.49   ALK PHOS U/L 65   ALT U/L 20   AST U/L 12*   GLUCOSE RANDOM mg/dL 167*             Lab Results   Component Value Date    HGBA1C 7.6 (H) 04/15/2025    HGBA1C 7.8 (H) 04/14/2025    HGBA1C 6.2 (H) 12/31/2024     Results from last 7 days   Lab Units  05/15/25  2040 05/15/25  1832   LACTIC ACID mmol/L 2.6* 4.0*       Imaging Results Review: I reviewed radiology reports from this admission including: CT abdomen/pelvis.  Other Study Results Review: EKG was personally reviewed and my interpretation is: Personally Reviewed.  Sinus tachycardia..    Administrative Statements   I have spent a total time of 78 minutes in caring for this patient on the day of the visit/encounter including Diagnostic results, Prognosis, Risks and benefits of tx options, Instructions for management, Patient and family education, Impressions, Counseling / Coordination of care, Documenting in the medical record, Reviewing/placing orders in the medical record (including tests, medications, and/or procedures), and Obtaining or reviewing history  .    ** Please Note: This note has been constructed using a voice recognition system. **

## 2025-05-16 NOTE — ASSESSMENT & PLAN NOTE
Intermittent lower to periumbilical abdominal pain, nausea, vomiting, diarrhea x 2 months, diarrhea and vomiting have been worse over the last 2 days  Has had recent hospital admissions, so we will check C. difficile panel and stool panel to rule out infectious origin of diarrhea  CTA abdomen pelvis negative for acute change  Rule infectious diarrhea versus viral gastroenteritis versus medication induced as patient is on metformin and multiple medications?  IV fluid hydration  For now hold antibiotics pending stool panel  After review of  continue home as needed Percocet every 8 hours, as needed Bentyl, start IV Protonix for pain  Hold metformin  After initiating treatment if patient still with persistent diarrhea, vomiting or abdominal pain consider GI consult

## 2025-05-16 NOTE — ASSESSMENT & PLAN NOTE
Potassium decreased at 2.7 in the setting of vomiting and diarrhea  Telemetry  Given p.o. potassium chloride 40 mEq x 1 and IV potassium chloride 20 mEq x 1 in the ED, will give 1 more IV potassium chloride 20 mill equivalent dose  Recheck BMP in a.m.

## 2025-05-16 NOTE — PROGRESS NOTES
Progress Note - Hospitalist   Name: Nanci Navarro 60 y.o. female I MRN: [id number]  Unit/Bed#: ED 24 I Date of Admission: 5/15/2025   Date of Service: 5/16/2025 I Hospital Day: 1    Assessment & Plan  Intractable lower abdominal pain  Intermittent lower to periumbilical abdominal pain, nausea, vomiting, diarrhea x 2 months, diarrhea and vomiting have been worse over the last 2 days  Has had recent hospital admissions, so we will check C. difficile panel and stool panel to rule out infectious origin of diarrhea  CTA abdomen pelvis negative for acute change  Rule infectious diarrhea versus viral gastroenteritis versus medication induced as patient is on metformin and multiple medications?  IV fluid hydration  For now hold antibiotics pending stool panel  After review of  continue home as needed Percocet every 8 hours, as needed Bentyl, start IV Protonix for pain  Hold metformin  GI consulted- recommended conservative management, stool studies, ordered bentyl as well for pain  Hypokalemia  K now normal after replacement   Chest pain  Most likely associated with abdominal pain and/or GERD versus less likely cardiac origin  Trop neg x 3  EKG revealing sinus tachycardia x 2  Monitor on telemetry  Diabetes mellitus (HCC)  Lab Results   Component Value Date    HGBA1C 7.6 (H) 04/15/2025       Recent Labs     05/15/25  2253 05/16/25  0710   POCGLU 147* 110       Blood Sugar Average: Last 72 hrs:  (P) 128.5Blood glucose checks 4 times daily, hypoglycemia protocol  Hold home metformin, continue Jardiance  ssi    SIRS (systemic inflammatory response syndrome) (HCC)  Meeting criteria due to tachycardia and tachypnea, most likely secondary to abdominal pain and COPD history  We will hold off on antibiotics as no infectious origin noted at this time  Continue IV fluid hydration, was given IV fluid bolus in the ED, will hold on 30 mg/kg IV fluid bolus due to risk of fluid overload  Noted lactate to be elevated to 4.0  COPD  (chronic obstructive pulmonary disease) (HCC)  Currently does not appear to be in exacerbation  Saturating in the 90s on chronic 3 L nasal cannula  Continue home inhalers    VTE Pharmacologic Prophylaxis: VTE Score: 4 Moderate Risk (Score 3-4) - Pharmacological DVT Prophylaxis Ordered: enoxaparin (Lovenox).    Mobility:      -[facility] Goal achieved. Continue to encourage appropriate mobility.    Patient Centered Rounds: I performed bedside rounds with nursing staff today.   Discussions with Specialists or Other Care Team Provider: GI    Education and Discussions with Family / Patient: Patient declined call to .     Current Length of Stay: 1 day(s)  Current Patient Status: Inpatient   Certification Statement: The patient will continue to require additional inpatient hospital stay due to abdominal exams, diarrhea, stool studies   Discharge Plan: Anticipate discharge in 24-48 hrs to home with home services.    Code Status: Level 1 - Full Code    Subjective   Reported 1 loose BM this morning but none since. Still having abdominal pain but reported she always has pain. No n/v. Tolerating her diet so far and ordering lunch.    Objective :  Temp:  [98.2 °F (36.8 °C)-98.6 °F (37 °C)] 98.2 °F (36.8 °C)  HR:  [] 87  BP: ()/(50-75) 130/62  Resp:  [18-30] 20  SpO2:  [97 %-100 %] 98 %  O2 Device: Nasal cannula  Nasal Cannula O2 Flow Rate (L/min):  [3 L/min] 3 L/min    Body mass index is 45.44 kg/m².     Input and Output Summary (last 24 hours):     Intake/Output Summary (Last 24 hours) at 5/16/2025 0805  Last data filed at 5/15/2025 2143  Gross per 24 hour   Intake 1089.17 ml   Output --   Net 1089.17 ml       Physical Exam  NAD, appears comfortable  Nonlabored resp on 3L   RRR  Upper and lower mid abdominal ttp, no guarding/rebound  aaox3    Lines/Drains:        Telemetry:  Telemetry Orders (From admission, onward)               24 Hour Telemetry Monitoring  Continuous x 24 Hours (Telem)        Expiring    Question:  Reason for 24 Hour Telemetry  Answer:  Metabolic/electrolyte disturbance with high probability of dysrhythmia. K level <3 or >6 OR KCL infusion >10mEq/hr                     Telemetry Reviewed: Normal Sinus Rhythm  Indication for Continued Telemetry Use: No indication for continued use. Will discontinue.                Lab Results: I have reviewed the following results:   Results from last 7 days   Lab Units 05/15/25  1832   WBC Thousand/uL 11.96*   HEMOGLOBIN g/dL 14.0   HEMATOCRIT % 42.7   PLATELETS Thousands/uL 227   SEGS PCT % 81*   LYMPHO PCT % 11*   MONO PCT % 7   EOS PCT % 1     Results from last 7 days   Lab Units 05/16/25  0606 05/15/25  1832   SODIUM mmol/L 138 141   POTASSIUM mmol/L 4.4 2.7*   CHLORIDE mmol/L 108 102   CO2 mmol/L 24 27   BUN mg/dL 9 11   CREATININE mg/dL 0.90 1.11   ANION GAP mmol/L 6 12   CALCIUM mg/dL 8.1* 9.4   ALBUMIN g/dL  --  4.4   TOTAL BILIRUBIN mg/dL  --  0.49   ALK PHOS U/L  --  65   ALT U/L  --  20   AST U/L  --  12*   GLUCOSE RANDOM mg/dL 121 167*         Results from last 7 days   Lab Units 05/16/25  0710 05/15/25  2253   POC GLUCOSE mg/dl 110 147*         Results from last 7 days   Lab Units 05/15/25  2040 05/15/25  1832   LACTIC ACID mmol/L 2.6* 4.0*       Recent Cultures (last 7 days):         Imaging Results Review: No pertinent imaging studies reviewed.  Other Study Results Review: No additional pertinent studies reviewed.    Last 24 Hours Medication List:     Current Facility-Administered Medications:     acetaminophen (TYLENOL) tablet 650 mg, Q6H PRN    albuterol (PROVENTIL HFA,VENTOLIN HFA) inhaler 2 puff, Q4H PRN    aluminum-magnesium hydroxide-simethicone (MAALOX) oral suspension 30 mL, Q6H PRN    aspirin chewable tablet 81 mg, Daily    atorvastatin (LIPITOR) tablet 40 mg, Daily With Dinner    benzonatate (TESSALON PERLES) capsule 200 mg, TID PRN    dicyclomine (BENTYL) capsule 10 mg, Q6H PRN    Empagliflozin (JARDIANCE) tablet 10 mg, Daily     enoxaparin (LOVENOX) subcutaneous injection 40 mg, Q12H    famotidine (PEPCID) tablet 20 mg, Daily    Fluticasone Furoate-Vilanterol 100-25 mcg/actuation 1 puff, Daily **AND** umeclidinium 62.5 mcg/actuation inhaler AEPB 1 puff, Daily    insulin lispro (HumALOG/ADMELOG) 100 units/mL subcutaneous injection 1-5 Units, HS    insulin lispro (HumALOG/ADMELOG) 100 units/mL subcutaneous injection 1-6 Units, TID AC    ipratropium-albuterol (DUO-NEB) 0.5-2.5 mg/3 mL inhalation solution 3 mL, Q6H PRN    lactated ringers infusion, Continuous, Last Rate: 75 mL/hr (05/15/25 2322)    levothyroxine tablet 50 mcg, Early Morning    lidocaine (LIDODERM) 5 % patch 1 patch, Daily PRN    metoprolol succinate (TOPROL-XL) 24 hr tablet 25 mg, Daily    nicotine (NICODERM CQ) 14 mg/24hr TD 24 hr patch 1 patch, Daily    ondansetron (ZOFRAN) injection 4 mg, Q6H PRN    oxyCODONE-acetaminophen (PERCOCET) 5-325 mg per tablet 1 tablet, Q8H PRN    pantoprazole (PROTONIX) injection 40 mg, Q24H TANVIR    potassium chloride (Klor-Con M20) CR tablet 40 mEq, Daily    sertraline (ZOLOFT) tablet 25 mg, Daily    torsemide (DEMADEX) tablet 40 mg, Daily    Administrative Statements   Today, Patient Was Seen By: Calvin Casarez MD      **Please Note: This note may have been constructed using a voice recognition system.**

## 2025-05-16 NOTE — CONSULTS
Consultation - Gastroenterology   Name: Nanci Navarro 60 y.o. female I MRN: [id number]  Unit/Bed#: ED 24 I Date of Admission: 5/15/2025   Date of Service: 5/16/2025 I Hospital Day: 1   Inpatient consult to gastroenterology  Consult performed by: Ciera Francisco PA-C  Consult ordered by: Calvin Casarez MD        Physician Requesting Evaluation: Calvin Casarez MD   Reason for Evaluation / Principal Problem:     Assessment & Plan  Intractable lower abdominal pain  60-year-old female very well-known to the GI team who presents with lower abdominal pain, diarrhea, nausea, and vomiting.  Patient did undergo an EGD and colonoscopy in September 2024.  These results were reviewed.  Patient has had multiple CT scans of the abdomen and pelvis within the last several months.  At present there is no plan for repeat EGD or colonoscopy.  Will continue cholestyramine 1 to 2 packets 3 times a day.  Will restart Bentyl 10 mg 3 times a day as needed while inpatient.  Continue PPI twice daily.  Serial abdominal exams  Supportive care and IV fluids  Strict blood sugar control.  Diet as tolerated.  Patient should have close outpatient GI follow-up.  Follow-up stool cultures.  Continue to trend BMP.  Continue to monitor stool output and color.  Diarrhea    Nausea and vomiting    I have discussed the above management plan in detail with the primary service.   Gastroenterology service signing off.    History of Present Illness   HPI:  Nanci Navarro is a 60 y.o. female who presents with a past medical history significant for COPD, tobacco abuse, vertebral fracture, ambulatory dysfunction, GERD, diabetes mellitus, obesity, hypertension.    Patient was just actually seen in GI consultation from April 16, 2025 with the above-noted symptoms.  Patient reports back to the emergency department today with a chief complaint of abdominal pain, vomiting, diarrhea.  Patient reports that she has now been evaluated not only at [facility] but at [facility] multiple times for these noted symptoms.  Patient did undergo an EGD and a colonoscopy in September 2024.  Patient did have multiple polyps removed from her colon.  Patient's pathology was consistent with hyperplastic polyps as well as tubular adenomas.  Patient's EGD did show evidence of erythematous mucosa in the antrum.  Pathology was negative for Helicobacter pylori.  Patient reports that she was being treated with antibiotics from [facility] for presumed sepsis and colitis noted on imaging.  CTAP performed on 5/15/2025 showed no evidence of colitis.  Patient's white blood count is slightly elevated.  Patient's hemoglobin remained stable.  Patient reports that she could no longer take antispasmodics because it interfered with her potassium.  Patient is currently maintained on cholestyramine 3 packets daily.  At present patient reports that the only thing she is really eating at home is bananas and yogurt.  Patient reports a 4 pound weight loss in 24 hours.  Patient reports that she has not had a bowel movement since admission.    Review of Systems   Constitutional:  Negative for chills and fever.   HENT:  Negative for ear pain and sore throat.    Eyes:  Negative for pain and visual disturbance.   Respiratory:  Positive for shortness of breath. Negative for cough.    Cardiovascular:  Negative for chest pain and palpitations.   Gastrointestinal:  Positive for abdominal pain, diarrhea, nausea and vomiting.   Genitourinary:  Negative for dysuria and hematuria.   Musculoskeletal:  Negative for arthralgias and back pain.   Skin:  Negative for color change and rash.   Neurological:  Negative for seizures and syncope.   All other systems reviewed and are negative.    Medical History Review: I have reviewed the patient's PMH, PSH, Social History, Family History, Meds, and Allergies     Objective :  Temp:  [98.2 °F (36.8 °C)-98.6 °F (37 °C)] 98.2 °F (36.8 °C)  HR:  [] 84  BP:  ()/(50-75) 120/57  Resp:  [18-30] 22  SpO2:  [97 %-100 %] 99 %  O2 Device: Nasal cannula  Nasal Cannula O2 Flow Rate (L/min):  [3 L/min] 3 L/min    Physical Exam  Vitals and nursing note reviewed.   Constitutional:       General: She is not in acute distress.     Appearance: She is well-developed. She is ill-appearing.   HENT:      Head: Normocephalic and atraumatic.     Eyes:      Conjunctiva/sclera: Conjunctivae normal.       Cardiovascular:      Rate and Rhythm: Normal rate and regular rhythm.      Heart sounds: No murmur heard.  Pulmonary:      Effort: Pulmonary effort is normal. No respiratory distress.      Breath sounds: Normal breath sounds.   Abdominal:      General: There is distension.      Palpations: Abdomen is soft.      Tenderness: There is abdominal tenderness.     Musculoskeletal:         General: No swelling.      Cervical back: Neck supple.     Skin:     General: Skin is warm and dry.      Capillary Refill: Capillary refill takes less than 2 seconds.     Neurological:      Mental Status: She is alert.     Psychiatric:         Mood and Affect: Mood normal.           Lab Results: I have reviewed the following results:

## 2025-05-16 NOTE — PLAN OF CARE
Problem: PAIN - ADULT  Goal: Verbalizes/displays adequate comfort level or baseline comfort level  Description: Interventions:  - Encourage patient to monitor pain and request assistance  - Assess pain using appropriate pain scale  - Administer analgesics as ordered based on type and severity of pain and evaluate response  - Implement non-pharmacological measures as appropriate and evaluate response  - Consider cultural and social influences on pain and pain management  - Notify physician/advanced practitioner if interventions unsuccessful or patient reports new pain  - Educate patient/family on pain management process including their role and importance of  reporting pain   - Provide non-pharmacologic/complimentary pain relief interventions  Outcome: Progressing     Problem: INFECTION - ADULT  Goal: Absence or prevention of progression during hospitalization  Description: INTERVENTIONS:  - Assess and monitor for signs and symptoms of infection  - Monitor lab/diagnostic results  - Monitor all insertion sites, i.e. indwelling lines, tubes, and drains  - [place] appropriate cooling/warming therapies per order  - Administer medications as ordered  - Instruct and encourage patient and family to use good hand hygiene technique  - Identify and instruct in appropriate isolation precautions for identified infection/condition  Outcome: Progressing  Goal: Absence of fever/infection during neutropenic period  Description: INTERVENTIONS:  - Monitor WBC  - Perform strict hand hygiene  - Limit to healthy visitors only  - No plants, dried, fresh or silk flowers with edward in patient room  Outcome: Progressing     Problem: SAFETY ADULT  Goal: Maintain or return to baseline ADL function  Description: INTERVENTIONS:  -  Assess patient's ability to carry out ADLs; assess patient's baseline for ADL function and identify physical deficits which impact ability to perform ADLs (bathing, care of mouth/teeth, toileting, grooming,  dressing, etc.)  - Assess/evaluate cause of self-care deficits   - Assess range of motion  - Assess patient's mobility; develop plan if impaired  - Assess patient's need for assistive devices and provide as appropriate  - Encourage maximum independence but intervene and supervise when necessary  - Involve family in performance of ADLs  - Assess for home care needs following discharge   - Consider OT consult to assist with ADL evaluation and planning for discharge  - Provide patient education as appropriate  - Monitor functional capacity and physical performance, use of AM PAC & JH-HLM   - Monitor gait, balance and fatigue with ambulation    Outcome: Progressing     Problem: DISCHARGE PLANNING  Goal: Discharge to home or other facility with appropriate resources  Description: INTERVENTIONS:  - Identify barriers to discharge w/patient and caregiver  - Arrange for needed discharge resources and transportation as appropriate  - Identify discharge learning needs (meds, wound care, etc.)  - Arrange for interpretive services to assist at discharge as needed  - Refer to Case Management Department for coordinating discharge planning if the patient needs post-hospital services based on physician/advanced practitioner order or complex needs related to functional status, cognitive ability, or social support system  Outcome: Progressing     Problem: Knowledge Deficit  Goal: Patient/family/caregiver demonstrates understanding of disease process, treatment plan, medications, and discharge instructions  Description: Complete learning assessment and assess knowledge base.  Interventions:  - Provide teaching at level of understanding  - Provide teaching via preferred learning methods  Outcome: Progressing

## 2025-05-17 LAB
ALBUMIN SERPL BCG-MCNC: 3.7 G/DL (ref 3.5–5)
ALP SERPL-CCNC: 51 U/L (ref 34–104)
ALT SERPL W P-5'-P-CCNC: 16 U/L (ref 7–52)
ANION GAP SERPL CALCULATED.3IONS-SCNC: 9 MMOL/L (ref 4–13)
AST SERPL W P-5'-P-CCNC: 11 U/L (ref 13–39)
BASOPHILS # BLD AUTO: 0.02 THOUSANDS/ÂΜL (ref 0–0.1)
BASOPHILS NFR BLD AUTO: 0 % (ref 0–1)
BILIRUB SERPL-MCNC: 0.45 MG/DL (ref 0.2–1)
BUN SERPL-MCNC: 16 MG/DL (ref 5–25)
CALCIUM SERPL-MCNC: 8.2 MG/DL (ref 8.4–10.2)
CHLORIDE SERPL-SCNC: 102 MMOL/L (ref 96–108)
CO2 SERPL-SCNC: 29 MMOL/L (ref 21–32)
CREAT SERPL-MCNC: 1.03 MG/DL (ref 0.6–1.3)
EOSINOPHIL # BLD AUTO: 0.18 THOUSAND/ÂΜL (ref 0–0.61)
EOSINOPHIL NFR BLD AUTO: 3 % (ref 0–6)
ERYTHROCYTE [DISTWIDTH] IN BLOOD BY AUTOMATED COUNT: 15.9 % (ref 11.6–15.1)
GFR SERPL CREATININE-BSD FRML MDRD: 59 ML/MIN/1.73SQ M
GLUCOSE SERPL-MCNC: 123 MG/DL (ref 65–140)
GLUCOSE SERPL-MCNC: 128 MG/DL (ref 65–140)
GLUCOSE SERPL-MCNC: 141 MG/DL (ref 65–140)
GLUCOSE SERPL-MCNC: 154 MG/DL (ref 65–140)
GLUCOSE SERPL-MCNC: 206 MG/DL (ref 65–140)
HCT VFR BLD AUTO: 36.7 % (ref 34.8–46.1)
HGB BLD-MCNC: 11.4 G/DL (ref 11.5–15.4)
IMM GRANULOCYTES # BLD AUTO: 0.02 THOUSAND/UL (ref 0–0.2)
IMM GRANULOCYTES NFR BLD AUTO: 0 % (ref 0–2)
LYMPHOCYTES # BLD AUTO: 1.08 THOUSANDS/ÂΜL (ref 0.6–4.47)
LYMPHOCYTES NFR BLD AUTO: 17 % (ref 14–44)
MAGNESIUM SERPL-MCNC: 1.5 MG/DL (ref 1.9–2.7)
MCH RBC QN AUTO: 29.6 PG (ref 26.8–34.3)
MCHC RBC AUTO-ENTMCNC: 31.1 G/DL (ref 31.4–37.4)
MCV RBC AUTO: 95 FL (ref 82–98)
MONOCYTES # BLD AUTO: 0.64 THOUSAND/ÂΜL (ref 0.17–1.22)
MONOCYTES NFR BLD AUTO: 10 % (ref 4–12)
NEUTROPHILS # BLD AUTO: 4.6 THOUSANDS/ÂΜL (ref 1.85–7.62)
NEUTS SEG NFR BLD AUTO: 70 % (ref 43–75)
NRBC BLD AUTO-RTO: 0 /100 WBCS
PHOSPHATE SERPL-MCNC: 3.8 MG/DL (ref 2.3–4.1)
PLATELET # BLD AUTO: 180 THOUSANDS/UL (ref 149–390)
PMV BLD AUTO: 9.9 FL (ref 8.9–12.7)
POTASSIUM SERPL-SCNC: 3.2 MMOL/L (ref 3.5–5.3)
PROT SERPL-MCNC: 5.7 G/DL (ref 6.4–8.4)
RBC # BLD AUTO: 3.85 MILLION/UL (ref 3.81–5.12)
SODIUM SERPL-SCNC: 140 MMOL/L (ref 135–147)
WBC # BLD AUTO: 6.54 THOUSAND/UL (ref 4.31–10.16)

## 2025-05-17 PROCEDURE — 80053 COMPREHEN METABOLIC PANEL: CPT | Performed by: STUDENT IN AN ORGANIZED HEALTH CARE EDUCATION/TRAINING PROGRAM

## 2025-05-17 PROCEDURE — 85025 COMPLETE CBC W/AUTO DIFF WBC: CPT | Performed by: STUDENT IN AN ORGANIZED HEALTH CARE EDUCATION/TRAINING PROGRAM

## 2025-05-17 PROCEDURE — 82948 REAGENT STRIP/BLOOD GLUCOSE: CPT

## 2025-05-17 PROCEDURE — 83735 ASSAY OF MAGNESIUM: CPT | Performed by: STUDENT IN AN ORGANIZED HEALTH CARE EDUCATION/TRAINING PROGRAM

## 2025-05-17 PROCEDURE — 84100 ASSAY OF PHOSPHORUS: CPT | Performed by: STUDENT IN AN ORGANIZED HEALTH CARE EDUCATION/TRAINING PROGRAM

## 2025-05-17 PROCEDURE — 99233 SBSQ HOSP IP/OBS HIGH 50: CPT | Performed by: STUDENT IN AN ORGANIZED HEALTH CARE EDUCATION/TRAINING PROGRAM

## 2025-05-17 RX ORDER — HYDROMORPHONE HCL/PF 1 MG/ML
0.5 SYRINGE (ML) INJECTION ONCE
Status: COMPLETED | OUTPATIENT
Start: 2025-05-17 | End: 2025-05-17

## 2025-05-17 RX ORDER — MAGNESIUM SULFATE HEPTAHYDRATE 40 MG/ML
2 INJECTION, SOLUTION INTRAVENOUS ONCE
Status: COMPLETED | OUTPATIENT
Start: 2025-05-17 | End: 2025-05-17

## 2025-05-17 RX ORDER — POTASSIUM CHLORIDE 1500 MG/1
40 TABLET, EXTENDED RELEASE ORAL ONCE
Status: DISCONTINUED | OUTPATIENT
Start: 2025-05-17 | End: 2025-05-18 | Stop reason: HOSPADM

## 2025-05-17 RX ORDER — FUROSEMIDE 40 MG/1
40 TABLET ORAL DAILY
Status: DISCONTINUED | OUTPATIENT
Start: 2025-05-17 | End: 2025-05-18 | Stop reason: HOSPADM

## 2025-05-17 RX ORDER — SERTRALINE HYDROCHLORIDE 25 MG/1
25 TABLET, FILM COATED ORAL
Status: DISCONTINUED | OUTPATIENT
Start: 2025-05-17 | End: 2025-05-18 | Stop reason: HOSPADM

## 2025-05-17 RX ORDER — HYDROMORPHONE HCL/PF 1 MG/ML
0.5 SYRINGE (ML) INJECTION EVERY 4 HOURS PRN
Refills: 0 | Status: DISCONTINUED | OUTPATIENT
Start: 2025-05-17 | End: 2025-05-18 | Stop reason: HOSPADM

## 2025-05-17 RX ADMIN — DICYCLOMINE HYDROCHLORIDE 10 MG: 10 CAPSULE ORAL at 11:41

## 2025-05-17 RX ADMIN — OXYCODONE HYDROCHLORIDE AND ACETAMINOPHEN 1 TABLET: 5; 325 TABLET ORAL at 16:38

## 2025-05-17 RX ADMIN — EMPAGLIFLOZIN 10 MG: 10 TABLET, FILM COATED ORAL at 08:04

## 2025-05-17 RX ADMIN — ENOXAPARIN SODIUM 40 MG: 40 INJECTION SUBCUTANEOUS at 08:08

## 2025-05-17 RX ADMIN — MAGNESIUM SULFATE HEPTAHYDRATE 2 G: 40 INJECTION, SOLUTION INTRAVENOUS at 12:04

## 2025-05-17 RX ADMIN — LEVOTHYROXINE SODIUM 50 MCG: 0.05 TABLET ORAL at 05:36

## 2025-05-17 RX ADMIN — POTASSIUM CHLORIDE 40 MEQ: 1500 TABLET, EXTENDED RELEASE ORAL at 08:04

## 2025-05-17 RX ADMIN — PANTOPRAZOLE SODIUM 40 MG: 40 INJECTION, POWDER, FOR SOLUTION INTRAVENOUS at 08:44

## 2025-05-17 RX ADMIN — HYDROMORPHONE HYDROCHLORIDE 0.5 MG: 1 INJECTION, SOLUTION INTRAMUSCULAR; INTRAVENOUS; SUBCUTANEOUS at 19:51

## 2025-05-17 RX ADMIN — UMECLIDINIUM 1 PUFF: 62.5 AEROSOL, POWDER ORAL at 08:08

## 2025-05-17 RX ADMIN — ASPIRIN 81 MG: 81 TABLET, CHEWABLE ORAL at 08:04

## 2025-05-17 RX ADMIN — FAMOTIDINE 20 MG: 20 TABLET, FILM COATED ORAL at 08:04

## 2025-05-17 RX ADMIN — SERTRALINE HYDROCHLORIDE 25 MG: 25 TABLET ORAL at 21:57

## 2025-05-17 RX ADMIN — ENOXAPARIN SODIUM 40 MG: 40 INJECTION SUBCUTANEOUS at 19:50

## 2025-05-17 RX ADMIN — FLUTICASONE FUROATE AND VILANTEROL TRIFENATATE 1 PUFF: 100; 25 POWDER RESPIRATORY (INHALATION) at 08:07

## 2025-05-17 RX ADMIN — ONDANSETRON 4 MG: 2 INJECTION INTRAMUSCULAR; INTRAVENOUS at 00:04

## 2025-05-17 RX ADMIN — OXYCODONE HYDROCHLORIDE AND ACETAMINOPHEN 1 TABLET: 5; 325 TABLET ORAL at 05:36

## 2025-05-17 RX ADMIN — NICOTINE 1 PATCH: 14 PATCH, EXTENDED RELEASE TRANSDERMAL at 08:11

## 2025-05-17 RX ADMIN — DICYCLOMINE HYDROCHLORIDE 10 MG: 10 CAPSULE ORAL at 08:04

## 2025-05-17 RX ADMIN — INSULIN LISPRO 2 UNITS: 100 INJECTION, SOLUTION INTRAVENOUS; SUBCUTANEOUS at 11:41

## 2025-05-17 RX ADMIN — HYDROMORPHONE HYDROCHLORIDE 0.5 MG: 1 INJECTION, SOLUTION INTRAMUSCULAR; INTRAVENOUS; SUBCUTANEOUS at 15:00

## 2025-05-17 RX ADMIN — FUROSEMIDE 40 MG: 40 TABLET ORAL at 09:47

## 2025-05-17 RX ADMIN — ONDANSETRON 4 MG: 2 INJECTION INTRAMUSCULAR; INTRAVENOUS at 16:51

## 2025-05-17 RX ADMIN — DICYCLOMINE HYDROCHLORIDE 10 MG: 10 CAPSULE ORAL at 16:21

## 2025-05-17 RX ADMIN — HYDROMORPHONE HYDROCHLORIDE 0.5 MG: 1 INJECTION, SOLUTION INTRAMUSCULAR; INTRAVENOUS; SUBCUTANEOUS at 10:30

## 2025-05-17 RX ADMIN — ATORVASTATIN CALCIUM 40 MG: 40 TABLET, FILM COATED ORAL at 16:18

## 2025-05-17 NOTE — ASSESSMENT & PLAN NOTE
Lab Results   Component Value Date    HGBA1C 7.6 (H) 04/15/2025       Recent Labs     05/16/25  2101 05/17/25  0753 05/17/25  1109 05/17/25  1602   POCGLU 139 141* 206* 123       Blood Sugar Average: Last 72 hrs:  (P) 150Blood glucose checks 4 times daily, hypoglycemia protocol  Hold home metformin and Jardiance  ssi

## 2025-05-17 NOTE — PROGRESS NOTES
Progress Note - Hospitalist   Name: Nanci Navarro 60 y.o. female I MRN: [id number]  Unit/Bed#: -01 I Date of Admission: 5/15/2025   Date of Service: 5/17/2025 I Hospital Day: 2    Assessment & Plan  Intractable lower abdominal pain  Intermittent lower to periumbilical abdominal pain, nausea, vomiting, diarrhea x 2 months, diarrhea and vomiting have been worse over the last 2 days - However her diarrhea stopped shortly after admission   C diff, stool cultures were ordered but canceled today given lack of BM since admission   CTA abdomen pelvis negative for acute change  Rule infectious diarrhea versus viral gastroenteritis versus medication induced as patient is on metformin and multiple medications?  After review of  continue home as needed Percocet every 8 hours  Hold metformin and jardiance  GI consulted- recommended conservative management, stool studies, ordered bentyl as well for pain  She continues to have pain this morning so I recommended downgrading her diet to NPO for now to further monitor her pain and once her pain improves we can slowly reintroduce her diet.   Also noted low BP this morning which may be contributing and will hold her metoprolol  Will add a PRN dilaudid for now given her reported pain. I did tell her that this may be related to her IBS and that we may not find any alternative cause.   Hypokalemia  K now normal after replacement   Chest pain  Most likely associated with abdominal pain and/or GERD versus less likely cardiac origin  Trop neg x 3  EKG revealing sinus tachycardia x 2  Monitor on telemetry  Diabetes mellitus (HCC)  Lab Results   Component Value Date    HGBA1C 7.6 (H) 04/15/2025       Recent Labs     05/16/25  2101 05/17/25  0753 05/17/25  1109 05/17/25  1602   POCGLU 139 141* 206* 123       Blood Sugar Average: Last 72 hrs:  (P) 150Blood glucose checks 4 times daily, hypoglycemia protocol  Hold home metformin and Jardiance  ssi    SIRS (systemic inflammatory  response syndrome) (HCC)  Meeting criteria due to tachycardia and tachypnea, most likely secondary to abdominal pain and COPD history  We will hold off on antibiotics as no infectious origin noted at this time  Continue IV fluid hydration, was given IV fluid bolus in the ED, will hold on 30 mg/kg IV fluid bolus due to risk of fluid overload  Noted lactate to be elevated to 4.0 on arrival   COPD (chronic obstructive pulmonary disease) (HCC)  Currently does not appear to be in exacerbation  Saturating in the 90s on chronic 3 L nasal cannula  Continue home inhalers  Diarrhea    Nausea and vomiting      VTE Pharmacologic Prophylaxis: VTE Score: 4 Moderate Risk (Score 3-4) - Pharmacological DVT Prophylaxis Ordered: enoxaparin (Lovenox).    Mobility:   Basic Mobility Inpatient Raw Score: 23  JH-HLM Goal: 7: Walk 25 feet or more  JH-HLM Achieved: 7: Walk 25 feet or more  JH-HLM Goal achieved. Continue to encourage appropriate mobility.    Patient Centered Rounds: I performed bedside rounds with nursing staff today.   Discussions with Specialists or Other Care Team Provider: none    Education and Discussions with Family / Patient: Patient declined call to .     Current Length of Stay: 2 day(s)  Current Patient Status: Inpatient   Certification Statement: The patient will continue to require additional inpatient hospital stay due to abdominal pain  Discharge Plan: Anticipate discharge in 24-48 hrs to home with home services.    Code Status: Level 1 - Full Code    Subjective   Reported ongoing abdominal pain this morning. In the upper and lower mid quadrant. No n/v. No BM since admission. She was tolerating a diet this morning slowly.     Objective :  Temp:  [98 °F (36.7 °C)-98.4 °F (36.9 °C)] 98.4 °F (36.9 °C)  HR:  [77-99] 99  BP: ()/(37-97) 121/62  Resp:  [17] 17  SpO2:  [94 %-96 %] 95 %  O2 Device: Nasal cannula  Nasal Cannula O2 Flow Rate (L/min):  [3 L/min] 3 L/min    Body mass index is 48.09 kg/m².      Input and Output Summary (last 24 hours):     Intake/Output Summary (Last 24 hours) at 5/17/2025 1603  Last data filed at 5/17/2025 0801  Gross per 24 hour   Intake 480 ml   Output --   Net 480 ml       Physical Exam  NAD, appears comfortable   Nonlabored on 2L   RRR  Mild upper and lower mid quad abd pain without guarding/rebound/rigidity  aaox3    Lines/Drains:              Lab Results: I have reviewed the following results:   Results from last 7 days   Lab Units 05/17/25  0544   WBC Thousand/uL 6.54   HEMOGLOBIN g/dL 11.4*   HEMATOCRIT % 36.7   PLATELETS Thousands/uL 180   SEGS PCT % 70   LYMPHO PCT % 17   MONO PCT % 10   EOS PCT % 3     Results from last 7 days   Lab Units 05/17/25  0544   SODIUM mmol/L 140   POTASSIUM mmol/L 3.2*   CHLORIDE mmol/L 102   CO2 mmol/L 29   BUN mg/dL 16   CREATININE mg/dL 1.03   ANION GAP mmol/L 9   CALCIUM mg/dL 8.2*   ALBUMIN g/dL 3.7   TOTAL BILIRUBIN mg/dL 0.45   ALK PHOS U/L 51   ALT U/L 16   AST U/L 11*   GLUCOSE RANDOM mg/dL 154*         Results from last 7 days   Lab Units 05/17/25  1602 05/17/25  1109 05/17/25  0753 05/16/25  2101 05/16/25  1248 05/16/25  0710 05/15/25  2253   POC GLUCOSE mg/dl 123 206* 141* 139 184* 110 147*         Results from last 7 days   Lab Units 05/15/25  2040 05/15/25  1832   LACTIC ACID mmol/L 2.6* 4.0*       Recent Cultures (last 7 days):   Results from last 7 days   Lab Units 05/15/25  2151   BLOOD CULTURE  No Growth at 24 hrs.  No Growth at 24 hrs.       Imaging Results Review: No pertinent imaging studies reviewed.  Other Study Results Review: No additional pertinent studies reviewed.    Last 24 Hours Medication List:     Current Facility-Administered Medications:     acetaminophen (TYLENOL) tablet 650 mg, Q6H PRN    albuterol (PROVENTIL HFA,VENTOLIN HFA) inhaler 2 puff, Q4H PRN    aluminum-magnesium hydroxide-simethicone (MAALOX) oral suspension 30 mL, Q6H PRN    aspirin chewable tablet 81 mg, Daily    atorvastatin (LIPITOR) tablet 40  mg, Daily With Dinner    benzonatate (TESSALON PERLES) capsule 200 mg, TID PRN    dicyclomine (BENTYL) capsule 10 mg, Q6H PRN    dicyclomine (BENTYL) capsule 10 mg, TID AC    enoxaparin (LOVENOX) subcutaneous injection 40 mg, Q12H    famotidine (PEPCID) tablet 20 mg, Daily    Fluticasone Furoate-Vilanterol 100-25 mcg/actuation 1 puff, Daily **AND** umeclidinium 62.5 mcg/actuation inhaler AEPB 1 puff, Daily    furosemide (LASIX) tablet 40 mg, Daily    HYDROmorphone (DILAUDID) injection 0.5 mg, Q4H PRN    insulin lispro (HumALOG/ADMELOG) 100 units/mL subcutaneous injection 1-5 Units, HS    insulin lispro (HumALOG/ADMELOG) 100 units/mL subcutaneous injection 1-6 Units, TID AC    ipratropium-albuterol (DUO-NEB) 0.5-2.5 mg/3 mL inhalation solution 3 mL, Q6H PRN    levothyroxine tablet 50 mcg, Early Morning    lidocaine (LIDODERM) 5 % patch 1 patch, Daily PRN    nicotine (NICODERM CQ) 14 mg/24hr TD 24 hr patch 1 patch, Daily    ondansetron (ZOFRAN) injection 4 mg, Q6H PRN    oxyCODONE-acetaminophen (PERCOCET) 5-325 mg per tablet 1 tablet, Q8H PRN    pantoprazole (PROTONIX) injection 40 mg, Q24H TANVIR    potassium chloride (Klor-Con M20) CR tablet 40 mEq, Once    sertraline (ZOLOFT) tablet 25 mg, Daily    Administrative Statements   Today, Patient Was Seen By: Calvin Casarez MD      **Please Note: This note may have been constructed using a voice recognition system.**

## 2025-05-17 NOTE — PLAN OF CARE
Problem: SAFETY ADULT  Goal: Maintain or return to baseline ADL function  Description: INTERVENTIONS:  -  Assess patient's ability to carry out ADLs; assess patient's baseline for ADL function and identify physical deficits which impact ability to perform ADLs (bathing, care of mouth/teeth, toileting, grooming, dressing, etc.)  - Assess/evaluate cause of self-care deficits   - Assess range of motion  - Assess patient's mobility; develop plan if impaired  - Assess patient's need for assistive devices and provide as appropriate  - Encourage maximum independence but intervene and supervise when necessary  - Involve family in performance of ADLs  - Assess for home care needs following discharge   - Consider OT consult to assist with ADL evaluation and planning for discharge  - Provide patient education as appropriate  - Monitor functional capacity and physical performance, use of AM PAC & JH-HLM   - Monitor gait, balance and fatigue with ambulation    5/17/2025 0605 by Terese Hagen RN  Outcome: Progressing  5/16/2025 2101 by Terese Hagen RN  Outcome: Progressing

## 2025-05-17 NOTE — ASSESSMENT & PLAN NOTE
Meeting criteria due to tachycardia and tachypnea, most likely secondary to abdominal pain and COPD history  We will hold off on antibiotics as no infectious origin noted at this time  Continue IV fluid hydration, was given IV fluid bolus in the ED, will hold on 30 mg/kg IV fluid bolus due to risk of fluid overload  Noted lactate to be elevated to 4.0 on arrival

## 2025-05-17 NOTE — ASSESSMENT & PLAN NOTE
Intermittent lower to periumbilical abdominal pain, nausea, vomiting, diarrhea x 2 months, diarrhea and vomiting have been worse over the last 2 days - However her diarrhea stopped shortly after admission   C diff, stool cultures were ordered but canceled today given lack of BM since admission   CTA abdomen pelvis negative for acute change  Rule infectious diarrhea versus viral gastroenteritis versus medication induced as patient is on metformin and multiple medications?  After review of  continue home as needed Percocet every 8 hours  Hold metformin and jardiance  GI consulted- recommended conservative management, stool studies, ordered bentyl as well for pain  She continues to have pain this morning so I recommended downgrading her diet to NPO for now to further monitor her pain and once her pain improves we can slowly reintroduce her diet.   Also noted low BP this morning which may be contributing and will hold her metoprolol  Will add a PRN dilaudid for now given her reported pain. I did tell her that this may be related to her IBS and that we may not find any alternative cause.

## 2025-05-18 VITALS
TEMPERATURE: 98.2 F | OXYGEN SATURATION: 92 % | HEIGHT: 57 IN | HEART RATE: 80 BPM | BODY MASS INDEX: 47.61 KG/M2 | RESPIRATION RATE: 17 BRPM | DIASTOLIC BLOOD PRESSURE: 88 MMHG | SYSTOLIC BLOOD PRESSURE: 116 MMHG | WEIGHT: 220.68 LBS

## 2025-05-18 PROBLEM — J44.9 COPD (CHRONIC OBSTRUCTIVE PULMONARY DISEASE) (HCC): Status: ACTIVE | Noted: 2025-05-15

## 2025-05-18 LAB
ALBUMIN SERPL BCG-MCNC: 4.1 G/DL (ref 3.5–5)
ALP SERPL-CCNC: 60 U/L (ref 34–104)
ALT SERPL W P-5'-P-CCNC: 17 U/L (ref 7–52)
ANION GAP SERPL CALCULATED.3IONS-SCNC: 9 MMOL/L (ref 4–13)
AST SERPL W P-5'-P-CCNC: 16 U/L (ref 13–39)
BASOPHILS # BLD AUTO: 0.02 THOUSANDS/ÂΜL (ref 0–0.1)
BASOPHILS NFR BLD AUTO: 0 % (ref 0–1)
BILIRUB SERPL-MCNC: 0.66 MG/DL (ref 0.2–1)
BUN SERPL-MCNC: 14 MG/DL (ref 5–25)
CALCIUM SERPL-MCNC: 9 MG/DL (ref 8.4–10.2)
CHLORIDE SERPL-SCNC: 97 MMOL/L (ref 96–108)
CO2 SERPL-SCNC: 31 MMOL/L (ref 21–32)
CREAT SERPL-MCNC: 0.94 MG/DL (ref 0.6–1.3)
EOSINOPHIL # BLD AUTO: 0.16 THOUSAND/ÂΜL (ref 0–0.61)
EOSINOPHIL NFR BLD AUTO: 2 % (ref 0–6)
ERYTHROCYTE [DISTWIDTH] IN BLOOD BY AUTOMATED COUNT: 15.8 % (ref 11.6–15.1)
GFR SERPL CREATININE-BSD FRML MDRD: 66 ML/MIN/1.73SQ M
GLUCOSE SERPL-MCNC: 137 MG/DL (ref 65–140)
GLUCOSE SERPL-MCNC: 143 MG/DL (ref 65–140)
GLUCOSE SERPL-MCNC: 227 MG/DL (ref 65–140)
HCT VFR BLD AUTO: 38.6 % (ref 34.8–46.1)
HGB BLD-MCNC: 12.5 G/DL (ref 11.5–15.4)
IMM GRANULOCYTES # BLD AUTO: 0.02 THOUSAND/UL (ref 0–0.2)
IMM GRANULOCYTES NFR BLD AUTO: 0 % (ref 0–2)
LYMPHOCYTES # BLD AUTO: 1.08 THOUSANDS/ÂΜL (ref 0.6–4.47)
LYMPHOCYTES NFR BLD AUTO: 16 % (ref 14–44)
MAGNESIUM SERPL-MCNC: 2.1 MG/DL (ref 1.9–2.7)
MCH RBC QN AUTO: 30.3 PG (ref 26.8–34.3)
MCHC RBC AUTO-ENTMCNC: 32.4 G/DL (ref 31.4–37.4)
MCV RBC AUTO: 94 FL (ref 82–98)
MONOCYTES # BLD AUTO: 0.76 THOUSAND/ÂΜL (ref 0.17–1.22)
MONOCYTES NFR BLD AUTO: 11 % (ref 4–12)
NEUTROPHILS # BLD AUTO: 4.62 THOUSANDS/ÂΜL (ref 1.85–7.62)
NEUTS SEG NFR BLD AUTO: 71 % (ref 43–75)
NRBC BLD AUTO-RTO: 0 /100 WBCS
PHOSPHATE SERPL-MCNC: 4.1 MG/DL (ref 2.3–4.1)
PLATELET # BLD AUTO: 204 THOUSANDS/UL (ref 149–390)
PMV BLD AUTO: 10 FL (ref 8.9–12.7)
POTASSIUM SERPL-SCNC: 3.3 MMOL/L (ref 3.5–5.3)
PROT SERPL-MCNC: 6.5 G/DL (ref 6.4–8.4)
RBC # BLD AUTO: 4.12 MILLION/UL (ref 3.81–5.12)
SODIUM SERPL-SCNC: 137 MMOL/L (ref 135–147)
WBC # BLD AUTO: 6.66 THOUSAND/UL (ref 4.31–10.16)

## 2025-05-18 PROCEDURE — 84100 ASSAY OF PHOSPHORUS: CPT | Performed by: STUDENT IN AN ORGANIZED HEALTH CARE EDUCATION/TRAINING PROGRAM

## 2025-05-18 PROCEDURE — 83735 ASSAY OF MAGNESIUM: CPT | Performed by: STUDENT IN AN ORGANIZED HEALTH CARE EDUCATION/TRAINING PROGRAM

## 2025-05-18 PROCEDURE — 85025 COMPLETE CBC W/AUTO DIFF WBC: CPT | Performed by: STUDENT IN AN ORGANIZED HEALTH CARE EDUCATION/TRAINING PROGRAM

## 2025-05-18 PROCEDURE — 80053 COMPREHEN METABOLIC PANEL: CPT | Performed by: STUDENT IN AN ORGANIZED HEALTH CARE EDUCATION/TRAINING PROGRAM

## 2025-05-18 PROCEDURE — 82948 REAGENT STRIP/BLOOD GLUCOSE: CPT

## 2025-05-18 PROCEDURE — 99239 HOSP IP/OBS DSCHRG MGMT >30: CPT | Performed by: FAMILY MEDICINE

## 2025-05-18 RX ORDER — POTASSIUM CHLORIDE 14.9 MG/ML
20 INJECTION INTRAVENOUS ONCE
Status: COMPLETED | OUTPATIENT
Start: 2025-05-18 | End: 2025-05-18

## 2025-05-18 RX ORDER — DICYCLOMINE HYDROCHLORIDE 10 MG/1
10 CAPSULE ORAL
Qty: 20 CAPSULE | Refills: 0 | Status: SHIPPED | OUTPATIENT
Start: 2025-05-18 | End: 2025-05-25

## 2025-05-18 RX ORDER — NICOTINE 21 MG/24HR
1 PATCH, TRANSDERMAL 24 HOURS TRANSDERMAL DAILY
Qty: 28 PATCH | Refills: 0 | Status: SHIPPED | OUTPATIENT
Start: 2025-05-19

## 2025-05-18 RX ADMIN — FUROSEMIDE 40 MG: 40 TABLET ORAL at 08:08

## 2025-05-18 RX ADMIN — DICYCLOMINE HYDROCHLORIDE 10 MG: 10 CAPSULE ORAL at 08:08

## 2025-05-18 RX ADMIN — NICOTINE 1 PATCH: 14 PATCH, EXTENDED RELEASE TRANSDERMAL at 08:10

## 2025-05-18 RX ADMIN — UMECLIDINIUM 1 PUFF: 62.5 AEROSOL, POWDER ORAL at 08:09

## 2025-05-18 RX ADMIN — HYDROMORPHONE HYDROCHLORIDE 0.5 MG: 1 INJECTION, SOLUTION INTRAMUSCULAR; INTRAVENOUS; SUBCUTANEOUS at 11:57

## 2025-05-18 RX ADMIN — ONDANSETRON 4 MG: 2 INJECTION INTRAMUSCULAR; INTRAVENOUS at 04:00

## 2025-05-18 RX ADMIN — HYDROMORPHONE HYDROCHLORIDE 0.5 MG: 1 INJECTION, SOLUTION INTRAMUSCULAR; INTRAVENOUS; SUBCUTANEOUS at 04:01

## 2025-05-18 RX ADMIN — LEVOTHYROXINE SODIUM 50 MCG: 0.05 TABLET ORAL at 05:47

## 2025-05-18 RX ADMIN — INSULIN LISPRO 2 UNITS: 100 INJECTION, SOLUTION INTRAVENOUS; SUBCUTANEOUS at 12:00

## 2025-05-18 RX ADMIN — OXYCODONE HYDROCHLORIDE AND ACETAMINOPHEN 1 TABLET: 5; 325 TABLET ORAL at 08:58

## 2025-05-18 RX ADMIN — PANTOPRAZOLE SODIUM 40 MG: 40 INJECTION, POWDER, FOR SOLUTION INTRAVENOUS at 08:08

## 2025-05-18 RX ADMIN — DICYCLOMINE HYDROCHLORIDE 10 MG: 10 CAPSULE ORAL at 12:00

## 2025-05-18 RX ADMIN — FAMOTIDINE 20 MG: 20 TABLET, FILM COATED ORAL at 08:08

## 2025-05-18 RX ADMIN — POTASSIUM CHLORIDE 20 MEQ: 14.9 INJECTION, SOLUTION INTRAVENOUS at 08:59

## 2025-05-18 RX ADMIN — FLUTICASONE FUROATE AND VILANTEROL TRIFENATATE 1 PUFF: 100; 25 POWDER RESPIRATORY (INHALATION) at 08:09

## 2025-05-18 RX ADMIN — OXYCODONE HYDROCHLORIDE AND ACETAMINOPHEN 1 TABLET: 5; 325 TABLET ORAL at 00:55

## 2025-05-18 RX ADMIN — ASPIRIN 81 MG: 81 TABLET, CHEWABLE ORAL at 08:08

## 2025-05-18 NOTE — ASSESSMENT & PLAN NOTE
Meeting criteria due to tachycardia and tachypnea, most likely secondary to abdominal pain and COPD history  Hold off on antibiotics as no infectious origin noted at this time  Remains afebrile.  UA neg, Blood cultures neg so far, CTA C/AP neg for infectious etiology

## 2025-05-18 NOTE — ASSESSMENT & PLAN NOTE
Lab Results   Component Value Date    HGBA1C 7.6 (H) 04/15/2025       Recent Labs     05/17/25  1602 05/17/25  2113 05/18/25  0757 05/18/25  1050   POCGLU 123 128 143* 227*       Blood Sugar Average: Last 72 hrs:  (P) 154.8  Blood glucose checks 4 times daily with ssi , hypoglycemia protocol  Resume home metformin and Jardiance on dc

## 2025-05-18 NOTE — PLAN OF CARE
Problem: PAIN - ADULT  Goal: Verbalizes/displays adequate comfort level or baseline comfort level  Description: Interventions:  - Encourage patient to monitor pain and request assistance  - Assess pain using appropriate pain scale  - Administer analgesics as ordered based on type and severity of pain and evaluate response  - Implement non-pharmacological measures as appropriate and evaluate response  - Consider cultural and social influences on pain and pain management  - Notify physician/advanced practitioner if interventions unsuccessful or patient reports new pain  - Educate patient/family on pain management process including their role and importance of  reporting pain   - Provide non-pharmacologic/complimentary pain relief interventions  5/18/2025 0605 by Terese Hagen RN  Outcome: Progressing  5/17/2025 2041 by Terese Hagen RN  Outcome: Progressing

## 2025-05-18 NOTE — CASE MANAGEMENT
Case Management Discharge Planning Note    Patient name Nanci Navarro  Location /-01 MRN [id number]  : 1965 Date 2025       Current Admission Date: 5/15/2025  Current Admission Diagnosis:Intractable lower abdominal pain   Patient Active Problem List    Diagnosis Date Noted    Diarrhea 2025    Nausea and vomiting 2025    SIRS (systemic inflammatory response syndrome) ([facility]) 05/15/2025    COPD (chronic obstructive pulmonary disease) ([facility]) 05/15/2025    COPD exacerbation ([facility]) 04/15/2025    Hypothyroidism 2025    Thrombus of left radial artery ([facility]) 2024    SOB (shortness of breath) 2024    Chronic back pain 2024    Class 3 severe obesity due to excess calories with serious comorbidity and body mass index (BMI) of 45.0 to 49.9 in adult 2024    Non-compliant behavior 2024    Discharge planning issues 2024    Anemia 2024    Intractable lower abdominal pain 2024    Diabetes mellitus (HCC) 2024    Osteopenia of lumbar spine 2023    Gastroesophageal reflux disease without esophagitis 2023    Chest pain 01/15/2023    Volume overload 01/15/2023    Ambulatory dysfunction 2023    Compression fracture of L1 vertebra ([facility]) 2023    Systemic lupus erythematosus with lung involvement ([facility]) 12/10/2022    Chronic respiratory failure with hypoxia ([facility]) 10/06/2022    Hypokalemia 10/04/2022    Simple chronic bronchitis ([facility]) 2022    Smoker 2022      LOS (days): 3  Geometric Mean LOS (GMLOS) (days):   Days to GMLOS:     OBJECTIVE:  Risk of Unplanned Readmission Score: 24.8         Current admission status: Inpatient   Preferred Pharmacy:   [facility] - [place], [place] - [address]  [address]  [place] [address]  Phone: [phone] Fax: [phone]    [id number] - [place], [place] - [address] [place] [address]  [place] [address]  Phone: [phone] Fax: [phone]    Primary Care Provider: Natividad Robertson PA-C    Primary Insurance: [facility]  Secondary Insurance:     DISCHARGE DETAILS:    Discharge planning discussed with:: patient  Freedom of Choice: Yes  Comments - Freedom of Choice: Pt to be d/poornima home today.  Declining HHC.  Pt requesting Ly[employer]-has no money to pay for an Uber and no one who can transport.  Waiver signed.  Transport requested for 12;40                     Requested Home Health Care         Is the patient interested in HHC at discharge?: No    DME Referral Provided  Referral made for DME?: No    Other Referral/Resources/Interventions Provided:  Interventions: Transportation  Referral Comments: Ly[employer] arranged for 1240.  Waiver signed    Would you like to participate in our [facility] service program?  : No - Declined    Treatment Team Recommendation: Home  Discharge Destination Plan:: Home  Transport at Discharge : Ride Share     Number/Name of Dispatcher: Roundtrip     ETA of Transport (Date): 05/18/25  ETA of Transport (Time): 1240

## 2025-05-18 NOTE — ASSESSMENT & PLAN NOTE
Intermittent lower to periumbilical abdominal pain, nausea, vomiting, diarrhea x 2 months.  her diarrhea stopped shortly after admission     C diff, stool cultures were ordered but canceled given lack of BM since admission   CTA abdomen pelvis negative for acute change  Seen by GI : Patient has chronic abdominal pain and irregular stools secondary to the irritable bowel syndrome.  She needs to have outpatient follow-up with her gastroenterologist at [facility]. CW Bentyl 10 mg p.o. before every meal, & PPI twice daily  After review of  continue home as needed Percocet every 8 hours    - Advanced diet which she tolerated.  She continues to have her chronic abdominal pain but no worse than baseline.  Understands she needs outpatient follow-up with her GI.  DC home.

## 2025-05-18 NOTE — DISCHARGE SUMMARY
Discharge Summary - Hospitalist   Name: Nanci Navarro 60 y.o. female I MRN: [id number]  Unit/Bed#: -01 I Date of Admission: 5/15/2025   Date of Service: 5/18/2025 I Hospital Day: 3     Assessment & Plan  Intractable lower abdominal pain  Intermittent lower to periumbilical abdominal pain, nausea, vomiting, diarrhea x 2 months.  her diarrhea stopped shortly after admission     C diff, stool cultures were ordered but canceled given lack of BM since admission   CTA abdomen pelvis negative for acute change  Seen by GI : Patient has chronic abdominal pain and irregular stools secondary to the irritable bowel syndrome.  She needs to have outpatient follow-up with her gastroenterologist at [facility]. CW Bentyl 10 mg p.o. before every meal, & PPI twice daily  After review of  continue home as needed Percocet every 8 hours    - Advanced diet which she tolerated.  She continues to have her chronic abdominal pain but no worse than baseline.  Understands she needs outpatient follow-up with her GI.  DC home.  Hypokalemia  Being repleted  Chest pain  Most likely associated with abdominal pain and/or GERD versus less likely cardiac origin  Trop neg x 3  EKG revealing sinus tachycardia x 2  Monitor on telemetry  Diabetes mellitus (HCC)  Lab Results   Component Value Date    HGBA1C 7.6 (H) 04/15/2025       Recent Labs     05/17/25  1602 05/17/25  2113 05/18/25  0757 05/18/25  1050   POCGLU 123 128 143* 227*       Blood Sugar Average: Last 72 hrs:  (P) 154.8  Blood glucose checks 4 times daily with ssi , hypoglycemia protocol  Resume home metformin and Jardiance on dc      SIRS (systemic inflammatory response syndrome) (HCC)  Meeting criteria due to tachycardia and tachypnea, most likely secondary to abdominal pain and COPD history  Hold off on antibiotics as no infectious origin noted at this time  Remains afebrile.  UA neg, Blood cultures neg so far, CTA C/AP neg for infectious etiology  COPD (chronic obstructive  pulmonary disease) (HCC)  Currently does not appear to be in exacerbation  Saturating in the 90s on chronic 3 L nasal cannula  Continue home inhalers   Discharging Physician / Practitioner: Kevon Hidalgo MD  PCP: Natividad Robertson PA-C  Admission Date:   Admission Orders (From admission, onward)       Ordered        05/15/25 2115  Inpatient Admission  Once                          Discharge Date: 05/18/25    Disposition:      home    Reason for Admission: intractable abdominal pain    Discharge Diagnoses:     Please see assessment and plan section above for further details regarding discharge diagnoses.     Medical Problems       Resolved Problems  Date Reviewed: 4/15/2025   None         Consultations During Hospital Stay:  GI    Medication Adjustments and Discharge Medications:  Summary of Medication Adjustments made as a result of this hospitalization: see med rec  Medication Dosing Tapers - Please refer to Discharge Medication List for details on any medication dosing tapers (if applicable to patient).  Medications being temporarily held (include recommended restart time): see med rec  Discharge Medication List: See after visit summary for reconciled discharge medications.       Diet Recommendations at Discharge:  Diet -        Diet Orders   (From admission, onward)                 Start     Ordered    05/18/25 0917  Diet GI; Lo Fiber/Lo Residue  Diet effective now        Comments: Meds ok   References:    Adult Nutrition Support Algorithm    RD Therapeutic Diet Order Protocol   Question Answer Comment   Diet Type GI    GI Lo Fiber/Lo Residue    Allow Registered Dietitian to adjust diet order per protocol Yes        05/18/25 0916                      Hospital Course:     Nanci Navarro is a 60 y.o. female patient who originally presented to the hospital on 5/15/2025 with past medical history of COPD, CHF, type 2 diabetes, GERD, SLE who came in with symptoms of gradual worsening of lower abdominal pain, nausea,  "vomiting, diarrhea that has been ongoing for the last 2 months.  Symptoms were intermittent previously however over 2 days leading to admission got severe.    - She did have a CT abdomen pelvis done which was negative for any acute changes.  She was seen by GI during hospitalization.  She has chronic abdominal pain and irregular stool secondary to the irritable bowel syndrome.  She needs to have outpatient follow-up with her gastroenterologist at [facility].  Continue with Bentyl and twice daily PPI.  Her C. difficile stool cultures were ordered but canceled given lack of bowel movement since admission.  She was initially kept n.p.o. and following that her diet was slowly advanced which she tolerated well.  She continues to have a chronic abdominal pain but no worse than her baseline.  She understands she needs outpatient follow-up with GI closely.  Continue her as needed Percocet/Bentyl/Protonix at home.    Above plan of care discussed with patient.  She verbalizes understanding and is in agreement.  DC home.    Condition at Discharge: fair     Discharge Day Visit / Exam:       Vitals: Blood Pressure: 116/88 (05/18/25 0713)  Pulse: 80 (05/17/25 2329)  Temperature: 98.2 °F (36.8 °C) (05/18/25 0713)  Temp Source: Oral (05/16/25 0528)  Respirations: 17 (05/17/25 1452)  Height: 4' 9\" (144.8 cm) (05/16/25 1153)  Weight - Scale: 100 kg (220 lb 10.9 oz) (05/18/25 0530)  SpO2: 92 % (05/17/25 2329)  Exam:   Physical Exam  Constitutional:       General: She is not in acute distress.     Appearance: She is obese. She is not toxic-appearing.   HENT:      Mouth/Throat:      Mouth: Mucous membranes are moist.      Pharynx: Oropharynx is clear.     Cardiovascular:      Rate and Rhythm: Normal rate and regular rhythm.   Pulmonary:      Effort: Pulmonary effort is normal. No respiratory distress.      Breath sounds: Normal breath sounds. No wheezing.   Abdominal:      General: Abdomen is flat. There is no distension.      " Palpations: Abdomen is soft.      Tenderness: There is no abdominal tenderness. There is no guarding.     Musculoskeletal:      Right lower leg: No edema.      Left lower leg: No edema.     Neurological:      Mental Status: She is alert and oriented to person, place, and time.           Goals of Care Discussions:  Code Status at Discharge: Level 1 - Full Code      Discharge instructions/Information to patient and family:   See after visit summary section titled Discharge Instructions for information provided to patient and family.       Discharge Statement:  I spent 45 minutes discharging the patient. This time was spent on the day of discharge. I had direct contact with the patient on the day of discharge. Greater than 50% of the total time was spent examining patient, answering all patient questions, arranging and discussing plan of care with patient as well as directly providing post-discharge instructions.  Additional time then spent on discharge activities.    ** Please Note: This note has been constructed using a voice recognition system **

## 2025-05-19 ENCOUNTER — PATIENT OUTREACH (OUTPATIENT)
Dept: CASE MANAGEMENT | Facility: OTHER | Age: 60
End: 2025-05-19

## 2025-05-19 NOTE — UTILIZATION REVIEW
NOTIFICATION OF ADMISSION DISCHARGE   [facility]. Please be advised that this patient has been discharge from our facility. Below you will find the admission and discharge date and time including the patient’s disposition.   UTILIZATION REVIEW CONTACT:  Utilization Review Assistants  Network Utilization Review Department  Phone: [phone] x carefully listen to the prompts. All voicemails are confidential.  Email: [email or web address]     ADMISSION INFORMATION  PRESENTATION DATE: 5/15/2025  5:28 PM  OBERVATION ADMISSION DATE: N/A  INPATIENT ADMISSION DATE: 5/15/25  9:15 PM   DISCHARGE DATE: 5/18/2025 12:47 PM   DISPOSITION:Home/Self Care    Network Utilization Review Department  ATTENTION: Please call with any questions or concerns to [phone] and carefully listen to the prompts so that you are directed to the right person. All voicemails are confidential.   For Discharge needs, contact Care Management DC Support Team at [phone] opt. 2  Send all requests for admission clinical reviews, approved or denied determinations and any other requests to dedicated fax number below belonging to the campus where the patient is receiving treatment. List of dedicated fax numbers for the Facilities:  FACILITY NAME UR FAX NUMBER   ADMISSION DENIALS (Administrative/Medical Necessity) [phone]   DISCHARGE SUPPORT TEAM ([facility]) [phone]   PARENT CHILD HEALTH (Maternity/NICU/Pediatrics) [phone]   [facility] [phone]   [facility] [phone]   [facility] [phone]   [facility] [phone]   [facility] [phone]   [facility] [phone]   [facility] [phone]   [facility] [phone]   [facility]  [facility] [phone]   [facility] [phone]   [facility] [phone]   [facility] [phone]

## 2025-05-19 NOTE — PROGRESS NOTES
"Outpatient Care Management Note    HRR referral received.  Chart review completed.  Per chart, PMH includes: COPD, Type 2 diabetes, SLE, GERD.  Patient was hospitalized at [facility] 5/15/25-5/18/25 for intractable lower abdominal pain.  Per chart, patient presented with gradual worsening of lower abdominal pain, nausea, vomiting, and diarrhea ongoing for last two months. Symptoms had been intermittent but became severe over 2 days leading to admission.  CT negative for any acute changes.  Patient saw GI. She will need outpatient follow up with GI..    RASHARD WEEMS will place outreach call to patient for follow up to this hospitalization and to assess for care management needs.  Call placed and spoke with patient.  RN CM introduced self, role and reason for call.  Patient states the she is \"not the best\".  She explained that her \"stomach still hurts\" and feels that she has not had improvement of her abdominal pain. She also reports still having nausea, no vomiting.   In addition, she states that she woke up feeling more \"chest heaviness\" due to congestion and states that she was coughing up a lot of phlegm this morning which made it hard to catch her breath at times.  Patient states that they were not giving her her nebulizer when she was hospitalized.  Patient states that at home she uses her nebulizer twice a day.  She states that she also used her Albuterol inhaler once today as well.  Patient wears oxygen 3 liters nasal cannula.   RN CM offered to route message to PCP listed in [facility] and patient shared that the  PCP listed is not her PCP. She states that her PCP is Sheri Plata at [facility].  Patient states that she spoke with them earlier today and they are aware of her symptoms.  Upon further chart review RN CM notes that patient spoke with  [facility] today and patient was advised regarding reported symptoms.  RN CM instructed patient to notify PCP for changes in " condition or worsening symptoms or to return to hospital if emergency- patient verbalized understanding.    Patient states that she was not provided a copy of her AVS instructions. She explained that at discharge she was being picked up by [employer] and things were rushed.  RN CM reviewed medications listed on AVS instructions and compared them to medications listed in Epic chart.  RN DANK went through each and completed medication review.    RN CM discussed blood sugar monitoring with patient who confirmed that she does monitor at home. She states that her blood sugars usually range 150-190.    Patient confirmed that she had been receiving and will continue to receive home health care through [facility]. She does not know when they are scheduled for this week.  RASHARD WEEMS will confirm that [facility] received referral and will be resuming care.    RN CM spoke with [facility] who states that patient visits were put on hold due to hospitalization and would need new home care orders. RN CM will route request for home care orders to  PCP via [facility] Fax in Epic and will also include with a copy of discharge summary and OP RN CM note.    Patient denies any other needs or concerns at this time.         Normal for race

## 2025-05-20 ENCOUNTER — TRANSITIONAL CARE MANAGEMENT (OUTPATIENT)
Age: 60
End: 2025-05-20

## 2025-05-21 LAB
BACTERIA BLD CULT: NORMAL
BACTERIA BLD CULT: NORMAL

## 2025-07-02 ENCOUNTER — TELEPHONE (OUTPATIENT)
Age: 60
End: 2025-07-02

## 2025-07-02 NOTE — TELEPHONE ENCOUNTER
Patients GI provider:  Dr. Lamb    Number to return call: [phone]    Reason for call: Pts PA calling to say the pts still having diarrhea and they are having a C diff test done by home health tomorrow. The PA is asking that we make the pt an appt as soon as we can due to the pt having multiple hospital trips and antibiotics . Please reach out to the pt for an appt the PA does not want her to wait until September to see him    Scheduled procedure/appointment date if applicable: Apt/procedure

## 2025-07-02 NOTE — TELEPHONE ENCOUNTER
Called pt, unable to lvm, mailbox not set up.   To note: from hosp note, pt follows with [facility] gastro and advised to f/u with them

## 2025-07-03 NOTE — TELEPHONE ENCOUNTER
Spoke with pt, she does not see [facility] only saw them when she was at [facility].   I explained to pt we do not take [employer] health insurance but pt states she has medicare as well. Not on chart.     I offered OV for 7/17 but pt currently is very sick and her home health aid called her PCP and they want pt to go back to the hospital. Pt is calling [phone] and will call us back after she is discharged from the hospital.

## 2025-07-11 ENCOUNTER — NURSE TRIAGE (OUTPATIENT)
Age: 60
End: 2025-07-11

## 2025-07-11 RX ORDER — GLUCOSAM/CHON-MSM1/C/MANG/BOSW 500-416.6
TABLET ORAL
Qty: 100 EACH | OUTPATIENT
Start: 2025-07-11

## 2025-07-11 NOTE — TELEPHONE ENCOUNTER
"REASON FOR CONVERSATION: Diarrhea    SYMPTOMS: Symptoms have been occuring since 03/21. Diarrhea and mucous clumps with urgency 3-6 x per day; 2-3 episodes of vomiting daily; generalized abdominal pain 6-9/10 and intermittent left side stabbing pain; bloat, chronic dizziness with poor fluid intake; unplanned weight loss of 30 pounds since March.    OTHER HEALTH INFORMATION: Hospital admission 05/15-05/18 for the above symptoms.    PROTOCOL DISPOSITION: See Within 2 Weeks in Office    CARE ADVICE PROVIDED: Urgent office visit scheduled 07/17 with JASON Washington    PRACTICE FOLLOW-UP: none      Reason for Disposition   Diarrhea is a chronic symptom (recurrent or ongoing AND lasting > 4 weeks)    Answer Assessment - Initial Assessment Questions  1. DIARRHEA SEVERITY: \"How bad is the diarrhea?\" \"How many more stools have you had in the past 24 hours than normal?\"       Urgency with diarrhea 3-6 x per day  2. ONSET: \"When did the diarrhea begin?\"       03/21  3. STOOL DESCRIPTION:  \"How loose or watery is the diarrhea?\" \"What is the stool color?\" \"Is there any blood or mucous in the stool?\"      Liquid with mucous clumps  4. VOMITING: \"Are you also vomiting?\" If Yes, ask: \"How many times in the past 24 hours?\"       2-3 x per day  5. ABDOMEN PAIN: \"Are you having any abdomen pain?\" If Yes, ask: \"What does it feel like?\" (e.g., crampy, dull, intermittent, constant)       Generalized with left side stabbing pains  6. ABDOMEN PAIN SEVERITY: If present, ask: \"How bad is the pain?\"  (e.g., Scale 1-10; mild, moderate, or severe)      9/10  7. ORAL INTAKE: If vomiting, \"Have you been able to drink liquids?\" \"How much liquids have you had in the past 24 hours?\"      Poor fluid intake  8. HYDRATION: \"Any signs of dehydration?\" (e.g., dry mouth [not just dry lips], too weak to stand, dizziness, new weight loss) \"When did you last urinate?\"      Dizziness   11. OTHER SYMPTOMS: \"Do you have any other symptoms?\" (e.g., fever, blood " in stool)        Low grade fever 99.2f, bloat    Protocols used: Diarrhea-Adult-[place]

## 2025-07-13 ENCOUNTER — NURSE TRIAGE (OUTPATIENT)
Dept: OTHER | Facility: OTHER | Age: 60
End: 2025-07-13

## 2025-07-13 NOTE — TELEPHONE ENCOUNTER
"REASON FOR CONVERSATION: Abdominal Pain    SYMPTOMS: Severe diarrhea x several weeks. Severe abdominal pain since Friday that has been getting worse. Has lost 34lbs in the past several weeks. Also with fever last night of 102    OTHER HEALTH INFORMATION: none    PROTOCOL DISPOSITION: Go to ED Now    CARE ADVICE PROVIDED: Please present to the ED for eval    PRACTICE FOLLOW-UP: Please follow up with patient for symptom update.          Reason for Disposition   [1] SEVERE pain (e.g., excruciating) AND [2] present > 1 hour    Answer Assessment - Initial Assessment Questions  1. LOCATION: \"Where does it hurt?\"       Entire abdomen    3. ONSET: \"When did the pain begin?\" (e.g., minutes, hours or days ago)       Started Friday and has been getting worse    4. SUDDEN: \"Gradual or sudden onset?\"      Gradual    5. PATTERN \"Does the pain come and go, or is it constant?\"      Constant    6. SEVERITY: \"How bad is the pain?\"  (e.g., Scale 1-10; mild, moderate, or severe)      Severe      8. CAUSE: \"What do you think is causing the stomach pain?\"      Unsure    10. OTHER SYMPTOMS: \"Do you have any other symptoms?\" (e.g., back pain, diarrhea, fever, urination pain, vomiting)        Watery, severe diarrhea, fever, and a feeling of \"heaviness\" in stomach    Protocols used: Abdominal Pain - Female-Adult-AH    "

## 2025-07-14 ENCOUNTER — APPOINTMENT (EMERGENCY)
Dept: CT IMAGING | Facility: HOSPITAL | Age: 60
DRG: 392 | End: 2025-07-14
Payer: MEDICARE

## 2025-07-14 ENCOUNTER — HOSPITAL ENCOUNTER (INPATIENT)
Facility: HOSPITAL | Age: 60
LOS: 3 days | Discharge: HOME HEALTH | DRG: 392 | End: 2025-07-18
Admitting: FAMILY MEDICINE
Payer: MEDICARE

## 2025-07-14 DIAGNOSIS — R10.30 INTRACTABLE LOWER ABDOMINAL PAIN: ICD-10-CM

## 2025-07-14 DIAGNOSIS — R11.2 INTRACTABLE NAUSEA AND VOMITING: ICD-10-CM

## 2025-07-14 DIAGNOSIS — D64.9 ANEMIA, UNSPECIFIED TYPE: ICD-10-CM

## 2025-07-14 DIAGNOSIS — R10.9 ABDOMINAL PAIN: ICD-10-CM

## 2025-07-14 DIAGNOSIS — K52.9 ENTERITIS: Primary | ICD-10-CM

## 2025-07-14 DIAGNOSIS — R19.7 DIARRHEA: ICD-10-CM

## 2025-07-14 PROBLEM — I50.32 CHRONIC DIASTOLIC CHF (CONGESTIVE HEART FAILURE) (HCC): Status: ACTIVE | Noted: 2025-07-14

## 2025-07-14 PROBLEM — D50.9 IRON DEFICIENCY ANEMIA: Status: ACTIVE | Noted: 2025-04-24

## 2025-07-14 LAB
ALBUMIN SERPL BCG-MCNC: 3.7 G/DL (ref 3.5–5)
ALP SERPL-CCNC: 102 U/L (ref 34–104)
ALT SERPL W P-5'-P-CCNC: 14 U/L (ref 7–52)
ANION GAP SERPL CALCULATED.3IONS-SCNC: 7 MMOL/L (ref 4–13)
AST SERPL W P-5'-P-CCNC: 10 U/L (ref 13–39)
ATRIAL RATE: 94 BPM
BASOPHILS # BLD AUTO: 0.03 THOUSANDS/ÂΜL (ref 0–0.1)
BASOPHILS NFR BLD AUTO: 0 % (ref 0–1)
BILIRUB SERPL-MCNC: 0.56 MG/DL (ref 0.2–1)
BUN SERPL-MCNC: 10 MG/DL (ref 5–25)
CALCIUM SERPL-MCNC: 9.2 MG/DL (ref 8.4–10.2)
CHLORIDE SERPL-SCNC: 102 MMOL/L (ref 96–108)
CO2 SERPL-SCNC: 31 MMOL/L (ref 21–32)
CREAT SERPL-MCNC: 0.87 MG/DL (ref 0.6–1.3)
EOSINOPHIL # BLD AUTO: 0.13 THOUSAND/ÂΜL (ref 0–0.61)
EOSINOPHIL NFR BLD AUTO: 1 % (ref 0–6)
ERYTHROCYTE [DISTWIDTH] IN BLOOD BY AUTOMATED COUNT: 14.5 % (ref 11.6–15.1)
FLUAV AG UPPER RESP QL IA.RAPID: NEGATIVE
FLUBV AG UPPER RESP QL IA.RAPID: NEGATIVE
GFR SERPL CREATININE-BSD FRML MDRD: 72 ML/MIN/1.73SQ M
GLUCOSE SERPL-MCNC: 110 MG/DL (ref 65–140)
GLUCOSE SERPL-MCNC: 116 MG/DL (ref 65–140)
HCT VFR BLD AUTO: 36.6 % (ref 34.8–46.1)
HGB BLD-MCNC: 12.4 G/DL (ref 11.5–15.4)
IMM GRANULOCYTES # BLD AUTO: 0.07 THOUSAND/UL (ref 0–0.2)
IMM GRANULOCYTES NFR BLD AUTO: 1 % (ref 0–2)
LACTATE SERPL-SCNC: 1.4 MMOL/L (ref 0.5–2)
LIPASE SERPL-CCNC: 18 U/L (ref 11–82)
LYMPHOCYTES # BLD AUTO: 0.96 THOUSANDS/ÂΜL (ref 0.6–4.47)
LYMPHOCYTES NFR BLD AUTO: 8 % (ref 14–44)
MCH RBC QN AUTO: 30.8 PG (ref 26.8–34.3)
MCHC RBC AUTO-ENTMCNC: 33.9 G/DL (ref 31.4–37.4)
MCV RBC AUTO: 91 FL (ref 82–98)
MONOCYTES # BLD AUTO: 1.1 THOUSAND/ÂΜL (ref 0.17–1.22)
MONOCYTES NFR BLD AUTO: 9 % (ref 4–12)
NEUTROPHILS # BLD AUTO: 9.42 THOUSANDS/ÂΜL (ref 1.85–7.62)
NEUTS SEG NFR BLD AUTO: 81 % (ref 43–75)
NRBC BLD AUTO-RTO: 0 /100 WBCS
P AXIS: 77 DEGREES
PLATELET # BLD AUTO: 355 THOUSANDS/UL (ref 149–390)
PMV BLD AUTO: 9 FL (ref 8.9–12.7)
POTASSIUM SERPL-SCNC: 3.4 MMOL/L (ref 3.5–5.3)
PR INTERVAL: 148 MS
PROT SERPL-MCNC: 6.4 G/DL (ref 6.4–8.4)
QRS AXIS: 47 DEGREES
QRSD INTERVAL: 86 MS
QT INTERVAL: 348 MS
QTC INTERVAL: 435 MS
RBC # BLD AUTO: 4.02 MILLION/UL (ref 3.81–5.12)
SARS-COV+SARS-COV-2 AG RESP QL IA.RAPID: NEGATIVE
SODIUM SERPL-SCNC: 140 MMOL/L (ref 135–147)
T WAVE AXIS: 65 DEGREES
VENTRICULAR RATE: 94 BPM
WBC # BLD AUTO: 11.71 THOUSAND/UL (ref 4.31–10.16)

## 2025-07-14 PROCEDURE — 82948 REAGENT STRIP/BLOOD GLUCOSE: CPT

## 2025-07-14 PROCEDURE — 85025 COMPLETE CBC W/AUTO DIFF WBC: CPT

## 2025-07-14 PROCEDURE — 99223 1ST HOSP IP/OBS HIGH 75: CPT

## 2025-07-14 PROCEDURE — 87811 SARS-COV-2 COVID19 W/OPTIC: CPT

## 2025-07-14 PROCEDURE — 93010 ELECTROCARDIOGRAM REPORT: CPT | Performed by: INTERNAL MEDICINE

## 2025-07-14 PROCEDURE — 80053 COMPREHEN METABOLIC PANEL: CPT

## 2025-07-14 PROCEDURE — 87804 INFLUENZA ASSAY W/OPTIC: CPT

## 2025-07-14 PROCEDURE — 99284 EMERGENCY DEPT VISIT MOD MDM: CPT

## 2025-07-14 PROCEDURE — 36415 COLL VENOUS BLD VENIPUNCTURE: CPT

## 2025-07-14 PROCEDURE — 99285 EMERGENCY DEPT VISIT HI MDM: CPT

## 2025-07-14 PROCEDURE — 83690 ASSAY OF LIPASE: CPT

## 2025-07-14 PROCEDURE — 96374 THER/PROPH/DIAG INJ IV PUSH: CPT

## 2025-07-14 PROCEDURE — 96375 TX/PRO/DX INJ NEW DRUG ADDON: CPT

## 2025-07-14 PROCEDURE — 74177 CT ABD & PELVIS W/CONTRAST: CPT

## 2025-07-14 PROCEDURE — 93005 ELECTROCARDIOGRAM TRACING: CPT

## 2025-07-14 PROCEDURE — 83605 ASSAY OF LACTIC ACID: CPT

## 2025-07-14 RX ORDER — ONDANSETRON 2 MG/ML
4 INJECTION INTRAMUSCULAR; INTRAVENOUS EVERY 6 HOURS PRN
Status: DISCONTINUED | OUTPATIENT
Start: 2025-07-14 | End: 2025-07-18 | Stop reason: HOSPADM

## 2025-07-14 RX ORDER — ATORVASTATIN CALCIUM 40 MG/1
40 TABLET, FILM COATED ORAL DAILY
Status: DISCONTINUED | OUTPATIENT
Start: 2025-07-15 | End: 2025-07-18 | Stop reason: HOSPADM

## 2025-07-14 RX ORDER — DIPHENHYDRAMINE HYDROCHLORIDE 50 MG/ML
25 INJECTION, SOLUTION INTRAMUSCULAR; INTRAVENOUS ONCE
Status: COMPLETED | OUTPATIENT
Start: 2025-07-14 | End: 2025-07-14

## 2025-07-14 RX ORDER — FUROSEMIDE 40 MG/1
40 TABLET ORAL DAILY
Status: DISCONTINUED | OUTPATIENT
Start: 2025-07-15 | End: 2025-07-18 | Stop reason: HOSPADM

## 2025-07-14 RX ORDER — METOCLOPRAMIDE HYDROCHLORIDE 5 MG/ML
10 INJECTION INTRAMUSCULAR; INTRAVENOUS ONCE
Status: COMPLETED | OUTPATIENT
Start: 2025-07-14 | End: 2025-07-14

## 2025-07-14 RX ORDER — HYDROMORPHONE HCL IN WATER/PF 6 MG/30 ML
0.2 PATIENT CONTROLLED ANALGESIA SYRINGE INTRAVENOUS ONCE
Status: COMPLETED | OUTPATIENT
Start: 2025-07-14 | End: 2025-07-14

## 2025-07-14 RX ORDER — LEVOTHYROXINE SODIUM 50 UG/1
50 TABLET ORAL DAILY
Status: DISCONTINUED | OUTPATIENT
Start: 2025-07-14 | End: 2025-07-18 | Stop reason: HOSPADM

## 2025-07-14 RX ORDER — SERTRALINE HYDROCHLORIDE 25 MG/1
25 TABLET, FILM COATED ORAL DAILY
Status: DISCONTINUED | OUTPATIENT
Start: 2025-07-15 | End: 2025-07-18 | Stop reason: HOSPADM

## 2025-07-14 RX ORDER — HYDROMORPHONE HCL IN WATER/PF 6 MG/30 ML
0.2 PATIENT CONTROLLED ANALGESIA SYRINGE INTRAVENOUS
Status: DISCONTINUED | OUTPATIENT
Start: 2025-07-14 | End: 2025-07-15

## 2025-07-14 RX ORDER — ALBUTEROL SULFATE 90 UG/1
2 INHALANT RESPIRATORY (INHALATION) EVERY 4 HOURS PRN
Status: DISCONTINUED | OUTPATIENT
Start: 2025-07-14 | End: 2025-07-18 | Stop reason: HOSPADM

## 2025-07-14 RX ORDER — METOPROLOL SUCCINATE 50 MG/1
50 TABLET, EXTENDED RELEASE ORAL DAILY
Status: DISCONTINUED | OUTPATIENT
Start: 2025-07-15 | End: 2025-07-18 | Stop reason: HOSPADM

## 2025-07-14 RX ORDER — ACETAMINOPHEN 10 MG/ML
1000 INJECTION, SOLUTION INTRAVENOUS EVERY 6 HOURS PRN
Status: DISPENSED | OUTPATIENT
Start: 2025-07-14 | End: 2025-07-16

## 2025-07-14 RX ORDER — HEPARIN SODIUM 5000 [USP'U]/ML
5000 INJECTION, SOLUTION INTRAVENOUS; SUBCUTANEOUS EVERY 8 HOURS SCHEDULED
Status: DISCONTINUED | OUTPATIENT
Start: 2025-07-14 | End: 2025-07-18 | Stop reason: HOSPADM

## 2025-07-14 RX ORDER — PANTOPRAZOLE SODIUM 40 MG/10ML
40 INJECTION, POWDER, LYOPHILIZED, FOR SOLUTION INTRAVENOUS EVERY 12 HOURS SCHEDULED
Status: DISCONTINUED | OUTPATIENT
Start: 2025-07-14 | End: 2025-07-18

## 2025-07-14 RX ORDER — FLUTICASONE FUROATE AND VILANTEROL 100; 25 UG/1; UG/1
1 POWDER RESPIRATORY (INHALATION) DAILY
Status: DISCONTINUED | OUTPATIENT
Start: 2025-07-15 | End: 2025-07-18 | Stop reason: HOSPADM

## 2025-07-14 RX ADMIN — DIPHENHYDRAMINE HYDROCHLORIDE 25 MG: 50 INJECTION, SOLUTION INTRAMUSCULAR; INTRAVENOUS at 17:45

## 2025-07-14 RX ADMIN — SODIUM CHLORIDE 1000 ML: 0.9 INJECTION, SOLUTION INTRAVENOUS at 17:43

## 2025-07-14 RX ADMIN — METOCLOPRAMIDE 10 MG: 5 INJECTION, SOLUTION INTRAMUSCULAR; INTRAVENOUS at 17:45

## 2025-07-14 RX ADMIN — HYDROMORPHONE HYDROCHLORIDE 0.2 MG: 0.2 INJECTION, SOLUTION INTRAMUSCULAR; INTRAVENOUS; SUBCUTANEOUS at 17:13

## 2025-07-14 RX ADMIN — IOHEXOL 100 ML: 350 INJECTION, SOLUTION INTRAVENOUS at 16:39

## 2025-07-14 RX ADMIN — HEPARIN SODIUM 5000 UNITS: 5000 INJECTION INTRAVENOUS; SUBCUTANEOUS at 21:32

## 2025-07-14 RX ADMIN — LEVOTHYROXINE SODIUM 50 MCG: 0.05 TABLET ORAL at 21:32

## 2025-07-14 RX ADMIN — HYDROMORPHONE HYDROCHLORIDE 0.2 MG: 0.2 INJECTION, SOLUTION INTRAMUSCULAR; INTRAVENOUS; SUBCUTANEOUS at 23:00

## 2025-07-14 RX ADMIN — HYDROMORPHONE HYDROCHLORIDE 0.2 MG: 0.2 INJECTION, SOLUTION INTRAMUSCULAR; INTRAVENOUS; SUBCUTANEOUS at 20:02

## 2025-07-15 LAB
ANION GAP SERPL CALCULATED.3IONS-SCNC: 7 MMOL/L (ref 4–13)
BUN SERPL-MCNC: 10 MG/DL (ref 5–25)
CALCIUM SERPL-MCNC: 8.6 MG/DL (ref 8.4–10.2)
CHLORIDE SERPL-SCNC: 103 MMOL/L (ref 96–108)
CO2 SERPL-SCNC: 27 MMOL/L (ref 21–32)
CREAT SERPL-MCNC: 0.87 MG/DL (ref 0.6–1.3)
ERYTHROCYTE [DISTWIDTH] IN BLOOD BY AUTOMATED COUNT: 14.9 % (ref 11.6–15.1)
GFR SERPL CREATININE-BSD FRML MDRD: 72 ML/MIN/1.73SQ M
GLUCOSE P FAST SERPL-MCNC: 107 MG/DL (ref 65–99)
GLUCOSE SERPL-MCNC: 107 MG/DL (ref 65–140)
HCT VFR BLD AUTO: 34.5 % (ref 34.8–46.1)
HGB BLD-MCNC: 11.4 G/DL (ref 11.5–15.4)
MCH RBC QN AUTO: 30.4 PG (ref 26.8–34.3)
MCHC RBC AUTO-ENTMCNC: 33 G/DL (ref 31.4–37.4)
MCV RBC AUTO: 92 FL (ref 82–98)
PLATELET # BLD AUTO: 330 THOUSANDS/UL (ref 149–390)
PMV BLD AUTO: 9.5 FL (ref 8.9–12.7)
POTASSIUM SERPL-SCNC: 3.2 MMOL/L (ref 3.5–5.3)
RBC # BLD AUTO: 3.75 MILLION/UL (ref 3.81–5.12)
SODIUM SERPL-SCNC: 137 MMOL/L (ref 135–147)
WBC # BLD AUTO: 9.79 THOUSAND/UL (ref 4.31–10.16)

## 2025-07-15 PROCEDURE — 80048 BASIC METABOLIC PNL TOTAL CA: CPT

## 2025-07-15 PROCEDURE — 85027 COMPLETE CBC AUTOMATED: CPT

## 2025-07-15 PROCEDURE — 99222 1ST HOSP IP/OBS MODERATE 55: CPT | Performed by: INTERNAL MEDICINE

## 2025-07-15 PROCEDURE — 99232 SBSQ HOSP IP/OBS MODERATE 35: CPT | Performed by: FAMILY MEDICINE

## 2025-07-15 RX ORDER — HYDROMORPHONE HCL IN WATER/PF 6 MG/30 ML
0.2 PATIENT CONTROLLED ANALGESIA SYRINGE INTRAVENOUS
Status: DISCONTINUED | OUTPATIENT
Start: 2025-07-15 | End: 2025-07-17

## 2025-07-15 RX ORDER — SODIUM CHLORIDE, SODIUM LACTATE, POTASSIUM CHLORIDE, CALCIUM CHLORIDE 600; 310; 30; 20 MG/100ML; MG/100ML; MG/100ML; MG/100ML
125 INJECTION, SOLUTION INTRAVENOUS CONTINUOUS
Status: CANCELLED | OUTPATIENT
Start: 2025-07-15

## 2025-07-15 RX ORDER — POTASSIUM CHLORIDE 14.9 MG/ML
20 INJECTION INTRAVENOUS ONCE
Status: COMPLETED | OUTPATIENT
Start: 2025-07-15 | End: 2025-07-15

## 2025-07-15 RX ORDER — DICYCLOMINE HYDROCHLORIDE 10 MG/1
10 CAPSULE ORAL
Status: DISCONTINUED | OUTPATIENT
Start: 2025-07-15 | End: 2025-07-18

## 2025-07-15 RX ORDER — TRAMADOL HYDROCHLORIDE 50 MG/1
50 TABLET ORAL EVERY 6 HOURS PRN
Status: DISCONTINUED | OUTPATIENT
Start: 2025-07-15 | End: 2025-07-17

## 2025-07-15 RX ADMIN — FLUTICASONE FUROATE AND VILANTEROL TRIFENATATE 1 PUFF: 100; 25 POWDER RESPIRATORY (INHALATION) at 09:04

## 2025-07-15 RX ADMIN — ATORVASTATIN CALCIUM 40 MG: 40 TABLET, FILM COATED ORAL at 09:01

## 2025-07-15 RX ADMIN — DICYCLOMINE HYDROCHLORIDE 10 MG: 10 CAPSULE ORAL at 21:31

## 2025-07-15 RX ADMIN — POTASSIUM CHLORIDE 20 MEQ: 14.9 INJECTION, SOLUTION INTRAVENOUS at 10:51

## 2025-07-15 RX ADMIN — SERTRALINE HYDROCHLORIDE 25 MG: 25 TABLET ORAL at 09:03

## 2025-07-15 RX ADMIN — HYDROMORPHONE HYDROCHLORIDE 0.2 MG: 0.2 INJECTION, SOLUTION INTRAMUSCULAR; INTRAVENOUS; SUBCUTANEOUS at 12:43

## 2025-07-15 RX ADMIN — HYDROMORPHONE HYDROCHLORIDE 0.2 MG: 0.2 INJECTION, SOLUTION INTRAMUSCULAR; INTRAVENOUS; SUBCUTANEOUS at 09:02

## 2025-07-15 RX ADMIN — HEPARIN SODIUM 5000 UNITS: 5000 INJECTION INTRAVENOUS; SUBCUTANEOUS at 13:15

## 2025-07-15 RX ADMIN — METOPROLOL SUCCINATE 50 MG: 50 TABLET, EXTENDED RELEASE ORAL at 09:01

## 2025-07-15 RX ADMIN — DICYCLOMINE HYDROCHLORIDE 10 MG: 10 CAPSULE ORAL at 10:51

## 2025-07-15 RX ADMIN — HYDROMORPHONE HYDROCHLORIDE 0.2 MG: 0.2 INJECTION, SOLUTION INTRAMUSCULAR; INTRAVENOUS; SUBCUTANEOUS at 16:23

## 2025-07-15 RX ADMIN — ONDANSETRON 4 MG: 2 INJECTION INTRAMUSCULAR; INTRAVENOUS at 04:14

## 2025-07-15 RX ADMIN — HYDROMORPHONE HYDROCHLORIDE 0.2 MG: 0.2 INJECTION, SOLUTION INTRAMUSCULAR; INTRAVENOUS; SUBCUTANEOUS at 05:24

## 2025-07-15 RX ADMIN — ONDANSETRON 4 MG: 2 INJECTION INTRAMUSCULAR; INTRAVENOUS at 18:09

## 2025-07-15 RX ADMIN — PANTOPRAZOLE SODIUM 40 MG: 40 INJECTION, POWDER, FOR SOLUTION INTRAVENOUS at 09:02

## 2025-07-15 RX ADMIN — HYDROMORPHONE HYDROCHLORIDE 0.2 MG: 0.2 INJECTION, SOLUTION INTRAMUSCULAR; INTRAVENOUS; SUBCUTANEOUS at 23:26

## 2025-07-15 RX ADMIN — PANTOPRAZOLE SODIUM 40 MG: 40 INJECTION, POWDER, FOR SOLUTION INTRAVENOUS at 20:27

## 2025-07-15 RX ADMIN — HYDROMORPHONE HYDROCHLORIDE 0.2 MG: 0.2 INJECTION, SOLUTION INTRAMUSCULAR; INTRAVENOUS; SUBCUTANEOUS at 20:27

## 2025-07-15 RX ADMIN — HYDROMORPHONE HYDROCHLORIDE 0.2 MG: 0.2 INJECTION, SOLUTION INTRAMUSCULAR; INTRAVENOUS; SUBCUTANEOUS at 02:28

## 2025-07-15 RX ADMIN — POLYETHYLENE GLYCOL 3350, SODIUM SULFATE ANHYDROUS, SODIUM BICARBONATE, SODIUM CHLORIDE, POTASSIUM CHLORIDE 4000 ML: 236; 22.74; 6.74; 5.86; 2.97 POWDER, FOR SOLUTION ORAL at 11:33

## 2025-07-15 RX ADMIN — HEPARIN SODIUM 5000 UNITS: 5000 INJECTION INTRAVENOUS; SUBCUTANEOUS at 21:31

## 2025-07-15 RX ADMIN — UMECLIDINIUM 1 PUFF: 62.5 AEROSOL, POWDER ORAL at 09:04

## 2025-07-15 RX ADMIN — DICYCLOMINE HYDROCHLORIDE 10 MG: 10 CAPSULE ORAL at 16:23

## 2025-07-15 NOTE — ASSESSMENT & PLAN NOTE
Secondary to COPD  Reports SOB at baseline  Currently SpO2: 99 % on Nasal Cannula O2 Flow Rate (L/min): 3 L/min, at baseline

## 2025-07-15 NOTE — ASSESSMENT & PLAN NOTE
Wt Readings from Last 3 Encounters:   07/14/25 93 kg (205 lb)   05/18/25 100 kg (220 lb 10.9 oz)   04/16/25 102 kg (225 lb)   Most recent echo April 2025 revealing LVEF 65%  Maintained on 40 mg p.o. Lasix twice daily as outpatient.  Patient states she has missed a few doses within past week as she has not been eating and drinking well and has not been able to tolerate p.o. intake due to n/v.  Euvolemic on exam  Will hold p.o. Lasix for now in setting of poor p.o. intake to avoid hypovolemia or hypotension  Received 1 L IVF bolus in ED, will defer further IVF hydration to avoid volume overload

## 2025-07-15 NOTE — ASSESSMENT & PLAN NOTE
Most recent A1c July 2025 6.3  SSI every 6 hours while NPO  Monitor Accu-Cheks closely, hypoglycemia protocol

## 2025-07-15 NOTE — ASSESSMENT & PLAN NOTE
60-year-old female presents to ED complaining of abdominal pain, N/V/D + 38 lb weight loss x 4 months  CT A/P revealing nonspecific distal small bowel enteritis likely infectious vs inflammatory  On-call GI recommending admission for further inpatient workup  VSS, WBC 11.71, potassium 3.4 otherwise labs unremarkable  Stool studies ordered, plan to follow-up  N.p.o., serial abdominal exams, 40 mg IV Protonix twice daily  Received 1 L IVF bolus in ED.  Euvolemic on exam.  In setting of history of CHF, will defer continuous IVF hydration at this time to avoid volume overload.  GI consulted, appreciate further recommendations

## 2025-07-16 ENCOUNTER — ANESTHESIA (INPATIENT)
Dept: GASTROENTEROLOGY | Facility: HOSPITAL | Age: 60
DRG: 392 | End: 2025-07-16
Payer: MEDICARE

## 2025-07-16 ENCOUNTER — ANESTHESIA EVENT (INPATIENT)
Dept: GASTROENTEROLOGY | Facility: HOSPITAL | Age: 60
DRG: 392 | End: 2025-07-16
Payer: MEDICARE

## 2025-07-16 ENCOUNTER — APPOINTMENT (INPATIENT)
Dept: GASTROENTEROLOGY | Facility: HOSPITAL | Age: 60
DRG: 392 | End: 2025-07-16
Attending: PHYSICIAN ASSISTANT
Payer: MEDICARE

## 2025-07-16 PROBLEM — Z99.81 OXYGEN DEPENDENT: Status: ACTIVE | Noted: 2025-07-16

## 2025-07-16 LAB
ALBUMIN SERPL BCG-MCNC: 3 G/DL (ref 3.5–5)
ALP SERPL-CCNC: 85 U/L (ref 34–104)
ALT SERPL W P-5'-P-CCNC: 15 U/L (ref 7–52)
ANION GAP SERPL CALCULATED.3IONS-SCNC: 7 MMOL/L (ref 4–13)
AST SERPL W P-5'-P-CCNC: 11 U/L (ref 13–39)
BASOPHILS # BLD AUTO: 0.03 THOUSANDS/ÂΜL (ref 0–0.1)
BASOPHILS NFR BLD AUTO: 0 % (ref 0–1)
BILIRUB SERPL-MCNC: 0.57 MG/DL (ref 0.2–1)
BUN SERPL-MCNC: 9 MG/DL (ref 5–25)
CALCIUM ALBUM COR SERPL-MCNC: 9.2 MG/DL (ref 8.3–10.1)
CALCIUM SERPL-MCNC: 8.4 MG/DL (ref 8.4–10.2)
CHLORIDE SERPL-SCNC: 104 MMOL/L (ref 96–108)
CO2 SERPL-SCNC: 28 MMOL/L (ref 21–32)
CREAT SERPL-MCNC: 0.84 MG/DL (ref 0.6–1.3)
EOSINOPHIL # BLD AUTO: 0.2 THOUSAND/ÂΜL (ref 0–0.61)
EOSINOPHIL NFR BLD AUTO: 3 % (ref 0–6)
ERYTHROCYTE [DISTWIDTH] IN BLOOD BY AUTOMATED COUNT: 14.6 % (ref 11.6–15.1)
GFR SERPL CREATININE-BSD FRML MDRD: 75 ML/MIN/1.73SQ M
GLUCOSE SERPL-MCNC: 135 MG/DL (ref 65–140)
GLUCOSE SERPL-MCNC: 142 MG/DL (ref 65–140)
GLUCOSE SERPL-MCNC: 85 MG/DL (ref 65–140)
GLUCOSE SERPL-MCNC: 88 MG/DL (ref 65–140)
GLUCOSE SERPL-MCNC: 89 MG/DL (ref 65–140)
HCT VFR BLD AUTO: 32 % (ref 34.8–46.1)
HGB BLD-MCNC: 10.9 G/DL (ref 11.5–15.4)
IMM GRANULOCYTES # BLD AUTO: 0.05 THOUSAND/UL (ref 0–0.2)
IMM GRANULOCYTES NFR BLD AUTO: 1 % (ref 0–2)
LYMPHOCYTES # BLD AUTO: 1.13 THOUSANDS/ÂΜL (ref 0.6–4.47)
LYMPHOCYTES NFR BLD AUTO: 15 % (ref 14–44)
MCH RBC QN AUTO: 30.9 PG (ref 26.8–34.3)
MCHC RBC AUTO-ENTMCNC: 34.1 G/DL (ref 31.4–37.4)
MCV RBC AUTO: 91 FL (ref 82–98)
MONOCYTES # BLD AUTO: 0.79 THOUSAND/ÂΜL (ref 0.17–1.22)
MONOCYTES NFR BLD AUTO: 10 % (ref 4–12)
NEUTROPHILS # BLD AUTO: 5.41 THOUSANDS/ÂΜL (ref 1.85–7.62)
NEUTS SEG NFR BLD AUTO: 71 % (ref 43–75)
NRBC BLD AUTO-RTO: 0 /100 WBCS
PLATELET # BLD AUTO: 275 THOUSANDS/UL (ref 149–390)
PMV BLD AUTO: 8.9 FL (ref 8.9–12.7)
POTASSIUM SERPL-SCNC: 2.9 MMOL/L (ref 3.5–5.3)
PROT SERPL-MCNC: 5.2 G/DL (ref 6.4–8.4)
RBC # BLD AUTO: 3.53 MILLION/UL (ref 3.81–5.12)
SODIUM SERPL-SCNC: 139 MMOL/L (ref 135–147)
WBC # BLD AUTO: 7.61 THOUSAND/UL (ref 4.31–10.16)

## 2025-07-16 PROCEDURE — 85025 COMPLETE CBC W/AUTO DIFF WBC: CPT | Performed by: FAMILY MEDICINE

## 2025-07-16 PROCEDURE — 0W3P8ZZ CONTROL BLEEDING IN GASTROINTESTINAL TRACT, VIA NATURAL OR ARTIFICIAL OPENING ENDOSCOPIC: ICD-10-PCS | Performed by: INTERNAL MEDICINE

## 2025-07-16 PROCEDURE — 45385 COLONOSCOPY W/LESION REMOVAL: CPT | Performed by: INTERNAL MEDICINE

## 2025-07-16 PROCEDURE — 0DB68ZX EXCISION OF STOMACH, VIA NATURAL OR ARTIFICIAL OPENING ENDOSCOPIC, DIAGNOSTIC: ICD-10-PCS | Performed by: INTERNAL MEDICINE

## 2025-07-16 PROCEDURE — 80053 COMPREHEN METABOLIC PANEL: CPT | Performed by: FAMILY MEDICINE

## 2025-07-16 PROCEDURE — 43239 EGD BIOPSY SINGLE/MULTIPLE: CPT | Performed by: INTERNAL MEDICINE

## 2025-07-16 PROCEDURE — 82948 REAGENT STRIP/BLOOD GLUCOSE: CPT

## 2025-07-16 PROCEDURE — 0DB78ZX EXCISION OF STOMACH, PYLORUS, VIA NATURAL OR ARTIFICIAL OPENING ENDOSCOPIC, DIAGNOSTIC: ICD-10-PCS | Performed by: INTERNAL MEDICINE

## 2025-07-16 PROCEDURE — 99232 SBSQ HOSP IP/OBS MODERATE 35: CPT | Performed by: FAMILY MEDICINE

## 2025-07-16 PROCEDURE — C1886 CATHETER, ABLATION: HCPCS

## 2025-07-16 PROCEDURE — 0DBN8ZX EXCISION OF SIGMOID COLON, VIA NATURAL OR ARTIFICIAL OPENING ENDOSCOPIC, DIAGNOSTIC: ICD-10-PCS | Performed by: INTERNAL MEDICINE

## 2025-07-16 PROCEDURE — 87505 NFCT AGENT DETECTION GI: CPT

## 2025-07-16 PROCEDURE — 0DBB8ZX EXCISION OF ILEUM, VIA NATURAL OR ARTIFICIAL OPENING ENDOSCOPIC, DIAGNOSTIC: ICD-10-PCS | Performed by: INTERNAL MEDICINE

## 2025-07-16 PROCEDURE — 88305 TISSUE EXAM BY PATHOLOGIST: CPT | Performed by: PATHOLOGY

## 2025-07-16 PROCEDURE — 0DBK8ZX EXCISION OF ASCENDING COLON, VIA NATURAL OR ARTIFICIAL OPENING ENDOSCOPIC, DIAGNOSTIC: ICD-10-PCS | Performed by: INTERNAL MEDICINE

## 2025-07-16 PROCEDURE — 0DB98ZX EXCISION OF DUODENUM, VIA NATURAL OR ARTIFICIAL OPENING ENDOSCOPIC, DIAGNOSTIC: ICD-10-PCS | Performed by: INTERNAL MEDICINE

## 2025-07-16 PROCEDURE — 0DBL8ZX EXCISION OF TRANSVERSE COLON, VIA NATURAL OR ARTIFICIAL OPENING ENDOSCOPIC, DIAGNOSTIC: ICD-10-PCS | Performed by: INTERNAL MEDICINE

## 2025-07-16 PROCEDURE — 45380 COLONOSCOPY AND BIOPSY: CPT | Performed by: INTERNAL MEDICINE

## 2025-07-16 RX ORDER — LIDOCAINE HYDROCHLORIDE 20 MG/ML
INJECTION, SOLUTION EPIDURAL; INFILTRATION; INTRACAUDAL; PERINEURAL AS NEEDED
Status: DISCONTINUED | OUTPATIENT
Start: 2025-07-16 | End: 2025-07-16

## 2025-07-16 RX ORDER — INSULIN LISPRO 100 [IU]/ML
1-5 INJECTION, SOLUTION INTRAVENOUS; SUBCUTANEOUS EVERY 6 HOURS SCHEDULED
Status: DISCONTINUED | OUTPATIENT
Start: 2025-07-16 | End: 2025-07-18 | Stop reason: HOSPADM

## 2025-07-16 RX ORDER — PROPOFOL 10 MG/ML
INJECTION, EMULSION INTRAVENOUS AS NEEDED
Status: DISCONTINUED | OUTPATIENT
Start: 2025-07-16 | End: 2025-07-16

## 2025-07-16 RX ORDER — POTASSIUM CHLORIDE 14.9 MG/ML
20 INJECTION INTRAVENOUS ONCE
Status: COMPLETED | OUTPATIENT
Start: 2025-07-16 | End: 2025-07-16

## 2025-07-16 RX ORDER — SODIUM CHLORIDE, SODIUM LACTATE, POTASSIUM CHLORIDE, CALCIUM CHLORIDE 600; 310; 30; 20 MG/100ML; MG/100ML; MG/100ML; MG/100ML
INJECTION, SOLUTION INTRAVENOUS CONTINUOUS PRN
Status: DISCONTINUED | OUTPATIENT
Start: 2025-07-16 | End: 2025-07-16

## 2025-07-16 RX ORDER — POTASSIUM CHLORIDE 14.9 MG/ML
20 INJECTION INTRAVENOUS ONCE
Status: COMPLETED | OUTPATIENT
Start: 2025-07-16 | End: 2025-07-17

## 2025-07-16 RX ORDER — PHENYLEPHRINE HCL IN 0.9% NACL 1 MG/10 ML
SYRINGE (ML) INTRAVENOUS AS NEEDED
Status: DISCONTINUED | OUTPATIENT
Start: 2025-07-16 | End: 2025-07-16

## 2025-07-16 RX ADMIN — PROPOFOL 20 MG: 10 INJECTION, EMULSION INTRAVENOUS at 12:38

## 2025-07-16 RX ADMIN — PROPOFOL 20 MG: 10 INJECTION, EMULSION INTRAVENOUS at 13:00

## 2025-07-16 RX ADMIN — POTASSIUM CHLORIDE 20 MEQ: 14.9 INJECTION, SOLUTION INTRAVENOUS at 11:06

## 2025-07-16 RX ADMIN — FLUTICASONE FUROATE AND VILANTEROL TRIFENATATE 1 PUFF: 100; 25 POWDER RESPIRATORY (INHALATION) at 08:50

## 2025-07-16 RX ADMIN — PROPOFOL 50 MG: 10 INJECTION, EMULSION INTRAVENOUS at 12:47

## 2025-07-16 RX ADMIN — ONDANSETRON 4 MG: 2 INJECTION INTRAMUSCULAR; INTRAVENOUS at 10:41

## 2025-07-16 RX ADMIN — HYDROMORPHONE HYDROCHLORIDE 0.2 MG: 0.2 INJECTION, SOLUTION INTRAMUSCULAR; INTRAVENOUS; SUBCUTANEOUS at 14:06

## 2025-07-16 RX ADMIN — PROPOFOL 30 MG: 10 INJECTION, EMULSION INTRAVENOUS at 12:43

## 2025-07-16 RX ADMIN — PROPOFOL 100 MG: 10 INJECTION, EMULSION INTRAVENOUS at 12:30

## 2025-07-16 RX ADMIN — POTASSIUM CHLORIDE 20 MEQ: 14.9 INJECTION, SOLUTION INTRAVENOUS at 08:41

## 2025-07-16 RX ADMIN — DICYCLOMINE HYDROCHLORIDE 10 MG: 10 CAPSULE ORAL at 14:05

## 2025-07-16 RX ADMIN — LIDOCAINE HYDROCHLORIDE 100 MG: 20 INJECTION, SOLUTION EPIDURAL; INFILTRATION; INTRACAUDAL; PERINEURAL at 12:30

## 2025-07-16 RX ADMIN — HYDROMORPHONE HYDROCHLORIDE 0.2 MG: 0.2 INJECTION, SOLUTION INTRAMUSCULAR; INTRAVENOUS; SUBCUTANEOUS at 21:13

## 2025-07-16 RX ADMIN — UMECLIDINIUM 1 PUFF: 62.5 AEROSOL, POWDER ORAL at 08:50

## 2025-07-16 RX ADMIN — DICYCLOMINE HYDROCHLORIDE 10 MG: 10 CAPSULE ORAL at 08:51

## 2025-07-16 RX ADMIN — SERTRALINE HYDROCHLORIDE 25 MG: 25 TABLET ORAL at 08:50

## 2025-07-16 RX ADMIN — Medication 100 MCG: at 12:30

## 2025-07-16 RX ADMIN — HYDROMORPHONE HYDROCHLORIDE 0.2 MG: 0.2 INJECTION, SOLUTION INTRAMUSCULAR; INTRAVENOUS; SUBCUTANEOUS at 08:50

## 2025-07-16 RX ADMIN — PROPOFOL 20 MG: 10 INJECTION, EMULSION INTRAVENOUS at 12:58

## 2025-07-16 RX ADMIN — ATORVASTATIN CALCIUM 40 MG: 40 TABLET, FILM COATED ORAL at 08:50

## 2025-07-16 RX ADMIN — HYDROMORPHONE HYDROCHLORIDE 0.2 MG: 0.2 INJECTION, SOLUTION INTRAMUSCULAR; INTRAVENOUS; SUBCUTANEOUS at 05:04

## 2025-07-16 RX ADMIN — ONDANSETRON 4 MG: 2 INJECTION INTRAMUSCULAR; INTRAVENOUS at 00:16

## 2025-07-16 RX ADMIN — HEPARIN SODIUM 5000 UNITS: 5000 INJECTION INTRAVENOUS; SUBCUTANEOUS at 14:05

## 2025-07-16 RX ADMIN — HEPARIN SODIUM 5000 UNITS: 5000 INJECTION INTRAVENOUS; SUBCUTANEOUS at 21:13

## 2025-07-16 RX ADMIN — PROPOFOL 20 MG: 10 INJECTION, EMULSION INTRAVENOUS at 12:36

## 2025-07-16 RX ADMIN — PROPOFOL 50 MG: 10 INJECTION, EMULSION INTRAVENOUS at 12:55

## 2025-07-16 RX ADMIN — TOPICAL ANESTHETIC 1 SPRAY: 200 SPRAY DENTAL; PERIODONTAL at 12:26

## 2025-07-16 RX ADMIN — DICYCLOMINE HYDROCHLORIDE 10 MG: 10 CAPSULE ORAL at 18:06

## 2025-07-16 RX ADMIN — PROPOFOL 30 MG: 10 INJECTION, EMULSION INTRAVENOUS at 12:41

## 2025-07-16 RX ADMIN — DICYCLOMINE HYDROCHLORIDE 10 MG: 10 CAPSULE ORAL at 21:13

## 2025-07-16 RX ADMIN — ONDANSETRON 4 MG: 2 INJECTION INTRAMUSCULAR; INTRAVENOUS at 18:43

## 2025-07-16 RX ADMIN — PROPOFOL 30 MG: 10 INJECTION, EMULSION INTRAVENOUS at 12:50

## 2025-07-16 RX ADMIN — LEVOTHYROXINE SODIUM 50 MCG: 0.05 TABLET ORAL at 08:50

## 2025-07-16 RX ADMIN — SODIUM CHLORIDE, SODIUM LACTATE, POTASSIUM CHLORIDE, AND CALCIUM CHLORIDE: .6; .31; .03; .02 INJECTION, SOLUTION INTRAVENOUS at 11:54

## 2025-07-16 RX ADMIN — PANTOPRAZOLE SODIUM 40 MG: 40 INJECTION, POWDER, FOR SOLUTION INTRAVENOUS at 08:50

## 2025-07-16 RX ADMIN — ACETAMINOPHEN 1000 MG: 10 INJECTION INTRAVENOUS at 00:14

## 2025-07-16 RX ADMIN — METOPROLOL SUCCINATE 50 MG: 50 TABLET, EXTENDED RELEASE ORAL at 08:51

## 2025-07-16 RX ADMIN — HEPARIN SODIUM 5000 UNITS: 5000 INJECTION INTRAVENOUS; SUBCUTANEOUS at 05:04

## 2025-07-16 RX ADMIN — HYDROMORPHONE HYDROCHLORIDE 0.2 MG: 0.2 INJECTION, SOLUTION INTRAMUSCULAR; INTRAVENOUS; SUBCUTANEOUS at 18:06

## 2025-07-16 RX ADMIN — PANTOPRAZOLE SODIUM 40 MG: 40 INJECTION, POWDER, FOR SOLUTION INTRAVENOUS at 21:13

## 2025-07-16 RX ADMIN — Medication 100 MCG: at 12:44

## 2025-07-16 NOTE — PLAN OF CARE
Problem: PAIN - ADULT  Goal: Verbalizes/displays adequate comfort level or baseline comfort level  Description: Interventions:  - Encourage patient to monitor pain and request assistance  - Assess pain using appropriate pain scale  - Administer analgesics as ordered based on type and severity of pain and evaluate response  - Implement non-pharmacological measures as appropriate and evaluate response  - Consider cultural and social influences on pain and pain management  - Notify physician/advanced practitioner if interventions unsuccessful or patient reports new pain  - Educate patient/family on pain management process including their role and importance of  reporting pain   - Provide non-pharmacologic/complimentary pain relief interventions  Outcome: Progressing     Problem: INFECTION - ADULT  Goal: Absence or prevention of progression during hospitalization  Description: INTERVENTIONS:  - Assess and monitor for signs and symptoms of infection  - Monitor lab/diagnostic results  - Monitor all insertion sites, i.e. indwelling lines, tubes, and drains  - Monitor endotracheal if appropriate and nasal secretions for changes in amount and color  - Epworth appropriate cooling/warming therapies per order  - Administer medications as ordered  - Instruct and encourage patient and family to use good hand hygiene technique  - Identify and instruct in appropriate isolation precautions for identified infection/condition  Outcome: Progressing  Goal: Absence of fever/infection during neutropenic period  Description: INTERVENTIONS:  - Monitor WBC  - Perform strict hand hygiene  - Limit to healthy visitors only  - No plants, dried, fresh or silk flowers with edward in patient room  Outcome: Progressing     Problem: SAFETY ADULT  Goal: Patient will remain free of falls  Description: INTERVENTIONS:  - Educate patient/family on patient safety including physical limitations  - Instruct patient to call for assistance with activity   -  Consider consulting OT/PT to assist with strengthening/mobility based on AM PAC & JH-HLM score  - Consult OT/PT to assist with strengthening/mobility   - Keep Call bell within reach  - Keep bed low and locked with side rails adjusted as appropriate  - Keep care items and personal belongings within reach  - Initiate and maintain comfort rounds  - Make Fall Risk Sign visible to staff  - Offer Toileting every 2 Hours, in advance of need  - Initiate/Maintain bed/chair alarm  - Obtain necessary fall risk management equipment  - Apply yellow socks and bracelet for high fall risk patients  - Consider moving patient to room near nurses station  Outcome: Progressing  Goal: Maintain or return to baseline ADL function  Description: INTERVENTIONS:  -  Assess patient's ability to carry out ADLs; assess patient's baseline for ADL function and identify physical deficits which impact ability to perform ADLs (bathing, care of mouth/teeth, toileting, grooming, dressing, etc.)  - Assess/evaluate cause of self-care deficits   - Assess range of motion  - Assess patient's mobility; develop plan if impaired  - Assess patient's need for assistive devices and provide as appropriate  - Encourage maximum independence but intervene and supervise when necessary  - Involve family in performance of ADLs  - Assess for home care needs following discharge   - Consider OT consult to assist with ADL evaluation and planning for discharge  - Provide patient education as appropriate  - Monitor functional capacity and physical performance, use of AM PAC & JH-HLM   - Monitor gait, balance and fatigue with ambulation    Outcome: Progressing  Goal: Maintains/Returns to pre admission functional level  Description: INTERVENTIONS:  - Perform AM-PAC 6 Click Basic Mobility/ Daily Activity assessment daily.  - Set and communicate daily mobility goal to care team and patient/family/caregiver.   - Collaborate with rehabilitation services on mobility goals if  consulted  - Perform Range of Motion 3 times a day.  - Reposition patient every 2 hours.  - Dangle patient 3 times a day  - Stand patient 3 times a day  - Ambulate patient 3 times a day  - Out of bed to chair 3 times a day   - Out of bed for meals 3 times a day  - Out of bed for toileting  - Record patient progress and toleration of activity level   Outcome: Progressing     Problem: DISCHARGE PLANNING  Goal: Discharge to home or other facility with appropriate resources  Description: INTERVENTIONS:  - Identify barriers to discharge w/patient and caregiver  - Arrange for needed discharge resources and transportation as appropriate  - Identify discharge learning needs (meds, wound care, etc.)  - Arrange for interpretive services to assist at discharge as needed  - Refer to Case Management Department for coordinating discharge planning if the patient needs post-hospital services based on physician/advanced practitioner order or complex needs related to functional status, cognitive ability, or social support system  Outcome: Progressing     Problem: Knowledge Deficit  Goal: Patient/family/caregiver demonstrates understanding of disease process, treatment plan, medications, and discharge instructions  Description: Complete learning assessment and assess knowledge base.  Interventions:  - Provide teaching at level of understanding  - Provide teaching via preferred learning methods  Outcome: Progressing     Problem: Nutrition/Hydration-ADULT  Goal: Nutrient/Hydration intake appropriate for improving, restoring or maintaining nutritional needs  Description: Monitor and assess patient's nutrition/hydration status for malnutrition. Collaborate with interdisciplinary team and initiate plan and interventions as ordered.  Monitor patient's weight and dietary intake as ordered or per policy. Utilize nutrition screening tool and intervene as necessary. Determine patient's food preferences and provide high-protein, high-caloric  foods as appropriate.     INTERVENTIONS:  - Monitor oral intake, urinary output, labs, and treatment plans  - Assess nutrition and hydration status and recommend course of action  - Evaluate amount of meals eaten  - Assist patient with eating if necessary   - Allow adequate time for meals  - Recommend/ encourage appropriate diets, oral nutritional supplements, and vitamin/mineral supplements  - Order, calculate, and assess calorie counts as needed  - Recommend, monitor, and adjust tube feedings and TPN/PPN based on assessed needs  - Assess need for intravenous fluids  - Provide specific nutrition/hydration education as appropriate  - Include patient/family/caregiver in decisions related to nutrition  Outcome: Progressing     Problem: GASTROINTESTINAL - ADULT  Goal: Minimal or absence of nausea and/or vomiting  Description: INTERVENTIONS:  - Administer IV fluids if ordered to ensure adequate hydration  - Maintain NPO status until nausea and vomiting are resolved  - Nasogastric tube if ordered  - Administer ordered antiemetic medications as needed  - Provide nonpharmacologic comfort measures as appropriate  - Advance diet as tolerated, if ordered  - Consider nutrition services referral to assist patient with adequate nutrition and appropriate food choices  Outcome: Progressing  Goal: Maintains or returns to baseline bowel function  Description: INTERVENTIONS:  - Assess bowel function  - Encourage oral fluids to ensure adequate hydration  - Administer IV fluids if ordered to ensure adequate hydration  - Administer ordered medications as needed  - Encourage mobilization and activity  - Consider nutritional services referral to assist patient with adequate nutrition and appropriate food choices  Outcome: Progressing  Goal: Maintains adequate nutritional intake  Description: INTERVENTIONS:  - Monitor percentage of each meal consumed  - Identify factors contributing to decreased intake, treat as appropriate  - Assist with  meals as needed  - Monitor I&O, weight, and lab values if indicated  - Obtain nutrition services referral as needed  Outcome: Progressing     Problem: METABOLIC, FLUID AND ELECTROLYTES - ADULT  Goal: Electrolytes maintained within normal limits  Description: INTERVENTIONS:  - Monitor labs and assess patient for signs and symptoms of electrolyte imbalances  - Administer electrolyte replacement as ordered  - Monitor response to electrolyte replacements, including repeat lab results as appropriate  - Instruct patient on fluid and nutrition as appropriate  Outcome: Progressing      PRN/set up only required

## 2025-07-16 NOTE — ANESTHESIA PREPROCEDURE EVALUATION
Procedure:  COLONOSCOPY  EGD    Admitted 7/14/25 with intractable abdominal pain with nausea and diarrhea x 4 months.  Reports >30 lbs weight loss in that timeframe.    Relevant Problems   ANESTHESIA (within normal limits)      CARDIO   (+) Chest pain   (+) Thrombus of left radial artery (HCC)      ENDO   (+) Hypothyroidism      GI/HEPATIC   (+) Gastroesophageal reflux disease without esophagitis      /RENAL (within normal limits)      HEMATOLOGY   (+) Anemia   (+) Iron deficiency anemia      MUSCULOSKELETAL   (+) Chronic back pain      NEURO/PSYCH   (+) Chronic back pain      PULMONARY  Used trelegy this AM, typically requires use of rescue inhaler ~1x per week  Uses 3L nc at baseline   (+) COPD (chronic obstructive pulmonary disease) (HCC)   (+) COPD exacerbation (HCC)   (+) Chronic respiratory failure (HCC)   (+) Oxygen dependent   (+) SOB (shortness of breath)   (+) Simple chronic bronchitis (HCC)      Rheumatology   (+) Systemic lupus erythematosus with lung involvement (HCC)      Cardiovascular/Peripheral Vascular   (+) Chronic diastolic CHF (congestive heart failure) ([facility])      Other   (+) Class 3 severe obesity due to excess calories with serious comorbidity and body mass index (BMI) of 45.0 to 49.9 in adult        Physical Exam    Airway     Mallampati score: II  TM Distance: >3 FB  Neck ROM: limited extension  Mouth opening: >= 4 cm      Cardiovascular  Rhythm: regular, Rate: normal    Dental        Pulmonary   Decreased breath sounds, No wheezes    Neurological    She appears awake and alert.      Other Findings  post-pubertal.      Anesthesia Plan  ASA Score- 4     Anesthesia Type- IV sedation with anesthesia with ASA Monitors.         Additional Monitors:     Airway Plan: natural airway.           Plan Factors-Exercise tolerance (METS): <4 METS.    Chart reviewed. EKG reviewed.  Existing labs reviewed.     Patient is a current smoker.  Patient did not smoke on day of surgery  (inpatient).            Induction- intravenous.    Postoperative Plan- .   Monitoring Plan - Monitoring plan - standard ASA monitoring  Post Operative Pain Plan - non-opiod analgesics    Perioperative Resuscitation Plan - Level 1 - Full Code.       Informed Consent- Anesthetic plan and risks discussed with patient.  I personally reviewed this patient with the CRNA. Discussed and agreed on the Anesthesia Plan with the CRNA..      NPO Status:  Vitals Value Taken Time   Date of last liquid 07/16/25 07/16/25 11:46   Time of last liquid 0900 07/16/25 11:46   Date of last solid 07/11/25 07/16/25 11:46   Time of last solid         TTE 4/16/25:  Interpretation Summary    Left Ventricle: Left ventricular cavity size is normal. Wall thickness is increased. The left ventricular ejection fraction is 65%. Systolic function is normal. Wall motion is normal.      Lab Results   Component Value Date    WBC 7.61 07/16/2025    HGB 10.9 (L) 07/16/2025    HCT 32.0 (L) 07/16/2025    MCV 91 07/16/2025     07/16/2025     Lab Results   Component Value Date    SODIUM 139 07/16/2025    K 2.9 (L) 07/16/2025     07/16/2025    CO2 28 07/16/2025    BUN 9 07/16/2025    CREATININE 0.84 07/16/2025    GLUC 89 07/16/2025    CALCIUM 8.4 07/16/2025     Lab Results   Component Value Date    ALT 15 07/16/2025    AST 11 (L) 07/16/2025    ALKPHOS 85 07/16/2025     Lab Results   Component Value Date    INR 1 05/07/2025    INR 0.9 03/21/2025    INR 0.9 03/11/2025    PROTIME 12.6 01/29/2025    PROTIME 13.7 01/18/2024    PROTIME 13.5 01/14/2024     Lab Results   Component Value Date    HGBA1C 6.3 (H) 07/03/2025

## 2025-07-16 NOTE — ASSESSMENT & PLAN NOTE
Most recent A1c July 2025 6.3  Hold metformin during hospitalization  SSI every 6 hours while NPO  Monitor Accu-Cheks closely, hypoglycemia protocol

## 2025-07-16 NOTE — ASSESSMENT & PLAN NOTE
Continue PTA inhalers : As needed albuterol, fluticasone-vilanterol, umeclinidium  Currently on 3 L NC at baseline satting appropriately

## 2025-07-16 NOTE — ASSESSMENT & PLAN NOTE
Wt Readings from Last 3 Encounters:   07/14/25 95 kg (209 lb 7 oz)   05/18/25 100 kg (220 lb 10.9 oz)   04/16/25 102 kg (225 lb)   Most recent echo April 2025 revealing LVEF 65%  Maintained on 40 mg p.o. Lasix twice daily as outpatient.  Patient states she has missed a few doses within past week as she has not been eating and drinking well and has not been able to tolerate p.o. intake due to n/v.  Euvolemic on exam  Will hold p.o. Lasix for now in setting of poor p.o. intake to avoid hypovolemia or hypotension  Received 1 L IVF bolus in ED, will defer further IVF hydration to avoid volume overload

## 2025-07-16 NOTE — ANESTHESIA POSTPROCEDURE EVALUATION
Post-Op Assessment Note    CV Status:  Stable    Pain management: adequate       Mental Status:  Alert and awake   Hydration Status:  Stable   PONV Controlled:  None   Airway Patency:  Patent     Post Op Vitals Reviewed: Yes    No anethesia notable event occurred.    Staff: Anesthesiologist           Last Filed PACU Vitals:  Vitals Value Taken Time   Temp 97.9 °F (36.6 °C) 07/16/25 13:06   Pulse 72 07/16/25 13:06   BP 89/47 07/16/25 13:06   Resp 22 07/16/25 13:06   SpO2 100 % 07/16/25 13:06       Modified Blaze:     Vitals Value Taken Time   Activity 2 07/16/25 13:06   Respiration 2 07/16/25 13:06   Circulation 2 07/16/25 13:06   Consciousness 1 07/16/25 13:06   Oxygen Saturation 1 07/16/25 13:06     Modified Blaze Score: 8

## 2025-07-16 NOTE — ASSESSMENT & PLAN NOTE
Secondary to COPD  Currently SpO2: 97 % on Nasal Cannula O2 Flow Rate (L/min): 3 L/min, at baseline

## 2025-07-16 NOTE — PROGRESS NOTES
Progress Note - Hospitalist   Name: Nanci Navarro 60 y.o. female I MRN: [id number]  Unit/Bed#: -[facility] I Date of Admission: 7/14/2025   Date of Service: 7/16/2025 I Hospital Day: 1    Assessment & Plan  Intractable lower abdominal pain  60-yo with significant abdominal pain, nausea, vomiting, diarrhea.    Patient reports 35 pound weight loss in the past 3 months due to the symptoms.     CTAP shows nonspecific distal small bowel enteritis likely infectious or inflammatory.  Follow-up stool C. difficile, enteric panel  Added Bentyl 10 mg 4 times daily, as needed Tylenol/tramadol/Dilaudid for pain control   IV PPI twice daily  GI consulted; plan repeat EGD and colonoscopy with biopsy of the terminal ileum today  Monitor and correct electrolytes  Gastroesophageal reflux disease without esophagitis  40 mg IV Protonix twice daily  Diabetes mellitus (HCC)  Most recent A1c July 2025 6.3  Hold metformin during hospitalization  SSI every 6 hours while NPO  Monitor Accu-Cheks closely, hypoglycemia protocol  Chronic respiratory failure (HCC)  Secondary to COPD  Currently SpO2: 97 % on Nasal Cannula O2 Flow Rate (L/min): 3 L/min, at baseline  Hypothyroidism  Continue levothyroxine  COPD (chronic obstructive pulmonary disease) ([facility])  Continue PTA inhalers : As needed albuterol, fluticasone-vilanterol, umeclinidium  Currently on 3 L NC at baseline satting appropriately  Chronic diastolic CHF (congestive heart failure) ([facility])  Wt Readings from Last 3 Encounters:   07/14/25 95 kg (209 lb 7 oz)   05/18/25 100 kg (220 lb 10.9 oz)   04/16/25 102 kg (225 lb)   Most recent echo April 2025 revealing LVEF 65%  Maintained on 40 mg p.o. Lasix twice daily as outpatient.  Patient states she has missed a few doses within past week as she has not been eating and drinking well and has not been able to tolerate p.o. intake due to n/v.  Euvolemic on exam  Will hold p.o. Lasix for now in setting of poor p.o. intake to avoid hypovolemia or  hypotension  Received 1 L IVF bolus in ED, will defer further IVF hydration to avoid volume overload    VTE Pharmacologic Prophylaxis: VTE Score: 3 heparin    Mobility:   Basic Mobility Inpatient Raw Score: 21  JH-HLM Goal: 6: Walk 10 steps or more  JH-HLM Achieved: 6: Walk 10 steps or more  JH-HLM Goal achieved. Continue to encourage appropriate mobility.    Patient Centered Rounds: I performed bedside rounds with nursing staff today.   Discussions with Specialists or Other Care Team Provider: GI    Education and Discussions with Family / Patient: patient    Current Length of Stay: 1 day(s)  Current Patient Status: Inpatient   Certification Statement: The patient will continue to require additional inpatient hospital stay due to egd, colo today  Discharge Plan: Anticipate discharge in 24-48 hrs to home with home services.    Code Status: Level 1 - Full Code    Subjective   Still reporting intractable epigastric pain  Has received colon prep overnight for colonoscopy  Still feeling nauseous although no episodes of vomiting      Temp:  [98 °F (36.7 °C)-99.8 °F (37.7 °C)] 98 °F (36.7 °C)  HR:  [58-95] 82  BP: (100-147)/(59-78) 118/74  Resp:  [20-21] 20  SpO2:  [93 %-97 %] 97 %  O2 Device: Nasal cannula  Nasal Cannula O2 Flow Rate (L/min):  [3 L/min] 3 L/min    Body mass index is 45.32 kg/m².     Input and Output Summary (last 24 hours):     Intake/Output Summary (Last 24 hours) at 7/16/2025 1136  Last data filed at 7/16/2025 1111  Gross per 24 hour   Intake 51.67 ml   Output --   Net 51.67 ml       Physical Exam  Constitutional:       General: She is not in acute distress.     Appearance: She is obese.   HENT:      Mouth/Throat:      Mouth: Mucous membranes are moist.     Cardiovascular:      Rate and Rhythm: Normal rate and regular rhythm.   Pulmonary:      Effort: Pulmonary effort is normal. No respiratory distress.      Breath sounds: Normal breath sounds.   Abdominal:      General: Abdomen is flat. There is no  distension.      Palpations: Abdomen is soft.      Tenderness: There is abdominal tenderness. There is no guarding.     Musculoskeletal:      Right lower leg: No edema.      Left lower leg: No edema.     Neurological:      General: No focal deficit present.      Mental Status: She is alert and oriented to person, place, and time.       Lines/Drains:      Lab Results: I have reviewed the following results:   Results from last 7 days   Lab Units 07/16/25  0559   WBC Thousand/uL 7.61   HEMOGLOBIN g/dL 10.9*   HEMATOCRIT % 32.0*   PLATELETS Thousands/uL 275   SEGS PCT % 71   LYMPHO PCT % 15   MONO PCT % 10   EOS PCT % 3     Results from last 7 days   Lab Units 07/16/25  0559   SODIUM mmol/L 139   POTASSIUM mmol/L 2.9*   CHLORIDE mmol/L 104   CO2 mmol/L 28   BUN mg/dL 9   CREATININE mg/dL 0.84   ANION GAP mmol/L 7   CALCIUM mg/dL 8.4   ALBUMIN g/dL 3.0*   TOTAL BILIRUBIN mg/dL 0.57   ALK PHOS U/L 85   ALT U/L 15   AST U/L 11*   GLUCOSE RANDOM mg/dL 89         Results from last 7 days   Lab Units 07/16/25  1109 07/14/25  2043   POC GLUCOSE mg/dl 85 110         Results from last 7 days   Lab Units 07/14/25  1532   LACTIC ACID mmol/L 1.4       Recent Cultures (last 7 days):           Last 24 Hours Medication List:     Current Facility-Administered Medications:     acetaminophen (Ofirmev) injection 1,000 mg, Q6H PRN, Last Rate: 1,000 mg (07/16/25 0014)    albuterol (PROVENTIL HFA,VENTOLIN HFA) inhaler 2 puff, Q4H PRN    atorvastatin (LIPITOR) tablet 40 mg, Daily    dicyclomine (BENTYL) capsule 10 mg, 4x Daily (AC & HS)    Fluticasone Furoate-Vilanterol 100-25 mcg/actuation 1 puff, Daily **AND** umeclidinium 62.5 mcg/actuation inhaler AEPB 1 puff, Daily    [Held by provider] furosemide (LASIX) tablet 40 mg, Daily    heparin (porcine) subcutaneous injection 5,000 Units, Q8H TANVIR    HYDROmorphone HCl (DILAUDID) injection 0.2 mg, Q3H PRN    insulin lispro (HumALOG/ADMELOG) 100 units/mL subcutaneous injection 1-5 Units, Q6H TANVIR  **AND** Fingerstick Glucose (POCT), Q6H    levothyroxine tablet 50 mcg, Daily    metoprolol succinate (TOPROL-XL) 24 hr tablet 50 mg, Daily    ondansetron (ZOFRAN) injection 4 mg, Q6H PRN    pantoprazole (PROTONIX) injection 40 mg, Q12H TANVIR    potassium chloride 20 mEq IVPB (premix), Once, Last Rate: 50 mL/hr at 07/16/25 1108    sertraline (ZOLOFT) tablet 25 mg, Daily    traMADol (ULTRAM) tablet 50 mg, Q6H PRN    Administrative Statements   Today, Patient Was Seen By: Kevon Hidalgo MD      **Please Note: This note may have been constructed using a voice recognition system.**

## 2025-07-16 NOTE — ASSESSMENT & PLAN NOTE
60-yo with significant abdominal pain, nausea, vomiting, diarrhea.    Patient reports 35 pound weight loss in the past 3 months due to the symptoms.     CTAP shows nonspecific distal small bowel enteritis likely infectious or inflammatory.  Follow-up stool C. difficile, enteric panel  Added Bentyl 10 mg 4 times daily, as needed Tylenol/tramadol/Dilaudid for pain control   IV PPI twice daily  GI consulted; plan repeat EGD and colonoscopy with biopsy of the terminal ileum today  Monitor and correct electrolytes

## 2025-07-16 NOTE — CASE MANAGEMENT
Case Management Assessment & Discharge Planning Note    Patient name Nanci Navarro  Location /-01 MRN [id number]  : 1965 Date 2025       Current Admission Date: 2025  Current Admission Diagnosis:Intractable lower abdominal pain   Patient Active Problem List    Diagnosis Date Noted    Oxygen dependent 2025    Chronic diastolic CHF (congestive heart failure) ([facility]) 2025    Diarrhea 2025    Nausea and vomiting 2025    SIRS (systemic inflammatory response syndrome) ([facility]) 05/15/2025    COPD (chronic obstructive pulmonary disease) ([facility]) 05/15/2025    Iron deficiency anemia 2025    COPD exacerbation ([facility]) 04/15/2025    Hypothyroidism 2025    Thrombus of left radial artery ([facility]) 2024    SOB (shortness of breath) 2024    Chronic back pain 2024    Class 3 severe obesity due to excess calories with serious comorbidity and body mass index (BMI) of 45.0 to 49.9 in adult 2024    Non-compliant behavior 2024    Discharge planning issues 2024    Anemia 2024    Intractable lower abdominal pain 2024    Diabetes mellitus ([facility]) 2024    Osteopenia of lumbar spine 2023    Gastroesophageal reflux disease without esophagitis 2023    Chest pain 01/15/2023    Volume overload 01/15/2023    Ambulatory dysfunction 2023    Compression fracture of L1 vertebra ([facility]) 2023    Systemic lupus erythematosus with lung involvement ([facility]) 12/10/2022    Chronic respiratory failure ([facility]) 10/06/2022    Hypokalemia 10/04/2022    Simple chronic bronchitis ([facility]) 2022    Smoker 2022      LOS (days): 1  Geometric Mean LOS (GMLOS) (days): 2.5  Days to GMLOS:1.5     OBJECTIVE:    Risk of Unplanned Readmission Score: 24.9        Current admission status: Inpatient     Preferred Pharmacy:   [facility] - [place], [place] - [address]  [address]  [place]  [address]  Phone: [phone] Fax: [phone]    [id number] - [address] [address]  Phone: [phone] Fax: [phone]    Primary Care Provider: Gladys Yeung    Primary Insurance: MEDICARE  Secondary Insurance: AMBETTER    ASSESSMENT:  Active Health Care Proxies    There are no active Health Care Proxies on file.       Advance Directives  Does patient have a Health Care POA?: No  Was patient offered paperwork?: No (Patient reports she needs to consider who she wants as her HCR. She's considering asking her friend Nanci. She's not ready to complete paperwork at this time.)  Does patient currently have a Health Care decision maker?: No  Does patient have Advance Directives?: No  Was patient offered paperwork?: No (see above)  Primary Contact: dtr Izabel or friend Nanci         Readmission Root Cause  30 Day Readmission: No    Patient Information  Admitted from:: Home  Mental Status: Alert  During Assessment patient was accompanied by: Not accompanied during assessment  Assessment information provided by:: Patient  Primary Caregiver: Self  Support Systems: Daughter, Friend, Home care staff  County of Residence: [place]  What city do you live in?: [place]  Home entry access options. Select all that apply.: Stairs  Number of steps to enter home.: 2  Do the steps have railings?: Yes  Type of Current Residence: Apartment  Floor Level: 1  Upon entering residence, is there a bedroom on the main floor (no further steps)?: Yes  Upon entering residence, is there a bathroom on the main floor (no further steps)?: Yes  Living Arrangements: Lives Alone    Activities of Daily Living Prior to Admission  Functional Status: Independent  Completes ADLs independently?: Yes  Ambulates independently?: Yes  Does patient use assisted devices?: Yes  Assisted Devices (DME) used: Straight Cane, Home Oxygen concentrator  DME Company Name (respiratory  supplies): [employer]  O2 Rate(s): 3L at night and PRN during the day  Does patient currently own DME?: Yes  What DME does the patient currently own?: Straight Cane, Home Oxygen concentrator, Portable Oxygen concentrator, Walker (Patient reports her POC is broken and she's contacted the DME company to no avail. She declined CM offer to contact [employer] to try and assist.)  Does the patient have a history of Short-Term Rehab?: No  Does patient have a history of HHC?: Yes  Does patient currently have HHC?: Yes    Current Home Health Care  Type of Current Home Care Services: Nurse visit, Home OT, Home PT  Home Health Agency Name:: [facility]  Current Home Health Follow-Up Provider:: PCP    Patient Information Continued  Does patient have prescription coverage?: Yes  Can the patient afford their medications and any related supplies (such as glucometers or test strips)?: Yes  Does patient receive dialysis treatments?: No  Does patient have a history of substance abuse?: No  Does patient have a history of Mental Health Diagnosis?: No         Means of Transportation  Means of Transport to [facility] [place]:: Drives Self (when patient has a car; she does not have a working car at the moment)    DISCHARGE DETAILS:    Discharge planning discussed with:: patient at bedside  Freedom of Choice: Yes  Comments - Freedom of Choice: CM met w/ pt at bedside to introduce self/role. Patient is current w/ [facility] for SN/PT/OT and she would like to resume care with them upon discharge. She would prefer to use [facility] [address] for any dc meds. No other CM needs identified at this time but CM Dept will continue to follow.  CM contacted family/caregiver?: No- see comments (Patient declines)  Were Treatment Team discharge recommendations reviewed with patient/caregiver?: Yes  Did patient/caregiver verbalize understanding of patient care needs?: Yes  Were patient/caregiver advised of the risks associated with not following Treatment Team  discharge recommendations?: Yes         Requested Home Health Care         Is the patient interested in [facility] at discharge?: Yes  Home Health Discipline requested:: Nursing, Occupational Therapy, Physical Therapy  Home Health Agency Name:: [facility]  HHA External Referral Reason (only applicable if external HHA name selected): Patient has established relationship with provider  Home Health Follow-Up Provider:: PCP  Home Health Services Needed:: COPD Management, Diabetes Management, Evaluate Functional Status and Safety, Gait/ADL Training, Heart Failure Management, Oxygen Via Nasal Cannula, Strengthening/Theraputic Exercises to Improve Function  Oxygen LPM Ordered (if applicable based on home health services needed):: 3 LPM  Homebound Criteria Met:: Requires the Assistance of Another Person for Safe Ambulation or to Leave the Home, Uses an Assist Device (i.e. cane, walker, etc)  Supporting Clincal Findings:: Dyspnea with Exertion, Limited Endurance, Fatigues Easliy in Short Distances, Requires Oxygen    DME Referral Provided  Referral made for DME?: No    Other Referral/Resources/Interventions Provided:  Interventions: HHC  Referral Comments: Referral sent to [facility] for [facility] w/ SN/PT/OT.    Would you like to participate in our [facility] service program?  : No - Declined    Treatment Team Recommendation: Home with Home Health Care  Expected Discharge Disposition: Home Health Services     Transport at Discharge : Other (Comment) (patient's friend may be able to transport depending on when she is cleared)

## 2025-07-17 ENCOUNTER — TREATMENT (OUTPATIENT)
Dept: GASTROENTEROLOGY | Facility: CLINIC | Age: 60
End: 2025-07-17

## 2025-07-17 DIAGNOSIS — K58.0 IRRITABLE BOWEL SYNDROME WITH DIARRHEA: Primary | ICD-10-CM

## 2025-07-17 LAB
ANION GAP SERPL CALCULATED.3IONS-SCNC: 7 MMOL/L (ref 4–13)
BASOPHILS # BLD MANUAL: 0 THOUSAND/UL (ref 0–0.1)
BASOPHILS NFR MAR MANUAL: 0 % (ref 0–1)
BUN SERPL-MCNC: 9 MG/DL (ref 5–25)
C COLI+JEJUNI TUF STL QL NAA+PROBE: NEGATIVE
C DIFF TOX GENS STL QL NAA+PROBE: NEGATIVE
CALCIUM SERPL-MCNC: 8.1 MG/DL (ref 8.4–10.2)
CHLORIDE SERPL-SCNC: 105 MMOL/L (ref 96–108)
CO2 SERPL-SCNC: 25 MMOL/L (ref 21–32)
CREAT SERPL-MCNC: 0.95 MG/DL (ref 0.6–1.3)
EC STX1+STX2 GENES STL QL NAA+PROBE: NEGATIVE
EOSINOPHIL # BLD MANUAL: 0 THOUSAND/UL (ref 0–0.4)
EOSINOPHIL NFR BLD MANUAL: 0 % (ref 0–6)
ERYTHROCYTE [DISTWIDTH] IN BLOOD BY AUTOMATED COUNT: 14.7 % (ref 11.6–15.1)
GFR SERPL CREATININE-BSD FRML MDRD: 65 ML/MIN/1.73SQ M
GLUCOSE SERPL-MCNC: 108 MG/DL (ref 65–140)
GLUCOSE SERPL-MCNC: 121 MG/DL (ref 65–140)
GLUCOSE SERPL-MCNC: 150 MG/DL (ref 65–140)
GLUCOSE SERPL-MCNC: 156 MG/DL (ref 65–140)
GLUCOSE SERPL-MCNC: 167 MG/DL (ref 65–140)
HCT VFR BLD AUTO: 31.9 % (ref 34.8–46.1)
HGB BLD-MCNC: 10.6 G/DL (ref 11.5–15.4)
LYMPHOCYTES # BLD AUTO: 1 THOUSAND/UL (ref 0.6–4.47)
LYMPHOCYTES # BLD AUTO: 9 % (ref 14–44)
MCH RBC QN AUTO: 29.9 PG (ref 26.8–34.3)
MCHC RBC AUTO-ENTMCNC: 33.2 G/DL (ref 31.4–37.4)
MCV RBC AUTO: 90 FL (ref 82–98)
METAMYELOCYTE ABSOLUTE CT: 0.2 THOUSAND/UL (ref 0–0.1)
METAMYELOCYTES NFR BLD MANUAL: 2 % (ref 0–1)
MONOCYTES # BLD AUTO: 0.8 THOUSAND/UL (ref 0–1.22)
MONOCYTES NFR BLD: 8 % (ref 4–12)
NEUTROPHILS # BLD MANUAL: 7.97 THOUSAND/UL (ref 1.85–7.62)
NEUTS BAND NFR BLD MANUAL: 12 % (ref 0–8)
NEUTS SEG NFR BLD AUTO: 68 % (ref 43–75)
OVALOCYTES BLD QL SMEAR: PRESENT
PLATELET # BLD AUTO: 298 THOUSANDS/UL (ref 149–390)
PLATELET BLD QL SMEAR: ADEQUATE
PMV BLD AUTO: 9 FL (ref 8.9–12.7)
POIKILOCYTOSIS BLD QL SMEAR: PRESENT
POLYCHROMASIA BLD QL SMEAR: PRESENT
POTASSIUM SERPL-SCNC: 3.2 MMOL/L (ref 3.5–5.3)
RBC # BLD AUTO: 3.55 MILLION/UL (ref 3.81–5.12)
RBC MORPH BLD: PRESENT
SALMONELLA SP SPAO STL QL NAA+PROBE: NEGATIVE
SHIGELLA SP+EIEC IPAH STL QL NAA+PROBE: NEGATIVE
SODIUM SERPL-SCNC: 137 MMOL/L (ref 135–147)
VARIANT LYMPHS # BLD AUTO: 1 %
WBC # BLD AUTO: 9.96 THOUSAND/UL (ref 4.31–10.16)

## 2025-07-17 PROCEDURE — 85027 COMPLETE CBC AUTOMATED: CPT | Performed by: FAMILY MEDICINE

## 2025-07-17 PROCEDURE — 99232 SBSQ HOSP IP/OBS MODERATE 35: CPT | Performed by: FAMILY MEDICINE

## 2025-07-17 PROCEDURE — 80048 BASIC METABOLIC PNL TOTAL CA: CPT | Performed by: FAMILY MEDICINE

## 2025-07-17 PROCEDURE — 82948 REAGENT STRIP/BLOOD GLUCOSE: CPT

## 2025-07-17 PROCEDURE — 99232 SBSQ HOSP IP/OBS MODERATE 35: CPT | Performed by: INTERNAL MEDICINE

## 2025-07-17 PROCEDURE — 85007 BL SMEAR W/DIFF WBC COUNT: CPT | Performed by: FAMILY MEDICINE

## 2025-07-17 RX ORDER — POTASSIUM CHLORIDE 1500 MG/1
40 TABLET, EXTENDED RELEASE ORAL ONCE
Status: COMPLETED | OUTPATIENT
Start: 2025-07-17 | End: 2025-07-17

## 2025-07-17 RX ORDER — SUCRALFATE 1 G/1
1 TABLET ORAL
Status: DISCONTINUED | OUTPATIENT
Start: 2025-07-17 | End: 2025-07-18 | Stop reason: HOSPADM

## 2025-07-17 RX ORDER — ALOSETRON HYDROCHLORIDE 1 MG/1
1 TABLET ORAL 2 TIMES DAILY
Qty: 62 TABLET | Refills: 3 | Status: SHIPPED | OUTPATIENT
Start: 2025-07-17

## 2025-07-17 RX ADMIN — DICYCLOMINE HYDROCHLORIDE 10 MG: 10 CAPSULE ORAL at 12:17

## 2025-07-17 RX ADMIN — PANTOPRAZOLE SODIUM 40 MG: 40 INJECTION, POWDER, FOR SOLUTION INTRAVENOUS at 21:19

## 2025-07-17 RX ADMIN — DICYCLOMINE HYDROCHLORIDE 10 MG: 10 CAPSULE ORAL at 21:19

## 2025-07-17 RX ADMIN — SUCRALFATE 1 G: 1 TABLET ORAL at 15:52

## 2025-07-17 RX ADMIN — SUCRALFATE 1 G: 1 TABLET ORAL at 12:17

## 2025-07-17 RX ADMIN — DICYCLOMINE HYDROCHLORIDE 10 MG: 10 CAPSULE ORAL at 08:55

## 2025-07-17 RX ADMIN — POTASSIUM CHLORIDE 40 MEQ: 1500 TABLET, EXTENDED RELEASE ORAL at 10:20

## 2025-07-17 RX ADMIN — HEPARIN SODIUM 5000 UNITS: 5000 INJECTION INTRAVENOUS; SUBCUTANEOUS at 13:32

## 2025-07-17 RX ADMIN — ONDANSETRON 4 MG: 2 INJECTION INTRAMUSCULAR; INTRAVENOUS at 01:44

## 2025-07-17 RX ADMIN — DICYCLOMINE HYDROCHLORIDE 10 MG: 10 CAPSULE ORAL at 15:52

## 2025-07-17 RX ADMIN — LEVOTHYROXINE SODIUM 50 MCG: 0.05 TABLET ORAL at 08:51

## 2025-07-17 RX ADMIN — ATORVASTATIN CALCIUM 40 MG: 40 TABLET, FILM COATED ORAL at 08:50

## 2025-07-17 RX ADMIN — ONDANSETRON 4 MG: 2 INJECTION INTRAMUSCULAR; INTRAVENOUS at 08:50

## 2025-07-17 RX ADMIN — ONDANSETRON 4 MG: 2 INJECTION INTRAMUSCULAR; INTRAVENOUS at 15:52

## 2025-07-17 RX ADMIN — MORPHINE SULFATE 2 MG: 2 INJECTION, SOLUTION INTRAMUSCULAR; INTRAVENOUS at 15:52

## 2025-07-17 RX ADMIN — SUCRALFATE 1 G: 1 TABLET ORAL at 21:19

## 2025-07-17 RX ADMIN — METOPROLOL SUCCINATE 50 MG: 50 TABLET, EXTENDED RELEASE ORAL at 08:50

## 2025-07-17 RX ADMIN — MORPHINE SULFATE 2 MG: 2 INJECTION, SOLUTION INTRAMUSCULAR; INTRAVENOUS at 10:19

## 2025-07-17 RX ADMIN — UMECLIDINIUM 1 PUFF: 62.5 AEROSOL, POWDER ORAL at 08:51

## 2025-07-17 RX ADMIN — HYDROMORPHONE HYDROCHLORIDE 0.2 MG: 0.2 INJECTION, SOLUTION INTRAMUSCULAR; INTRAVENOUS; SUBCUTANEOUS at 06:41

## 2025-07-17 RX ADMIN — HYDROMORPHONE HYDROCHLORIDE 0.2 MG: 0.2 INJECTION, SOLUTION INTRAMUSCULAR; INTRAVENOUS; SUBCUTANEOUS at 03:27

## 2025-07-17 RX ADMIN — MORPHINE SULFATE 2 MG: 2 INJECTION, SOLUTION INTRAMUSCULAR; INTRAVENOUS at 22:31

## 2025-07-17 RX ADMIN — SERTRALINE HYDROCHLORIDE 25 MG: 25 TABLET ORAL at 08:50

## 2025-07-17 RX ADMIN — FLUTICASONE FUROATE AND VILANTEROL TRIFENATATE 1 PUFF: 100; 25 POWDER RESPIRATORY (INHALATION) at 08:51

## 2025-07-17 RX ADMIN — PANTOPRAZOLE SODIUM 40 MG: 40 INJECTION, POWDER, FOR SOLUTION INTRAVENOUS at 08:50

## 2025-07-17 RX ADMIN — HYDROMORPHONE HYDROCHLORIDE 0.2 MG: 0.2 INJECTION, SOLUTION INTRAMUSCULAR; INTRAVENOUS; SUBCUTANEOUS at 00:24

## 2025-07-17 NOTE — ASSESSMENT & PLAN NOTE
60-year-old female who presents with significant abdominal pain, nausea, vomiting, diarrhea.  Patient reports 35 pound weight loss in the past 3 months due to the symptoms.    CTAP shows nonspecific distal small bowel enteritis likely infectious or inflammatory.  Will continue to monitor laboratories.  Colonoscopy from 7/16/2025 showed evidence of diverticulosis as well as a sessile polyp smaller than 5 mm in the sigmoid colon.  Multiple biopsies were taken and are pending.  EGD from 7/16/2025 showed a 2 cm hiatal hernia as well as erythematous mucosa in the cardia.  Biopsies pending.  Antiemetics and pain control  Bentyl 10 mg 3 times daily.  I think patient would benefit from a low-dose Celexa or amitriptyline.  This can be started in the outpatient setting.  Patient should have close outpatient GI follow-up.

## 2025-07-17 NOTE — ASSESSMENT & PLAN NOTE
Anesthesia Pre Eval Note    Anesthesia ROS/Med Hx    Overall Review:  EKG was reviewed     Anesthetic Complication History:  Patient does not have a history of anesthetic complications      Pulmonary Review:    Positive for sleep apnea     Neuro/Psych Review:  Patient does not have a neuro/psych history       Cardiovascular Review:  Exercise tolerance: good (>4 METS)    GI/HEPATIC/RENAL Review:    Positive for renal disease    End/Other Review:  Positive for diabetes  Additional Results:     ALLERGIES:   -- Grass -- Other (See Comments)   -- Trees -- Other (See Comments)   -- Midazolam -- Other (See Comments)    --  Memory problems \"for weeks\" per pt       Lab Results       Component                Value               Date                       SODIUM                   144                 01/10/2020                 POTASSIUM                4.3                 01/10/2020                 CHLORIDE                 107                 01/10/2020                 CO2                      29                  01/10/2020                 GLUCOSE                  95                  01/10/2020                 BUN                      27 (H)              01/10/2020                 CREATININE               1.14                01/10/2020                 GFRA                     70                  01/10/2020                 GFRNA                    60                  01/10/2020                 CALCIUM                  9.0                 01/10/2020             Past Medical History:  No date: Abdominal pain in male  No date: Colon polyp  No date: Decreased urine stream  No date: Diabetes mellitus ([facility])  No date: Kidney stone  No date: Nocturia  No date: Other specified disorders of parathyroid gland ([facility])  No date: Parathyroid adenoma  No date: Rheumatic fever without mention of heart involvement      Comment:  age 6  No date: Scrotum pain  No date: Unspecified sleep apnea      Comment:  CPAP  No date: Urinary tract  Most recent A1c July 2025 6.3  Hold metformin, jardiance during hospitalization  SSI every 6 hours while NPO  Monitor Accu-Cheks closely, hypoglycemia protocol   infection    Past Surgical History:  No date: Hernia repair      Comment:  Left inguinal hernia repair no mesh       Prior to Admission medications :  Medication FOLIC ACID PO, Sig Take by mouth daily., Start Date , End Date , Taking? Yes, Authorizing Provider Outside Provider    Medication VITAMIN D, CHOLECALCIFEROL, PO, Sig Take by mouth daily., Start Date , End Date , Taking? Yes, Authorizing Provider Outside Provider    Medication Famotidine (PEPCID PO), Sig Take 1 tablet by mouth daily. , Start Date , End Date , Taking? Yes, Authorizing Provider Outside Provider    Medication tamsulosin (FLOMAX) 0.4 MG Cap, Sig Take 1 capsule by mouth daily after a meal., Start Date 4/30/21, End Date , Taking? Yes, Authorizing Provider Darrell Alonzo MD    Medication tamsulosin (FLOMAX) 0.4 MG Cap, Sig Take 1 capsule by mouth at bedtime., Start Date 7/10/20, End Date , Taking? Yes, Authorizing Provider Darrell Alonzo MD    Medication SAW PALMETTO EXTR, Sig 3 capsules daily, Start Date 2/2/09, End Date , Taking? Yes, Authorizing Provider Jo Mendoza MD    Medication MULTIVITAMINS PO TABS, Sig 1 TABLET DAILY, Start Date 2/2/09, End Date , Taking? Yes, Authorizing Provider Jo Mendoza MD    Medication COQ-10 10 MG PO CAPS, Sig 1 daily, Start Date 2/2/09, End Date , Taking? Yes, Authorizing Provider Jo Mendoza MD    Medication CALCIUM-CARB 600 + D 600-125 MG-UNIT PO TABS, Sig 1 TABLET DAILY, Start Date 2/2/09, End Date , Taking? Yes, Authorizing Provider Jo Mendoza MD    Medication B COMPLEX PO CAPS, Sig 1 daily, Start Date 2/2/09, End Date , Taking? Yes, Authorizing Provider Jo Mendoza MD    Medication sildenafil (REVATIO) 20 MG tablet, Sig Take 1 tablet by mouth daily as needed (1 to 5 tablets as needed 1 hour prior to sexual activity)., Start Date 7/10/20, End Date , Taking? , Authorizing Provider Darrell Alonzo MD            Relevant Problems   No relevant active problems       Physical Exam     Airway   Mallampati: II  TM  Distance: >3 FB  Neck ROM: Full    Cardiovascular  Cardiovascular exam normal    Dental Exam  Dental exam normal    Pulmonary Exam  Pulmonary exam normal      Anesthesia Plan:  Anesthesia Plan    ASA Status: 2    Anesthesia Type: General    Induction: Intravenous  Preferred Airway Type: ETT  Maintenance: Inhalational      Post-op Pain Management: Per Surgeon      Checklist  Reviewed: Lab Results, Allergies, Past Med History, Medications, Nursing Notes, NPO Status and Problem list  Consent/Risks Discussed Statement:  The proposed anesthetic plan, including its risks and benefits, have been discussed with the Patient along with the risks and benefits of alternatives. Questions were encouraged and answered and the patient and/or representative understands and agrees to proceed.    I have discussed elements of the patient's history or examination, as noted above and/or as follows, that place the patient at higher risk of complications; age and sleep apnea.    I discussed with the patient (and/or patient's legal representative) the risks and benefits of the proposed anesthesia plan, General, which may include services performed by other anesthesia providers.    Alternative anesthesia plans, if available, were reviewed with the patient (and/or patient's legal representative). Discussion has been held with the patient (and/or patient's legal representative) regarding risks of anesthesia, which include allergic reaction, eye injury, vomiting, sore throat, nausea and dental injury and emergent situations that may require change in anesthesia plan.    The patient (and/or patient's legal representative) has indicated understanding, his/her questions have been answered, and he/she wishes to proceed with the planned anesthetic.      Blood Products: Not Anticipated

## 2025-07-17 NOTE — PROGRESS NOTES
Progress Note - Gastroenterology   Name: Nanci Navarro 60 y.o. female I MRN: [id number]  Unit/Bed#: [facility]-01 I Date of Admission: 7/14/2025   Date of Service: 7/17/2025 I Hospital Day: 2    Assessment & Plan  Intractable lower abdominal pain  60-year-old female who presents with significant abdominal pain, nausea, vomiting, diarrhea.  Patient reports 35 pound weight loss in the past 3 months due to the symptoms.    CTAP shows nonspecific distal small bowel enteritis likely infectious or inflammatory.  Will continue to monitor laboratories.  Colonoscopy from 7/16/2025 showed evidence of diverticulosis as well as a sessile polyp smaller than 5 mm in the sigmoid colon.  Multiple biopsies were taken and are pending.  EGD from 7/16/2025 showed a 2 cm hiatal hernia as well as erythematous mucosa in the cardia.  Biopsies pending.  Antiemetics and pain control  Bentyl 10 mg 3 times daily.  I think patient would benefit from a low-dose Celexa or amitriptyline.  This can be started in the outpatient setting.  Patient should have close outpatient GI follow-up.    Gastroesophageal reflux disease without esophagitis  Pantoprazole 40 mg twice daily.    Patient be seen and examined by Dr. Lamb.  All huitron medical decision made by Dr. Lamb.    I have discussed the above management plan in detail with the primary service.   Gastroenterology service signing off.    Subjective   Patient tolerating a diet.  Patient still reporting excruciating abdominal pain.  Patient with nausea but no vomiting.    Objective :  Temp:  [97.9 °F (36.6 °C)-101.2 °F (38.4 °C)] 99.7 °F (37.6 °C)  HR:  [] 85  BP: ()/(47-80) 113/64  Resp:  [14-22] 19  SpO2:  [95 %-100 %] 96 %  O2 Device: Nasal cannula  Nasal Cannula O2 Flow Rate (L/min):  [2 L/min-3 L/min] 2.5 L/min    Physical Exam  Vitals and nursing note reviewed.   Constitutional:       General: She is not in acute distress.     Appearance: She is well-developed.   HENT:      Head:  Normocephalic and atraumatic.     Eyes:      Conjunctiva/sclera: Conjunctivae normal.       Cardiovascular:      Rate and Rhythm: Normal rate and regular rhythm.      Heart sounds: No murmur heard.  Pulmonary:      Effort: Pulmonary effort is normal. No respiratory distress.      Breath sounds: Normal breath sounds.   Abdominal:      Palpations: Abdomen is soft.      Tenderness: There is no abdominal tenderness.     Musculoskeletal:         General: No swelling.      Cervical back: Neck supple.     Skin:     General: Skin is warm and dry.      Capillary Refill: Capillary refill takes less than 2 seconds.     Neurological:      Mental Status: She is alert.     Psychiatric:         Mood and Affect: Mood normal.           Lab Results: I have reviewed the following results:

## 2025-07-17 NOTE — ASSESSMENT & PLAN NOTE
60-yo with significant abdominal pain, nausea, vomiting, diarrhea.    Patient reports 35 pound weight loss in the past 3 months due to the symptoms.     CTAP shows nonspecific distal small bowel enteritis likely infectious or inflammatory.  Follow-up stool C. difficile, enteric panel  Added Bentyl 10 mg 4 times daily, as needed Tylenol/prn morphine for pain control   IV PPI twice daily, added carafate  GI on board : EGD/ [place] done 7/16 : Noted erythematous mucosa in cardia/fundus of stomach s/p biopsy and H. pylori testing, colon polyp s/p biopsy.  Monitor and correct electrolytes

## 2025-07-17 NOTE — ASSESSMENT & PLAN NOTE
Secondary to COPD  Currently SpO2: 96 % on Nasal Cannula O2 Flow Rate (L/min): 2.5 L/min, at baseline

## 2025-07-17 NOTE — PROGRESS NOTES
Patient:    MRN:  [id number]    Ravinder Request ID:  [id number]    Level of care reserved:  Home Health Agency    Partner Reserved:  [facility], [place] [id number] [phone]    Clinical needs requested:    Geography searched:  [id number]    Start of Service:    Request sent:  4:31pm EDT on 7/16/2025 by Clint Patel    Partner reserved:  10:23am EDT on 7/17/2025 by Clair Zhang    Choice list shared:  10:23am EDT on 7/17/2025 by Clair Zhang

## 2025-07-17 NOTE — ASSESSMENT & PLAN NOTE
Pantoprazole 40 mg twice daily.    Patient be seen and examined by Dr. Lamb.  All huitron medical decision made by Dr. Lamb.

## 2025-07-17 NOTE — PLAN OF CARE
Problem: PAIN - ADULT  Goal: Verbalizes/displays adequate comfort level or baseline comfort level  Description: Interventions:  - Encourage patient to monitor pain and request assistance  - Assess pain using appropriate pain scale  - Administer analgesics as ordered based on type and severity of pain and evaluate response  - Implement non-pharmacological measures as appropriate and evaluate response  - Consider cultural and social influences on pain and pain management  - Notify physician/advanced practitioner if interventions unsuccessful or patient reports new pain  - Educate patient/family on pain management process including their role and importance of  reporting pain   - Provide non-pharmacologic/complimentary pain relief interventions  Outcome: Progressing     Problem: INFECTION - ADULT  Goal: Absence or prevention of progression during hospitalization  Description: INTERVENTIONS:  - Assess and monitor for signs and symptoms of infection  - Monitor lab/diagnostic results  - Monitor all insertion sites, i.e. indwelling lines, tubes, and drains  - Monitor endotracheal if appropriate and nasal secretions for changes in amount and color  - [place] appropriate cooling/warming therapies per order  - Administer medications as ordered  - Instruct and encourage patient and family to use good hand hygiene technique  - Identify and instruct in appropriate isolation precautions for identified infection/condition  Outcome: Progressing  Goal: Absence of fever/infection during neutropenic period  Description: INTERVENTIONS:  - Monitor WBC  - Perform strict hand hygiene  - Limit to healthy visitors only  - No plants, dried, fresh or silk flowers with edward in patient room  Outcome: Progressing     Problem: SAFETY ADULT  Goal: Patient will remain free of falls  Description: INTERVENTIONS:  - Educate patient/family on patient safety including physical limitations  - Instruct patient to call for assistance with activity   -  Consider consulting OT/PT to assist with strengthening/mobility based on AM PAC & JH-HLM score  - Consult OT/PT to assist with strengthening/mobility   - Keep Call bell within reach  - Keep bed low and locked with side rails adjusted as appropriate  - Keep care items and personal belongings within reach  - Initiate and maintain comfort rounds  - Make Fall Risk Sign visible to staff  - Offer Toileting every 2 Hours, in advance of need  - Initiate/Maintain bed/chair alarm  - Obtain necessary fall risk management equipment  - Apply yellow socks and bracelet for high fall risk patients  - Consider moving patient to room near nurses station  Outcome: Progressing  Goal: Maintain or return to baseline ADL function  Description: INTERVENTIONS:  -  Assess patient's ability to carry out ADLs; assess patient's baseline for ADL function and identify physical deficits which impact ability to perform ADLs (bathing, care of mouth/teeth, toileting, grooming, dressing, etc.)  - Assess/evaluate cause of self-care deficits   - Assess range of motion  - Assess patient's mobility; develop plan if impaired  - Assess patient's need for assistive devices and provide as appropriate  - Encourage maximum independence but intervene and supervise when necessary  - Involve family in performance of ADLs  - Assess for home care needs following discharge   - Consider OT consult to assist with ADL evaluation and planning for discharge  - Provide patient education as appropriate  - Monitor functional capacity and physical performance, use of AM PAC & JH-HLM   - Monitor gait, balance and fatigue with ambulation    Outcome: Progressing  Goal: Maintains/Returns to pre admission functional level  Description: INTERVENTIONS:  - Perform AM-PAC 6 Click Basic Mobility/ Daily Activity assessment daily.  - Set and communicate daily mobility goal to care team and patient/family/caregiver.   - Collaborate with rehabilitation services on mobility goals if  consulted  - Perform Range of Motion 3 times a day.  - Reposition patient every 2 hours.  - Dangle patient 3 times a day  - Stand patient 3 times a day  - Ambulate patient 3 times a day  - Out of bed to chair 3 times a day   - Out of bed for meals 3 times a day  - Out of bed for toileting  - Record patient progress and toleration of activity level   Outcome: Progressing     Problem: DISCHARGE PLANNING  Goal: Discharge to home or other facility with appropriate resources  Description: INTERVENTIONS:  - Identify barriers to discharge w/patient and caregiver  - Arrange for needed discharge resources and transportation as appropriate  - Identify discharge learning needs (meds, wound care, etc.)  - Arrange for interpretive services to assist at discharge as needed  - Refer to Case Management Department for coordinating discharge planning if the patient needs post-hospital services based on physician/advanced practitioner order or complex needs related to functional status, cognitive ability, or social support system  Outcome: Progressing     Problem: Knowledge Deficit  Goal: Patient/family/caregiver demonstrates understanding of disease process, treatment plan, medications, and discharge instructions  Description: Complete learning assessment and assess knowledge base.  Interventions:  - Provide teaching at level of understanding  - Provide teaching via preferred learning methods  Outcome: Progressing     Problem: Nutrition/Hydration-ADULT  Goal: Nutrient/Hydration intake appropriate for improving, restoring or maintaining nutritional needs  Description: Monitor and assess patient's nutrition/hydration status for malnutrition. Collaborate with interdisciplinary team and initiate plan and interventions as ordered.  Monitor patient's weight and dietary intake as ordered or per policy. Utilize nutrition screening tool and intervene as necessary. Determine patient's food preferences and provide high-protein, high-caloric  foods as appropriate.     INTERVENTIONS:  - Monitor oral intake, urinary output, labs, and treatment plans  - Assess nutrition and hydration status and recommend course of action  - Evaluate amount of meals eaten  - Assist patient with eating if necessary   - Allow adequate time for meals  - Recommend/ encourage appropriate diets, oral nutritional supplements, and vitamin/mineral supplements  - Order, calculate, and assess calorie counts as needed  - Recommend, monitor, and adjust tube feedings and TPN/PPN based on assessed needs  - Assess need for intravenous fluids  - Provide specific nutrition/hydration education as appropriate  - Include patient/family/caregiver in decisions related to nutrition  Outcome: Progressing     Problem: GASTROINTESTINAL - ADULT  Goal: Minimal or absence of nausea and/or vomiting  Description: INTERVENTIONS:  - Administer IV fluids if ordered to ensure adequate hydration  - Maintain NPO status until nausea and vomiting are resolved  - Nasogastric tube if ordered  - Administer ordered antiemetic medications as needed  - Provide nonpharmacologic comfort measures as appropriate  - Advance diet as tolerated, if ordered  - Consider nutrition services referral to assist patient with adequate nutrition and appropriate food choices  Outcome: Progressing  Goal: Maintains or returns to baseline bowel function  Description: INTERVENTIONS:  - Assess bowel function  - Encourage oral fluids to ensure adequate hydration  - Administer IV fluids if ordered to ensure adequate hydration  - Administer ordered medications as needed  - Encourage mobilization and activity  - Consider nutritional services referral to assist patient with adequate nutrition and appropriate food choices  Outcome: Progressing  Goal: Maintains adequate nutritional intake  Description: INTERVENTIONS:  - Monitor percentage of each meal consumed  - Identify factors contributing to decreased intake, treat as appropriate  - Assist with  meals as needed  - Monitor I&O, weight, and lab values if indicated  - Obtain nutrition services referral as needed  Outcome: Progressing     Problem: METABOLIC, FLUID AND ELECTROLYTES - ADULT  Goal: Electrolytes maintained within normal limits  Description: INTERVENTIONS:  - Monitor labs and assess patient for signs and symptoms of electrolyte imbalances  - Administer electrolyte replacement as ordered  - Monitor response to electrolyte replacements, including repeat lab results as appropriate  - Instruct patient on fluid and nutrition as appropriate  Outcome: Progressing

## 2025-07-17 NOTE — CASE MANAGEMENT
"   Case Management Progress Note    Patient name Nanci Navarro  Location /-01 MRN [id number]  : 1965 Date 2025       LOS (days): 2  Geometric Mean LOS (GMLOS) (days): 2.5  Days to GMLOS:0.5        OBJECTIVE:        Current admission status: Inpatient  Preferred Pharmacy:   [email or web address] - [place], [place] - [address]  [address]  [address]  [facility] [place] [address]  Phone: [phone] Fax: [phone]    [id number] - [place], [place] - [address]  [address] [address]  Phone: [phone] Fax: [phone]    Primary Care Provider: Gladys Yeung    Primary Insurance: MEDICARE  Secondary Insurance: AMBETTER    PROGRESS NOTE:      [facility] Fanny Decker replied saying,\"Patient is current with us. Please have discharging provider place Ambulatory Referral to Home Health and select the services patient needs at discharge. IMPORTANT: Please fax us the version of this order that shows ordering provider's electronic signature. Also, please fax discharge instructions once complete. Our fax is: [phone]. Thank you!\". CM reached out to [facility] who is aware.     "

## 2025-07-17 NOTE — ASSESSMENT & PLAN NOTE
Wt Readings from Last 3 Encounters:   07/14/25 95 kg (209 lb 7 oz)   05/18/25 100 kg (220 lb 10.9 oz)   04/16/25 102 kg (225 lb)   Most recent echo April 2025 revealing LVEF 65%  Maintained on 40 mg p.o. Lasix twice daily as outpatient.  Patient states she has missed a few doses within past week as she has not been eating and drinking well and has not been able to tolerate p.o. intake due to n/v.  Euvolemic on exam  Cw home lasix  Received 1 L IVF bolus in ED, will defer further IVF hydration to avoid volume overload

## 2025-07-17 NOTE — PROGRESS NOTES
Progress Note - Hospitalist   Name: Nanci Navarro 60 y.o. female I MRN: [id number]  Unit/Bed#: -[facility] I Date of Admission: 7/14/2025   Date of Service: 7/17/2025 I Hospital Day: 2    Assessment & Plan  Intractable lower abdominal pain  60-yo with significant abdominal pain, nausea, vomiting, diarrhea.    Patient reports 35 pound weight loss in the past 3 months due to the symptoms.     CTAP shows nonspecific distal small bowel enteritis likely infectious or inflammatory.  Follow-up stool C. difficile, enteric panel  Added Bentyl 10 mg 4 times daily, as needed Tylenol/prn morphine for pain control   IV PPI twice daily, added carafate  GI on board : EGD/ [place] done 7/16 : Noted erythematous mucosa in cardia/fundus of stomach s/p biopsy and H. pylori testing, colon polyp s/p biopsy.  Monitor and correct electrolytes  Gastroesophageal reflux disease without esophagitis  40 mg IV Protonix twice daily  Added carafate  Diabetes mellitus (HCC)  Most recent A1c July 2025 6.3  Hold metformin, jardiance during hospitalization  SSI every 6 hours while NPO  Monitor Accu-Cheks closely, hypoglycemia protocol  Chronic respiratory failure (HCC)  Secondary to COPD  Currently SpO2: 96 % on Nasal Cannula O2 Flow Rate (L/min): 2.5 L/min, at baseline  Hypothyroidism  Continue levothyroxine  COPD (chronic obstructive pulmonary disease) (HCC)  Continue PTA inhalers : As needed albuterol, fluticasone-vilanterol, umeclinidium  Currently on 3 L NC at baseline satting appropriately  Chronic diastolic CHF (congestive heart failure) ([facility])  Wt Readings from Last 3 Encounters:   07/14/25 95 kg (209 lb 7 oz)   05/18/25 100 kg (220 lb 10.9 oz)   04/16/25 102 kg (225 lb)   Most recent echo April 2025 revealing LVEF 65%  Maintained on 40 mg p.o. Lasix twice daily as outpatient.  Patient states she has missed a few doses within past week as she has not been eating and drinking well and has not been able to tolerate p.o. intake due to  n/v.  Euvolemic on exam  Cw home lasix  Received 1 L IVF bolus in ED, will defer further IVF hydration to avoid volume overload    VTE Pharmacologic Prophylaxis: VTE Score: 3 heparin    Mobility:   Basic Mobility Inpatient Raw Score: 21  JH-HLM Goal: 6: Walk 10 steps or more  JH-HLM Achieved: 5: Stand (1 or more minutes)  JH-HLM Goal achieved. Continue to encourage appropriate mobility.    Patient Centered Rounds: I performed bedside rounds with nursing staff today.   Discussions with Specialists or Other Care Team Provider: elsy    Education and Discussions with Family / Patient: patient    Current Length of Stay: 2 day(s)  Current Patient Status: Inpatient   Certification Statement: The patient will continue to require additional inpatient hospital stay due to fu stool studies  Discharge Plan: Anticipate discharge in 24-48 hrs to home with home services.    Code Status: Level 1 - Full Code    Subjective   Patient had fever overnight 101.2  Underwent EGD/colonoscopy yesterday with evidence of erythematous mucosa in cardia and fundus of stomach on EGD.  This morning reports pain is still uncontrolled.  As needed Dilaudid/tramadol not working.  Also reports persistent diarrhea    Objective :  Temp:  [97.9 °F (36.6 °C)-101.2 °F (38.4 °C)] 99.7 °F (37.6 °C)  HR:  [] 85  BP: ()/(47-80) 113/64  Resp:  [14-22] 19  SpO2:  [95 %-100 %] 96 %  O2 Device: Nasal cannula  Nasal Cannula O2 Flow Rate (L/min):  [2 L/min-3 L/min] 2.5 L/min    Body mass index is 45.32 kg/m².     Input and Output Summary (last 24 hours):     Intake/Output Summary (Last 24 hours) at 7/17/2025 1028  Last data filed at 7/16/2025 1301  Gross per 24 hour   Intake 651.67 ml   Output --   Net 651.67 ml       Physical Exam  Constitutional:       General: She is not in acute distress.     Appearance: She is obese. She is ill-appearing.   HENT:      Mouth/Throat:      Mouth: Mucous membranes are moist.     Cardiovascular:      Rate and Rhythm: Normal  rate and regular rhythm.   Pulmonary:      Effort: Pulmonary effort is normal. No respiratory distress.      Breath sounds: Normal breath sounds.   Abdominal:      General: Abdomen is flat.      Palpations: Abdomen is soft.      Tenderness: There is abdominal tenderness. There is no guarding.     Musculoskeletal:      Right lower leg: No edema.      Left lower leg: No edema.     Neurological:      Mental Status: She is alert and oriented to person, place, and time.         Lines/Drains:      Lab Results: I have reviewed the following results:   Results from last 7 days   Lab Units 07/17/25  0651 07/16/25  0559   WBC Thousand/uL 9.96 7.61   HEMOGLOBIN g/dL 10.6* 10.9*   HEMATOCRIT % 31.9* 32.0*   PLATELETS Thousands/uL 298 275   BANDS PCT % 12*  --    SEGS PCT %  --  71   LYMPHO PCT % 9* 15   MONO PCT % 8 10   EOS PCT % 0 3     Results from last 7 days   Lab Units 07/17/25  0651 07/16/25  0559   SODIUM mmol/L 137 139   POTASSIUM mmol/L 3.2* 2.9*   CHLORIDE mmol/L 105 104   CO2 mmol/L 25 28   BUN mg/dL 9 9   CREATININE mg/dL 0.95 0.84   ANION GAP mmol/L 7 7   CALCIUM mg/dL 8.1* 8.4   ALBUMIN g/dL  --  3.0*   TOTAL BILIRUBIN mg/dL  --  0.57   ALK PHOS U/L  --  85   ALT U/L  --  15   AST U/L  --  11*   GLUCOSE RANDOM mg/dL 167* 89         Results from last 7 days   Lab Units 07/17/25  0736 07/16/25  2059 07/16/25  1813 07/16/25  1405 07/16/25  1109 07/14/25  2043   POC GLUCOSE mg/dl 156* 142* 135 88 85 110         Results from last 7 days   Lab Units 07/14/25  1532   LACTIC ACID mmol/L 1.4       Recent Cultures (last 7 days):           Last 24 Hours Medication List:     Current Facility-Administered Medications:     albuterol (PROVENTIL HFA,VENTOLIN HFA) inhaler 2 puff, Q4H PRN    atorvastatin (LIPITOR) tablet 40 mg, Daily    dicyclomine (BENTYL) capsule 10 mg, 4x Daily (AC & HS)    Fluticasone Furoate-Vilanterol 100-25 mcg/actuation 1 puff, Daily **AND** umeclidinium 62.5 mcg/actuation inhaler AEPB 1 puff, Daily     furosemide (LASIX) tablet 40 mg, Daily    heparin (porcine) subcutaneous injection 5,000 Units, Q8H TANVIR    insulin lispro (HumALOG/ADMELOG) 100 units/mL subcutaneous injection 1-5 Units, Q6H TANVIR **AND** Fingerstick Glucose (POCT), Q6H    levothyroxine tablet 50 mcg, Daily    metoprolol succinate (TOPROL-XL) 24 hr tablet 50 mg, Daily    morphine injection 2 mg, Q6H PRN    ondansetron (ZOFRAN) injection 4 mg, Q6H PRN    pantoprazole (PROTONIX) injection 40 mg, Q12H TANVIR    sertraline (ZOLOFT) tablet 25 mg, Daily    sucralfate (CARAFATE) tablet 1 g, 4x Daily (AC & HS)    Administrative Statements   Today, Patient Was Seen By: Kevon Hidalgo MD    **Please Note: This note may have been constructed using a voice recognition system.**

## 2025-07-18 VITALS
BODY MASS INDEX: 45.18 KG/M2 | DIASTOLIC BLOOD PRESSURE: 54 MMHG | HEIGHT: 57 IN | OXYGEN SATURATION: 94 % | WEIGHT: 209.44 LBS | RESPIRATION RATE: 19 BRPM | TEMPERATURE: 98.6 F | SYSTOLIC BLOOD PRESSURE: 110 MMHG | HEART RATE: 64 BPM

## 2025-07-18 LAB
GLUCOSE SERPL-MCNC: 108 MG/DL (ref 65–140)
GLUCOSE SERPL-MCNC: 124 MG/DL (ref 65–140)
GLUCOSE SERPL-MCNC: 138 MG/DL (ref 65–140)
GLUCOSE SERPL-MCNC: 161 MG/DL (ref 65–140)

## 2025-07-18 PROCEDURE — 99239 HOSP IP/OBS DSCHRG MGMT >30: CPT | Performed by: FAMILY MEDICINE

## 2025-07-18 PROCEDURE — 82948 REAGENT STRIP/BLOOD GLUCOSE: CPT

## 2025-07-18 RX ORDER — SUCRALFATE 1 G/1
1 TABLET ORAL
Qty: 120 TABLET | Refills: 0 | Status: SHIPPED | OUTPATIENT
Start: 2025-07-18 | End: 2025-08-17

## 2025-07-18 RX ORDER — PANTOPRAZOLE SODIUM 40 MG/1
40 TABLET, DELAYED RELEASE ORAL
Status: DISCONTINUED | OUTPATIENT
Start: 2025-07-18 | End: 2025-07-18 | Stop reason: HOSPADM

## 2025-07-18 RX ORDER — DICYCLOMINE HYDROCHLORIDE 10 MG/1
20 CAPSULE ORAL
Status: DISCONTINUED | OUTPATIENT
Start: 2025-07-18 | End: 2025-07-18 | Stop reason: HOSPADM

## 2025-07-18 RX ORDER — DICYCLOMINE HYDROCHLORIDE 10 MG/1
20 CAPSULE ORAL
Qty: 40 CAPSULE | Refills: 0 | Status: SHIPPED | OUTPATIENT
Start: 2025-07-18 | End: 2025-07-25

## 2025-07-18 RX ADMIN — ATORVASTATIN CALCIUM 40 MG: 40 TABLET, FILM COATED ORAL at 08:29

## 2025-07-18 RX ADMIN — PANTOPRAZOLE SODIUM 40 MG: 40 TABLET, DELAYED RELEASE ORAL at 08:29

## 2025-07-18 RX ADMIN — UMECLIDINIUM 1 PUFF: 62.5 AEROSOL, POWDER ORAL at 08:32

## 2025-07-18 RX ADMIN — DICYCLOMINE HYDROCHLORIDE 20 MG: 10 CAPSULE ORAL at 10:30

## 2025-07-18 RX ADMIN — FUROSEMIDE 40 MG: 40 TABLET ORAL at 08:29

## 2025-07-18 RX ADMIN — SUCRALFATE 1 G: 1 TABLET ORAL at 07:14

## 2025-07-18 RX ADMIN — LEVOTHYROXINE SODIUM 50 MCG: 0.05 TABLET ORAL at 08:29

## 2025-07-18 RX ADMIN — FLUTICASONE FUROATE AND VILANTEROL TRIFENATATE 1 PUFF: 100; 25 POWDER RESPIRATORY (INHALATION) at 08:32

## 2025-07-18 RX ADMIN — MORPHINE SULFATE 2 MG: 2 INJECTION, SOLUTION INTRAMUSCULAR; INTRAVENOUS at 10:24

## 2025-07-18 RX ADMIN — DICYCLOMINE HYDROCHLORIDE 10 MG: 10 CAPSULE ORAL at 07:13

## 2025-07-18 RX ADMIN — SERTRALINE HYDROCHLORIDE 25 MG: 25 TABLET ORAL at 08:29

## 2025-07-18 RX ADMIN — MORPHINE SULFATE 2 MG: 2 INJECTION, SOLUTION INTRAMUSCULAR; INTRAVENOUS at 04:32

## 2025-07-18 RX ADMIN — ONDANSETRON 4 MG: 2 INJECTION INTRAMUSCULAR; INTRAVENOUS at 02:53

## 2025-07-18 RX ADMIN — ONDANSETRON 4 MG: 2 INJECTION INTRAMUSCULAR; INTRAVENOUS at 07:14

## 2025-07-18 RX ADMIN — SUCRALFATE 1 G: 1 TABLET ORAL at 10:30

## 2025-07-18 RX ADMIN — METOPROLOL SUCCINATE 50 MG: 50 TABLET, EXTENDED RELEASE ORAL at 08:29

## 2025-07-18 NOTE — ASSESSMENT & PLAN NOTE
Secondary to COPD  Currently SpO2: 94 % on Nasal Cannula O2 Flow Rate (L/min): 2.5 L/min, at baseline

## 2025-07-18 NOTE — ASSESSMENT & PLAN NOTE
60-yo with significant abdominal pain, nausea, vomiting, diarrhea.    Patient reports 35 pound weight loss in the past 3 months due to the symptoms.     CTAP shows nonspecific distal small bowel enteritis likely infectious or inflammatory.   stool C. difficile, enteric panel neg  Added Bentyl 20 mg 4 times daily, as needed Tylenol  Cw PPI twice daily, added carafate  GI on board : EGD/ [place] done 7/16 : Noted erythematous mucosa in cardia/fundus of stomach s/p biopsy and H. pylori testing, colon polyp s/p biopsy.    - Patient now afebrile with slight improvement in abdominal pain.  No further episodes of nausea vomiting.  Diarrhea improved.  Clinically improved, plan for discharge with outpatient follow-up with PCP GI.

## 2025-07-18 NOTE — DISCHARGE SUMMARY
Discharge Summary - Hospitalist   Name: Nanci Navarro 60 y.o. female I MRN: [id number]  Unit/Bed#: [facility] I Date of Admission: 7/14/2025   Date of Service: 7/18/2025 I Hospital Day: 3     Assessment & Plan  Intractable lower abdominal pain  60-yo with significant abdominal pain, nausea, vomiting, diarrhea.    Patient reports 35 pound weight loss in the past 3 months due to the symptoms.     CTAP shows nonspecific distal small bowel enteritis likely infectious or inflammatory.   stool C. difficile, enteric panel neg  Added Bentyl 20 mg 4 times daily, as needed Tylenol  Cw PPI twice daily, added carafate  GI on board : EGD/ [place] done 7/16 : Noted erythematous mucosa in cardia/fundus of stomach s/p biopsy and H. pylori testing, colon polyp s/p biopsy.    - Patient now afebrile with slight improvement in abdominal pain.  No further episodes of nausea vomiting.  Diarrhea improved.  Clinically improved, plan for discharge with outpatient follow-up with PCP GI.  Gastroesophageal reflux disease without esophagitis  40 mg Protonix twice daily  Added carafate  Diabetes mellitus (HCC)  Most recent A1c July 2025 6.3  Resume metformin, jardiance on dc    Chronic respiratory failure (HCC)  Secondary to COPD  Currently SpO2: 94 % on Nasal Cannula O2 Flow Rate (L/min): 2.5 L/min, at baseline  Hypothyroidism  Continue levothyroxine  COPD (chronic obstructive pulmonary disease) ([facility])  Continue PTA inhalers : As needed albuterol, fluticasone-vilanterol, umeclinidium  Currently on 3 L NC at baseline satting appropriately  Chronic diastolic CHF (congestive heart failure) ([facility])  Wt Readings from Last 3 Encounters:   07/14/25 95 kg (209 lb 7 oz)   05/18/25 100 kg (220 lb 10.9 oz)   04/16/25 102 kg (225 lb)   Most recent echo April 2025 revealing LVEF 65%  Maintained on 40 mg p.o. Lasix twice daily as outpatient.  Patient states she has missed a few doses within past week as she has not been eating and drinking well and has not been  able to tolerate p.o. intake due to n/v.  Euvolemic on exam  Cw home lasix  Received 1 L IVF bolus in ED, will defer further IVF hydration to avoid volume overload     Discharging Physician / Practitioner: Kevon Hidalgo MD  PCP: Gladys Yeung  Admission Date:   Admission Orders (From admission, onward)       Ordered        07/15/25 1540  INPATIENT ADMISSION  Once            07/14/25 1756  Place in Observation  Once                          Discharge Date: 07/18/25    Disposition:      Home with home health    Reason for Admission: intractable abdominal pain    Discharge Diagnoses:     Please see assessment and plan section above for further details regarding discharge diagnoses.     Medical Problems       Resolved Problems  Date Reviewed: 4/15/2025   None         Consultations During Hospital Stay:  GI    Medication Adjustments and Discharge Medications:  Summary of Medication Adjustments made as a result of this hospitalization: see [facility] rec  Medication Dosing Tapers - Please refer to Discharge Medication List for details on any medication dosing tapers (if applicable to patient).  Medications being temporarily held (include recommended restart time): see med rec  Discharge Medication List: See after visit summary for reconciled discharge medications.       Diet Recommendations at Discharge:  Diet -        Diet Orders   (From admission, onward)                 Start     Ordered    07/16/25 1323  Diet Shahid/CHO Controlled; Consistent Carbohydrate Diet Level 3 (6 carb servings/90 grams CHO/meal)  Diet effective now        References:    Adult Nutrition Support Algorithm    RD Therapeutic Diet Order Protocol   Question Answer Comment   Diet Type Shahid/CHO Controlled    Shahid/CHO Controlled Consistent Carbohydrate Diet Level 3 (6 carb servings/90 grams CHO/meal)    Allow Registered Dietitian to adjust diet order per protocol Yes        07/16/25 1322    07/15/25 1513  Dietary nutrition supplements  Once        Question Answer  "Comment   Select Supplement: Ensure Clear Vega    Frequency Breakfast, Lunch, Dinner        07/15/25 1512                      Hospital Course:     Nanci Navarro is a 60 y.o. female patient who originally presented to the hospital on 7/14/2025 with underlying history of diabetes GERD, chronic hypoxic respiratory failure secondary to COPD, CHF, hypothyroidism who presented with intractable epigastric abdominal pain associated with nausea vomiting diarrhea ongoing for 4 months associated with 35 pound weight loss.    Seen by GI, CT abdomen pelvis noted nonspecific distal small bowel enteritis likely infectious or inflammatory.  Stool for C. difficile, enteric panel negative.  GI on board, patient underwent EGD colonoscopy on 7/16/2025 noting erythematous mucosa in cardia fundus of stomach s/p biopsy and H. pylori testing.  Discussed with GI, patient to continue with PPI, as needed Bentyl.  Carafate was added.  Patient now afebrile with slight improvement in abdominal pain.  Tolerating diet.  No further episodes of nausea vomiting.  Diarrhea improved.  Clinically patient improved, plan for discharge with outpatient follow-up with PCP and GI.  Above plan of care discussed with patient.  She verbalizes understanding and is in agreement.  DC home with home health    Condition at Discharge: fair     Discharge Day Visit / Exam:       Vitals: Blood Pressure: 110/54 (07/18/25 1026)  Pulse: 64 (07/18/25 1026)  Temperature: 98.6 °F (37 °C) (07/18/25 0831)  Temp Source: Oral (07/18/25 0746)  Respirations: 19 (07/18/25 0746)  Height: 4' 9\" (144.8 cm) (07/14/25 2048)  Weight - Scale: 95 kg (209 lb 7 oz) (07/14/25 2048)  SpO2: 94 % (07/18/25 1026)  Exam:   Physical Exam  Constitutional:       General: She is not in acute distress.     Appearance: She is obese.   HENT:      Mouth/Throat:      Mouth: Mucous membranes are moist.     Cardiovascular:      Rate and Rhythm: Normal rate and regular rhythm.   Pulmonary:      Effort: " Pulmonary effort is normal. No respiratory distress.      Breath sounds: Normal breath sounds.   Abdominal:      General: Abdomen is flat.      Palpations: Abdomen is soft.      Tenderness: There is no abdominal tenderness. There is no guarding.     Musculoskeletal:      Right lower leg: No edema.      Left lower leg: No edema.     Neurological:      Mental Status: She is alert and oriented to person, place, and time.           Goals of Care Discussions:  Code Status at Discharge: Level 1 - Full Code    Discharge instructions/Information to patient and family:   See after visit summary section titled Discharge Instructions for information provided to patient and family.       Discharge Statement:  I spent 45 minutes discharging the patient. This time was spent on the day of discharge. I had direct contact with the patient on the day of discharge. Greater than 50% of the total time was spent examining patient, answering all patient questions, arranging and discussing plan of care with patient as well as directly providing post-discharge instructions.  Additional time then spent on discharge activities.    ** Please Note: This note has been constructed using a voice recognition system **

## 2025-07-18 NOTE — CASE MANAGEMENT
Case Management Progress Note    Patient name Nanci Navarro  Location /-01 MRN [id number]  : 1965 Date 2025       LOS (days): 3  Geometric Mean LOS (GMLOS) (days): 2.5  Days to GMLOS:-0.4        OBJECTIVE:        Current admission status: Inpatient  Preferred Pharmacy:   [facility] - [place], [place] - [address]  [address]  [address]  [facility] [address]  Phone: [phone] Fax: [phone]    [id number] - [place], [place] - [address]  [address] [address]  Phone: [phone] Fax: [phone]    Primary Care Provider: Gladys Yeung    Primary Insurance: MEDICARE  Secondary Insurance: STEPH    PROGRESS NOTE:  Patient cleared for d/c today.  CM coordinated Lyft via [employer].  Patient w/o means for d/c home.  AVS and ambulatory referral to home health attached in [facility] for [facility] agency.  No additional CM needs identified at this time.

## 2025-07-25 ENCOUNTER — RESULTS FOLLOW-UP (OUTPATIENT)
Dept: GASTROENTEROLOGY | Facility: CLINIC | Age: 60
End: 2025-07-25

## 2025-07-25 ENCOUNTER — TELEPHONE (OUTPATIENT)
Dept: GASTROENTEROLOGY | Facility: CLINIC | Age: 60
End: 2025-07-25

## 2025-07-25 DIAGNOSIS — R89.7 ABNORMALITY PRESENT ON GROSS PATHOLOGY: Primary | ICD-10-CM

## 2025-08-15 ENCOUNTER — TELEPHONE (OUTPATIENT)
Dept: GASTROENTEROLOGY | Facility: CLINIC | Age: 60
End: 2025-08-15